# Patient Record
Sex: MALE | Employment: OTHER | URBAN - METROPOLITAN AREA
[De-identification: names, ages, dates, MRNs, and addresses within clinical notes are randomized per-mention and may not be internally consistent; named-entity substitution may affect disease eponyms.]

---

## 2017-05-18 ENCOUNTER — HOSPITAL ENCOUNTER (EMERGENCY)
Age: 67
Discharge: HOME | End: 2017-05-18
Attending: EMERGENCY MEDICINE
Payer: MEDICAID

## 2017-05-18 ENCOUNTER — APPOINTMENT (OUTPATIENT)
Dept: CT IMAGING | Age: 67
End: 2017-05-18
Attending: EMERGENCY MEDICINE
Payer: MEDICAID

## 2017-05-18 VITALS
HEART RATE: 72 BPM | WEIGHT: 273 LBS | SYSTOLIC BLOOD PRESSURE: 172 MMHG | DIASTOLIC BLOOD PRESSURE: 100 MMHG | TEMPERATURE: 99.3 F | RESPIRATION RATE: 16 BRPM | OXYGEN SATURATION: 96 % | BODY MASS INDEX: 36.02 KG/M2

## 2017-05-18 DIAGNOSIS — R22.1 LOCALIZED SWELLING, MASS AND LUMP, NECK: ICD-10-CM

## 2017-05-18 DIAGNOSIS — R03.0 ELEVATED BLOOD PRESSURE READING: Primary | ICD-10-CM

## 2017-05-18 PROCEDURE — 74011250636 HC RX REV CODE- 250/636: Performed by: EMERGENCY MEDICINE

## 2017-05-18 PROCEDURE — 87070 CULTURE OTHR SPECIMN AEROBIC: CPT | Performed by: EMERGENCY MEDICINE

## 2017-05-18 PROCEDURE — 96374 THER/PROPH/DIAG INJ IV PUSH: CPT

## 2017-05-18 PROCEDURE — 85025 COMPLETE CBC W/AUTO DIFF WBC: CPT | Performed by: EMERGENCY MEDICINE

## 2017-05-18 PROCEDURE — 86140 C-REACTIVE PROTEIN: CPT | Performed by: EMERGENCY MEDICINE

## 2017-05-18 PROCEDURE — 88305 TISSUE EXAM BY PATHOLOGIST: CPT | Performed by: OTOLARYNGOLOGY

## 2017-05-18 PROCEDURE — 74011250636 HC RX REV CODE- 250/636: Performed by: PHYSICIAN ASSISTANT

## 2017-05-18 PROCEDURE — 74011250636 HC RX REV CODE- 250/636: Performed by: OTOLARYNGOLOGY

## 2017-05-18 PROCEDURE — 84145 PROCALCITONIN (PCT): CPT | Performed by: PHYSICIAN ASSISTANT

## 2017-05-18 PROCEDURE — 80053 COMPREHEN METABOLIC PANEL: CPT | Performed by: EMERGENCY MEDICINE

## 2017-05-18 PROCEDURE — 85652 RBC SED RATE AUTOMATED: CPT | Performed by: EMERGENCY MEDICINE

## 2017-05-18 PROCEDURE — 99282 EMERGENCY DEPT VISIT SF MDM: CPT

## 2017-05-18 PROCEDURE — 88173 CYTOPATH EVAL FNA REPORT: CPT | Performed by: OTOLARYNGOLOGY

## 2017-05-18 PROCEDURE — 87040 BLOOD CULTURE FOR BACTERIA: CPT | Performed by: PHYSICIAN ASSISTANT

## 2017-05-18 PROCEDURE — 96375 TX/PRO/DX INJ NEW DRUG ADDON: CPT

## 2017-05-18 PROCEDURE — 96361 HYDRATE IV INFUSION ADD-ON: CPT

## 2017-05-18 PROCEDURE — 76380 CAT SCAN FOLLOW-UP STUDY: CPT

## 2017-05-18 RX ORDER — LISINOPRIL 20 MG/1
TABLET ORAL DAILY
COMMUNITY
End: 2019-02-23

## 2017-05-18 RX ORDER — OXYCODONE AND ACETAMINOPHEN 5; 325 MG/1; MG/1
TABLET ORAL
Qty: 12 TAB | Refills: 0 | Status: SHIPPED | OUTPATIENT
Start: 2017-05-18 | End: 2017-08-20

## 2017-05-18 RX ORDER — KETOROLAC TROMETHAMINE 30 MG/ML
15 INJECTION, SOLUTION INTRAMUSCULAR; INTRAVENOUS
Status: COMPLETED | OUTPATIENT
Start: 2017-05-18 | End: 2017-05-18

## 2017-05-18 RX ORDER — LIDOCAINE HYDROCHLORIDE 20 MG/ML
10 INJECTION, SOLUTION INFILTRATION; PERINEURAL ONCE
Status: DISCONTINUED | OUTPATIENT
Start: 2017-05-18 | End: 2017-05-18

## 2017-05-18 RX ORDER — CLINDAMYCIN HYDROCHLORIDE 300 MG/1
300 CAPSULE ORAL 4 TIMES DAILY
Qty: 40 CAP | Refills: 0 | Status: SHIPPED | OUTPATIENT
Start: 2017-05-18 | End: 2017-05-28

## 2017-05-18 RX ORDER — MORPHINE SULFATE 4 MG/ML
4 INJECTION, SOLUTION INTRAMUSCULAR; INTRAVENOUS
Status: COMPLETED | OUTPATIENT
Start: 2017-05-18 | End: 2017-05-18

## 2017-05-18 RX ADMIN — KETOROLAC TROMETHAMINE 15 MG: 30 INJECTION, SOLUTION INTRAMUSCULAR at 17:21

## 2017-05-18 RX ADMIN — Medication 4 MG: at 17:56

## 2017-05-18 RX ADMIN — SODIUM CHLORIDE 1000 ML: 900 INJECTION, SOLUTION INTRAVENOUS at 17:20

## 2017-05-18 NOTE — ED PROVIDER NOTES
HPI Comments: Pt presents with c/o \"swelling\" around the L neck just below the L ear x 3 month. He had seen his PCP who had ordered US followed by MRI but due to body habitus he was unable to get MRI scheduled. He is here today since it started hurting since last night. Denies of any n/v. SOB , chest pain, night sweats, chills, or difficulty hearing on the L ear, dysphagia or cough. Remote hx of tabacco use, quit in the 80 s. Patient is a 74 y.o. male presenting with abscess. Abscess           Past Medical History:   Diagnosis Date    COPD     Hypertension        Social History     Social History    Marital status: SINGLE     Spouse name: N/A    Number of children: N/A    Years of education: N/A     Occupational History    Not on file. Social History Main Topics    Smoking status: Former Smoker     Quit date: 6/1/1984    Smokeless tobacco: Not on file    Alcohol use Yes      Comment: sometimes    Drug use: No    Sexual activity: Not on file     Other Topics Concern    Not on file     Social History Narrative         ALLERGIES: Review of patient's allergies indicates no known allergies. Review of Systems   Constitutional: Negative for chills, fatigue and fever. HENT: Negative for dental problem, drooling, ear discharge, ear pain, facial swelling, hearing loss and mouth sores. Eyes: Negative for redness and itching. Respiratory: Positive for wheezing. Negative for apnea, cough, choking, chest tightness and stridor. Cardiovascular: Negative for chest pain and palpitations. Gastrointestinal: Negative for abdominal pain, anal bleeding and blood in stool. Musculoskeletal: Positive for neck pain. Negative for back pain, gait problem, joint swelling, myalgias and neck stiffness.        Vitals:    05/18/17 1435   BP: (!) 172/100   Pulse: 72   Resp: 16   Temp: 99.3 °F (37.4 °C)   SpO2: 96%   Weight: 123.8 kg (273 lb)            Physical Exam   Constitutional: He is oriented to person, place, and time. He appears well-developed and well-nourished. HENT:   Head: Normocephalic and atraumatic. Eyes: Conjunctivae and EOM are normal.   Neck: Normal range of motion. Neck mass on L side just below L ear approx 3-4 cm in diameter, warm to touch TTP, indurated with no fluctuance    Cardiovascular: Normal rate and regular rhythm. Pulmonary/Chest: Effort normal and breath sounds normal.   Abdominal: Soft. Bowel sounds are normal.   Musculoskeletal: Normal range of motion. Neurological: He is alert and oriented to person, place, and time. Skin: Skin is warm. Psychiatric: He has a normal mood and affect. His behavior is normal. Judgment and thought content normal.        MDM  Number of Diagnoses or Management Options  Elevated blood pressure reading:   Localized swelling, mass and lump, neck:   Diagnosis management comments: SpENT evaluated patient at [facility] and performed needle drainage with note explaining further detail. Recommended to send patient on Clindamycin including IR guided biopsy and follow up ih there  Clinic as OP. Spoke to ENT Resident Na/ Attend Dr Bandar Zamora @ [phone] pm based on CT neck finding. RECOMMENDATION PER ENT   \"IR core biopsy, preferably with Dr Sonna Kocher at [facility] (call [phone] during business hours to schedule), in next 2 weeks, with subsequent follow up with Dr Sher Archer, Dr Josie Jacobs, Dr Vicente Blum or Dr Jamison Romero at [facility] Otolaryngology (call [phone] during business hours to schedule). Return to ED if worsening redness, swelling, pain, fevers, difficulty breathing, or difficulty eating -- would recommend scan with IV contrast if that occurs. Discussed with senior resident Dr Sherri Page who agrees with management plan. \"    ED Course       Procedures    No results found for this or any previous visit (from the past 12 hour(s)). CT NECK W/O CONTRAST    1. No evidence of maxillofacial fracture.   2. Indeterminate, large mixed attenuation left parotid mass, involving both the  deep and superficial portions of the gland, and in close anatomic proximity to  the retromandibular vein. ENT consultation is recommended. 3. Incidental note of a medialized retropharyngeal course to the right common  carotid artery. DISCHARGE NOTE:  Now    Emmett Little Jr.'s  results have been reviewed with him. He has been counseled regarding his diagnosis, treatment, and plan. He verbally conveys understanding and agreement of the signs, symptoms, diagnosis, treatment and prognosis and additionally agrees to follow up as discussed. He also agrees with the care-plan and conveys that all of his questions have been answered. I have also provided discharge instructions for him that include: educational information regarding their diagnosis and treatment, and list of reasons why they would want to return to the ED prior to their follow-up appointment, should his condition change. CLINICAL IMPRESSION:    1. Elevated blood pressure reading    2. Localized swelling, mass and lump, neck        AFTER VISIT PLAN:    Current Discharge Medication List      START taking these medications    Details   clindamycin (CLEOCIN) 300 mg capsule Take 1 Cap by mouth four (4) times daily for 10 days. Qty: 40 Cap, Refills: 0      oxyCODONE-acetaminophen (PERCOCET) 5-325 mg per tablet Take 1 tablet every 4-6 hours as needed for pain control. If you were instructed to try over the counter ibuprofen or tylenol, only take the percocet for pain not controlled with the over the counter medication. Qty: 12 Tab, Refills: 0         STOP taking these medications       HYDROcodone-acetaminophen (NORCO) 5-325 mg per tablet Comments:   Reason for Stopping: Follow-up Information     Follow up With Details Comments Contact Info    Keturah Severe, MD Schedule an appointment as soon as possible for a visit TO GET SCHEDULED FOR IR GUIDED CORE BIOPSY FOR L sided neck mass.   [address] [place] [address] Belen Wiggins [phone] [facility] Department Of Otolaryngology Head Neck Surgery Schedule an appointment as soon as possible for a visit in 1 day call there office for follow up appointment.   [address] [place] [address] EMERGENCY DEPT  if pain worsens or difficulty swallowing, fever oral over 101, nausea, vomiting or shortness of breath = 100 [facility]           Written by Artem Crowe PA-C

## 2017-05-18 NOTE — CONSULTS
Consult    Patient: Lloyd Rice MRN: [id number]  SSN: [id number]    YOB: 1950  Age: 74 y.o. Sex: male      Subjective:      Lloyd Rice is a 74 y.o. male who is being seen for left parotid mass. Patient first noticed mass 9 days ago, and feels it has acutely enlarged and become more tender over past 72 hours. Denies fevers, unintentional weight loss, dyspnea, dental pain, difficulty eating, vision changes, or paresthesias. He denies tobacco use. Past Medical History:   Diagnosis Date    COPD     Hypertension      History reviewed. No pertinent surgical history. History reviewed. No pertinent family history. Social History   Substance Use Topics    Smoking status: Former Smoker     Quit date: 6/1/1984    Smokeless tobacco: Not on file    Alcohol use Yes      Comment: sometimes      Current Facility-Administered Medications   Medication Dose Route Frequency Provider Last Rate Last Dose    sodium chloride 0.9 % bolus infusion 1,000 mL  1,000 mL IntraVENous ONCE Patel Case, MD        ketorolac (TORADOL) injection 15 mg  15 mg IntraVENous NOW Lawrence AMANDA Joe         Current Outpatient Prescriptions   Medication Sig Dispense Refill    lisinopril (PRINIVIL, ZESTRIL) 20 mg tablet Take  by mouth daily.  simvastatin (ZOCOR) 10 mg tablet Take  by mouth nightly.  HYDROcodone-acetaminophen (NORCO) 5-325 mg per tablet Take 1 Tab by mouth every six (6) hours as needed for Pain. 12 Tab 0        No Known Allergies    Review of Systems:  A comprehensive review of systems was negative except for that written in the History of Present Illness. Objective:     Vitals:    05/18/17 1435   BP: (!) 172/100   Pulse: 72   Resp: 16   Temp: 99.3 °F (37.4 °C)   SpO2: 96%   Weight: 123.8 kg (273 lb)        Physical Exam:  NAD  EOMI, appropriate, neuro intact, CN's II-XII intact bilaterally.    TM's, EAC's clear  Nasal passages clear  OP clear, almost edentulous with no remaining dentition of left maxilla or mandible, left parotid duct papilla moderately edematous, able to express clear saliva on palpation of superior (non-swollen) aspect of gland. Left facial swelling overlying inferior aspect of parotid gland -- tender to palpation, tense. No overlying erythema. Neck flat with no obvious lymphadenopathy. RRR/CTAB       Labs:   Lab Results   Component Value Date/Time    WBC 6.0 05/16/2017 03:07 PM    HGB 14.4 05/16/2017 03:07 PM    HCT 42.3 05/16/2017 03:07 PM    PLATELET 599 10/35/6397 03:07 PM    MCV 83.6 05/16/2017 03:07 PM     Lab Results   Component Value Date/Time    Sodium 138 05/16/2017 03:07 PM    Potassium 4.0 05/16/2017 03:07 PM    Chloride 107 05/16/2017 03:07 PM    CO2 26 05/16/2017 03:07 PM    Anion gap 5 05/16/2017 03:07 PM    Glucose 92 05/16/2017 03:07 PM    BUN 13 05/16/2017 03:07 PM    Creatinine 1.04 05/16/2017 03:07 PM    BUN/Creatinine ratio 13 05/16/2017 03:07 PM    GFR est AA >60 05/16/2017 03:07 PM    GFR est non-AA >60 05/16/2017 03:07 PM    Calcium 8.5 05/16/2017 03:07 PM     Imaging:   CT maxillofacial w/o contrast:   Findings:        The frontal bone, nasal bones, bilateral zygomatic arches, medial orbital walls,  maxillary sinus walls, pterygoid plates, and anterior maxillary spine are  intact. Temporal mandibular joint alignment is normal bilaterally. Mild  bilateral temporomandibular joint osteoarthritis present. No evidence of  mandibular fracture. There are numerous missing teeth throughout the maxilla and  mandible. No evidence to suggest periapical abscess.     The CT appearance of the orbits demonstrates postoperative changes from  bilateral lens replacements. No evidence of intraconal or extraconal fat  abnormality. The included frontal, anterior and posterior ethmoid air cells,  bilateral maxillary, and sphenoid sinuses are normal in appearance.  Bilateral  mastoid air cells are normal in appearance.     Included submandibular glands are normal in appearance. There is a mixed  attenuation mass present within the left parotid gland, spanning the superficial  and deep components of the gland, which measures approximately 2.8 x 3.3 x 4.7  cm in size. This mass is in close approximation to the retromandibular vein. The  right parotid gland is normal in appearance.     Although not performed as an angiographic evaluation, incidental note is made of  a medial retropharyngeal course to the right common carotid artery.     IMPRESSION  IMPRESSION:  1. No evidence of maxillofacial fracture. 2. Indeterminate, large mixed attenuation left parotid mass, involving both the  deep and superficial portions of the gland, and in close anatomic proximity to  the retromandibular vein. ENT consultation is recommended. 3. Incidental note of a medialized retropharyngeal course to the right common  carotid artery. Assessment:     74 y.o. M with left parotid mass, with likely stasis of secretions. Difficult to fully evaluate without contrasted scan. Plan:     Needle aspiration performed with 22 G needle after informed consent obtained. Aspirated 6 cc of saliva mixed with blood; did not appear grossly purulent but sent for culture and cytology. OK for home from ENT standpoint with ABx (e.g. Clinda or Augmentin/Flagyl if more affordable), option of steroids, and warm compresses. Recommend IR core biopsy, preferably with Dr Izabela Swann at [facility] (call [phone] during business hours to schedule), in next 2 weeks, with subsequent follow up with Dr Luis E Gagnon, Dr Perfecto Osler, Dr Talia Medrano or Dr Latisha Casas at [facility] Otolaryngology (call [phone] during business hours to schedule). Return to ED if worsening redness, swelling, pain, fevers, difficulty breathing, or difficulty eating -- would recommend scan with IV contrast if that occurs. Discussed with senior resident Dr Job Goltz who agrees with management plan.        Signed By: Beryl Quiñonez MD     May 18, 2017

## 2017-05-18 NOTE — DISCHARGE INSTRUCTIONS
Elevated Blood Pressure: Care Instructions  Your Care Instructions    Blood pressure is a measure of how hard the blood pushes against the walls of your arteries. It's normal for blood pressure to go up and down throughout the day. But if it stays up over time, you have high blood pressure. Two numbers tell you your blood pressure. The first number is the systolic pressure. It shows how hard the blood pushes when your heart is pumping. The second number is the diastolic pressure. It shows how hard the blood pushes between heartbeats, when your heart is relaxed and filling with blood. An ideal blood pressure in adults is less than 120/80 (say \"120 over 80\"). High blood pressure is 140/90 or higher. You have high blood pressure if your top number is 140 or higher or your bottom number is 90 or higher, or both. The main test for high blood pressure is simple, fast, and painless. To diagnose high blood pressure, your doctor will test your blood pressure at different times. After testing your blood pressure, your doctor may ask you to test it again when you are home. If you are diagnosed with high blood pressure, you can work with your doctor to make a long-term plan to manage it. Follow-up care is a key part of your treatment and safety. Be sure to make and go to all appointments, and call your doctor if you are having problems. It's also a good idea to know your test results and keep a list of the medicines you take. How can you care for yourself at home? · Do not smoke. Smoking increases your risk for heart attack and stroke. If you need help quitting, talk to your doctor about stop-smoking programs and medicines. These can increase your chances of quitting for good. · Stay at a healthy weight. · Try to limit how much sodium you eat to less than 2,300 milligrams (mg) a day. Your doctor may ask you to try to eat less than 1,500 mg a day. · Be physically active.  Get at least 30 minutes of exercise on most days of the week. Walking is a good choice. You also may want to do other activities, such as running, swimming, cycling, or playing tennis or team sports. · Avoid or limit alcohol. Talk to your doctor about whether you can drink any alcohol. · Eat plenty of fruits, vegetables, and low-fat dairy products. Eat less saturated and total fats. · Learn how to check your blood pressure at home. When should you call for help? Call your doctor now or seek immediate medical care if:  · Your blood pressure is much higher than normal (such as 180/110 or higher). · You think high blood pressure is causing symptoms such as:  ¨ Severe headache. ¨ Blurry vision. Watch closely for changes in your health, and be sure to contact your doctor if:  · You do not get better as expected. Where can you learn more? Go to http://vinod-joshua.info/. Enter S976 in the search box to learn more about \"Elevated Blood Pressure: Care Instructions. \"  Current as of: October 19, 2016  Content Version: 11.2  © 9394-6598 [employer]. Care instructions adapted under license by [employer] (which disclaims liability or warranty for this information). If you have questions about a medical condition or this instruction, always ask your healthcare professional. [address] any warranty or liability for your use of this information.

## 2017-05-19 LAB
ALBUMIN SERPL BCP-MCNC: 3.1 G/DL (ref 3.4–5)
ALBUMIN/GLOB SERPL: 0.6 {RATIO} (ref 0.8–1.7)
ALP SERPL-CCNC: 85 U/L (ref 45–117)
ALT SERPL-CCNC: 33 U/L (ref 16–61)
ANION GAP BLD CALC-SCNC: 5 MMOL/L (ref 3–18)
AST SERPL W P-5'-P-CCNC: 41 U/L (ref 15–37)
BASOPHILS # BLD AUTO: 0 K/UL (ref 0–0.06)
BASOPHILS # BLD: 0 % (ref 0–2)
BILIRUB SERPL-MCNC: 1 MG/DL (ref 0.2–1)
BUN SERPL-MCNC: 13 MG/DL (ref 7–18)
BUN/CREAT SERPL: 13 (ref 12–20)
CALCIUM SERPL-MCNC: 8.5 MG/DL (ref 8.5–10.1)
CHLORIDE SERPL-SCNC: 107 MMOL/L (ref 100–108)
CO2 SERPL-SCNC: 26 MMOL/L (ref 21–32)
CREAT SERPL-MCNC: 1.04 MG/DL (ref 0.6–1.3)
CRP SERPL-MCNC: 1.2 MG/DL (ref 0–0.3)
DIFFERENTIAL METHOD BLD: ABNORMAL
EOSINOPHIL # BLD: 0 K/UL (ref 0–0.4)
EOSINOPHIL NFR BLD: 1 % (ref 0–5)
ERYTHROCYTE [DISTWIDTH] IN BLOOD BY AUTOMATED COUNT: 13.7 % (ref 11.6–14.5)
ERYTHROCYTE [SEDIMENTATION RATE] IN BLOOD: 19 MM/HR (ref 0–20)
GLOBULIN SER CALC-MCNC: 5.3 G/DL (ref 2–4)
GLUCOSE SERPL-MCNC: 92 MG/DL (ref 74–99)
HCT VFR BLD AUTO: 42.3 % (ref 36–48)
HGB BLD-MCNC: 14.4 G/DL (ref 13–16)
LYMPHOCYTES # BLD AUTO: 28 % (ref 21–52)
LYMPHOCYTES # BLD: 1.7 K/UL (ref 0.9–3.6)
MCH RBC QN AUTO: 28.5 PG (ref 24–34)
MCHC RBC AUTO-ENTMCNC: 34 G/DL (ref 31–37)
MCV RBC AUTO: 83.6 FL (ref 74–97)
MONOCYTES # BLD: 0.7 K/UL (ref 0.05–1.2)
MONOCYTES NFR BLD AUTO: 12 % (ref 3–10)
NEUTS SEG # BLD: 3.6 K/UL (ref 1.8–8)
NEUTS SEG NFR BLD AUTO: 59 % (ref 40–73)
PLATELET # BLD AUTO: 135 K/UL (ref 135–420)
PMV BLD AUTO: 10.3 FL (ref 9.2–11.8)
POTASSIUM SERPL-SCNC: 4 MMOL/L (ref 3.5–5.5)
PROCALCITONIN SERPL-MCNC: 0.05 NG/ML (ref 0–0.08)
PROT SERPL-MCNC: 8.4 G/DL (ref 6.4–8.2)
RBC # BLD AUTO: 5.06 M/UL (ref 4.7–5.5)
SODIUM SERPL-SCNC: 138 MMOL/L (ref 136–145)
WBC # BLD AUTO: 6 K/UL (ref 4.6–13.2)

## 2017-05-19 NOTE — ED NOTES
I have reviewed discharge instructions with the patient. The patient verbalized understanding. Patient made aware that he should not drive while taking prescription narcotics. Patient verbalized understanding. Patient armband removed and shredded.

## 2017-05-23 LAB
BACTERIA SPEC CULT: NORMAL
GRAM STN SPEC: NORMAL
GRAM STN SPEC: NORMAL
SERVICE CMNT-IMP: NORMAL

## 2017-05-24 LAB
BACTERIA SPEC CULT: NORMAL
BACTERIA SPEC CULT: NORMAL
SERVICE CMNT-IMP: NORMAL
SERVICE CMNT-IMP: NORMAL

## 2017-06-14 ENCOUNTER — HOSPITAL ENCOUNTER (EMERGENCY)
Age: 67
Discharge: HOME | End: 2017-06-14
Attending: EMERGENCY MEDICINE
Payer: MEDICAID

## 2017-06-14 ENCOUNTER — APPOINTMENT (OUTPATIENT)
Dept: GENERAL RADIOLOGY | Age: 67
End: 2017-06-14
Attending: EMERGENCY MEDICINE
Payer: MEDICAID

## 2017-06-14 VITALS
HEART RATE: 82 BPM | OXYGEN SATURATION: 98 % | DIASTOLIC BLOOD PRESSURE: 74 MMHG | RESPIRATION RATE: 20 BRPM | SYSTOLIC BLOOD PRESSURE: 127 MMHG | TEMPERATURE: 98.7 F

## 2017-06-14 DIAGNOSIS — T50.901A DRUG OVERDOSE, ACCIDENTAL OR UNINTENTIONAL, INITIAL ENCOUNTER: Primary | ICD-10-CM

## 2017-06-14 DIAGNOSIS — F14.10 COCAINE ABUSE (HCC): ICD-10-CM

## 2017-06-14 LAB
ALBUMIN SERPL BCP-MCNC: 3.2 G/DL (ref 3.4–5)
ALBUMIN/GLOB SERPL: 0.7 {RATIO} (ref 0.8–1.7)
ALP SERPL-CCNC: 90 U/L (ref 45–117)
ALT SERPL-CCNC: 39 U/L (ref 16–61)
AMPHET UR QL SCN: NEGATIVE
ANION GAP BLD CALC-SCNC: 12 MMOL/L (ref 3–18)
AST SERPL W P-5'-P-CCNC: 51 U/L (ref 15–37)
ATRIAL RATE: 102 BPM
BARBITURATES UR QL SCN: NEGATIVE
BASOPHILS # BLD AUTO: 0 K/UL (ref 0–0.06)
BASOPHILS # BLD: 0 % (ref 0–2)
BENZODIAZ UR QL: POSITIVE
BILIRUB SERPL-MCNC: 0.6 MG/DL (ref 0.2–1)
BUN SERPL-MCNC: 7 MG/DL (ref 7–18)
BUN/CREAT SERPL: 6 (ref 12–20)
CALCIUM SERPL-MCNC: 8.2 MG/DL (ref 8.5–10.1)
CALCULATED P AXIS, ECG09: 49 DEGREES
CALCULATED R AXIS, ECG10: -18 DEGREES
CALCULATED T AXIS, ECG11: 43 DEGREES
CANNABINOIDS UR QL SCN: NEGATIVE
CHLORIDE SERPL-SCNC: 108 MMOL/L (ref 100–108)
CK MB CFR SERPL CALC: 0.9 % (ref 0–4)
CK MB SERPL-MCNC: 3.6 NG/ML (ref 5–25)
CK SERPL-CCNC: 383 U/L (ref 39–308)
CO2 SERPL-SCNC: 22 MMOL/L (ref 21–32)
COCAINE UR QL SCN: POSITIVE
CREAT SERPL-MCNC: 1.26 MG/DL (ref 0.6–1.3)
DIAGNOSIS, 93000: NORMAL
DIFFERENTIAL METHOD BLD: ABNORMAL
EOSINOPHIL # BLD: 0 K/UL (ref 0–0.4)
EOSINOPHIL NFR BLD: 1 % (ref 0–5)
ERYTHROCYTE [DISTWIDTH] IN BLOOD BY AUTOMATED COUNT: 14.3 % (ref 11.6–14.5)
ETHANOL SERPL-MCNC: 32 MG/DL (ref 0–3)
GLOBULIN SER CALC-MCNC: 4.9 G/DL (ref 2–4)
GLUCOSE SERPL-MCNC: 143 MG/DL (ref 74–99)
HCT VFR BLD AUTO: 42.8 % (ref 36–48)
HDSCOM,HDSCOM: ABNORMAL
HGB BLD-MCNC: 14.2 G/DL (ref 13–16)
LYMPHOCYTES # BLD AUTO: 30 % (ref 21–52)
LYMPHOCYTES # BLD: 2.2 K/UL (ref 0.9–3.6)
MCH RBC QN AUTO: 28.6 PG (ref 24–34)
MCHC RBC AUTO-ENTMCNC: 33.2 G/DL (ref 31–37)
MCV RBC AUTO: 86.3 FL (ref 74–97)
METHADONE UR QL: NEGATIVE
MONOCYTES # BLD: 0.4 K/UL (ref 0.05–1.2)
MONOCYTES NFR BLD AUTO: 5 % (ref 3–10)
NEUTS SEG # BLD: 4.8 K/UL (ref 1.8–8)
NEUTS SEG NFR BLD AUTO: 64 % (ref 40–73)
OPIATES UR QL: NEGATIVE
P-R INTERVAL, ECG05: 126 MS
PCP UR QL: NEGATIVE
PLATELET # BLD AUTO: 130 K/UL (ref 135–420)
PMV BLD AUTO: 10.8 FL (ref 9.2–11.8)
POTASSIUM SERPL-SCNC: 4.6 MMOL/L (ref 3.5–5.5)
PROT SERPL-MCNC: 8.1 G/DL (ref 6.4–8.2)
Q-T INTERVAL, ECG07: 354 MS
QRS DURATION, ECG06: 82 MS
QTC CALCULATION (BEZET), ECG08: 461 MS
RBC # BLD AUTO: 4.96 M/UL (ref 4.7–5.5)
SODIUM SERPL-SCNC: 142 MMOL/L (ref 136–145)
TROPONIN I SERPL-MCNC: 0.02 NG/ML (ref 0–0.04)
VENTRICULAR RATE, ECG03: 102 BPM
WBC # BLD AUTO: 7.4 K/UL (ref 4.6–13.2)

## 2017-06-14 PROCEDURE — 80053 COMPREHEN METABOLIC PANEL: CPT | Performed by: EMERGENCY MEDICINE

## 2017-06-14 PROCEDURE — 74011250636 HC RX REV CODE- 250/636: Performed by: EMERGENCY MEDICINE

## 2017-06-14 PROCEDURE — 80307 DRUG TEST PRSMV CHEM ANLYZR: CPT | Performed by: EMERGENCY MEDICINE

## 2017-06-14 PROCEDURE — 99285 EMERGENCY DEPT VISIT HI MDM: CPT

## 2017-06-14 PROCEDURE — 85025 COMPLETE CBC W/AUTO DIFF WBC: CPT | Performed by: EMERGENCY MEDICINE

## 2017-06-14 PROCEDURE — 96374 THER/PROPH/DIAG INJ IV PUSH: CPT

## 2017-06-14 PROCEDURE — 82550 ASSAY OF CK (CPK): CPT | Performed by: EMERGENCY MEDICINE

## 2017-06-14 PROCEDURE — 71010 XR CHEST PORT: CPT

## 2017-06-14 PROCEDURE — 93005 ELECTROCARDIOGRAM TRACING: CPT

## 2017-06-14 RX ORDER — NALOXONE HYDROCHLORIDE 1 MG/ML
INJECTION INTRAMUSCULAR; INTRAVENOUS; SUBCUTANEOUS
Status: DISCONTINUED
Start: 2017-06-14 | End: 2017-06-14 | Stop reason: HOSPADM

## 2017-06-14 RX ORDER — NALOXONE HYDROCHLORIDE 1 MG/ML
2 INJECTION INTRAMUSCULAR; INTRAVENOUS; SUBCUTANEOUS
Status: COMPLETED | OUTPATIENT
Start: 2017-06-14 | End: 2017-06-14

## 2017-06-14 RX ADMIN — NALOXONE HYDROCHLORIDE 2 MG: 1 INJECTION PARENTERAL at 02:10

## 2017-06-14 NOTE — ED PROVIDER NOTES
HPI Comments: Ghanshyam Carson is a 74 y.o. Male who found unresponsive via friends, attempted to revive with throwing water on patient, called ems who arrived noted dec respirations, given narcan with return of normal loc. Pt denies any drug use but has h/o opiate, cocaine abuse in past. Denies any recent illness and does not recall what happened. The history is provided by the patient and the EMS personnel. Past Medical History:   Diagnosis Date    COPD     Hypertension        History reviewed. No pertinent surgical history. History reviewed. No pertinent family history. Social History     Social History    Marital status: SINGLE     Spouse name: N/A    Number of children: N/A    Years of education: N/A     Occupational History    Not on file. Social History Main Topics    Smoking status: Former Smoker     Quit date: 6/1/1984    Smokeless tobacco: Not on file    Alcohol use Yes      Comment: sometimes    Drug use: No    Sexual activity: Not on file     Other Topics Concern    Not on file     Social History Narrative         ALLERGIES: Review of patient's allergies indicates no known allergies. Review of Systems   Constitutional: Negative for fever. HENT: Negative for sore throat. Eyes: Negative for visual disturbance. Respiratory: Positive for apnea and shortness of breath. Cardiovascular: Negative for chest pain and leg swelling. Gastrointestinal: Negative for abdominal pain. Genitourinary: Negative for difficulty urinating. Musculoskeletal: Negative for gait problem (none recent). Skin: Negative for wound. Allergic/Immunologic: Negative for immunocompromised state. Neurological: Positive for syncope. Hematological: Does not bruise/bleed easily. Psychiatric/Behavioral: Negative for suicidal ideas.        Vitals:    06/14/17 0400 06/14/17 0445 06/14/17 0500 06/14/17 0515   BP: 115/64 132/71 133/71 119/69   Pulse: 83 77 76 75   Resp: 27 24 24 25 Temp:       SpO2: 100% 98% 96% 98%            Physical Exam   Constitutional: He is oriented to person, place, and time. Non-toxic appearance. He does not appear ill. No distress. HENT:   Head: Normocephalic and atraumatic. Right Ear: External ear normal.   Left Ear: External ear normal.   Nose: Nose normal.   Mouth/Throat: Oropharynx is clear and moist. No oropharyngeal exudate. Powder substance in nares     Eyes: Conjunctivae are normal.   Neck: Normal range of motion. Cardiovascular: Normal rate, regular rhythm, normal heart sounds and intact distal pulses. Pulmonary/Chest: Effort normal and breath sounds normal. No respiratory distress. He has no wheezes. He has no rales. Abdominal: Soft. There is no tenderness. Musculoskeletal: Normal range of motion. He exhibits no edema. Neurological: He is alert and oriented to person, place, and time. He displays no tremor. No cranial nerve deficit (3-12) or sensory deficit (gross touch). He exhibits normal muscle tone. GCS eye subscore is 4. GCS verbal subscore is 5. GCS motor subscore is 6. Skin: Skin is warm and dry. He is not diaphoretic. Psychiatric: His behavior is normal.   Nursing note and vitals reviewed.        [facility]  ED Course       Procedures           Vitals:  Patient Vitals for the past 12 hrs:   Temp Pulse Resp BP SpO2   06/14/17 0515 - 75 25 119/69 98 %   06/14/17 0500 - 76 24 133/71 96 %   06/14/17 0445 - 77 24 132/71 98 %   06/14/17 0400 - 83 27 115/64 100 %   06/14/17 0345 - 84 26 124/64 99 %   06/14/17 0330 - 87 28 128/64 99 %   06/14/17 0315 - 84 28 130/72 99 %   06/14/17 0300 - 83 28 138/73 99 %   06/14/17 0245 - 81 28 148/69 100 %   06/14/17 0230 - 91 (!) 33 162/76 97 %   06/14/17 0226 98.7 °F (37.1 °C) (!) 107 (!) 34 162/76 97 %         Medications ordered:   Medications   naloxone (NARCAN) 1 mg/mL injection (not administered)   naloxone (NARCAN) injection 2 mg (2 mg IntraVENous Given 6/14/17 0210)         Lab findings:  Recent Results (from the past 12 hour(s))   DRUG SCREEN, URINE    Collection Time: 06/14/17  2:16 AM   Result Value Ref Range    BENZODIAZEPINE POSITIVE (A) NEG      BARBITURATES NEGATIVE  NEG      THC (TH-CANNABINOL) NEGATIVE  NEG      OPIATES NEGATIVE  NEG      PCP(PHENCYCLIDINE) NEGATIVE  NEG      COCAINE POSITIVE (A) NEG      AMPHETAMINE NEGATIVE  NEG      METHADONE NEGATIVE       HDSCOM (NOTE)    CBC WITH AUTOMATED DIFF    Collection Time: 06/14/17  2:20 AM   Result Value Ref Range    WBC 7.4 4.6 - 13.2 K/uL    RBC 4.96 4.70 - 5.50 M/uL    HGB 14.2 13.0 - 16.0 g/dL    HCT 42.8 36.0 - 48.0 %    MCV 86.3 74.0 - 97.0 FL    MCH 28.6 24.0 - 34.0 PG    MCHC 33.2 31.0 - 37.0 g/dL    RDW 14.3 11.6 - 14.5 %    PLATELET 790 (L) 775 - 420 K/uL    MPV 10.8 9.2 - 11.8 FL    NEUTROPHILS 64 40 - 73 %    LYMPHOCYTES 30 21 - 52 %    MONOCYTES 5 3 - 10 %    EOSINOPHILS 1 0 - 5 %    BASOPHILS 0 0 - 2 %    ABS. NEUTROPHILS 4.8 1.8 - 8.0 K/UL    ABS. LYMPHOCYTES 2.2 0.9 - 3.6 K/UL    ABS. MONOCYTES 0.4 0.05 - 1.2 K/UL    ABS. EOSINOPHILS 0.0 0.0 - 0.4 K/UL    ABS. BASOPHILS 0.0 0.0 - 0.06 K/UL    DF AUTOMATED     METABOLIC PANEL, COMPREHENSIVE    Collection Time: 06/14/17  2:20 AM   Result Value Ref Range    Sodium 142 136 - 145 mmol/L    Potassium 4.6 3.5 - 5.5 mmol/L    Chloride 108 100 - 108 mmol/L    CO2 22 21 - 32 mmol/L    Anion gap 12 3.0 - 18 mmol/L    Glucose 143 (H) 74 - 99 mg/dL    BUN 7 7.0 - 18 MG/DL    Creatinine 1.26 0.6 - 1.3 MG/DL    BUN/Creatinine ratio 6 (L) 12 - 20      GFR est AA >60 >60 ml/min/1.73m2    GFR est non-AA 57 (L) >60 ml/min/1.73m2    Calcium 8.2 (L) 8.5 - 10.1 MG/DL    Bilirubin, total 0.6 0.2 - 1.0 MG/DL    ALT (SGPT) 39 16 - 61 U/L    AST (SGOT) 51 (H) 15 - 37 U/L    Alk.  phosphatase 90 45 - 117 U/L    Protein, total 8.1 6.4 - 8.2 g/dL    Albumin 3.2 (L) 3.4 - 5.0 g/dL    Globulin 4.9 (H) 2.0 - 4.0 g/dL    A-G Ratio 0.7 (L) 0.8 - 1.7     CARDIAC PANEL,(CK, CKMB & TROPONIN)    Collection Time: 06/14/17 2:20 AM   Result Value Ref Range     (H) 39 - 308 U/L    CK - MB 3.6 (H) <3.6 ng/ml    CK-MB Index 0.9 0.0 - 4.0 %    Troponin-I, Qt. 0.02 0.0 - 0.045 NG/ML   EKG, 12 LEAD, INITIAL    Collection Time: 06/14/17  2:21 AM   Result Value Ref Range    Ventricular Rate 102 BPM    Atrial Rate 102 BPM    P-R Interval 126 ms    QRS Duration 82 ms    Q-T Interval 354 ms    QTC Calculation (Bezet) 461 ms    Calculated P Axis 49 degrees    Calculated R Axis -18 degrees    Calculated T Axis 43 degrees    Diagnosis       Sinus tachycardia  Possible Left atrial enlargement  Borderline ECG  When compared with ECG of 05-DEC-2010 13:00,  No significant change was found         EKG interpretation by ED Physician:  Sinus tach with no acute st tw changes  Rate 102, qtc 461    X-Ray, CT or other radiology findings or impressions:  XR CHEST PORT    (Results Pending)   nap ep interp    Progress notes, Consult notes or additional Procedure notes:   D/w pt results, need to stop using drugs as it may result in death to which he expressed understanding  No further resp depression, sig hypoxia,. Vitals stable  I have discussed with patient and/or family/sig other the results, interpretation of any imaging if performed, suspected diagnosis and treatment plan to include instructions regarding the diagnoses listed to which understanding was expressed with all questions answered    ED Critical Care Note    System at risk for life threatening failure: cardiac, neuro, pulm  Associated problems: tachy, hypoxia, neuro    Critical Care services provided: bedside management, d/w pt, documentation  Excluded procedures (time not included in critical care): ecg interp    Total Critical Care Time (in minutes) 38          Reevaluation of patient:   stable    Disposition:  Diagnosis:   1. Drug overdose, accidental or unintentional, initial encounter    2.  Cocaine abuse        Disposition: home      Follow-up Information     Follow up With Details Comments Contact Info    Rafael Green MD Schedule an appointment as soon as possible for a visit  [address] [phone]      [facility] EMERGENCY DEPT  If symptoms worsen [address]  [phone]            Patient's Medications   Start Taking    No medications on file   Continue Taking    LISINOPRIL (PRINIVIL, ZESTRIL) 20 MG TABLET    Take  by mouth daily. OXYCODONE-ACETAMINOPHEN (PERCOCET) 5-325 MG PER TABLET    Take 1 tablet every 4-6 hours as needed for pain control. If you were instructed to try over the counter ibuprofen or tylenol, only take the percocet for pain not controlled with the over the counter medication. SIMVASTATIN (ZOCOR) 10 MG TABLET    Take  by mouth nightly.    These Medications have changed    No medications on file   Stop Taking    No medications on file

## 2017-06-14 NOTE — ED TRIAGE NOTES
EMS called for unresponsive patient. Upon arrival found patient unresponsive with agonal breathing and pinpoint pupils. Administered 2mg NARCAN IV. Patient then became more responsive and respirations improved. Upon arrival patient awake and answering questions. Patient denies drug use, but admits to ETOH consumption.

## 2017-06-14 NOTE — ED NOTES
Patient states he was drinking and took some of the \"fentanyl stuff\". Patient does not appear to be in any respiratory distress.

## 2017-08-11 ENCOUNTER — HOSPITAL ENCOUNTER (OUTPATIENT)
Dept: LAB | Age: 67
Discharge: HOME | End: 2017-08-11

## 2017-08-11 DIAGNOSIS — M79.10 MYALGIA: ICD-10-CM

## 2017-08-11 PROCEDURE — 99001 SPECIMEN HANDLING PT-LAB: CPT | Performed by: INTERNAL MEDICINE

## 2017-08-20 ENCOUNTER — APPOINTMENT (OUTPATIENT)
Dept: GENERAL RADIOLOGY | Age: 67
End: 2017-08-20
Attending: EMERGENCY MEDICINE
Payer: MEDICAID

## 2017-08-20 ENCOUNTER — HOSPITAL ENCOUNTER (EMERGENCY)
Age: 67
Discharge: HOME | End: 2017-08-20
Attending: EMERGENCY MEDICINE
Payer: MEDICAID

## 2017-08-20 VITALS
HEART RATE: 77 BPM | DIASTOLIC BLOOD PRESSURE: 113 MMHG | SYSTOLIC BLOOD PRESSURE: 176 MMHG | RESPIRATION RATE: 17 BRPM | HEIGHT: 72 IN | WEIGHT: 255 LBS | BODY MASS INDEX: 34.54 KG/M2 | TEMPERATURE: 98.2 F | OXYGEN SATURATION: 100 %

## 2017-08-20 DIAGNOSIS — S82.425A CLOSED NONDISPLACED TRANSVERSE FRACTURE OF SHAFT OF LEFT FIBULA, INITIAL ENCOUNTER: Primary | ICD-10-CM

## 2017-08-20 PROCEDURE — 99283 EMERGENCY DEPT VISIT LOW MDM: CPT

## 2017-08-20 PROCEDURE — 93971 EXTREMITY STUDY: CPT

## 2017-08-20 PROCEDURE — 74011250637 HC RX REV CODE- 250/637: Performed by: EMERGENCY MEDICINE

## 2017-08-20 PROCEDURE — 73590 X-RAY EXAM OF LOWER LEG: CPT

## 2017-08-20 RX ORDER — OXYCODONE AND ACETAMINOPHEN 5; 325 MG/1; MG/1
TABLET ORAL
Qty: 12 TAB | Refills: 0 | Status: SHIPPED | OUTPATIENT
Start: 2017-08-20 | End: 2018-08-24 | Stop reason: CLARIF

## 2017-08-20 RX ORDER — HYDROCODONE BITARTRATE AND ACETAMINOPHEN 5; 325 MG/1; MG/1
TABLET ORAL
Qty: 12 TAB | Refills: 0 | Status: SHIPPED | OUTPATIENT
Start: 2017-08-20 | End: 2018-08-24 | Stop reason: CLARIF

## 2017-08-20 RX ORDER — OXYCODONE AND ACETAMINOPHEN 5; 325 MG/1; MG/1
1 TABLET ORAL
Qty: 12 TAB | Refills: 0 | Status: SHIPPED | OUTPATIENT
Start: 2017-08-20 | End: 2017-08-20

## 2017-08-20 RX ORDER — OXYCODONE AND ACETAMINOPHEN 5; 325 MG/1; MG/1
1 TABLET ORAL
Status: COMPLETED | OUTPATIENT
Start: 2017-08-20 | End: 2017-08-20

## 2017-08-20 RX ADMIN — OXYCODONE HYDROCHLORIDE AND ACETAMINOPHEN 1 TABLET: 5; 325 TABLET ORAL at 12:10

## 2017-08-20 NOTE — ED PROVIDER NOTES
HPI Comments: 11:46 AM Aubrey Almaraz is a 74 y.o. male with h/o HTN who presents to ED complaining of left leg pain onset Monday. Patient says he was moving a rock with a stick when the stick bounced back and hit him in the leg. He admits to being nauseous from taking ibuprofen. Had no relief with the ibuprofen. Pain is 9/10. Denies CP, SOB, fever, and vomiting. No other concerns or symptoms at this time. PCP: Joyce Flores MD      The history is provided by the patient. Past Medical History:   Diagnosis Date    COPD     Hypertension        History reviewed. No pertinent surgical history. History reviewed. No pertinent family history. Social History     Social History    Marital status: SINGLE     Spouse name: N/A    Number of children: N/A    Years of education: N/A     Occupational History    Not on file. Social History Main Topics    Smoking status: Former Smoker     Quit date: 6/1/1984    Smokeless tobacco: Never Used    Alcohol use Yes      Comment: sometimes    Drug use: No    Sexual activity: Not on file     Other Topics Concern    Not on file     Social History Narrative         ALLERGIES: Review of patient's allergies indicates no known allergies. Review of Systems   Constitutional: Negative for diaphoresis and fever. HENT: Negative for congestion and sore throat. Eyes: Negative for pain and itching. Respiratory: Negative for cough and shortness of breath. Cardiovascular: Negative for chest pain and palpitations. Gastrointestinal: Negative for abdominal pain and diarrhea. Endocrine: Negative for polydipsia and polyuria. Genitourinary: Negative for dysuria and hematuria. Musculoskeletal: Positive for myalgias (left leg pain). Negative for arthralgias. Skin: Negative for rash and wound. Neurological: Negative for seizures and syncope. Hematological: Does not bruise/bleed easily.    Psychiatric/Behavioral: Negative for agitation and hallucinations. Vitals:    08/20/17 1135   BP: (!) 176/113   Pulse: 77   Resp: 17   Temp: 98.2 °F (36.8 °C)   SpO2: 100%   Weight: 115.7 kg (255 lb)   Height: 6' (1.829 m)            Physical Exam   Constitutional: He appears well-developed and well-nourished. HENT:   Head: Normocephalic and atraumatic. Eyes: Conjunctivae are normal. No scleral icterus. Neck: Normal range of motion. Neck supple. No JVD present. Cardiovascular: Normal rate, regular rhythm and normal heart sounds. 4 intact extremity pulses   Pulmonary/Chest: Effort normal and breath sounds normal.   Abdominal: Soft. He exhibits no mass. There is no tenderness. Musculoskeletal: Normal range of motion. Left lower leg: He exhibits tenderness. He exhibits no swelling and no deformity. No skin lesion. Neurovascular Intact. Lymphadenopathy:     He has no cervical adenopathy. Neurological: He is alert. Skin: Skin is warm and dry. Nursing note and vitals reviewed. MDM  Number of Diagnoses or Management Options  Diagnosis management comments: Unlikely fracture or DVT. Mot likely contusion. Will give percocet for pain. Will Xray and US. ED Course       Procedures      Vitals:  Patient Vitals for the past 12 hrs:   Temp Pulse Resp BP SpO2   08/20/17 1135 98.2 °F (36.8 °C) 77 17 (!) 176/113 100 %       Medications ordered:   Medications   oxyCODONE-acetaminophen (PERCOCET) 5-325 mg per tablet 1 Tab (1 Tab Oral Given 8/20/17 1210)         X-Ray, CT or other radiology findings or impressions:  DUPLEX LOWER EXT VENOUS LEFT         XR TIB/FIB LT    (Results Pending)    duplex LLE neg for DVT    XR shows left proximal fracture of the fibula        Progress notes, Consult notes, and Reevaluation of patient:   1:08 PM pain well controlled with a percocet. Discussed finding of no dvt, has fibular fracture left side, weight bear as tolerated, script for percocet #12  Reviewed in , appears he's in pain management.   In light of acute fracture, will give small script for percocet. Disposition:  Diagnosis:   1. Closed nondisplaced transverse fracture of shaft of left fibula, initial encounter        Disposition: home    Follow-up Information     Follow up With Details Comments [address] [address], [place] [id number]  [phone]             Patient's Medications   Start Taking    HYDROCODONE-ACETAMINOPHEN (NORCO) 5-325 MG PER TABLET    Take 1-2 tablets PO every 4-6 hours as needed for pain control. If over the counter ibuprofen or acetaminophen was suggested, then only take the vicodin for pain not well controlled with the over the counter medication. Continue Taking    LISINOPRIL (PRINIVIL, ZESTRIL) 20 MG TABLET    Take  by mouth daily. SIMVASTATIN (ZOCOR) 10 MG TABLET    Take  by mouth nightly. These Medications have changed    No medications on file   Stop Taking    OXYCODONE-ACETAMINOPHEN (PERCOCET) 5-325 MG PER TABLET    Take 1 tablet every 4-6 hours as needed for pain control. If you were instructed to try over the counter ibuprofen or tylenol, only take the percocet for pain not controlled with the over the counter medication. Mya Wadlen MD acting as a scribe for and in the presence of Rebecca Leon MD      August 20, 2017 at 1:07 PM       Provider Attestation:      I personally performed the services described in the documentation, reviewed the documentation, as recorded by the scribe in my presence, and it accurately and completely records my words and actions.  August 20, 2017 at 1:07 PM - Rebecca Leon MD

## 2017-08-20 NOTE — PROCEDURES
Jamee  *** FINAL REPORT ***    Name: Jean Carlos Alvarado  MRN: [id number]    Inpatient  : 13 Sep 1950  HIS Order #: [id number]  [id number] [facility] Visit #: [id number]  Date: 20 Aug 2017    TYPE OF TEST: Peripheral Venous Testing    REASON FOR TEST  Pain in limb    Left Leg:-  Deep venous thrombosis:           No  Superficial venous thrombosis:    No  Deep venous insufficiency:        Not examined  Superficial venous insufficiency: Not examined      INTERPRETATION/FINDINGS  Duplex images were obtained using 2-D gray scale, color flow, and  spectral Doppler analysis. Left leg :  1. Deep vein(s) visualized include the common femoral, deep femoral,  proximal femoral, mid femoral, distal femoral, popliteal(above knee),  popliteal(fossa), popliteal(below knee), posterior tibial and peroneal   veins. 2. No evidence of deep venous thrombosis detected in the veins  visualized. 3. No evidence of deep vein thrombosis in the contralateral common  femoral vein. 4. Superficial vein(s) visualized include the great saphenous and  small saphenous veins. 5. No evidence of superficial thrombosis detected. ADDITIONAL COMMENTS  Results given to Dr. Vazquez Officer    I have personally reviewed the data relevant to the interpretation of  this  study. TECHNOLOGIST: Mehul Fernandez RDMS, SHANTEL  Signed: 2017 12:07 PM    PHYSICIAN: Roseann Marshall MD  Signed: 2017 07:38 PM

## 2018-07-08 ENCOUNTER — HOSPITAL ENCOUNTER (EMERGENCY)
Age: 68
Discharge: HOME | End: 2018-07-08
Attending: EMERGENCY MEDICINE
Payer: MEDICAID

## 2018-07-08 VITALS
SYSTOLIC BLOOD PRESSURE: 153 MMHG | OXYGEN SATURATION: 93 % | RESPIRATION RATE: 22 BRPM | DIASTOLIC BLOOD PRESSURE: 69 MMHG | HEART RATE: 85 BPM | TEMPERATURE: 98.2 F

## 2018-07-08 DIAGNOSIS — T40.601A OPIATE OVERDOSE, ACCIDENTAL OR UNINTENTIONAL, INITIAL ENCOUNTER (HCC): Primary | ICD-10-CM

## 2018-07-08 LAB
ANION GAP SERPL CALC-SCNC: 9 MMOL/L (ref 3–18)
BASOPHILS # BLD: 0 K/UL (ref 0–0.06)
BASOPHILS NFR BLD: 0 % (ref 0–2)
BUN SERPL-MCNC: 20 MG/DL (ref 7–18)
BUN/CREAT SERPL: 14 (ref 12–20)
CALCIUM SERPL-MCNC: 7.9 MG/DL (ref 8.5–10.1)
CHLORIDE SERPL-SCNC: 107 MMOL/L (ref 100–108)
CO2 SERPL-SCNC: 23 MMOL/L (ref 21–32)
CREAT SERPL-MCNC: 1.46 MG/DL (ref 0.6–1.3)
DIFFERENTIAL METHOD BLD: ABNORMAL
EOSINOPHIL # BLD: 0.1 K/UL (ref 0–0.4)
EOSINOPHIL NFR BLD: 2 % (ref 0–5)
ERYTHROCYTE [DISTWIDTH] IN BLOOD BY AUTOMATED COUNT: 14.3 % (ref 11.6–14.5)
GLUCOSE SERPL-MCNC: 132 MG/DL (ref 74–99)
HCT VFR BLD AUTO: 44.7 % (ref 36–48)
HGB BLD-MCNC: 14.9 G/DL (ref 13–16)
LYMPHOCYTES # BLD: 3.6 K/UL (ref 0.9–3.6)
LYMPHOCYTES NFR BLD: 49 % (ref 21–52)
MCH RBC QN AUTO: 28.5 PG (ref 24–34)
MCHC RBC AUTO-ENTMCNC: 33.3 G/DL (ref 31–37)
MCV RBC AUTO: 85.5 FL (ref 74–97)
MONOCYTES # BLD: 0.3 K/UL (ref 0.05–1.2)
MONOCYTES NFR BLD: 5 % (ref 3–10)
NEUTS SEG # BLD: 3.2 K/UL (ref 1.8–8)
NEUTS SEG NFR BLD: 44 % (ref 40–73)
PLATELET # BLD AUTO: 129 K/UL (ref 135–420)
PMV BLD AUTO: 11.6 FL (ref 9.2–11.8)
POTASSIUM SERPL-SCNC: 3.9 MMOL/L (ref 3.5–5.5)
RBC # BLD AUTO: 5.23 M/UL (ref 4.7–5.5)
SODIUM SERPL-SCNC: 139 MMOL/L (ref 136–145)
WBC # BLD AUTO: 7.3 K/UL (ref 4.6–13.2)

## 2018-07-08 PROCEDURE — 99283 EMERGENCY DEPT VISIT LOW MDM: CPT

## 2018-07-08 PROCEDURE — 85025 COMPLETE CBC W/AUTO DIFF WBC: CPT | Performed by: EMERGENCY MEDICINE

## 2018-07-08 PROCEDURE — 80048 BASIC METABOLIC PNL TOTAL CA: CPT | Performed by: EMERGENCY MEDICINE

## 2018-07-08 NOTE — DISCHARGE INSTRUCTIONS
Alcohol, Drug, or Poison Ingestion: Care Instructions  Your Care Instructions    A person can become very sick, or die, from swallowing or using alcohol, drugs, or poisons. Alcohol poisoning occurs when a person drinks a large amount of alcohol. Alcohol can stop nerve signals that control breathing. It can also stop the gag reflex that prevents choking. Alcohol poisoning is serious. It can lead to brain damage or death if it's not treated right away. Drugs can be used by accident or on purpose. They can be swallowed, inhaled, injected, or absorbed through the skin. Drugs include over-the-counter medicine (such as aspirin or acetaminophen) and prescription medicine. They also include vitamins and supplements. And they include illegal drugs such as cocaine and heroin. And poisons are all around us. They include household , cosmetics, houseplants, and garden chemicals. The doctor has checked you carefully, but problems can develop later. If you notice any problems or new symptoms, get medical treatment right away. Follow-up care is a key part of your treatment and safety. Be sure to make and go to all appointments, and call your doctor if you are having problems. It's also a good idea to know your test results and keep a list of the medicines you take. How can you care for yourself at home? Alcohol problems  · Talk to your doctor or counselor about programs that can help you stop using alcohol. · Plan ways to avoid being tempted to drink. ¨ Get rid of all alcohol in your home. ¨ Avoid places where you tend to drink. ¨ Stay away from places or events that offer alcohol. ¨ Stay away from people who drink a lot. Drug problems  · Talk to your doctor about programs that can help you stop using drugs. · Get rid of any drugs you might be tempted to misuse. · Learn how to say no when other people use drugs. · Don't spend time with people who use drugs.   Poison prevention  · Keep products in the containers they came in. Keep them with the original labels. · Be careful when you use cleaning products, paints, solvents, and pesticides. Read labels before use. Use a fan to move strong odors and fumes out of your home. · Do not mix cleaning products. Try to use nontoxic . These include vinegar, lemon juice, and baking soda. When should you call for help? Poison control centers, hospitals, or your doctor can give immediate advice in the case of a poisoning. The [facility] [employer] number is [phone]. Have the poison container with you so you can give complete information to the poison control center, such as what the poison or substance is, how much was taken and when. Do not try to make the person vomit. ?Call 911 anytime you think you may need emergency care. For example, call if you or someone else:  ? · Has used or currently uses alcohol or drugs and is very confused or can't stay awake. ? · Has passed out (lost consciousness). ? · Has severe trouble breathing. ? · Is having a seizure. ?Call your doctor now or seek immediate medical care if you or someone else:  ? · Has new symptoms, or is not acting normally. ? Watch closely for changes in your health, and be sure to contact your doctor if:  ? · You do not get better as expected. ? · You need help with drug or alcohol problems. ? · You have problems with depression or other mental health issues. Where can you learn more? Go to http://vinod-joshua.info/. Enter T905 in the search box to learn more about \"Alcohol, Drug, or Poison Ingestion: Care Instructions. \"  Current as of: March 20, 2017  Content Version: 11.4  © 7735-3945 [employer]. Care instructions adapted under license by [employer] (which disclaims liability or warranty for this information).  If you have questions about a medical condition or this instruction, always ask your healthcare professional. [address] any warranty or liability for your use of this information.

## 2018-07-08 NOTE — ED NOTES
Report given to Edilma Duenas, [address] [facility]  No longer caring for pt

## 2018-07-08 NOTE — ED PROVIDER NOTES
EMERGENCY DEPARTMENT HISTORY AND PHYSICAL EXAM    2:39 PM      Date: 7/8/2018  Patient Name: Mae Zamorano. History of Presenting Illness     Chief Complaint   Patient presents with    Drug Overdose         History Provided By: PT    Chief Complaint: Drug overdose  Duration:  PTA  Timing:  acute  Location: generalized  Quality: heroin  Severity: severe  Modifying Factors: Pt was given narcan by EMS. Associated Symptoms: vomiting, Denies abd pain. Additional History (Context): Mae Jones is a 69 y.o. male presents with acute severe generalized heroin overdose that occurred PTA. Associated sx are vomiting, Denies abd pain. Pt was given narcan by EMS. Pt is a former smoker and drinks. PCP: Rosas Munson MD    Current Outpatient Prescriptions   Medication Sig Dispense Refill    HYDROcodone-acetaminophen (NORCO) 5-325 mg per tablet Take 1-2 tablets PO every 4-6 hours as needed for pain control. If over the counter ibuprofen or acetaminophen was suggested, then only take the vicodin for pain not well controlled with the over the counter medication. 12 Tab 0    oxyCODONE-acetaminophen (PERCOCET) 5-325 mg per tablet Take 1 tablet every 4-6 hours as needed for pain control. If you were instructed to try over the counter ibuprofen or tylenol, only take the percocet for pain not controlled with the over the counter medication. 12 Tab 0    lisinopril (PRINIVIL, ZESTRIL) 20 mg tablet Take  by mouth daily.  simvastatin (ZOCOR) 10 mg tablet Take  by mouth nightly. Past History     Past Medical History:  Past Medical History:   Diagnosis Date    COPD     Hypertension        Past Surgical History:  History reviewed. No pertinent surgical history. Family History:  History reviewed. No pertinent family history.     Social History:  Social History   Substance Use Topics    Smoking status: Former Smoker     Quit date: 6/1/1984    Smokeless tobacco: Never Used    Alcohol use Yes      Comment: sometimes       Allergies:  No Known Allergies      Review of Systems     Review of Systems   Constitutional: Negative for diaphoresis and fever. Positive for drug overdose   HENT: Negative for congestion and sore throat. Eyes: Negative for pain and itching. Respiratory: Negative for cough and shortness of breath. Cardiovascular: Negative for chest pain and palpitations. Gastrointestinal: Positive for vomiting. Negative for abdominal pain and diarrhea. Endocrine: Negative for polydipsia and polyuria. Genitourinary: Negative for dysuria and hematuria. Musculoskeletal: Negative for arthralgias and myalgias. Skin: Negative for rash and wound. Neurological: Negative for seizures and syncope. Hematological: Does not bruise/bleed easily. Psychiatric/Behavioral: Negative for agitation and hallucinations. All other systems reviewed and are negative. Physical Exam     Visit Vitals    /69    Pulse 85    Temp 98.2 °F (36.8 °C)    Resp 22    SpO2 93%       Physical Exam   Constitutional: He appears well-developed and well-nourished. HENT:   Head: Normocephalic and atraumatic. Eyes: Conjunctivae are normal. No scleral icterus. Neck: Normal range of motion. Neck supple. No JVD present. Cardiovascular: Normal rate, regular rhythm and normal heart sounds. 4 intact extremity pulses   Pulmonary/Chest: Effort normal and breath sounds normal.   Abdominal: Soft. He exhibits no mass. There is no tenderness. Musculoskeletal: Normal range of motion. Lymphadenopathy:     He has no cervical adenopathy. Neurological: He is alert. Pupils 2 mm reactive   Skin: Skin is warm and dry. Nursing note and vitals reviewed.         Diagnostic Study Results   Labs -  Recent Results (from the past 12 hour(s))   CBC WITH AUTOMATED DIFF    Collection Time: 07/08/18  2:45 PM   Result Value Ref Range    WBC 7.3 4.6 - 13.2 K/uL    RBC 5.23 4.70 - 5.50 M/uL    HGB 14.9 13.0 - 16.0 g/dL    HCT 44.7 36.0 - 48.0 %    MCV 85.5 74.0 - 97.0 FL    MCH 28.5 24.0 - 34.0 PG    MCHC 33.3 31.0 - 37.0 g/dL    RDW 14.3 11.6 - 14.5 %    PLATELET 370 (L) 801 - 420 K/uL    MPV 11.6 9.2 - 11.8 FL    NEUTROPHILS 44 40 - 73 %    LYMPHOCYTES 49 21 - 52 %    MONOCYTES 5 3 - 10 %    EOSINOPHILS 2 0 - 5 %    BASOPHILS 0 0 - 2 %    ABS. NEUTROPHILS 3.2 1.8 - 8.0 K/UL    ABS. LYMPHOCYTES 3.6 0.9 - 3.6 K/UL    ABS. MONOCYTES 0.3 0.05 - 1.2 K/UL    ABS. EOSINOPHILS 0.1 0.0 - 0.4 K/UL    ABS. BASOPHILS 0.0 0.0 - 0.06 K/UL    DF AUTOMATED     METABOLIC PANEL, BASIC    Collection Time: 07/08/18  2:45 PM   Result Value Ref Range    Sodium 139 136 - 145 mmol/L    Potassium 3.9 3.5 - 5.5 mmol/L    Chloride 107 100 - 108 mmol/L    CO2 23 21 - 32 mmol/L    Anion gap 9 3.0 - 18 mmol/L    Glucose 132 (H) 74 - 99 mg/dL    BUN 20 (H) 7.0 - 18 MG/DL    Creatinine 1.46 (H) 0.6 - 1.3 MG/DL    BUN/Creatinine ratio 14 12 - 20      GFR est AA 58 (L) >60 ml/min/1.73m2    GFR est non-AA 48 (L) >60 ml/min/1.73m2    Calcium 7.9 (L) 8.5 - 10.1 MG/DL       Radiologic Studies -   No orders to display     No results found. Medications ordered:   Medications - No data to display      Medical Decision Making   Initial Medical Decision Making and DDx:  Consistent with narcotics OD., Will monitor for a few hours. Don't suspect intracranial event or other toxic encephalopathy. ED Course: Progress Notes, Reevaluation, and Consults:  4:40 PM Pt has stable vital signs and is complaining of itching with no rash. He has not required a repeat dose of narcan so we will discharge. I am the first provider for this patient. I reviewed the vital signs, available nursing notes, past medical history, past surgical history, family history and social history. Vital Signs-Reviewed the patient's vital signs. Records Reviewed: Nursing Notes and Old Medical Records (Time of Review: 2:39 PM)    Diagnosis     Clinical Impression:   1.  Opiate overdose, accidental or unintentional, initial encounter        Disposition: Discharge    Follow-up Information     Follow up With Details Comments Contact Kyleigh Ferrer MD In 2 days  [facility] [phone]             Patient's Medications   Start Taking    No medications on file   Continue Taking    HYDROCODONE-ACETAMINOPHEN (NORCO) 5-325 MG PER TABLET    Take 1-2 tablets PO every 4-6 hours as needed for pain control. If over the counter ibuprofen or acetaminophen was suggested, then only take the vicodin for pain not well controlled with the over the counter medication. LISINOPRIL (PRINIVIL, ZESTRIL) 20 MG TABLET    Take  by mouth daily. OXYCODONE-ACETAMINOPHEN (PERCOCET) 5-325 MG PER TABLET    Take 1 tablet every 4-6 hours as needed for pain control. If you were instructed to try over the counter ibuprofen or tylenol, only take the percocet for pain not controlled with the over the counter medication. SIMVASTATIN (ZOCOR) 10 MG TABLET    Take  by mouth nightly. These Medications have changed    No medications on file   Stop Taking    No medications on file     _______________________________    Attestations:  [address] acting as a scribe for and in the presence of Guilherme Zepeda MD      July 08, 2018 at 2:39 PM       Provider Attestation:      I personally performed the services described in the documentation, reviewed the documentation, as recorded by the scribe in my presence, and it accurately and completely records my words and actions.  July 08, 2018 at 2:39 PM - Guilherme Zepeda MD    _______________________________

## 2018-07-08 NOTE — ED TRIAGE NOTES
Pt was found unresponsive by family in bathroom w/ needle at side. Pt had agonal breathing and was bagged by EMS. Given 2 mg IN Narcan and responded immediately. Pt states he uses heroin for arthritis pain. Unknown amount.  1 bout of vomiting upon arrival. [employer] at bedside

## 2018-08-24 ENCOUNTER — HOSPITAL ENCOUNTER (EMERGENCY)
Age: 68
Discharge: HOME | End: 2018-08-24
Attending: EMERGENCY MEDICINE | Admitting: EMERGENCY MEDICINE
Payer: MEDICAID

## 2018-08-24 VITALS
SYSTOLIC BLOOD PRESSURE: 148 MMHG | HEIGHT: 72 IN | HEART RATE: 90 BPM | BODY MASS INDEX: 36.3 KG/M2 | RESPIRATION RATE: 18 BRPM | OXYGEN SATURATION: 93 % | TEMPERATURE: 99 F | WEIGHT: 268 LBS | DIASTOLIC BLOOD PRESSURE: 75 MMHG

## 2018-08-24 DIAGNOSIS — S39.012A LOW BACK STRAIN, INITIAL ENCOUNTER: Primary | ICD-10-CM

## 2018-08-24 PROCEDURE — 99284 EMERGENCY DEPT VISIT MOD MDM: CPT

## 2018-08-24 PROCEDURE — 74011250637 HC RX REV CODE- 250/637: Performed by: EMERGENCY MEDICINE

## 2018-08-24 RX ORDER — CYCLOBENZAPRINE HCL 5 MG
10 TABLET ORAL 3 TIMES DAILY
Qty: 18 TAB | Refills: 0 | Status: SHIPPED | OUTPATIENT
Start: 2018-08-24 | End: 2018-08-27

## 2018-08-24 RX ORDER — HYDROCODONE BITARTRATE AND ACETAMINOPHEN 5; 325 MG/1; MG/1
1 TABLET ORAL
Status: COMPLETED | OUTPATIENT
Start: 2018-08-24 | End: 2018-08-24

## 2018-08-24 RX ORDER — GABAPENTIN 400 MG/1
400 CAPSULE ORAL 3 TIMES DAILY
COMMUNITY

## 2018-08-24 RX ORDER — IBUPROFEN 200 MG
600 TABLET ORAL 2 TIMES DAILY
COMMUNITY
End: 2018-08-24

## 2018-08-24 RX ORDER — IBUPROFEN 800 MG/1
800 TABLET ORAL EVERY 8 HOURS
Qty: 15 TAB | Refills: 0 | Status: SHIPPED | OUTPATIENT
Start: 2018-08-24 | End: 2018-08-29

## 2018-08-24 RX ORDER — LORATADINE 10 MG/1
10 TABLET ORAL
COMMUNITY
End: 2019-02-23

## 2018-08-24 RX ADMIN — HYDROCODONE BITARTRATE AND ACETAMINOPHEN 1 TABLET: 5; 325 TABLET ORAL at 03:32

## 2018-08-24 NOTE — ED PROVIDER NOTES
[facility] EMERGENCY DEPT      3:16 AM    Date: 8/24/2018  Patient Name: Justin Nava. History of Presenting Illness     Chief Complaint   Patient presents with    Back Pain       History Provided By: Patient    Chief Complaint: back pain  Duration:  1 day  Timing:  acute  Location: left lower back  Quality: aching  Severity: moderate  Modifying Factors: none  Associated Symptoms: left shoulder pain radiating down left side. no chest pain or shortness of breath. 69 y.o. male with noted past medical history who presents to the emergency department complaining of acute, aching, lower, left-sided back pain that started today. Patient notes that it started with left shoulder pain that radiated down his left side and then into his back 2 days ago. He also explains that he recently bought a new mattress approximately 3 months ago and has been sleeping on the sofa for the last 2 months since his new mattress is uncomfortable. No history of similar pain. No recent strenous activity. No recent fall. He denies chest pain, shortness of breath. No other complaints. Nursing notes regarding the HPI and triage nursing notes were reviewed. Prior medical records were reviewed. Current Outpatient Prescriptions   Medication Sig Dispense Refill    gabapentin (NEURONTIN) 400 mg capsule Take 400 mg by mouth two (2) times a day. Indications: NEUROPATHIC PAIN      loratadine (CLARITIN) 10 mg tablet Take 10 mg by mouth daily as needed for Allergies.  ipratropium-albuterol (COMBIVENT RESPIMAT)  mcg/actuation inhaler Take 1 Puff by inhalation every twelve (12) hours. Indications: Chronic Obstructive Pulmonary Disease with Bronchospasms      lisinopril (PRINIVIL, ZESTRIL) 20 mg tablet Take  by mouth daily.  simvastatin (ZOCOR) 10 mg tablet Take  by mouth nightly.          Past History     Past Medical History:  Past Medical History:   Diagnosis Date    Arthritis     COPD     Hypertension Past Surgical History:  History reviewed. No pertinent surgical history. Family History:  History reviewed. No pertinent family history. Social History:  Social History   Substance Use Topics    Smoking status: Former Smoker     Quit date: 6/1/1984    Smokeless tobacco: Never Used    Alcohol use Yes      Comment: sometimes       Allergies:  No Known Allergies    Patient's primary care provider (as noted in EPIC):  None    Review of Systems   Constitutional: Negative for diaphoresis. HENT: Negative for congestion. Eyes: Negative for discharge. Respiratory: Negative for shortness of breath and stridor. Cardiovascular: Negative for chest pain and palpitations. Gastrointestinal: Negative for diarrhea. Genitourinary: Negative for flank pain. Musculoskeletal: Positive for back pain. (+) left shoulder pain radiating down left side   Neurological: Negative for weakness. Psychiatric/Behavioral: Negative for hallucinations. All other systems reviewed and are negative. Visit Vitals    /75    Pulse 90    Temp 99 °F (37.2 °C)    Resp 18    Ht 6' (1.829 m)    Wt 121.6 kg (268 lb)    SpO2 93%    BMI 36.35 kg/m2       PHYSICAL EXAM:  CONSTITUTIONAL:  Alert, in no apparent distress;  well developed;  well nourished. HEAD:  Normocephalic, atraumatic. EYES:  EOMI. Non-icteric sclera. Normal conjunctiva. ENTM:  Nose:  no rhinorrhea. Throat:  no erythema or exudate, mucous membranes moist.  NECK:  No JVD. Supple  RESPIRATORY:  Chest clear, equal breath sounds, good air movement. CARDIOVASCULAR:  Regular rate and rhythm. No murmurs, rubs, or gallops. GI:  Normal bowel sounds, abdomen soft and non-tender. No rebound or guarding. BACK:  Lower right lateral back mild reproducible tenderness to palpation. No midline vertebral bony point tenderness or step-off. Normal polly-anal sensation. Normal bilateral straight leg raise.      UPPER EXT:  Normal inspection. LOWER EXT:  No edema, no calf tenderness. Distal pulses intact. NEURO:  Moves all four extremities, and grossly normal motor exam.  SKIN:  No rashes;  Normal for age. PSYCH:  Alert and normal affect. DIFFERENTIAL DIAGNOSES/ MEDICAL DECISION MAKING:  Low back pain from myofascial strain/ sprain, muscle spasm, vertebral disc problem, neuropathy to include sciatica, abscess/infection, referred retroperitoneal pain from pyelonephritis or ureterolithiasis, other etiologies versus a combination of the above (example: myofascial strain/ sprain as well as muscle spasm). Diagnostic Study Results     Abnormal lab results from this emergency department encounter:  Labs Reviewed - No data to display    Lab values for this patient within approximately the last 12 hours:  No results found for this or any previous visit (from the past 12 hour(s)). Radiologist and cardiologist interpretations if available at time of this note:  No results found. Medication(s) ordered for patient during this emergency visit encounter:  Medications   HYDROcodone-acetaminophen (NORCO) 5-325 mg per tablet 1 Tab (1 Tab Oral Given 8/24/18 3030)       Medical Decision Making     I am the first provider for this patient. I reviewed the vital signs, available nursing notes, past medical history, past surgical history, family history and social history. Vital Signs:  Reviewed the patient's vital signs. IMPRESSION AND MEDICAL DECISION MAKING:  There is no signs of sciatica. No perianal decreased sensation nor bowel/bladder incontinence to suggest a neurological emergency. The patient does not have fever, no other signs of infection to suggest an epidural or other back abscess that would require emergent intervention.     Based upon the patients presentation with noted HPI and PE, along with the work up done in the emergency department, I believe that the patient is having paralumbar myofascial strain (lower back strain). DIAGNOSIS:  1. Lower back (muscle) strain. SPECIFIC PATIENT INSTRUCTIONS FROM THE PHYSICIAN WHO TREATED YOU IN THE ER TODAY:  1. Ibuprofen and flexeril as prescribed until finished. 2. Follow up with:  Your primary doctor if still having symptoms after you finish the ibuprofen and flexeril. 3. Return if any concerns or worsening condition(s). Patient is improved, resting quietly and comfortably. The patient will be discharged home. The patient was reassured that these symptoms do not appear to represent a serious or life threatening condition at this time. Warning signs of worsening condition were discussed and understood by the patient. Based on patient's age, coexisting illness, exam, and the results of this ED evaluation, the decision to treat as an outpatient was made. Based on the information available at time of discharge, acute pathology requiring immediate intervention was deemed relative unlikely. While it is impossible to completely exclude the possibility of underlying serious disease or worsening of condition, I feel the relative likelihood is extremely low. I discussed this uncertainty with the patient, who understood ED evaluation and treatment and felt comfortable with the outpatient treatment plan. All questions regarding care, test results, and follow up were answered. The patient is stable and appropriate to discharge. They understand that they should return to the emergency department for any new or worsening symptoms. I stressed the importance of follow up for repeat assessment and possibly further evaluation/treatment. Coding Diagnoses     Clinical Impression:   1. Low back strain, initial encounter        Disposition     Disposition:  Home. Ann Chance M.D.   KENDAL Board Certified Emergency Physician    Provider Attestation:  If a scribe was utilized in generation of this patient record, I personally performed the services described in the documentation, reviewed the documentation, as recorded by the scribe in my presence, and it accurately records the patient's history of presenting illness, review of systems, patient physical examination, and procedures performed by me as the attending physician. Darel Bray L. Lafayette Hodgkins, M.D.   [facility] Board Certified Emergency Physician  8/24/2018.  3:17 AM    Scribe Attestation     Cristy Miranda acting as a scribe for and in the presence of Marshal Lundborg, MD      August 24, 2018 at 3:28 AM

## 2018-08-24 NOTE — ED NOTES
I have reviewed discharge instruction and prescriptions with patient. Patient verbalized understanding and has no further questions at this time. Education taught and patient verbalized understanding of education. Teach back method used. Armband removed and shredded per patients request.    Patients pain 4/10. Belongings given to patient. Patient discharged with waiting room to await [employer] Cab.

## 2018-08-24 NOTE — DISCHARGE INSTRUCTIONS
SPECIFIC PATIENT INSTRUCTIONS FROM THE PHYSICIAN WHO TREATED YOU IN THE ER TODAY:  1. Ibuprofen and flexeril as prescribed until finished. 2. Follow up with:  Your primary doctor if still having symptoms after you finish the ibuprofen and flexeril. 3. Return if any concerns or worsening condition(s). Back Strain: Care Instructions  Your Care Instructions    Back strain happens when you overstretch, or pull, a muscle in your back. You may hurt your back in an accident or when you exercise or lift something. Most back pain will get better with rest and time. You can take care of yourself at home to help your back heal.  Follow-up care is a key part of your treatment and safety. Be sure to make and go to all appointments, and call your doctor if you are having problems. It's also a good idea to know your test results and keep a list of the medicines you take. How can you care for yourself at home? · Try to stay as active as you can, but stop or reduce any activity that causes pain. · Put ice or a cold pack on the sore muscle for 10 to 20 minutes at a time to stop swelling. Try this every 1 to 2 hours for 3 days (when you are awake) or until the swelling goes down. Put a thin cloth between the ice pack and your skin. · After 2 or 3 days, apply a heating pad on low or a warm cloth to your back. Some doctors suggest that you go back and forth between hot and cold treatments. · Take pain medicines exactly as directed. ¨ If the doctor gave you a prescription medicine for pain, take it as prescribed. ¨ If you are not taking a prescription pain medicine, ask your doctor if you can take an over-the-counter medicine. · Try sleeping on your side with a pillow between your legs. Or put a pillow under your knees when you lie on your back. These measures can ease pain in your lower back. · Return to your usual level of activity slowly. When should you call for help?   Call 911 anytime you think you may need emergency care. For example, call if:    · You are unable to move a leg at all.   [facility] your doctor now or seek immediate medical care if:    · You have new or worse symptoms in your legs, belly, or buttocks. Symptoms may include:  ¨ Numbness or tingling. ¨ Weakness. ¨ Pain.     · You lose bladder or bowel control.    Watch closely for changes in your health, and be sure to contact your doctor if:    · You have a fever, lose weight, or don't feel well.     · You are not getting better as expected. Where can you learn more? Go to http://vinod-joshua.info/. Enter Y354 in the search box to learn more about \"Back Strain: Care Instructions. \"  Current as of: November 29, 2017  Content Version: 11.7  © 3002-6790 [employer]. Care instructions adapted under license by [employer] (which disclaims liability or warranty for this information). If you have questions about a medical condition or this instruction, always ask your healthcare professional. [place] [address] any warranty or liability for your use of this information. [employer] Activation    Thank you for requesting access to [employer]. Please follow the instructions below to securely access and download your online medical record. [employer] allows you to send messages to your doctor, view your test results, renew your prescriptions, schedule appointments, and more. How Do I Sign Up? 1. In your internet browser, go to [email or web address]. 2. Click on the First Time User? Click Here link in the Sign In box. You will see the New Member Sign Up page. 3. Enter your [employer] Access Code exactly as it appears below. You will not need to use this code after youve completed the sign-up process. If you do not sign up before the expiration date, you must request a new code.     [employer] Access Code: [email or web address]  Expires: 10/6/2018  2:34 PM (This is the date your [employer] access code will )    4. Enter the last four digits of your Social Security Number (xxxx) and Date of Birth (mm/dd/yyyy) as indicated and click Submit. You will be taken to the next sign-up page. 5. Create a [employer] ID. This will be your [employer] login ID and cannot be changed, so think of one that is secure and easy to remember. 6. Create a [employer] password. You can change your password at any time. 7. Enter your Password Reset Question and Answer. This can be used at a later time if you forget your password. 8. Enter your e-mail address. You will receive e-mail notification when new information is available in [address] 9. Click Sign Up. You can now view and download portions of your medical record. 10. Click the Download Summary menu link to download a portable copy of your medical information. Additional Information    If you have questions, please visit the Frequently Asked Questions section of the [employer] website at [email or web address] Remember, [employer] is NOT to be used for urgent needs. For medical emergencies, dial 911.

## 2018-08-24 NOTE — ED TRIAGE NOTES
Pt with history on chronic back pain that has gotten worse in the past 4 days. Feels worst in the lower and mid right side.

## 2018-09-09 ENCOUNTER — HOSPITAL ENCOUNTER (EMERGENCY)
Age: 68
Discharge: HOME | End: 2018-09-09
Attending: EMERGENCY MEDICINE | Admitting: EMERGENCY MEDICINE
Payer: MEDICAID

## 2018-09-09 VITALS
BODY MASS INDEX: 37.7 KG/M2 | HEART RATE: 94 BPM | TEMPERATURE: 99.1 F | WEIGHT: 278 LBS | OXYGEN SATURATION: 94 % | DIASTOLIC BLOOD PRESSURE: 84 MMHG | RESPIRATION RATE: 18 BRPM | SYSTOLIC BLOOD PRESSURE: 168 MMHG

## 2018-09-09 DIAGNOSIS — M54.9 CHRONIC LEFT-SIDED BACK PAIN, UNSPECIFIED BACK LOCATION: Primary | ICD-10-CM

## 2018-09-09 DIAGNOSIS — G89.29 CHRONIC LEFT-SIDED BACK PAIN, UNSPECIFIED BACK LOCATION: Primary | ICD-10-CM

## 2018-09-09 LAB
APPEARANCE UR: CLEAR
BACTERIA URNS QL MICRO: ABNORMAL /HPF
BILIRUB UR QL: ABNORMAL
COLOR UR: ABNORMAL
EPITH CASTS URNS QL MICRO: ABNORMAL /LPF (ref 0–5)
GLUCOSE UR STRIP.AUTO-MCNC: NEGATIVE MG/DL
HGB UR QL STRIP: NEGATIVE
KETONES UR QL STRIP.AUTO: NEGATIVE MG/DL
LEUKOCYTE ESTERASE UR QL STRIP.AUTO: ABNORMAL
NITRITE UR QL STRIP.AUTO: NEGATIVE
PH UR STRIP: 6 [PH] (ref 5–8)
PROT UR STRIP-MCNC: NEGATIVE MG/DL
RBC #/AREA URNS HPF: ABNORMAL /HPF (ref 0–5)
SP GR UR REFRACTOMETRY: 1.02 (ref 1–1.03)
UROBILINOGEN UR QL STRIP.AUTO: 4 EU/DL (ref 0.2–1)
WBC URNS QL MICRO: ABNORMAL /HPF (ref 0–4)

## 2018-09-09 PROCEDURE — 74011000250 HC RX REV CODE- 250: Performed by: EMERGENCY MEDICINE

## 2018-09-09 PROCEDURE — 74011250637 HC RX REV CODE- 250/637: Performed by: EMERGENCY MEDICINE

## 2018-09-09 PROCEDURE — 99284 EMERGENCY DEPT VISIT MOD MDM: CPT

## 2018-09-09 PROCEDURE — 81001 URINALYSIS AUTO W/SCOPE: CPT | Performed by: EMERGENCY MEDICINE

## 2018-09-09 RX ORDER — IBUPROFEN 400 MG/1
800 TABLET ORAL
Status: COMPLETED | OUTPATIENT
Start: 2018-09-09 | End: 2018-09-09

## 2018-09-09 RX ORDER — CYCLOBENZAPRINE HCL 10 MG
10 TABLET ORAL
Status: COMPLETED | OUTPATIENT
Start: 2018-09-09 | End: 2018-09-09

## 2018-09-09 RX ORDER — NAPROXEN 500 MG/1
500 TABLET ORAL 2 TIMES DAILY WITH MEALS
Qty: 14 TAB | Refills: 0 | Status: SHIPPED | OUTPATIENT
Start: 2018-09-09 | End: 2018-09-16

## 2018-09-09 RX ORDER — LIDOCAINE 4 G/100G
1 PATCH TOPICAL EVERY 24 HOURS
Status: DISCONTINUED | OUTPATIENT
Start: 2018-09-09 | End: 2018-09-09 | Stop reason: HOSPADM

## 2018-09-09 RX ADMIN — CYCLOBENZAPRINE HYDROCHLORIDE 10 MG: 10 TABLET, FILM COATED ORAL at 10:07

## 2018-09-09 RX ADMIN — IBUPROFEN 800 MG: 400 TABLET ORAL at 10:07

## 2018-09-09 NOTE — ED PROVIDER NOTES
EMERGENCY DEPARTMENT HISTORY AND PHYSICAL EXAM 
 
9:56 AM 
 
 
Date: 9/9/2018 Patient Name: Ezra Mchugh. History of Presenting Illness Chief Complaint Patient presents with  Back Pain History Provided By: Patient Chief Complaint: Back Pain Duration:  2 weeks ago Timing:  Progressive Location: Left-lumbar back pain radiating into his left shoulder, and left-side of abdomen Quality: \"Hammering sensation\" Severity: Severe Modifying Factors:  Notes that he was discharged with Flexeril and Ibuprofen. Reports that the medications alleviated the pain for 1 day, and afterwards, it provided on alleviation. Associated Symptoms: Reports that the back pain radiates into the left shoulder and left abdomen. Also reports tingling sensation in bilateral feet. Also reports urinary incontinence. Denies weakness, numbness, dysuria, or hematuria. Additional History (Context): Ezra Sanchez is a 69 y.o. male with PMHx of COPD, Arthritis, and HTN who presents with left lumbar back pain onset 3 weeks ago. Patient states that he was seen on 8/24/18 for right-sided back pain. Notes that he was discharged with Flexeril and Ibuprofen. Reports that the medications alleviated the pain for 1 day, and afterwards, it provided on alleviation. States that the pain is now located on the left-side with radiation to the left shoulder and left abdomen onset 5-6 days ago. Reports that he has been having to sleep sitting upwards secondary to the pain. Notes that the pain is exacerbated with movement. Denies any weakness or numbness, no groin numbness. Patient also reports urinary incontinence, but denies any retention. Denies any prior hx of this symptom. Notes hx of UTI approximately 10 years ago. Denies other associated symptoms, such as dysuria or hematuria. No other concerns were expressed at this time. PCP: None Current Facility-Administered Medications Medication Dose Route Frequency Provider Last Rate Last Dose  lidocaine 4 % patch 1 Patch  1 Patch TransDERmal Q24H Johnnie Clayton MD      
 
Current Outpatient Prescriptions Medication Sig Dispense Refill  naproxen (NAPROSYN) 500 mg tablet Take 1 Tab by mouth two (2) times daily (with meals) for 7 days. 14 Tab 0  
 gabapentin (NEURONTIN) 400 mg capsule Take 400 mg by mouth two (2) times a day. Indications: NEUROPATHIC PAIN    
 loratadine (CLARITIN) 10 mg tablet Take 10 mg by mouth daily as needed for Allergies.  ipratropium-albuterol (COMBIVENT RESPIMAT)  mcg/actuation inhaler Take 1 Puff by inhalation every twelve (12) hours. Indications: Chronic Obstructive Pulmonary Disease with Bronchospasms  lisinopril (PRINIVIL, ZESTRIL) 20 mg tablet Take  by mouth daily.  simvastatin (ZOCOR) 10 mg tablet Take  by mouth nightly. Past History Past Medical History: 
Past Medical History:  
Diagnosis Date  Arthritis  COPD  Hypertension Past Surgical History: 
History reviewed. No pertinent surgical history. Family History: 
History reviewed. No pertinent family history. Social History: 
Social History Substance Use Topics  Smoking status: Former Smoker Quit date: 6/1/1984  Smokeless tobacco: Never Used  Alcohol use Yes Comment: sometimes Allergies: Allergies Allergen Reactions  Shellfish Derived Hives Review of Systems Review of Systems Constitutional: Negative for activity change and appetite change. HENT: Negative for congestion. Eyes: Negative for visual disturbance. Respiratory: Negative for cough and shortness of breath. Cardiovascular: Negative for chest pain. Gastrointestinal: Positive for abdominal pain (left abdomen (from back)). Negative for diarrhea, nausea and vomiting. Genitourinary: Negative for dysuria and hematuria. (+) Urinary incontinence Musculoskeletal: Positive for arthralgias (left shoulder (from back pain)) and back pain. Negative for myalgias. Skin: Negative for rash. Neurological: Negative for weakness and numbness.  
     (+) Tingling sensation in bilateral feet Physical Exam  
 
Visit Vitals  /84 (BP 1 Location: Right arm)  Pulse 94  Temp 99.1 °F (37.3 °C)  Resp 18  Wt 126.1 kg (278 lb)  SpO2 94%  BMI 37.7 kg/m2 Physical Exam  
Constitutional: He is oriented to person, place, and time. He appears well-developed and well-nourished. HENT:  
Head: Normocephalic and atraumatic. Mouth/Throat: Oropharynx is clear and moist.  
Eyes: Conjunctivae are normal.  
Neck: Normal range of motion. Neck supple. No JVD present. Cardiovascular: Normal rate, regular rhythm, normal heart sounds and intact distal pulses. No murmur heard. Pulmonary/Chest: Effort normal and breath sounds normal.  
Abdominal: Soft. Bowel sounds are normal. He exhibits no distension. There is no tenderness. Musculoskeletal: Normal range of motion. He exhibits no deformity. No midline back tenderness. Some point tenderness in the musculature of the left low-mid back. Lymphadenopathy:  
  He has no cervical adenopathy. Neurological: He is alert and oriented to person, place, and time. He has normal strength and normal reflexes. No cranial nerve deficit or sensory deficit. Coordination normal.  
Skin: Skin is warm and dry. No rash noted. Psychiatric: He has a normal mood and affect. Nursing note and vitals reviewed. Diagnostic Study Results Labs - Recent Results (from the past 12 hour(s)) URINALYSIS W/ RFLX MICROSCOPIC Collection Time: 09/09/18  9:50 AM  
Result Value Ref Range Color DARK YELLOW Appearance CLEAR Specific gravity 1.023 1.005 - 1.030    
 pH (UA) 6.0 5.0 - 8.0 Protein NEGATIVE  NEG mg/dL Glucose NEGATIVE  NEG mg/dL Ketone NEGATIVE  NEG mg/dL Bilirubin SMALL (A) NEG Blood NEGATIVE  NEG Urobilinogen 4.0 (H) 0.2 - 1.0 EU/dL Nitrites NEGATIVE  NEG Leukocyte Esterase TRACE (A) NEG URINE MICROSCOPIC ONLY Collection Time: 09/09/18  9:50 AM  
Result Value Ref Range WBC 4 to 10 0 - 4 /hpf  
 RBC 0 to 1 0 - 5 /hpf Epithelial cells 1+ 0 - 5 /lpf Bacteria 1+ (A) NEG /hpf Radiologic Studies - No orders to display Medical Decision Making I am the first provider for this patient. I reviewed the vital signs, available nursing notes, past medical history, past surgical history, family history and social history. Vital Signs-Reviewed the patient's vital signs. Records Reviewed: Nursing Notes and Old Medical Records (Time of Review: 9:56 AM) 
 
ED Course: Progress Notes, Reevaluation, and Consults: 
 
 
Provider Notes (Medical Decision Making):  
Shana Dowd is a 69 y.o. male with PMHx of COPD, Arthritis, and HTN who presents with left lumbar back pain onset 3 weeks ago. He was seen previously a week ago for back pain, but has since had more pain in the left side of his back. He has no focal neurologic deficits, no darlene numbness, no urinary retention, no leg weakness or changes in reflexes. Abdomen is benign, vitals unremarkable. Pulses symmetric. Differential Diagnosis: likely muscular back pain. I do not suspect renal disease, ureterolithiasis, bowel obstruction or ischemia, AAA, cord injury given history and exam. 
 
Testing: ua 
Treatments: ibuprofen, flexeril, lidocaine patch. Do not feel narcotics are indicated. Case management has seen the patient to help connect with  Primary physician given the recent frequency of ED visits with no outpatient follow-up. Will switch to naproxen. Urged outpatient follow-up for this ongoing back pain. Diagnosis Clinical Impression: 1. Chronic left-sided back pain, unspecified back location Disposition: Discharge Follow-up Information Follow up With Details Comments Contact Info [facility] EMERGENCY DEPT  If symptoms worsen [address] 
[phone] Patient's Medications Start Taking NAPROXEN (NAPROSYN) 500 MG TABLET    Take 1 Tab by mouth two (2) times daily (with meals) for 7 days. Continue Taking GABAPENTIN (NEURONTIN) 400 MG CAPSULE    Take 400 mg by mouth two (2) times a day. Indications: NEUROPATHIC PAIN  
 IPRATROPIUM-ALBUTEROL (COMBIVENT RESPIMAT)  MCG/ACTUATION INHALER    Take 1 Puff by inhalation every twelve (12) hours. Indications: Chronic Obstructive Pulmonary Disease with Bronchospasms LISINOPRIL (PRINIVIL, ZESTRIL) 20 MG TABLET    Take  by mouth daily. LORATADINE (CLARITIN) 10 MG TABLET    Take 10 mg by mouth daily as needed for Allergies. SIMVASTATIN (ZOCOR) 10 MG TABLET    Take  by mouth nightly. These Medications have changed No medications on file Stop Taking No medications on file  
 
_______________________________ Attestations: 
Scribe Attestation [facility] acting as a scribe for and in the presence of Peg Lesches, MD     
September 09, 2018 at 9:56 AM 
    
Provider Attestation:     
I personally performed the services described in the documentation, reviewed the documentation, as recorded by the scribe in my presence, and it accurately and completely records my words and actions. September 09, 2018 at 9:56 AM - Peg Lesches, MD   
_______________________________

## 2018-09-09 NOTE — PROGRESS NOTES
Patient with three ED visits over 6 months and no PCP. The patient does have medicaid. He is willing to be referred to a PCP. Referred to  for PCP appt. Verified contact information on face sheet is correct - best number to reach pt is [phone].

## 2018-09-09 NOTE — DISCHARGE INSTRUCTIONS
Learning About Relief for Back Pain  What is back tension and strain? Back strain happens when you overstretch, or pull, a muscle in your back. You may hurt your back in an accident or when you exercise or lift something. Most back pain will get better with rest and time. You can take care of yourself at home to help your back heal.  What can you do first to relieve back pain? When you first feel back pain, try these steps:  · Walk. Take a short walk (10 to 20 minutes) on a level surface (no slopes, hills, or stairs) every 2 to 3 hours. Walk only distances you can manage without pain, especially leg pain. · Relax. Find a comfortable position for rest. Some people are comfortable on the floor or a medium-firm bed with a small pillow under their head and another under their knees. Some people prefer to lie on their side with a pillow between their knees. Don't stay in one position for too long. · Try heat or ice. Try using a heating pad on a low or medium setting, or take a warm shower, for 15 to 20 minutes every 2 to 3 hours. Or you can buy single-use heat wraps that last up to 8 hours. You can also try an ice pack for 10 to 15 minutes every 2 to 3 hours. You can use an ice pack or a bag of frozen vegetables wrapped in a thin towel. There is not strong evidence that either heat or ice will help, but you can try them to see if they help. You may also want to try switching between heat and cold. · Take pain medicine exactly as directed. ¨ If the doctor gave you a prescription medicine for pain, take it as prescribed. ¨ If you are not taking a prescription pain medicine, ask your doctor if you can take an over-the-counter medicine. What else can you do? · Stretch and exercise. Exercises that increase flexibility may relieve your pain and make it easier for your muscles to keep your spine in a good, neutral position. And don't forget to keep walking. · Do self-massage.  You can use self-massage to unwind after work or school or to energize yourself in the morning. You can easily massage your feet, hands, or neck. Self-massage works best if you are in comfortable clothes and are sitting or lying in a comfortable position. Use oil or lotion to massage bare skin. · Reduce stress. Back pain can lead to a vicious [place]: Distress about the pain tenses the muscles in your back, which in turn causes more pain. Learn how to relax your mind and your muscles to lower your stress. Where can you learn more? Go to http://vinod-joshua.info/. Enter R802 in the search box to learn more about \"Learning About Relief for Back Pain. \"  Current as of: March 21, 2017  Content Version: 11.5  © 8701-4751 [employer], [employer]. Care instructions adapted under license by [employer] (which disclaims liability or warranty for this information). If you have questions about a medical condition or this instruction, always ask your healthcare professional. [place] [address] any warranty or liability for your use of this information.

## 2018-11-14 ENCOUNTER — APPOINTMENT (OUTPATIENT)
Dept: CT IMAGING | Age: 68
DRG: 347 | End: 2018-11-14
Attending: EMERGENCY MEDICINE
Payer: MEDICAID

## 2018-11-14 ENCOUNTER — APPOINTMENT (OUTPATIENT)
Dept: MRI IMAGING | Age: 68
DRG: 347 | End: 2018-11-14
Attending: EMERGENCY MEDICINE
Payer: MEDICAID

## 2018-11-14 ENCOUNTER — APPOINTMENT (OUTPATIENT)
Dept: GENERAL RADIOLOGY | Age: 68
DRG: 347 | End: 2018-11-14
Attending: NURSE PRACTITIONER
Payer: MEDICAID

## 2018-11-14 ENCOUNTER — HOSPITAL ENCOUNTER (INPATIENT)
Age: 68
LOS: 20 days | Discharge: HOME HEALTH | DRG: 347 | End: 2018-12-04
Attending: EMERGENCY MEDICINE | Admitting: INTERNAL MEDICINE
Payer: MEDICAID

## 2018-11-14 DIAGNOSIS — M46.44 DISCITIS OF THORACIC REGION: Primary | ICD-10-CM

## 2018-11-14 PROBLEM — K59.00 CONSTIPATION: Status: ACTIVE | Noted: 2018-11-14

## 2018-11-14 PROBLEM — K74.60 CIRRHOSIS OF LIVER (HCC): Status: ACTIVE | Noted: 2018-11-14

## 2018-11-14 PROBLEM — B19.20 HEPATITIS C: Status: ACTIVE | Noted: 2018-11-14

## 2018-11-14 PROBLEM — M48.061 LUMBAR STENOSIS: Status: ACTIVE | Noted: 2018-11-14

## 2018-11-14 PROBLEM — M54.9 BACK PAIN: Status: ACTIVE | Noted: 2018-11-14

## 2018-11-14 PROBLEM — J90 BILATERAL PLEURAL EFFUSION: Status: ACTIVE | Noted: 2018-11-14

## 2018-11-14 PROBLEM — E66.9 OBESITY: Status: ACTIVE | Noted: 2018-11-14

## 2018-11-14 PROBLEM — J44.9 COPD (CHRONIC OBSTRUCTIVE PULMONARY DISEASE) (HCC): Chronic | Status: ACTIVE | Noted: 2018-11-14

## 2018-11-14 PROBLEM — D35.02 ADENOMA OF LEFT ADRENAL GLAND: Status: ACTIVE | Noted: 2018-11-14

## 2018-11-14 PROBLEM — N39.0 UTI (URINARY TRACT INFECTION): Status: ACTIVE | Noted: 2018-11-14

## 2018-11-14 PROBLEM — I10 UNCONTROLLED HYPERTENSION: Status: ACTIVE | Noted: 2018-11-14

## 2018-11-14 LAB
ALBUMIN SERPL-MCNC: 2.2 G/DL (ref 3.4–5)
ALBUMIN/GLOB SERPL: 0.3 {RATIO} (ref 0.8–1.7)
ALP SERPL-CCNC: 123 U/L (ref 45–117)
ALT SERPL-CCNC: 21 U/L (ref 16–61)
ANION GAP SERPL CALC-SCNC: 7 MMOL/L (ref 3–18)
APPEARANCE UR: ABNORMAL
APTT PPP: 36.8 SEC (ref 23–36.4)
AST SERPL-CCNC: 37 U/L (ref 15–37)
ATRIAL RATE: 96 BPM
BACTERIA URNS QL MICRO: ABNORMAL /HPF
BASOPHILS # BLD: 0 K/UL (ref 0–0.1)
BASOPHILS NFR BLD: 0 % (ref 0–2)
BILIRUB SERPL-MCNC: 1.2 MG/DL (ref 0.2–1)
BILIRUB UR QL: NEGATIVE
BNP SERPL-MCNC: 233 PG/ML (ref 0–900)
BUN SERPL-MCNC: 7 MG/DL (ref 7–18)
BUN/CREAT SERPL: 7 (ref 12–20)
CALCIUM SERPL-MCNC: 8.1 MG/DL (ref 8.5–10.1)
CALCULATED P AXIS, ECG09: 25 DEGREES
CALCULATED R AXIS, ECG10: -55 DEGREES
CALCULATED T AXIS, ECG11: -46 DEGREES
CHLORIDE SERPL-SCNC: 101 MMOL/L (ref 100–108)
CK MB CFR SERPL CALC: 2.1 % (ref 0–4)
CK MB SERPL-MCNC: 1.8 NG/ML (ref 5–25)
CK SERPL-CCNC: 86 U/L (ref 39–308)
CO2 SERPL-SCNC: 31 MMOL/L (ref 21–32)
COLOR UR: YELLOW
CREAT SERPL-MCNC: 0.99 MG/DL (ref 0.6–1.3)
CRP SERPL-MCNC: 2.8 MG/DL (ref 0–0.3)
DIAGNOSIS, 93000: NORMAL
DIFFERENTIAL METHOD BLD: ABNORMAL
EOSINOPHIL # BLD: 0.1 K/UL (ref 0–0.4)
EOSINOPHIL NFR BLD: 1 % (ref 0–5)
EPITH CASTS URNS QL MICRO: ABNORMAL /LPF (ref 0–5)
ERYTHROCYTE [DISTWIDTH] IN BLOOD BY AUTOMATED COUNT: 15.9 % (ref 11.6–14.5)
ERYTHROCYTE [SEDIMENTATION RATE] IN BLOOD: 20 MM/HR (ref 0–20)
GLOBULIN SER CALC-MCNC: 6.6 G/DL (ref 2–4)
GLUCOSE SERPL-MCNC: 89 MG/DL (ref 74–99)
GLUCOSE UR STRIP.AUTO-MCNC: NEGATIVE MG/DL
HCT VFR BLD AUTO: 43.1 % (ref 36–48)
HGB BLD-MCNC: 13.5 G/DL (ref 13–16)
HGB UR QL STRIP: NEGATIVE
INR PPP: 1.3 (ref 0.8–1.2)
KETONES UR QL STRIP.AUTO: NEGATIVE MG/DL
LACTATE BLD-SCNC: 1.07 MMOL/L (ref 0.4–2)
LEUKOCYTE ESTERASE UR QL STRIP.AUTO: ABNORMAL
LIPASE SERPL-CCNC: 112 U/L (ref 73–393)
LYMPHOCYTES # BLD: 1.9 K/UL (ref 0.9–3.6)
LYMPHOCYTES NFR BLD: 37 % (ref 21–52)
MCH RBC QN AUTO: 26.7 PG (ref 24–34)
MCHC RBC AUTO-ENTMCNC: 31.3 G/DL (ref 31–37)
MCV RBC AUTO: 85.3 FL (ref 74–97)
MONOCYTES # BLD: 0.4 K/UL (ref 0.05–1.2)
MONOCYTES NFR BLD: 8 % (ref 3–10)
NEUTS SEG # BLD: 2.8 K/UL (ref 1.8–8)
NEUTS SEG NFR BLD: 54 % (ref 40–73)
NITRITE UR QL STRIP.AUTO: POSITIVE
P-R INTERVAL, ECG05: 140 MS
PH UR STRIP: 8 [PH] (ref 5–8)
PLATELET # BLD AUTO: 157 K/UL (ref 135–420)
PMV BLD AUTO: 10.1 FL (ref 9.2–11.8)
POTASSIUM SERPL-SCNC: 4.2 MMOL/L (ref 3.5–5.5)
PROT SERPL-MCNC: 8.8 G/DL (ref 6.4–8.2)
PROT UR STRIP-MCNC: NEGATIVE MG/DL
PROTHROMBIN TIME: 15.7 SEC (ref 11.5–15.2)
Q-T INTERVAL, ECG07: 400 MS
QRS DURATION, ECG06: 78 MS
QTC CALCULATION (BEZET), ECG08: 505 MS
RBC # BLD AUTO: 5.05 M/UL (ref 4.7–5.5)
RBC #/AREA URNS HPF: 0 /HPF (ref 0–5)
SODIUM SERPL-SCNC: 139 MMOL/L (ref 136–145)
SP GR UR REFRACTOMETRY: 1.01 (ref 1–1.03)
TROPONIN I SERPL-MCNC: <0.02 NG/ML (ref 0–0.04)
UROBILINOGEN UR QL STRIP.AUTO: 2 EU/DL (ref 0.2–1)
VENTRICULAR RATE, ECG03: 96 BPM
WBC # BLD AUTO: 5.3 K/UL (ref 4.6–13.2)
WBC URNS QL MICRO: ABNORMAL /HPF (ref 0–4)

## 2018-11-14 PROCEDURE — 74011250636 HC RX REV CODE- 250/636: Performed by: NEUROLOGICAL SURGERY

## 2018-11-14 PROCEDURE — 87040 BLOOD CULTURE FOR BACTERIA: CPT

## 2018-11-14 PROCEDURE — 74011636320 HC RX REV CODE- 636/320: Performed by: EMERGENCY MEDICINE

## 2018-11-14 PROCEDURE — 74177 CT ABD & PELVIS W/CONTRAST: CPT

## 2018-11-14 PROCEDURE — 93005 ELECTROCARDIOGRAM TRACING: CPT

## 2018-11-14 PROCEDURE — 82553 CREATINE MB FRACTION: CPT

## 2018-11-14 PROCEDURE — 65270000029 HC RM PRIVATE

## 2018-11-14 PROCEDURE — 74011250636 HC RX REV CODE- 250/636: Performed by: EMERGENCY MEDICINE

## 2018-11-14 PROCEDURE — 85610 PROTHROMBIN TIME: CPT

## 2018-11-14 PROCEDURE — 77030032490 HC SLV COMPR SCD KNE COVD -B

## 2018-11-14 PROCEDURE — 83880 ASSAY OF NATRIURETIC PEPTIDE: CPT

## 2018-11-14 PROCEDURE — 96375 TX/PRO/DX INJ NEW DRUG ADDON: CPT

## 2018-11-14 PROCEDURE — 81001 URINALYSIS AUTO W/SCOPE: CPT

## 2018-11-14 PROCEDURE — 86140 C-REACTIVE PROTEIN: CPT

## 2018-11-14 PROCEDURE — 85730 THROMBOPLASTIN TIME PARTIAL: CPT

## 2018-11-14 PROCEDURE — 83690 ASSAY OF LIPASE: CPT

## 2018-11-14 PROCEDURE — 83605 ASSAY OF LACTIC ACID: CPT

## 2018-11-14 PROCEDURE — 80053 COMPREHEN METABOLIC PANEL: CPT

## 2018-11-14 PROCEDURE — 96374 THER/PROPH/DIAG INJ IV PUSH: CPT

## 2018-11-14 PROCEDURE — 77010033678 HC OXYGEN DAILY

## 2018-11-14 PROCEDURE — 85652 RBC SED RATE AUTOMATED: CPT

## 2018-11-14 PROCEDURE — 74011250637 HC RX REV CODE- 250/637: Performed by: INTERNAL MEDICINE

## 2018-11-14 PROCEDURE — 87186 SC STD MICRODIL/AGAR DIL: CPT

## 2018-11-14 PROCEDURE — 74011250636 HC RX REV CODE- 250/636: Performed by: INTERNAL MEDICINE

## 2018-11-14 PROCEDURE — 74011000258 HC RX REV CODE- 258: Performed by: EMERGENCY MEDICINE

## 2018-11-14 PROCEDURE — 85025 COMPLETE CBC W/AUTO DIFF WBC: CPT

## 2018-11-14 PROCEDURE — 99285 EMERGENCY DEPT VISIT HI MDM: CPT

## 2018-11-14 PROCEDURE — 87086 URINE CULTURE/COLONY COUNT: CPT

## 2018-11-14 PROCEDURE — 87106 FUNGI IDENTIFICATION YEAST: CPT

## 2018-11-14 PROCEDURE — 71045 X-RAY EXAM CHEST 1 VIEW: CPT

## 2018-11-14 RX ORDER — NALOXONE HYDROCHLORIDE 0.4 MG/ML
0.4 INJECTION, SOLUTION INTRAMUSCULAR; INTRAVENOUS; SUBCUTANEOUS AS NEEDED
Status: DISCONTINUED | OUTPATIENT
Start: 2018-11-14 | End: 2018-12-04 | Stop reason: HOSPADM

## 2018-11-14 RX ORDER — HYDROCODONE BITARTRATE AND ACETAMINOPHEN 7.5; 3 MG/1; MG/1
2 TABLET ORAL
COMMUNITY
End: 2018-12-21

## 2018-11-14 RX ORDER — ALBUMIN HUMAN 50 G/1000ML
25 SOLUTION INTRAVENOUS ONCE
Status: DISCONTINUED | OUTPATIENT
Start: 2018-11-14 | End: 2018-11-15 | Stop reason: RX

## 2018-11-14 RX ORDER — SIMVASTATIN 20 MG/1
20 TABLET, FILM COATED ORAL
Status: DISCONTINUED | OUTPATIENT
Start: 2018-11-14 | End: 2018-12-04 | Stop reason: HOSPADM

## 2018-11-14 RX ORDER — HYDROMORPHONE HYDROCHLORIDE 1 MG/ML
1 INJECTION, SOLUTION INTRAMUSCULAR; INTRAVENOUS; SUBCUTANEOUS ONCE
Status: DISPENSED | OUTPATIENT
Start: 2018-11-14 | End: 2018-11-15

## 2018-11-14 RX ORDER — IPRATROPIUM BROMIDE AND ALBUTEROL SULFATE 2.5; .5 MG/3ML; MG/3ML
3 SOLUTION RESPIRATORY (INHALATION)
Status: DISCONTINUED | OUTPATIENT
Start: 2018-11-14 | End: 2018-12-04 | Stop reason: HOSPADM

## 2018-11-14 RX ORDER — ONDANSETRON 2 MG/ML
4 INJECTION INTRAMUSCULAR; INTRAVENOUS
Status: DISCONTINUED | OUTPATIENT
Start: 2018-11-14 | End: 2018-12-04 | Stop reason: HOSPADM

## 2018-11-14 RX ORDER — VANCOMYCIN/0.9 % SOD CHLORIDE 1 G/100 ML
1000 PLASTIC BAG, INJECTION (ML) INTRAVENOUS ONCE
Status: DISCONTINUED | OUTPATIENT
Start: 2018-11-14 | End: 2018-11-14

## 2018-11-14 RX ORDER — SODIUM CHLORIDE 0.9 % (FLUSH) 0.9 %
5-10 SYRINGE (ML) INJECTION AS NEEDED
Status: DISCONTINUED | OUTPATIENT
Start: 2018-11-14 | End: 2018-12-04 | Stop reason: HOSPADM

## 2018-11-14 RX ORDER — VANCOMYCIN 2 GRAM/500 ML IN 0.9 % SODIUM CHLORIDE INTRAVENOUS
2000 EVERY 12 HOURS
Status: DISCONTINUED | OUTPATIENT
Start: 2018-11-15 | End: 2018-11-16

## 2018-11-14 RX ORDER — LISINOPRIL 20 MG/1
20 TABLET ORAL DAILY
Status: DISCONTINUED | OUTPATIENT
Start: 2018-11-15 | End: 2018-12-04 | Stop reason: HOSPADM

## 2018-11-14 RX ORDER — SODIUM CHLORIDE AND POTASSIUM CHLORIDE .9; .15 G/100ML; G/100ML
SOLUTION INTRAVENOUS CONTINUOUS
Status: DISCONTINUED | OUTPATIENT
Start: 2018-11-14 | End: 2018-11-15

## 2018-11-14 RX ORDER — HYDRALAZINE HYDROCHLORIDE 20 MG/ML
10 INJECTION INTRAMUSCULAR; INTRAVENOUS
Status: DISCONTINUED | OUTPATIENT
Start: 2018-11-14 | End: 2018-12-04 | Stop reason: HOSPADM

## 2018-11-14 RX ORDER — DIPHENHYDRAMINE HYDROCHLORIDE 50 MG/ML
12.5 INJECTION, SOLUTION INTRAMUSCULAR; INTRAVENOUS
Status: DISCONTINUED | OUTPATIENT
Start: 2018-11-14 | End: 2018-12-04 | Stop reason: HOSPADM

## 2018-11-14 RX ORDER — HEPARIN SODIUM 5000 [USP'U]/ML
5000 INJECTION, SOLUTION INTRAVENOUS; SUBCUTANEOUS EVERY 8 HOURS
Status: DISCONTINUED | OUTPATIENT
Start: 2018-11-14 | End: 2018-11-15

## 2018-11-14 RX ORDER — HYDROMORPHONE HYDROCHLORIDE 1 MG/ML
1 INJECTION, SOLUTION INTRAMUSCULAR; INTRAVENOUS; SUBCUTANEOUS
Status: DISCONTINUED | OUTPATIENT
Start: 2018-11-14 | End: 2018-11-16

## 2018-11-14 RX ORDER — FACIAL-BODY WIPES
10 EACH TOPICAL DAILY PRN
Status: DISCONTINUED | OUTPATIENT
Start: 2018-11-14 | End: 2018-12-04 | Stop reason: HOSPADM

## 2018-11-14 RX ORDER — VANCOMYCIN 2 GRAM/500 ML IN 0.9 % SODIUM CHLORIDE INTRAVENOUS
2000 ONCE
Status: COMPLETED | OUTPATIENT
Start: 2018-11-14 | End: 2018-11-14

## 2018-11-14 RX ORDER — GABAPENTIN 400 MG/1
400 CAPSULE ORAL 2 TIMES DAILY
Status: DISCONTINUED | OUTPATIENT
Start: 2018-11-14 | End: 2018-12-04 | Stop reason: HOSPADM

## 2018-11-14 RX ORDER — LORATADINE 10 MG/1
10 TABLET ORAL
Status: DISCONTINUED | OUTPATIENT
Start: 2018-11-14 | End: 2018-12-04 | Stop reason: HOSPADM

## 2018-11-14 RX ADMIN — SODIUM CHLORIDE AND POTASSIUM CHLORIDE: .9; .15 SOLUTION INTRAVENOUS at 20:24

## 2018-11-14 RX ADMIN — IOPAMIDOL 97 ML: 612 INJECTION, SOLUTION INTRAVENOUS at 15:17

## 2018-11-14 RX ADMIN — HYDROMORPHONE HYDROCHLORIDE 1 MG: 1 INJECTION, SOLUTION INTRAMUSCULAR; INTRAVENOUS; SUBCUTANEOUS at 20:43

## 2018-11-14 RX ADMIN — CEFEPIME HYDROCHLORIDE 2 G: 2 INJECTION, POWDER, FOR SOLUTION INTRAVENOUS at 17:45

## 2018-11-14 RX ADMIN — GABAPENTIN 400 MG: 400 CAPSULE ORAL at 22:38

## 2018-11-14 RX ADMIN — VANCOMYCIN HYDROCHLORIDE 2000 MG: 10 INJECTION, POWDER, LYOPHILIZED, FOR SOLUTION INTRAVENOUS at 17:46

## 2018-11-14 RX ADMIN — HEPARIN SODIUM 5000 UNITS: 5000 INJECTION INTRAVENOUS; SUBCUTANEOUS at 20:46

## 2018-11-14 RX ADMIN — SIMVASTATIN 20 MG: 20 TABLET, FILM COATED ORAL at 22:38

## 2018-11-14 RX ADMIN — LACTULOSE 10 G: 20 SOLUTION ORAL at 20:44

## 2018-11-14 NOTE — ED TRIAGE NOTES
Pt arrived through triage with c/o back pain with \"inability to walk,\" constipation x 3 weeks, abdominal pain, weight loss, and decreased appetite.

## 2018-11-14 NOTE — H&P
Hospitalist Admission Note NAME:  Gloria Riley :   1950 MRN:   [id number] Date/Time:  2018 5:49 PM 
 
Subjective: CHIEF COMPLAINT:   
Chief Complaint Patient presents with  Back Pain  Constipation HISTORY OF PRESENT ILLNESS:    
Shellie Brown is a 77 y.o.  male with h/o hypertension,copd,hepatitis C,presented to ED because of back pain. Patient indicating the lower portion of his T-spine where he has the pain,saying that it started almost three months ago and has been getting worse. He reports that he was seen in ED few weeks ago and was told that nothing was wrong with him. He is also reporting constipation the last 3 weeks. He denies legs weakness or loss of sensation. No fever or chills. Although patient did not report his history of heroin abuse,the ED report indicates that patient is an IV drugs abuser and last use of iv heroin was 3-4 weeks ago. Patient's friend who came with him in ED has reported that patient fell yesterday while trying to get to the bathroom and was incontinent. Patient is reporting that he has not been able to hold his urine for a while. In ED,CT abdm/pelvis showed severe  discitis,L3-4 central stenosis,bilateral pleural effusions with bilateral lower lobe atelectasis,splenic hilar and perisplenic varices. UA showed UTI. In ED,patient was started on vanc and cefepime for severe discitis. Neurosurgery was consulted,will see patient. The hospitalist team was called for admission. Patient now laying in bed,complaining more of back pain. Past Medical History:  
Diagnosis Date  Arthritis  COPD  Hypertension Social History Tobacco Use  Smoking status: Former Smoker Last attempt to quit: 1984 Years since quittin.4  Smokeless tobacco: Former User Substance Use Topics  Alcohol use: Yes Comment: sometimes History reviewed. No pertinent family history. Allergies Allergen Reactions  Shellfish Derived Hives Prior to Admission medications Medication Sig Start Date End Date Taking? Authorizing Provider  
gabapentin (NEURONTIN) 400 mg capsule Take 400 mg by mouth two (2) times a day. Indications: NEUROPATHIC PAIN    Kim Sanchez MD  
loratadine (CLARITIN) 10 mg tablet Take 10 mg by mouth daily as needed for Allergies. Kim Sanchez MD  
ipratropium-albuterol (COMBIVENT RESPIMAT)  mcg/actuation inhaler Take 1 Puff by inhalation every twelve (12) hours. Indications: Chronic Obstructive Pulmonary Disease with Bronchospasms    Kim Sanchez MD  
lisinopril (PRINIVIL, ZESTRIL) 20 mg tablet Take  by mouth daily. Kim Sanchez MD  
simvastatin (ZOCOR) 10 mg tablet Take  by mouth nightly. Kim Sanchez MD  
 
 
REVIEW OF SYSTEMS:   
Constitutional:  No fever or weight loss HEENT:  No headache or visual changes Cardiovascular:  No chest pain, no palpitations. Respiratory:  No coughing, wheezing, or shortness of breath. GI:  Constipations. No abdominal pain. No nausea or vomiting. No diarrhea :  Urinary incontinence. No hematuria or dysuria. No frequency, retention. Musculoskeletal:Back pain in thoracic and lumbar areas Skin:  No rashes or moles Neuro:  No seizures or syncope Hematological:  No bruising or bleeding Endocrine:  No diabetes or thyroid disease Objective: VITALS:  
 
 
Visit Vitals BP (!) 213/85 Pulse 96 Temp 98.4 °F (36.9 °C) Resp 27 Ht 6' (1.829 m) Wt 126.1 kg (278 lb) SpO2 92% BMI 37.70 kg/m² O2 Device: Room air PHYSICAL EXAM:  
General:    Lying in bed in no acute distress. HEENT:  Pupils equal.  Sclera anicteric. Conjunctiva pink. Mucous membranes 
                         moist 
Neck:  Supple. Trachea midline. No accessory muscle use. No thyromegaly. No jugular venous distention CV:                  Regular rate and rhythm. Lungs:   Clear to auscultation bilaterally. No Wheezing or Rhonchi.  No rales. 
                         Normal to percussion Abdomen:   Soft, non-tender. Not distended. Bowel sounds normal. No organomegaly Back:     Tender to palpation lower thoracic area of L-spine. Straight legs raise positive at 60 degrees. Extremities: Edema 2+. No cyanosis. No clubbing Neurologic: Alert and oriented X 3. Good sensation all extremities,good muscles tone,moving all extremities without difficulty. Skin:                Warm and dry. No rashes. LAB DATA REVIEWED:   
Recent Results (from the past 24 hour(s)) EKG, 12 LEAD, INITIAL Collection Time: 11/14/18 12:59 PM  
Result Value Ref Range Ventricular Rate 96 BPM  
 Atrial Rate 96 BPM  
 P-R Interval 140 ms QRS Duration 78 ms Q-T Interval 400 ms QTC Calculation (Bezet) 505 ms Calculated P Axis 25 degrees Calculated R Axis -55 degrees Calculated T Axis -46 degrees Diagnosis Normal sinus rhythm Possible Left atrial enlargement Left anterior fascicular block Left ventricular hypertrophy T wave abnormality, consider inferior ischemia T wave abnormality, consider anterolateral ischemia Prolonged QT Abnormal ECG When compared with ECG of 14-JUN-2017 02:21, Left anterior fascicular block is now present T wave inversion now evident in Inferior leads T wave inversion now evident in Anterolateral leads Confirmed by Ester Arcos ([id number]) on 11/14/2018 4:26:18 PM 
  
CBC WITH AUTOMATED DIFF Collection Time: 11/14/18  1:33 PM  
Result Value Ref Range WBC 5.3 4.6 - 13.2 K/uL  
 RBC 5.05 4.70 - 5.50 M/uL  
 HGB 13.5 13.0 - 16.0 g/dL HCT 43.1 36.0 - 48.0 % MCV 85.3 74.0 - 97.0 FL  
 MCH 26.7 24.0 - 34.0 PG  
 MCHC 31.3 31.0 - 37.0 g/dL  
 RDW 15.9 (H) 11.6 - 14.5 % PLATELET 053 383 - 389 K/uL MPV 10.1 9.2 - 11.8 FL  
 NEUTROPHILS 54 40 - 73 % LYMPHOCYTES 37 21 - 52 % MONOCYTES 8 3 - 10 % EOSINOPHILS 1 0 - 5 % BASOPHILS 0 0 - 2 %  
 ABS. NEUTROPHILS 2.8 1.8 - 8.0 K/UL ABS. LYMPHOCYTES 1.9 0.9 - 3.6 K/UL  
 ABS. MONOCYTES 0.4 0.05 - 1.2 K/UL  
 ABS. EOSINOPHILS 0.1 0.0 - 0.4 K/UL  
 ABS. BASOPHILS 0.0 0.0 - 0.1 K/UL  
 DF AUTOMATED METABOLIC PANEL, COMPREHENSIVE Collection Time: 11/14/18  1:33 PM  
Result Value Ref Range Sodium 139 136 - 145 mmol/L Potassium 4.2 3.5 - 5.5 mmol/L Chloride 101 100 - 108 mmol/L  
 CO2 31 21 - 32 mmol/L Anion gap 7 3.0 - 18 mmol/L Glucose 89 74 - 99 mg/dL BUN 7 7.0 - 18 MG/DL Creatinine 0.99 0.6 - 1.3 MG/DL  
 BUN/Creatinine ratio 7 (L) 12 - 20 GFR est AA >60 >60 ml/min/1.73m2 GFR est non-AA >60 >60 ml/min/1.73m2 Calcium 8.1 (L) 8.5 - 10.1 MG/DL Bilirubin, total 1.2 (H) 0.2 - 1.0 MG/DL  
 ALT (SGPT) 21 16 - 61 U/L  
 AST (SGOT) 37 15 - 37 U/L Alk. phosphatase 123 (H) 45 - 117 U/L Protein, total 8.8 (H) 6.4 - 8.2 g/dL Albumin 2.2 (L) 3.4 - 5.0 g/dL Globulin 6.6 (H) 2.0 - 4.0 g/dL A-G Ratio 0.3 (L) 0.8 - 1.7 LIPASE Collection Time: 11/14/18  1:33 PM  
Result Value Ref Range Lipase 112 73 - 393 U/L  
CARDIAC PANEL,(CK, CKMB & TROPONIN) Collection Time: 11/14/18  1:33 PM  
Result Value Ref Range CK 86 39 - 308 U/L  
 CK - MB 1.8 <3.6 ng/ml CK-MB Index 2.1 0.0 - 4.0 % Troponin-I, Qt. <0.02 0.0 - 0.045 NG/ML  
NT-PRO BNP Collection Time: 11/14/18  1:33 PM  
Result Value Ref Range NT pro- 0 - 900 PG/ML  
URINALYSIS W/ RFLX MICROSCOPIC Collection Time: 11/14/18  2:00 PM  
Result Value Ref Range Color YELLOW Appearance CLOUDY Specific gravity 1.015 1.005 - 1.030    
 pH (UA) 8.0 5.0 - 8.0 Protein NEGATIVE  NEG mg/dL Glucose NEGATIVE  NEG mg/dL Ketone NEGATIVE  NEG mg/dL Bilirubin NEGATIVE  NEG Blood NEGATIVE  NEG Urobilinogen 2.0 (H) 0.2 - 1.0 EU/dL Nitrites POSITIVE (A) NEG Leukocyte Esterase SMALL (A) NEG URINE MICROSCOPIC ONLY Collection Time: 11/14/18  2:00 PM  
Result Value Ref Range WBC 11 to 20 0 - 4 /hpf  
 RBC 0 0 - 5 /hpf Epithelial cells FEW 0 - 5 /lpf Bacteria 3+ (A) NEG /hpf POC LACTIC ACID Collection Time: 11/14/18  5:05 PM  
Result Value Ref Range Lactic Acid (POC) 1.07 0.40 - 2.00 mmol/L Assessment/Plan:  
  
Principal Problem: 
  Discitis of thoracic region (11/14/2018) Active Problems: 
  UTI (urinary tract infection) (11/14/2018) Bilateral pleural effusion (11/14/2018) Lumbar stenosis (11/14/2018) Adenoma of left adrenal gland (11/14/2018) Uncontrolled hypertension (11/14/2018) Cirrhosis of liver ([facility] (11/14/2018) Hepatitis C (11/14/2018) Obesity (11/14/2018) COPD (chronic obstructive pulmonary disease) ([facility] (11/14/2018) Constipation (11/14/2018) Back pain (11/14/2018) Legs edema 
___________________________________________________ PLAN: 1. Discitis of thoracic region 
 -CT showing severe discitis of T10-11. Patient with h/o iv heroin abuse. Infection suspected. -Vanc and cefepime initiated in ED,will continue. -Sepsis protocol was initiated in ED but don't think patient is septic - lactic acid,wbc,temp,HR normal. 
 -Blood cx ordered. -MRI ordered. 
 -Neurosurgery consulted,will see patient. Will follow recommendations. 
 -Pain management with dilaudid q 4 hrs prns. -Fall precaution. 2.Lumbar stenosis at L3-4  
 -Will follow neurosurgery recommendations 3.UTI (urinary tract infection) 
 -On cefepime. 
 -Ucx and blood cxordered 4. Cirrhosis of liver/Hepatitis C/H/o alcohol abuse 
 -Patient has h/o hepatitis C and past history of alcohol abuse. CT showing strong evidence of cirrhosis with possible portal vein hypertension. 
 -Will monitor. Will consult GI  
 -Will order albumin x 1 
 -Ammonia level. PT/INR 5. Bilateral pleural effusion/Legs edema 
 -Likely due to cirrhosis of liver. -Might need diuretics but will discuss with GI. 6.Adenoma of left adrenal gland -Seen in previous films. Will monitor 7. Uncontrolled hypertension  
 -Resume lisinopril. Hydralazine prn 8. Obesity 
 -Supportive care 9. COPD 
 -Duo-neb prn 
 
10. Constipation 
 -Order dulcolax 11. Back pain 
 -Due to problems above - on pain meds. 12.Legs edema 
 -Likely due to cirrhosis with portal hypertension 
 -Will discuss lasix with gi. 
 -Edema likely due to liver disease,will order pro-BNP and ECHO considering possibility of cardiac disease. DVT prophylaxis:sc heparin Full code:discussed with patient who stated that he does not have a living will and never decided his code status Disposition:tbd 
 
___________________________________________________ Admitting Physician: Kingston Tanner MD

## 2018-11-14 NOTE — PROGRESS NOTES
Pharmacy Dosing Services: Vancomycin Indication: Bone and Joint Infection Day of therapy: 0 Other Antimicrobials (Include dose, start day & day of therapy): 
Cefepime 2 grams every 8 hours, 2018 Loading dose (date given): 2,000 mg 
Current Maintenance dose: new start Goal Vancomycin Level: 15-20 mcg/mL (Trough 15-20 for most infections, 20 for meningitis/osteomyelitis, pre-HD level ~25) Vancomycin Level (if drawn): new start Significant Cultures:  
Blood cultures taken Renal function stable? (unstable defined as SCr increase of 0.5 mg/dL or > 50% increase from baseline, whichever is greater) (Y/N): Y  
 
CAPD, Hemodialysis or Renal Replacement Therapy (Y/N): N Recent Labs 18 
1333 CREA 0.99  
BUN 7  
WBC 5.3 Temp (24hrs), Av.4 °F (36.9 °C), Min:98.4 °F (36.9 °C), Max:98.4 °F (36.9 °C) Creatinine Clearance (Creatinine Clearance (ml/min)): Estimated Creatinine Clearance: 95.3 mL/min (based on SCr of 0.99 mg/dL). Regimen assessment: New start Maintenance dose: 2,000 mg every 12 hours Next scheduled level: 2018 at 0530 Pharmacy will follow daily and adjust medications as appropriate for renal function and/or serum levels. Thank you, Devonte Bray [place]

## 2018-11-14 NOTE — ED PROVIDER NOTES
EMERGENCY DEPARTMENT HISTORY AND PHYSICAL EXAM 
 
1:07 PM 
 
 
Date: 11/14/2018 Patient Name: Shruthi Nails History of Presenting Illness Chief Complaint Patient presents with  Back Pain  Constipation History Provided By: Patient Chief Complaint: Pain Duration: 3wks Timing:  Intermittent Location: Diffuse abd and back Quality: N/A Severity: Severe Modifying Factors: none reported Associated Symptoms: SOB, acticity and appetite change, weight loss, leg swelling, SOB, constipation Additional History (Context): Shruthi Nails is a 77 y.o. male with hypertension, osteoarthritis and COPD who presents with intermittent severe lower back and abd pain x3 wks. Associated sx include constipation for 3wks, appetite and activity change, weight loss, b/l leg swelling, SOB. Denies vomiting, CP. Friend states pt fell yesterday while trying to get to the bathroom, fell and was incontinent. 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 Pt is not home O2 and states he shouldn't be. PCP: None Current Facility-Administered Medications Medication Dose Route Frequency Provider Last Rate Last Dose  sodium chloride (NS) flush 5-10 mL  5-10 mL IntraVENous PRN Aimee Humphrey MD      
 cefepime (MAXIPIME) 2 g in 0.9% sodium chloride (MBP/ADV) 100 mL MBP  2 g IntraVENous Q8H Aimee Humphrey MD      
 vancomycin (VANCOCIN) 2000 mg in  ml infusion  2,000 mg IntraVENous ONCE Aimee Humphrey MD      
 Please enter patient's height and weight when available  1 Each Other ONCE Aimee Humphrey MD      
 
Current Outpatient Medications Medication Sig Dispense Refill  gabapentin (NEURONTIN) 400 mg capsule Take 400 mg by mouth two (2) times a day. Indications: NEUROPATHIC PAIN    
 loratadine (CLARITIN) 10 mg tablet Take 10 mg by mouth daily as needed for Allergies.  ipratropium-albuterol (COMBIVENT RESPIMAT)  mcg/actuation inhaler Take 1 Puff by inhalation every twelve (12) hours. Indications: Chronic Obstructive Pulmonary Disease with Bronchospasms  lisinopril (PRINIVIL, ZESTRIL) 20 mg tablet Take  by mouth daily.  simvastatin (ZOCOR) 10 mg tablet Take  by mouth nightly. Past History Past Medical History: 
Past Medical History:  
Diagnosis Date  Arthritis  COPD  Hypertension Past Surgical History: No past surgical history on file. Family History: No family history on file. Social History: 
Social History Tobacco Use  Smoking status: Former Smoker Last attempt to quit: 1984 Years since quittin.4  Smokeless tobacco: Never Used Substance Use Topics  Alcohol use: Yes Comment: sometimes  Drug use: Yes Types: Heroin, Marijuana, Opiates Comment: Uses opiates for pain Allergies: Allergies Allergen Reactions  Shellfish Derived Hives Review of Systems Review of Systems Constitutional: Positive for activity change, appetite change and unexpected weight change. Respiratory: Positive for shortness of breath. Cardiovascular: Positive for leg swelling. Negative for chest pain. Gastrointestinal: Positive for abdominal distention, abdominal pain and constipation. Negative for vomiting. Musculoskeletal: Positive for back pain. All other systems reviewed and are negative. Physical Exam  
 
Visit Vitals BP (!) 213/85 Pulse 96 Temp 98.4 °F (36.9 °C) Resp 27 SpO2 92% Physical Exam  
Constitutional: He is oriented to person, place, and time. He appears well-developed and well-nourished. No distress. Morbidly obese HENT:  
Head: Normocephalic and atraumatic. Mouth/Throat: Oropharynx is clear and moist.  
Eyes: Conjunctivae and EOM are normal. Pupils are equal, round, and reactive to light. No scleral icterus. Neck: Normal range of motion. Neck supple. Cardiovascular: Normal rate, regular rhythm and normal heart sounds. No murmur heard. Pulmonary/Chest: Effort normal. No respiratory distress. He has decreased breath sounds. Abdominal: Soft. Bowel sounds are normal. He exhibits no distension. There is tenderness (minimal). Musculoskeletal: He exhibits edema (2+ LE). Lymphadenopathy:  
  He has no cervical adenopathy. Neurological: He is alert and oriented to person, place, and time. Coordination normal.  
Skin: Skin is warm and dry. No rash noted. Psychiatric: He has a normal mood and affect. His behavior is normal.  
Nursing note and vitals reviewed. Diagnostic Study Results Labs - Recent Results (from the past 12 hour(s)) EKG, 12 LEAD, INITIAL Collection Time: 11/14/18 12:59 PM  
Result Value Ref Range Ventricular Rate 96 BPM  
 Atrial Rate 96 BPM  
 P-R Interval 140 ms QRS Duration 78 ms Q-T Interval 400 ms QTC Calculation (Bezet) 505 ms Calculated P Axis 25 degrees Calculated R Axis -55 degrees Calculated T Axis -46 degrees Diagnosis Normal sinus rhythm Possible Left atrial enlargement Left anterior fascicular block Left ventricular hypertrophy T wave abnormality, consider inferior ischemia T wave abnormality, consider anterolateral ischemia Prolonged QT Abnormal ECG When compared with ECG of 14-JUN-2017 02:21, Left anterior fascicular block is now present T wave inversion now evident in Inferior leads T wave inversion now evident in Anterolateral leads Confirmed by Corrie Ba ([id number]) on 11/14/2018 4:26:18 PM 
  
CBC WITH AUTOMATED DIFF Collection Time: 11/14/18  1:33 PM  
Result Value Ref Range WBC 5.3 4.6 - 13.2 K/uL  
 RBC 5.05 4.70 - 5.50 M/uL  
 HGB 13.5 13.0 - 16.0 g/dL HCT 43.1 36.0 - 48.0 % MCV 85.3 74.0 - 97.0 FL  
 MCH 26.7 24.0 - 34.0 PG  
 MCHC 31.3 31.0 - 37.0 g/dL  
 RDW 15.9 (H) 11.6 - 14.5 % PLATELET 673 800 - 953 K/uL MPV 10.1 9.2 - 11.8 FL  
 NEUTROPHILS 54 40 - 73 % LYMPHOCYTES 37 21 - 52 % MONOCYTES 8 3 - 10 % EOSINOPHILS 1 0 - 5 % BASOPHILS 0 0 - 2 %  
 ABS. NEUTROPHILS 2.8 1.8 - 8.0 K/UL  
 ABS. LYMPHOCYTES 1.9 0.9 - 3.6 K/UL  
 ABS. MONOCYTES 0.4 0.05 - 1.2 K/UL  
 ABS. EOSINOPHILS 0.1 0.0 - 0.4 K/UL  
 ABS. BASOPHILS 0.0 0.0 - 0.1 K/UL  
 DF AUTOMATED METABOLIC PANEL, COMPREHENSIVE Collection Time: 11/14/18  1:33 PM  
Result Value Ref Range Sodium 139 136 - 145 mmol/L Potassium 4.2 3.5 - 5.5 mmol/L Chloride 101 100 - 108 mmol/L  
 CO2 31 21 - 32 mmol/L Anion gap 7 3.0 - 18 mmol/L Glucose 89 74 - 99 mg/dL BUN 7 7.0 - 18 MG/DL Creatinine 0.99 0.6 - 1.3 MG/DL  
 BUN/Creatinine ratio 7 (L) 12 - 20 GFR est AA >60 >60 ml/min/1.73m2 GFR est non-AA >60 >60 ml/min/1.73m2 Calcium 8.1 (L) 8.5 - 10.1 MG/DL Bilirubin, total 1.2 (H) 0.2 - 1.0 MG/DL  
 ALT (SGPT) 21 16 - 61 U/L  
 AST (SGOT) 37 15 - 37 U/L Alk. phosphatase 123 (H) 45 - 117 U/L Protein, total 8.8 (H) 6.4 - 8.2 g/dL Albumin 2.2 (L) 3.4 - 5.0 g/dL Globulin 6.6 (H) 2.0 - 4.0 g/dL A-G Ratio 0.3 (L) 0.8 - 1.7 LIPASE Collection Time: 11/14/18  1:33 PM  
Result Value Ref Range Lipase 112 73 - 393 U/L  
CARDIAC PANEL,(CK, CKMB & TROPONIN) Collection Time: 11/14/18  1:33 PM  
Result Value Ref Range CK 86 39 - 308 U/L  
 CK - MB 1.8 <3.6 ng/ml CK-MB Index 2.1 0.0 - 4.0 % Troponin-I, Qt. <0.02 0.0 - 0.045 NG/ML  
NT-PRO BNP Collection Time: 11/14/18  1:33 PM  
Result Value Ref Range NT pro- 0 - 900 PG/ML  
URINALYSIS W/ RFLX MICROSCOPIC Collection Time: 11/14/18  2:00 PM  
Result Value Ref Range Color YELLOW Appearance CLOUDY Specific gravity 1.015 1.005 - 1.030    
 pH (UA) 8.0 5.0 - 8.0 Protein NEGATIVE  NEG mg/dL Glucose NEGATIVE  NEG mg/dL Ketone NEGATIVE  NEG mg/dL  Bilirubin NEGATIVE  NEG    
 Blood NEGATIVE  NEG Urobilinogen 2.0 (H) 0.2 - 1.0 EU/dL Nitrites POSITIVE (A) NEG Leukocyte Esterase SMALL (A) NEG URINE MICROSCOPIC ONLY Collection Time: 11/14/18  2:00 PM  
Result Value Ref Range WBC 11 to 20 0 - 4 /hpf  
 RBC 0 0 - 5 /hpf Epithelial cells FEW 0 - 5 /lpf Bacteria 3+ (A) NEG /hpf POC LACTIC ACID Collection Time: 11/14/18  5:05 PM  
Result Value Ref Range Lactic Acid (POC) 1.07 0.40 - 2.00 mmol/L Radiologic Studies -  
CT ABD PELV W CONT Final Result XR CHEST PORT Final Result MRI [facility] SPINE W CONT    (Results Pending) Ct Abd Pelv W Cont Result Date: 11/14/2018 EXAM: CT of the abdomen and pelvis INDICATION: Constipation, diffuse abdominal pain and severe low back pain for 3 months with recent progression. COMPARISON: None. TECHNIQUE: Axial CT imaging of the abdomen and pelvis was performed with intravenous contrast. Multiplanar reformats were generated. One or more dose reduction techniques were used on this CT: automated exposure control, adjustment of the mAs and/or kVp according to patient size, and iterative reconstruction techniques. The specific techniques used on this CT exam have been documented in the patient's electronic medical record. _______________ FINDINGS: LOWER CHEST: Moderate bilateral dependent pleural effusions with passive partial lower lobe atelectasis. Additional bandlike atelectasis posterior lingula and right middle lobe LIVER, BILIARY: Multiple small low-attenuation foci in the liver compatible with cysts. One lesion in anterior dome of liver measures 9 mm. Several additional smaller lesions as well. Disproportionate enlargement segment 2 and 3 of liver. No biliary dilation. PANCREAS: Normal. SPLEEN: Normal. ADRENALS: Low-attenuation nodule right adrenal gland measures about 1.7 cm without change compatible with adenoma. Normal left adrenal gland.  KIDNEYS/URETERS/URINARY BLADDER: Normal. LYMPH NODES: Gastrohepatic ligament adenopathy with several enlarged nodes 2 of which measure 1.2 cm short axis. There is also retrocrural adenopathy. GASTROINTESTINAL TRACT: No bowel dilation or wall thickening. Normal appendix. Large amount of retained stool in right colon. PELVIC ORGANS: Mildly enlarged prostate. VASCULATURE: Moderate atherosclerosis. Splenic hilar and perisplenic varices. BONES: There are severe destructive changes involving the endplates at G74-54. There is associated paravertebral soft tissue infiltration. Findings compatible with severe discitis. The inflammatory process extends into the anterior epidural space with compression of cord. L5-S1 spondylosis and degenerative disc disease. Probable hemangioma right L1 vertebral body. L3-4 severe central stenosis. OTHER: None. _______________ IMPRESSION: 1. Severe destructive changes involving endplates of U06-63 with circumferential paravertebral soft tissue infiltration and extension of process into the anterior epidural space with cord compression. Findings compatible with severe discitis. Recommend MRI evaluation. 2. Moderate dependent bilateral pleural effusions with partial bilateral lower lobe atelectasis and additional atelectatic streaks right middle lobe and lingula. 3. Multiple small hepatic cysts. Left adrenal adenoma. 4. Gastrohepatic ligament adenopathy and retrocrural adenopathy, probably reactive. 5. Splenic hilar and perisplenic varices. Disproportionate enlargement segment 2 and 3 of liver. Findings may indicate cirrhosis. 6. L3-4 central stenosis. Probable small hemangioma L1 vertebral body. Xr Chest [facility] Result Date: 11/14/2018 Chest, single view Indication: Shortness of breath Comparison: Several prior exams, most recently June 14, 2017 Findings:  Portable upright AP view of the chest was obtained. Lung volumes are low with mild bibasilar atelectasis. Small pleural effusions. Left retrocardiac density noted. Mild elevation of the right hemidiaphragm similar to multiple prior exams. Stable cardiac size and mediastinal contours. No acute osseous abnormality. Osteoarthritis of bilateral shoulder girdles noted. Impression: 1. Low lung volumes, left retrocardiac atelectasis or airspace disease and small bilateral pleural effusions. Medical Decision Making I am the first provider for this patient. I reviewed the vital signs, available nursing notes, past medical history, past surgical history, family history and social history. Vital Signs-Reviewed the patient's vital signs. Pulse Oximetry Analysis -  95% on 2L of O2 via NC (Interpretation) wnl 
 
Cardiac Monitor: 
Rate: 94 Rhythm:  Normal Sinus Rhythm EKG: Interpreted by the EP. Time Interpreted: 9466 Rate: 96 
 Rhythm: Normal Sinus Rhythm Interpretation: T wave inversions v1 through v6. New from June 2017. Records Reviewed: Nursing Notes and Old Medical Records (Time of Review: 1:07 PM) ED Course: Progress Notes, Reevaluation, and Consults: 
 
16:30 Repeat neuro exam, pt able to lift both LE able to flex and extend against gravity. 16:45 Consult:  Discussed care with Dr. Katia Frances (Hsopitalist). Standard discussion; including history of patients chief complaint, available diagnostic results, and treatment course. Accepts for admission. 17:11 Consult:  Discussed care with Dr. Mallika Mena (Neurosurgery). Standard discussion; including history of patients chief complaint, available diagnostic results, and treatment course. Agrees with Abx first, will see tonight. Provider Notes (Medical Decision Making): MDM Number of Diagnoses or Management Options Discitis of thoracic region:  
Diagnosis management comments: C/o back pain progressive for the past 3 weeks with increased abd pain denies lower ext numbness or weakness fever h/ o IVDA Ct reviewed sed rate crp blood cultures and poc lactic obtained prior to starting Abx. No evidence of severe sepsis or septic shock . Amount and/or Complexity of Data Reviewed Clinical lab tests: ordered and reviewed Tests in the radiology section of CPT®: ordered and reviewed Risk of Complications, Morbidity, and/or Mortality Presenting problems: high Diagnostic procedures: moderate Management options: moderate Critical Care Time: The services I provided to this patient were to treat and/or prevent clinically significant deterioration that could result in the failure of one or more body systems and/or organ systems due to discitis. Services included the following: 
-reviewing nursing notes and old charts 
-vital sign assessments 
-direct patient care 
-medication orders and management 
-interpreting and reviewing diagnostic studies/labs 
-re-evaluations 
-documentation time Aggregate critical care time was 30 minutes, which includes only time during which I was engaged in work directly related to the patient's care as described above, whether I was at bedside or elsewhere in the Emergency Department. It did not include time spent performing other reported procedures or the services of residents, students, nurses, or advance practice providers. Temitope Palomino MD - 16:45 For Hospitalized Patients: 
 
1. Hospitalization Decision Time: The decision to hospitalize the patient was made by Dr. Ibeth Mitchell at [phone] on 11/14/2018 2. Aspirin: Aspirin was not given because the patient did not present with a stroke at the time of their Emergency Department evaluation Diagnosis Clinical Impression: 1. Discitis of thoracic region Disposition: Admit Medication List  
  
ASK your doctor about these medications COMBIVENT RESPIMAT  mcg/actuation inhaler Generic drug:  ipratropium-albuterol 
  
gabapentin 400 mg capsule Commonly known as:  NEURONTIN 
  
lisinopril 20 mg tablet Commonly known as:  Judd Jones 
  
 loratadine 10 mg tablet Commonly known as:  CLARITIN 
  
simvastatin 10 mg tablet Commonly known as:  Doyce Pali 
  
  
 
_______________________________ Attestations: 
Scribe Attestation Theodosia Mcardle acting as a scribe for and in the presence of Reena Del Valle MD     
November 14, 2018 at 5:31 PM 
    
Provider Attestation:     
I personally performed the services described in the documentation, reviewed the documentation, as recorded by the scribe in my presence, and it accurately and completely records my words and actions. November 14, 2018 at 5:31 PM - Reena Del Valle MD   
_______________________________

## 2018-11-14 NOTE — ED TRIAGE NOTES
Florina Martinez. Darrol Rubinstein is a 77year old male arrived to ER c/o decreased appetite, SOB, constipation, diffuse abdominal pain, and severe lower back pain. Onset of symptoms started three months and has gradually worsen the past week. Patient denies fever, chills, CP,nausea, vomiting, urinary sx's, or any other concerns. I performed a brief evaluation, including history and physical, of the patient here in triage and I have determined that pt will need further treatment and evaluation from the main side ER physician. I have placed initial orders to help in expediting patients care.      November 14, 2018 at 12:34 PM - Darin Hilario NP

## 2018-11-15 ENCOUNTER — ANESTHESIA (OUTPATIENT)
Dept: SURGERY | Age: 68
DRG: 347 | End: 2018-11-15
Payer: MEDICAID

## 2018-11-15 ENCOUNTER — APPOINTMENT (OUTPATIENT)
Dept: CT IMAGING | Age: 68
DRG: 347 | End: 2018-11-15
Attending: INTERNAL MEDICINE
Payer: MEDICAID

## 2018-11-15 ENCOUNTER — APPOINTMENT (OUTPATIENT)
Dept: NON INVASIVE DIAGNOSTICS | Age: 68
DRG: 347 | End: 2018-11-15
Attending: INTERNAL MEDICINE
Payer: MEDICAID

## 2018-11-15 LAB
ALBUMIN SERPL-MCNC: 2.4 G/DL (ref 3.4–5)
ALBUMIN/GLOB SERPL: 0.3 {RATIO} (ref 0.8–1.7)
ALP SERPL-CCNC: 141 U/L (ref 45–117)
ALT SERPL-CCNC: 23 U/L (ref 16–61)
AMMONIA PLAS-SCNC: 36 UMOL/L (ref 11–32)
AMMONIA PLAS-SCNC: 57 UMOL/L (ref 11–32)
ANION GAP SERPL CALC-SCNC: 4 MMOL/L (ref 3–18)
ANION GAP SERPL CALC-SCNC: 4 MMOL/L (ref 3–18)
APTT PPP: 36.1 SEC (ref 23–36.4)
ARTERIAL PATENCY WRIST A: YES
AST SERPL-CCNC: 35 U/L (ref 15–37)
BASE EXCESS BLD CALC-SCNC: 9 MMOL/L
BASOPHILS # BLD: 0 K/UL (ref 0–0.1)
BASOPHILS NFR BLD: 0 % (ref 0–2)
BDY SITE: ABNORMAL
BILIRUB SERPL-MCNC: 1.3 MG/DL (ref 0.2–1)
BODY TEMPERATURE: 98.6
BUN SERPL-MCNC: 7 MG/DL (ref 7–18)
BUN SERPL-MCNC: 9 MG/DL (ref 7–18)
BUN/CREAT SERPL: 8 (ref 12–20)
BUN/CREAT SERPL: 8 (ref 12–20)
CALCIUM SERPL-MCNC: 8.2 MG/DL (ref 8.5–10.1)
CALCIUM SERPL-MCNC: 8.3 MG/DL (ref 8.5–10.1)
CHLORIDE SERPL-SCNC: 101 MMOL/L (ref 100–108)
CHLORIDE SERPL-SCNC: 102 MMOL/L (ref 100–108)
CO2 SERPL-SCNC: 31 MMOL/L (ref 21–32)
CO2 SERPL-SCNC: 32 MMOL/L (ref 21–32)
CREAT SERPL-MCNC: 0.91 MG/DL (ref 0.6–1.3)
CREAT SERPL-MCNC: 1.1 MG/DL (ref 0.6–1.3)
CRYPTOC AG SER QL LA: NEGATIVE
DIFFERENTIAL METHOD BLD: ABNORMAL
ECHO AO ASC DIAM: 3.69 CM
ECHO AO ROOT DIAM: 3.59 CM
ECHO AV AREA PEAK VELOCITY: -3.3 CM2
ECHO AV AREA VTI: 2.3 CM2
ECHO AV AREA/BSA PEAK VELOCITY: -1.4 CM2/M2
ECHO AV AREA/BSA VTI: 1 CM2/M2
ECHO AV MEAN GRADIENT: 9.2 MMHG
ECHO AV PEAK GRADIENT: 5.2 MMHG
ECHO AV PEAK VELOCITY: -113.89 CM/S
ECHO AV VTI: 26.86 CM
ECHO IVC SNIFF: 2.27 CM
ECHO LA VOL 2C: 41.26 ML (ref 18–58)
ECHO LA VOL 4C: 72.78 ML (ref 18–58)
ECHO LA VOLUME INDEX A2C: 16.84 ML/M2 (ref 16–28)
ECHO LA VOLUME INDEX A4C: 29.7 ML/M2 (ref 16–28)
ECHO LV EDV A2C: 103.5 ML
ECHO LV EDV A4C: 122.4 ML
ECHO LV EDV BP: 112.8 ML (ref 67–155)
ECHO LV EDV INDEX A4C: 50 ML/M2
ECHO LV EDV INDEX BP: 46 ML/M2
ECHO LV EDV NDEX A2C: 42.2 ML/M2
ECHO LV EJECTION FRACTION A2C: 68 %
ECHO LV EJECTION FRACTION A4C: 80 %
ECHO LV EJECTION FRACTION BIPLANE: 73.8 % (ref 55–100)
ECHO LV ESV A2C: 32.8 ML
ECHO LV ESV A4C: 25.1 ML
ECHO LV ESV BP: 29.6 ML (ref 22–58)
ECHO LV ESV INDEX A2C: 13.4 ML/M2
ECHO LV ESV INDEX A4C: 10.2 ML/M2
ECHO LV ESV INDEX BP: 12.1 ML/M2
ECHO LV INTERNAL DIMENSION DIASTOLIC: 3.48 CM (ref 4.2–5.9)
ECHO LV INTERNAL DIMENSION SYSTOLIC: 2.57 CM
ECHO LV IVSD: 1.49 CM (ref 0.6–1)
ECHO LV MASS 2D: 208 G (ref 88–224)
ECHO LV MASS INDEX 2D: 84.9 G/M2 (ref 49–115)
ECHO LV POSTERIOR WALL DIASTOLIC: 1.37 CM (ref 0.6–1)
ECHO LVOT CARDIAC OUTPUT: 6.7 L/MIN
ECHO LVOT DIAM: 1.99 CM
ECHO LVOT PEAK GRADIENT: 5.7 MMHG
ECHO LVOT PEAK VELOCITY: 119.14 CM/S
ECHO LVOT SV: 60.9 ML
ECHO LVOT VTI: 19.49 CM
ECHO MV A VELOCITY: 120.68 CM/S
ECHO MV AREA PHT: 5.1 CM2
ECHO MV AREA VTI: 3 CM2
ECHO MV E DECELERATION TIME (DT): 149.5 MS
ECHO MV E VELOCITY: 0.68 CM/S
ECHO MV E/A RATIO: 0.01
ECHO MV MAX VELOCITY: 130.53 CM/S
ECHO MV MEAN GRADIENT: 3.4 MMHG
ECHO MV PEAK GRADIENT: 6.8 MMHG
ECHO MV PRESSURE HALF TIME (PHT): 43.4 MS
ECHO MV VTI: 20.07 CM
ECHO TV REGURGITANT MAX VELOCITY: -38.41 CM/S
ECHO TV REGURGITANT PEAK GRADIENT: 0.6 MMHG
EOSINOPHIL # BLD: 0.1 K/UL (ref 0–0.4)
EOSINOPHIL NFR BLD: 1 % (ref 0–5)
ERYTHROCYTE [DISTWIDTH] IN BLOOD BY AUTOMATED COUNT: 15.9 % (ref 11.6–14.5)
GAS FLOW.O2 O2 DELIVERY SYS: ABNORMAL L/MIN
GAS FLOW.O2 SETTING OXYMISER: 5 L/M
GLOBULIN SER CALC-MCNC: 7.4 G/DL (ref 2–4)
GLUCOSE BLD STRIP.AUTO-MCNC: 96 MG/DL (ref 70–110)
GLUCOSE SERPL-MCNC: 106 MG/DL (ref 74–99)
GLUCOSE SERPL-MCNC: 79 MG/DL (ref 74–99)
HCO3 BLD-SCNC: 34.8 MMOL/L (ref 22–26)
HCT VFR BLD AUTO: 45.4 % (ref 36–48)
HGB BLD-MCNC: 14.7 G/DL (ref 13–16)
INR PPP: 1.2 (ref 0.8–1.2)
LYMPHOCYTES # BLD: 2 K/UL (ref 0.9–3.6)
LYMPHOCYTES NFR BLD: 30 % (ref 21–52)
MAGNESIUM SERPL-MCNC: 2.1 MG/DL (ref 1.6–2.6)
MCH RBC QN AUTO: 27.8 PG (ref 24–34)
MCHC RBC AUTO-ENTMCNC: 32.4 G/DL (ref 31–37)
MCV RBC AUTO: 85.8 FL (ref 74–97)
MONOCYTES # BLD: 0.6 K/UL (ref 0.05–1.2)
MONOCYTES NFR BLD: 9 % (ref 3–10)
NEUTS SEG # BLD: 4 K/UL (ref 1.8–8)
NEUTS SEG NFR BLD: 60 % (ref 40–73)
O2/TOTAL GAS SETTING VFR VENT: 40 %
PCO2 BLD: 64.1 MMHG (ref 35–45)
PH BLD: 7.34 [PH] (ref 7.35–7.45)
PHOSPHATE SERPL-MCNC: 3.5 MG/DL (ref 2.5–4.9)
PLATELET # BLD AUTO: 170 K/UL (ref 135–420)
PMV BLD AUTO: 10.4 FL (ref 9.2–11.8)
PO2 BLD: 91 MMHG (ref 80–100)
POTASSIUM SERPL-SCNC: 4.4 MMOL/L (ref 3.5–5.5)
POTASSIUM SERPL-SCNC: 4.5 MMOL/L (ref 3.5–5.5)
PROT SERPL-MCNC: 9.8 G/DL (ref 6.4–8.2)
PROTHROMBIN TIME: 15.1 SEC (ref 11.5–15.2)
PSA SERPL-MCNC: 0.9 NG/ML (ref 0–4)
RBC # BLD AUTO: 5.29 M/UL (ref 4.7–5.5)
SAO2 % BLD: 96 % (ref 92–97)
SERVICE CMNT-IMP: ABNORMAL
SODIUM SERPL-SCNC: 136 MMOL/L (ref 136–145)
SODIUM SERPL-SCNC: 138 MMOL/L (ref 136–145)
SPECIMEN TYPE: ABNORMAL
TOTAL RESP. RATE, ITRR: 20
WBC # BLD AUTO: 6.7 K/UL (ref 4.6–13.2)

## 2018-11-15 PROCEDURE — 85025 COMPLETE CBC W/AUTO DIFF WBC: CPT

## 2018-11-15 PROCEDURE — 77030011256 HC DRSG MEPILEX <16IN NO BORD MOLN -A

## 2018-11-15 PROCEDURE — 74011250636 HC RX REV CODE- 250/636: Performed by: HOSPITALIST

## 2018-11-15 PROCEDURE — 77030021352 HC CBL LD SYS DISP COVD -B

## 2018-11-15 PROCEDURE — 74011250637 HC RX REV CODE- 250/637: Performed by: INTERNAL MEDICINE

## 2018-11-15 PROCEDURE — 36415 COLL VENOUS BLD VENIPUNCTURE: CPT

## 2018-11-15 PROCEDURE — 80053 COMPREHEN METABOLIC PANEL: CPT

## 2018-11-15 PROCEDURE — 82803 BLOOD GASES ANY COMBINATION: CPT

## 2018-11-15 PROCEDURE — 87449 NOS EACH ORGANISM AG IA: CPT

## 2018-11-15 PROCEDURE — 77030032490 HC SLV COMPR SCD KNE COVD -B

## 2018-11-15 PROCEDURE — 84153 ASSAY OF PSA TOTAL: CPT

## 2018-11-15 PROCEDURE — 74011000258 HC RX REV CODE- 258: Performed by: EMERGENCY MEDICINE

## 2018-11-15 PROCEDURE — 84100 ASSAY OF PHOSPHORUS: CPT

## 2018-11-15 PROCEDURE — 85730 THROMBOPLASTIN TIME PARTIAL: CPT

## 2018-11-15 PROCEDURE — 70450 CT HEAD/BRAIN W/O DYE: CPT

## 2018-11-15 PROCEDURE — 83735 ASSAY OF MAGNESIUM: CPT

## 2018-11-15 PROCEDURE — 74011250636 HC RX REV CODE- 250/636: Performed by: INTERNAL MEDICINE

## 2018-11-15 PROCEDURE — 74011000250 HC RX REV CODE- 250: Performed by: INTERNAL MEDICINE

## 2018-11-15 PROCEDURE — 87327 CRYPTOCOCCUS NEOFORM AG IA: CPT

## 2018-11-15 PROCEDURE — 65270000029 HC RM PRIVATE

## 2018-11-15 PROCEDURE — 82140 ASSAY OF AMMONIA: CPT

## 2018-11-15 PROCEDURE — 36600 WITHDRAWAL OF ARTERIAL BLOOD: CPT

## 2018-11-15 PROCEDURE — P9045 ALBUMIN (HUMAN), 5%, 250 ML: HCPCS | Performed by: INTERNAL MEDICINE

## 2018-11-15 PROCEDURE — 82962 GLUCOSE BLOOD TEST: CPT

## 2018-11-15 PROCEDURE — C8929 TTE W OR WO FOL WCON,DOPPLER: HCPCS

## 2018-11-15 PROCEDURE — 85610 PROTHROMBIN TIME: CPT

## 2018-11-15 PROCEDURE — 74011250636 HC RX REV CODE- 250/636: Performed by: EMERGENCY MEDICINE

## 2018-11-15 PROCEDURE — 77010033678 HC OXYGEN DAILY

## 2018-11-15 PROCEDURE — 86060 ANTISTREPTOLYSIN O TITER: CPT

## 2018-11-15 RX ORDER — ALBUMIN HUMAN 50 G/1000ML
25 SOLUTION INTRAVENOUS ONCE
Status: COMPLETED | OUTPATIENT
Start: 2018-11-15 | End: 2018-11-15

## 2018-11-15 RX ORDER — SODIUM CHLORIDE 9 MG/ML
75 INJECTION, SOLUTION INTRAVENOUS CONTINUOUS
Status: DISCONTINUED | OUTPATIENT
Start: 2018-11-15 | End: 2018-11-18

## 2018-11-15 RX ADMIN — HYDROMORPHONE HYDROCHLORIDE 1 MG: 1 INJECTION, SOLUTION INTRAMUSCULAR; INTRAVENOUS; SUBCUTANEOUS at 20:30

## 2018-11-15 RX ADMIN — GABAPENTIN 400 MG: 400 CAPSULE ORAL at 09:27

## 2018-11-15 RX ADMIN — CEFEPIME HYDROCHLORIDE 2 G: 2 INJECTION, POWDER, FOR SOLUTION INTRAVENOUS at 09:27

## 2018-11-15 RX ADMIN — HYDRALAZINE HYDROCHLORIDE 10 MG: 20 INJECTION INTRAMUSCULAR; INTRAVENOUS at 20:17

## 2018-11-15 RX ADMIN — CEFEPIME HYDROCHLORIDE 2 G: 2 INJECTION, POWDER, FOR SOLUTION INTRAVENOUS at 01:35

## 2018-11-15 RX ADMIN — CEFEPIME HYDROCHLORIDE 2 G: 2 INJECTION, POWDER, FOR SOLUTION INTRAVENOUS at 23:59

## 2018-11-15 RX ADMIN — CEFEPIME HYDROCHLORIDE 2 G: 2 INJECTION, POWDER, FOR SOLUTION INTRAVENOUS at 19:34

## 2018-11-15 RX ADMIN — SODIUM CHLORIDE 75 ML/HR: 900 INJECTION, SOLUTION INTRAVENOUS at 23:59

## 2018-11-15 RX ADMIN — SIMVASTATIN 20 MG: 20 TABLET, FILM COATED ORAL at 23:59

## 2018-11-15 RX ADMIN — VANCOMYCIN HYDROCHLORIDE 2000 MG: 10 INJECTION, POWDER, LYOPHILIZED, FOR SOLUTION INTRAVENOUS at 20:56

## 2018-11-15 RX ADMIN — PERFLUTREN 1 ML: 6.52 INJECTION, SUSPENSION INTRAVENOUS at 09:00

## 2018-11-15 RX ADMIN — HEPARIN SODIUM 5000 UNITS: 5000 INJECTION INTRAVENOUS; SUBCUTANEOUS at 04:21

## 2018-11-15 RX ADMIN — LACTULOSE 10 G: 20 SOLUTION ORAL at 09:28

## 2018-11-15 RX ADMIN — ALBUMIN (HUMAN) 25 G: 12.5 INJECTION, SOLUTION INTRAVENOUS at 04:18

## 2018-11-15 RX ADMIN — BISACODYL 10 MG: 10 SUPPOSITORY RECTAL at 01:41

## 2018-11-15 RX ADMIN — LISINOPRIL 20 MG: 20 TABLET ORAL at 09:27

## 2018-11-15 RX ADMIN — VANCOMYCIN HYDROCHLORIDE 2000 MG: 10 INJECTION, POWDER, LYOPHILIZED, FOR SOLUTION INTRAVENOUS at 06:00

## 2018-11-15 NOTE — PROGRESS NOTES
conducted an initial consultation and Spiritual Assessment for Deana Canela, who is a 77 y.o.,male. Patients Primary Language is: [place]. According to the patients EMR Jew Affiliation is: No Confucianist. The reason the Patient came to the hospital is:  
Patient Active Problem List  
 Diagnosis Date Noted  Discitis of thoracic region 11/14/2018  UTI (urinary tract infection) 11/14/2018  Bilateral pleural effusion 11/14/2018  Lumbar stenosis 11/14/2018  Adenoma of left adrenal gland 11/14/2018  Uncontrolled hypertension 11/14/2018  Cirrhosis of liver ([facility] [address] 11/14/2018  Hepatitis C 11/14/2018  Obesity 11/14/2018  COPD (chronic obstructive pulmonary disease) ([facility] [address] 11/14/2018  Constipation 11/14/2018  Back pain 11/14/2018 The  provided the following Interventions: 
 attempted to initiate a relationship of care and support with patient in room 2220 today but found him resting peacefully after his lunch. Patient is not able to communicate at present. No family seen. Provided information about Spiritual Care Services. Offered prayer and assurance of continued prayers on patients behalf. . 
 
Assessment: 
Patient does not have any Yazidi/cultural needs that will affect patients preferences in health care. There are no further spiritual or Yazidi issues which require Spiritual Care Services interventions at this time. Plan: 
Chaplains will continue to follow and will provide pastoral care on an as needed/requested basis Gilda Emery 3 Board Certified [employer] Spiritual Care  
[phone]

## 2018-11-15 NOTE — CDMP QUERY
Please clarify if this patient is being treated/managed for: 
 
=>Psoas muscle abscess AND/OR 
=>Epidural abscess with spinal cord compression as evidenced by CT scan, treated w/ IV Vanco and maxipime 
=>Other Explanation of clinical findings =>Unable to Determine (no explanation of clinical findings) The medical record reflects the following: 
 
Risk: Low back pain x3 months, H/o IV heroin use Clinical Indicators: Patient presented w/ worsening back pain. CT chest : Severe destructive changes involving endplates of M76-16 with circumferential paravertebral soft tissue infiltration and extension of process into the anterior epidural space with cord compression. Neurosurgery consult: \"Diskitis with epidural extension, but no signs of a myelopathy\" Treatment:  IV Vanco, IV maxipime, Neurosurgery consult, MRI, bedrest 
 
Please clarify and document your clinical opinion in the progress notes and discharge summary including the definitive and/or presumptive diagnosis, (suspected or probable), related to the above clinical findings. Please include clinical findings supporting your diagnosis. If you DECLINE this query or would like to communicate with [facility], please utilize the \"[facility] message box\" at the TOP of the Progress Note on the right. Thank you, Sakina Pulido [facility], [address]

## 2018-11-15 NOTE — PROGRESS NOTES
Transported pt to ICU, pt placed on monitor, incontinence care provided. Vitals taken, SBAR given to Joy Samayoa and NIH stroke scale completed. Eleanor Levin updated by this nurse per Monitor Tech pt rhythm at 1622 appeared to be in V-tach/possible Torsades at the time RRT/ Code S was called.

## 2018-11-15 NOTE — CONSULTS
[facility]  MR[facility] [id number]  : 1950  ACCOUNT #: [de-identified]   DATE OF SERVICE: 2018    CHIEF COMPLAINT:  T10-T11 diskitis with anterior epidural extension. HISTORY:  The patient is a 66-year-old gentleman who presents with a several-year history of low back pain and a 3-month history of increasing lower back pain and lancinating electrical shocks around his abdomen. I have discussed this case with Dr. Viraj Charles of the ER, and the patient does have a history of IV drug abuse. The patient does have a primary care physician who started him on gabapentin for the numbness and tingling in his legs. When he coughs or sneezes, he will have a circular type of electrical pain to his umbilicus. Patient fell today, was incontinent of urine 1 time and presented to the emergency room. CAT scan of the abdomen shows extensive destruction of the T10-T11 disk space with some anterior epidural compression. MRI scan has been ordered. He is able to void. He has been having BMs. PHYSICAL EXAMINATION:  Shows a fit 77year-old. He is a bit overweight. He localizes his pain to the lower thoracic spine. He has some pain in the lower back as well. He has good strength in all lower extremity motor groups. Perhaps he has some very mild hip flexor weakness. Sensory exam is intact to pin and touch and position. His reflexes are diminished. ASSESSMENT AND PLAN:  Diskitis with epidural extension, but no signs of a myelopathy. At this point, I recommend bed rest.  I would recommend a TLSO. We would consider IR to do an aspirate of the T10-T11 disk space. MRI and DVT prophylaxis. At this juncture, antibiotics is the primary course of treatment. Certainly if he progresses neurologically, then decompression would be in order.       MD HAMLET iTlley / POOL  D: 2018 19:44     T: 11/15/2018 07:42  JOB #: [id number]

## 2018-11-15 NOTE — PROGRESS NOTES
6018 Received patient from ED. Patient is alert and oriented x 4. 
 
2011 Located 1 cm open area on right buttock placed wound consult in per protocol. Placed Mepilex over area per protocol. 2030 Patient asked to appear in MRI suite in order to receive MRI, Patient had informed MD that he would need something to be UNCONSCIOUS during the procedure. When patient arrived in MRI suite he agreed to participate upon this nurses urging to find out what was wrong with him, later MRI called and said patient had refused to participate. When this nurse went to get him, he stated that he must be Unconscious in order to receive an MRI. He has claustrophobia according to him, tolerated the elevator without a word. 0030 Administered Medications tolerating well, Patient reports it's been 3 weeks since BM will administer Dulcolax Supp. 
 
0215 Patient attempted to get out of bed by pulling all diagnostic/therapeutic tubes off him. Both IV were pulled out of patient simultaneously. 0300 Ensured placement of call bell for patient. 8425 Was informed by tech Patient is about to receive Echo. 
 
0745 Bedside and Verbal shift change report given to [address] [facility] (oncoming nurse) by Eliel Banks RN (offgoing nurse). Report included the following information SBAR, ED Summary, OR Summary, Intake/Output and MAR.

## 2018-11-15 NOTE — PROGRESS NOTES
Care Management Interventions PCP Verified by CM: Yes 
Palliative Care Criteria Met (RRAT>21 & CHF Dx)?: No 
Transition of Care Consult (CM Consult): Discharge Planning Physical Therapy Consult: No 
Occupational Therapy Consult: No 
Current Support Network: Other Plan discussed with Pt/Family/Caregiver: Yes Reason for Admission:   t10-T11 discistis RRAT Score:    RED Resources/supports as identified by patient/family:   Patient stated that he has personal care at home with [facility] 9-3 M-F and 9-1 on weekends hjis care giver is Taisha Strange [phone] He lives with his mother. Top Challenges facing patient (as identified by patient/family and CM): Patient lives with his mother Finances/Medication cost?    CCCP Medicaid Transportation? He states he uses the bus Support system or lack thereof? Lives with his mother. he has personal care at home with [facility] 9-3 M-F and 9-1 on weekends his care giver is Taisha Strange [phone] Living arrangements? Lives with his mother Self-care/ADLs/Cognition? Alert and oriented Current Advanced Directive/Advance Care Plan:  Not on file Plan for utilizing home health:    TBD Likelihood of readmission: RED Transition of Care Plan:               
Spoke with patient in room, He needs assistance with bathing ADL's and has a cane and walker. He has home care with [facility] home [facility] 9-3 M-F and 9-1 on weekends his care giver is Taisha Strange [phone]. Patient lives with his mother. He verified his address, PCP and insurance  and phone # as correct on the facesheet. D/c plan is home with home health personal care at this time. Patient will need PT to evaluate home care needs.

## 2018-11-15 NOTE — PROGRESS NOTES
Hospitalist Progress Note Marie Burgess MD 
Internal medicine/ Hospitalist 
 
Daily Progress Note: 11/15/2018 2:24 PM   
Interval history / Subjective:  
Lata French is a 77 y.o.  male with h/o hypertension,copd,hepatitis C,presented to ED because of back pain. Patient indicating the lower portion of his T-spine where he has the pain,saying that it started almost three months ago and has been getting worse. He reports that he was seen in ED few weeks ago and was told that nothing was wrong with him. He is also reporting constipation the last 3 weeks. He denies legs weakness or loss of sensation. No fever or chills. Although patient did not report his history of heroin abuse,the ED report indicates that patient is an IV drugs abuser and last use of iv heroin was 3-4 weeks ago. Patient's friend who came with him in ED has reported that patient fell yesterday while trying to get to the bathroom and was incontinent. Patient is reporting that he has not been able to hold his urine for a while. In ED,CT abdm/pelvis showed severe  discitis,L3-4 central stenosis,bilateral pleural effusions with bilateral lower lobe atelectasis,splenic hilar and perisplenic varices. UA showed UTI. In ED,patient was started on vanc and cefepime for severe discitis. Neurosurgery was consulted,will see patient. The hospitalist team was called for admission. Patient now laying in bed,complaining more of back pain. Current Facility-Administered Medications Medication Dose Route Frequency  sodium chloride (NS) flush 5-10 mL  5-10 mL IntraVENous PRN  
 cefepime (MAXIPIME) 2 g in 0.9% sodium chloride (MBP/ADV) 100 mL MBP  2 g IntraVENous Q8H  
 gabapentin (NEURONTIN) capsule 400 mg  400 mg Oral BID  lisinopril (PRINIVIL, ZESTRIL) tablet 20 mg  20 mg Oral DAILY  loratadine (CLARITIN) tablet 10 mg  10 mg Oral DAILY PRN  
 simvastatin (ZOCOR) tablet 20 mg  20 mg Oral QHS  0.9% sodium chloride with KCl 20 mEq/L infusion   IntraVENous CONTINUOUS  
 HYDROmorphone (PF) (DILAUDID) injection 1 mg  1 mg IntraVENous Q4H PRN  
 naloxone (NARCAN) injection 0.4 mg  0.4 mg IntraVENous PRN  
 diphenhydrAMINE (BENADRYL) injection 12.5 mg  12.5 mg IntraVENous Q4H PRN  
 ondansetron (ZOFRAN) injection 4 mg  4 mg IntraVENous Q4H PRN  
 heparin (porcine) injection 5,000 Units  5,000 Units SubCUTAneous Q8H  
 albuterol-ipratropium (DUO-NEB) 2.5 MG-0.5 MG/3 ML  3 mL Nebulization Q4H PRN  
 hydrALAZINE (APRESOLINE) 20 mg/mL injection 10 mg  10 mg IntraVENous Q6H PRN  
 vancomycin (VANCOCIN) 2000 mg in  ml infusion  2,000 mg IntraVENous Q12H  
 [START ON 2018] VANCOMYCIN INFORMATION NOTE   Other ONCE  
 VANCOMYCIN INFORMATION NOTE   Other Rx Dosing/Monitoring  bisacodyl (DULCOLAX) suppository 10 mg  10 mg Rectal DAILY PRN  
 lactulose (CHRONULAC) solution 10 g  10 g Oral BID Review of Systems Feeling better today,saying that the pain medications helping. Objective:  
 
Visit Vitals /80 (BP 1 Location: Left arm, BP Patient Position: At rest) Pulse (!) 113 Temp 98.6 °F (37 °C) Resp 17 Ht 6' (1.829 m) Wt 126.1 kg (278 lb) SpO2 98% BMI 37.70 kg/m² O2 Flow Rate (L/min): 2 l/min O2 Device: Nasal cannula Temp (24hrs), Av.2 °F (36.8 °C), Min:97.9 °F (36.6 °C), Max:98.6 °F (37 °C) 
 
 
11/15 0701 - 11/15 1900 In: -  
Out: 200 [Urine:200] 1901 - 11/15 0700 In: 1400 [P.O.:1400] Out: 1575 [PHKTX:6045] PHYSICAL EXAM:  
General:          Lying in bed in no acute distress. HEENT:           Pupils equal.  Sclera anicteric. Conjunctiva pink. Mucous membranes moist 
CV:                  Regular rate and rhythm. Lungs:             Clear to auscultation bilaterally. Abdomen:        Soft, non-tender. Not distended. Back:     Tender to palpation lower thoracic area of L-spine. Straight legs raise positive at 60 degrees. Extremities:     Edema 2+. No cyanosis. No clubbing Neurologic:      Alert and oriented X 3. Good sensation all extremities,good muscles tone,moving all extremities without difficulty. Data Review Recent Results (from the past 12 hour(s)) METABOLIC PANEL, COMPREHENSIVE Collection Time: 11/15/18  4:40 AM  
Result Value Ref Range Sodium 136 136 - 145 mmol/L Potassium 4.5 3.5 - 5.5 mmol/L Chloride 101 100 - 108 mmol/L  
 CO2 31 21 - 32 mmol/L Anion gap 4 3.0 - 18 mmol/L Glucose 79 74 - 99 mg/dL BUN 7 7.0 - 18 MG/DL Creatinine 0.91 0.6 - 1.3 MG/DL  
 BUN/Creatinine ratio 8 (L) 12 - 20 GFR est AA >60 >60 ml/min/1.73m2 GFR est non-AA >60 >60 ml/min/1.73m2 Calcium 8.2 (L) 8.5 - 10.1 MG/DL Bilirubin, total 1.3 (H) 0.2 - 1.0 MG/DL  
 ALT (SGPT) 23 16 - 61 U/L  
 AST (SGOT) 35 15 - 37 U/L Alk. phosphatase 141 (H) 45 - 117 U/L Protein, total 9.8 (H) 6.4 - 8.2 g/dL Albumin 2.4 (L) 3.4 - 5.0 g/dL Globulin 7.4 (H) 2.0 - 4.0 g/dL A-G Ratio 0.3 (L) 0.8 - 1.7    
CBC WITH AUTOMATED DIFF Collection Time: 11/15/18  4:40 AM  
Result Value Ref Range WBC 6.7 4.6 - 13.2 K/uL  
 RBC 5.29 4.70 - 5.50 M/uL  
 HGB 14.7 13.0 - 16.0 g/dL HCT 45.4 36.0 - 48.0 % MCV 85.8 74.0 - 97.0 FL  
 MCH 27.8 24.0 - 34.0 PG  
 MCHC 32.4 31.0 - 37.0 g/dL  
 RDW 15.9 (H) 11.6 - 14.5 % PLATELET 173 512 - 326 K/uL MPV 10.4 9.2 - 11.8 FL  
 NEUTROPHILS 60 40 - 73 % LYMPHOCYTES 30 21 - 52 % MONOCYTES 9 3 - 10 % EOSINOPHILS 1 0 - 5 % BASOPHILS 0 0 - 2 %  
 ABS. NEUTROPHILS 4.0 1.8 - 8.0 K/UL  
 ABS. LYMPHOCYTES 2.0 0.9 - 3.6 K/UL  
 ABS. MONOCYTES 0.6 0.05 - 1.2 K/UL  
 ABS. EOSINOPHILS 0.1 0.0 - 0.4 K/UL  
 ABS. BASOPHILS 0.0 0.0 - 0.1 K/UL  
 DF AUTOMATED    
PSA, DIAGNOSTIC (PROSTATE SPECIFIC AG) Collection Time: 11/15/18  4:40 AM  
Result Value Ref Range Prostate Specific Ag 0.9 0.0 - 4.0 ng/mL AMMONIA Collection Time: 11/15/18  4:40 AM  
Result Value Ref Range Ammonia 36 (H) 11 - 32 UMOL/L  
ECHO ADULT COMPLETE Collection Time: 11/15/18  8:46 AM  
Result Value Ref Range AO ASC D 3.69 cm Aortic Valve Systolic Peak Velocity -116.51 cm/s AoV VTI 26.86 cm Aortic Valve Area by Continuity of Peak Velocity -3.3 cm2 Aortic Valve Area by Continuity of VTI 2.3 cm2 AoV PG 5.2 mmHg LVIDd 3.48 (A) 4.2 - 5.9 cm  
 LVPWd 1.37 (A) 0.6 - 1.0 cm LVIDs 2.57 cm IVSd 1.49 (A) 0.6 - 1.0 cm  
 LV ED Vol A2C 103.5 mL  
 LV ES Vol A4C 25.1 mL  
 LV ES Vol BP 29.6 22 - 58 mL  
 LVOT d 1.99 cm  
 LVOT Peak Velocity 119.14 cm/s LVOT Peak Gradient 5.7 mmHg LVOT VTI 19.49 cm  
 MVA (PHT) 5.1 cm2  
 MV A Hardeep 120.68 cm/s  
 MV E Hardeep 0.68 cm/s  
 MV E/A 0.01   
 MV Mean Gradient 3.4 mmHg Mitral Valve Annulus Velocity Time Integral 20.07 cm Aortic Valve Systolic Mean Gradient 9.2 mmHg BP EF 73.8 55 - 100 % LV Ejection Fraction MOD 4C 80 % LV Ejection Fraction MOD 2C 68 % LVOT Cardiac Output 6.7 L/min Inferior Vena Cava Diameter Sniffing 2.27 cm  
 LA Vol 4C 72.78 (A) 18 - 58 mL  
 LA Vol 2C 41.26 18 - 58 mL  
 LV Mass .0 88 - 224 g LV Mass AL Index 84.9 49 - 115 g/m2 MV Peak Gradient 6.8 mmHg LV ES Vol A2C 32.8 mL  
 LVES Vol Index BP 12.1 mL/m2 LV ED Vol A4C 122.4 mL  
 LVED Vol Index BP 46.0 mL/m2 Mitral Valve E Wave Deceleration Time 149.5 ms  
 Mitral Valve Pressure Half-time 43.4 ms Triscuspid Valve Regurgitation Peak Gradient 0.6 mmHg Mitral Valve Max Velocity 130.53 cm/s MVA VTI 3.0 cm2 LVOT SV 60.9 ml  
 LV ED Vol .8 67 - 155 ml  
 TR Max Velocity -38.41 cm/s  
 LA Vol Index 16.84 16 - 28 ml/m2 LA Vol Index 29.70 16 - 28 ml/m2 LVED Vol Index A4C 50.0 mL/m2 LVED Vol Index A2C 42.2 mL/m2 LVES Vol Index A4C 10.2 mL/m2 LVES Vol Index A2C 13.4 mL/m2 Ao Root D 3.59 cm MARKIE/BSA Pk Hardeep -1.4 cm2/m2 MARKIE/BSA VTI 1.0 cm2/m2 Assessment/Plan:  
 
Principal Problem: Discitis of thoracic region (11/14/2018) Active Problems: 
  UTI (urinary tract infection) (11/14/2018) Bilateral pleural effusion (11/14/2018) Lumbar stenosis (11/14/2018) Adenoma of left adrenal gland (11/14/2018) Uncontrolled hypertension (11/14/2018) Cirrhosis of liver ([facility] [address] (11/14/2018) Hepatitis C (11/14/2018) Obesity (11/14/2018) COPD (chronic obstructive pulmonary disease) ([facility] [address] (11/14/2018) Constipation (11/14/2018) Back pain (11/14/2018) Care Plan 1. Discitis of thoracic region 
 -CT showing severe discitis of T10-11. Patient with h/o iv heroin abuse. Infection suspected. -Vanc and cefepime initiated in ED,will continue. -Sepsis protocol was initiated in ED but don't think patient is septic - lactic acid,wbc,temp,HR normal. 
 -Blood cx NGTD 
 -Pt did not tolerate MRI. Discussed with IR,will have MRI under sedation and aspiration of T10-11 will be performed for cultures,cytology 
 -Neurosurgery consulted ->pt high risk for intervention - medical management 
 -ID consulted 
 -Pain management with dilaudid q 4 hrs prns. -Fall precaution. 
  
2.Lumbar stenosis at L3-4  
 -Will follow neurosurgery recommendations -> no surgery,pt asymptomatic 
  
3. UTI (urinary tract infection) 
 -On cefepime. 
 -Ucx results pending and blood cx NGTD 
  
4.Cirrhosis of liver/Hepatitis C/H/o alcohol abuse 
 -Patient has h/o hepatitis C and past history of alcohol abuse. CT showing strong evidence of cirrhosis with possible portal vein hypertension. 
 -Will monitor. 
 -Will order albumin x 1  
 -Ammonia level 36. INR 1.3 
 -Outpatient follow up 5. Bilateral pleural effusion/Legs edema 
 -Likely due to cirrhosis of liver. -Might need diuretics but will discuss with GI. 
  
6. Adenoma of left adrenal gland  
 -Seen in previous films. Will monitor 
  
7. Uncontrolled hypertension  
 -Resume lisinopril. Hydralazine prn 
  
8. Obesity 
 -Supportive care 
  
9. COPD -Duo-neb prn 
  
10. Constipation 
 -Order dulcolax 
  
11. Back pain 
 -Due to problems above - on pain meds. 
  
12. Legs edema 
 -Likely due to cirrhosis with portal hypertension 
 -Will discuss lasix with gi. 
 -Edema likely due to liver disease,will order  
 -pro-BNP not elevated and ECHO nl with EF 66-70% 
  
DVT prophylaxis:sc heparin Full code:discussed with patient who stated that he does not have a living will and never decided his code status Disposition:tbd

## 2018-11-15 NOTE — CONSULTS
Neurosurgery consult:  Cc: t10-T11 discistis  Hx:  Mr. Bryan Rene is a 77year old male presenting with a several year history of lower back pain and a three month history of increased back pain with lancinating electrical shock around his abdomen. Today had one episode of incontinence and a fall. Ct scan of the abdomin shows extensive destruction of the T10-11 disc space. With anterior epidural compression. Has been on neurontin 400 mg tid with improvement of the numbness in the legs. Able to void and has had bms. Exam:   Back localizes the pain in the lower t spine  Motor: 5.5 perhaps slight weakness very mild in the right hip flexors. Sensory: intact to pp and touch  Reflexes: diminished.    A;  discistis with epidural extension  P; bedrest       tlso       Consider ir to do aspirate of the T10-11 disc space        Mri        dvt prophylasis

## 2018-11-15 NOTE — WOUND CARE
Wound care consult received and EMR reviewed, discussed all with CAMERON West. Per nursing, patient admitted with \"dime size\" approximately 1cm area of breakdown to right buttock. Area present is a stage 2 and Zinc oxide cream placed by nursing. Wound care recommends nursing to continue Zinc Oxide cream and every 2 hour turning to prevent further breakdown.

## 2018-11-15 NOTE — ROUTINE PROCESS
Plan for pt to have scheduled T-spine MRI with MAC sedation performed by anesthesia tomorrow 11/16/18 at 1330. Marcela, RN made aware. Pt should be made NPO after midnight tonight in preparation.

## 2018-11-15 NOTE — PROGRESS NOTES
1622 - This [address], [employer] arrived in pt room to find pt unresponsive to voice. Sternal rub performed. RRT called. VS taken /97 O2 93% 2L NC  
 
1624 - supervisor Jason Chambers ICU RN, Dr. Eder Carias in room 1625 - Code S called by nursing supervisor d/t right side facial droop & unintentional gaze. 1626 - blood sugar taken 96 
 
1627 - nonrebreather placed at 10L, CT order obtained 1628 - pt off floor to CT

## 2018-11-15 NOTE — PROGRESS NOTES
Received bedside report from Jacek Amador RN. Pt Aox4, Tele box #[id number], NC-2 liters, pt currently receiving ECHO, pt resting in bed/bed in low position, wheels locked, call bell within reach. 1159-Per Dr. Dee Cox pt to have MRI with sedation  Please make pt NPO. 
 
 
1232-Per Rocio FAULKNER Tech pt must have been NPO for 12 hours in order for MRI to be completed @ 2pm today w/ Anesthesia, if not MRI will be completed tomorrow. This RN informed Bruce Horne pt last had breakfast this AM and last had snack at 1030 am. Per Bruce FAULKNER Tech let pt eat will plan for MRI tomorrow. Will make pt NPO midnight.

## 2018-11-15 NOTE — CONSULTS
INFECTIOUS DISEASE CONSULT NOTE     Requested by:Dr Taryn Butler    Reason: disciitis    ABX:     Current abx Prior abx    Cefepime/vanco  11/14   1      ASSESSMENT: -- RECOMMENDATIONS      T10-11 disciitis   CT:   severe destructive changes involving the endplates at M71-82. There is associated paravertebral soft tissue infiltration. Findings compatible  with severe discitis. The inflammatory process extends into the anterior  epidural space with compression of cord. L5-S1 spondylosis and degenerative disc  disease. L3-4 central stenosis Neurosurgery consulted    IR assist needed for diagnostic aspiration or bx for path and micro to direct antibiotic selection   direct sampling needed to identify pathogen   blood cultures obtained. Molecular diagnostic ordered but low sensitivity  Trend inflammatory markers   Bilateral pleural effusions and LL atx with atx in rml    Abnormal CT abd:    Adenopathy- gastrohepatic ligament    Splenic hilar and perisplenic varices    Liver enlargement        Ho ivda Precludes cvl for out pt rx- likelly   Co morbidities: Arthritis/ copd/HTN    allergies Shellfish     MICROBIOLOGY:     11/14  blcx  X 2   ngtd  11/14  urcx   ngtd    LINES/DRAINS:     piv  HPI:  78 yo XY with ho arthritis and 3 months of back pain presented to [facility] ED on 11/14 with c/o decreased appetite, SOB, constipation, diffuse abdominal pain, and severe lower back pain. Onset of symptoms started three months and has gradually worsen the past week. Patient denies fever, chills, CP,nausea, vomiting, urinary sx's, or any other concerns. CT imaging reviewed:Severe destructive changes involving endplates of H81-05 with circumferential  paravertebral soft tissue infiltration and extension of process into the  anterior epidural space with cord compression. Findings compatible with severe discitis. Neurosurgery was consulted and empiric cefepime and iv vanco were started. Pt has been constipated and has had some urinary incontinence. Past Medical History:   Diagnosis Date    Arthritis     COPD     Hypertension        Past Surgical History:   Procedure Laterality Date    HX REFRACTIVE SURGERY         Allergies: Shellfish derived . Current Facility-Administered Medications   Medication Dose Route Frequency    sodium chloride (NS) flush 5-10 mL  5-10 mL IntraVENous PRN    cefepime (MAXIPIME) 2 g in 0.9% sodium chloride (MBP/ADV) 100 mL MBP  2 g IntraVENous Q8H    gabapentin (NEURONTIN) capsule 400 mg  400 mg Oral BID    lisinopril (PRINIVIL, ZESTRIL) tablet 20 mg  20 mg Oral DAILY    loratadine (CLARITIN) tablet 10 mg  10 mg Oral DAILY PRN    simvastatin (ZOCOR) tablet 20 mg  20 mg Oral QHS    0.9% sodium chloride with KCl 20 mEq/L infusion   IntraVENous CONTINUOUS    HYDROmorphone (PF) (DILAUDID) injection 1 mg  1 mg IntraVENous Q4H PRN    naloxone (NARCAN) injection 0.4 mg  0.4 mg IntraVENous PRN    diphenhydrAMINE (BENADRYL) injection 12.5 mg  12.5 mg IntraVENous Q4H PRN    ondansetron (ZOFRAN) injection 4 mg  4 mg IntraVENous Q4H PRN    heparin (porcine) injection 5,000 Units  5,000 Units SubCUTAneous Q8H    albuterol-ipratropium (DUO-NEB) 2.5 MG-0.5 MG/3 ML  3 mL Nebulization Q4H PRN    hydrALAZINE (APRESOLINE) 20 mg/mL injection 10 mg  10 mg IntraVENous Q6H PRN    vancomycin (VANCOCIN) 2000 mg in  ml infusion  2,000 mg IntraVENous Q12H    [START ON 11/16/2018] VANCOMYCIN INFORMATION NOTE   Other ONCE    VANCOMYCIN INFORMATION NOTE   Other Rx Dosing/Monitoring    bisacodyl (DULCOLAX) suppository 10 mg  10 mg Rectal DAILY PRN    lactulose (CHRONULAC) solution 10 g  10 g Oral BID       History reviewed. No pertinent family history.   Social History     Socioeconomic History    Marital status: SINGLE     Spouse name: Not on file    Number of children: Not on file    Years of education: Not on file    Highest education level: Not on file   Social Needs    Financial resource strain: Not on file    Food insecurity - worry: Not on file    Food insecurity - inability: Not on file    Transportation needs - medical: Not on file   [employer] needs - non-medical: Not on file   Occupational History    Not on file   Tobacco Use    Smoking status: Former Smoker     Last attempt to quit: 1984     Years since quittin.4    Smokeless tobacco: Former User   Substance and Sexual Activity    Alcohol use: Yes     Comment: sometimes    Drug use: Yes     Types: Heroin, Marijuana, Opiates     Comment: Uses opiates for pain    Sexual activity: Not on file   Other Topics Concern    Not on file   Social History Narrative    Not on file     Social History     Tobacco Use   Smoking Status Former Smoker    Last attempt to quit: 1984    Years since quittin.4   Smokeless Tobacco Former User        Temp (24hrs), Av.2 °F (36.8 °C), Min:97.9 °F (36.6 °C), Max:98.6 °F (37 °C)    Visit Vitals  /80 (BP 1 Location: Left arm, BP Patient Position: At rest)   Pulse (!) 113   Temp 98.6 °F (37 °C)   Resp 17   Ht 6' (1.829 m)   Wt 126.1 kg (278 lb)   SpO2 98%   BMI 37.70 kg/m²       ROS:A comprehensive review of systems was negative except for that written in the History of Present Illness. Physical Exam:    General: Well developed, well nourished 77 y.o.  male in no acute distress.   ENT: ENT exam normal, no neck nodes or sinus tenderness  Head: normocephalic, without obvious abnormality  Mouth:  mucous membranes moist, pharynx normal without lesions  Neck: supple, symmetrical, trachea midline   Cardio:  regular rate and rhythm, S1, S2 normal, no murmur, click, rub or gallop  Chest: inspection normal - no chest wall deformities or tenderness, respiratory effort normal  Lungs: clear to auscultation, no wheezes or rales and unlabored breathing  Abdomen: soft, non-tender. Bowel sounds normal. No masses, no organomegaly. Extremities:  no redness or tenderness in the calves or thighs, 2+ edema LEs bilateral  TTP L-S spine.  No point tenderness  Neuro: Grossly normal      Labs: Results:   Chemistry Recent Labs     11/15/18  0440 11/14/18  1333   GLU 79 89    139   K 4.5 4.2    101   CO2 31 31   BUN 7 7   CREA 0.91 0.99   CA 8.2* 8.1*   AGAP 4 7   BUCR 8* 7*   * 123*   TP 9.8* 8.8*   ALB 2.4* 2.2*   GLOB 7.4* 6.6*   AGRAT 0.3* 0.3*      CBC w/Diff Recent Labs     11/15/18  0440 11/14/18  1333   WBC 6.7 5.3   RBC 5.29 5.05   HGB 14.7 13.5   HCT 45.4 43.1    157   GRANS 60 54   LYMPH 30 37   EOS 1 1      Microbiology Recent Labs     11/14/18  1700 11/14/18  1645   CULT NO GROWTH AFTER 14 HOURS NO GROWTH AFTER 14 HOURS          Miguel Fang MD  11/15/2018  1:06 PM  [facility]   [phone]

## 2018-11-15 NOTE — PROGRESS NOTES
Neurosurgery Progress Note; 
Patient unable to tolerate mri last nite. Overall this am his back pain and radiculopathy is better. Motor strength is 5.5   Sed rate is normal! White count and hct normal 
Sensory is intact Reflexes are dimnished:   NO CHANGE Very short of breath On vancomycin and maxipene A: stable exam no signs of myelopathy. At this point an mri would be helpful if possible. Needle aspirate of psoas collection or disc space would help refine antibiotic treatment. Patient is a poor surgical candidate give copd, cirrhosis, hep c etc. Would not operate unless he developes progressive neurological deficit or kyphotic deformity. tlso should be later today. P; described above

## 2018-11-16 ENCOUNTER — APPOINTMENT (OUTPATIENT)
Dept: VASCULAR SURGERY | Age: 68
DRG: 347 | End: 2018-11-16
Attending: NURSE PRACTITIONER
Payer: MEDICAID

## 2018-11-16 ENCOUNTER — ANESTHESIA EVENT (OUTPATIENT)
Dept: SURGERY | Age: 68
DRG: 347 | End: 2018-11-16
Payer: MEDICAID

## 2018-11-16 ENCOUNTER — APPOINTMENT (OUTPATIENT)
Dept: MRI IMAGING | Age: 68
DRG: 347 | End: 2018-11-16
Attending: NEUROLOGICAL SURGERY
Payer: MEDICAID

## 2018-11-16 LAB
AMMONIA PLAS-SCNC: 52 UMOL/L (ref 11–32)
AMPHET UR QL SCN: NEGATIVE
ANION GAP SERPL CALC-SCNC: 1 MMOL/L (ref 3–18)
ARTERIAL PATENCY WRIST A: YES
ARTERIAL PATENCY WRIST A: YES
ASO AB SERPL-ACNC: 108 IU/ML (ref 0–200)
BARBITURATES UR QL SCN: NEGATIVE
BASE EXCESS BLD CALC-SCNC: 6 MMOL/L
BASE EXCESS BLD CALC-SCNC: 8 MMOL/L
BASOPHILS # BLD: 0 K/UL (ref 0–0.1)
BASOPHILS NFR BLD: 1 % (ref 0–2)
BDY SITE: ABNORMAL
BDY SITE: ABNORMAL
BENZODIAZ UR QL: NEGATIVE
BODY TEMPERATURE: 98.6
BUN SERPL-MCNC: 9 MG/DL (ref 7–18)
BUN/CREAT SERPL: 9 (ref 12–20)
CALCIUM SERPL-MCNC: 8.2 MG/DL (ref 8.5–10.1)
CANNABINOIDS UR QL SCN: NEGATIVE
CHLORIDE SERPL-SCNC: 103 MMOL/L (ref 100–108)
CO2 SERPL-SCNC: 35 MMOL/L (ref 21–32)
COCAINE UR QL SCN: POSITIVE
CREAT SERPL-MCNC: 0.97 MG/DL (ref 0.6–1.3)
DATE LAST DOSE: ABNORMAL
DIFFERENTIAL METHOD BLD: ABNORMAL
EOSINOPHIL # BLD: 0 K/UL (ref 0–0.4)
EOSINOPHIL NFR BLD: 1 % (ref 0–5)
ERYTHROCYTE [DISTWIDTH] IN BLOOD BY AUTOMATED COUNT: 16.2 % (ref 11.6–14.5)
GAS FLOW.O2 O2 DELIVERY SYS: ABNORMAL L/MIN
GAS FLOW.O2 O2 DELIVERY SYS: ABNORMAL L/MIN
GAS FLOW.O2 SETTING OXYMISER: 5 L/M
GLUCOSE SERPL-MCNC: 97 MG/DL (ref 74–99)
HCO3 BLD-SCNC: 32.8 MMOL/L (ref 22–26)
HCO3 BLD-SCNC: 34.2 MMOL/L (ref 22–26)
HCT VFR BLD AUTO: 41.3 % (ref 36–48)
HDSCOM,HDSCOM: ABNORMAL
HGB BLD-MCNC: 12.4 G/DL (ref 13–16)
LYMPHOCYTES # BLD: 1.1 K/UL (ref 0.9–3.6)
LYMPHOCYTES NFR BLD: 25 % (ref 21–52)
MAGNESIUM SERPL-MCNC: 2 MG/DL (ref 1.6–2.6)
MCH RBC QN AUTO: 26.5 PG (ref 24–34)
MCHC RBC AUTO-ENTMCNC: 30 G/DL (ref 31–37)
MCV RBC AUTO: 88.2 FL (ref 74–97)
METHADONE UR QL: NEGATIVE
MONOCYTES # BLD: 0.4 K/UL (ref 0.05–1.2)
MONOCYTES NFR BLD: 9 % (ref 3–10)
NEUTS SEG # BLD: 2.8 K/UL (ref 1.8–8)
NEUTS SEG NFR BLD: 64 % (ref 40–73)
O2/TOTAL GAS SETTING VFR VENT: 35 %
O2/TOTAL GAS SETTING VFR VENT: 40 %
OPIATES UR QL: POSITIVE
PCO2 BLD: 69.4 MMHG (ref 35–45)
PCO2 BLD: 71.5 MMHG (ref 35–45)
PCP UR QL: NEGATIVE
PEEP RESPIRATORY: 8 CMH2O
PH BLD: 7.28 [PH] (ref 7.35–7.45)
PH BLD: 7.29 [PH] (ref 7.35–7.45)
PHOSPHATE SERPL-MCNC: 3.1 MG/DL (ref 2.5–4.9)
PIP ISTAT,IPIP: 18
PLATELET # BLD AUTO: 144 K/UL (ref 135–420)
PMV BLD AUTO: 10.4 FL (ref 9.2–11.8)
PO2 BLD: 60 MMHG (ref 80–100)
PO2 BLD: 76 MMHG (ref 80–100)
POTASSIUM SERPL-SCNC: 4.4 MMOL/L (ref 3.5–5.5)
PRESSURE SUPPORT SETTING VENT: 10 CMH2O
RBC # BLD AUTO: 4.68 M/UL (ref 4.7–5.5)
REPORTED DOSE,DOSE: ABNORMAL UNITS
REPORTED DOSE/TIME,TMG: 1800
SAO2 % BLD: 86 % (ref 92–97)
SAO2 % BLD: 93 % (ref 92–97)
SERVICE CMNT-IMP: ABNORMAL
SERVICE CMNT-IMP: ABNORMAL
SODIUM SERPL-SCNC: 139 MMOL/L (ref 136–145)
SPECIMEN TYPE: ABNORMAL
SPECIMEN TYPE: ABNORMAL
TOTAL RESP. RATE, ITRR: 23
TOTAL RESP. RATE, ITRR: 26
VANCOMYCIN TROUGH SERPL-MCNC: 23.5 UG/ML (ref 10–20)
WBC # BLD AUTO: 4.3 K/UL (ref 4.6–13.2)

## 2018-11-16 PROCEDURE — 36600 WITHDRAWAL OF ARTERIAL BLOOD: CPT

## 2018-11-16 PROCEDURE — 74011250636 HC RX REV CODE- 250/636: Performed by: EMERGENCY MEDICINE

## 2018-11-16 PROCEDURE — 74011250636 HC RX REV CODE- 250/636: Performed by: INTERNAL MEDICINE

## 2018-11-16 PROCEDURE — 93970 EXTREMITY STUDY: CPT

## 2018-11-16 PROCEDURE — 85025 COMPLETE CBC W/AUTO DIFF WBC: CPT

## 2018-11-16 PROCEDURE — 80307 DRUG TEST PRSMV CHEM ANLYZR: CPT

## 2018-11-16 PROCEDURE — 94660 CPAP INITIATION&MGMT: CPT

## 2018-11-16 PROCEDURE — 82140 ASSAY OF AMMONIA: CPT

## 2018-11-16 PROCEDURE — 80048 BASIC METABOLIC PNL TOTAL CA: CPT

## 2018-11-16 PROCEDURE — 84100 ASSAY OF PHOSPHORUS: CPT

## 2018-11-16 PROCEDURE — 84145 PROCALCITONIN (PCT): CPT

## 2018-11-16 PROCEDURE — 5A09357 ASSISTANCE WITH RESPIRATORY VENTILATION, LESS THAN 24 CONSECUTIVE HOURS, CONTINUOUS POSITIVE AIRWAY PRESSURE: ICD-10-PCS | Performed by: INTERNAL MEDICINE

## 2018-11-16 PROCEDURE — 74011000258 HC RX REV CODE- 258: Performed by: INTERNAL MEDICINE

## 2018-11-16 PROCEDURE — 77030035694 HC MSK BIPAP FLL FAC PERFMAX PHIL -B

## 2018-11-16 PROCEDURE — 77010033678 HC OXYGEN DAILY

## 2018-11-16 PROCEDURE — 36415 COLL VENOUS BLD VENIPUNCTURE: CPT

## 2018-11-16 PROCEDURE — 83735 ASSAY OF MAGNESIUM: CPT

## 2018-11-16 PROCEDURE — 65270000029 HC RM PRIVATE

## 2018-11-16 PROCEDURE — 82803 BLOOD GASES ANY COMBINATION: CPT

## 2018-11-16 PROCEDURE — 74011250636 HC RX REV CODE- 250/636: Performed by: HOSPITALIST

## 2018-11-16 PROCEDURE — 74011000258 HC RX REV CODE- 258: Performed by: EMERGENCY MEDICINE

## 2018-11-16 PROCEDURE — 80202 ASSAY OF VANCOMYCIN: CPT

## 2018-11-16 PROCEDURE — 74011250637 HC RX REV CODE- 250/637: Performed by: INTERNAL MEDICINE

## 2018-11-16 RX ORDER — HYDROMORPHONE HYDROCHLORIDE 2 MG/ML
1 INJECTION, SOLUTION INTRAMUSCULAR; INTRAVENOUS; SUBCUTANEOUS ONCE
Status: DISCONTINUED | OUTPATIENT
Start: 2018-11-16 | End: 2018-11-16

## 2018-11-16 RX ORDER — MORPHINE SULFATE 2 MG/ML
2 INJECTION, SOLUTION INTRAMUSCULAR; INTRAVENOUS
Status: DISCONTINUED | OUTPATIENT
Start: 2018-11-16 | End: 2018-11-17

## 2018-11-16 RX ADMIN — GABAPENTIN 400 MG: 400 CAPSULE ORAL at 18:20

## 2018-11-16 RX ADMIN — CEFEPIME HYDROCHLORIDE 2 G: 2 INJECTION, POWDER, FOR SOLUTION INTRAVENOUS at 09:44

## 2018-11-16 RX ADMIN — HYDROMORPHONE HYDROCHLORIDE 1 MG: 1 INJECTION, SOLUTION INTRAMUSCULAR; INTRAVENOUS; SUBCUTANEOUS at 05:12

## 2018-11-16 RX ADMIN — LACTULOSE 10 G: 20 SOLUTION ORAL at 18:19

## 2018-11-16 RX ADMIN — SODIUM CHLORIDE 75 ML/HR: 900 INJECTION, SOLUTION INTRAVENOUS at 18:19

## 2018-11-16 RX ADMIN — VANCOMYCIN HYDROCHLORIDE 2000 MG: 10 INJECTION, POWDER, LYOPHILIZED, FOR SOLUTION INTRAVENOUS at 06:00

## 2018-11-16 RX ADMIN — HYDROMORPHONE HYDROCHLORIDE 1 MG: 1 INJECTION, SOLUTION INTRAMUSCULAR; INTRAVENOUS; SUBCUTANEOUS at 01:56

## 2018-11-16 RX ADMIN — SIMVASTATIN 20 MG: 20 TABLET, FILM COATED ORAL at 22:07

## 2018-11-16 RX ADMIN — CEFTRIAXONE SODIUM 2 G: 2 INJECTION, POWDER, FOR SOLUTION INTRAMUSCULAR; INTRAVENOUS at 18:20

## 2018-11-16 NOTE — PROGRESS NOTES
Problem: Pressure Injury - Risk of 
Goal: *Prevention of pressure injury Document Raul Scale and appropriate interventions in the flowsheet. Outcome: Progressing Towards Goal 
Pressure Injury Interventions: Activity Interventions: PT/OT evaluation Mobility Interventions: PT/OT evaluation Nutrition Interventions: Document food/fluid/supplement intake

## 2018-11-16 NOTE — PROGRESS NOTES
Problem: Falls - Risk of 
Goal: *Absence of Falls Document Clifm Furth Fall Risk and appropriate interventions in the flowsheet. Outcome: Progressing Towards Goal 
Fall Risk Interventions: 
Mobility Interventions: Patient to call before getting OOB

## 2018-11-16 NOTE — PROGRESS NOTES
Neurosurgery Progress Note; 
Transferred to icu for pulmonary reasons Awake but a bit more confused On vancomycin and maxipene Alert orients x 1-2 Voiding well Motor strength is intact no sensory level A; stable examination P: antibiotics for now, at some point will need aspirated of disc space or psoas abscess, mri of t spine would be usedful as well

## 2018-11-16 NOTE — PROGRESS NOTES
Hospitalist Progress Note Debo Carreon MD 
Internal medicine/ Hospitalist 
 
Daily Progress Note: 11/16/2018 2:24 PM   
Interval history / Subjective:  
Serenity Appiah is a 77 y.o.  male with h/o hypertension,copd,hepatitis C,presented to ED because of back pain. Patient indicating the lower portion of his T-spine where he has the pain,saying that it started almost three months ago and has been getting worse. He reports that he was seen in ED few weeks ago and was told that nothing was wrong with him. He is also reporting constipation the last 3 weeks. He denies legs weakness or loss of sensation. No fever or chills. Although patient did not report his history of heroin abuse,the ED report indicates that patient is an IV drugs abuser and last use of iv heroin was 3-4 weeks ago. Patient's friend who came with him in ED has reported that patient fell yesterday while trying to get to the bathroom and was incontinent. Patient is reporting that he has not been able to hold his urine for a while. In ED,CT abdm/pelvis showed severe  discitis,L3-4 central stenosis,bilateral pleural effusions with bilateral lower lobe atelectasis,splenic hilar and perisplenic varices. UA showed UTI. In ED,patient was started on vanc and cefepime for severe discitis. Neurosurgery was consulted,will see patient. The hospitalist team was called for admission. Patient now laying in bed,complaining more of back pain. Current Facility-Administered Medications Medication Dose Route Frequency  cefTRIAXone (ROCEPHIN) 2 g in 0.9% sodium chloride (MBP/ADV) 50 mL MBP  2 g IntraVENous Q24H  
 morphine injection 2 mg  2 mg IntraVENous Q4H PRN  
 0.9% sodium chloride infusion  75 mL/hr IntraVENous CONTINUOUS  
 sodium chloride (NS) flush 5-10 mL  5-10 mL IntraVENous PRN  
 gabapentin (NEURONTIN) capsule 400 mg  400 mg Oral BID  lisinopril (PRINIVIL, ZESTRIL) tablet 20 mg  20 mg Oral DAILY  loratadine (CLARITIN) tablet 10 mg  10 mg Oral DAILY PRN  
 simvastatin (ZOCOR) tablet 20 mg  20 mg Oral QHS  naloxone (NARCAN) injection 0.4 mg  0.4 mg IntraVENous PRN  
 diphenhydrAMINE (BENADRYL) injection 12.5 mg  12.5 mg IntraVENous Q4H PRN  
 ondansetron (ZOFRAN) injection 4 mg  4 mg IntraVENous Q4H PRN  
 albuterol-ipratropium (DUO-NEB) 2.5 MG-0.5 MG/3 ML  3 mL Nebulization Q4H PRN  
 hydrALAZINE (APRESOLINE) 20 mg/mL injection 10 mg  10 mg IntraVENous Q6H PRN  
 bisacodyl (DULCOLAX) suppository 10 mg  10 mg Rectal DAILY PRN  
 lactulose (CHRONULAC) solution 10 g  10 g Oral BID Review of Systems Feeling better today,saying that the pain medications helping. Objective:  
 
Visit Vitals /58 Pulse (!) 115 Temp 96.9 °F (36.1 °C) Resp 29 Ht 6' (1.829 m) Wt 112.9 kg (248 lb 14.4 oz) SpO2 93% BMI 33.76 kg/m² O2 Flow Rate (L/min): 5 l/min O2 Device: Nasal cannula Temp (24hrs), Av.7 °F (36.5 °C), Min:96.9 °F (36.1 °C), Max:98.6 °F (37 °C) No intake/output data recorded.  1901 -  0700 In: 1400 [P.O.:1400] Out: 1775 [TOONN:[id number]] PHYSICAL EXAM:  
General:          Lying in bed in no acute distress. HEENT:           Pupils equal.  Sclera anicteric. Conjunctiva pink. Mucous membranes moist 
CV:                  Regular rate and rhythm. Lungs:             Clear to auscultation bilaterally. Abdomen:        Soft, non-tender. Not distended. Back:     Tender to palpation lower thoracic area of L-spine. Straight legs raise positive at 60 degrees. Extremities:     Edema 2+. No cyanosis. No clubbing Neurologic:      Alert and oriented X 3. Good sensation all extremities,good muscles tone,moving all extremities without difficulty. Data Review Recent Results (from the past 12 hour(s)) Leandro Lambert Collection Time: 18  6:30 AM  
Result Value Ref Range Vancomycin,trough 23.5 (HH) 10.0 - 20.0 ug/mL Reported dose date: 20,181,115 Reported dose time: 1,800 Reported dose: 2,000 MG UNITS METABOLIC PANEL, BASIC Collection Time: 11/16/18  6:30 AM  
Result Value Ref Range Sodium 139 136 - 145 mmol/L Potassium 4.4 3.5 - 5.5 mmol/L Chloride 103 100 - 108 mmol/L  
 CO2 35 (H) 21 - 32 mmol/L Anion gap 1 (L) 3.0 - 18 mmol/L Glucose 97 74 - 99 mg/dL BUN 9 7.0 - 18 MG/DL Creatinine 0.97 0.6 - 1.3 MG/DL  
 BUN/Creatinine ratio 9 (L) 12 - 20 GFR est AA >60 >60 ml/min/1.73m2 GFR est non-AA >60 >60 ml/min/1.73m2 Calcium 8.2 (L) 8.5 - 10.1 MG/DL  
AMMONIA Collection Time: 11/16/18  9:23 AM  
Result Value Ref Range Ammonia 52 (H) 11 - 32 UMOL/L  
CBC WITH AUTOMATED DIFF Collection Time: 11/16/18  9:23 AM  
Result Value Ref Range WBC 4.3 (L) 4.6 - 13.2 K/uL  
 RBC 4.68 (L) 4.70 - 5.50 M/uL  
 HGB 12.4 (L) 13.0 - 16.0 g/dL HCT 41.3 36.0 - 48.0 % MCV 88.2 74.0 - 97.0 FL  
 MCH 26.5 24.0 - 34.0 PG  
 MCHC 30.0 (L) 31.0 - 37.0 g/dL  
 RDW 16.2 (H) 11.6 - 14.5 % PLATELET 210 328 - 343 K/uL MPV 10.4 9.2 - 11.8 FL  
 NEUTROPHILS 64 40 - 73 % LYMPHOCYTES 25 21 - 52 % MONOCYTES 9 3 - 10 % EOSINOPHILS 1 0 - 5 % BASOPHILS 1 0 - 2 %  
 ABS. NEUTROPHILS 2.8 1.8 - 8.0 K/UL  
 ABS. LYMPHOCYTES 1.1 0.9 - 3.6 K/UL  
 ABS. MONOCYTES 0.4 0.05 - 1.2 K/UL  
 ABS. EOSINOPHILS 0.0 0.0 - 0.4 K/UL  
 ABS. BASOPHILS 0.0 0.0 - 0.1 K/UL  
 DF AUTOMATED MAGNESIUM Collection Time: 11/16/18  9:23 AM  
Result Value Ref Range Magnesium 2.0 1.6 - 2.6 mg/dL PHOSPHORUS Collection Time: 11/16/18  9:23 AM  
Result Value Ref Range Phosphorus 3.1 2.5 - 4.9 MG/DL  
POC G3 Collection Time: 11/16/18 11:26 AM  
Result Value Ref Range Device: BIPAP    
 FIO2 (POC) 35 % pH (POC) 7.283 (L) 7.35 - 7.45    
 pCO2 (POC) 69.4 (H) 35.0 - 45.0 MMHG  
 pO2 (POC) 76 (L) 80 - 100 MMHG  
 HCO3 (POC) 32.8 (H) 22 - 26 MMOL/L  
 sO2 (POC) 93 92 - 97 % Base excess (POC) 6 mmol/L  
 PEEP/CPAP (POC) 8 cmH2O  
 PIP (POC) 18 Pressure support 10 cmH2O Allens test (POC) YES Total resp. rate 23 Site RIGHT RADIAL Specimen type (POC) ARTERIAL Performed by Pedro Dunham DRUG SCREEN, URINE Collection Time: 11/16/18 11:55 AM  
Result Value Ref Range BENZODIAZEPINES NEGATIVE  NEG    
 BARBITURATES NEGATIVE  NEG    
 THC (TH-CANNABINOL) NEGATIVE  NEG    
 OPIATES POSITIVE (A) NEG    
 PCP(PHENCYCLIDINE) NEGATIVE  NEG    
 COCAINE POSITIVE (A) NEG    
 AMPHETAMINES NEGATIVE  NEG METHADONE NEGATIVE  NEG HDSCOM (NOTE) Assessment/Plan:  
 
Principal Problem: 
  Discitis of thoracic region (11/14/2018) Active Problems: 
  UTI (urinary tract infection) (11/14/2018) Bilateral pleural effusion (11/14/2018) Lumbar stenosis (11/14/2018) Adenoma of left adrenal gland (11/14/2018) Uncontrolled hypertension (11/14/2018) Cirrhosis of liver ([facility] [address] (11/14/2018) Hepatitis C (11/14/2018) Obesity (11/14/2018) COPD (chronic obstructive pulmonary disease) ([facility] [address] (11/14/2018) Constipation (11/14/2018) Back pain (11/14/2018) Care Plan 1. Discitis of thoracic region 
 -CT showing severe discitis of T10-11. Patient with h/o iv heroin abuse. Infection suspected. -Vanc and cefepime initiated in ED,will continue. -Sepsis protocol was initiated in ED but don't think patient is septic - lactic acid,wbc,temp,HR normal. 
 -Blood cx NGTD 
 -Pt did not tolerate MRI. Discussed with IR,will have MRI under sedation and aspiration of T10-11 will be performed for cultures,cytology 
 -Neurosurgery consulted ->pt high risk for intervention - medical management 
 -ID consulted -Vanc/cefepime d/heather on 11/16. Pt currently on ceftriaxone. 
 -Neurosurgery requesting MRI 
 -Pain management with dilaudid q 4 hrs prns. -Fall precaution. 2.Respiratory failure with hypoxia/hypercapnia 
 -Started on BIPAP -Critical care consulted 3. Acute encephalopathy metabolic 
 -Likely due to acute resp failure and possibly elevated ammonia 
 -Will monitor mentation as patient being treated 
 -CT scan done on 11/15 after code stroke was called did not show any acute process. Tele-neurology did not think that patient had a stroke 
  4.Lumbar stenosis at L3-4  
 -Will follow neurosurgery recommendations -> no surgery,pt asymptomatic 
  
5. UTI (urinary tract infection) 
 -On cefepime. 
 -Ucx results pending and blood cx NGTD 
  
6.Cirrhosis of liver/Hepatitis C/H/o alcohol abuse 
 -Patient has h/o hepatitis C and past history of alcohol abuse. CT showing strong evidence of cirrhosis with possible portal vein hypertension. 
 -Will monitor. 
 -Will order albumin x 1  
 -Ammonia level 36. INR 1.3 
 -Outpatient follow up 7. Bilateral pleural effusion/Legs edema 
 -Likely due to cirrhosis of liver. -Might need diuretics but will discuss with GI. 
  
8. Adenoma of left adrenal gland  
 -Seen in previous films. Will monitor 
  
9. Uncontrolled hypertension  
 -Resume lisinopril. Hydralazine prn 
  
10. Obesity 
 -Supportive care 
  
11. COPD  
 -Duo-neb prn 
  
12. Constipation 
 -Order dulcolax 
  
13. Back pain 
 -Due to problems above - on pain meds. 
  
14. Legs edema 
 -Likely due to cirrhosis with portal hypertension 
 -Edema likely due to liver disease,will order  
 -pro-BNP not elevated and ECHO nl with EF 66-70% -legs edema resolved. 
  
DVT prophylaxis:sc heparin Full code:discussed with patient who stated that he does not have a living will and never decided his code status Disposition:tbd

## 2018-11-16 NOTE — CONSULTS
[facility]  Pulmonary, Critical Care, and Sleep Medicine      Name: Sherren Jupiter. MRN: [id number]   : 1950 Hospital: [facility]   Date: 2018          Critical Care Initial Patient Consult    Requesting MD: Taryn Butler                                                 Reason for CC Consult: Respiratory failure    IMPRESSION:   · Respiratory distress with hypercapnia requiring BIPAP  · Discitis of thoracic region- Neurosurgery and ID following  · Acute encephalopathy- elevated ammonia/hypercapnia? · Possible endocarditis with Hx of IV drug use? · UTI  · Right lower lobe consolidation-18 CT Abd- Pneumonia? · Bilateral pleural effusions  · Lumbar stenosis  · Adenoma of left adrenal gland  · Elevated ammonia- lactulose  · HTN  · Cirrhosis of liver  · Hep C  · COPD  Hx  · Arthritis  · Heroin/crack coccain use      RECOMMENDATIONS:   · Resp - Stable on 5L NC.  HOB > 30. O2 goal > 90. Aspiration precautions. Pulmonary hygiene. Repeat ABG this afternoon. BIPAP if hypercapnia continues to increase. May be a candidate for intubation. · ID - Afebrile and leukopenia. Trend temp and WBC curve. Continue cefepime and vancomycin for discitis per ID. IR for diagnostic aspiration? Monitor for signs of infection. · CVS - Monitor HD status. Continue lisinopril. BLE duplex studies for LE edema. · Heme/Onc- Daily CBC. Monitor H/H and platelets. Monitor for signs of infection. · Metabolic - Daily BMP. Trend and replace electrolytes per replacement protocol. Continue lactulose for elevated ammonia. · Renal - Trend renal indices. Monitor UOP. UDS ordered. · Endocrine - Glycemic control per protocol. Avoid hypoglycemia. · Neuro/ Pain/ Sedation - Monitor neuro status. Avoid sedating medications. Gabapentin for back pain. · GI - NPO. · Prophylaxis - DVT, GI- SCDs  · Code status- FULL     Subjective/History:      This patient has been seen and evaluated at the request of Dr. Jeramie Hyde for respiratory failure. Patient is a 77 y.o. male w/ PMHx of COPD, HTN, Hep C, heroin use,  who initially presented to the ED on 11/14/18 c/o lower back pain for the last 3 months and constipation for the past 3 weeks. CT abd/pelvis showed severe  discitis,L3-4 central stenosis,bilateral pleural effusions with bilateral lower lobe atelectasis,splenic hilar and perisplenic varices. UA showed UTI. Placed on cefepime and vanc for severe discitis. Neurosurgery was consulted ordered MRI, bedrest and possible plan to have IR to aspirate the T10-11 disc space and TLSO. Pt refused to have MRI unless he was medicated to be \"unconscious\" for the procedure. Plan for MRI w/ MAC sedation performed by anesthesia 11/16/18 at 1330. Yesterday afternoon pt found unresponsive w/ right sided facil droop and unintentional gaze. RRT and Code S called. CT negative for stroke. Transferred to ICU. Overnight pt confused- ABG showed respiratory acidosis- placed on BIPAP. Currently resting on BIPAP. Intensivist consulted for management and evaluation of BIPAP and concern of pt possibly needing intubation to obtain MRI. Mother at the bedside- stated that the pt has an adult daughter who has been difficult to reach. The patient is critically ill and can not provide additional history due to encephalopathy. Past Medical History:   Diagnosis Date    Arthritis     COPD     Hypertension       Past Surgical History:   Procedure Laterality Date    HX REFRACTIVE SURGERY        Prior to Admission medications    Medication Sig Start Date End Date Taking? Authorizing Provider   loratadine (CLARITIN) 10 mg tablet Take 10 mg by mouth daily as needed for Allergies. Yes Other, MD Kim   ipratropium-albuterol (COMBIVENT RESPIMAT)  mcg/actuation inhaler Take 1 Puff by inhalation every twelve (12) hours.  Indications: Chronic Obstructive Pulmonary Disease with Bronchospasms   Yes Other, MD Kim   simvastatin (ZOCOR) 10 mg tablet Take  by mouth nightly. Yes Laura, MD Kim   HYDROcodone-acetaminophen (XODOL) 7.5-300 mg tablet Take 2 Tabs by mouth four (4) times daily as needed. Provider, Historical   gabapentin (NEURONTIN) 400 mg capsule Take 400 mg by mouth two (2) times a day. Indications: NEUROPATHIC PAIN    Kim Sanchez MD   lisinopril (PRINIVIL, ZESTRIL) 20 mg tablet Take  by mouth daily. Kim Sanchez MD     Current Facility-Administered Medications   Medication Dose Route Frequency    HYDROmorphone (DILAUDID) injection 1 mg  1 mg IntraVENous ONCE    0.9% sodium chloride infusion  75 mL/hr IntraVENous CONTINUOUS    cefepime (MAXIPIME) 2 g in 0.9% sodium chloride (MBP/ADV) 100 mL MBP  2 g IntraVENous Q8H    gabapentin (NEURONTIN) capsule 400 mg  400 mg Oral BID    lisinopril (PRINIVIL, ZESTRIL) tablet 20 mg  20 mg Oral DAILY    simvastatin (ZOCOR) tablet 20 mg  20 mg Oral QHS    vancomycin (VANCOCIN) 2000 mg in  ml infusion  2,000 mg IntraVENous Q12H    VANCOMYCIN INFORMATION NOTE   Other Rx Dosing/Monitoring    lactulose (CHRONULAC) solution 10 g  10 g Oral BID     Allergies   Allergen Reactions    Shellfish Derived Hives      Social History     Tobacco Use    Smoking status: Former Smoker     Last attempt to quit: 1984     Years since quittin.4    Smokeless tobacco: Former User   Substance Use Topics    Alcohol use: Yes     Comment: sometimes      History reviewed. No pertinent family history. Review of Systems:  A comprehensive review of systems was negative except for that written in the HPI.     Objective:   Vital Signs:    Visit Vitals  /58   Pulse (!) 115   Temp 96.9 °F (36.1 °C)   Resp 29   Ht 6' (1.829 m)   Wt 126.1 kg (278 lb)   SpO2 97%   BMI 37.70 kg/m²       O2 Device: BIPAP   O2 Flow Rate (L/min): 5 l/min(5)   Temp (24hrs), Av.9 °F (36.6 °C), Min:96.9 °F (36.1 °C), Max:98.6 °F (37 °C)       Intake/Output:   Last shift:      No intake/output data recorded. Last 3 shifts: 11/14 1901 - 11/16 0700  In: 1400 [P.O.:1400]  Out: 1775 [Urine:1775]    Intake/Output Summary (Last 24 hours) at 11/16/2018 0848  Last data filed at 11/15/2018 0856  Gross per 24 hour   Intake    Output 200 ml   Net -200 ml       Ventilator Settings:  Mode Rate Tidal Volume Pressure FiO2 PEEP            35 %       Peak airway pressure:      Minute ventilation: 5.5 l/min            Physical Exam:    General:  Awake, not following commands, agitated   Head:  Normocephalic, without obvious abnormality, atraumatic. Eyes:  Conjunctivae/corneas clear. PERRL, EOMs intact. Nose: Nares normal. Septum midline. Mucosa normal. No drainage or sinus tenderness. Throat: Lips, mucosa, and tongue dry. Teeth and gums normal.   Neck: Supple, symmetrical, trachea midline, no adenopathy, thyroid: no enlargment/tenderness/nodules, no carotid bruit and no JVD. Back:   Symmetric, no curvature. ROM normal.   Lungs:   Clear to auscultation bilaterally. Chest wall:  No tenderness or deformity. Heart:  Sinus tachycardia, S1, S2 normal, no murmur, click, rub or gallop. Abdomen:   Soft, non-tender. Scar noted, Bowel sounds normal. No masses,  No organomegaly. Extremities: Extremities normal, atraumatic, no cyanosis +1 edema to BLE. Pulses: 2+ and symmetric all extremities. Skin: Skin color, texture, turgor normal. No rashes or lesions. Normal capillary refill.    Lymph nodes: Cervical, supraclavicular, and axillary nodes normal.   Neurologic: Awake, not cooperative, moves all extremities, agitated       Data:     Recent Results (from the past 24 hour(s))   CRYPTOCOCCUS AG W/REFLEX TITER    Collection Time: 11/15/18  2:35 PM   Result Value Ref Range    Cryptococcus Ag NEGATIVE  NEG     STREPTOLYSIN O (ASO) AB    Collection Time: 11/15/18  2:35 PM   Result Value Ref Range    Anti-streptolysin O Ab 108.0 0.0 - 200.0 IU/mL   GLUCOSE, POC    Collection Time: 11/15/18  4:26 PM   Result Value Ref Range    Glucose (POC) 96 70 - 110 mg/dL   AMMONIA    Collection Time: 11/15/18  7:49 PM   Result Value Ref Range    Ammonia 57 (H) 11 - 32 UMOL/L   MAGNESIUM    Collection Time: 11/15/18  7:49 PM   Result Value Ref Range    Magnesium 2.1 1.6 - 2.6 mg/dL   METABOLIC PANEL, BASIC    Collection Time: 11/15/18  7:49 PM   Result Value Ref Range    Sodium 138 136 - 145 mmol/L    Potassium 4.4 3.5 - 5.5 mmol/L    Chloride 102 100 - 108 mmol/L    CO2 32 21 - 32 mmol/L    Anion gap 4 3.0 - 18 mmol/L    Glucose 106 (H) 74 - 99 mg/dL    BUN 9 7.0 - 18 MG/DL    Creatinine 1.10 0.6 - 1.3 MG/DL    BUN/Creatinine ratio 8 (L) 12 - 20      GFR est AA >60 >60 ml/min/1.73m2    GFR est non-AA >60 >60 ml/min/1.73m2    Calcium 8.3 (L) 8.5 - 10.1 MG/DL   PHOSPHORUS    Collection Time: 11/15/18  7:49 PM   Result Value Ref Range    Phosphorus 3.5 2.5 - 4.9 MG/DL   PTT    Collection Time: 11/15/18  7:49 PM   Result Value Ref Range    aPTT 36.1 23.0 - 36.4 SEC   PROTHROMBIN TIME + INR    Collection Time: 11/15/18  7:49 PM   Result Value Ref Range    Prothrombin time 15.1 11.5 - 15.2 sec    INR 1.2 0.8 - 1.2     POC G3    Collection Time: 11/15/18  9:48 PM   Result Value Ref Range    Device: NASAL CANNULA      Flow rate (POC) 5 L/M    FIO2 (POC) 40 %    pH (POC) 7.342 (L) 7.35 - 7.45      pCO2 (POC) 64.1 (H) 35.0 - 45.0 MMHG    pO2 (POC) 91 80 - 100 MMHG    HCO3 (POC) 34.8 (H) 22 - 26 MMOL/L    sO2 (POC) 96 92 - 97 %    Base excess (POC) 9 mmol/L    Allens test (POC) YES      Total resp. rate 20      Site RIGHT RADIAL      Patient temp.  98.6      Specimen type (POC) ARTERIAL      Performed by Joshua Evans    POC G3    Collection Time: 11/16/18  2:11 AM   Result Value Ref Range    Device: NASAL CANNULA      Flow rate (POC) 5 L/M    FIO2 (POC) 40 %    pH (POC) 7.287 (L) 7.35 - 7.45      pCO2 (POC) 71.5 (H) 35.0 - 45.0 MMHG    pO2 (POC) 60 (L) 80 - 100 MMHG    HCO3 (POC) 34.2 (H) 22 - 26 MMOL/L    sO2 (POC) 86 (L) 92 - 97 %    Base excess (POC) 8 mmol/L    Allens test (POC) YES      Total resp. rate 26      Site RIGHT RADIAL      Patient temp. 98.6      Specimen type (POC) ARTERIAL      Performed by ALF Holly    Collection Time: 11/16/18  6:30 AM   Result Value Ref Range    Vancomycin,trough 23.5 (HH) 10.0 - 20.0 ug/mL    Reported dose date: 20,181,115      Reported dose time: 1,800      Reported dose: 2,000 MG UNITS   METABOLIC PANEL, BASIC    Collection Time: 11/16/18  6:30 AM   Result Value Ref Range    Sodium 139 136 - 145 mmol/L    Potassium 4.4 3.5 - 5.5 mmol/L    Chloride 103 100 - 108 mmol/L    CO2 35 (H) 21 - 32 mmol/L    Anion gap 1 (L) 3.0 - 18 mmol/L    Glucose 97 74 - 99 mg/dL    BUN 9 7.0 - 18 MG/DL    Creatinine 0.97 0.6 - 1.3 MG/DL    BUN/Creatinine ratio 9 (L) 12 - 20      GFR est AA >60 >60 ml/min/1.73m2    GFR est non-AA >60 >60 ml/min/1.73m2    Calcium 8.2 (L) 8.5 - 10.1 MG/DL             Telemetry:normal sinus rhythm    Imaging:  CXR Results  (Last 48 hours)               11/14/18 1420  XR CHEST PORT Final result    Impression:  Impression:       1. Low lung volumes, left retrocardiac atelectasis or airspace disease and small   bilateral pleural effusions. Narrative:  Chest, single view       Indication: Shortness of breath       Comparison: Several prior exams, most recently June 14, 2017       Findings:  Portable upright AP view of the chest was obtained. Lung volumes are   low with mild bibasilar atelectasis. Small pleural effusions. Left retrocardiac   density noted. Mild elevation of the right hemidiaphragm similar to multiple   prior exams. Stable cardiac size and mediastinal contours. No acute osseous   abnormality. Osteoarthritis of bilateral shoulder girdles noted. CT Results  (Last 48 hours)               11/15/18 1645  CT HEAD WO CONT Final result    Impression:  IMPRESSION:       1.   No CT evidence of an acute intracranial abnormality - in particular, no   acute cortical infarct, hemorrhage, or mass effect. 2.  Mild cerebral atrophy/volume loss and periventricular white matter changes,   most commonly seen with chronic microvascular disease. As this examination was ordered as a Code S, the findings were discussed with   the ordering physician, Dr. Janee Ricks, at 4:51 PM on 11/15/2018. Narrative:  EXAM: CT HEAD, WITHOUT IV CONTRAST       COMPARISON: Head CT 12/5/2010       INDICATION: Acute onset of altered mental status, confusion and disorientation   at 422 this afternoon       TECHNIQUE: Multiple axial CT images of the head were obtained extending from the   skull base through the vertex. Additional coronal and sagittal reformations were   also performed. One or more dose reduction techniques were used on this CT:   automated exposure control, adjustment of the mAs and/or kVp according to   patient size, and iterative reconstruction techniques. The specific techniques   used on this CT exam have been documented in the patient's electronic medical   record.       _______________       FINDINGS:       BRAIN AND POSTERIOR FOSSA: There is generalized prominence of the ventricular   system, associated with proportional widening of the cortical cerebral sulci,   compatible with generalized volume loss. Hazy hypoattenuation identified along   the periventricular white matter, a nonspecific finding which is most commonly   encountered in the setting of chronic microvascular disease. There is no   intracranial hemorrhage, mass effect, or midline shift. There are no   significant additional areas of abnormal parenchymal attenuation. EXTRA-AXIAL SPACES AND MENINGES: There are no abnormal extra-axial fluid   collections. CALVARIUM: No acute osseous abnormality.        OTHER: The visualized portions of the paranasal sinuses and mastoid air cells   are clear.       _______________           11/14/18 1520  CT ABD PELV W CONT Final result Impression:  IMPRESSION:           1. Severe destructive changes involving endplates of Y57-24 with circumferential   paravertebral soft tissue infiltration and extension of process into the   anterior epidural space with cord compression. Findings compatible with severe   discitis. Recommend MRI evaluation. 2. Moderate dependent bilateral pleural effusions with partial bilateral lower   lobe atelectasis and additional atelectatic streaks right middle lobe and   lingula. 3. Multiple small hepatic cysts. Left adrenal adenoma. 4. Gastrohepatic ligament adenopathy and retrocrural adenopathy, probably   reactive. 5. Splenic hilar and perisplenic varices. Disproportionate enlargement segment 2   and 3 of liver. Findings may indicate cirrhosis. 6. L3-4 central stenosis. Probable small hemangioma L1 vertebral body. Narrative:  EXAM: CT of the abdomen and pelvis       INDICATION: Constipation, diffuse abdominal pain and severe low back pain for 3   months with recent progression. COMPARISON: None. TECHNIQUE: Axial CT imaging of the abdomen and pelvis was performed with   intravenous contrast. Multiplanar reformats were generated. One or more dose   reduction techniques were used on this CT: automated exposure control,   adjustment of the mAs and/or kVp according to patient size, and iterative   reconstruction techniques. The specific techniques used on this CT exam have   been documented in the patient's electronic medical record.       _______________       FINDINGS:       LOWER CHEST: Moderate bilateral dependent pleural effusions with passive partial   lower lobe atelectasis. Additional bandlike atelectasis posterior lingula and   right middle lobe       LIVER, BILIARY: Multiple small low-attenuation foci in the liver compatible with   cysts. One lesion in anterior dome of liver measures 9 mm. Several additional   smaller lesions as well.  Disproportionate enlargement segment 2 and 3 of liver. No biliary dilation. PANCREAS: Normal.       SPLEEN: Normal.       ADRENALS: Low-attenuation nodule right adrenal gland measures about 1.7 cm   without change compatible with adenoma. Normal left adrenal gland. KIDNEYS/URETERS/URINARY BLADDER: Normal.       LYMPH NODES: Gastrohepatic ligament adenopathy with several enlarged nodes 2 of   which measure 1.2 cm short axis. There is also retrocrural adenopathy. GASTROINTESTINAL TRACT: No bowel dilation or wall thickening. Normal appendix. Large amount of retained stool in right colon. PELVIC ORGANS: Mildly enlarged prostate. VASCULATURE: Moderate atherosclerosis. Splenic hilar and perisplenic varices. BONES: There are severe destructive changes involving the endplates at D43-11. There is associated paravertebral soft tissue infiltration. Findings compatible   with severe discitis. The inflammatory process extends into the anterior   epidural space with compression of cord. L5-S1 spondylosis and degenerative disc   disease. Probable hemangioma right L1 vertebral body. L3-4 severe central   stenosis.        OTHER: None.       _______________                       Total critical care time exclusive of procedures: 30 minutes  Wilson Heath NP

## 2018-11-16 NOTE — ANESTHESIA PREPROCEDURE EVALUATION
Outpatient Physical Therapy Ortho Treatment Note  [facility]     Patient Name: Shellie Villeda  : 1949  MRN: [id number]  Today's Date: 3/7/2018      Visit Date: 2018    Visit Dx:    ICD-10-CM ICD-9-CM   1. Left shoulder pain, unspecified chronicity M25.512 719.41       Patient Active Problem List   Diagnosis   • Closed compression fracture of third lumbar vertebra   • Wedge compression fracture of third lumbar vertebra with delayed healing   • Chronic bilateral low back pain without sciatica   • Abdominal bloating   • Closed wedge compression fracture of third lumbar vertebra with routine healing   • Non-smoker   • Normal body mass index (BMI)        Past Medical History:   Diagnosis Date   • Compression fracture of lumbar vertebra    • Depression    • Disc degeneration, lumbar    • History of basal cell carcinoma    • History of hyperlipidemia    • History of insomnia    • History of multiple pulmonary nodules    • History of palpitations    • History of urinary tract infection    • Low back pain    • Osteoporosis    • Osteoporosis    • Skin cancer     basal cell   • Vitamin D insufficiency         Past Surgical History:   Procedure Laterality Date   • BASAL CELL CARCINOMA EXCISION      SEVERAL OFF FACE   • BREAST BIOPSY      benign   • COLONOSCOPY  2006   • COLONOSCOPY N/A 3/9/2017    Procedure: COLONOSCOPY WITH ANESTHESIA;  Surgeon: Arthur Harris MD;  Location: [facility] ENDOSCOPY;  Service:    • KYPHOPLASTY N/A 2016    Procedure: KYPHOPLASTY L3;  Surgeon: Ricky Ferguson MD;  Location: [facility] OR;  Service:                              PT Assessment/Plan       18 1100       PT Assessment    Assessment Comments The IR is improving with much less guarding at end range.  She is scheduled for two sessions next week and this will be likely all that we need.  The ROM limitations will continue to improve with a focused HEP as well.  -RS     PT Plan    PT Plan Comments progress HEP  Anesthetic History No history of anesthetic complications Review of Systems / Medical History Patient summary reviewed and pertinent labs reviewed Pulmonary COPD Comments: Hypercapnic resp. Failure on CPAP, acidotic B/l pleural effusion, atelectasis Neuro/Psych Comments: confused Cardiovascular Hypertension Comments: ECHO: EF= 66-70% GI/Hepatic/Renal 
  
 
 
 
Liver disease Comments: Cirrhosis Hep C Splenic varices Endo/Other Obesity Other Findings Comments: Change of mental status, hypercapnic resp failure on cpap, ammonium 57 Cocaine/ Heroine addict ETOH 
T10-11 discitis Physical Exam 
 
Airway Comments: Unable to assess, pt is confused. Cardiovascular Regular rate and rhythm,  S1 and S2 normal,  no murmur, click, rub, or gallop Rhythm: regular Rate: normal 
 
 
 
 Dental 
No notable dental hx 
 
Comments: Unable to assess, pt is confused Pulmonary Decreased breath sounds: bibasilar Abdominal 
GI exam deferred Other Findings Anesthetic Plan ASA: 4 Anesthesia type: general 
 
 
 
 
Induction: Intravenous Anesthetic plan and risks discussed with: Patient for final as we will discharge next week.  -RS       User Key  (r) = Recorded By, (t) = Taken By, (c) = Cosigned By    Initials Name Provider Type    RS Kingsley Su, PT DPT Physical Therapist                    Exercises       03/07/18 1100          Subjective Comments    Subjective Comments Pt is doing well.  She is working on her HEP  -RS      Subjective Pain    Able to rate subjective pain? yes  -RS      Pre-Treatment Pain Level 0  -RS      Post-Treatment Pain Level 0  -RS      Exercise 1    Exercise Name 1 supine (L) shoulder flexion full available range with 1# weight  -RS      Cueing 1 Verbal;Tactile  -RS      Sets 1 3  -RS      Reps 1 10  -RS      Exercise 2    Exercise Name 2 sidelying (L) ER AROM  -RS      Cueing 2 Verbal;Tactile  -RS      Sets 2 3  -RS      Reps 2 10  -RS      Exercise 3    Exercise Name 3 (R) sidelying (L) ER place and holds  -RS      Cueing 3 Verbal;Tactile  -RS      Sets 3 3  -RS      Time (Seconds) 3 15-20 seconds  -RS      Exercise 4    Exercise Name 4 standing CKC wall clocks 1-5 with red theraband  -RS      Cueing 4 Verbal;Tactile  -RS      Sets 4 1  -RS      Time (Minutes) 4 1 minute each  -RS      Exercise 5    Exercise Name 5 standing perturbations with green therabar and red theraband resistance  -RS      Cueing 5 Tactile  -RS      Sets 5 3  -RS      Time (Minutes) 5 20-30  -RS      Additional Comments towel under elbbow  -RS      Exercise 6    Exercise Name 6 supine therapist resisted PNF low to high chop (L) UE  -RS      Cueing 6 Tactile  -RS      Sets 6 3  -RS      Reps 6 12  -RS      Exercise 7    Exercise Name 7 UBE 3f/3b with level 2.0 resistance  -RS      Time (Minutes) 7 6  -RS        User Key  (r) = Recorded By, (t) = Taken By, (c) = Cosigned By    Initials Name Provider Type    RS Kingsley Su, PT DPT Physical Therapist                               PT OP Goals       03/07/18 1100       PT Short Term Goals    STG Date to Achieve 03/14/18  -RS     STG  1 Patient will improve left shoulder passive flexion to 120 or greater without guarding  -RS     STG 1 Progress Met  -RS     STG 2 Patient will improve left shoulder passive abduction to 100 degrees without guarding  -RS     STG 2 Progress Met  -RS     STG 3 Patient will improve left shoulder passive IR at 90 deg abduction to 25 degrees without guarding  -RS     STG 3 Progress Met  -RS     Long Term Goals    LTG Date to Achieve 03/28/18  -RS     LTG 1 Patient will be independent with a comprehensive HEP by discharge  -     LTG 1 Progress Progressing  -RS     LTG 2 Patient will improve Quick Dash score to reflect <25% impairment using the G code calculator to demonstrate a clinically significant improvement in function by discharge  -RS     LTG 2 Progress Met  -RS     LTG 3 Patient will improve (L) shoulder AROM to within 10 degrees of the (R) shoulder into flexion, abduction, ER, and IR at 90 degrees by discharge  -RS     LTG 3 Progress Ongoing  -RS     LTG 4 Patient will report ability to participate in ADL's such as dressing, bathing, putting on makeup with symptoms <3/10 in the left shoulder by discharge.  -RS     LTG 4 Progress Progressing  -RS     LTG 5 Patient will improve gross posterior rotator cuff strength to 4+/5 on MMT by discharge  -RS     LTG 5 Progress Progressing  -RS     Time Calculation    PT Goal Re-Cert Due Date 03/30/18  -       User Key  (r) = Recorded By, (t) = Taken By, (c) = Cosigned By    Initials Name Provider Type    RS Kingsley Su, PT DPT Physical Therapist          Therapy Education  Given: HEP  Program: Reinforced  How Provided: Verbal  Provided to: Patient  Level of Understanding: Verbalized              Time Calculation:   Start Time: 1110  Stop Time: 1155  Time Calculation (min): 45 min  Total Timed Code Minutes- PT: 45 minute(s)    Therapy Charges for Today     Code Description Service Date Service Provider Modifiers Qty    47662341497 HC PT THER PROC EA 15 MIN  3/7/2018 Kingsley Su, PT DPT GP 3                    R. Alli Su, PT DPT  3/7/2018

## 2018-11-16 NOTE — PROGRESS NOTES
Pharmacy Dosing Services: Vancomycin Indication: Bone and Joint Infection Day of therapy: 3 Other Antimicrobials (Include dose, start day & day of therapy): 
Cefepime 2 grams every 8 hours, 2018 Loading dose (date given): 2,000 mg 
Current Maintenance dose: 2 gm IV every 12 hours Goal Vancomycin Level: 15-20 mcg/mL (Trough 15-20 for most infections, 20 for meningitis/osteomyelitis, pre-HD level ~25) Vancomycin Level (if drawn): 
 - 23.5 (9 hours post dose) Significant Cultures:  
 - blood - pending  - urine - >100K GNR Renal function stable? (unstable defined as SCr increase of 0.5 mg/dL or > 50% increase from baseline, whichever is greater) (Y/N): Y  
 
CAPD, Hemodialysis or Renal Replacement Therapy (Y/N): N Recent Labs 18 
5847 18 
0630 11/15/18 
1949 11/15/18 
0440 18 
1333 CREA  --  0.97 1.10 0.91 0.99 BUN  --  9 9 7 7 WBC 4.3*  --   --  6.7 5.3 Temp (24hrs), Av.9 °F (36.6 °C), Min:96.9 °F (36.1 °C), Max:98.6 °F (37 °C) Creatinine Clearance (Creatinine Clearance (ml/min)): Estimated Creatinine Clearance: 91.9 mL/min (based on SCr of 0.97 mg/dL). Regimen assessment: Trough level taken only 9 hours post dose; extrapolated trough is 18.6 mcg/mL. Will resume current dose. Maintenance dose: 2,000 mg every 12 hours Next scheduled level: 2018 at 1730 Pharmacy will follow daily and adjust medications as appropriate for renal function and/or serum levels. Thank you, Stefani Olmos

## 2018-11-16 NOTE — ROUTINE PROCESS
1930 Bedside verbal shift report received from Zechariah Marie RN(offgoing nurse) given to [address], RN(oncoming nurse). Report included SBAR,MAR,KARDEX,TELE,I/O 
 
0482 Dr. Ade Garner at bedside assessing pt. Okay for patient to have something to eat but NPO after MN  
 
0140 Pt woke up out of sleep yelling out of room. Acting confused. New orders for ABG obtained. 0211 RRT at bedside obtaining ABG 
 
0247 New orders from Dr. Ade Garner to placed pt on BIPAP due to confusion and elevated CO2 
 
0300 RRT at bedside placing pt on BIPAP. 0345 PT lying in bed resting comfortably with bipap now. Denies pain and nad noted at this time. 0500 Lab at bedside/unable to draw labs on patient due to patient being a difficult lab draw will have someone else from lab come try. Pt pulled Bipap off and refused to put it back on. Bed soiled. Condom catheter placed. Bed changed. 0630 Phlebotomy at bedside drawing labs. 5602 Dr. Ade Garner notified that pt is in much pain after being turned/cleaned and repositioned. HR in the 120's. Pt not in distress but lots of pain. New orders for a one time dose of dilaudid 1mg Iv.

## 2018-11-16 NOTE — PROGRESS NOTES
0220,Pt placed on BIPAP of 18/8 40% for increased CO2,sxn set up at bedside pt resting will monitor though the shift.

## 2018-11-16 NOTE — PROGRESS NOTES
INFECTIOUS DISEASE FOLLOW UP NOTE :-  
Will follow back on Monday, Dr Augusto Otto is on call this weekend, please call us at [phone] for any questions or concerns. Admit Date: 11/14/2018 ABX;   
 
Current  Prior Ceftriaxone 11/16 - 0  Cefepime/vanco  11/14   2 ASSESSMENT: -> RECS  
 
T10-11 disciitis CT:   severe destructive changes involving the endplates at A34-90. There is associated paravertebral soft tissue infiltration. Findings compatible 
with severe discitis. The inflammatory process extends into the anterior 
epidural space with compression of cord. L5-S1 spondylosis and degenerative disc 
disease. 
  
  L3-4 central stenosis Neurosurgery consulted - ESR/CRP low for OM/discitis but dioes not exclude  
- also pt without any fever/chills or leukocytosis, no sepsis hence no urgency for Iv abx  
- needs diagnostics first, hence will stop Vanc/cefepime - d/w Dr Turner Em, pt will need sedation/intubation for MRI and likely IR guided biopsy of T10-11 area, which they will plan. GNR UTI  
- ua pos nitrite, small le, 11-20 wbc, 3+ bac - Ucx >100k GNR -> start iv ceftriaxone x 3 days 
-> await final Ucx Bilateral pleural effusions and LL atx with atx in rml    
Abnormal CT abd: 
  Adenopathy- gastrohepatic ligament Splenic hilar and perisplenic varices Liver enlargement    
Ho ivda Precludes cvl for out pt rx- likelly Co morbidities: Arthritis/ copd/HTN    
allergies Shellfish MICROBIOLOGY:  
11/14    Blcx x 2 - ntd 
             ucx - >100k gnr LINES AND CATHETERS:  
PIV SUBJECTIVE :  
 
Interval notes reviewed. Pt had MRT due to being over sedated from likely dilaudid he received overnight. He is awake and alert now, asking for pain medication for his back. MEDICATIONS :  
 
Current Facility-Administered Medications Medication Dose Route Frequency  HYDROmorphone (DILAUDID) injection 1 mg  1 mg IntraVENous ONCE  
 0.9% sodium chloride infusion  75 mL/hr IntraVENous CONTINUOUS  
 sodium chloride (NS) flush 5-10 mL  5-10 mL IntraVENous PRN  
 gabapentin (NEURONTIN) capsule 400 mg  400 mg Oral BID  lisinopril (PRINIVIL, ZESTRIL) tablet 20 mg  20 mg Oral DAILY  loratadine (CLARITIN) tablet 10 mg  10 mg Oral DAILY PRN  
 simvastatin (ZOCOR) tablet 20 mg  20 mg Oral QHS  
 HYDROmorphone (PF) (DILAUDID) injection 1 mg  1 mg IntraVENous Q4H PRN  
 naloxone (NARCAN) injection 0.4 mg  0.4 mg IntraVENous PRN  
 diphenhydrAMINE (BENADRYL) injection 12.5 mg  12.5 mg IntraVENous Q4H PRN  
 ondansetron (ZOFRAN) injection 4 mg  4 mg IntraVENous Q4H PRN  
 albuterol-ipratropium (DUO-NEB) 2.5 MG-0.5 MG/3 ML  3 mL Nebulization Q4H PRN  
 hydrALAZINE (APRESOLINE) 20 mg/mL injection 10 mg  10 mg IntraVENous Q6H PRN  
 bisacodyl (DULCOLAX) suppository 10 mg  10 mg Rectal DAILY PRN  
 lactulose (CHRONULAC) solution 10 g  10 g Oral BID OBJECTIVE :  
 
Visit Vitals /58 Pulse (!) 115 Temp 96.9 °F (36.1 °C) Resp 29 Ht 6' (1.829 m) Wt 112.9 kg (248 lb 14.4 oz) SpO2 93% BMI 33.76 kg/m² Temp (24hrs), Av.7 °F (36.5 °C), Min:96.9 °F (36.1 °C), Max:98.6 °F (37 °C) Jessica Clinton old AAM, confused but awake, alert not in distress RESP-  ctab CVS- s1s2 normal, no murmur ABD-soft, Nt, bs present EXT-no edema SKIN -no rash NEURO - awake, alert, O x2, b/l motor strength intact Labs: Results:  
Chemistry Recent Labs 18 
0630 11/15/18 
1949 11/15/18 
0440 18 
1333 GLU 97 106* 79 89  138 136 139  
K 4.4 4.4 4.5 4.2  102 101 101 CO2 35* 32 31 31 BUN 9 9 7 7 CREA 0.97 1.10 0.91 0.99 CA 8.2* 8.3* 8.2* 8.1* AGAP 1* 4 4 7 BUCR 9* 8* 8* 7* AP  --   --  141* 123* TP  --   --  9.8* 8.8* ALB  --   --  2.4* 2.2*  
GLOB  --   --  7.4* 6.6* AGRAT  --   --  0.3* 0.3*  
  
 CBC w/Diff Recent Labs 11/16/18 
6897 11/15/18 
0440 11/14/18 
1333 WBC 4.3* 6.7 5.3  
RBC 4.68* 5.29 5.05  
HGB 12.4* 14.7 13.5 HCT 41.3 45.4 43.1  170 157 GRANS 64 60 54 LYMPH 25 30 37 EOS 1 1 1 RADIOLOGY :   
CT abdo/pelvis 11/14 - IMPRESSION: 
1. Severe destructive changes involving endplates of A29-95 with circumferential 
paravertebral soft tissue infiltration and extension of process into the 
anterior epidural space with cord compression. Findings compatible with severe 
discitis. Recommend MRI evaluation. 2. Moderate dependent bilateral pleural effusions with partial bilateral lower 
lobe atelectasis and additional atelectatic streaks right middle lobe and 
lingula. 3. Multiple small hepatic cysts. Left adrenal adenoma. 4. Gastrohepatic ligament adenopathy and retrocrural adenopathy, probably 
reactive. 5. Splenic hilar and perisplenic varices. Disproportionate enlargement segment 2 
and 3 of liver. Findings may indicate cirrhosis. 6. L3-4 central stenosis. Probable small hemangioma L1 vertebral body. Lieutenant Tone MD 
November 16, 2018 [facility] [phone]

## 2018-11-17 ENCOUNTER — APPOINTMENT (OUTPATIENT)
Dept: MRI IMAGING | Age: 68
DRG: 347 | End: 2018-11-17
Attending: INTERNAL MEDICINE
Payer: MEDICAID

## 2018-11-17 LAB
1,3 BETA GLUCAN SER-MCNC: 375 PG/ML
ALBUMIN SERPL-MCNC: 2.4 G/DL (ref 3.4–5)
ALBUMIN/GLOB SERPL: 0.4 {RATIO} (ref 0.8–1.7)
ALP SERPL-CCNC: 104 U/L (ref 45–117)
ALT SERPL-CCNC: 21 U/L (ref 16–61)
AMMONIA PLAS-SCNC: 52 UMOL/L (ref 11–32)
ANION GAP SERPL CALC-SCNC: 1 MMOL/L (ref 3–18)
AST SERPL-CCNC: 32 U/L (ref 15–37)
BACTERIA SPEC CULT: ABNORMAL
BASOPHILS # BLD: 0 K/UL (ref 0–0.1)
BASOPHILS NFR BLD: 0 % (ref 0–2)
BILIRUB SERPL-MCNC: 0.9 MG/DL (ref 0.2–1)
BUN SERPL-MCNC: 7 MG/DL (ref 7–18)
BUN/CREAT SERPL: 10 (ref 12–20)
CALCIUM SERPL-MCNC: 8.1 MG/DL (ref 8.5–10.1)
CHLORIDE SERPL-SCNC: 101 MMOL/L (ref 100–108)
CO2 SERPL-SCNC: 35 MMOL/L (ref 21–32)
CREAT SERPL-MCNC: 0.67 MG/DL (ref 0.6–1.3)
DIFFERENTIAL METHOD BLD: ABNORMAL
EOSINOPHIL # BLD: 0.1 K/UL (ref 0–0.4)
EOSINOPHIL NFR BLD: 1 % (ref 0–5)
ERYTHROCYTE [DISTWIDTH] IN BLOOD BY AUTOMATED COUNT: 15.9 % (ref 11.6–14.5)
GLOBULIN SER CALC-MCNC: 6.8 G/DL (ref 2–4)
GLUCOSE SERPL-MCNC: 90 MG/DL (ref 74–99)
HCT VFR BLD AUTO: 42.5 % (ref 36–48)
HGB BLD-MCNC: 12.7 G/DL (ref 13–16)
LYMPHOCYTES # BLD: 1.4 K/UL (ref 0.9–3.6)
LYMPHOCYTES NFR BLD: 29 % (ref 21–52)
MCH RBC QN AUTO: 26.6 PG (ref 24–34)
MCHC RBC AUTO-ENTMCNC: 29.9 G/DL (ref 31–37)
MCV RBC AUTO: 89.1 FL (ref 74–97)
MONOCYTES # BLD: 0.5 K/UL (ref 0.05–1.2)
MONOCYTES NFR BLD: 10 % (ref 3–10)
NEUTS SEG # BLD: 2.9 K/UL (ref 1.8–8)
NEUTS SEG NFR BLD: 60 % (ref 40–73)
PLATELET # BLD AUTO: 141 K/UL (ref 135–420)
PMV BLD AUTO: 9.9 FL (ref 9.2–11.8)
POTASSIUM SERPL-SCNC: 4.7 MMOL/L (ref 3.5–5.5)
PROT SERPL-MCNC: 9.2 G/DL (ref 6.4–8.2)
RBC # BLD AUTO: 4.77 M/UL (ref 4.7–5.5)
SERVICE CMNT-IMP: ABNORMAL
SODIUM SERPL-SCNC: 137 MMOL/L (ref 136–145)
WBC # BLD AUTO: 4.9 K/UL (ref 4.6–13.2)

## 2018-11-17 PROCEDURE — 74011250636 HC RX REV CODE- 250/636: Performed by: INTERNAL MEDICINE

## 2018-11-17 PROCEDURE — 74011000258 HC RX REV CODE- 258: Performed by: INTERNAL MEDICINE

## 2018-11-17 PROCEDURE — 74011250637 HC RX REV CODE- 250/637: Performed by: INTERNAL MEDICINE

## 2018-11-17 PROCEDURE — 82140 ASSAY OF AMMONIA: CPT

## 2018-11-17 PROCEDURE — 65270000029 HC RM PRIVATE

## 2018-11-17 PROCEDURE — 94640 AIRWAY INHALATION TREATMENT: CPT

## 2018-11-17 PROCEDURE — 74011000250 HC RX REV CODE- 250: Performed by: INTERNAL MEDICINE

## 2018-11-17 PROCEDURE — 85025 COMPLETE CBC W/AUTO DIFF WBC: CPT

## 2018-11-17 PROCEDURE — 80053 COMPREHEN METABOLIC PANEL: CPT

## 2018-11-17 PROCEDURE — 77010033678 HC OXYGEN DAILY

## 2018-11-17 PROCEDURE — 36415 COLL VENOUS BLD VENIPUNCTURE: CPT

## 2018-11-17 PROCEDURE — 74011250636 HC RX REV CODE- 250/636: Performed by: HOSPITALIST

## 2018-11-17 RX ORDER — MORPHINE SULFATE 2 MG/ML
2 INJECTION, SOLUTION INTRAMUSCULAR; INTRAVENOUS ONCE
Status: COMPLETED | OUTPATIENT
Start: 2018-11-17 | End: 2018-11-17

## 2018-11-17 RX ORDER — MORPHINE SULFATE 2 MG/ML
1 INJECTION, SOLUTION INTRAMUSCULAR; INTRAVENOUS
Status: DISCONTINUED | OUTPATIENT
Start: 2018-11-17 | End: 2018-12-04 | Stop reason: HOSPADM

## 2018-11-17 RX ORDER — BUDESONIDE 0.5 MG/2ML
500 INHALANT ORAL
Status: DISCONTINUED | OUTPATIENT
Start: 2018-11-17 | End: 2018-12-04 | Stop reason: HOSPADM

## 2018-11-17 RX ORDER — HEPARIN SODIUM 5000 [USP'U]/ML
5000 INJECTION, SOLUTION INTRAVENOUS; SUBCUTANEOUS EVERY 8 HOURS
Status: DISPENSED | OUTPATIENT
Start: 2018-11-17 | End: 2018-11-26

## 2018-11-17 RX ADMIN — CEFTRIAXONE SODIUM 2 G: 2 INJECTION, POWDER, FOR SOLUTION INTRAMUSCULAR; INTRAVENOUS at 17:12

## 2018-11-17 RX ADMIN — MORPHINE SULFATE 1 MG: 2 INJECTION, SOLUTION INTRAMUSCULAR; INTRAVENOUS at 20:08

## 2018-11-17 RX ADMIN — SODIUM CHLORIDE 75 ML/HR: 900 INJECTION, SOLUTION INTRAVENOUS at 07:30

## 2018-11-17 RX ADMIN — MORPHINE SULFATE 2 MG: 2 INJECTION, SOLUTION INTRAMUSCULAR; INTRAVENOUS at 09:25

## 2018-11-17 RX ADMIN — LISINOPRIL 20 MG: 20 TABLET ORAL at 09:21

## 2018-11-17 RX ADMIN — SIMVASTATIN 20 MG: 20 TABLET, FILM COATED ORAL at 21:02

## 2018-11-17 RX ADMIN — HEPARIN SODIUM 5000 UNITS: 5000 INJECTION INTRAVENOUS; SUBCUTANEOUS at 21:02

## 2018-11-17 RX ADMIN — LACTULOSE 10 G: 20 SOLUTION ORAL at 09:21

## 2018-11-17 RX ADMIN — MORPHINE SULFATE 2 MG: 2 INJECTION, SOLUTION INTRAMUSCULAR; INTRAVENOUS at 13:35

## 2018-11-17 RX ADMIN — GABAPENTIN 400 MG: 400 CAPSULE ORAL at 09:21

## 2018-11-17 RX ADMIN — BUDESONIDE 500 MCG: 0.5 INHALANT RESPIRATORY (INHALATION) at 21:00

## 2018-11-17 NOTE — DIABETES MGMT
Nutrition/Glycemic Control: Pt screened for nutritional status. He is 6'4\" 248lbs. Ideal wt. 202 lbs. Pt is 123% ideal weight and has a  BMI=30.2 kg/k2. I observed him eating well at the dinner meal. No nutritional problems at this time.   
Dilip FREEMAN

## 2018-11-17 NOTE — PROGRESS NOTES
Problem: Falls - Risk of 
Goal: *Absence of Falls Document Juve Cornea Fall Risk and appropriate interventions in the flowsheet. Outcome: Progressing Towards Goal 
Fall Risk Interventions: 
Mobility Interventions: Bed/chair exit alarm Mentation Interventions: Bed/chair exit alarm, Evaluate medications/consider consulting pharmacy Medication Interventions: Bed/chair exit alarm Elimination Interventions: Bed/chair exit alarm, Call light in reach

## 2018-11-17 NOTE — PROGRESS NOTES
[facility] Pulmonary, Critical Care, and Sleep Medicine Critical Care Progress Note Name: Sulema Croft. : 1950 MRN: [id number] Date: 2018 [x]I have reviewed the flowsheet and previous days notes. Events, vitals, medications and notes from last 24 hours reviewed. Care plan discussed on multidisciplinary rounds. My assessment, plan of care, findings, medications, side effects etc were discussed with: 
[x] Nurse [] PT/OT   
[] Respiratory therapy [x] Dr. Salena Alvarenga  
[] Family [] Patient: answered all questions to satisfaction  
[] Pharmacist [] ASSESSMENT/PLAN:  
Principal Problem: 
  Discitis of thoracic region (2018) Active Problems: 
  UTI (urinary tract infection) (2018) Bilateral pleural effusion (2018) Lumbar stenosis (2018) Adenoma of left adrenal gland (2018) Uncontrolled hypertension (2018) Cirrhosis of liver ([facility] [address] (2018) Hepatitis C (2018) Obesity (2018) COPD (chronic obstructive pulmonary disease) ([facility] [address] (2018) Constipation (2018) Back pain (2018) · Decrease morphine to 1 mg IV q 4 hr PRN for pain · DuoNeb/Pulmicort · MRI of thoracolumbar spine today · Continue Rocephin 2 g IV daily per ID · Continue lisinopril · Continue Zocor · PT/OT evaluation and treatment plan NUTRITION: cardiac. Check prealbumin q week. Consult Clinical Dietician. ICU electrolyte replacement protocol PROPHYLAXIS: 
DVT prophylaxis: start heparin GI prophylaxis: start Protonix HOB 30-degree elevation Chlorhexidine mouth washes CODE STATUS: FULL CODE Further recommendations will be based on the patient's response to recommended treatment and results of the investigation ordered. DISPOSITION: 
 
The patient is: [] acutely ill Risk of deterioration: [x] moderate  
 [] critically ill  [] high [x]See my orders for details 
 
 
[] The patient is unable to give any meaningful history or review of systems because the patient is: 
[] Intubated [] Sedated  
[] Unresponsive [] []The patient is critically ill on     
[] Mechanical ventilation [] Vasoactive agents  
[] BiPAP [] Inotropes SUBJECTIVE 
- This 77year old  male is seen in consultation at the request of Dr. Karma Gottlieb for recommendations on further evaluation and management of acute hypercapnic respiratory failure. He presented to the to ER  on 11/14/2018 with complaints of decreased appetite, SOB, constipation, diffuse abdominal pain, and severe lower back pain. Onset of symptoms started three months and has gradually worsen the past week. CT abd/pelvis obtained in the ER showed severe T10-11 discitis with anterior epidural compression, L3-4 central stenosis, bilateral pleural effusions with bilateral lower lobe atelectasis, splenic and perisplenic varices. UA showed UTI. In ED, patient was started on vanc and cefepime for severe discitis. The patient is a poor historian and is unable to cooperate in providing clinical history to a satisfactory level of detail. He admits to a history of IV drug abuse with heroin and cocaine. He denies alcohol consumption, but he does have untreated hepatitis C with hyperammonemia. He is argumentative, combative and belligerent. He needs an MRI for further evaluation of the spine; however, he is unable to cooperate/tolerate MR imaging.  
 
- Interim events: Case was discussed with Dr. Bradley Mcmanus (ID) yesterday evening. The patient was started on empiric antibiotic therapy for suspected disciitis of infectious etiology but without a feasible plan for biopsy. The patient was combative and unable to tolerate MRI. On the other hand, the patient does not appear to be septic and the level of suspicion for infection is indeterminate.  The patient refused to undergo MRI imaging, citing claustrophobia and anxiety. His confused mental status yesterday did not allow him to provide informed consent regarding his care. The patient's mother provided information that the patient has an adult daughter, to whom decision making responsibilities should fall. [x] Nutrition: Cardiac 
[] BM today. ROS: Review of systems not obtained due to patient factors. Past Medical History:  
Diagnosis Date  Arthritis  COPD  Hypertension Allergies Allergen Reactions  Shellfish Derived Hives Current Facility-Administered Medications:  
  cefTRIAXone (ROCEPHIN) 2 g in 0.9% sodium chloride (MBP/ADV) 50 mL MBP, 2 g, IntraVENous, Q24H, Lizett Cortez MD, Last Rate: 100 mL/hr at 11/16/18 1820, 2 g at 11/16/18 1820   morphine injection 2 mg, 2 mg, IntraVENous, Q4H PRN, Qi Lomax MD, 2 mg at 11/17/18 0925 
  0.9% sodium chloride infusion, 75 mL/hr, IntraVENous, CONTINUOUS, Ursula Velez MD, Last Rate: 75 mL/hr at 11/17/18 0730, 75 mL/hr at 11/17/18 0730 
  sodium chloride (NS) flush 5-10 mL, 5-10 mL, IntraVENous, PRN, Gio Harrington MD 
  gabapentin (NEURONTIN) capsule 400 mg, 400 mg, Oral, BID, Senia Noyola MD, 400 mg at 11/17/18 0921 
  lisinopril (PRINIVIL, ZESTRIL) tablet 20 mg, 20 mg, Oral, DAILY, Senia Noyola MD, 20 mg at 11/17/18 7617   loratadine (CLARITIN) tablet 10 mg, 10 mg, Oral, DAILY PRN, Socrates Ackerman MD 
  simvastatin (ZOCOR) tablet 20 mg, 20 mg, Oral, QHS, Socrates Ackerman MD, 20 mg at 11/16/18 2207   naloxone (NARCAN) injection 0.4 mg, 0.4 mg, IntraVENous, PRN, Senia Longoria MD 
  diphenhydrAMINE (BENADRYL) injection 12.5 mg, 12.5 mg, IntraVENous, Q4H PRN, Senia Noyola MD 
  ondansetron (ZOFRAN) injection 4 mg, 4 mg, IntraVENous, Q4H PRN, Senia Noyola MD 
  albuterol-ipratropium (DUO-NEB) 2.5 MG-0.5 MG/3 ML, 3 mL, Nebulization, Q4H PRN, Socrates Ackerman MD 
   hydrALAZINE (APRESOLINE) 20 mg/mL injection 10 mg, 10 mg, IntraVENous, Q6H PRN, Arthur Lobo MD, 10 mg at 11/15/18 2017   bisacodyl (DULCOLAX) suppository 10 mg, 10 mg, Rectal, DAILY PRN, Senia Rapp MD, 10 mg at 11/15/18 0141 
  lactulose (CHRONULAC) solution 10 g, 10 g, Oral, BID, Senia Rapp MD, 10 g at 18 1319 OBJECTIVE Vital Signs:   
Patient Vitals for the past 24 hrs: 
 Temp Pulse Resp BP SpO2  
18 0849 98.6 °F (37 °C)      
18 0700  (!) 104 29 157/76 99 % 18 0600  96 27 150/77 100 % 18 0500  (!) 104 29 (!) 157/98 100 % 18 0400 97.7 °F (36.5 °C) (!) 101 (!) 33 (!) 177/99 100 % 18 0300  93 26 157/90 100 % 18 0200  99 28 (!) 158/94 100 % 18 0100  95 21 128/66 100 % 18 0000 98.4 °F (36.9 °C) 98 23 161/87 100 % 18 2300  98 19 148/89 100 % 18 2200  98 29 149/90 99 % 18 2100  100 (!) 33 161/90 99 % 18 2000 98.2 °F (36.8 °C) (!) 102 24 146/71 99 % 18 1900  (!) 107 23 (!) 124/106   
18 1800  96 26 148/72   
18 1700  (!) 105 23 163/76 93 % 18 1600  99 (!) 33 153/75 96 % 18 1500  98 23 140/67 99 % 18 1400  (!) 102 (!) 33 130/62 98 % 18 1300  (!) 105 26 152/73 97 % 18 1230  (!) 105 30 138/81 96 % O2 Device: Nasal cannula O2 Flow Rate (L/min): 6 l/min Temp (24hrs), Av.2 °F (36.8 °C), Min:97.7 °F (36.5 °C), Max:98.6 °F (37 °C) PHYSICAL EXAM:  
General: sleeping comfortably. arousable. Head: atraumatic, normocephalic Eye: PERRLA, no scleral icterus, no pallor, no cyanosis Nose: no sinus tenderness, no erythema, no induration, no discharge Throat: no tonsillar enlargement, no erythema, no exudates, no oral thrush Neck: supple, no thyromegaly, no JVD, no lymphadenopathy. Trachea midline Lung: symmetric in development and expansion, good air entry, no rales, no rhonchi, no wheezing Heart: Regular S1 & S2. No murmur. No rub. No gallop. Abdomen: soft, flat, bowel sounds present. Nontender, nondistended, no rebound, no guarding. Extremities: no edema, no cyanosis, no clubbing, 2+ peripheral pulses in DP Lymphatic: no cervical/supraclavicular/axillary lymphadenopathy Neurologic: cranial nerves II-XII are grossly symmetric and physiologic. No Babinski. No ankle clonus. No cerebellar signs. Gait was not assessed. DATA:  
Intake/Output:  
Last shift:      11/17 0701 - 11/17 1900 In: 120 [P.O.:120] Out: - Last 3 shifts: 11/15 1901 - 11/17 0700 In: 3733 [I.V.:875] Out: 625 [Urine:625] Intake/Output Summary (Last 24 hours) at 11/17/2018 1226 Last data filed at 11/17/2018 2580 Gross per 24 hour Intake 1195 ml Output 625 ml Net 570 ml Last 3 Recorded Weights in this Encounter 11/14/18 1747 11/16/18 8673 Weight: 126.1 kg (278 lb) 112.9 kg (248 lb 14.4 oz) Lines: All central lines examined by me. No signs of erythema, induration, discharge. Peripheral Intravenous Line: 
Peripheral IV 11/14/18 Right Forearm (Active) Site Assessment Clean, dry, & intact 11/17/2018  4:00 AM  
Phlebitis Assessment 0 11/17/2018  4:00 AM  
Infiltration Assessment 0 11/17/2018  4:00 AM  
Dressing Status Clean, dry, & intact 11/17/2018  4:00 AM  
Dressing Type Transparent 11/17/2018  4:00 AM  
Hub Color/Line Status Pink; Infusing 11/17/2018  4:00 AM  
Action Taken Open ports on tubing capped 11/17/2018  4:00 AM  
Alcohol Cap Used Yes 11/17/2018  4:00 AM  
  
Drain(s): 
Condom Catheter 11/16/18 (Active) Indications for Use Accurate measurement of urinary output 11/17/2018 12:00 AM  
Status Draining;Patent 11/17/2018 12:00 AM  
Site Condition No abnormalities 11/17/2018 12:00 AM  
Drainage Tube Clipped to Bed Yes 11/17/2018 12:00 AM  
Catheter Secured to Thigh Yes 11/17/2018 12:00 AM  
Tamper Seal Intact No 11/17/2018 12:00 AM  
 Bag Below Bladder/Not on Floor Yes 11/17/2018 12:00 AM  
Lack of Dependent Loop in Tubing Yes 11/17/2018 12:00 AM  
Drainage Bag Less Than Half Full Yes 11/17/2018 12:00 AM  
Sterile Solution Used for  Irrigation N/A 11/17/2018 12:00 AM  
Irrigation Volume Input (mL) 200 ml 11/17/2018 12:00 AM  
Urine Output (mL) 625 ml 11/16/2018  7:30 PM  
 
 
Telemetry: [x] Sinus [] A-flutter [] Paced [] A-fib [] Multiple PVCs Imaging: 
[x] I have personally reviewed the patients radiographs 
[] Radiographs reviewed with radiologist 
[x] No CXR study available for review today 
[] No change from prior, tubes and lines are in adequate position 
[] Improved   [] Worsening No results found. Labs: 
Recent Results (from the past 24 hour(s)) METABOLIC PANEL, COMPREHENSIVE Collection Time: 11/17/18  4:00 AM  
Result Value Ref Range Sodium 137 136 - 145 mmol/L Potassium 4.7 3.5 - 5.5 mmol/L Chloride 101 100 - 108 mmol/L  
 CO2 35 (H) 21 - 32 mmol/L Anion gap 1 (L) 3.0 - 18 mmol/L Glucose 90 74 - 99 mg/dL BUN 7 7.0 - 18 MG/DL Creatinine 0.67 0.6 - 1.3 MG/DL  
 BUN/Creatinine ratio 10 (L) 12 - 20 GFR est AA >60 >60 ml/min/1.73m2 GFR est non-AA >60 >60 ml/min/1.73m2 Calcium 8.1 (L) 8.5 - 10.1 MG/DL Bilirubin, total 0.9 0.2 - 1.0 MG/DL  
 ALT (SGPT) 21 16 - 61 U/L  
 AST (SGOT) 32 15 - 37 U/L Alk. phosphatase 104 45 - 117 U/L Protein, total 9.2 (H) 6.4 - 8.2 g/dL Albumin 2.4 (L) 3.4 - 5.0 g/dL Globulin 6.8 (H) 2.0 - 4.0 g/dL A-G Ratio 0.4 (L) 0.8 - 1.7    
CBC WITH AUTOMATED DIFF Collection Time: 11/17/18  4:00 AM  
Result Value Ref Range WBC 4.9 4.6 - 13.2 K/uL  
 RBC 4.77 4.70 - 5.50 M/uL  
 HGB 12.7 (L) 13.0 - 16.0 g/dL HCT 42.5 36.0 - 48.0 % MCV 89.1 74.0 - 97.0 FL  
 MCH 26.6 24.0 - 34.0 PG  
 MCHC 29.9 (L) 31.0 - 37.0 g/dL  
 RDW 15.9 (H) 11.6 - 14.5 % PLATELET 327 768 - 850 K/uL MPV 9.9 9.2 - 11.8 FL  
 NEUTROPHILS 60 40 - 73 % LYMPHOCYTES 29 21 - 52 % MONOCYTES 10 3 - 10 % EOSINOPHILS 1 0 - 5 % BASOPHILS 0 0 - 2 %  
 ABS. NEUTROPHILS 2.9 1.8 - 8.0 K/UL  
 ABS. LYMPHOCYTES 1.4 0.9 - 3.6 K/UL  
 ABS. MONOCYTES 0.5 0.05 - 1.2 K/UL  
 ABS. EOSINOPHILS 0.1 0.0 - 0.4 K/UL  
 ABS. BASOPHILS 0.0 0.0 - 0.1 K/UL  
 DF AUTOMATED AMMONIA Collection Time: 11/17/18  4:00 AM  
Result Value Ref Range Ammonia 52 (H) 11 - 32 UMOL/L Lab Results Component Value Date/Time Color YELLOW 11/14/2018 02:00 PM  
 Appearance CLOUDY 11/14/2018 02:00 PM  
 Specific gravity 1.015 11/14/2018 02:00 PM  
 pH (UA) 8.0 11/14/2018 02:00 PM  
 Protein NEGATIVE  11/14/2018 02:00 PM  
 Glucose NEGATIVE  11/14/2018 02:00 PM  
 Ketone NEGATIVE  11/14/2018 02:00 PM  
 Bilirubin NEGATIVE  11/14/2018 02:00 PM  
 Urobilinogen 2.0 (H) 11/14/2018 02:00 PM  
 Nitrites POSITIVE (A) 11/14/2018 02:00 PM  
 Leukocyte Esterase SMALL (A) 11/14/2018 02:00 PM  
 Epithelial cells FEW 11/14/2018 02:00 PM  
 Bacteria 3+ (A) 11/14/2018 02:00 PM  
 WBC 11 to 20 11/14/2018 02:00 PM  
 RBC 0 11/14/2018 02:00 PM  
 
Cultures: All Micro Results Procedure Component Value Units Date/Time CULTURE, BLOOD [[id number]] Collected:  11/14/18 1645 Order Status:  Completed Specimen:  Blood Updated:  11/17/18 0732 Special Requests: NO SPECIAL REQUESTS Culture result: NO GROWTH 3 DAYS     
 CULTURE, BLOOD [[id number]] Collected:  11/14/18 1700 Order Status:  Completed Specimen:  Blood Updated:  11/17/18 0732 Special Requests: NO SPECIAL REQUESTS Culture result: NO GROWTH 3 DAYS     
 CULTURE, URINE [[id number]]  (Abnormal)  (Susceptibility) Collected:  11/14/18 1400 Order Status:  Completed Specimen:  Urine from Clean catch Updated:  11/17/18 2782 Special Requests: NO SPECIAL REQUESTS Culture result:    
  >100,000 COLONIES/mL SERRATIA MARCESCENS  
     
 CULTURE, RESPIRATORY/SPUTUM/BRONCH Abbie Dec STAIN [[id number]] Order Status:  Sent Specimen:  Sputum CRYPTOCOCCUS AG W/REFLEX TITER [[id number]] Collected:  11/15/18 1435 Order Status:  Completed Specimen:  Serum Updated:  11/15/18 1538 Cryptococcus Ag NEGATIVE CULTURE, BLOOD [[id number]] Collected:  11/14/18 1630 Order Status:  Canceled Specimen:  Blood CULTURE, BLOOD [[id number]] Collected:  11/14/18 1630 Order Status:  Canceled Specimen:  Blood During this entire length of time I was immediately available to the patient. The reason for providing this level of medical care for this critically ill patient was due a critical illness that impaired one or more vital organ systems such that there was a high probability of imminent or life threatening deterioration in the patients condition. This care involved high complexity decision making to assess, manipulate, and support vital system functions, to treat this degreee vital organ system failure and to prevent further life threatening deterioration of the patients condition 
 
[] Total critical care time spent on reviewing the case/medical record/data/notes/EMR/patient examination/documentation/coordinating care with nurse/consultants, exclusive of procedures - minutes with complex decision making performed and > 50% time spent in face to face evaluation. Parviz Hannah MD  
Board Certified by the [employer] 11/17/2018

## 2018-11-17 NOTE — PROGRESS NOTES
Problem: Pressure Injury - Risk of 
Goal: *Prevention of pressure injury Document Raul Scale and appropriate interventions in the flowsheet. Outcome: Progressing Towards Goal 
Pressure Injury Interventions: 
Sensory Interventions: Assess changes in LOC, Check visual cues for pain Moisture Interventions: Absorbent underpads Activity Interventions: Pressure redistribution bed/mattress(bed type) Mobility Interventions: HOB 30 degrees or less, Pressure redistribution bed/mattress (bed type) Nutrition Interventions: Document food/fluid/supplement intake Friction and Shear Interventions: Foam dressings/transparent film/skin sealants, HOB 30 degrees or less

## 2018-11-17 NOTE — PROGRESS NOTES
Hospitalist Progress Note Irish Huang MD 
Internal medicine/ Hospitalist 
 
Daily Progress Note: 11/17/2018    
Interval history / Subjective:  
Abrahan Naranjo is a 77 y.o.  male with h/o hypertension,copd,hepatitis C,presented to ED because of back pain. Patient indicating the lower portion of his T-spine where he has the pain,saying that it started almost three months ago and has been getting worse. He reports that he was seen in ED few weeks ago and was told that nothing was wrong with him. He is also reporting constipation the last 3 weeks. He denies legs weakness or loss of sensation. No fever or chills. Although patient did not report his history of heroin abuse,the ED report indicates that patient is an IV drugs abuser and last use of iv heroin was 3-4 weeks ago. Patient's friend who came with him in ED has reported that patient fell yesterday while trying to get to the bathroom and was incontinent. Patient is reporting that he has not been able to hold his urine for a while. In ED,CT abdm/pelvis showed severe  discitis,L3-4 central stenosis,bilateral pleural effusions with bilateral lower lobe atelectasis,splenic hilar and perisplenic varices. UA showed UTI. In ED,patient was started on vanc and cefepime for severe discitis. Neurosurgery was consulted,will see patient. The hospitalist team was called for admission. ID consulted. Currently patient is only on ceftriaxone. MRI of the spine not done yet as patient has not tolerated. On 11/15/2018,patient noted confused. Code stroke was called. CT head w/o acute and tele-neurology ruled out the stroke. Patient was transferred to icu due to altered mental status,likely metabolic. On 11/16/18,patient was hypoxic,hypercapnic. He was put on BIPAP,critical care was consulted. Current Facility-Administered Medications Medication Dose Route Frequency  morphine injection 1 mg  1 mg IntraVENous Q4H PRN  
 [START ON 11/18/2018] pantoprazole (PROTONIX) 40 mg in sodium chloride 0.9% 10 mL injection  40 mg IntraVENous DAILY  heparin (porcine) injection 5,000 Units  5,000 Units SubCUTAneous Q8H  
 budesonide (PULMICORT) 500 mcg/2 ml nebulizer suspension  500 mcg Nebulization BID RT  
 cefTRIAXone (ROCEPHIN) 2 g in 0.9% sodium chloride (MBP/ADV) 50 mL MBP  2 g IntraVENous Q24H  
 0.9% sodium chloride infusion  75 mL/hr IntraVENous CONTINUOUS  
 sodium chloride (NS) flush 5-10 mL  5-10 mL IntraVENous PRN  
 gabapentin (NEURONTIN) capsule 400 mg  400 mg Oral BID  lisinopril (PRINIVIL, ZESTRIL) tablet 20 mg  20 mg Oral DAILY  loratadine (CLARITIN) tablet 10 mg  10 mg Oral DAILY PRN  
 simvastatin (ZOCOR) tablet 20 mg  20 mg Oral QHS  naloxone (NARCAN) injection 0.4 mg  0.4 mg IntraVENous PRN  
 diphenhydrAMINE (BENADRYL) injection 12.5 mg  12.5 mg IntraVENous Q4H PRN  
 ondansetron (ZOFRAN) injection 4 mg  4 mg IntraVENous Q4H PRN  
 albuterol-ipratropium (DUO-NEB) 2.5 MG-0.5 MG/3 ML  3 mL Nebulization Q4H PRN  
 hydrALAZINE (APRESOLINE) 20 mg/mL injection 10 mg  10 mg IntraVENous Q6H PRN  
 bisacodyl (DULCOLAX) suppository 10 mg  10 mg Rectal DAILY PRN  
 lactulose (CHRONULAC) solution 10 g  10 g Oral BID Review of Systems: 
Still complaining of back pain. Has been sometimes agitated according to nurse. Objective:  
 
Visit Vitals BP (!) 151/109 Pulse 100 Temp 98.6 °F (37 °C) Resp (!) 31 Ht 6' (1.829 m) Wt 112.9 kg (248 lb 14.4 oz) SpO2 99% BMI 33.76 kg/m² O2 Flow Rate (L/min): 6 l/min O2 Device: Nasal cannula Temp (24hrs), Av.2 °F (36.8 °C), Min:97.7 °F (36.5 °C), Max:98.6 °F (37 °C) 
 
 
701 - 1900 In: 120 [P.O.:120] Out: -  
11/15 1901 -  0700 In: 6810 [I.V.:875] Out: 625 [Urine:625] PHYSICAL EXAM:  
General:          Lying in bed in no acute distress. On nasal cannula oxygen now. HEENT:           Pupils equal.  Sclera anicteric. Conjunctiva pink.   Mucous membranes moist 
 CV:                  Regular rate and rhythm. Lungs:             Clear to auscultation bilaterally. Abdomen:        Soft, non-tender. Not distended. Back:     Tender to palpation lower thoracic area of L-spine. Straight legs raise positive at 60 degrees. Extremities:     Edema 2+. No cyanosis. No clubbing Neurologic:      Awake,answers questions. Good sensation all extremities,good muscles tone. Data Review Recent Results (from the past 12 hour(s)) METABOLIC PANEL, COMPREHENSIVE Collection Time: 11/17/18  4:00 AM  
Result Value Ref Range Sodium 137 136 - 145 mmol/L Potassium 4.7 3.5 - 5.5 mmol/L Chloride 101 100 - 108 mmol/L  
 CO2 35 (H) 21 - 32 mmol/L Anion gap 1 (L) 3.0 - 18 mmol/L Glucose 90 74 - 99 mg/dL BUN 7 7.0 - 18 MG/DL Creatinine 0.67 0.6 - 1.3 MG/DL  
 BUN/Creatinine ratio 10 (L) 12 - 20 GFR est AA >60 >60 ml/min/1.73m2 GFR est non-AA >60 >60 ml/min/1.73m2 Calcium 8.1 (L) 8.5 - 10.1 MG/DL Bilirubin, total 0.9 0.2 - 1.0 MG/DL  
 ALT (SGPT) 21 16 - 61 U/L  
 AST (SGOT) 32 15 - 37 U/L Alk. phosphatase 104 45 - 117 U/L Protein, total 9.2 (H) 6.4 - 8.2 g/dL Albumin 2.4 (L) 3.4 - 5.0 g/dL Globulin 6.8 (H) 2.0 - 4.0 g/dL A-G Ratio 0.4 (L) 0.8 - 1.7    
CBC WITH AUTOMATED DIFF Collection Time: 11/17/18  4:00 AM  
Result Value Ref Range WBC 4.9 4.6 - 13.2 K/uL  
 RBC 4.77 4.70 - 5.50 M/uL  
 HGB 12.7 (L) 13.0 - 16.0 g/dL HCT 42.5 36.0 - 48.0 % MCV 89.1 74.0 - 97.0 FL  
 MCH 26.6 24.0 - 34.0 PG  
 MCHC 29.9 (L) 31.0 - 37.0 g/dL  
 RDW 15.9 (H) 11.6 - 14.5 % PLATELET 922 284 - 933 K/uL MPV 9.9 9.2 - 11.8 FL  
 NEUTROPHILS 60 40 - 73 % LYMPHOCYTES 29 21 - 52 % MONOCYTES 10 3 - 10 % EOSINOPHILS 1 0 - 5 % BASOPHILS 0 0 - 2 %  
 ABS. NEUTROPHILS 2.9 1.8 - 8.0 K/UL  
 ABS. LYMPHOCYTES 1.4 0.9 - 3.6 K/UL  
 ABS. MONOCYTES 0.5 0.05 - 1.2 K/UL  
 ABS. EOSINOPHILS 0.1 0.0 - 0.4 K/UL  
 ABS. BASOPHILS 0.0 0.0 - 0.1 K/UL DF AUTOMATED AMMONIA Collection Time: 11/17/18  4:00 AM  
Result Value Ref Range Ammonia 52 (H) 11 - 32 UMOL/L Assessment/Plan:  
 
Principal Problem: 
  Discitis of thoracic region (11/14/2018) Active Problems: 
  UTI (urinary tract infection) (11/14/2018) Bilateral pleural effusion (11/14/2018) Lumbar stenosis (11/14/2018) Adenoma of left adrenal gland (11/14/2018) Uncontrolled hypertension (11/14/2018) Cirrhosis of liver ([facility] [address] (11/14/2018) Hepatitis C (11/14/2018) Obesity (11/14/2018) COPD (chronic obstructive pulmonary disease) ([facility] (11/14/2018) Constipation (11/14/2018) Back pain (11/14/2018) Care Plan 1. Discitis of thoracic region 
 -CT showing severe discitis of T10-11. Patient with h/o iv heroin abuse. Infection suspected. -Vanc and cefepime initiated in ED,continued 
 -Sepsis protocol was initiated in ED but don't think patient is septic - lactic acid,wbc,temp,HR normal. 
 -Blood cx NGTD 
 -Pt did not tolerate MRI. Discussed with IR,will have MRI under sedation and aspiration of T10-11 will be performed for cultures,cytology 
 -Neurosurgery consulted ->pt high risk for intervention - medical management 
 -ID consulted -Vanc/cefepime d/heather on 11/16. Pt currently on ceftriaxone. 
 -Neurosurgery,ID requesting MRI 
 -Pain management with morphine prn 
 -Fall precaution. 2.Respiratory failure with hypoxia/hypercapnia 
 -Started on BIPAP 
 -Critical care consulted 3. Acute encephalopathy metabolic 
 -Likely due to acute resp failure and possibly elevated ammonia 
 -Will monitor mentation as patient being treated 
 -CT scan done on 11/15 after code stroke was called did not show any acute process. Tele-neurology did not think that patient had a stroke 
 -Improving mentation 
  4.Lumbar stenosis at L3-4  
 -Will follow neurosurgery recommendations -> no surgery,pt asymptomatic 
  
5. UTI (urinary tract infection) 
 -On cefepime. -Ucx c/w serratia marscens and blood cx NGTD 
  
6.Cirrhosis of liver/Hepatitis C/H/o alcohol abuse 
 -Patient has h/o hepatitis C and past history of alcohol abuse. CT showing strong evidence of cirrhosis with possible portal vein hypertension. 
 -Will monitor. 
 -Received albumin x 1  
 -Ammonia level 36. INR 1.3 initially 
 -On lactulose. 7.Hyperammonia 52 on 11/17/18 
 -On lactulose 8. Bilateral pleural effusion/Legs edema 
 -Likely due to cirrhosis of liver. 
 -Legs edema improved 
  
9. Adenoma of left adrenal gland  
 -Seen in previous films. Will monitor 
  
10. Uncontrolled hypertension  
 -Resume lisinopril. Hydralazine prn 
  
11. Obesity 
 -Supportive care 
  
12. COPD  
 -Duo-neb prn 
  
13. Constipation 
 -Order dulcolax 
  
14. Back pain 
 -Due to problems above - on pain meds. 
  
15. Legs edema 
 -Likely due to cirrhosis with portal hypertension 
 -Edema likely due to liver disease,will order  
 -pro-BNP not elevated and ECHO nl with EF 66-70% -legs edema resolved. 
  
DVT prophylaxis:sc heparin Full code:discussed with patient who stated that he does not have a living will and never decided his code status Disposition:tbd

## 2018-11-17 NOTE — PROGRESS NOTES
2000 Assumed care of pt after bedside report with pt lying in bed with HOB elevated. Monitor denotes NSR. Pt alert but confused. Is oriented to self but disoriented to place, situation, and time. CORDOVA x 4. Abd distended, obese, soft. Condom catheter in place and draining lina urine. Denies c/o pain or discomfort. 2130 Daughter at bedside to see pt. Pt agreed to give his daughter information regarding hospitalization and also given pt's info code. 2300 Pt remains confused. Pt given call light and TV channel changer. Explained to pt that he is in hospital because he is sick. 11/17/2018  0100 Resting at present and moaning at times but is asleep. 0400 AM labs drawn and sent to lab. 0600 Resting without complaints.

## 2018-11-18 LAB
ALBUMIN SERPL-MCNC: 2.1 G/DL (ref 3.4–5)
ALBUMIN/GLOB SERPL: 0.3 {RATIO} (ref 0.8–1.7)
ALP SERPL-CCNC: 93 U/L (ref 45–117)
ALT SERPL-CCNC: 18 U/L (ref 16–61)
AMMONIA PLAS-SCNC: 46 UMOL/L (ref 11–32)
AMMONIA PLAS-SCNC: 60 UMOL/L (ref 11–32)
ANION GAP SERPL CALC-SCNC: 3 MMOL/L (ref 3–18)
ANION GAP SERPL CALC-SCNC: 5 MMOL/L (ref 3–18)
ARTERIAL PATENCY WRIST A: YES
ARTERIAL PATENCY WRIST A: YES
AST SERPL-CCNC: 28 U/L (ref 15–37)
BASE EXCESS BLD CALC-SCNC: 15 MMOL/L
BASE EXCESS BLD CALC-SCNC: 15 MMOL/L
BASOPHILS # BLD: 0 K/UL (ref 0–0.1)
BASOPHILS NFR BLD: 0 % (ref 0–2)
BDY SITE: ABNORMAL
BDY SITE: ABNORMAL
BILIRUB SERPL-MCNC: 0.9 MG/DL (ref 0.2–1)
BODY TEMPERATURE: 98.3
BUN SERPL-MCNC: 6 MG/DL (ref 7–18)
BUN SERPL-MCNC: 6 MG/DL (ref 7–18)
BUN/CREAT SERPL: 11 (ref 12–20)
BUN/CREAT SERPL: 12 (ref 12–20)
CALCIUM SERPL-MCNC: 8.1 MG/DL (ref 8.5–10.1)
CALCIUM SERPL-MCNC: 8.2 MG/DL (ref 8.5–10.1)
CHLORIDE SERPL-SCNC: 100 MMOL/L (ref 100–108)
CHLORIDE SERPL-SCNC: 98 MMOL/L (ref 100–108)
CO2 SERPL-SCNC: 29 MMOL/L (ref 21–32)
CO2 SERPL-SCNC: 33 MMOL/L (ref 21–32)
CREAT SERPL-MCNC: 0.51 MG/DL (ref 0.6–1.3)
CREAT SERPL-MCNC: 0.54 MG/DL (ref 0.6–1.3)
DIFFERENTIAL METHOD BLD: ABNORMAL
EOSINOPHIL # BLD: 0 K/UL (ref 0–0.4)
EOSINOPHIL NFR BLD: 1 % (ref 0–5)
ERYTHROCYTE [DISTWIDTH] IN BLOOD BY AUTOMATED COUNT: 15.3 % (ref 11.6–14.5)
GAS FLOW.O2 O2 DELIVERY SYS: ABNORMAL L/MIN
GAS FLOW.O2 O2 DELIVERY SYS: ABNORMAL L/MIN
GAS FLOW.O2 SETTING OXYMISER: 3 L/M
GAS FLOW.O2 SETTING OXYMISER: 3 L/M
GLOBULIN SER CALC-MCNC: 6.5 G/DL (ref 2–4)
GLUCOSE BLD STRIP.AUTO-MCNC: 110 MG/DL (ref 70–110)
GLUCOSE SERPL-MCNC: 81 MG/DL (ref 74–99)
GLUCOSE SERPL-MCNC: 82 MG/DL (ref 74–99)
HCO3 BLD-SCNC: 38.2 MMOL/L (ref 22–26)
HCO3 BLD-SCNC: 38.7 MMOL/L (ref 22–26)
HCT VFR BLD AUTO: 39.4 % (ref 36–48)
HGB BLD-MCNC: 12.1 G/DL (ref 13–16)
LYMPHOCYTES # BLD: 1.7 K/UL (ref 0.9–3.6)
LYMPHOCYTES NFR BLD: 37 % (ref 21–52)
MCH RBC QN AUTO: 27.1 PG (ref 24–34)
MCHC RBC AUTO-ENTMCNC: 30.7 G/DL (ref 31–37)
MCV RBC AUTO: 88.3 FL (ref 74–97)
MONOCYTES # BLD: 0.5 K/UL (ref 0.05–1.2)
MONOCYTES NFR BLD: 10 % (ref 3–10)
NEUTS SEG # BLD: 2.4 K/UL (ref 1.8–8)
NEUTS SEG NFR BLD: 52 % (ref 40–73)
O2/TOTAL GAS SETTING VFR VENT: 0.32 %
PCO2 BLD: 48.7 MMHG (ref 35–45)
PCO2 BLD: 53.8 MMHG (ref 35–45)
PH BLD: 7.46 [PH] (ref 7.35–7.45)
PH BLD: 7.5 [PH] (ref 7.35–7.45)
PLATELET # BLD AUTO: 139 K/UL (ref 135–420)
PMV BLD AUTO: 10.1 FL (ref 9.2–11.8)
PO2 BLD: 58 MMHG (ref 80–100)
PO2 BLD: 64 MMHG (ref 80–100)
POTASSIUM SERPL-SCNC: 4.5 MMOL/L (ref 3.5–5.5)
POTASSIUM SERPL-SCNC: 4.7 MMOL/L (ref 3.5–5.5)
PROT SERPL-MCNC: 8.6 G/DL (ref 6.4–8.2)
RBC # BLD AUTO: 4.46 M/UL (ref 4.7–5.5)
SAO2 % BLD: 91 % (ref 92–97)
SAO2 % BLD: 94 % (ref 92–97)
SERVICE CMNT-IMP: ABNORMAL
SERVICE CMNT-IMP: ABNORMAL
SODIUM SERPL-SCNC: 134 MMOL/L (ref 136–145)
SODIUM SERPL-SCNC: 134 MMOL/L (ref 136–145)
SPECIMEN TYPE: ABNORMAL
SPECIMEN TYPE: ABNORMAL
TOTAL RESP. RATE, ITRR: 30
TOTAL RESP. RATE, ITRR: 36
WBC # BLD AUTO: 4.6 K/UL (ref 4.6–13.2)

## 2018-11-18 PROCEDURE — C9113 INJ PANTOPRAZOLE SODIUM, VIA: HCPCS | Performed by: INTERNAL MEDICINE

## 2018-11-18 PROCEDURE — 74011000258 HC RX REV CODE- 258: Performed by: INTERNAL MEDICINE

## 2018-11-18 PROCEDURE — 77010033678 HC OXYGEN DAILY

## 2018-11-18 PROCEDURE — 74011250637 HC RX REV CODE- 250/637: Performed by: INTERNAL MEDICINE

## 2018-11-18 PROCEDURE — 77030021352 HC CBL LD SYS DISP COVD -B

## 2018-11-18 PROCEDURE — 36600 WITHDRAWAL OF ARTERIAL BLOOD: CPT

## 2018-11-18 PROCEDURE — 65660000000 HC RM CCU STEPDOWN

## 2018-11-18 PROCEDURE — 82140 ASSAY OF AMMONIA: CPT

## 2018-11-18 PROCEDURE — 36415 COLL VENOUS BLD VENIPUNCTURE: CPT

## 2018-11-18 PROCEDURE — 94640 AIRWAY INHALATION TREATMENT: CPT

## 2018-11-18 PROCEDURE — 74011250636 HC RX REV CODE- 250/636: Performed by: INTERNAL MEDICINE

## 2018-11-18 PROCEDURE — 85025 COMPLETE CBC W/AUTO DIFF WBC: CPT

## 2018-11-18 PROCEDURE — 80053 COMPREHEN METABOLIC PANEL: CPT

## 2018-11-18 PROCEDURE — 82803 BLOOD GASES ANY COMBINATION: CPT

## 2018-11-18 PROCEDURE — 74011000250 HC RX REV CODE- 250: Performed by: INTERNAL MEDICINE

## 2018-11-18 PROCEDURE — 82962 GLUCOSE BLOOD TEST: CPT

## 2018-11-18 RX ORDER — PANTOPRAZOLE SODIUM 40 MG/1
40 TABLET, DELAYED RELEASE ORAL
Status: DISCONTINUED | OUTPATIENT
Start: 2018-11-19 | End: 2018-12-04 | Stop reason: HOSPADM

## 2018-11-18 RX ADMIN — HEPARIN SODIUM 5000 UNITS: 5000 INJECTION INTRAVENOUS; SUBCUTANEOUS at 13:32

## 2018-11-18 RX ADMIN — PANTOPRAZOLE SODIUM 40 MG: 40 INJECTION, POWDER, FOR SOLUTION INTRAVENOUS at 08:29

## 2018-11-18 RX ADMIN — CEFTRIAXONE SODIUM 2 G: 2 INJECTION, POWDER, FOR SOLUTION INTRAMUSCULAR; INTRAVENOUS at 16:05

## 2018-11-18 RX ADMIN — LACTULOSE 10 G: 20 SOLUTION ORAL at 17:31

## 2018-11-18 RX ADMIN — MORPHINE SULFATE 1 MG: 2 INJECTION, SOLUTION INTRAMUSCULAR; INTRAVENOUS at 13:31

## 2018-11-18 RX ADMIN — HEPARIN SODIUM 5000 UNITS: 5000 INJECTION INTRAVENOUS; SUBCUTANEOUS at 04:47

## 2018-11-18 RX ADMIN — HYDRALAZINE HYDROCHLORIDE 10 MG: 20 INJECTION INTRAMUSCULAR; INTRAVENOUS at 13:32

## 2018-11-18 RX ADMIN — BUDESONIDE 500 MCG: 0.5 INHALANT RESPIRATORY (INHALATION) at 08:29

## 2018-11-18 RX ADMIN — HEPARIN SODIUM 5000 UNITS: 5000 INJECTION INTRAVENOUS; SUBCUTANEOUS at 21:26

## 2018-11-18 RX ADMIN — HYDRALAZINE HYDROCHLORIDE 10 MG: 20 INJECTION INTRAMUSCULAR; INTRAVENOUS at 19:40

## 2018-11-18 RX ADMIN — LISINOPRIL 20 MG: 20 TABLET ORAL at 08:30

## 2018-11-18 RX ADMIN — GABAPENTIN 400 MG: 400 CAPSULE ORAL at 08:30

## 2018-11-18 RX ADMIN — HYDRALAZINE HYDROCHLORIDE 10 MG: 20 INJECTION INTRAMUSCULAR; INTRAVENOUS at 04:47

## 2018-11-18 RX ADMIN — MORPHINE SULFATE 1 MG: 2 INJECTION, SOLUTION INTRAMUSCULAR; INTRAVENOUS at 17:27

## 2018-11-18 RX ADMIN — BUDESONIDE 500 MCG: 0.5 INHALANT RESPIRATORY (INHALATION) at 19:34

## 2018-11-18 RX ADMIN — SIMVASTATIN 20 MG: 20 TABLET, FILM COATED ORAL at 21:25

## 2018-11-18 RX ADMIN — MORPHINE SULFATE 1 MG: 2 INJECTION, SOLUTION INTRAMUSCULAR; INTRAVENOUS at 21:25

## 2018-11-18 RX ADMIN — LACTULOSE 10 G: 20 SOLUTION ORAL at 08:29

## 2018-11-18 RX ADMIN — GABAPENTIN 400 MG: 400 CAPSULE ORAL at 17:31

## 2018-11-18 NOTE — PROGRESS NOTES
Report given to [facility] on 3S. Pt being transferred to [address] [place] [address]. Report included the following information SBAR, Kardex, Procedure Summary, Intake/Output, MAR, Recent Results and Med Rec Status.

## 2018-11-18 NOTE — PROGRESS NOTES
S: resting comfortably, denies leg pain O: remains neuro intact in legs A: Likely discitis P: When medically stable, proceed with plan of Dr. Avinash Garcia note of 11/16

## 2018-11-18 NOTE — PROGRESS NOTES
ABG drawn on 3 lpm NC O2. Results: pH7.46, pCO2 53.8, pO2 58, BE 15, HCO3 38.7, sO2 91. SpO2 98, HR 99, RR 28.

## 2018-11-18 NOTE — PROGRESS NOTES
Hospitalist Progress Note Rachel Pierce MD 
Internal medicine/ Hospitalist 
 
Daily Progress Note: 11/18/2018    
Interval history / Subjective:  
Braulio Navarro is a 77 y.o.  male with h/o hypertension,copd,hepatitis C,presented to ED because of back pain. Patient indicating the lower portion of his T-spine where he has the pain,saying that it started almost three months ago and has been getting worse. He reports that he was seen in ED few weeks ago and was told that nothing was wrong with him. He is also reporting constipation the last 3 weeks. He denies legs weakness or loss of sensation. No fever or chills. Although patient did not report his history of heroin abuse,the ED report indicates that patient is an IV drugs abuser and last use of iv heroin was 3-4 weeks ago. Patient's friend who came with him in ED has reported that patient fell yesterday while trying to get to the bathroom and was incontinent. Patient is reporting that he has not been able to hold his urine for a while. In ED,CT abdm/pelvis showed severe  discitis,L3-4 central stenosis,bilateral pleural effusions with bilateral lower lobe atelectasis,splenic hilar and perisplenic varices. UA showed UTI. In ED,patient was started on vanc and cefepime for severe discitis. Neurosurgery was consulted,will see patient. The hospitalist team was called for admission. ID consulted. On 11/16,ID decided to discontinued vanc and cefepime,preferred the diagnosis first since patient has been afebrile,without leokocytosis and ESR/CRP showing low probability for OM/Discitis. Currently patient is only on ceftriaxone for UTI due to serratia. MRI of the spine not done yet as patient has not tolerated. On 11/15/2018,patient noted confused. Code stroke was called. CT head w/o acute and tele-neurology ruled out the stroke. Patient was transferred to icu due to altered mental status,likely metabolic. On 11/16/18,patient was hypoxic,hypercapnic. He was put on BIPAP,critical care was consulted. Today ,patient more awake,clincally stable. MRI machine not working since yesterday. Current Facility-Administered Medications Medication Dose Route Frequency  [START ON 2018] pantoprazole (PROTONIX) tablet 40 mg  40 mg Oral ACB  morphine injection 1 mg  1 mg IntraVENous Q4H PRN  
 heparin (porcine) injection 5,000 Units  5,000 Units SubCUTAneous Q8H  
 budesonide (PULMICORT) 500 mcg/2 ml nebulizer suspension  500 mcg Nebulization BID RT  
 cefTRIAXone (ROCEPHIN) 2 g in 0.9% sodium chloride (MBP/ADV) 50 mL MBP  2 g IntraVENous Q24H  
 sodium chloride (NS) flush 5-10 mL  5-10 mL IntraVENous PRN  
 gabapentin (NEURONTIN) capsule 400 mg  400 mg Oral BID  lisinopril (PRINIVIL, ZESTRIL) tablet 20 mg  20 mg Oral DAILY  loratadine (CLARITIN) tablet 10 mg  10 mg Oral DAILY PRN  
 simvastatin (ZOCOR) tablet 20 mg  20 mg Oral QHS  naloxone (NARCAN) injection 0.4 mg  0.4 mg IntraVENous PRN  
 diphenhydrAMINE (BENADRYL) injection 12.5 mg  12.5 mg IntraVENous Q4H PRN  
 ondansetron (ZOFRAN) injection 4 mg  4 mg IntraVENous Q4H PRN  
 albuterol-ipratropium (DUO-NEB) 2.5 MG-0.5 MG/3 ML  3 mL Nebulization Q4H PRN  
 hydrALAZINE (APRESOLINE) 20 mg/mL injection 10 mg  10 mg IntraVENous Q6H PRN  
 bisacodyl (DULCOLAX) suppository 10 mg  10 mg Rectal DAILY PRN  
 lactulose (CHRONULAC) solution 10 g  10 g Oral BID Review of Systems: 
Feeling better although still having back pain. Nurse reporting that patient is calm and more cooperative today Objective:  
 
Visit Vitals /82 Pulse (!) 116 Temp (P) 98 °F (36.7 °C) Resp 28 Ht 6' (1.829 m) Wt 112.9 kg (248 lb 14.4 oz) SpO2 93% BMI 33.76 kg/m² O2 Flow Rate (L/min): 3 l/min(titrated down from 5, SpO2 100) O2 Device: Nasal cannula Temp (24hrs), Av.2 °F (36.8 °C), Min:98 °F (36.7 °C), Max:98.9 °F (37.2 °C) No intake/output data recorded. 11/16 1901 - 11/18 0700 In: 9971 [P.O.:120; I.V.:825] Out: 3327 [Urine:2475] PHYSICAL EXAM:  
General:          Lying in bed in no acute distress. On nasal cannula oxygen now. HEENT:           Pupils equal.  Sclera anicteric. Conjunctiva pink. Mucous membranes moist 
CV:                  Regular rate and rhythm. Lungs:             Clear to auscultation bilaterally. Abdomen:        Soft, non-tender. Not distended. Back:     Tender to palpation lower thoracic area of L-spine. Straight legs raise positive at 60 degrees. Extremities:     Edema 2+. No cyanosis. No clubbing Neurologic:      Awake,answers questions. Good sensation all extremities,good muscles tone. Data Review Recent Results (from the past 12 hour(s)) METABOLIC PANEL, BASIC Collection Time: 11/18/18  5:00 AM  
Result Value Ref Range Sodium 134 (L) 136 - 145 mmol/L Potassium 4.7 3.5 - 5.5 mmol/L Chloride 100 100 - 108 mmol/L  
 CO2 29 21 - 32 mmol/L Anion gap 5 3.0 - 18 mmol/L Glucose 82 74 - 99 mg/dL BUN 6 (L) 7.0 - 18 MG/DL Creatinine 0.54 (L) 0.6 - 1.3 MG/DL  
 BUN/Creatinine ratio 11 (L) 12 - 20 GFR est AA >60 >60 ml/min/1.73m2 GFR est non-AA >60 >60 ml/min/1.73m2 Calcium 8.2 (L) 8.5 - 10.1 MG/DL  
CBC WITH AUTOMATED DIFF Collection Time: 11/18/18  5:00 AM  
Result Value Ref Range WBC 4.6 4.6 - 13.2 K/uL  
 RBC 4.46 (L) 4.70 - 5.50 M/uL  
 HGB 12.1 (L) 13.0 - 16.0 g/dL HCT 39.4 36.0 - 48.0 % MCV 88.3 74.0 - 97.0 FL  
 MCH 27.1 24.0 - 34.0 PG  
 MCHC 30.7 (L) 31.0 - 37.0 g/dL  
 RDW 15.3 (H) 11.6 - 14.5 % PLATELET 809 420 - 928 K/uL MPV 10.1 9.2 - 11.8 FL  
 NEUTROPHILS 52 40 - 73 % LYMPHOCYTES 37 21 - 52 % MONOCYTES 10 3 - 10 % EOSINOPHILS 1 0 - 5 % BASOPHILS 0 0 - 2 %  
 ABS. NEUTROPHILS 2.4 1.8 - 8.0 K/UL  
 ABS. LYMPHOCYTES 1.7 0.9 - 3.6 K/UL  
 ABS. MONOCYTES 0.5 0.05 - 1.2 K/UL  
 ABS. EOSINOPHILS 0.0 0.0 - 0.4 K/UL  
 ABS. BASOPHILS 0.0 0.0 - 0.1 K/UL DF AUTOMATED METABOLIC PANEL, COMPREHENSIVE Collection Time: 11/18/18  5:00 AM  
Result Value Ref Range Sodium 134 (L) 136 - 145 mmol/L Potassium 4.5 3.5 - 5.5 mmol/L Chloride 98 (L) 100 - 108 mmol/L  
 CO2 33 (H) 21 - 32 mmol/L Anion gap 3 3.0 - 18 mmol/L Glucose 81 74 - 99 mg/dL BUN 6 (L) 7.0 - 18 MG/DL Creatinine 0.51 (L) 0.6 - 1.3 MG/DL  
 BUN/Creatinine ratio 12 12 - 20 GFR est AA >60 >60 ml/min/1.73m2 GFR est non-AA >60 >60 ml/min/1.73m2 Calcium 8.1 (L) 8.5 - 10.1 MG/DL Bilirubin, total 0.9 0.2 - 1.0 MG/DL  
 ALT (SGPT) 18 16 - 61 U/L  
 AST (SGOT) 28 15 - 37 U/L Alk. phosphatase 93 45 - 117 U/L Protein, total 8.6 (H) 6.4 - 8.2 g/dL Albumin 2.1 (L) 3.4 - 5.0 g/dL Globulin 6.5 (H) 2.0 - 4.0 g/dL A-G Ratio 0.3 (L) 0.8 - 1.7 AMMONIA Collection Time: 11/18/18  5:00 AM  
Result Value Ref Range Ammonia 60 (H) 11 - 32 UMOL/L  
POC G3 Collection Time: 11/18/18  9:21 AM  
Result Value Ref Range Device: NASAL CANNULA Flow rate (POC) 3 L/M  
 FIO2 (POC) 0.32 % pH (POC) 7.464 (H) 7.35 - 7.45    
 pCO2 (POC) 53.8 (H) 35.0 - 45.0 MMHG  
 pO2 (POC) 58 (L) 80 - 100 MMHG  
 HCO3 (POC) 38.7 (H) 22 - 26 MMOL/L  
 sO2 (POC) 91 (L) 92 - 97 % Base excess (POC) 15 mmol/L Allens test (POC) YES Total resp. rate 30 Site RIGHT RADIAL Patient temp. 98.3 Specimen type (POC) ARTERIAL Performed by Pama Bollinger AMMONIA Collection Time: 11/18/18  9:40 AM  
Result Value Ref Range Ammonia 46 (H) 11 - 32 UMOL/L Assessment/Plan:  
 
Principal Problem: 
  Discitis of thoracic region (11/14/2018) Active Problems: 
  UTI (urinary tract infection) (11/14/2018) Bilateral pleural effusion (11/14/2018) Lumbar stenosis (11/14/2018) Adenoma of left adrenal gland (11/14/2018) Uncontrolled hypertension (11/14/2018) Cirrhosis of liver ([facility] [address] (11/14/2018) Hepatitis C (11/14/2018) Obesity (11/14/2018) COPD (chronic obstructive pulmonary disease) ([facility] (11/14/2018) Constipation (11/14/2018) Back pain (11/14/2018) Care Plan 1. Discitis of thoracic region 
 -CT showing severe discitis of T10-11. Patient with h/o iv heroin abuse. Infection suspected. -Vanc and cefepime initiated in ED,continued until 11/16 
 -Sepsis protocol was initiated in ED but I didn't think patient was septic - lactic acid,wbc,temp,HR normal. 
 -Blood cx negative 
 -Pt did not tolerate MRI. Discussed with IR,will have MRI under sedation and aspiration of T10-11 will be performed for cultures,cytology 
 -Neurosurgery consulted ->pt high risk for intervention - medical management 
 -ID consulted 
 -ID decided to d/c Vanc/cefepime on 11/16 as patient was not septic and ESR/CRP showing low probability for OM/Discitis. Pt currently on ceftriaxone for UTI 
 -Neurosurgery,ID requesting MRI 
 -Pain management with morphine prn 
 -Fall precaution. -MRI machine did not work since yesterday. Eventually patient will have MRI on Monday 2. Respiratory failure with hypoxia/hypercapnia 
 -Started on BIPAP 
 -Critical care consulted 
 -Improved. Tolerating 3 liters NC and O2sat 93-98% 3. Acute encephalopathy metabolic 
 -Likely due to acute resp failure and possibly elevated ammonia 
 -Will monitor mentation as patient being treated 
 -CT scan done on 11/15 after code stroke was called did not show any acute process. Tele-neurology did not think that patient had a stroke 
 -Improved mentation,interacting better today 
  4.Lumbar stenosis at L3-4  
 -Will follow neurosurgery recommendations -> no surgery,pt asymptomatic 
  
5. UTI (urinary tract infection) -Was on cefepime,then ceftriaxone since 11/16. 
 -Ucx c/w serratia marscens and blood cx NGTD 
  
6.Cirrhosis of liver/Hepatitis C/H/o alcohol abuse 
 -Patient has h/o hepatitis C and past history of alcohol abuse. CT showing strong evidence of cirrhosis with possible portal vein hypertension. 
 -Will monitor. 
 -Received albumin x 1  
 -Ammonia level 36. INR 1.3 initially. Then went up to 60 on 11/17 
 -On lactulose. 
 -Ammonia 46 today 7. Hyperammonia 52 on 11/17/18 
 -On lactulose 8. Bilateral pleural effusion/Legs edema 
 -Likely due to cirrhosis of liver. 
 -Legs edema improved 
  
9. Adenoma of left adrenal gland  
 -Seen in previous films. Will monitor 
  
10. Uncontrolled hypertension  
 -Resume lisinopril. Hydralazine prn 
  
11. Obesity 
 -Supportive care 
  
12. COPD  
 -Duo-neb prn 
  
13. Constipation 
 -Order dulcolax 
  
14. Back pain 
 -Due to problems above - on pain meds. 
  
15. Legs edema 
 -Likely due to cirrhosis with portal hypertension 
 -Edema likely due to liver disease,will order  
 -pro-BNP not elevated and ECHO nl with EF 66-70% -legs edema resolved. 
  
DVT prophylaxis:sc heparin Full code:discussed with patient who stated that he does not have a living will and never decided his code status Disposition:tbd

## 2018-11-18 NOTE — PROGRESS NOTES
[facility] Pulmonary, Critical Care, and Sleep Medicine Critical Care Progress Note Name: Kevin Dominique. : 1950 MRN: [id number] Date: 2018 [x]I have reviewed the flowsheet and previous days notes. Events, vitals, medications and notes from last 24 hours reviewed. Care plan discussed on multidisciplinary rounds. My assessment, plan of care, findings, medications, side effects etc were discussed with: 
[x] Nurse [] PT/OT   
[] Respiratory therapy [x] Dr. Jermaie Hyde  
[] Family [] Patient: answered all questions to satisfaction  
[] Pharmacist [] ASSESSMENT/PLAN:  
Principal Problem: 
  Discitis of thoracic region (2018) Active Problems: 
  UTI (urinary tract infection) (2018) Bilateral pleural effusion (2018) Lumbar stenosis (2018) Adenoma of left adrenal gland (2018) Uncontrolled hypertension (2018) Cirrhosis of liver ([facility] [address] (2018) Hepatitis C (2018) Obesity (2018) COPD (chronic obstructive pulmonary disease) ([facility] [address] (2018) Constipation (2018) Back pain (2018) · Decrease morphine to 1 mg IV q 4 hr PRN for pain · DuoNeb/Pulmicort · MRI of thoracic spine tomorrow, if MRI has been repaired · Will allow Rocephin to  (empiric treatment for UTI) · Continue lisinopril · Continue Zocor · PT/OT evaluation and treatment plan NUTRITION: cardiac. Check prealbumin q week. Consult Clinical Dietician. ICU electrolyte replacement protocol PROPHYLAXIS: 
DVT prophylaxis: start heparin GI prophylaxis: start Protonix HOB 30-degree elevation Chlorhexidine mouth washes CODE STATUS: FULL CODE Further recommendations will be based on the patient's response to recommended treatment and results of the investigation ordered.   
 
DISPOSITION: OK for the floor from pulmonary standpoint, if ABG shows normal pH The patient is: [] acutely ill Risk of deterioration: [x] moderate  
 [] critically ill  [] high  
 
[x]See my orders for details 
 
 
[] The patient is unable to give any meaningful history or review of systems because the patient is: 
[] Intubated [] Sedated  
[] Unresponsive [] []The patient is critically ill on     
[] Mechanical ventilation [] Vasoactive agents  
[] BiPAP [] Inotropes SUBJECTIVE 
- This 77year old  male is seen in consultation at the request of Dr. Rachel Pierce for recommendations on further evaluation and management of acute hypercapnic respiratory failure. He presented to the to ER  on 11/14/2018 with complaints of decreased appetite, SOB, constipation, diffuse abdominal pain, and severe lower back pain. Onset of symptoms started three months and has gradually worsen the past week. CT abd/pelvis obtained in the ER showed severe T10-11 discitis with anterior epidural compression, L3-4 central stenosis, bilateral pleural effusions with bilateral lower lobe atelectasis, splenic and perisplenic varices. UA showed UTI. In ED, patient was started on vanc and cefepime for severe discitis. The patient is a poor historian and is unable to cooperate in providing clinical history to a satisfactory level of detail. He admits to a history of IV drug abuse with heroin and cocaine. He denies alcohol consumption, but he does have untreated hepatitis C with hyperammonemia. He is argumentative, combative and belligerent. He needs an MRI for further evaluation of the spine; however, he is unable to cooperate/tolerate MR imaging.  
 
- Interim events: case discussed with Dr. Adonis Corey. The patient went down for MRI yesterday and was able to tolerate supine position with premedication (morphine 2 mg); however, there were technical difficulties involving MRI malfunction, so imaging was not done. Mentation has improved. [x] Nutrition: Cardiac 
[] BM today. ROS: Review of systems not obtained due to patient factors. Past Medical History:  
Diagnosis Date  Arthritis  COPD  Hypertension Allergies Allergen Reactions  Shellfish Derived Hives Current Facility-Administered Medications:  
  morphine injection 1 mg, 1 mg, IntraVENous, Q4H PRN, Yokasta Pedroza MD, 1 mg at 11/17/18 2008   pantoprazole (PROTONIX) 40 mg in sodium chloride 0.9% 10 mL injection, 40 mg, IntraVENous, DAILY, Yokasta Pedroza MD, 40 mg at 11/18/18 0829 
  heparin (porcine) injection 5,000 Units, 5,000 Units, SubCUTAneous, Q8H, Yokasta Pedroza MD, 5,000 Units at 11/18/18 3533   budesonide (PULMICORT) 500 mcg/2 ml nebulizer suspension, 500 mcg, Nebulization, BID RT, Yokasta Pedroza MD, 500 mcg at 11/18/18 3645   cefTRIAXone (ROCEPHIN) 2 g in 0.9% sodium chloride (MBP/ADV) 50 mL MBP, 2 g, IntraVENous, Q24H, Ludy Shen MD, Last Rate: 100 mL/hr at 11/17/18 1712, 2 g at 11/17/18 1712   sodium chloride (NS) flush 5-10 mL, 5-10 mL, IntraVENous, PRN, Vikki Estes MD 
  gabapentin (NEURONTIN) capsule 400 mg, 400 mg, Oral, BID, Senia Noyola MD, 400 mg at 11/18/18 0830 
  lisinopril (PRINIVIL, ZESTRIL) tablet 20 mg, 20 mg, Oral, DAILY, Senia Noyola MD, 20 mg at 11/18/18 0830   loratadine (CLARITIN) tablet 10 mg, 10 mg, Oral, DAILY PRN, Sadie Traylor MD 
  simvastatin (ZOCOR) tablet 20 mg, 20 mg, Oral, QHS, Senia Noyola MD, 20 mg at 11/17/18 2102 
  naloxone (NARCAN) injection 0.4 mg, 0.4 mg, IntraVENous, PRN, Sadie Traylor MD 
  diphenhydrAMINE (BENADRYL) injection 12.5 mg, 12.5 mg, IntraVENous, Q4H PRN, Senia Allison MD 
  ondansetron (ZOFRAN) injection 4 mg, 4 mg, IntraVENous, Q4H PRN, Senia Allison MD 
  albuterol-ipratropium (DUO-NEB) 2.5 MG-0.5 MG/3 ML, 3 mL, Nebulization, Q4H PRN, Senia Allison MD 
  hydrALAZINE (APRESOLINE) 20 mg/mL injection 10 mg, 10 mg, IntraVENous, Q6H PRN, Kendal Graham MD, 10 mg at 18 0544   bisacodyl (DULCOLAX) suppository 10 mg, 10 mg, Rectal, DAILY PRN, Senia Thompson MD, 10 mg at 11/15/18 0141 
  lactulose (CHRONULAC) solution 10 g, 10 g, Oral, BID, Senia Thompson MD, 10 g at 18 2516 OBJECTIVE Vital Signs:   
Patient Vitals for the past 24 hrs: 
 Temp Pulse Resp BP SpO2  
18 0829     99 % 18 0700  100 (!) 40 164/82 91 % 18 0600  97 29 161/79 98 % 18 0400 98.3 °F (36.8 °C) 90 (!) 31 161/84 98 % 18 0200  95 (!) 38 (!) 165/94 92 % 18 0001 98 °F (36.7 °C) 90 29 144/89 97 % 18 2200  90 25 138/75 100 % 18 2101     91 % 18 2000 98 °F (36.7 °C) 97 27 153/84 98 % 18 1630  95 27 145/74 100 % 18 1600 98.9 °F (37.2 °C) 96 30 146/79 99 % 18 1300  100 (!) 31 (!) 151/109 99 % 18 1200  97 21 107/56 100 % 18 1100  100 26 129/56 100 % 18 1000  (!) 105 18 146/70 99 % O2 Device: Nasal cannula O2 Flow Rate (L/min): 3 l/min(titrated down from 5, SpO2 100) Temp (24hrs), Av.3 °F (36.8 °C), Min:98 °F (36.7 °C), Max:98.9 °F (37.2 °C) PHYSICAL EXAM:  
General: sleeping comfortably. arousable. Head: atraumatic, normocephalic Eye: PERRLA, no scleral icterus, no pallor, no cyanosis Nose: no sinus tenderness, no erythema, no induration, no discharge Throat: no tonsillar enlargement, no erythema, no exudates, no oral thrush Neck: supple, no thyromegaly, no JVD, no lymphadenopathy. Trachea midline Lung: symmetric in development and expansion, good air entry, no rales, no rhonchi, no wheezing Heart: Regular S1 & S2. No murmur. No rub. No gallop. Abdomen: soft, flat, bowel sounds present. Nontender, nondistended, no rebound, no guarding. Extremities: no edema, no cyanosis, no clubbing, 2+ peripheral pulses in DP Lymphatic: no cervical/supraclavicular/axillary lymphadenopathy Neurologic: cranial nerves II-XII are grossly symmetric and physiologic. No Babinski. No ankle clonus. No cerebellar signs. Gait was not assessed. DATA:  
Intake/Output:  
Last shift:      No intake/output data recorded. Last 3 shifts: 11/16 1901 - 11/18 0700 In: 7320 [P.O.:120; I.V.:825] Out: 5114 [Urine:2475] Intake/Output Summary (Last 24 hours) at 11/18/2018 3940 Last data filed at 11/18/2018 6047 Gross per 24 hour Intake 120 ml Output 1850 ml Net -1730 ml Last 3 Recorded Weights in this Encounter 11/14/18 1747 11/16/18 5970 Weight: 126.1 kg (278 lb) 112.9 kg (248 lb 14.4 oz) Lines: All central lines examined by me. No signs of erythema, induration, discharge. Peripheral Intravenous Line: 
Peripheral IV 11/18/18 Anterior; Inferior;Left;Lower;Proximal Arm (Active) Drain(s): 
Condom Catheter 11/16/18 (Active) Indications for Use Accurate measurement of urinary output 11/17/2018  8:00 PM  
Status Draining 11/17/2018  8:00 PM  
Site Condition No abnormalities 11/17/2018  8:00 PM  
Drainage Tube Clipped to Bed Yes 11/17/2018  8:00 PM  
Catheter Secured to Thigh Yes 11/17/2018  8:00 PM  
Tamper Seal Intact Yes 11/17/2018  8:00 PM  
Bag Below Bladder/Not on Floor Yes 11/17/2018  8:00 PM  
Lack of Dependent Loop in Tubing Yes 11/17/2018  8:00 PM  
Drainage Bag Less Than Half Full Yes 11/17/2018  8:00 PM  
Sterile Solution Used for  Irrigation N/A 11/17/2018  8:00 PM  
Irrigation Volume Input (mL) 200 ml 11/17/2018 12:00 AM  
Urine Output (mL) 550 ml 11/18/2018  4:52 AM  
 
 
Telemetry: [x] Sinus [] A-flutter [] Paced [] A-fib [] Multiple PVCs Imaging: 
[x] I have personally reviewed the patients radiographs 
[] Radiographs reviewed with radiologist 
[x] No CXR study available for review today 
[] No change from prior, tubes and lines are in adequate position 
[] Improved   [] Worsening No results found. Labs: 
Recent Results (from the past 24 hour(s)) METABOLIC PANEL, BASIC Collection Time: 11/18/18  5:00 AM  
Result Value Ref Range Sodium 134 (L) 136 - 145 mmol/L Potassium 4.7 3.5 - 5.5 mmol/L Chloride 100 100 - 108 mmol/L  
 CO2 29 21 - 32 mmol/L Anion gap 5 3.0 - 18 mmol/L Glucose 82 74 - 99 mg/dL BUN 6 (L) 7.0 - 18 MG/DL Creatinine 0.54 (L) 0.6 - 1.3 MG/DL  
 BUN/Creatinine ratio 11 (L) 12 - 20 GFR est AA >60 >60 ml/min/1.73m2 GFR est non-AA >60 >60 ml/min/1.73m2 Calcium 8.2 (L) 8.5 - 10.1 MG/DL  
CBC WITH AUTOMATED DIFF Collection Time: 11/18/18  5:00 AM  
Result Value Ref Range WBC 4.6 4.6 - 13.2 K/uL  
 RBC 4.46 (L) 4.70 - 5.50 M/uL  
 HGB 12.1 (L) 13.0 - 16.0 g/dL HCT 39.4 36.0 - 48.0 % MCV 88.3 74.0 - 97.0 FL  
 MCH 27.1 24.0 - 34.0 PG  
 MCHC 30.7 (L) 31.0 - 37.0 g/dL  
 RDW 15.3 (H) 11.6 - 14.5 % PLATELET 834 554 - 865 K/uL MPV 10.1 9.2 - 11.8 FL  
 NEUTROPHILS 52 40 - 73 % LYMPHOCYTES 37 21 - 52 % MONOCYTES 10 3 - 10 % EOSINOPHILS 1 0 - 5 % BASOPHILS 0 0 - 2 %  
 ABS. NEUTROPHILS 2.4 1.8 - 8.0 K/UL  
 ABS. LYMPHOCYTES 1.7 0.9 - 3.6 K/UL  
 ABS. MONOCYTES 0.5 0.05 - 1.2 K/UL  
 ABS. EOSINOPHILS 0.0 0.0 - 0.4 K/UL  
 ABS. BASOPHILS 0.0 0.0 - 0.1 K/UL  
 DF AUTOMATED METABOLIC PANEL, COMPREHENSIVE Collection Time: 11/18/18  5:00 AM  
Result Value Ref Range Sodium 134 (L) 136 - 145 mmol/L Potassium 4.5 3.5 - 5.5 mmol/L Chloride 98 (L) 100 - 108 mmol/L  
 CO2 33 (H) 21 - 32 mmol/L Anion gap 3 3.0 - 18 mmol/L Glucose 81 74 - 99 mg/dL BUN 6 (L) 7.0 - 18 MG/DL Creatinine 0.51 (L) 0.6 - 1.3 MG/DL  
 BUN/Creatinine ratio 12 12 - 20 GFR est AA >60 >60 ml/min/1.73m2 GFR est non-AA >60 >60 ml/min/1.73m2 Calcium 8.1 (L) 8.5 - 10.1 MG/DL Bilirubin, total 0.9 0.2 - 1.0 MG/DL  
 ALT (SGPT) 18 16 - 61 U/L  
 AST (SGOT) 28 15 - 37 U/L Alk. phosphatase 93 45 - 117 U/L Protein, total 8.6 (H) 6.4 - 8.2 g/dL Albumin 2.1 (L) 3.4 - 5.0 g/dL Globulin 6.5 (H) 2.0 - 4.0 g/dL A-G Ratio 0.3 (L) 0.8 - 1.7 AMMONIA Collection Time: 11/18/18  5:00 AM  
Result Value Ref Range Ammonia 60 (H) 11 - 32 UMOL/L Lab Results Component Value Date/Time Color YELLOW 11/14/2018 02:00 PM  
 Appearance CLOUDY 11/14/2018 02:00 PM  
 Specific gravity 1.015 11/14/2018 02:00 PM  
 pH (UA) 8.0 11/14/2018 02:00 PM  
 Protein NEGATIVE  11/14/2018 02:00 PM  
 Glucose NEGATIVE  11/14/2018 02:00 PM  
 Ketone NEGATIVE  11/14/2018 02:00 PM  
 Bilirubin NEGATIVE  11/14/2018 02:00 PM  
 Urobilinogen 2.0 (H) 11/14/2018 02:00 PM  
 Nitrites POSITIVE (A) 11/14/2018 02:00 PM  
 Leukocyte Esterase SMALL (A) 11/14/2018 02:00 PM  
 Epithelial cells FEW 11/14/2018 02:00 PM  
 Bacteria 3+ (A) 11/14/2018 02:00 PM  
 WBC 11 to 20 11/14/2018 02:00 PM  
 RBC 0 11/14/2018 02:00 PM  
 
Cultures: All Micro Results Procedure Component Value Units Date/Time CULTURE, BLOOD [[id number]] Collected:  11/14/18 1645 Order Status:  Completed Specimen:  Blood Updated:  11/18/18 8113 Special Requests: NO SPECIAL REQUESTS Culture result: NO GROWTH 4 DAYS     
 CULTURE, BLOOD [[id number]] Collected:  11/14/18 1700 Order Status:  Completed Specimen:  Blood Updated:  11/18/18 0709 Special Requests: NO SPECIAL REQUESTS Culture result: NO GROWTH 4 DAYS     
 CULTURE, URINE [[id number]]  (Abnormal)  (Susceptibility) Collected:  11/14/18 1400 Order Status:  Completed Specimen:  Urine from Clean catch Updated:  11/17/18 3203 Special Requests: NO SPECIAL REQUESTS Culture result:    
  >100,000 COLONIES/mL SERRATIA MARCESCENS  
     
 CULTURE, RESPIRATORY/SPUTUM/BRONCH Page Rode STAIN [[id number]] Order Status:  Sent Specimen:  Sputum CRYPTOCOCCUS AG W/REFLEX TITER [[id number]] Collected:  11/15/18 1435 Order Status:  Completed Specimen:  Serum Updated:  11/15/18 1538   Cryptococcus Ag NEGATIVE      
 CULTURE, BLOOD [[id number]] Collected:  11/14/18 1630 Order Status:  Canceled Specimen:  Blood CULTURE, BLOOD [[id number]] Collected:  11/14/18 1630 Order Status:  Canceled Specimen:  Blood During this entire length of time I was immediately available to the patient. The reason for providing this level of medical care for this critically ill patient was due a critical illness that impaired one or more vital organ systems such that there was a high probability of imminent or life threatening deterioration in the patients condition. This care involved high complexity decision making to assess, manipulate, and support vital system functions, to treat this degreee vital organ system failure and to prevent further life threatening deterioration of the patients condition 
 
[] Total critical care time spent on reviewing the case/medical record/data/notes/EMR/patient examination/documentation/coordinating care with nurse/consultants, exclusive of procedures - minutes with complex decision making performed and > 50% time spent in face to face evaluation. Reina Dye MD  
Board Certified by the [employer] 11/18/2018

## 2018-11-18 NOTE — PROGRESS NOTES
1540 Received pt from ICU, A/O x 4, calm and cooperative O2 at 4L/min. IV site on left hand patent and intact. With pressure ulcer in the buttocks, dressing in place. With condom cath, pt on bedrest. Per report, pt gets confused when just waking up but becomes oriented after a while. Pt when sleeping is difficult to wake up as per report but is pt's baseline as well. HR, RR and BP elevated, MD aware per report, pt's baseline. 1600 Changed underpad and condom cath, condom cath not in place anymore upon arrival. Pt saying he still in pain, ICU RN explained he got a pain med already and should wait a while. 1727 Pain med given. 1800 Pt saying he feels better. 1825 VS taken SBP lower, HR and respirations still elevated. 12 Spoke with MD, per MD call RT to place pt in BIPAP and do ABG, if does not resolve, transfer pt back to ICU. Pt in bed, says feeling better after pain med given, respirations still fast. Report given to incoming RN. Pt alert and oriented x 4. On condom cath, draining to clear yellow uo. Pt on tele tachy with PVCs.

## 2018-11-19 LAB
ALBUMIN SERPL-MCNC: 2.4 G/DL (ref 3.4–5)
ALBUMIN/GLOB SERPL: 0.3 {RATIO} (ref 0.8–1.7)
ALP SERPL-CCNC: 112 U/L (ref 45–117)
ALT SERPL-CCNC: 19 U/L (ref 16–61)
AMMONIA PLAS-SCNC: 67 UMOL/L (ref 11–32)
ANION GAP SERPL CALC-SCNC: 4 MMOL/L (ref 3–18)
AST SERPL-CCNC: 26 U/L (ref 15–37)
BASOPHILS # BLD: 0 K/UL (ref 0–0.1)
BASOPHILS NFR BLD: 0 % (ref 0–2)
BILIRUB SERPL-MCNC: 1.2 MG/DL (ref 0.2–1)
BUN SERPL-MCNC: 6 MG/DL (ref 7–18)
BUN/CREAT SERPL: 9 (ref 12–20)
CALCIUM SERPL-MCNC: 8.3 MG/DL (ref 8.5–10.1)
CHLORIDE SERPL-SCNC: 99 MMOL/L (ref 100–108)
CO2 SERPL-SCNC: 33 MMOL/L (ref 21–32)
CREAT SERPL-MCNC: 0.64 MG/DL (ref 0.6–1.3)
DIFFERENTIAL METHOD BLD: ABNORMAL
EOSINOPHIL # BLD: 0 K/UL (ref 0–0.4)
EOSINOPHIL NFR BLD: 0 % (ref 0–5)
ERYTHROCYTE [DISTWIDTH] IN BLOOD BY AUTOMATED COUNT: 15.2 % (ref 11.6–14.5)
GLOBULIN SER CALC-MCNC: 6.9 G/DL (ref 2–4)
GLUCOSE BLD STRIP.AUTO-MCNC: 102 MG/DL (ref 70–110)
GLUCOSE BLD STRIP.AUTO-MCNC: 110 MG/DL (ref 70–110)
GLUCOSE BLD STRIP.AUTO-MCNC: 123 MG/DL (ref 70–110)
GLUCOSE BLD STRIP.AUTO-MCNC: 88 MG/DL (ref 70–110)
GLUCOSE SERPL-MCNC: 87 MG/DL (ref 74–99)
HCT VFR BLD AUTO: 43.4 % (ref 36–48)
HGB BLD-MCNC: 14 G/DL (ref 13–16)
LYMPHOCYTES # BLD: 1.9 K/UL (ref 0.9–3.6)
LYMPHOCYTES NFR BLD: 35 % (ref 21–52)
MCH RBC QN AUTO: 27.3 PG (ref 24–34)
MCHC RBC AUTO-ENTMCNC: 32.3 G/DL (ref 31–37)
MCV RBC AUTO: 84.6 FL (ref 74–97)
MONOCYTES # BLD: 0.6 K/UL (ref 0.05–1.2)
MONOCYTES NFR BLD: 11 % (ref 3–10)
NEUTS SEG # BLD: 3 K/UL (ref 1.8–8)
NEUTS SEG NFR BLD: 54 % (ref 40–73)
PLATELET # BLD AUTO: 173 K/UL (ref 135–420)
PMV BLD AUTO: 10 FL (ref 9.2–11.8)
POTASSIUM SERPL-SCNC: 3.7 MMOL/L (ref 3.5–5.5)
PROCALCITONIN SERPL-MCNC: 0.07 NG/ML (ref 0–0.08)
PROT SERPL-MCNC: 9.3 G/DL (ref 6.4–8.2)
RBC # BLD AUTO: 5.13 M/UL (ref 4.7–5.5)
SODIUM SERPL-SCNC: 136 MMOL/L (ref 136–145)
WBC # BLD AUTO: 5.6 K/UL (ref 4.6–13.2)

## 2018-11-19 PROCEDURE — 82962 GLUCOSE BLOOD TEST: CPT

## 2018-11-19 PROCEDURE — 94660 CPAP INITIATION&MGMT: CPT

## 2018-11-19 PROCEDURE — 80053 COMPREHEN METABOLIC PANEL: CPT

## 2018-11-19 PROCEDURE — 82140 ASSAY OF AMMONIA: CPT

## 2018-11-19 PROCEDURE — 74011250637 HC RX REV CODE- 250/637: Performed by: INTERNAL MEDICINE

## 2018-11-19 PROCEDURE — 94640 AIRWAY INHALATION TREATMENT: CPT

## 2018-11-19 PROCEDURE — 85025 COMPLETE CBC W/AUTO DIFF WBC: CPT

## 2018-11-19 PROCEDURE — 36415 COLL VENOUS BLD VENIPUNCTURE: CPT

## 2018-11-19 PROCEDURE — 77010033678 HC OXYGEN DAILY

## 2018-11-19 PROCEDURE — 94760 N-INVAS EAR/PLS OXIMETRY 1: CPT

## 2018-11-19 PROCEDURE — 74011000250 HC RX REV CODE- 250: Performed by: INTERNAL MEDICINE

## 2018-11-19 PROCEDURE — 74011250636 HC RX REV CODE- 250/636: Performed by: INTERNAL MEDICINE

## 2018-11-19 PROCEDURE — 65660000000 HC RM CCU STEPDOWN

## 2018-11-19 RX ADMIN — HYDRALAZINE HYDROCHLORIDE 10 MG: 20 INJECTION INTRAMUSCULAR; INTRAVENOUS at 03:54

## 2018-11-19 RX ADMIN — LACTULOSE 10 G: 20 SOLUTION ORAL at 17:30

## 2018-11-19 RX ADMIN — BUDESONIDE 500 MCG: 0.5 INHALANT RESPIRATORY (INHALATION) at 21:29

## 2018-11-19 RX ADMIN — LACTULOSE 10 G: 20 SOLUTION ORAL at 10:21

## 2018-11-19 RX ADMIN — MORPHINE SULFATE 1 MG: 2 INJECTION, SOLUTION INTRAMUSCULAR; INTRAVENOUS at 10:22

## 2018-11-19 RX ADMIN — HEPARIN SODIUM 5000 UNITS: 5000 INJECTION INTRAVENOUS; SUBCUTANEOUS at 04:55

## 2018-11-19 RX ADMIN — LISINOPRIL 20 MG: 20 TABLET ORAL at 10:22

## 2018-11-19 RX ADMIN — HEPARIN SODIUM 5000 UNITS: 5000 INJECTION INTRAVENOUS; SUBCUTANEOUS at 14:36

## 2018-11-19 RX ADMIN — HEPARIN SODIUM 5000 UNITS: 5000 INJECTION INTRAVENOUS; SUBCUTANEOUS at 21:12

## 2018-11-19 RX ADMIN — GABAPENTIN 400 MG: 400 CAPSULE ORAL at 17:30

## 2018-11-19 RX ADMIN — GABAPENTIN 400 MG: 400 CAPSULE ORAL at 10:22

## 2018-11-19 RX ADMIN — SIMVASTATIN 20 MG: 20 TABLET, FILM COATED ORAL at 21:12

## 2018-11-19 RX ADMIN — PANTOPRAZOLE SODIUM 40 MG: 40 TABLET, DELAYED RELEASE ORAL at 10:22

## 2018-11-19 RX ADMIN — BUDESONIDE 500 MCG: 0.5 INHALANT RESPIRATORY (INHALATION) at 08:33

## 2018-11-19 RX ADMIN — MORPHINE SULFATE 1 MG: 2 INJECTION, SOLUTION INTRAMUSCULAR; INTRAVENOUS at 03:54

## 2018-11-19 RX ADMIN — MORPHINE SULFATE 1 MG: 2 INJECTION, SOLUTION INTRAMUSCULAR; INTRAVENOUS at 22:00

## 2018-11-19 NOTE — PROGRESS NOTES
[facility] In[facility] Daily Progress Note Daily progress Note Patient: Mariluz Ellis. MRN: [id number]  [facility]: [id number] YOB: 1950  Age: 77 y.o. Sex: male DOA: 11/14/2018 LOS:  LOS: 5 days Chief Complaint:  Discitis thoracic region Interval History: 77 y.o.  male with h/o HTN, COPD, Hep C, presented to ED because of back pain on 11/14. Patient indicating the lower portion of his T-spine where he has the pain,saying that it started almost three months ago and has been getting worse. He reports that he was seen in ED few weeks ago and was told that nothing was wrong with him. He is also reporting constipation the last 3 weeks. He denies legs weakness or loss of sensation. No fever or chills. Although patient did not report his history of heroin abuse, the ED report indicates that patient is an IV drugs abuser and last use of IV heroin was 3-4 weeks ago. Patient's friend who came with him in ED has reported that patient fell the day prior while trying to get to the bathroom and was incontinent. Patient is reporting that he has not been able to hold his urine for a while. In the ED,CT abdm/pelvis showed severe  discitis, L3-4 central stenosis, bilateral pleural effusions with bilateral lower lobe atelectasis, splenic hilar and perisplenic varices. UA showed UTI. In ED, patient was started on vanc and cefepime for severe discitis. Neurosurgery was consulted. The hospitalist team was called for admission. ID consulted. On 11/16,  ID decided to discontinue vanc and cefepime, preferred the diagnosis first since patient has been afebrile,without leokocytosis and ESR/CRP showing low probability for OM/Discitis. Currently patient is only on ceftriaxone for UTI due to serratia.   MRI of the spine not done yet as patient has not tolerated. On 11/15/2018, patient noted confused. Code S called. CT head w/o acute and tele-neurology ruled out the stroke. Patient was transferred to ICU due to altered mental status, likely metabolic. On 11/16/18, patient was hypoxic,hypercapnic. He was put on BIPAP, critical care was consulted. On 11/18, patient more awake, clincally stable MRI machine not working since 11/17- tests still pending. Per ID, ESR/CRP low for OM/discitis but does not exclude, of all abx for better diagnostics as pt not septic. Assessment/Plan:  
 
Patient Active Problem List  
Diagnosis Code  Discitis of thoracic region M46.44  
 UTI (urinary tract infection) N39.0  Bilateral pleural effusion J90  
 Lumbar stenosis M48.061  
 Adenoma of left adrenal gland D35.02  
 Uncontrolled hypertension I10  
 Cirrhosis of liver ([facility] [address] K74.60  Hepatitis C B19.20  Obesity E66.9  
 COPD (chronic obstructive pulmonary disease) (HCC) J44.9  Constipation K59.00  Back pain M54.9 A/P: 
1. Discitis of thoracic region 
 -CT showing severe discitis of T10-11. Patient with h/o iv heroin abuse. Infection suspected. 
 -Vanc and cefepime initiated in ED,continued until 11/16 
 -Sepsis protocol was initiated in ED but I didn't think patient was septic - lactic acid,wbc,temp,HR normal. 
 -Blood cx negative 
 -Pt did not tolerate MRI. Discussed with IR,will have MRI under sedation and aspiration of T10-11 will be performed for cultures,cytology 
 -Neurosurgery consulted ->pt high risk for intervention - medical management 
 -ID consulted 
 -ID decided to d/c Vanc/cefepime on 11/16 as patient was not septic and ESR/CRP showing low probability for OM/Discitis. Pt currently on ceftriaxone for UTI 
 -Neurosurgery,ID requesting MRI 
 -Pain management with morphine prn 
 -Fall precaution. -MRI machine did not work since yesterday. Eventually patient will have MRI on Monday 
  2. Respiratory failure with hypoxia/hypercapnia -Started on BIPAP 
 -Critical care consulted 
 -Improved. Tolerating 3 liters NC and O2sat 93-98% 
  
3.Acute encephalopathy metabolic 
 -Likely due to acute resp failure and possibly elevated ammonia 
 -Will monitor mentation as patient being treated 
 -CT scan done on 11/15 after code stroke was called did not show any acute process. Tele-neurology did not think that patient had a stroke 
 -Improved mentation,interacting better today 
  4.Lumbar stenosis at L3-4  
 -Will follow neurosurgery recommendations -> no surgery,pt asymptomatic 
  
5. UTI (urinary tract infection)  -Was on cefepime,then ceftriaxone since 11/16. 
 -Ucx c/w serratia marscens and blood cx NGTD 
  
6.Cirrhosis of liver/Hepatitis C/H/o alcohol abuse 
 -Patient has h/o hepatitis C and past history of alcohol abuse. CT showing strong evidence of cirrhosis with possible portal vein hypertension. 
  -Received albumin x 1  
 -On lactulose. 7.Bilateral pleural effusion/Legs edema 
 -Likely due to cirrhosis of liver. 
 -Legs edema improved 
  
8. Adenoma of left adrenal gland  
 -Seen in previous films. Will monitor 
  
9. Uncontrolled hypertension  
 -Resume lisinopril. Hydralazine prn 
  
10. Obesity 
 -Supportive care 
  
11. COPD  
 -Duo-neb prn 
  
12. Constipation 
 -Order dulcolax 
  
13. Back pain 
 -Due to problems above - on pain meds. 
  
14. Legs edema 
 -Likely due to cirrhosis with portal hypertension 
 -Edema likely due to liver disease,will order  
 -pro-BNP not elevated and ECHO nl with EF 66-70% -legs edema resolved. 
  
DVT prophylaxis 
- Heparin subcutaneously TID ANDERSON Enriquez [address] [place], [place] [address]ty Group Hospitalist Division Pager:  [phone] Office:  [phone] Subjective: No acute events overnight. Pt c/o back pain. Awaiting MRI. Objective:  
  
Visit Vitals /82 (BP 1 Location: Left arm, BP Patient Position: Head of bed elevated (Comment degrees)) Pulse (!) 107 Temp 98 °F (36.7 °C) Resp 20 Ht 6' (1.829 m) Wt 108.4 kg (238 lb 15.7 oz) SpO2 96% BMI 32.41 kg/m² Physical Exam: 
General appearance: alert, cooperative, no distress, appears stated age Head: Normocephalic, without obvious abnormality, atraumatic Lungs: clear to auscultation bilaterally Heart: regular rate and rhythm, S1, S2 normal, no murmur, click, rub or gallop Abdomen: soft, non tender, non distended. Normoactive bowel sounds. No masses, no organomegaly Extremities: extremities normal, atraumatic, no cyanosis or edema Skin: Skin color, texture, turgor normal. No rashes or lesions Neurologic: Grossly normal 
PSY: Mood and affect normal, appropriately behaved Intake and Output: 
Current Shift:  11/19 0701 - 11/19 1900 In: 120 [P.O.:120] Out: 600 [Urine:600] Last three shifts:  11/17 1901 - 11/19 0700 In: 120 [P.O.:120] Out: 3101 [MGECL:4709] Recent Results (from the past 24 hour(s)) POC G3 Collection Time: 11/18/18  8:02 PM  
Result Value Ref Range Device: NASAL CANNULA Flow rate (POC) 3.0 L/M  
 pH (POC) 7.502 (H) 7.35 - 7.45    
 pCO2 (POC) 48.7 (H) 35.0 - 45.0 MMHG  
 pO2 (POC) 64 (L) 80 - 100 MMHG  
 HCO3 (POC) 38.2 (H) 22 - 26 MMOL/L  
 sO2 (POC) 94 92 - 97 % Base excess (POC) 15 mmol/L Allens test (POC) YES Total resp. rate 36 Site RIGHT RADIAL Specimen type (POC) ARTERIAL Performed by Cholo Anne GLUCOSE, POC Collection Time: 11/18/18  9:16 PM  
Result Value Ref Range Glucose (POC) 110 70 - 110 mg/dL CBC WITH AUTOMATED DIFF Collection Time: 11/19/18  7:24 AM  
Result Value Ref Range WBC 5.6 4.6 - 13.2 K/uL  
 RBC 5.13 4.70 - 5.50 M/uL  
 HGB 14.0 13.0 - 16.0 g/dL HCT 43.4 36.0 - 48.0 % MCV 84.6 74.0 - 97.0 FL  
 MCH 27.3 24.0 - 34.0 PG  
 MCHC 32.3 31.0 - 37.0 g/dL  
 RDW 15.2 (H) 11.6 - 14.5 % PLATELET 054 489 - 690 K/uL MPV 10.0 9.2 - 11.8 FL  
 NEUTROPHILS 54 40 - 73 % LYMPHOCYTES 35 21 - 52 % MONOCYTES 11 (H) 3 - 10 % EOSINOPHILS 0 0 - 5 % BASOPHILS 0 0 - 2 %  
 ABS. NEUTROPHILS 3.0 1.8 - 8.0 K/UL  
 ABS. LYMPHOCYTES 1.9 0.9 - 3.6 K/UL  
 ABS. MONOCYTES 0.6 0.05 - 1.2 K/UL  
 ABS. EOSINOPHILS 0.0 0.0 - 0.4 K/UL  
 ABS. BASOPHILS 0.0 0.0 - 0.1 K/UL  
 DF AUTOMATED METABOLIC PANEL, COMPREHENSIVE Collection Time: 11/19/18  7:24 AM  
Result Value Ref Range Sodium 136 136 - 145 mmol/L Potassium 3.7 3.5 - 5.5 mmol/L Chloride 99 (L) 100 - 108 mmol/L  
 CO2 33 (H) 21 - 32 mmol/L Anion gap 4 3.0 - 18 mmol/L Glucose 87 74 - 99 mg/dL BUN 6 (L) 7.0 - 18 MG/DL Creatinine 0.64 0.6 - 1.3 MG/DL  
 BUN/Creatinine ratio 9 (L) 12 - 20 GFR est AA >60 >60 ml/min/1.73m2 GFR est non-AA >60 >60 ml/min/1.73m2 Calcium 8.3 (L) 8.5 - 10.1 MG/DL Bilirubin, total 1.2 (H) 0.2 - 1.0 MG/DL  
 ALT (SGPT) 19 16 - 61 U/L  
 AST (SGOT) 26 15 - 37 U/L Alk. phosphatase 112 45 - 117 U/L Protein, total 9.3 (H) 6.4 - 8.2 g/dL Albumin 2.4 (L) 3.4 - 5.0 g/dL Globulin 6.9 (H) 2.0 - 4.0 g/dL A-G Ratio 0.3 (L) 0.8 - 1.7 AMMONIA Collection Time: 11/19/18  7:24 AM  
Result Value Ref Range Ammonia 67 (H) 11 - 32 UMOL/L  
GLUCOSE, POC Collection Time: 11/19/18  8:06 AM  
Result Value Ref Range Glucose (POC) 88 70 - 110 mg/dL GLUCOSE, POC Collection Time: 11/19/18 11:57 AM  
Result Value Ref Range Glucose (POC) 123 (H) 70 - 110 mg/dL Lab Results Component Value Date/Time Glucose 87 11/19/2018 07:24 AM  
 Glucose 82 11/18/2018 05:00 AM  
 Glucose 81 11/18/2018 05:00 AM  
 Glucose 90 11/17/2018 04:00 AM  
 Glucose 97 11/16/2018 06:30 AM  
  
 
Imaging: No results found. Medication List Reviewed: 
Current Facility-Administered Medications Medication Dose Route Frequency  pantoprazole (PROTONIX) tablet 40 mg  40 mg Oral ACB  morphine injection 1 mg  1 mg IntraVENous Q4H PRN  
  heparin (porcine) injection 5,000 Units  5,000 Units SubCUTAneous Q8H  
 budesonide (PULMICORT) 500 mcg/2 ml nebulizer suspension  500 mcg Nebulization BID RT  
 sodium chloride (NS) flush 5-10 mL  5-10 mL IntraVENous PRN  
 gabapentin (NEURONTIN) capsule 400 mg  400 mg Oral BID  lisinopril (PRINIVIL, ZESTRIL) tablet 20 mg  20 mg Oral DAILY  loratadine (CLARITIN) tablet 10 mg  10 mg Oral DAILY PRN  
 simvastatin (ZOCOR) tablet 20 mg  20 mg Oral QHS  naloxone (NARCAN) injection 0.4 mg  0.4 mg IntraVENous PRN  
 diphenhydrAMINE (BENADRYL) injection 12.5 mg  12.5 mg IntraVENous Q4H PRN  
 ondansetron (ZOFRAN) injection 4 mg  4 mg IntraVENous Q4H PRN  
 albuterol-ipratropium (DUO-NEB) 2.5 MG-0.5 MG/3 ML  3 mL Nebulization Q4H PRN  
 hydrALAZINE (APRESOLINE) 20 mg/mL injection 10 mg  10 mg IntraVENous Q6H PRN  
 bisacodyl (DULCOLAX) suppository 10 mg  10 mg Rectal DAILY PRN  
 lactulose (CHRONULAC) solution 10 g  10 g Oral BID

## 2018-11-19 NOTE — PROGRESS NOTES
conducted a Follow up consultation and Spiritual Assessment for Malvin Arnetha Krabbe., who is a 77 y.o.,male. The  provided the following Interventions: 
Continued the relationship of care and support. Listened empathically. Offered prayer and assurance of continued prayer on patients behalf. Chart reviewed. The following outcomes were achieved: 
Patient expressed gratitude for pastoral care visit. Assessment: 
There are no further spiritual or Amish issues which require Spiritual Care Services interventions at this time. Plan: 
Chaplains will continue to follow and will provide pastoral care on an as needed/requested basis.  recommends bedside caregivers page  on duty if patient shows signs of acute spiritual or emotional distress. [facility] Staff  Spiritual Care  
[phone]

## 2018-11-19 NOTE — PROGRESS NOTES
Spoke with Glenys Avila with MRI-per Glenys Avila there is currently no MRI ordered as anesthesia had deemed pt too ill to intubate for MRI and pt is claustrophobic and had adamantly refused MRI previously. Jose Vazquez NP notified.

## 2018-11-19 NOTE — PROGRESS NOTES
Neurosurgery Progress Note; 
Neurologically remains completely intact. No signs of a myelopathy. White count and sed rate are normal.  
Mri would be helpful as well as needle biopsy to determine active versus burned out infection discussed with patient 
 motor function is intact Brace is here patient if breathing better. Following no surgical indications at this point. Patient is a high surgical risk.

## 2018-11-19 NOTE — MANAGEMENT PLAN
Discharge/Transition Planning Reviewed chart and Care Management following. Pt plan is Home and HH. Need orders for PT/OT as soon as pt able to work with therapy to assist with mobilization and smooth transition home Jaimee Estrada RN BSN Outcomes Manager Pager # [phone]

## 2018-11-19 NOTE — PROGRESS NOTES
INFECTIOUS DISEASE FOLLOW UP NOTE :-  
 
Admit Date: 11/14/2018 ABX;   
 
Current  Prior None 11/19 - 0  Cefepime/vanco  11/14   2, Ceftriaxone 11/16 - 3  
 
 
ASSESSMENT: -> RECS  
 
T10-11 disciitis CT:   severe destructive changes involving the endplates at R94-90. There is associated paravertebral soft tissue infiltration. Findings compatible 
with severe discitis. The inflammatory process extends into the anterior 
epidural space with compression of cord. L5-S1 spondylosis and degenerative disc 
disease. 
  
  L3-4 central stenosis Neurosurgery consulted - ESR/CRP low for OM/discitis but does not exclude  
- off all abx for better diagnostics as pt not septic 
- plan for MRI spine likely with sedation and then IR guided aspiration for cultures [ bacteria, fungal, AFB ] and pathology - d/w NP Rollen Finders Serratia UTI  
- ua pos nitrite, small le, 11-20 wbc, 3+ cj - Ucx >100k Serratia  ->completed 5 days of abx Bilateral pleural effusions and LL atx with atx in rml    
Abnormal CT abd: 
  Adenopathy- gastrohepatic ligament Splenic hilar and perisplenic varices Liver enlargement    
Ho ivda Precludes cvl for out pt rx - likely Co morbidities: Arthritis/ copd/HTN    
allergies Shellfish MICROBIOLOGY:  
11/14    Blcx x 2 - ntd 
             ucx - >100k Serratia LINES AND CATHETERS:  
PIV SUBJECTIVE :  
 
Interval notes reviewed. Pt out of ICU over weekend, more awake and alert [ felt to be from dilaudid ]. No MRI done over weekend since MRI was down. Plan to likely do it today. MEDICATIONS :  
 
Current Facility-Administered Medications Medication Dose Route Frequency  pantoprazole (PROTONIX) tablet 40 mg  40 mg Oral ACB  morphine injection 1 mg  1 mg IntraVENous Q4H PRN  
 heparin (porcine) injection 5,000 Units  5,000 Units SubCUTAneous Q8H  
  budesonide (PULMICORT) 500 mcg/2 ml nebulizer suspension  500 mcg Nebulization BID RT  
 sodium chloride (NS) flush 5-10 mL  5-10 mL IntraVENous PRN  
 gabapentin (NEURONTIN) capsule 400 mg  400 mg Oral BID  lisinopril (PRINIVIL, ZESTRIL) tablet 20 mg  20 mg Oral DAILY  loratadine (CLARITIN) tablet 10 mg  10 mg Oral DAILY PRN  
 simvastatin (ZOCOR) tablet 20 mg  20 mg Oral QHS  naloxone (NARCAN) injection 0.4 mg  0.4 mg IntraVENous PRN  
 diphenhydrAMINE (BENADRYL) injection 12.5 mg  12.5 mg IntraVENous Q4H PRN  
 ondansetron (ZOFRAN) injection 4 mg  4 mg IntraVENous Q4H PRN  
 albuterol-ipratropium (DUO-NEB) 2.5 MG-0.5 MG/3 ML  3 mL Nebulization Q4H PRN  
 hydrALAZINE (APRESOLINE) 20 mg/mL injection 10 mg  10 mg IntraVENous Q6H PRN  
 bisacodyl (DULCOLAX) suppository 10 mg  10 mg Rectal DAILY PRN  
 lactulose (CHRONULAC) solution 10 g  10 g Oral BID OBJECTIVE :  
 
Visit Vitals /82 (BP 1 Location: Left arm, BP Patient Position: Head of bed elevated (Comment degrees)) Pulse (!) 107 Temp 98 °F (36.7 °C) Resp 20 Ht 6' (1.829 m) Wt 108.4 kg (238 lb 15.7 oz) SpO2 96% BMI 32.41 kg/m² Temp (24hrs), Av.2 °F (36.8 °C), Min:97.4 °F (36.3 °C), Max:99.4 °F (37.4 °C) Bull [place] old AAM, awake, alert not in distress RESP-  ctab CVS- s1s2 normal, no murmur ABD-soft, Nt, bs present EXT-no edema SKIN -no rash NEURO - awake, alert, O x3, b/l motor strength intact Labs: Results:  
Chemistry Recent Labs 18 
0724 18 
0500 18 
0400 GLU 87 82  81 90  134*  134* 137  
K 3.7 4.7  4.5 4.7 CL 99* 100  98* 101 CO2 33* 29  33* 35* BUN 6* 6*  6* 7  
CREA 0.64 0.54*  0.51* 0.67 CA 8.3* 8.2*  8.1* 8.1* AGAP 4 5  3 1*  
BUCR 9* 11*  12 10*  93 104  
TP 9.3* 8.6* 9.2* ALB 2.4* 2.1* 2.4*  
GLOB 6.9* 6.5* 6.8* AGRAT 0.3* 0.3* 0.4* CBC w/Diff Recent Labs 18 
0724 18 
0500 18 
0400 WBC 5.6 4.6 4.9  
RBC 5.13 4.46* 4.77 HGB 14.0 12.1* 12.7* HCT 43.4 39.4 42.5  139 141 GRANS 54 52 60 LYMPH 35 37 29 EOS 0 1 1 RADIOLOGY :   
CT abdo/pelvis 11/14 - IMPRESSION: 
1. Severe destructive changes involving endplates of Y42-47 with circumferential paravertebral soft tissue infiltration and extension of process into the anterior epidural space with cord compression. Findings compatible with severe discitis. Recommend MRI evaluation. 2. Moderate dependent bilateral pleural effusions with partial bilateral lower lobe atelectasis and additional atelectatic streaks right middle lobe and lingula. 3. Multiple small hepatic cysts. Left adrenal adenoma. 4. Gastrohepatic ligament adenopathy and retrocrural adenopathy, probably reactive. 5. Splenic hilar and perisplenic varices. Disproportionate enlargement segment 2 and 3 of liver. Findings may indicate cirrhosis. 6. L3-4 central stenosis. Probable small hemangioma L1 vertebral body. Janett Zuluaga MD 
November 19, 2018 [facility] [phone]

## 2018-11-19 NOTE — PROGRESS NOTES
Assumed care; Pt resting in bed; no distress noted; Pt complains of moderate pain, repositioned; Pt Tachycardic and Tachypnea. Pt denies chest pain. Lung clear. 95% on 4LNC bed in low position; ABG, and Bipap orders. RT at bedside. Prn BP meds administered. Nursing Supervisor notified of possible transfer to stepdown. Bed alarm on; Side rails up x 3; Call light and personal belonging within reach. Will continue to monitor. 2005: Dr Afia Cole notified of Mews, ABG results. Continue to monitor, Bipap at bedtime. Transferred to [phone] for continuous pulse ox monitoring. 2100: Transferred. 98% on 4LNC.

## 2018-11-19 NOTE — INTERDISCIPLINARY ROUNDS
IDR Summary Patient: Shruthi Nails MRN: [id number]    Age: 77 y.o.  : 1950 Admit Diagnosis: Discitis of thoracic region DIET status: 
 
Lines/Tubes:  
IV: YES   Needed: YES Cardoza: YES  Needed:YES Central Line: NO Needed: NO 
 
 
VTE Prophylaxis: Mechanical 
 
Mobility needs: No  
 
 
 
Disposition/Care Management: 
Discharge plan: TBD Barriers to discharge:  
Financial concerns: YES 
PCP: None : NO Interventions: LOS: 5 days Expected days until discharge:  TBD Signed:  
 
JUAN PABLO Jones [facility] Hospitalist Division Pager:  [phone] Office:  [phone]

## 2018-11-19 NOTE — PROGRESS NOTES
Problem: Pressure Injury - Risk of 
Goal: *Prevention of pressure injury Document Raul Scale and appropriate interventions in the flowsheet. Outcome: Progressing Towards Goal 
Pressure Injury Interventions: 
Sensory Interventions: Assess changes in LOC, Keep linens dry and wrinkle-free Moisture Interventions: Internal/External fecal devices Activity Interventions: Pressure redistribution bed/mattress(bed type) Mobility Interventions: Pressure redistribution bed/mattress (bed type) Nutrition Interventions: Document food/fluid/supplement intake, Offer support with meals,snacks and hydration Friction and Shear Interventions: HOB 30 degrees or less Problem: Falls - Risk of 
Goal: *Absence of Falls Document Brayan Mickey Fall Risk and appropriate interventions in the flowsheet. Outcome: Progressing Towards Goal 
Fall Risk Interventions: 
Mobility Interventions: Communicate number of staff needed for ambulation/transfer, Patient to call before getting OOB Mentation Interventions: Adequate sleep, hydration, pain control, Door open when patient unattended Medication Interventions: Patient to call before getting OOB Elimination Interventions: Call light in reach History of Falls Interventions: Consult care management for discharge planning, Door open when patient unattended

## 2018-11-19 NOTE — PROGRESS NOTES
Pt incontinent of large amount of stool. Mepilex to R buttock Stage II changed. Pt cleaned turned and repositioned.

## 2018-11-19 NOTE — PROGRESS NOTES
PT taken off BiPAP. pt continuously removes BiPAP. 0233:Belen Bennett RN discovered pt had realigned BiPAP placement choking himself. 4LNC applied.

## 2018-11-20 LAB
ANION GAP SERPL CALC-SCNC: 3 MMOL/L (ref 3–18)
BACTERIA SPEC CULT: NORMAL
BACTERIA SPEC CULT: NORMAL
BUN SERPL-MCNC: 10 MG/DL (ref 7–18)
BUN/CREAT SERPL: 13 (ref 12–20)
CALCIUM SERPL-MCNC: 8.9 MG/DL (ref 8.5–10.1)
CHLORIDE SERPL-SCNC: 99 MMOL/L (ref 100–108)
CO2 SERPL-SCNC: 36 MMOL/L (ref 21–32)
CREAT SERPL-MCNC: 0.77 MG/DL (ref 0.6–1.3)
GLUCOSE BLD STRIP.AUTO-MCNC: 105 MG/DL (ref 70–110)
GLUCOSE BLD STRIP.AUTO-MCNC: 80 MG/DL (ref 70–110)
GLUCOSE BLD STRIP.AUTO-MCNC: 97 MG/DL (ref 70–110)
GLUCOSE SERPL-MCNC: 80 MG/DL (ref 74–99)
POTASSIUM SERPL-SCNC: 3.8 MMOL/L (ref 3.5–5.5)
SERVICE CMNT-IMP: NORMAL
SERVICE CMNT-IMP: NORMAL
SODIUM SERPL-SCNC: 138 MMOL/L (ref 136–145)

## 2018-11-20 PROCEDURE — 77010033678 HC OXYGEN DAILY

## 2018-11-20 PROCEDURE — 74011000250 HC RX REV CODE- 250: Performed by: INTERNAL MEDICINE

## 2018-11-20 PROCEDURE — 94660 CPAP INITIATION&MGMT: CPT

## 2018-11-20 PROCEDURE — 36415 COLL VENOUS BLD VENIPUNCTURE: CPT

## 2018-11-20 PROCEDURE — 74011250637 HC RX REV CODE- 250/637: Performed by: INTERNAL MEDICINE

## 2018-11-20 PROCEDURE — 74011250636 HC RX REV CODE- 250/636: Performed by: INTERNAL MEDICINE

## 2018-11-20 PROCEDURE — 94760 N-INVAS EAR/PLS OXIMETRY 1: CPT

## 2018-11-20 PROCEDURE — 80048 BASIC METABOLIC PNL TOTAL CA: CPT

## 2018-11-20 PROCEDURE — 65660000000 HC RM CCU STEPDOWN

## 2018-11-20 PROCEDURE — 94640 AIRWAY INHALATION TREATMENT: CPT

## 2018-11-20 PROCEDURE — 82962 GLUCOSE BLOOD TEST: CPT

## 2018-11-20 RX ADMIN — MORPHINE SULFATE 1 MG: 2 INJECTION, SOLUTION INTRAMUSCULAR; INTRAVENOUS at 08:47

## 2018-11-20 RX ADMIN — GABAPENTIN 400 MG: 400 CAPSULE ORAL at 08:40

## 2018-11-20 RX ADMIN — LACTULOSE 10 G: 20 SOLUTION ORAL at 17:59

## 2018-11-20 RX ADMIN — BUDESONIDE 500 MCG: 0.5 INHALANT RESPIRATORY (INHALATION) at 08:45

## 2018-11-20 RX ADMIN — LISINOPRIL 20 MG: 20 TABLET ORAL at 08:41

## 2018-11-20 RX ADMIN — HEPARIN SODIUM 5000 UNITS: 5000 INJECTION INTRAVENOUS; SUBCUTANEOUS at 13:29

## 2018-11-20 RX ADMIN — SIMVASTATIN 20 MG: 20 TABLET, FILM COATED ORAL at 21:08

## 2018-11-20 RX ADMIN — PANTOPRAZOLE SODIUM 40 MG: 40 TABLET, DELAYED RELEASE ORAL at 08:46

## 2018-11-20 RX ADMIN — MORPHINE SULFATE 1 MG: 2 INJECTION, SOLUTION INTRAMUSCULAR; INTRAVENOUS at 13:21

## 2018-11-20 RX ADMIN — HEPARIN SODIUM 5000 UNITS: 5000 INJECTION INTRAVENOUS; SUBCUTANEOUS at 05:06

## 2018-11-20 RX ADMIN — HEPARIN SODIUM 5000 UNITS: 5000 INJECTION INTRAVENOUS; SUBCUTANEOUS at 21:07

## 2018-11-20 RX ADMIN — BUDESONIDE 500 MCG: 0.5 INHALANT RESPIRATORY (INHALATION) at 22:27

## 2018-11-20 RX ADMIN — GABAPENTIN 400 MG: 400 CAPSULE ORAL at 17:59

## 2018-11-20 RX ADMIN — MORPHINE SULFATE 1 MG: 2 INJECTION, SOLUTION INTRAMUSCULAR; INTRAVENOUS at 02:14

## 2018-11-20 RX ADMIN — LACTULOSE 10 G: 20 SOLUTION ORAL at 08:41

## 2018-11-20 RX ADMIN — MORPHINE SULFATE 1 MG: 2 INJECTION, SOLUTION INTRAMUSCULAR; INTRAVENOUS at 19:35

## 2018-11-20 NOTE — PROGRESS NOTES
Neurosurgery Progress Note; 
Patient is resting comfortably in room 3037 off antibiotics. Awaiting mri and biopsy. Discussed with patient importance of obtaining date, may not be infected. If not infected surgery may be an option  In terms of stabililizing the spine. Motor function is 5.5 A; stable P; await final workup

## 2018-11-20 NOTE — PROGRESS NOTES
INFECTIOUS DISEASE FOLLOW UP NOTE :-  
 
Admit Date: 11/14/2018 ABX;   
 
Current  Prior None 11/19 - 1 Cefepime/vanco  11/14   2, Ceftriaxone 11/16 - 3  
 
 
ASSESSMENT: -> RECS  
 
T10-11 disciitis CT:   severe destructive changes involving the endplates at P08-50. There is associated paravertebral soft tissue infiltration. Findings compatible 
with severe discitis. The inflammatory process extends into the anterior 
epidural space with compression of cord. L5-S1 spondylosis and degenerative disc 
disease. 
  
  L3-4 central stenosis - seen by Neurosurgery and conservative management recommended - ESR/CRP low for OM/discitis but does not exclude  
- off all abx for better diagnostics as pt not septic 
- needs MRI and then based on findings IT guided aspiration to make diagnosis. Send cultures [ bacteria, fungal, AFB ] and pathology - d/w Dr Elaine Grigsby and he is talking to [facility] to see if can intubate and get procedure done. Complex situation but will not subject him to long term Abx without making definite diagnosis Serratia UTI  
- ua pos nitrite, small le, 11-20 wbc, 3+ cj - Ucx >100k Serratia  ->completed 5 days of abx Bilateral pleural effusions and LL atx with atx in rml    
Abnormal CT abd: 
  Adenopathy- gastrohepatic ligament Splenic hilar and perisplenic varices Liver enlargement    
Ho ivda Precludes cvl for out pt rx - likely Co morbidities: Arthritis/ copd/HTN    
allergies Shellfish MICROBIOLOGY:  
11/14    Blcx x 2 - ntd 
             ucx - >100k Serratia LINES AND CATHETERS:  
PIV SUBJECTIVE :  
 
Interval notes reviewed. Pt afebrile. Continues to c/o pain. He says is claustrophobic but ready to try MRI again with ativan and pain meds.  D/w medicine team and pt had many attempts at MRI before and were unsuccessful despite meds. He is high risk for further sedation per anesthesia and unless intubated- won;t be able to get procedure done. D/w intensvist to see if can get it done. Will continue to hold Abx. MEDICATIONS :  
 
Current Facility-Administered Medications Medication Dose Route Frequency  pantoprazole (PROTONIX) tablet 40 mg  40 mg Oral ACB  morphine injection 1 mg  1 mg IntraVENous Q4H PRN  
 heparin (porcine) injection 5,000 Units  5,000 Units SubCUTAneous Q8H  
 budesonide (PULMICORT) 500 mcg/2 ml nebulizer suspension  500 mcg Nebulization BID RT  
 sodium chloride (NS) flush 5-10 mL  5-10 mL IntraVENous PRN  
 gabapentin (NEURONTIN) capsule 400 mg  400 mg Oral BID  lisinopril (PRINIVIL, ZESTRIL) tablet 20 mg  20 mg Oral DAILY  loratadine (CLARITIN) tablet 10 mg  10 mg Oral DAILY PRN  
 simvastatin (ZOCOR) tablet 20 mg  20 mg Oral QHS  naloxone (NARCAN) injection 0.4 mg  0.4 mg IntraVENous PRN  
 diphenhydrAMINE (BENADRYL) injection 12.5 mg  12.5 mg IntraVENous Q4H PRN  
 ondansetron (ZOFRAN) injection 4 mg  4 mg IntraVENous Q4H PRN  
 albuterol-ipratropium (DUO-NEB) 2.5 MG-0.5 MG/3 ML  3 mL Nebulization Q4H PRN  
 hydrALAZINE (APRESOLINE) 20 mg/mL injection 10 mg  10 mg IntraVENous Q6H PRN  
 bisacodyl (DULCOLAX) suppository 10 mg  10 mg Rectal DAILY PRN  
 lactulose (CHRONULAC) solution 10 g  10 g Oral BID OBJECTIVE :  
 
Visit Vitals /76 Pulse 92 Temp 97.8 °F (36.6 °C) Resp 26 Ht 6' (1.829 m) Wt 108.4 kg (238 lb 15.7 oz) SpO2 98% BMI 32.41 kg/m² Temp (24hrs), Av.9 °F (36.6 °C), Min:97.6 °F (36.4 °C), Max:98.2 °F (36.8 °C) Tom Flatter old AAM, awake, alert not in distress RESP-  ctab CVS- s1s2 normal, no murmur ABD-soft, Nt, bs present EXT-no edema SKIN -no rash NEURO - awake, alert, O x3, b/l motor strength intact Labs: Results:  
Chemistry Recent Labs  
  18 
0630 18 
0724 18 
0500 GLU 80 87 82  81  
  136 134*  134* K 3.8 3.7 4.7  4.5  
CL 99* 99* 100  98* CO2 36* 33* 29  33* BUN 10 6* 6*  6*  
CREA 0.77 0.64 0.54*  0.51* CA 8.9 8.3* 8.2*  8.1* AGAP 3 4 5  3 BUCR 13 9* 11*  12  
AP  --  112 93 TP  --  9.3* 8.6* ALB  --  2.4* 2.1*  
GLOB  --  6.9* 6.5* AGRAT  --  0.3* 0.3* CBC w/Diff Recent Labs 11/19/18 
0724 11/18/18 
0500 WBC 5.6 4.6  
RBC 5.13 4.46* HGB 14.0 12.1*  
HCT 43.4 39.4  139 GRANS 54 52 LYMPH 35 37 EOS 0 1 RADIOLOGY :   
CT abdo/pelvis 11/14 - IMPRESSION: 
1. Severe destructive changes involving endplates of R86-06 with circumferential paravertebral soft tissue infiltration and extension of process into the anterior epidural space with cord compression. Findings compatible with severe discitis. Recommend MRI evaluation. 2. Moderate dependent bilateral pleural effusions with partial bilateral lower lobe atelectasis and additional atelectatic streaks right middle lobe and lingula. 3. Multiple small hepatic cysts. Left adrenal adenoma. 4. Gastrohepatic ligament adenopathy and retrocrural adenopathy, probably reactive. 5. Splenic hilar and perisplenic varices. Disproportionate enlargement segment 2 and 3 of liver. Findings may indicate cirrhosis. 6. L3-4 central stenosis. Probable small hemangioma L1 vertebral body. Jaime Frederick MD 
November 20, 2018 [facility] [phone]

## 2018-11-20 NOTE — PROGRESS NOTES
SPO2 69. Pt anxious, attempting to remove Bipap. 4lLNC applied. SPO2 96. Offgoing nurse had reported during shift change that pt has severe claustrophobia.

## 2018-11-20 NOTE — PROGRESS NOTES
1256: PT orders received and cthart reviewed. Per chart review patient to be intubated for procedure and not appropriate for mobility at this time. Will d/c orders at this time. Please re-order when patient is appropriate for mobility. Thank you for this referral, Isidro Duarte PT, DPT Office extension: [phone] Pager #: [phone]

## 2018-11-20 NOTE — ROUTINE PROCESS
Bedside shift change report given to Belen BARNES (oncoming nurse) by Carolynn Brewer RN (offgoing nurse). Report included the following information SBAR, Kardex, Intake/Output, MAR, Recent Results and Cardiac Rhythm  occasional bigeminy.

## 2018-11-20 NOTE — ROUTINE PROCESS
0730-report received, patient resting queitly in bed, no distress noted, voiced no complaints at this time call bell within reach. 0840-am due medications given, and medicated for pain per patient request. 
1230-no change in condition, no distress noted. 1320-medicated for pain per patient request. 
1415-resting quietly in bed asleep. 1630-no change in condition, no distress noted. 1750-Dr. Moreira into see patient. 1800-pm due medications given. 1910-Bedside and Verbal shift change report given to Belen (oncoming nurse) by Sumit Ortega (offgoing nurse). Report included the following information SBAR, MAR and Recent Results.

## 2018-11-20 NOTE — ADT AUTH CERT NOTES
Back Pain - Care Day 5 (11/18/2018) by Tony Ji Review Status Review Entered Completed 11/19/2018 15:07 Criteria Review Care Day: 5 Care Date: 11/18/2018 Level of Care: Telemetry Guideline Day 2 Clinical Status  
(X) * Pain absent or managed 11/19/2018 3:04 PM EST by Azalia Miranda  
  Morphine 1 mg iv x 3,  
  
( ) * Acute etiologies requiring continued inpatient care absent  
( ) * Discharge plans and education understood Activity ( ) * Ambulation as tolerated[C] 11/19/2018 3:04 PM EST by Alma Kohler not noted to be up Routes  
(X) * Oral hydration, medications, and diet 11/19/2018 3:04 PM EST by Azalia Miranda  
  meds below, 120 ml po intake is noted. Card diet is order Medications ( ) * Oral analgesics 11/19/2018 3:04 PM EST by Azalia Miranda  
  Morphine 1 mg iv x 3,  
  
(X) * PCA absent 11/19/2018 3:07 PM EST by Azalia Miranda Subject: Additional Clinical Information  
  
per MEDICINE---  68 y.o.    male  with h/o hypertension,copd,hepatitis C,presented to ED because of back pain. Patient indicating the lower portion of his T-spine where he has the pain,saying that it started almost three months ago and has been getting worse. He reports that he was seen in ED few weeks ago and was told that nothing was wrong with him. He is also reporting constipation the last 3 weeks. He denies legs weakness or loss of sensation. No fever or chills. Although patient did not report his history of heroin abuse,the ED report indicates that patient is an IV drugs abuser and last use of iv heroin was 3-4 weeks ago. Patient's friend who came with him in ED has reported that patient fell yesterday while trying to get to the bathroom and was incontinent. Patient is reporting that he has not been able to hold his urine for a while.   
  
In ED,CT abdm/pelvis showed severe  discitis,L3-4 central stenosis,bilateral pleural effusions with bilateral lower lobe atelectasis,splenic hilar and perisplenic varices. UA showed UTI. In ED,patient was started on vanc and cefepime for severe discitis. Neurosurgery was consulted,will see patient. The hospitalist team was called for admission. ID consulted. On 11/16,ID decided to discontinued vanc and cefepime,preferred the diagnosis first since patient has been afebrile,without leokocytosis and ESR/CRP showing low probability for OM/Discitis. Currently patient is only on ceftriaxone for UTI due to serratia. MRI of the spine not done yet as patient has not tolerated. On 11/15/2018,   patient noted confused.   Code stroke was called.      
  
CT head w/o acute and tele-neurology ruled out the stroke. Patient was transferred to icu due to altered mental status,likely metabolic. On 11/16/18,patient was hypoxic,hypercapnic. He was put on BIPAP,critical care was consulted. Today 11/18,patient more awake,clincally stable. MRI machine not working since yesterday. 11/19/2018 3:04 PM EST by Octavia Jarquin Subject: Additional Clinical Information VS  
  
99.4-103-38, 172/102, 159/88. sat 97 on 3 liters NC.  LB  
  
lab HGB 12.1, , CA 8.2. AMMONIA 46.  
  
( ON 11/16/18 the UDS is positive for Cocaine and Opiates) Urine culture grew  
  
> 100,000 colonies/ml Serratia Marcescens. Resistant to 2 drugs. 11/18 ABG drawn on 3 lpm NC O2. Results: pH 7.46, pCO2 53.8, pO2 58, BE 15, HCO3 38.7, sO2 91. pO2 58, HR 99, RR 28. meds  
  
pulmicort neb bid, neurontin 400 mg po bid, hep 5000 u sc q 8h, apresoline 10 mg iv x 3, lactulose 10 g po bid, lisinopril 20 mg po daily, Morphine 1 mg iv x 3, Rocephin 2 g iv , NS IV @ 75 ran this day and DCd this day, protonix iv 40 mg daily.    
  
ORDERS;  
  
NEURO Cks per unit routine, BIPAP at bedtime. * Milestone Additional Notes -------------PER PULMONARY Principal Problem:  Discitis of thoracic region (2018)  Active Problems:  
  UTI (urinary tract infection) (2018)   
  Bilateral pleural effusion (2018)   
  Lumbar stenosis (2018)   
  Adenoma of left adrenal gland (2018)   
  Uncontrolled hypertension (2018)   
  Cirrhosis of liver ([facility] (2018)   
  Hepatitis C (2018)   
  Obesity (2018)   
  COPD (chronic obstructive pulmonary disease) ([facility] (2018)   
  Constipation (2018)  
   Back pain (2018)   
· Decrease morphine to 1 mg IV q 4 hr PRN for pain · DuoNeb/Pulmicort · MRI of thoracic spine tomorrow, if MRI has been repaired · Will allow Rocephin to  (empiric treatment for UTI) · Continue lisinopril · Continue Zocor · PT/OT evaluation and treatment plan   
NUTRITION: cardiac. Check prealbumin q week. Consult Clinical Dietician. ICU electrolyte replacement protocol   
PROPHYLAXIS:  DVT prophylaxis: start heparin GI prophylaxis: start Protonix HOB 30-degree elevation.   
  
---------------PER MEDICINE Care Plan 1. Discitis of thoracic region  
 -CT showing severe discitis of T10-11. Patient with h/o iv heroin abuse. Infection suspected.  
 -Vanc and cefepime initiated in ED,continued until   
 -Sepsis protocol was initiated in ED but I didn't think patient was septic - lactic acid,wbc,temp,HR normal.  
 -Blood cx negative  
 -Pt did not tolerate MRI. Discussed with IR,will have MRI under sedation and aspiration of T10-11 will be performed for cultures,cytology  
 -Neurosurgery consulted ->pt high risk for intervention - medical management  
 -ID consulted  
 -ID decided to d/c Vanc/cefepime on  as patient was not septic and ESR/CRP showing low probability for OM/Discitis. Pt currently on ceftriaxone for UTI  
 -Neurosurgery,ID requesting MRI  
 -Pain management with morphine prn  
 -Fall precaution.  -MRI machine did not work since yesterday. Eventually patient will have MRI on Monday  
  2. Respiratory failure with hypoxia/hypercapnia  
 -Started on BIPAP  
 -Critical care consulted  
 -Improved. Tolerating 3 liters NC and O2sat 93-98%  
   
3.Acute encephalopathy metabolic  
 -Likely due to acute resp failure and possibly elevated ammonia  
 -Will monitor mentation as patient being treated  
 -CT scan done on 11/15 after code stroke was called did not show any acute process. Tele-neurology did not think that patient had a stroke  
 -Improved mentation,interacting better today  
  4.Lumbar stenosis at L3-4   
 -Will follow neurosurgery recommendations -> no surgery,pt asymptomatic  
   
5. UTI (urinary tract infection) -Was on cefepime,then ceftriaxone since 11/16. -Ucx c/w serratia marscens and blood cx NGTD  
   
6.Cirrhosis of liver/Hepatitis C/H/o alcohol abuse  
 -Patient has h/o hepatitis C and past history of alcohol abuse. CT showing strong evidence of cirrhosis with possible portal vein hypertension. -Will monitor. -Received albumin x 1   
 -Ammonia level 36. INR 1.3 initially. Then went up to 60 on 11/17  
 -On lactulose. -Ammonia 46 today  
    
7. Hyperammonia 52 on 11/17/18 -On lactulose  
   
8.Bilateral pleural effusion/Legs edema -Likely due to cirrhosis of liver.  
 -Legs edema improved  9. Adenoma of left adrenal gland  -Seen in previous films. Will monitor  
   
10. Uncontrolled hypertension  -Resume lisinopril. Hydralazine prn   
11. Obesity -Supportive care   
12. COPD  -Duo-neb prn   
13. Constipation -Order dulcolax   
14. Back pain -Due to problems above - on pain meds.   
15. Legs edema -Likely due to cirrhosis with portal hypertension  
 -Edema likely due to liver disease,will order   
 -pro-BNP not elevated and ECHO nl with EF 66-70%  
 -legs edema resolved.  
   
DVT prophylaxis:sc heparin Full code:discussed with patient who stated that he does not have a living will and never decided his code status Disposition:tbd.   
  
-----------------PER NEUROSURGERY S: resting comfortably, denies leg pain O: remains neuro intact in legs A: Likely discitis P: When medically stable, proceed with plan of Dr. Veronica Daily note of 11/16 ( 11/16; P: antibiotics for now, at some point will need aspirated of disc space or psoas abscess, mri of t spine would be usedful as well)

## 2018-11-20 NOTE — PROGRESS NOTES
Problem: Pressure Injury - Risk of 
Goal: *Prevention of pressure injury Document Raul Scale and appropriate interventions in the flowsheet. Pressure Injury Interventions: 
Sensory Interventions: Assess changes in LOC Moisture Interventions: Absorbent underpads Activity Interventions: Pressure redistribution bed/mattress(bed type), PT/OT evaluation Mobility Interventions: Pressure redistribution bed/mattress (bed type) Nutrition Interventions: Document food/fluid/supplement intake Friction and Shear Interventions: Transferring/repositioning devices, Minimize layers, HOB 30 degrees or less Problem: Falls - Risk of 
Goal: *Absence of Falls Document Geovanni Agreste Fall Risk and appropriate interventions in the flowsheet. Outcome: Progressing Towards Goal 
Fall Risk Interventions: 
Mobility Interventions: Patient to call before getting OOB Mentation Interventions: Evaluate medications/consider consulting pharmacy Medication Interventions: Patient to call before getting OOB Elimination Interventions: Patient to call for help with toileting needs, Call light in reach History of Falls Interventions: Evaluate medications/consider consulting pharmacy Problem: Impaired Skin Integrity/Pressure Injury Treatment Goal: *Prevention of pressure injury Document Raul Scale and appropriate interventions in the flowsheet. Outcome: Progressing Towards Goal 
Pressure Injury Interventions: 
Sensory Interventions: Assess changes in LOC Moisture Interventions: Absorbent underpads Activity Interventions: Pressure redistribution bed/mattress(bed type), PT/OT evaluation Mobility Interventions: Pressure redistribution bed/mattress (bed type) Nutrition Interventions: Document food/fluid/supplement intake Friction and Shear Interventions: Transferring/repositioning devices, Minimize layers, HOB 30 degrees or less

## 2018-11-20 NOTE — PROGRESS NOTES
[address] [place], [place] [address]ist Division Inpatient Daily Progress Note Daily progress Note Patient: Terence Daugherty. MRN: [id number]  [facility]: [id number] YOB: 1950  Age: 77 y.o. Sex: male DOA: 11/14/2018 LOS:  LOS: 6 days Chief Complaint:  Discitis thoracic region Interval History: 77 y.o.  male with h/o HTN, COPD, Hep C, presented to ED because of back pain on 11/14. Patient indicating the lower portion of his T-spine where he has the pain,saying that it started almost three months ago and has been getting worse. He reports that he was seen in ED few weeks ago and was told that nothing was wrong with him. He is also reporting constipation the last 3 weeks. He denies legs weakness or loss of sensation. No fever or chills. Although patient did not report his history of heroin abuse, the ED report indicates that patient is an IV drugs abuser and last use of IV heroin was 3-4 weeks ago. Patient's friend who came with him in ED has reported that patient fell the day prior while trying to get to the bathroom and was incontinent. Patient is reporting that he has not been able to hold his urine for a while. In the ED,CT abdm/pelvis showed severe  discitis, L3-4 central stenosis, bilateral pleural effusions with bilateral lower lobe atelectasis, splenic hilar and perisplenic varices. UA showed UTI. In ED, patient was started on vanc and cefepime for severe discitis. Neurosurgery was consulted. The hospitalist team was called for admission. ID consulted. On 11/16,  ID decided to discontinue vanc and cefepime, preferred the diagnosis first since patient has been afebrile,without leokocytosis and ESR/CRP showing low probability for OM/Discitis. Currently patient is only on ceftriaxone for UTI due to serratia.   MRI of the spine not done yet as patient has not tolerated. On 11/15/2018, patient noted confused. Code S called. CT head w/o acute and tele-neurology ruled out the stroke. Patient was transferred to ICU due to altered mental status, likely metabolic. On 11/16/18, patient was hypoxic,hypercapnic. He was put on BIPAP, critical care was consulted. On 11/18, patient more awake, clincally stable MRI machine not working since 11/17- tests still pending. Per ID, ESR/CRP low for OM/discitis but does not exclude, of all abx for better diagnostics as pt not septic. Discussed with MRI dept on 11/19- stated anesthesia determined pt high risk for intubation and did not want to risk compensation given hx of COPD. MRI has been attempted 6-7 times but pt will not keep still and requesting to be put to sleep in order to complete test.  IR will not do culture/drain w/o prior MRI. Updated ID. Spoke with anesthesia on 11/20 to see if pt can be re-evaluated to be sedated for MRI- ID does not want to subject pt to long term IV abx w/o definite diagnosis, and IR will not obtain aspiration without MRI. Assessment/Plan:  
 
Patient Active Problem List  
Diagnosis Code  Discitis of thoracic region M46.44  
 UTI (urinary tract infection) N39.0  Bilateral pleural effusion J90  
 Lumbar stenosis M48.061  
 Adenoma of left adrenal gland D35.02  
 Uncontrolled hypertension I10  
 Cirrhosis of liver ([facility] [address] K74.60  Hepatitis C B19.20  Obesity E66.9  
 COPD (chronic obstructive pulmonary disease) (HCC) J44.9  Constipation K59.00  Back pain M54.9 A/P: 
1. Discitis of thoracic region 
 -CT showing severe discitis of T10-11. Patient with h/o iv heroin abuse. Infection suspected. 
 -Vanc and cefepime initiated in ED,continued until 11/16 
 -Sepsis protocol was initiated in ED but I didn't think patient was septic - lactic acid,wbc,temp,HR normal. 
 -Blood cx negative 
 -Pt did not tolerate MRI. Discussed with IR,will have MRI under sedation and aspiration of T10-11 will be performed for cultures,cytology 
 -Neurosurgery consulted ->pt high risk for intervention - medical management 
 -ID consulted 
 -ID decided to d/c Vanc/cefepime on 11/16 as patient was not septic and ESR/CRP showing low probability for OM/Discitis. Pt currently on ceftriaxone for UTI 
 -Neurosurgery,ID requesting MRI 
 -Pain management with morphine prn 
 -Fall precaution. -MRI machine did not work since 11/17 
- discussed with MRI dept 11/19 - stated anesthesia determined pt high risk for intubation and did not want to risk compensation given hx of COPD. MRI has been attempted 6-7 times but pt will not keep still and requesting to be put to sleep in order to complete test.  IR will not do culture/drain w/o prior MRI 
- discussed with anesthesia on 11/20 to see if pt can be re-evaluated for anesthesia for MRI 
  
2. Respiratory failure with hypoxia/hypercapnia 
 -Started on BIPAP 
 -Critical care consulted 
 -Improved. Tolerating 3 liters NC and O2sat 93-98% 
  
3.Acute encephalopathy metabolic 
 -Likely due to acute resp failure and possibly elevated ammonia 
 -Will monitor mentation as patient being treated 
 -CT scan done on 11/15 after code stroke was called did not show any acute process. Tele-neurology did not think that patient had a stroke 
 -Improved mentation,interacting better today 
  4.Lumbar stenosis at L3-4  
 -Will follow neurosurgery recommendations -> no surgery,pt asymptomatic 
  
5. UTI (urinary tract infection)  -Was on cefepime,then ceftriaxone since 11/16. 
 -Ucx c/w serratia marscens and blood cx NGTD 
  
6.Cirrhosis of liver/Hepatitis C/H/o alcohol abuse 
 -Patient has h/o hepatitis C and past history of alcohol abuse. CT showing strong evidence of cirrhosis with possible portal vein hypertension. 
  -Received albumin x 1  
 -On lactulose.  
  
7.Bilateral pleural effusion/Legs edema 
 -Likely due to cirrhosis of liver. 
 -Legs edema improved 
  
 8.Adenoma of left adrenal gland  
 -Seen in previous films. Will monitor 
  
9. Uncontrolled hypertension  
 -Resume lisinopril. Hydralazine prn 
  
10. Obesity 
 -Supportive care 
  
11. COPD  
 -Duo-neb prn 
  
12. Constipation 
 -Order dulcolax 
  
13. Back pain 
 -Due to problems above - on pain meds. 
  
14. Legs edema 
 -Likely due to cirrhosis with portal hypertension 
 -Edema likely due to liver disease,will order  
 -pro-BNP not elevated and ECHO nl with EF 66-70% -legs edema resolved. 
  
DVT prophylaxis 
- Heparin subcutaneously TID ANDERSON [address] [facility] Hospitalist Division Pager:  [phone] Office:  [phone] Subjective:  
  
Pt c/o back pain, meds help some. No c/o chest pain or SOB. Objective:  
  
Visit Vitals /76 Pulse 92 Temp 97.8 °F (36.6 °C) Resp 26 Ht 6' (1.829 m) Wt 108.4 kg (238 lb 15.7 oz) SpO2 98% BMI 32.41 kg/m² Physical Exam: 
General appearance: alert, cooperative, no distress, appears stated age Head: Normocephalic, without obvious abnormality, atraumatic Lungs: clear to auscultation bilaterally Heart: regular rate and rhythm, S1, S2 normal, no murmur, click, rub or gallop Abdomen: soft, non tender, non distended. Normoactive bowel sounds. No masses, no organomegaly Extremities: extremities normal, atraumatic, no cyanosis or edema Skin: Skin color, texture, turgor normal. No rashes or lesions Neurologic: Grossly normal 
PSY: Mood and affect normal, appropriately behaved Intake and Output: 
Current Shift:  No intake/output data recorded. Last three shifts:  11/18 1901 - 11/20 0700 In: 240 [P.O.:240] Out: 2200 [Urine:2200] Recent Results (from the past 24 hour(s)) GLUCOSE, POC Collection Time: 11/19/18 11:57 AM  
Result Value Ref Range Glucose (POC) 123 (H) 70 - 110 mg/dL GLUCOSE, POC Collection Time: 11/19/18  4:56 PM  
Result Value Ref Range Glucose (POC) 110 70 - 110 mg/dL GLUCOSE, POC Collection Time: 11/19/18  9:02 PM  
Result Value Ref Range Glucose (POC) 102 70 - 110 mg/dL METABOLIC PANEL, BASIC Collection Time: 11/20/18  6:30 AM  
Result Value Ref Range Sodium 138 136 - 145 mmol/L Potassium 3.8 3.5 - 5.5 mmol/L Chloride 99 (L) 100 - 108 mmol/L  
 CO2 36 (H) 21 - 32 mmol/L Anion gap 3 3.0 - 18 mmol/L Glucose 80 74 - 99 mg/dL BUN 10 7.0 - 18 MG/DL Creatinine 0.77 0.6 - 1.3 MG/DL  
 BUN/Creatinine ratio 13 12 - 20 GFR est AA >60 >60 ml/min/1.73m2 GFR est non-AA >60 >60 ml/min/1.73m2 Calcium 8.9 8.5 - 10.1 MG/DL  
GLUCOSE, POC Collection Time: 11/20/18  8:00 AM  
Result Value Ref Range Glucose (POC) 80 70 - 110 mg/dL Lab Results Component Value Date/Time Glucose 80 11/20/2018 06:30 AM  
 Glucose 87 11/19/2018 07:24 AM  
 Glucose 82 11/18/2018 05:00 AM  
 Glucose 81 11/18/2018 05:00 AM  
 Glucose 90 11/17/2018 04:00 AM  
  
 
Imaging: No results found. Medication List Reviewed: 
Current Facility-Administered Medications Medication Dose Route Frequency  pantoprazole (PROTONIX) tablet 40 mg  40 mg Oral ACB  morphine injection 1 mg  1 mg IntraVENous Q4H PRN  
 heparin (porcine) injection 5,000 Units  5,000 Units SubCUTAneous Q8H  
 budesonide (PULMICORT) 500 mcg/2 ml nebulizer suspension  500 mcg Nebulization BID RT  
 sodium chloride (NS) flush 5-10 mL  5-10 mL IntraVENous PRN  
 gabapentin (NEURONTIN) capsule 400 mg  400 mg Oral BID  lisinopril (PRINIVIL, ZESTRIL) tablet 20 mg  20 mg Oral DAILY  loratadine (CLARITIN) tablet 10 mg  10 mg Oral DAILY PRN  
 simvastatin (ZOCOR) tablet 20 mg  20 mg Oral QHS  naloxone (NARCAN) injection 0.4 mg  0.4 mg IntraVENous PRN  
 diphenhydrAMINE (BENADRYL) injection 12.5 mg  12.5 mg IntraVENous Q4H PRN  
 ondansetron (ZOFRAN) injection 4 mg  4 mg IntraVENous Q4H PRN  
  albuterol-ipratropium (DUO-NEB) 2.5 MG-0.5 MG/3 ML  3 mL Nebulization Q4H PRN  
 hydrALAZINE (APRESOLINE) 20 mg/mL injection 10 mg  10 mg IntraVENous Q6H PRN  
 bisacodyl (DULCOLAX) suppository 10 mg  10 mg Rectal DAILY PRN  
 lactulose (CHRONULAC) solution 10 g  10 g Oral BID

## 2018-11-21 PROCEDURE — 94640 AIRWAY INHALATION TREATMENT: CPT

## 2018-11-21 PROCEDURE — 74011250637 HC RX REV CODE- 250/637: Performed by: INTERNAL MEDICINE

## 2018-11-21 PROCEDURE — 77010033678 HC OXYGEN DAILY

## 2018-11-21 PROCEDURE — 74011000250 HC RX REV CODE- 250: Performed by: INTERNAL MEDICINE

## 2018-11-21 PROCEDURE — 94760 N-INVAS EAR/PLS OXIMETRY 1: CPT

## 2018-11-21 PROCEDURE — 74011250636 HC RX REV CODE- 250/636: Performed by: INTERNAL MEDICINE

## 2018-11-21 PROCEDURE — 74011250637 HC RX REV CODE- 250/637: Performed by: NURSE PRACTITIONER

## 2018-11-21 PROCEDURE — 65660000000 HC RM CCU STEPDOWN

## 2018-11-21 RX ORDER — HYDROCODONE BITARTRATE AND ACETAMINOPHEN 5; 325 MG/1; MG/1
1 TABLET ORAL
Status: DISCONTINUED | OUTPATIENT
Start: 2018-11-21 | End: 2018-12-04 | Stop reason: HOSPADM

## 2018-11-21 RX ADMIN — BUDESONIDE 500 MCG: 0.5 INHALANT RESPIRATORY (INHALATION) at 09:25

## 2018-11-21 RX ADMIN — GABAPENTIN 400 MG: 400 CAPSULE ORAL at 08:55

## 2018-11-21 RX ADMIN — LACTULOSE 10 G: 20 SOLUTION ORAL at 17:29

## 2018-11-21 RX ADMIN — HYDROCODONE BITARTRATE AND ACETAMINOPHEN 1 TABLET: 5; 325 TABLET ORAL at 10:54

## 2018-11-21 RX ADMIN — GABAPENTIN 400 MG: 400 CAPSULE ORAL at 17:29

## 2018-11-21 RX ADMIN — LISINOPRIL 20 MG: 20 TABLET ORAL at 08:55

## 2018-11-21 RX ADMIN — SIMVASTATIN 20 MG: 20 TABLET, FILM COATED ORAL at 22:17

## 2018-11-21 RX ADMIN — PANTOPRAZOLE SODIUM 40 MG: 40 TABLET, DELAYED RELEASE ORAL at 08:55

## 2018-11-21 RX ADMIN — HYDROCODONE BITARTRATE AND ACETAMINOPHEN 1 TABLET: 5; 325 TABLET ORAL at 16:00

## 2018-11-21 RX ADMIN — MORPHINE SULFATE 1 MG: 2 INJECTION, SOLUTION INTRAMUSCULAR; INTRAVENOUS at 08:55

## 2018-11-21 RX ADMIN — HEPARIN SODIUM 5000 UNITS: 5000 INJECTION INTRAVENOUS; SUBCUTANEOUS at 04:37

## 2018-11-21 RX ADMIN — BUDESONIDE 500 MCG: 0.5 INHALANT RESPIRATORY (INHALATION) at 20:40

## 2018-11-21 RX ADMIN — HEPARIN SODIUM 5000 UNITS: 5000 INJECTION INTRAVENOUS; SUBCUTANEOUS at 16:00

## 2018-11-21 RX ADMIN — HYDROCODONE BITARTRATE AND ACETAMINOPHEN 1 TABLET: 5; 325 TABLET ORAL at 20:36

## 2018-11-21 RX ADMIN — LACTULOSE 10 G: 20 SOLUTION ORAL at 08:55

## 2018-11-21 NOTE — PROGRESS NOTES
Neurosurgery Progress Note; Motor exam is stable and 5.5, thoracic radiculopathy has resolved. Patient is comfortable at bed rest and brace is here. Discussed with patient need for biopsy and aspirated. No signs of a myelopathy Patient is not too enthusiastic about operative procedure but is there is no infection it might hasten his recovery

## 2018-11-21 NOTE — PROGRESS NOTES
Problem: Pressure Injury - Risk of 
Goal: *Prevention of pressure injury Document Raul Scale and appropriate interventions in the flowsheet. Outcome: Progressing Towards Goal 
Pressure Injury Interventions: 
Sensory Interventions: Use 30-degree side-lying position, Turn and reposition approx. every two hours (pillows and wedges if needed), Pressure redistribution bed/mattress (bed type), Monitor skin under medical devices, Keep linens dry and wrinkle-free, Check visual cues for pain Moisture Interventions: Offer toileting Q_hr, Maintain skin hydration (lotion/cream), Check for incontinence Q2 hours and as needed, Internal/External urinary devices Activity Interventions: Pressure redistribution bed/mattress(bed type) Mobility Interventions: Pressure redistribution bed/mattress (bed type), Turn and reposition approx. every two hours(pillow and wedges), HOB 30 degrees or less Nutrition Interventions: Document food/fluid/supplement intake Friction and Shear Interventions: Apply protective barrier, creams and emollients Problem: Falls - Risk of 
Goal: *Absence of Falls Document Debria Check Fall Risk and appropriate interventions in the flowsheet. Outcome: Progressing Towards Goal 
Fall Risk Interventions: 
Mobility Interventions: Patient to call before getting OOB Mentation Interventions: Room close to nurse's station, More frequent rounding, Door open when patient unattended Medication Interventions: Teach patient to arise slowly, Patient to call before getting OOB Elimination Interventions: Call light in reach, Toileting schedule/hourly rounds, Patient to call for help with toileting needs History of Falls Interventions: Room close to nurse's station, Evaluate medications/consider consulting pharmacy, Door open when patient unattended Problem: Impaired Skin Integrity/Pressure Injury Treatment Goal: *Prevention of pressure injury Document Raul Scale and appropriate interventions in the flowsheet. Outcome: Progressing Towards Goal 
Pressure Injury Interventions: 
Sensory Interventions: Use 30-degree side-lying position, Turn and reposition approx. every two hours (pillows and wedges if needed), Pressure redistribution bed/mattress (bed type), Monitor skin under medical devices, Keep linens dry and wrinkle-free, Check visual cues for pain Moisture Interventions: Offer toileting Q_hr, Maintain skin hydration (lotion/cream), Check for incontinence Q2 hours and as needed, Internal/External urinary devices Activity Interventions: Pressure redistribution bed/mattress(bed type) Mobility Interventions: Pressure redistribution bed/mattress (bed type), Turn and reposition approx. every two hours(pillow and wedges), HOB 30 degrees or less Nutrition Interventions: Document food/fluid/supplement intake Friction and Shear Interventions: Apply protective barrier, creams and emollients

## 2018-11-21 NOTE — PROGRESS NOTES
Assumed care; Pt resting in bed; no distress noted; Pt c/o 
pain; bed in low position; Side rails up x 3; Call light and personal belonging within reach. Will continue to monitor. 1935: Converted from 4LNC-3LNC. Tolerating well @ 96%. 0100: Dr Kerry Prather wishes to contine BiPAP order till am.  
0130: Bipap on. Tolerating. Will continue to observe. 0215: SPO2 79. Pt awake in room requesting BiPAP off. 2LNC applied. 96% on 2lnc.  Will continue to monitor,

## 2018-11-21 NOTE — PROGRESS NOTES
0715 -- Bedside shift change report given to CAMERON Sparks (oncoming nurse) by Boris Yen RN (offgoing nurse). Report included the following information SBAR, Kardex, Intake/Output, MAR and Recent Results. 
   
3138 -- AM medications given, well tolerated, will continue to monitor. Pain level 8 out of 10, pain med given, well tolerated, will continue to monitor. 0930 -- Pain reassessed, patient pain level 6 out of 10, distraction, repositioned, and support offered. 1054 -- Pain level 8 out of 10, pain med given, well tolerated, will continue to monitor. Patient refused repositioning. 
   
1100 -- Reassessment completed, no change in patient condition, will continue to monitor. 1130 -- Pain reassessed, patient is sleeping easily arouse-able, will continue to monitor. Patient refused repositioning. 
   
1515 -- Reassessment completed, no change in patient condition, will continue to monitor. Patient refused repositioning. 1600 -- Pain level 8 out of 10, pain med given ,well tolerated, will continue to monitor. 1700 -- Pain reassessed, patient is visiting with a friend, will continue to monitor. Patient refused repositioning. 
   
Bedside shift change report given to CAMERON Malagon (oncoming nurse) by Clair Pemberton RN (offgoing nurse). Report included the following information SBAR, Kardex, Intake/Output, MAR and Recent Results.

## 2018-11-21 NOTE — PROGRESS NOTES
Nutrition initial assessment/ 
Plan of care RECOMMENDATIONS:  
 
1. Cardiac diet 2. Monitor weight, labs and PO intake 3. RD to follow GOALS:  
 
1. PO intake meets >75% of protein/calorie needs by 11/28 2. Weight Maintenance (+/- 1-2 lb by 11/28) ASSESSMENT:  
 
Weight status is classified as obese per BMI of 32.7. Patient with 13.1% weight loss over past 2.5 months per documented weights (Unsure if weights documented are accurate). PO intake is adequate. Labs noted. Nutrition recommendations listed. RD to follow. Nutrition Diagnoses:  
Obesity related to excessive energy intake as evidenced by BMI of 32.7. Nutrition Risk:  [] High  [] Moderate [x]  Low SUBJECTIVE/OBJECTIVE:  
 LOS. Transferred from ICU. Admitted with discitis of thoracic region. Has history of COPD and HTN. Has food allergy to shellfish. Noted poor dentition but patient denies problems with chewing/swallowing and declined change in diet texture. Patient reports having a good appetite and eating most of meals. Patient with 90% intake of lunch meal today and % intake of meals per vitals. Patient states weight was stable at 268 lb PTA (??). Encouraged adequate intake. Will monitor. Information Obtained from:  
 [x] Chart Review [x] Patient 
 [] Family/Caregiver 
 [] Nurse/Physician 
 [] Interdisciplinary Meeting/Rounds Diet: Cardiac diet Medications: [x] Reviewed Allergies: [x] Reviewed (Shellfish) Encounter Diagnoses ICD-10-CM ICD-9-CM 1. Discitis of thoracic region M46.44 722.92 Past Medical History:  
Diagnosis Date  Arthritis  COPD  Hypertension Labs:   
Lab Results Component Value Date/Time  Sodium 138 11/20/2018 06:30 AM  
 Potassium 3.8 11/20/2018 06:30 AM  
 Chloride 99 (L) 11/20/2018 06:30 AM  
 CO2 36 (H) 11/20/2018 06:30 AM  
 Anion gap 3 11/20/2018 06:30 AM  
 Glucose 80 11/20/2018 06:30 AM  
 BUN 10 11/20/2018 06:30 AM  
 Creatinine 0.77 11/20/2018 06:30 AM  
 Calcium 8.9 11/20/2018 06:30 AM  
 Magnesium 2.0 11/16/2018 09:23 AM  
 Phosphorus 3.1 11/16/2018 09:23 AM  
 Albumin 2.4 (L) 11/19/2018 07:24 AM  
 
Anthropometrics: BMI (calculated): 32.7 Last 3 Recorded Weights in this Encounter 11/18/18 2014 11/19/18 0540 11/20/18 1929 Weight: 110.4 kg (243 lb 4.8 oz) 108.4 kg (238 lb 15.7 oz) 109.5 kg (241 lb 8 oz) Ht Readings from Last 1 Encounters:  
11/14/18 6' (1.829 m) Weight Metrics 11/20/2018 9/9/2018 8/24/2018 8/20/2017 5/18/2017 6/24/2013 Weight 241 lb 8 oz 278 lb 268 lb 255 lb 273 lb 295 lb BMI 32.75 kg/m2 37.7 kg/m2 36.35 kg/m2 34.58 kg/m2 36.02 kg/m2 38.93 kg/m2 Patient Vitals for the past 100 hrs: 
 % Diet Eaten 11/21/18 0930 100 % 11/20/18 1758 75 % 11/20/18 1452 50 % 11/20/18 1320 100 % 11/20/18 1030 50 % 11/19/18 1842 50 % 11/19/18 1001 50 % 11/18/18 1800 50 % 11/17/18 0952 80 % IBW: 178 lb %IBW: 135% UBW: 268lb Estimated Nutrition Needs: [x] MSJ Calories: 2460 Kcal Based on:   [x] Actual BW   
Protein:    g Based on:   [x] Actual BW Fluid:       2500 ml Based on:   [x] Actual BW  
 
 [x] No Cultural, Moravian or ethnic dietary need identified. [] Cultural, Moravian and ethnic food preferences identified and addressed Wt Status:  [] Normal (18.6 - 24.9) [] Underweight (<18.5) [] Overweight (25 - 29.9) [x] Mild Obesity (30 - 34.9)  [] Moderate Obesity (35 - 39.9) [] Morbid Obesity (40+) Nutrition Problems Identified:  
[] Suboptimal PO intake [x] Food Allergies (Shellfish) [x] Difficulty chewing/swallowing/poor dentition 
[] Constipation/Diarrhea  
[] Nausea/Vomiting  
[] None 
[] Other:  
 
Plan:  
[x] Therapeutic Diet 
[]  Obtained/adjusted food preferences/tolerances and/or snacks options  
[]  Supplements added  
[] Occupational therapy following for feeding techniques []  HS snack added  
[]  Modify diet texture ( pt declined) [x]  Modify diet for food allergies []  Educate patient  
[]  Assist with menu selection  
[x]  Monitor PO intake on meal rounds  
[x]  Continue inpatient monitoring and intervention  
[]  Participated in discharge planning/Interdisciplinary rounds/Team meetings  
[]  Other:  
 
Education Needs: 
 [] Not appropriate for teaching at this time due to: 
 [x] Identified and addressed Nutrition Monitoring and Evaluation: 
[x] Continue ongoing monitoring and intervention 
[] Other [address]

## 2018-11-21 NOTE — PROGRESS NOTES
[address] [place], [place] [address] Hospitalist Division Inpatient Daily Progress Note Daily progress Note Patient: Terence Daugherty. MRN: [id number]  [facility]: [id number] YOB: 1950  Age: 77 y.o. Sex: male DOA: 11/14/2018 LOS:  LOS: 7 days Chief Complaint:  Discitis thoracic region Interval History: 77 y.o.  male with h/o HTN, COPD, Hep C, presented to ED because of back pain on 11/14. Patient indicating the lower portion of his T-spine where he has the pain,saying that it started almost three months ago and has been getting worse. He reports that he was seen in ED few weeks ago and was told that nothing was wrong with him. He is also reporting constipation the last 3 weeks. He denies legs weakness or loss of sensation. No fever or chills. Although patient did not report his history of heroin abuse, the ED report indicates that patient is an IV drugs abuser and last use of IV heroin was 3-4 weeks ago. Patient's friend who came with him in ED has reported that patient fell the day prior while trying to get to the bathroom and was incontinent. Patient is reporting that he has not been able to hold his urine for a while. In the ED,CT abdm/pelvis showed severe  discitis, L3-4 central stenosis, bilateral pleural effusions with bilateral lower lobe atelectasis, splenic hilar and perisplenic varices. UA showed UTI. In ED, patient was started on vanc and cefepime for severe discitis. Neurosurgery was consulted. The hospitalist team was called for admission. ID consulted. On 11/16,  ID decided to discontinue vanc and cefepime, preferred the diagnosis first since patient has been afebrile,without leokocytosis and ESR/CRP showing low probability for OM/Discitis. Currently patient is only on ceftriaxone for UTI due to serratia.   MRI of the spine not done yet as patient has not tolerated. On 11/15/2018, patient noted confused. Code S called. CT head w/o acute and tele-neurology ruled out the stroke. Patient was transferred to ICU due to altered mental status, likely metabolic. On 11/16/18, patient was hypoxic,hypercapnic. He was put on BIPAP, critical care was consulted. On 11/18, patient more awake, clincally stable MRI machine not working since 11/17- tests still pending. Per ID, ESR/CRP low for OM/discitis but does not exclude, of all abx for better diagnostics as pt not septic. Discussed with MRI dept on 11/19- stated anesthesia determined pt high risk for intubation and did not want to risk compensation given hx of COPD. MRI has been attempted 6-7 times but pt will not keep still and requesting to be put to sleep in order to complete test.  IR will not do culture/drain w/o prior MRI. Updated ID. Spoke with anesthesia on 11/20 to see if pt can be re-evaluated to be sedated for MRI- ID does not want to subject pt to long term IV abx w/o definite diagnosis, and IR will not obtain aspiration without MRI. Still awaiting MRI T spine- MRI stated they would be available on Friday for scheduling but unable to do today due to prior appts. Assessment/Plan:  
 
Patient Active Problem List  
Diagnosis Code  Discitis of thoracic region M46.44  
 UTI (urinary tract infection) N39.0  Bilateral pleural effusion J90  
 Lumbar stenosis M48.061  
 Adenoma of left adrenal gland D35.02  
 Uncontrolled hypertension I10  
 Cirrhosis of liver ([facility] [address] K74.60  Hepatitis C B19.20  Obesity E66.9  
 COPD (chronic obstructive pulmonary disease) (HCC) J44.9  Constipation K59.00  Back pain M54.9 A/P: 
1. Discitis of thoracic region 
 -CT showing severe discitis of T10-11. Patient with h/o iv heroin abuse. Infection suspected. 
 -Vanc and cefepime initiated in ED,continued until 11/16 
 -Sepsis protocol was initiated in ED but I didn't think patient was septic - lactic acid,wbc,temp,HR normal. 
 -Blood cx negative 
 -Pt did not tolerate MRI. Discussed with IR,will have MRI under sedation and aspiration of T10-11 will be performed for cultures,cytology 
 -Neurosurgery consulted ->pt high risk for intervention - medical management 
 -ID decided to d/c Vanc/cefepime on 11/16 as patient was not septic and ESR/CRP showing low probability for OM/Discitis. Pt currently on ceftriaxone for UTI 
 -Pain management with morphine and norco prn 
 -Fall precaution. - discussed with MRI dept 11/19 - stated anesthesia determined pt high risk for intubation and did not want to risk compensation given hx of COPD. MRI has been attempted 6-7 times but pt will not keep still and requesting to be put to sleep in order to complete test.  IR will not do culture/drain w/o prior MRI 
- discussed with anesthesia on 11/20 to see if pt can be re-evaluated for anesthesia for MRI 
  
2. Respiratory failure with hypoxia/hypercapnia 
 -Improved. Tolerating 3 liters NC and O2sat 93-98% 
  
3.Acute encephalopathy metabolic 
 -Likely due to acute resp failure and possibly elevated ammonia 
 -Will monitor mentation as patient being treated 
 -CT scan done on 11/15 after code stroke was called did not show any acute process. Tele-neurology did not think that patient had a stroke 
 -Improved mentation,interacting better today 
  4.Lumbar stenosis at L3-4  
 -Will follow neurosurgery recommendations -> no surgery,pt asymptomatic 
  
5. UTI (urinary tract infection) 
 - resolved- completed five days of IV abx 
 -Ucx c/w serratia marscens and blood cx NGTD 
  
6.Cirrhosis of liver/Hepatitis C/H/o alcohol abuse 
 -Patient has h/o hepatitis C and past history of alcohol abuse. CT showing strong evidence of cirrhosis with possible portal vein hypertension. 
  -Received albumin x 1  
 -On lactulose.  
  
7.Bilateral pleural effusion/Legs edema 
 -Likely due to cirrhosis of liver. 
 -Legs edema improved 
  
 8.Adenoma of left adrenal gland  
 -Seen in previous films. Will monitor 
  
9. Uncontrolled hypertension  
 -Resume lisinopril. Hydralazine prn 
  
10. Obesity 
 -Supportive care 
  
11. COPD  
 -Duo-neb prn 
  
12. Constipation 
 -Order dulcolax 
  
13. Back pain 
 -Due to problems above - on pain meds. 
  
14. Legs edema 
 -Likely due to cirrhosis with portal hypertension 
 -pro-BNP not elevated and ECHO nl with EF 66-70% -legs edema resolved. 
  
DVT prophylaxis 
- Heparin subcutaneously TID [facility] Pager:  [phone] Office:  [phone] Subjective: No acute events overnight. Morphine only helping for short amount of time. Still awaiting MRI. Objective:  
  
Visit Vitals /69 (BP 1 Location: Right arm, BP Patient Position: At rest) Pulse 91 Temp 98.3 °F (36.8 °C) Resp 22 Ht 6' (1.829 m) Wt 109.5 kg (241 lb 8 oz) SpO2 97% BMI 32.75 kg/m² Physical Exam: 
General appearance: alert, cooperative, no distress, appears stated age Head: Normocephalic, without obvious abnormality, atraumatic Lungs: clear to auscultation bilaterally Heart: regular rate and rhythm, S1, S2 normal, no murmur, click, rub or gallop Abdomen: soft, non tender, non distended. Normoactive bowel sounds. No masses, no organomegaly Extremities: extremities normal, atraumatic, no cyanosis or edema Skin: Skin color, texture, turgor normal. No rashes or lesions Neurologic: Grossly normal 
PSY: Mood and affect normal, appropriately behaved Intake and Output: 
Current Shift:  11/21 0701 - 11/21 1900 In: 240 [P.O.:240] Out: - Last three shifts:  11/19 1901 - 11/21 0700 In: 1300 [P.O.:600] Out: 400 [Urine:400] Recent Results (from the past 24 hour(s)) GLUCOSE, POC Collection Time: 11/20/18 12:26 PM  
Result Value Ref Range Glucose (POC) 105 70 - 110 mg/dL GLUCOSE, POC  
 Collection Time: 11/20/18  5:18 PM  
Result Value Ref Range Glucose (POC) 97 70 - 110 mg/dL Lab Results Component Value Date/Time Glucose 80 11/20/2018 06:30 AM  
 Glucose 87 11/19/2018 07:24 AM  
 Glucose 82 11/18/2018 05:00 AM  
 Glucose 81 11/18/2018 05:00 AM  
 Glucose 90 11/17/2018 04:00 AM  
  
 
Imaging: No results found. Medication List Reviewed: 
Current Facility-Administered Medications Medication Dose Route Frequency  HYDROcodone-acetaminophen (NORCO) 5-325 mg per tablet 1 Tab  1 Tab Oral Q4H PRN  pantoprazole (PROTONIX) tablet 40 mg  40 mg Oral ACB  morphine injection 1 mg  1 mg IntraVENous Q4H PRN  
 heparin (porcine) injection 5,000 Units  5,000 Units SubCUTAneous Q8H  
 budesonide (PULMICORT) 500 mcg/2 ml nebulizer suspension  500 mcg Nebulization BID RT  
 sodium chloride (NS) flush 5-10 mL  5-10 mL IntraVENous PRN  
 gabapentin (NEURONTIN) capsule 400 mg  400 mg Oral BID  lisinopril (PRINIVIL, ZESTRIL) tablet 20 mg  20 mg Oral DAILY  loratadine (CLARITIN) tablet 10 mg  10 mg Oral DAILY PRN  
 simvastatin (ZOCOR) tablet 20 mg  20 mg Oral QHS  naloxone (NARCAN) injection 0.4 mg  0.4 mg IntraVENous PRN  
 diphenhydrAMINE (BENADRYL) injection 12.5 mg  12.5 mg IntraVENous Q4H PRN  
 ondansetron (ZOFRAN) injection 4 mg  4 mg IntraVENous Q4H PRN  
 albuterol-ipratropium (DUO-NEB) 2.5 MG-0.5 MG/3 ML  3 mL Nebulization Q4H PRN  
 hydrALAZINE (APRESOLINE) 20 mg/mL injection 10 mg  10 mg IntraVENous Q6H PRN  
 bisacodyl (DULCOLAX) suppository 10 mg  10 mg Rectal DAILY PRN  
 lactulose (CHRONULAC) solution 10 g  10 g Oral BID

## 2018-11-21 NOTE — MANAGEMENT PLAN
Discharge/Transition Planning Pt will discharge Home with [facility] and personal care at discharge. Needs referral for PT to work with pt in hospital. Does not want nursing home placement currently Donna Ambrose RN BSN Outcomes Manager Pager # [phone]

## 2018-11-21 NOTE — PROGRESS NOTES
INFECTIOUS DISEASE FOLLOW UP NOTE :-  
 
Admit Date: 11/14/2018 ABX;   
 
Current  Prior None 11/19 - 2 Cefepime/vanco  11/14   2, Ceftriaxone 11/16 - 3  
 
 
ASSESSMENT: -> RECS  
 
T10-11 disciitis CT:   severe destructive changes involving the endplates at X42-65. There is associated paravertebral soft tissue infiltration. Findings compatible 
with severe discitis. The inflammatory process extends into the anterior 
epidural space with compression of cord. L5-S1 spondylosis and degenerative disc 
disease. 
  
  L3-4 central stenosis - seen by Neurosurgery and conservative management recommended - ESR/CRP low for OM/discitis but does not exclude  
- off all abx for better diagnostics as pt not septic 
- needs MRI and then based on findings IR guided aspiration to make diagnosis. Send cultures [ bacteria, fungal, AFB ] and pathology - d/w Dr Margaret PATEL and he is talking to [facility] to see if can intubate and get procedure done. Complex situation but will not subject him to long term Abx without making definite diagnosis Serratia UTI  
- ua pos nitrite, small le, 11-20 wbc, 3+ cj - Ucx >100k Serratia  ->completed 5 days of abx Bilateral pleural effusions and LL atx with atx in rml    
Abnormal CT abd: 
  Adenopathy- gastrohepatic ligament Splenic hilar and perisplenic varices Liver enlargement    
Ho ivda Precludes cvl for out pt rx - likely Co morbidities: Arthritis/ copd/HTN    
allergies Shellfish MICROBIOLOGY:  
11/14    Blcx x 2 - ntd 
             ucx - >100k Serratia LINES AND CATHETERS:  
PIV SUBJECTIVE :  
 
Interval notes reviewed. Pt afebrile. Continues to c/o pain. D/w medicine team and  He is high risk for further sedation per anesthesia and unless intubated- won;t be able to get procedure done.  Plans formulating to accomplish needed imaging. Will continue to hold Abx. MEDICATIONS :  
 
Current Facility-Administered Medications Medication Dose Route Frequency  HYDROcodone-acetaminophen (NORCO) 5-325 mg per tablet 1 Tab  1 Tab Oral Q4H PRN  pantoprazole (PROTONIX) tablet 40 mg  40 mg Oral ACB  morphine injection 1 mg  1 mg IntraVENous Q4H PRN  
 heparin (porcine) injection 5,000 Units  5,000 Units SubCUTAneous Q8H  
 budesonide (PULMICORT) 500 mcg/2 ml nebulizer suspension  500 mcg Nebulization BID RT  
 sodium chloride (NS) flush 5-10 mL  5-10 mL IntraVENous PRN  
 gabapentin (NEURONTIN) capsule 400 mg  400 mg Oral BID  lisinopril (PRINIVIL, ZESTRIL) tablet 20 mg  20 mg Oral DAILY  loratadine (CLARITIN) tablet 10 mg  10 mg Oral DAILY PRN  
 simvastatin (ZOCOR) tablet 20 mg  20 mg Oral QHS  naloxone (NARCAN) injection 0.4 mg  0.4 mg IntraVENous PRN  
 diphenhydrAMINE (BENADRYL) injection 12.5 mg  12.5 mg IntraVENous Q4H PRN  
 ondansetron (ZOFRAN) injection 4 mg  4 mg IntraVENous Q4H PRN  
 albuterol-ipratropium (DUO-NEB) 2.5 MG-0.5 MG/3 ML  3 mL Nebulization Q4H PRN  
 hydrALAZINE (APRESOLINE) 20 mg/mL injection 10 mg  10 mg IntraVENous Q6H PRN  
 bisacodyl (DULCOLAX) suppository 10 mg  10 mg Rectal DAILY PRN  
 lactulose (CHRONULAC) solution 10 g  10 g Oral BID OBJECTIVE :  
 
Visit Vitals /69 (BP 1 Location: Right arm, BP Patient Position: At rest) Pulse 91 Temp 98.3 °F (36.8 °C) Resp 22 Ht 6' (1.829 m) Wt 109.5 kg (241 lb 8 oz) SpO2 97% BMI 32.75 kg/m² Temp (24hrs), Av.1 °F (36.7 °C), Min:97.9 °F (36.6 °C), Max:98.3 °F (36.8 °C) Renae Stephen old AAM, awake, alert not in distress RESP-  ctab CVS- s1s2 normal, no murmur ABD-soft, Nt, bs present EXT-no edema SKIN -no rash NEURO - awake, alert, O x3, b/l motor strength intact Labs: Results:  
Chemistry Recent Labs  
  18 
0630 18 
8950 GLU 80 87  136  
K 3.8 3.7 CL 99* 99* CO2 36* 33* BUN 10 6* CREA 0.77 0.64 CA 8.9 8.3* AGAP 3 4 BUCR 13 9* AP  --  112 TP  --  9.3* ALB  --  2.4*  
GLOB  --  6.9* AGRAT  --  0.3* CBC w/Diff Recent Labs 11/19/18 
8069 WBC 5.6  
RBC 5.13  
HGB 14.0  
HCT 43.4  GRANS 54 LYMPH 35 EOS 0  
  
 
 
RADIOLOGY :   
CT abdo/pelvis 11/14 - IMPRESSION: 
1. Severe destructive changes involving endplates of S92-16 with circumferential paravertebral soft tissue infiltration and extension of process into the anterior epidural space with cord compression. Findings compatible with severe discitis. Recommend MRI evaluation. 2. Moderate dependent bilateral pleural effusions with partial bilateral lower lobe atelectasis and additional atelectatic streaks right middle lobe and lingula. 3. Multiple small hepatic cysts. Left adrenal adenoma. 4. Gastrohepatic ligament adenopathy and retrocrural adenopathy, probably reactive. 5. Splenic hilar and perisplenic varices. Disproportionate enlargement segment 2 and 3 of liver. Findings may indicate cirrhosis. 6. L3-4 central stenosis. Probable small hemangioma L1 vertebral body. Sara Lee MD, [id number] November 21, 2018 [facility] [phone]

## 2018-11-22 ENCOUNTER — APPOINTMENT (OUTPATIENT)
Dept: GENERAL RADIOLOGY | Age: 68
DRG: 347 | End: 2018-11-22
Attending: HOSPITALIST
Payer: MEDICAID

## 2018-11-22 LAB
ANION GAP SERPL CALC-SCNC: 0 MMOL/L (ref 3–18)
BUN SERPL-MCNC: 11 MG/DL (ref 7–18)
BUN/CREAT SERPL: 16 (ref 12–20)
CALCIUM SERPL-MCNC: 8.5 MG/DL (ref 8.5–10.1)
CHLORIDE SERPL-SCNC: 102 MMOL/L (ref 100–108)
CO2 SERPL-SCNC: 35 MMOL/L (ref 21–32)
CREAT SERPL-MCNC: 0.7 MG/DL (ref 0.6–1.3)
GLUCOSE BLD STRIP.AUTO-MCNC: 103 MG/DL (ref 70–110)
GLUCOSE BLD STRIP.AUTO-MCNC: 99 MG/DL (ref 70–110)
GLUCOSE SERPL-MCNC: 82 MG/DL (ref 74–99)
POTASSIUM SERPL-SCNC: 4.3 MMOL/L (ref 3.5–5.5)
SODIUM SERPL-SCNC: 137 MMOL/L (ref 136–145)

## 2018-11-22 PROCEDURE — 36415 COLL VENOUS BLD VENIPUNCTURE: CPT

## 2018-11-22 PROCEDURE — 65660000000 HC RM CCU STEPDOWN

## 2018-11-22 PROCEDURE — 77010033678 HC OXYGEN DAILY

## 2018-11-22 PROCEDURE — 80048 BASIC METABOLIC PNL TOTAL CA: CPT

## 2018-11-22 PROCEDURE — 77030011256 HC DRSG MEPILEX <16IN NO BORD MOLN -A

## 2018-11-22 PROCEDURE — 82803 BLOOD GASES ANY COMBINATION: CPT

## 2018-11-22 PROCEDURE — 36600 WITHDRAWAL OF ARTERIAL BLOOD: CPT

## 2018-11-22 PROCEDURE — 74011250637 HC RX REV CODE- 250/637: Performed by: INTERNAL MEDICINE

## 2018-11-22 PROCEDURE — 74011250636 HC RX REV CODE- 250/636: Performed by: INTERNAL MEDICINE

## 2018-11-22 PROCEDURE — 74011250637 HC RX REV CODE- 250/637: Performed by: NURSE PRACTITIONER

## 2018-11-22 PROCEDURE — 71045 X-RAY EXAM CHEST 1 VIEW: CPT

## 2018-11-22 PROCEDURE — 74011000250 HC RX REV CODE- 250: Performed by: INTERNAL MEDICINE

## 2018-11-22 PROCEDURE — 94640 AIRWAY INHALATION TREATMENT: CPT

## 2018-11-22 PROCEDURE — 82962 GLUCOSE BLOOD TEST: CPT

## 2018-11-22 RX ORDER — DOCUSATE SODIUM 100 MG/1
100 CAPSULE, LIQUID FILLED ORAL DAILY
Status: DISCONTINUED | OUTPATIENT
Start: 2018-11-23 | End: 2018-12-04 | Stop reason: HOSPADM

## 2018-11-22 RX ADMIN — LACTULOSE 10 G: 20 SOLUTION ORAL at 09:28

## 2018-11-22 RX ADMIN — BUDESONIDE 500 MCG: 0.5 INHALANT RESPIRATORY (INHALATION) at 19:39

## 2018-11-22 RX ADMIN — HYDROCODONE BITARTRATE AND ACETAMINOPHEN 1 TABLET: 5; 325 TABLET ORAL at 15:17

## 2018-11-22 RX ADMIN — HYDRALAZINE HYDROCHLORIDE 10 MG: 20 INJECTION INTRAMUSCULAR; INTRAVENOUS at 20:22

## 2018-11-22 RX ADMIN — GABAPENTIN 400 MG: 400 CAPSULE ORAL at 17:09

## 2018-11-22 RX ADMIN — LACTULOSE 10 G: 20 SOLUTION ORAL at 17:09

## 2018-11-22 RX ADMIN — PANTOPRAZOLE SODIUM 40 MG: 40 TABLET, DELAYED RELEASE ORAL at 09:28

## 2018-11-22 RX ADMIN — BUDESONIDE 500 MCG: 0.5 INHALANT RESPIRATORY (INHALATION) at 09:15

## 2018-11-22 RX ADMIN — LISINOPRIL 20 MG: 20 TABLET ORAL at 09:30

## 2018-11-22 RX ADMIN — HEPARIN SODIUM 5000 UNITS: 5000 INJECTION INTRAVENOUS; SUBCUTANEOUS at 17:10

## 2018-11-22 RX ADMIN — SIMVASTATIN 20 MG: 20 TABLET, FILM COATED ORAL at 21:54

## 2018-11-22 RX ADMIN — HYDROCODONE BITARTRATE AND ACETAMINOPHEN 1 TABLET: 5; 325 TABLET ORAL at 04:07

## 2018-11-22 RX ADMIN — HEPARIN SODIUM 5000 UNITS: 5000 INJECTION INTRAVENOUS; SUBCUTANEOUS at 23:41

## 2018-11-22 RX ADMIN — HEPARIN SODIUM 5000 UNITS: 5000 INJECTION INTRAVENOUS; SUBCUTANEOUS at 00:16

## 2018-11-22 RX ADMIN — HEPARIN SODIUM 5000 UNITS: 5000 INJECTION INTRAVENOUS; SUBCUTANEOUS at 09:30

## 2018-11-22 RX ADMIN — GABAPENTIN 400 MG: 400 CAPSULE ORAL at 09:28

## 2018-11-22 NOTE — PROGRESS NOTES
Bedside shift change report given to Vesna BURGOS RN (oncoming nurse) by Jermain Ho RN (offgoing nurse). Report included the following information SBAR, Kardex, Intake/Output, MAR and Recent Results. 
  
2030  Shift assessment completed. Medicated for pain to lower back. 2120  Pain reassessed - 3/10 reported, pt repositioned. 0406  Medicated for pain. 0450  Pain reassessed. Bedside shift change report given to Zak Thompson RN (oncoming nurse) by Efraín Gilliam RN (offgoing nurse). Report included the following information SBAR, Kardex, Intake/Output, MAR and Cardiac Rhythm NSR.

## 2018-11-22 NOTE — PROGRESS NOTES
Problem: Falls - Risk of 
Goal: *Absence of Falls Document Marinus Cornea Fall Risk and appropriate interventions in the flowsheet. Outcome: Progressing Towards Goal 
Fall Risk Interventions: 
Mobility Interventions: Patient to call before getting OOB Mentation Interventions: Door open when patient unattended, Bed/chair exit alarm Medication Interventions: Teach patient to arise slowly Elimination Interventions: Call light in reach History of Falls Interventions: Door open when patient unattended, Bed/chair exit alarm Problem: Impaired Skin Integrity/Pressure Injury Treatment Goal: *Prevention of pressure injury Document Raul Scale and appropriate interventions in the flowsheet. Outcome: Progressing Towards Goal 
Pressure Injury Interventions: 
Sensory Interventions: Assess need for specialty bed, Keep linens dry and wrinkle-free, Turn and reposition approx. every two hours (pillows and wedges if needed) Moisture Interventions: Offer toileting Q_hr, Moisture barrier, Absorbent underpads, Apply protective barrier, creams and emollients Activity Interventions: Pressure redistribution bed/mattress(bed type) Mobility Interventions: Pressure redistribution bed/mattress (bed type) Nutrition Interventions: Document food/fluid/supplement intake Friction and Shear Interventions: Apply protective barrier, creams and emollients

## 2018-11-22 NOTE — PROGRESS NOTES
Problem: Pressure Injury - Risk of 
Goal: *Prevention of pressure injury Document Raul Scale and appropriate interventions in the flowsheet. Outcome: Progressing Towards Goal 
Pressure Injury Interventions: 
Sensory Interventions: Assess changes in LOC, Check visual cues for pain, Discuss PT/OT consult with provider, Maintain/enhance activity level, Keep linens dry and wrinkle-free, Minimize linen layers Moisture Interventions: Internal/External urinary devices, Absorbent underpads, Limit adult briefs, Maintain skin hydration (lotion/cream), Minimize layers Activity Interventions: Pressure redistribution bed/mattress(bed type), PT/OT evaluation Mobility Interventions: HOB 30 degrees or less, Pressure redistribution bed/mattress (bed type), PT/OT evaluation Nutrition Interventions: Document food/fluid/supplement intake Friction and Shear Interventions: Foam dressings/transparent film/skin sealants, Lift sheet, HOB 30 degrees or less Problem: Falls - Risk of 
Goal: *Absence of Falls Document [facility] Fall Risk and appropriate interventions in the flowsheet. Outcome: Progressing Towards Goal 
Fall Risk Interventions: 
Mobility Interventions: Patient to call before getting OOB, PT Consult for mobility concerns Mentation Interventions: Adequate sleep, hydration, pain control, Door open when patient unattended, Room close to nurse's station Medication Interventions: Teach patient to arise slowly, Patient to call before getting OOB Elimination Interventions: Call light in reach, Patient to call for help with toileting needs, Toileting schedule/hourly rounds History of Falls Interventions: Evaluate medications/consider consulting pharmacy, Door open when patient unattended Problem: Impaired Skin Integrity/Pressure Injury Treatment Goal: *Prevention of pressure injury Document Raul Scale and appropriate interventions in the flowsheet.  
Outcome: Progressing Towards Goal 
 Pressure Injury Interventions: 
Sensory Interventions: Assess changes in LOC, Check visual cues for pain, Discuss PT/OT consult with provider, Maintain/enhance activity level, Keep linens dry and wrinkle-free, Minimize linen layers Moisture Interventions: Internal/External urinary devices, Absorbent underpads, Limit adult briefs, Maintain skin hydration (lotion/cream), Minimize layers Activity Interventions: Pressure redistribution bed/mattress(bed type), PT/OT evaluation Mobility Interventions: HOB 30 degrees or less, Pressure redistribution bed/mattress (bed type), PT/OT evaluation Nutrition Interventions: Document food/fluid/supplement intake Friction and Shear Interventions: Foam dressings/transparent film/skin sealants, Lift sheet, HOB 30 degrees or less

## 2018-11-22 NOTE — PROGRESS NOTES
[facility] Inpatient Daily Progress Note Patient: Sherren Jupiter. MRN: [id number]  [facility]: [id number] YOB: 1950  Age: 77 y.o. Sex: male DOA: 11/14/2018 LOS:  LOS: 8 days Interval History:   
 
77 y.o.  male with h/o HTN, COPD, Hep C, presented to ED because of back pain on 11/14. Patient indicating the lower portion of his T-spine where he has the pain,saying that it started almost three months ago and has been getting worse. He reports that he was seen in ED few weeks ago and was told that nothing was wrong with him. He is also reporting constipation the last 3 weeks. He denies legs weakness or loss of sensation. No fever or chills. Although patient did not report his history of heroin abuse, the ED report indicates that patient is an IV drugs abuser and last use of IV heroin was 3-4 weeks ago. Patient's friend who came with him in ED has reported that patient fell the day prior while trying to get to the bathroom and was incontinent. Patient is reporting that he has not been able to hold his urine for a while. In the ED,CT abdm/pelvis showed severe  discitis, L3-4 central stenosis, bilateral pleural effusions with bilateral lower lobe atelectasis, splenic hilar and perisplenic varices. UA showed UTI. In ED, patient was started on vanc and cefepime for severe discitis. Neurosurgery was consulted. The hospitalist team was called for admission. ID consulted. On 11/16,  ID decided to discontinue vanc and cefepime, preferred the diagnosis first since patient has been afebrile,without leokocytosis and ESR/CRP showing low probability for OM/Discitis. Currently patient is only on ceftriaxone for UTI due to serratia. MRI of the spine not done yet as patient has not tolerated. On 11/15/2018, patient noted confused.  Code S called. CT head w/o acute and tele-neurology ruled out the stroke. Patient was transferred to ICU due to altered mental status, likely metabolic. On 11/16/18, patient was hypoxic,hypercapnic. He was put on BIPAP, critical care was consulted. On 11/18, patient more awake, clincally stable MRI machine not working since 11/17- tests still pending. Per ID, ESR/CRP low for OM/discitis but does not exclude, of all abx for better diagnostics as pt not septic. Discussed with MRI dept on 11/19- stated anesthesia determined pt high risk for intubation and did not want to risk compensation given hx of COPD. MRI has been attempted 6-7 times but pt will not keep still and requesting to be put to sleep in order to complete test.  IR will not do culture/drain w/o prior MRI. Updated ID. Spoke with anesthesia on 11/20 to see if pt can be re-evaluated to be sedated for MRI- ID does not want to subject pt to long term IV abx w/o definite diagnosis, and IR will not obtain aspiration without MRI. Still awaiting MRI T spine- MRI stated they would be available on Friday for scheduling but unable to do today due to prior appts. 11/22/18: MRI tomorrow; aspirate pending MRI, consider surgery if no infx present-per neurosx. Colace added. BP slightly elevated-monitor BP closely. ICS, pulmonary toileting. Subjective:  
  
NAD. C/o some back discomfort Objective:  
  
Visit Vitals /68 Pulse 90 Temp 97.9 °F (36.6 °C) Resp 22 Ht 6' (1.829 m) Wt 111.5 kg (245 lb 13 oz) SpO2 96% BMI 33.34 kg/m² Physical Exam: 
General appearance: alert, cooperative Lungs: clear to auscultation bilaterally Heart: regular rate and rhythm, S1, S2 normal 
Abdomen: soft, non tender, non distended. Normoactive bowel sounds. Extremities: extremities normal, atraumatic, no cyanosis or edema Skin: Skin color, texture, turgor normal. 
Neurologic: Grossly normal 
 
 
Intake and Output: Current Shift:  No intake/output data recorded. Last three shifts:  11/20 1901 - 11/22 0700 In: 1970 [P.O.:1620] Out: 1225 [Urine:1225] No results found for this or any previous visit (from the past 24 hour(s)). Lab Results Component Value Date/Time Glucose 80 11/20/2018 06:30 AM  
 Glucose 87 11/19/2018 07:24 AM  
 Glucose 82 11/18/2018 05:00 AM  
 Glucose 81 11/18/2018 05:00 AM  
 Glucose 90 11/17/2018 04:00 AM  
  
 
Assessment/Plan:  
 
Patient Active Problem List  
Diagnosis Code  Discitis of thoracic region M46.44  
 UTI (urinary tract infection) N39.0  Bilateral pleural effusion J90  
 Lumbar stenosis M48.061  
 Adenoma of left adrenal gland D35.02  
 Uncontrolled hypertension I10  
 Cirrhosis of liver ([facility] [address] K74.60  Hepatitis C B19.20  Obesity E66.9  
 COPD (chronic obstructive pulmonary disease) (HCC) J44.9  Constipation K59.00  Back pain M54.9 A/P: 
 
1. Discitis of thoracic region 
 -CT showing severe discitis of T10-11. Patient with h/o iv heroin abuse. Infection suspected. 
 -Vanc and cefepime initiated in ED,continued until 11/16 
 -Sepsis protocol was initiated in ED but I didn't think patient was septic - lactic acid,wbc,temp,HR normal. 
 -Blood cx negative 
 -Pt did not tolerate MRI. Discussed with IR,will have MRI under sedation and aspiration of T10-11 will be performed for cultures,cytology 
 -Neurosurgery consulted ->pt high risk for intervention - medical management 
 -ID decided to d/c Vanc/cefepime on 11/16 as patient was not septic and ESR/CRP showing low probability for OM/Discitis. Pt currently on ceftriaxone for UTI 
 -Pain management with morphine and norco prn 
 -Fall precaution. - discussed with MRI dept 11/19 - stated anesthesia determined pt high risk for intubation and did not want to risk compensation given hx of COPD.   MRI has been attempted 6-7 times but pt will not keep still and requesting to be put to sleep in order to complete test.  IR will not do culture/drain w/o prior MRI 
- discussed with anesthesia on 11/20 to see if pt can be re-evaluated for anesthesia for MRI 
-MRI tomorrow hopefully per neurosx noted, aspirate pending MRI, if not infected, surgery considerable  
  2. Respiratory failure with hypoxia/hypercapnia 
 -Improved. Tolerating 3 liters NC and O2sat 93-98% 
-titrate down O2 as tolerated  
-hx of COPD (sat goal > 90%)  
  
3.Acute encephalopathy metabolic 
 -Likely due to acute resp failure and possibly elevated ammonia 
 -Will monitor mentation as patient being treated 
 -CT scan done on 11/15 after code stroke was called did not show any acute process. Tele-neurology did not think that patient had a stroke 
 -Improved mentation,interacting better today 
  4.Lumbar stenosis at L3-4  
 -Will follow neurosurgery recommendations 
-awaiting further w/u per neurosurgery  
  
5.UTI (urinary tract infection) 
 - resolved- completed five days of IV abx 
 -Ucx c/w serratia marscens and blood cx NGTD 
  
6.Cirrhosis of liver/Hepatitis C/H/o alcohol abuse 
 -Patient has h/o hepatitis C and past history of alcohol abuse. CT showing strong evidence of cirrhosis with possible portal vein hypertension. 
  -Received albumin x 1  
 -On lactulose 
  
7.Bilateral pleural effusion/Legs edema 
 -Likely due to cirrhosis of liver 
 -Legs edema improved 
  
8. Adenoma of left adrenal gland  
 -Seen in previous films. Will monitor 
  
9. Uncontrolled hypertension  
 -Resume lisinopril 
-Hydralazine prn 
  
10. Obesity 
 -Supportive care 
  
11. COPD  
 -Duo-neb prn 
-monitor sats (sat goal > 90%) 
-will need to monitor if will need home O2 (road test prior to discharge) -supplemental oxygenation as needed  
  
12. Constipation 
 -Order dulcolax 
-add daily stool softener  
  
13. Back pain 
 -Due to problems above - on pain meds. -pain control prn  
-frequent repositioning  
  
14. Legs edema  -Likely due to cirrhosis with portal hypertension 
 -pro-BNP not elevated and ECHO nl with EF 66-70% 
-resolved  
  
DVT prophylaxis 
- Heparin subcutaneously TID Quinn Trinidad, SARAH, NP-C [facility][phone] Office:  [phone]

## 2018-11-22 NOTE — ROUTINE PROCESS
Bedside, Verbal and Written shift change report given to Zeke Lewis RN (oncoming nurse) by Sandra Robles RN (offgoing nurse). Report included the following information SBAR, Kardex, Intake/Output, MAR, Recent Results, Med Rec Status, Procedure Summary and Cardiac Rhythm NSR.    
0720 - Shift assessment completed. Pt alert and oriented x4. No respiratory distress noted. No c/o pain reported. Call bell within reach, bed in low position. Will continue to monitor. 
   
1220 - Shift re-assessment completed, no change in pt condition. 
   
1517 - Pt c/o pain to back, PRN Norco administered. 1620 - Shift re-assessment completed, no change in pt condition. 
   
1939 - Pt had his O2 via n/c out of his nose and SOB. Placed pt's O2 back on, raised pt's HOB, and pt's scheduled breathing treatment administered. Pt's respiratory status improved and pt feeling better. Bedside, Verbal and Written shift change report given to Sandra Robles RN (oncoming nurse) by Zeke Lewis RN (offgoing nurse). Report included the following information SBAR, Kardex, Intake/Output, MAR, Recent Results, Med Rec Status, Procedure Summary and Cardiac Rhythm NSR.

## 2018-11-22 NOTE — PROGRESS NOTES
Neurosurgery:  
Mounika is comfortable, thoracic radiculopathy has resolved. Mri hopefully tomorrow Aspirate depending on mri If not infected could consider surgery Surgery would be at some increased risk but may shorten the overall treatment course. Discussed with patient

## 2018-11-22 NOTE — PROGRESS NOTES
Problem: Pressure Injury - Risk of 
Goal: *Prevention of pressure injury Document Raul Scale and appropriate interventions in the flowsheet. Outcome: Progressing Towards Goal 
Pressure Injury Interventions: 
Sensory Interventions: Assess changes in LOC, Avoid rigorous massage over bony prominences, Check visual cues for pain, Discuss PT/OT consult with provider, Keep linens dry and wrinkle-free, Maintain/enhance activity level, Minimize linen layers, Monitor skin under medical devices, Pad between skin to skin, Pressure redistribution bed/mattress (bed type), Turn and reposition approx. every two hours (pillows and wedges if needed) Moisture Interventions: Absorbent underpads, Check for incontinence Q2 hours and as needed, Internal/External urinary devices, Limit adult briefs, Maintain skin hydration (lotion/cream), Minimize layers, Moisture barrier Activity Interventions: Increase time out of bed, Pressure redistribution bed/mattress(bed type), PT/OT evaluation Mobility Interventions: HOB 30 degrees or less, Pressure redistribution bed/mattress (bed type), PT/OT evaluation, Turn and reposition approx. every two hours(pillow and wedges) Nutrition Interventions: Document food/fluid/supplement intake, Discuss nutritional consult with provider, Offer support with meals,snacks and hydration Friction and Shear Interventions: Foam dressings/transparent film/skin sealants, HOB 30 degrees or less, Lift sheet, Lift team/patient mobility team, Minimize layers, Transfer aides:transfer board/Gordon lift/ceiling lift, Transferring/repositioning devices Problem: Falls - Risk of 
Goal: *Absence of Falls Document Ermelinda Heart Fall Risk and appropriate interventions in the flowsheet.  
Outcome: Progressing Towards Goal 
Fall Risk Interventions: 
Mobility Interventions: Assess mobility with egress test, Communicate number of staff needed for ambulation/transfer, OT consult for ADLs, Patient to call before getting OOB, PT Consult for mobility concerns, PT Consult for assist device competence, Strengthening exercises (ROM-active/passive), Utilize walker, cane, or other assistive device, Utilize gait belt for transfers/ambulation, Bed/chair exit alarm Mentation Interventions: Adequate sleep, hydration, pain control, Door open when patient unattended, Eyeglasses and hearing aids, Gait belt with transfers/ambulation, Increase mobility, More frequent rounding, Room close to nurse's station, Toileting rounds, Update white board, Bed/chair exit alarm Medication Interventions: Patient to call before getting OOB, Teach patient to arise slowly, Utilize gait belt for transfers/ambulation, Bed/chair exit alarm Elimination Interventions: Call light in reach, Patient to call for help with toileting needs, Toilet paper/wipes in reach, Toileting schedule/hourly rounds, Bed/chair exit alarm History of Falls Interventions: Consult care management for discharge planning, Door open when patient unattended, Investigate reason for fall, Room close to nurse's station, Utilize gait belt for transfer/ambulation, Bed/chair exit alarm Problem: Impaired Skin Integrity/Pressure Injury Treatment Goal: *Prevention of pressure injury Document Raul Scale and appropriate interventions in the flowsheet. Outcome: Progressing Towards Goal 
Pressure Injury Interventions: 
Sensory Interventions: Assess changes in LOC, Avoid rigorous massage over bony prominences, Check visual cues for pain, Discuss PT/OT consult with provider, Keep linens dry and wrinkle-free, Maintain/enhance activity level, Minimize linen layers, Monitor skin under medical devices, Pad between skin to skin, Pressure redistribution bed/mattress (bed type), Turn and reposition approx. every two hours (pillows and wedges if needed) Moisture Interventions: Absorbent underpads, Check for incontinence Q2 hours and as needed, Internal/External urinary devices, Limit adult briefs, Maintain skin hydration (lotion/cream), Minimize layers, Moisture barrier Activity Interventions: Increase time out of bed, Pressure redistribution bed/mattress(bed type), PT/OT evaluation Mobility Interventions: HOB 30 degrees or less, Pressure redistribution bed/mattress (bed type), PT/OT evaluation, Turn and reposition approx. every two hours(pillow and wedges) Nutrition Interventions: Document food/fluid/supplement intake, Discuss nutritional consult with provider, Offer support with meals,snacks and hydration Friction and Shear Interventions: Foam dressings/transparent film/skin sealants, HOB 30 degrees or less, Lift sheet, Lift team/patient mobility team, Minimize layers, Transfer aides:transfer board/Gordon lift/ceiling lift, Transferring/repositioning devices

## 2018-11-23 LAB
ARTERIAL PATENCY WRIST A: YES
BASE EXCESS BLD CALC-SCNC: 17 MMOL/L
BDY SITE: ABNORMAL
GAS FLOW.O2 O2 DELIVERY SYS: ABNORMAL L/MIN
GAS FLOW.O2 SETTING OXYMISER: 3 L/M
HCO3 BLD-SCNC: 42.4 MMOL/L (ref 22–26)
O2/TOTAL GAS SETTING VFR VENT: 32 %
PCO2 BLD: 73.8 MMHG (ref 35–45)
PH BLD: 7.37 [PH] (ref 7.35–7.45)
PO2 BLD: 75 MMHG (ref 80–100)
SAO2 % BLD: 94 % (ref 92–97)
SERVICE CMNT-IMP: ABNORMAL
SPECIMEN TYPE: ABNORMAL
TOTAL RESP. RATE, ITRR: 28

## 2018-11-23 PROCEDURE — 74011250637 HC RX REV CODE- 250/637: Performed by: INTERNAL MEDICINE

## 2018-11-23 PROCEDURE — 74011250637 HC RX REV CODE- 250/637: Performed by: NURSE PRACTITIONER

## 2018-11-23 PROCEDURE — 77010033678 HC OXYGEN DAILY

## 2018-11-23 PROCEDURE — 74011250636 HC RX REV CODE- 250/636: Performed by: INTERNAL MEDICINE

## 2018-11-23 PROCEDURE — 94640 AIRWAY INHALATION TREATMENT: CPT

## 2018-11-23 PROCEDURE — 77030021352 HC CBL LD SYS DISP COVD -B

## 2018-11-23 PROCEDURE — 74011250636 HC RX REV CODE- 250/636: Performed by: NURSE PRACTITIONER

## 2018-11-23 PROCEDURE — 65660000000 HC RM CCU STEPDOWN

## 2018-11-23 PROCEDURE — 74011000250 HC RX REV CODE- 250: Performed by: INTERNAL MEDICINE

## 2018-11-23 RX ORDER — FUROSEMIDE 10 MG/ML
40 INJECTION INTRAMUSCULAR; INTRAVENOUS
Status: COMPLETED | OUTPATIENT
Start: 2018-11-23 | End: 2018-11-23

## 2018-11-23 RX ADMIN — IPRATROPIUM BROMIDE AND ALBUTEROL SULFATE 3 ML: .5; 3 SOLUTION RESPIRATORY (INHALATION) at 19:58

## 2018-11-23 RX ADMIN — LACTULOSE 10 G: 20 SOLUTION ORAL at 17:41

## 2018-11-23 RX ADMIN — GABAPENTIN 400 MG: 400 CAPSULE ORAL at 09:58

## 2018-11-23 RX ADMIN — HYDROCODONE BITARTRATE AND ACETAMINOPHEN 1 TABLET: 5; 325 TABLET ORAL at 22:36

## 2018-11-23 RX ADMIN — FUROSEMIDE 40 MG: 10 INJECTION, SOLUTION INTRAMUSCULAR; INTRAVENOUS at 16:46

## 2018-11-23 RX ADMIN — HEPARIN SODIUM 5000 UNITS: 5000 INJECTION INTRAVENOUS; SUBCUTANEOUS at 09:59

## 2018-11-23 RX ADMIN — BUDESONIDE 500 MCG: 0.5 INHALANT RESPIRATORY (INHALATION) at 10:06

## 2018-11-23 RX ADMIN — LACTULOSE 10 G: 20 SOLUTION ORAL at 09:58

## 2018-11-23 RX ADMIN — HEPARIN SODIUM 5000 UNITS: 5000 INJECTION INTRAVENOUS; SUBCUTANEOUS at 16:47

## 2018-11-23 RX ADMIN — Medication 10 ML: at 05:30

## 2018-11-23 RX ADMIN — DOCUSATE SODIUM 100 MG: 100 CAPSULE, LIQUID FILLED ORAL at 09:58

## 2018-11-23 RX ADMIN — HYDROCODONE BITARTRATE AND ACETAMINOPHEN 1 TABLET: 5; 325 TABLET ORAL at 01:39

## 2018-11-23 RX ADMIN — GABAPENTIN 400 MG: 400 CAPSULE ORAL at 17:40

## 2018-11-23 RX ADMIN — LISINOPRIL 20 MG: 20 TABLET ORAL at 09:59

## 2018-11-23 RX ADMIN — MORPHINE SULFATE 1 MG: 2 INJECTION, SOLUTION INTRAMUSCULAR; INTRAVENOUS at 16:47

## 2018-11-23 RX ADMIN — SIMVASTATIN 20 MG: 20 TABLET, FILM COATED ORAL at 22:31

## 2018-11-23 RX ADMIN — BUDESONIDE 500 MCG: 0.5 INHALANT RESPIRATORY (INHALATION) at 19:58

## 2018-11-23 RX ADMIN — PANTOPRAZOLE SODIUM 40 MG: 40 TABLET, DELAYED RELEASE ORAL at 09:59

## 2018-11-23 NOTE — PROGRESS NOTES
INFECTIOUS DISEASE FOLLOW UP NOTE :-  
 
Admit Date: 11/14/2018 ABX;   
 
Current  Prior None 11/19 - 4 Cefepime/vanco  11/14   2, Ceftriaxone 11/16 - 3  
 
 
ASSESSMENT: -> RECS  
 
T10-11 disciitis CT:   severe destructive changes involving the endplates at T96-09. There is associated paravertebral soft tissue infiltration. Findings compatible 
with severe discitis. The inflammatory process extends into the anterior 
epidural space with compression of cord. L5-S1 spondylosis and degenerative disc 
disease. 
  
  L3-4 central stenosis - seen by Neurosurgery and conservative management recommended - ESR/CRP low for OM/discitis but does not exclude  
- off all abx for better diagnostics as pt not septic 
- needs MRI and then based on findings IR guided aspiration to make diagnosis. Send cultures [ bacteria, fungal, AFB ] and pathology - d/w nursing-- mri still being orchestrated- arranging anesthesia assist 
 
 Complex situation but will not subject him to long term Abx without making definite diagnosis After diagnostic aspiration and/or bx accomplished and sent for Path and micro, begin: 
IV Vanco- dosing per pharm for trough goal 15-20 Plus ceftriaxone 2 g iv q 12 Serratia UTI  
- ua pos nitrite, small le, 11-20 wbc, 3+ cj - Ucx >100k Serratia  ->completed 5 days of abx Bilateral pleural effusions and LL atx with atx in rml    
Abnormal CT abd: 
  Adenopathy- gastrohepatic ligament Splenic hilar and perisplenic varices Liver enlargement    
Ho ivda Precludes cvl for out pt rx - likely Co morbidities: Arthritis/ copd/HTN    
allergies Shellfish MICROBIOLOGY:  
11/14    Blcx x 2 - ntd 
             ucx - >100k Serratia LINES AND CATHETERS:  
PIV SUBJECTIVE :  
 
Interval notes reviewed. Pt afebrile. Continues to c/o pain.   MRI Plans formulating to accomplish needed imaging and diagnostic proceedure. Will continue to hold Abx. MEDICATIONS :  
 
Current Facility-Administered Medications Medication Dose Route Frequency  docusate sodium (COLACE) capsule 100 mg  100 mg Oral DAILY  HYDROcodone-acetaminophen (NORCO) 5-325 mg per tablet 1 Tab  1 Tab Oral Q4H PRN  pantoprazole (PROTONIX) tablet 40 mg  40 mg Oral ACB  morphine injection 1 mg  1 mg IntraVENous Q4H PRN  
 heparin (porcine) injection 5,000 Units  5,000 Units SubCUTAneous Q8H  
 budesonide (PULMICORT) 500 mcg/2 ml nebulizer suspension  500 mcg Nebulization BID RT  
 sodium chloride (NS) flush 5-10 mL  5-10 mL IntraVENous PRN  
 gabapentin (NEURONTIN) capsule 400 mg  400 mg Oral BID  lisinopril (PRINIVIL, ZESTRIL) tablet 20 mg  20 mg Oral DAILY  loratadine (CLARITIN) tablet 10 mg  10 mg Oral DAILY PRN  
 simvastatin (ZOCOR) tablet 20 mg  20 mg Oral QHS  naloxone (NARCAN) injection 0.4 mg  0.4 mg IntraVENous PRN  
 diphenhydrAMINE (BENADRYL) injection 12.5 mg  12.5 mg IntraVENous Q4H PRN  
 ondansetron (ZOFRAN) injection 4 mg  4 mg IntraVENous Q4H PRN  
 albuterol-ipratropium (DUO-NEB) 2.5 MG-0.5 MG/3 ML  3 mL Nebulization Q4H PRN  
 hydrALAZINE (APRESOLINE) 20 mg/mL injection 10 mg  10 mg IntraVENous Q6H PRN  
 bisacodyl (DULCOLAX) suppository 10 mg  10 mg Rectal DAILY PRN  
 lactulose (CHRONULAC) solution 10 g  10 g Oral BID OBJECTIVE :  
 
Visit Vitals /74 (BP 1 Location: Right arm, BP Patient Position: Head of bed elevated (Comment degrees)) Pulse 96 Temp 98 °F (36.7 °C) Resp 18 Ht 6' (1.829 m) Wt 110.6 kg (243 lb 12.8 oz) SpO2 93% BMI 33.07 kg/m² Temp (24hrs), Av.2 °F (36.8 °C), Min:97.7 °F (36.5 °C), Max:98.4 °F (36.9 °C) Jessica Clinton old AAM, awake, alert not in distress RESP-  ctab CVS- s1s2 normal, no murmur ABD-soft, Nt, bs present EXT-no edema Labs: Results:  
Chemistry Recent Labs  
  18 0322 GLU 82   
K 4.3  CO2 35* BUN 11  
CREA 0.70 CA 8.5 AGAP 0* BUCR 16 CBC w/Diff No results for input(s): WBC, RBC, HGB, HCT, PLT, GRANS, LYMPH, EOS, HGBEXT, HCTEXT, PLTEXT, HGBEXT, HCTEXT, PLTEXT in the last 72 hours. RADIOLOGY :   
CT abdo/pelvis 11/14 - IMPRESSION: 
1. Severe destructive changes involving endplates of C12-44 with circumferential paravertebral soft tissue infiltration and extension of process into the anterior epidural space with cord compression. Findings compatible with severe discitis. Recommend MRI evaluation. 2. Moderate dependent bilateral pleural effusions with partial bilateral lower lobe atelectasis and additional atelectatic streaks right middle lobe and lingula. 3. Multiple small hepatic cysts. Left adrenal adenoma. 4. Gastrohepatic ligament adenopathy and retrocrural adenopathy, probably reactive. 5. Splenic hilar and perisplenic varices. Disproportionate enlargement segment 2 and 3 of liver. Findings may indicate cirrhosis. 6. L3-4 central stenosis. Probable small hemangioma L1 vertebral body. Stanley Mendez MD, [address] November 23, 2018 [facility] [phone]

## 2018-11-23 NOTE — PROGRESS NOTES
Neurosurgery:  
Patient is comfortable. Back pain is tolerable, no radiculary type symptoms. Motor function is 5.5 Off antibiotics currently Hopefully mri today and aspiration soon If not infected patient may have surgical option if he decides to proceed along these lines. He is not the best surgical patient given co-morbidities

## 2018-11-23 NOTE — PROGRESS NOTES
[address] [place], [place] [address] Hospitalist Division Inpatient Daily Progress Note Daily progress Note Patient: Mady Poe. MRN: [id number]  CSN: [id number] YOB: 1950  Age: 77 y.o. Sex: male DOA: 11/14/2018 LOS:  LOS: 9 days Chief Complaint:  Discitis thoracic region Interval History: 77 y.o.  male with h/o HTN, COPD, Hep C, presented to ED because of back pain on 11/14. Patient indicating the lower portion of his T-spine where he has the pain,saying that it started almost three months ago and has been getting worse. He reports that he was seen in ED few weeks ago and was told that nothing was wrong with him. He is also reporting constipation the last 3 weeks. He denies legs weakness or loss of sensation. No fever or chills. Although patient did not report his history of heroin abuse, the ED report indicates that patient is an IV drugs abuser and last use of IV heroin was 3-4 weeks ago. Patient's friend who came with him in ED has reported that patient fell the day prior while trying to get to the bathroom and was incontinent. Patient is reporting that he has not been able to hold his urine for a while. In the ED,CT abdm/pelvis showed severe  discitis, L3-4 central stenosis, bilateral pleural effusions with bilateral lower lobe atelectasis, splenic hilar and perisplenic varices. UA showed UTI. In ED, patient was started on vanc and cefepime for severe discitis. Neurosurgery was consulted. The hospitalist team was called for admission. ID consulted. On 11/16,  ID decided to discontinue vanc and cefepime, preferred the diagnosis first since patient has been afebrile,without leokocytosis and ESR/CRP showing low probability for OM/Discitis. Currently patient is only on ceftriaxone for UTI due to serratia.   MRI of the spine not done yet as patient has not tolerated. On 11/15/2018, patient noted confused. Code S called. CT head w/o acute and tele-neurology ruled out the stroke. Patient was transferred to ICU due to altered mental status, likely metabolic. On 11/16/18, patient was hypoxic,hypercapnic. He was put on BIPAP, critical care was consulted. On 11/18, patient more awake, clincally stable MRI machine not working since 11/17- tests still pending. Per ID, ESR/CRP low for OM/discitis but does not exclude, of all abx for better diagnostics as pt not septic. Discussed with MRI dept on 11/19- stated anesthesia determined pt high risk for intubation and did not want to risk compensation given hx of COPD. MRI has been attempted 6-7 times but pt will not keep still and requesting to be put to sleep in order to complete test.  IR will not do culture/drain w/o prior MRI. Updated ID. Spoke with anesthesia on 11/20 to see if pt can be re-evaluated to be sedated for MRI- ID does not want to subject pt to long term IV abx w/o definite diagnosis, and IR will not obtain aspiration without MRI. Still awaiting MRI T spine- MRI stated they would be available on Friday for scheduling but unable to do today due to prior appts. Spoke w/ anesthesia again on 11/23 - stated they were available for emergencies but would forward to Dr. Gerard Grider (sp) and would return call (I have given my cell number for return call). For now, continue w/ current plan of care - nothing can be done until pt obtains MRI. Discussed with pt again trying to get MRI done w/o anesthesia, states he knows he can't tolerate it. Assessment/Plan:  
 
Patient Active Problem List  
Diagnosis Code  Discitis of thoracic region M46.44  
 UTI (urinary tract infection) N39.0  Bilateral pleural effusion J90  
 Lumbar stenosis M48.061  
 Adenoma of left adrenal gland D35.02  
 Uncontrolled hypertension I10  
 Cirrhosis of liver ([facility] [address] K74.60  Hepatitis C B19.20  Obesity E66.9  COPD (chronic obstructive pulmonary disease) ([facility]) J44.9  Constipation K59.00  Back pain M54.9 A/P: 
1. Discitis of thoracic region 
 -CT showing severe discitis of T10-11. Patient with h/o iv heroin abuse. Infection suspected. 
 -Vanc and cefepime initiated in ED,continued until 11/16 
 -Sepsis protocol was initiated in ED but I didn't think patient was septic - lactic acid,wbc,temp,HR normal. 
 -Blood cx negative 
 -Pt did not tolerate MRI. Discussed with IR,will have MRI under sedation and aspiration of T10-11 will be performed for cultures,cytology 
 -Neurosurgery consulted ->pt high risk for intervention - medical management 
 -ID decided to d/c Vanc/cefepime on 11/16 as patient was not septic and ESR/CRP showing low probability for OM/Discitis. Pt currently on ceftriaxone for UTI 
 -Pain management with morphine and norco prn 
 -Fall precaution. - discussed with MRI dept 11/19 - stated anesthesia determined pt high risk for intubation and did not want to risk compensation given hx of COPD. MRI has been attempted 6-7 times but pt will not keep still and requesting to be put to sleep in order to complete test.  IR will not do culture/drain w/o prior MRI 
- discussed with anesthesia on 11/20 to see if pt can be re-evaluated for anesthesia for MRI 
- discussed w/ anesthesia again on 11/23 - they are here for emergencies only- awaiting return call  
  2. Respiratory failure with hypoxia/hypercapnia 
 -Improved. Tolerating 3 liters NC and O2sat 93-98% 
  
3.Acute encephalopathy metabolic 
 -Likely due to acute resp failure and possibly elevated ammonia 
 -Will monitor mentation as patient being treated 
 -CT scan done on 11/15 after code stroke was called did not show any acute process. Tele-neurology did not think that patient had a stroke 
 -Improved mentation,interacting better today 
  4.Lumbar stenosis at L3-4  
 -Will follow neurosurgery recommendations -> no surgery,pt asymptomatic 
  
 5.UTI (urinary tract infection) 
 - resolved- completed five days of IV abx 
 -Ucx c/w serratia marscens and blood cx NGTD 
  
6.Cirrhosis of liver/Hepatitis C/H/o alcohol abuse 
 -Patient has h/o hepatitis C and past history of alcohol abuse. CT showing strong evidence of cirrhosis with possible portal vein hypertension. 
  -Received albumin x 1  
 -On lactulose. 7.Bilateral pleural effusion/Legs edema 
 -Likely due to cirrhosis of liver. 
 -Legs edema improved 
  
8. Adenoma of left adrenal gland  
 -Seen in previous films. Will monitor 
  
9. Uncontrolled hypertension  
 -Resume lisinopril. Hydralazine prn 
  
10. Obesity 
 -Supportive care 
  
11. COPD  
 -Duo-neb prn 
  
12. Constipation 
 -Order dulcolax 
  
13. Back pain 
 -Due to problems above - on pain meds. 
  
14. Legs edema 
 -Likely due to cirrhosis with portal hypertension 
 -pro-BNP not elevated and ECHO nl with EF 66-70% -legs edema resolved. 
  
DVT prophylaxis 
- Heparin subcutaneously TID DAVID Albright-NAWAF [address] [place], [place] [address]ty Group Hospitalist Division Pager:  [phone] Office:  [phone] Subjective: No acute events overnight. Pain controlled. Objective:  
  
Visit Vitals /80 (BP 1 Location: Right arm, BP Patient Position: Head of bed elevated (Comment degrees)) Pulse 98 Temp 98.4 °F (36.9 °C) Resp 26 Ht 6' (1.829 m) Wt 110.6 kg (243 lb 12.8 oz) SpO2 95% BMI 33.07 kg/m² Physical Exam: 
General appearance: alert, cooperative, no distress, appears stated age Head: Normocephalic, without obvious abnormality, atraumatic Lungs: clear to auscultation bilaterally Heart: regular rate and rhythm, S1, S2 normal, no murmur, click, rub or gallop Abdomen: soft, non tender, non distended. Normoactive bowel sounds. No masses, no organomegaly Extremities: extremities normal, atraumatic, no cyanosis or edema Skin: Skin color, texture, turgor normal. No rashes or lesions Neurologic: Grossly normal 
PSY: Mood and affect normal, appropriately behaved Intake and Output: 
Current Shift:  No intake/output data recorded. Last three shifts:  11/21 1901 - 11/23 0700 In: 1140 [P.O.:1140] Out: 6143 [XRBTN:0173] Recent Results (from the past 24 hour(s)) METABOLIC PANEL, BASIC Collection Time: 11/22/18  7:50 AM  
Result Value Ref Range Sodium 137 136 - 145 mmol/L Potassium 4.3 3.5 - 5.5 mmol/L Chloride 102 100 - 108 mmol/L  
 CO2 35 (H) 21 - 32 mmol/L Anion gap 0 (L) 3.0 - 18 mmol/L Glucose 82 74 - 99 mg/dL BUN 11 7.0 - 18 MG/DL Creatinine 0.70 0.6 - 1.3 MG/DL  
 BUN/Creatinine ratio 16 12 - 20 GFR est AA >60 >60 ml/min/1.73m2 GFR est non-AA >60 >60 ml/min/1.73m2 Calcium 8.5 8.5 - 10.1 MG/DL  
GLUCOSE, POC Collection Time: 11/22/18  7:50 PM  
Result Value Ref Range Glucose (POC) 99 70 - 110 mg/dL POC G3 Collection Time: 11/22/18 11:44 PM  
Result Value Ref Range Device: NASAL CANNULA Flow rate (POC) 3 L/M  
 FIO2 (POC) 32 % pH (POC) 7.367 7.35 - 7.45    
 pCO2 (POC) 73.8 (H) 35.0 - 45.0 MMHG  
 pO2 (POC) 75 (L) 80 - 100 MMHG  
 HCO3 (POC) 42.4 (H) 22 - 26 MMOL/L  
 sO2 (POC) 94 92 - 97 % Base excess (POC) 17 mmol/L Allens test (POC) YES Total resp. rate 28 Site RIGHT RADIAL Specimen type (POC) ARTERIAL Performed by Raf Gaytan GLUCOSE, POC Collection Time: 11/22/18 11:48 PM  
Result Value Ref Range Glucose (POC) 103 70 - 110 mg/dL Lab Results Component Value Date/Time Glucose 82 11/22/2018 07:50 AM  
 Glucose 80 11/20/2018 06:30 AM  
 Glucose 87 11/19/2018 07:24 AM  
 Glucose 82 11/18/2018 05:00 AM  
 Glucose 81 11/18/2018 05:00 AM  
  
 
Imaging: No results found. Medication List Reviewed: 
Current Facility-Administered Medications Medication Dose Route Frequency  docusate sodium (COLACE) capsule 100 mg  100 mg Oral DAILY  HYDROcodone-acetaminophen (NORCO) 5-325 mg per tablet 1 Tab  1 Tab Oral Q4H PRN  pantoprazole (PROTONIX) tablet 40 mg  40 mg Oral ACB  morphine injection 1 mg  1 mg IntraVENous Q4H PRN  
 heparin (porcine) injection 5,000 Units  5,000 Units SubCUTAneous Q8H  
 budesonide (PULMICORT) 500 mcg/2 ml nebulizer suspension  500 mcg Nebulization BID RT  
 sodium chloride (NS) flush 5-10 mL  5-10 mL IntraVENous PRN  
 gabapentin (NEURONTIN) capsule 400 mg  400 mg Oral BID  lisinopril (PRINIVIL, ZESTRIL) tablet 20 mg  20 mg Oral DAILY  loratadine (CLARITIN) tablet 10 mg  10 mg Oral DAILY PRN  
 simvastatin (ZOCOR) tablet 20 mg  20 mg Oral QHS  naloxone (NARCAN) injection 0.4 mg  0.4 mg IntraVENous PRN  
 diphenhydrAMINE (BENADRYL) injection 12.5 mg  12.5 mg IntraVENous Q4H PRN  
 ondansetron (ZOFRAN) injection 4 mg  4 mg IntraVENous Q4H PRN  
 albuterol-ipratropium (DUO-NEB) 2.5 MG-0.5 MG/3 ML  3 mL Nebulization Q4H PRN  
 hydrALAZINE (APRESOLINE) 20 mg/mL injection 10 mg  10 mg IntraVENous Q6H PRN  
 bisacodyl (DULCOLAX) suppository 10 mg  10 mg Rectal DAILY PRN  
 lactulose (CHRONULAC) solution 10 g  10 g Oral BID

## 2018-11-23 NOTE — MANAGEMENT PLAN
[facility] personal care when medically stable. Pt needs PT/OT and oob when medically able to start working toward this plan. Have asked for orders to be placed. Antonella Proctor RN BSN Outcomes Manager Pager # [phone]

## 2018-11-23 NOTE — PROGRESS NOTES
0555Bedside shift change report given to Severa Milks, RN (oncoming nurse) by Vesna Alex RN (offgoing nurse). Report included the following information SBAR, Kardex, Intake/Output, MAR, and Cardiac Rhythm NSR. Pt drowsy but oriented x4 during shift change report. BG checked - 99. Breathing tx given by Bianca Minor (offgoing nurse). 1950  Pt in bed drowsy, A&O x4 with delayed responses. Reports pain 4/10. 
 
2035  Called to bedside for SOB, pt oxygen sat at 94%. Called Alina, RT to bedside 2225  Respiration in the high 30s. Paged and informed Dr Lance Angela - no new orders received. 0050  In bed denies pain. 0555  Pt resting in bed. Bedside shift change report given to [facility] B., RN (oncoming nurse) by Gosia Silva RN (offgoing nurse). Report included the following information SBAR, Kardex, Intake/Output, MAR and Cardiac Rhythm NSR/ST.

## 2018-11-23 NOTE — WOUND CARE
Wound/Ostomy Nurse Progress Note Patient: Joyce Elliott [id number] MRN: [id number] Situation: Wound consult for right buttocks pressure injury. Background: Patient was admitted to [address] [place] [address] on 18 for severe discitis and UTI. A pressure injury was noted upon admission. Assessment: The patient was lying quietly in bed when wound care arrived for consult. He was awake, alert, and agreeable to a wound assessment. He was able to turn himself onto his left side. A dressing of Mepilex foam was removed from his right buttocks. He has a small open area measuring 0.9 x 0.7 cm. The wound bed is pink and dry. The patient states \"I got that from laying on the couch; it was the string on my sweatpants that did it. \" He denies pain in the area. The wound was dressed with a bordered foam dressing. Recommendation: Continue to monitor the area q shift, during head-to-toe skin assessment. As the wound is dry and stable, the Mepilex foam may be lifted to inspect the wound and  replaced. Daily dressing change should not be necessary. Change the foam every 3-4 days. Off-load the area with pillows or foam wedges. Follow pressure injury prevention protocols.

## 2018-11-23 NOTE — PROGRESS NOTES
Problem: Pressure Injury - Risk of 
Goal: *Prevention of pressure injury Document Raul Scale and appropriate interventions in the flowsheet. Outcome: Progressing Towards Goal 
Pressure Injury Interventions: 
Sensory Interventions: Assess need for specialty bed, Check visual cues for pain Moisture Interventions: Absorbent underpads, Maintain skin hydration (lotion/cream), Minimize layers Activity Interventions: Increase time out of bed, Pressure redistribution bed/mattress(bed type) Mobility Interventions: HOB 30 degrees or less, Pressure redistribution bed/mattress (bed type) Nutrition Interventions: Document food/fluid/supplement intake Friction and Shear Interventions: Foam dressings/transparent film/skin sealants, HOB 30 degrees or less, Lift sheet, Minimize layers Problem: Falls - Risk of 
Goal: *Absence of Falls Document Marinus Cornea Fall Risk and appropriate interventions in the flowsheet. Outcome: Progressing Towards Goal 
Fall Risk Interventions: 
Mobility Interventions: Patient to call before getting OOB Mentation Interventions: Adequate sleep, hydration, pain control, Family/sitter at bedside Medication Interventions: Evaluate medications/consider consulting pharmacy, Patient to call before getting OOB, Teach patient to arise slowly Elimination Interventions: Call light in reach, Patient to call for help with toileting needs History of Falls Interventions: Consult care management for discharge planning, Door open when patient unattended Problem: Impaired Skin Integrity/Pressure Injury Treatment Goal: *Prevention of pressure injury Document Raul Scale and appropriate interventions in the flowsheet. Outcome: Progressing Towards Goal 
Pressure Injury Interventions: 
Sensory Interventions: Assess need for specialty bed, Check visual cues for pain Moisture Interventions: Absorbent underpads, Maintain skin hydration (lotion/cream), Minimize layers Activity Interventions: Increase time out of bed, Pressure redistribution bed/mattress(bed type) Mobility Interventions: HOB 30 degrees or less, Pressure redistribution bed/mattress (bed type) Nutrition Interventions: Document food/fluid/supplement intake Friction and Shear Interventions: Foam dressings/transparent film/skin sealants, HOB 30 degrees or less, Lift sheet, Minimize layers

## 2018-11-24 LAB
ANION GAP SERPL CALC-SCNC: 2 MMOL/L (ref 3–18)
BUN SERPL-MCNC: 10 MG/DL (ref 7–18)
BUN/CREAT SERPL: 17 (ref 12–20)
CALCIUM SERPL-MCNC: 8.8 MG/DL (ref 8.5–10.1)
CHLORIDE SERPL-SCNC: 98 MMOL/L (ref 100–108)
CO2 SERPL-SCNC: 39 MMOL/L (ref 21–32)
CREAT SERPL-MCNC: 0.6 MG/DL (ref 0.6–1.3)
GLUCOSE SERPL-MCNC: 93 MG/DL (ref 74–99)
POTASSIUM SERPL-SCNC: 4.4 MMOL/L (ref 3.5–5.5)
SODIUM SERPL-SCNC: 139 MMOL/L (ref 136–145)

## 2018-11-24 PROCEDURE — 36415 COLL VENOUS BLD VENIPUNCTURE: CPT

## 2018-11-24 PROCEDURE — 80048 BASIC METABOLIC PNL TOTAL CA: CPT

## 2018-11-24 PROCEDURE — 77010033678 HC OXYGEN DAILY

## 2018-11-24 PROCEDURE — 74011250637 HC RX REV CODE- 250/637: Performed by: INTERNAL MEDICINE

## 2018-11-24 PROCEDURE — 74011250637 HC RX REV CODE- 250/637: Performed by: NURSE PRACTITIONER

## 2018-11-24 PROCEDURE — 94640 AIRWAY INHALATION TREATMENT: CPT

## 2018-11-24 PROCEDURE — 65660000000 HC RM CCU STEPDOWN

## 2018-11-24 PROCEDURE — 74011000250 HC RX REV CODE- 250: Performed by: INTERNAL MEDICINE

## 2018-11-24 PROCEDURE — 94760 N-INVAS EAR/PLS OXIMETRY 1: CPT

## 2018-11-24 PROCEDURE — 74011250636 HC RX REV CODE- 250/636: Performed by: INTERNAL MEDICINE

## 2018-11-24 RX ADMIN — HYDROCODONE BITARTRATE AND ACETAMINOPHEN 1 TABLET: 5; 325 TABLET ORAL at 22:50

## 2018-11-24 RX ADMIN — SIMVASTATIN 20 MG: 20 TABLET, FILM COATED ORAL at 22:48

## 2018-11-24 RX ADMIN — LISINOPRIL 20 MG: 20 TABLET ORAL at 08:10

## 2018-11-24 RX ADMIN — HEPARIN SODIUM 5000 UNITS: 5000 INJECTION INTRAVENOUS; SUBCUTANEOUS at 15:29

## 2018-11-24 RX ADMIN — DOCUSATE SODIUM 100 MG: 100 CAPSULE, LIQUID FILLED ORAL at 08:10

## 2018-11-24 RX ADMIN — PANTOPRAZOLE SODIUM 40 MG: 40 TABLET, DELAYED RELEASE ORAL at 08:10

## 2018-11-24 RX ADMIN — LACTULOSE 10 G: 20 SOLUTION ORAL at 17:42

## 2018-11-24 RX ADMIN — HEPARIN SODIUM 5000 UNITS: 5000 INJECTION INTRAVENOUS; SUBCUTANEOUS at 00:44

## 2018-11-24 RX ADMIN — HEPARIN SODIUM 5000 UNITS: 5000 INJECTION INTRAVENOUS; SUBCUTANEOUS at 08:10

## 2018-11-24 RX ADMIN — GABAPENTIN 400 MG: 400 CAPSULE ORAL at 17:43

## 2018-11-24 RX ADMIN — GABAPENTIN 400 MG: 400 CAPSULE ORAL at 08:10

## 2018-11-24 RX ADMIN — BUDESONIDE 500 MCG: 0.5 INHALANT RESPIRATORY (INHALATION) at 19:51

## 2018-11-24 RX ADMIN — MORPHINE SULFATE 1 MG: 2 INJECTION, SOLUTION INTRAMUSCULAR; INTRAVENOUS at 08:08

## 2018-11-24 RX ADMIN — BUDESONIDE 500 MCG: 0.5 INHALANT RESPIRATORY (INHALATION) at 09:19

## 2018-11-24 RX ADMIN — MORPHINE SULFATE 1 MG: 2 INJECTION, SOLUTION INTRAMUSCULAR; INTRAVENOUS at 15:29

## 2018-11-24 RX ADMIN — LACTULOSE 10 G: 20 SOLUTION ORAL at 08:11

## 2018-11-24 NOTE — ROUTINE PROCESS
0810-Assessment completed, call bell within reach, no distress noted, voiced no complaints at this time. 1000-am due medications given. 1230-no change in condition, no distress noted, voiced no complaints at this time. 1400-resting quietly in bed. 1630-no change in condition, no distress noted. Medicated for pain per patient request. 
1740-pm due medications given. 1930-Bedside and Verbal shift change report given to Domenic SALGEURO (oncoming nurse) by Sumit Ortega (offgoing nurse). Report included the following information SBAR, MAR and Recent Results.

## 2018-11-24 NOTE — PROGRESS NOTES
1930 Assumed care of patient from [facility] 57. No ss of distress, denies pain bed low and locked, call bell within reach. All needs presently met. 2236 medicated for pain. 0000 Patient resting in bed, no pain or distress at this time. 0400Patient resting in bed, noo ss of pain or distress. 0730 Bedside shift change report given to [facility] 57 (oncoming nurse) by Isra Cameron RN (offgoing nurse). Report included the following information SBAR, Intake/Output, MAR, Recent Results and Cardiac Rhythm ST.

## 2018-11-24 NOTE — PROGRESS NOTES
Neurosurgery Progress Note; No changes,no mri Motor exam stable Back pain is stable Still need mri and aspirate to determine best treatment option. following

## 2018-11-24 NOTE — PROGRESS NOTES
Problem: Pressure Injury - Risk of 
Goal: *Prevention of pressure injury Document Raul Scale and appropriate interventions in the flowsheet. Outcome: Progressing Towards Goal 
Pressure Injury Interventions: 
Sensory Interventions: Assess changes in LOC, Keep linens dry and wrinkle-free, Pressure redistribution bed/mattress (bed type) Moisture Interventions: Internal/External urinary devices, Absorbent underpads Activity Interventions: Increase time out of bed, Pressure redistribution bed/mattress(bed type), PT/OT evaluation Mobility Interventions: HOB 30 degrees or less, Pressure redistribution bed/mattress (bed type), PT/OT evaluation Nutrition Interventions: Document food/fluid/supplement intake Friction and Shear Interventions: Foam dressings/transparent film/skin sealants Problem: Falls - Risk of 
Goal: *Absence of Falls Document Sarthak Granados Fall Risk and appropriate interventions in the flowsheet. Outcome: Progressing Towards Goal 
Fall Risk Interventions: 
Mobility Interventions: Patient to call before getting OOB Mentation Interventions: Reorient patient, Room close to nurse's station Medication Interventions: Patient to call before getting OOB, Teach patient to arise slowly Elimination Interventions: Call light in reach, Patient to call for help with toileting needs History of Falls Interventions: Evaluate medications/consider consulting pharmacy, Investigate reason for fall, Door open when patient unattended Problem: Impaired Skin Integrity/Pressure Injury Treatment Goal: *Prevention of pressure injury Document Raul Scale and appropriate interventions in the flowsheet. Outcome: Progressing Towards Goal 
Pressure Injury Interventions: 
Sensory Interventions: Assess changes in LOC, Keep linens dry and wrinkle-free, Pressure redistribution bed/mattress (bed type) Moisture Interventions: Internal/External urinary devices, Absorbent underpads Activity Interventions: Increase time out of bed, Pressure redistribution bed/mattress(bed type), PT/OT evaluation Mobility Interventions: HOB 30 degrees or less, Pressure redistribution bed/mattress (bed type), PT/OT evaluation Nutrition Interventions: Document food/fluid/supplement intake Friction and Shear Interventions: Foam dressings/transparent film/skin sealants

## 2018-11-25 ENCOUNTER — APPOINTMENT (OUTPATIENT)
Dept: MRI IMAGING | Age: 68
DRG: 347 | End: 2018-11-25
Attending: HOSPITALIST
Payer: MEDICAID

## 2018-11-25 LAB — CRP SERPL-MCNC: 0.7 MG/DL (ref 0–0.3)

## 2018-11-25 PROCEDURE — 74011250637 HC RX REV CODE- 250/637: Performed by: INTERNAL MEDICINE

## 2018-11-25 PROCEDURE — 36415 COLL VENOUS BLD VENIPUNCTURE: CPT

## 2018-11-25 PROCEDURE — 77010033678 HC OXYGEN DAILY

## 2018-11-25 PROCEDURE — 97162 PT EVAL MOD COMPLEX 30 MIN: CPT

## 2018-11-25 PROCEDURE — 74011250636 HC RX REV CODE- 250/636: Performed by: INTERNAL MEDICINE

## 2018-11-25 PROCEDURE — 74011250637 HC RX REV CODE- 250/637: Performed by: NURSE PRACTITIONER

## 2018-11-25 PROCEDURE — 97530 THERAPEUTIC ACTIVITIES: CPT

## 2018-11-25 PROCEDURE — 94640 AIRWAY INHALATION TREATMENT: CPT

## 2018-11-25 PROCEDURE — 86140 C-REACTIVE PROTEIN: CPT

## 2018-11-25 PROCEDURE — 74011250636 HC RX REV CODE- 250/636: Performed by: HOSPITALIST

## 2018-11-25 PROCEDURE — 94760 N-INVAS EAR/PLS OXIMETRY 1: CPT

## 2018-11-25 PROCEDURE — 65660000000 HC RM CCU STEPDOWN

## 2018-11-25 PROCEDURE — 74011000250 HC RX REV CODE- 250: Performed by: INTERNAL MEDICINE

## 2018-11-25 RX ORDER — LORAZEPAM 2 MG/ML
2 INJECTION INTRAMUSCULAR ONCE
Status: COMPLETED | OUTPATIENT
Start: 2018-11-25 | End: 2018-11-25

## 2018-11-25 RX ADMIN — DOCUSATE SODIUM 100 MG: 100 CAPSULE, LIQUID FILLED ORAL at 10:21

## 2018-11-25 RX ADMIN — LORAZEPAM 2 MG: 2 INJECTION, SOLUTION INTRAMUSCULAR; INTRAVENOUS at 10:23

## 2018-11-25 RX ADMIN — GABAPENTIN 400 MG: 400 CAPSULE ORAL at 10:22

## 2018-11-25 RX ADMIN — PANTOPRAZOLE SODIUM 40 MG: 40 TABLET, DELAYED RELEASE ORAL at 10:22

## 2018-11-25 RX ADMIN — MORPHINE SULFATE 1 MG: 2 INJECTION, SOLUTION INTRAMUSCULAR; INTRAVENOUS at 16:58

## 2018-11-25 RX ADMIN — HYDROCODONE BITARTRATE AND ACETAMINOPHEN 1 TABLET: 5; 325 TABLET ORAL at 15:28

## 2018-11-25 RX ADMIN — GABAPENTIN 400 MG: 400 CAPSULE ORAL at 17:00

## 2018-11-25 RX ADMIN — BUDESONIDE 500 MCG: 0.5 INHALANT RESPIRATORY (INHALATION) at 20:08

## 2018-11-25 RX ADMIN — LACTULOSE 10 G: 20 SOLUTION ORAL at 10:21

## 2018-11-25 RX ADMIN — LACTULOSE 10 G: 20 SOLUTION ORAL at 17:00

## 2018-11-25 RX ADMIN — SIMVASTATIN 20 MG: 20 TABLET, FILM COATED ORAL at 23:53

## 2018-11-25 RX ADMIN — BUDESONIDE 500 MCG: 0.5 INHALANT RESPIRATORY (INHALATION) at 10:18

## 2018-11-25 RX ADMIN — LISINOPRIL 20 MG: 20 TABLET ORAL at 10:22

## 2018-11-25 NOTE — PROGRESS NOTES
Progress Note Patient: Donna Parks. Sex: male          DOA: 11/14/2018 YOB: 1950      Age:  77 y.o.        LOS:  LOS: 11 days CHIEF COMPLAINT:  Likely discitis Subjective:  
 
Patient awake and talking No distress Objective:  
  
Visit Vitals /75 Pulse 92 Temp 98.2 °F (36.8 °C) Resp 18 Ht 6' (1.829 m) Wt 110.2 kg (243 lb) SpO2 94% BMI 32.96 kg/m² Physical Exam: 
Gen:  No distress, no complaint Lungs:  Clear bilaterally, no wheeze or rhonchi Heart:  Regular rate and rhythm, no murmurs or gallops Abdomen:  Soft, non-tender, normal bowel sounds Lab/Data Reviewed: 
BMP:  
Lab Results Component Value Date/Time  11/26/2018 04:20 AM  
 K 5.5 11/26/2018 04:20 AM  
  11/26/2018 04:20 AM  
 CO2 38 (H) 11/26/2018 04:20 AM  
 AGAP 0 (L) 11/26/2018 04:20 AM  
 GLU 93 11/26/2018 04:20 AM  
 BUN 21 (H) 11/26/2018 04:20 AM  
 CREA 1.09 11/26/2018 04:20 AM  
 GFRAA >60 11/26/2018 04:20 AM  
 GFRNA >60 11/26/2018 04:20 AM  
 
 
 
Assessment/Plan Principal Problem: 
  Discitis of thoracic region (11/14/2018) Active Problems: 
  UTI (urinary tract infection) (11/14/2018) Bilateral pleural effusion (11/14/2018) Lumbar stenosis (11/14/2018) Adenoma of left adrenal gland (11/14/2018) Uncontrolled hypertension (11/14/2018) Cirrhosis of liver ([address] (11/14/2018) Hepatitis C (11/14/2018) Obesity (11/14/2018) COPD (chronic obstructive pulmonary disease) ([address] (11/14/2018) Constipation (11/14/2018) Back pain (11/14/2018) Plan: We had long discussing regarding MRI He agrees how important this is for management He assures me he will undergo MRI testing today Further management based on results.

## 2018-11-25 NOTE — PROGRESS NOTES
Neurosurgery Progress nOte; 
Still in a holding pattern awaiting mri and aspiration Clinically doing well Discussed surgical option if he is not infected. Motor exam is stable. Will order pt and ot  
following

## 2018-11-25 NOTE — PROGRESS NOTES
Problem: Pressure Injury - Risk of 
Goal: *Prevention of pressure injury Document Raul Scale and appropriate interventions in the flowsheet. Outcome: Progressing Towards Goal 
Pressure Injury Interventions: 
Sensory Interventions: Assess changes in LOC, Pressure redistribution bed/mattress (bed type) Moisture Interventions: Internal/External urinary devices Activity Interventions: Pressure redistribution bed/mattress(bed type) Mobility Interventions: Pressure redistribution bed/mattress (bed type) Nutrition Interventions: Document food/fluid/supplement intake Friction and Shear Interventions: Foam dressings/transparent film/skin sealants Problem: Falls - Risk of 
Goal: *Absence of Falls Document Dorrene Evens Fall Risk and appropriate interventions in the flowsheet. Outcome: Progressing Towards Goal 
Fall Risk Interventions: 
Mobility Interventions: Patient to call before getting OOB Mentation Interventions: Reorient patient Medication Interventions: Patient to call before getting OOB Elimination Interventions: Call light in reach History of Falls Interventions: Evaluate medications/consider consulting pharmacy Problem: Impaired Skin Integrity/Pressure Injury Treatment Goal: *Prevention of pressure injury Document Raul Scale and appropriate interventions in the flowsheet. Outcome: Progressing Towards Goal 
Pressure Injury Interventions: 
Sensory Interventions: Assess changes in LOC, Pressure redistribution bed/mattress (bed type) Moisture Interventions: Internal/External urinary devices Activity Interventions: Pressure redistribution bed/mattress(bed type) Mobility Interventions: Pressure redistribution bed/mattress (bed type) Nutrition Interventions: Document food/fluid/supplement intake Friction and Shear Interventions: Foam dressings/transparent film/skin sealants

## 2018-11-25 NOTE — ROUTINE PROCESS
0730-report received, patient resting quietly in bed, no distress noted, call bell within reach, 
0810-am due medications given, and medicated for pain per patient request. 
1230-no change in condition, no distress noted, voiced no complaints. 1520-medicated for pain per patient request. 
1630-no change in condition, no distress noted. 1740-pm due medication given. 1915-Bedside and Verbal shift change report given to Domenic SALGUERO (oncoming nurse) by Sumit Ortega (offgoing nurse). Report included the following information SBAR, MAR and Recent Results.

## 2018-11-25 NOTE — ROUTINE PROCESS
0730- Assumed care. Pt in bed resting. NAD.  
 
1021- Due meds givem Tolerated well. Pt is alert and oriented with delayed responses and mumbled speech. No c/o pain. No respiratory distress noted. Call light in reach. MRI screening form completed. 1050- MRI on call bon Mena made aware of orders for MRI today and ativan given. He states \"the pt has been refusing unless he can be asleep and intubated\". Kaylah MAIN/Charge RN made aware. Dr. Jodie Harrington made aware. 1400- Pt back from MRI. Per MRI tech Antoni Mena, pt refused MRI and refused prn Ativan. Dr. Jodie Harrington paged. 1528- Prn pain meds given. 1658- Prn pain meds given post PT. Bedside and Verbal shift change report given to [employer] (oncoming nurse) by Figueroa Dumas RN 
 (offgoing nurse). Report included the following information SBAR, Kardex, Procedure Summary, Intake/Output, MAR, Recent Results and Med Rec Status.

## 2018-11-26 LAB
ANION GAP SERPL CALC-SCNC: 0 MMOL/L (ref 3–18)
BUN SERPL-MCNC: 21 MG/DL (ref 7–18)
BUN/CREAT SERPL: 19 (ref 12–20)
CALCIUM SERPL-MCNC: 8.8 MG/DL (ref 8.5–10.1)
CHLORIDE SERPL-SCNC: 102 MMOL/L (ref 100–108)
CO2 SERPL-SCNC: 38 MMOL/L (ref 21–32)
CREAT SERPL-MCNC: 1.09 MG/DL (ref 0.6–1.3)
GLUCOSE SERPL-MCNC: 93 MG/DL (ref 74–99)
POTASSIUM SERPL-SCNC: 5.5 MMOL/L (ref 3.5–5.5)
SODIUM SERPL-SCNC: 140 MMOL/L (ref 136–145)

## 2018-11-26 PROCEDURE — 77030010545

## 2018-11-26 PROCEDURE — 74011250637 HC RX REV CODE- 250/637: Performed by: NURSE PRACTITIONER

## 2018-11-26 PROCEDURE — 97530 THERAPEUTIC ACTIVITIES: CPT

## 2018-11-26 PROCEDURE — 74011250637 HC RX REV CODE- 250/637: Performed by: INTERNAL MEDICINE

## 2018-11-26 PROCEDURE — 77030011256 HC DRSG MEPILEX <16IN NO BORD MOLN -A

## 2018-11-26 PROCEDURE — 65660000000 HC RM CCU STEPDOWN

## 2018-11-26 PROCEDURE — 77010033678 HC OXYGEN DAILY

## 2018-11-26 PROCEDURE — 74011250636 HC RX REV CODE- 250/636: Performed by: INTERNAL MEDICINE

## 2018-11-26 PROCEDURE — 94760 N-INVAS EAR/PLS OXIMETRY 1: CPT

## 2018-11-26 PROCEDURE — 74011000250 HC RX REV CODE- 250: Performed by: INTERNAL MEDICINE

## 2018-11-26 PROCEDURE — 97166 OT EVAL MOD COMPLEX 45 MIN: CPT

## 2018-11-26 PROCEDURE — 36415 COLL VENOUS BLD VENIPUNCTURE: CPT

## 2018-11-26 PROCEDURE — 74011250636 HC RX REV CODE- 250/636: Performed by: NURSE PRACTITIONER

## 2018-11-26 PROCEDURE — 80048 BASIC METABOLIC PNL TOTAL CA: CPT

## 2018-11-26 PROCEDURE — 94640 AIRWAY INHALATION TREATMENT: CPT

## 2018-11-26 RX ADMIN — GABAPENTIN 400 MG: 400 CAPSULE ORAL at 17:52

## 2018-11-26 RX ADMIN — LACTULOSE 10 G: 20 SOLUTION ORAL at 10:02

## 2018-11-26 RX ADMIN — HYDROCODONE BITARTRATE AND ACETAMINOPHEN 1 TABLET: 5; 325 TABLET ORAL at 06:40

## 2018-11-26 RX ADMIN — DOCUSATE SODIUM 100 MG: 100 CAPSULE, LIQUID FILLED ORAL at 10:03

## 2018-11-26 RX ADMIN — GABAPENTIN 400 MG: 400 CAPSULE ORAL at 10:03

## 2018-11-26 RX ADMIN — LISINOPRIL 20 MG: 20 TABLET ORAL at 10:02

## 2018-11-26 RX ADMIN — PANTOPRAZOLE SODIUM 40 MG: 40 TABLET, DELAYED RELEASE ORAL at 06:33

## 2018-11-26 RX ADMIN — SIMVASTATIN 20 MG: 20 TABLET, FILM COATED ORAL at 21:00

## 2018-11-26 RX ADMIN — HYDROCODONE BITARTRATE AND ACETAMINOPHEN 1 TABLET: 5; 325 TABLET ORAL at 14:23

## 2018-11-26 RX ADMIN — BUDESONIDE 500 MCG: 0.5 INHALANT RESPIRATORY (INHALATION) at 20:06

## 2018-11-26 RX ADMIN — BUDESONIDE 500 MCG: 0.5 INHALANT RESPIRATORY (INHALATION) at 09:25

## 2018-11-26 RX ADMIN — HEPARIN SODIUM 5000 UNITS: 5000 INJECTION INTRAVENOUS; SUBCUTANEOUS at 10:03

## 2018-11-26 RX ADMIN — LACTULOSE 10 G: 20 SOLUTION ORAL at 17:52

## 2018-11-26 RX ADMIN — HEPARIN SODIUM 5000 UNITS: 5000 INJECTION INTRAVENOUS; SUBCUTANEOUS at 16:55

## 2018-11-26 NOTE — MANAGEMENT PLAN
Discharge/Transition Planning Care Management following and chart reviewed. Pt would benefit from SNF rehab. Barriers to this plan: Pt has [facility] and no skilled benefit for SNF; pt active crack cocaine and heroine user with positive urine on admissions and multiple previous admission which would make any acceptance difficult. Plan remains Home and [facility]. Pt working with therapy Kirstin Rodriguez RN BSN Outcomes Manager Pager # [phone]

## 2018-11-26 NOTE — PROGRESS NOTES
Neurosurgery Progress Note; Motor examination is stable Sleepy this am 
Still awaiting mri and aspirate. Following He is not interested in surgery at this point Will speak to Dr. Jodie Harrington today

## 2018-11-26 NOTE — PROGRESS NOTES
Problem: Self Care Deficits Care Plan (Adult) Goal: *Acute Goals and Plan of Care (Insert Text) Occupational Therapy Goals Initiated 11/26/2018 within 7 day(s). 1.  Patient will perform grooming tasks while standing with stand-by assistance for balance and < 3 rest breaks. 2.  Patient will perform lower body dressing with moderate assistance utilizing AE. 3.  Patient will perform functional task in standing for 8 minutes with supervision/set-up for balance to increase activity tolerance for ADLs. 4.  Patient will perform toilet transfers with stand-by assistance. 5.  Patient will perform all aspects of toileting with supervision/set-up. 6.  Patient will participate in upper extremity therapeutic exercise/activities with supervision/set-up for 8 minutes to increase BUE strength for functional transfers & ADLs. 7.  Patient will utilize energy conservation techniques during functional activities with minimal verbal cues. Outcome: [address] Occupational THERAPY: Initial Assessment INPATIENT: Medicaid: Hospital Day: 13 Patient: Shruthi Nails (67 y.o. male)    Date: 11/26/2018 Primary Diagnosis: Discitis of thoracic region Procedure(s) (LRB): Anes MRI (N/A), 11 Days Post-Op, Precautions: Back brace and Falls PLOF: Pt reports requiring assistance for most ADLs from caregiver PTA. Has RW for functional mobility. ASSESSMENT:  
Based on the objective data described below, the patient presents with impairments with regard to bed mobility, activity tolerance, BUE function and independence in ADLs. Pt supine on arrival, c/o 7/10 back pain, agreeable to therapy. Min/mod A x2 for supine-->sit with skilled instruction on proper positioning. Fair sitting balance; decreased BUE AROM/strength.  Functional transfers x3 trials with min A x2, RW and vc's for proper positioning to ensure lumbar integrity & safety in prep for [facility] transfer. Pt with decreased fair activity tolerance and frequent rest breaks with vc's on breathing techniques. Mod/max A x2 for sit-->supine secondary to pain. C/o 10/10 pain at end of session. Pt reports caregivers assist with ADLs PTA; pt will benefit from SNF stay to maximize activity tolerance and participation in ADLs. Needs left within reach. Recommendations for the next treatment session:  
Mr. Martin Hdz will benefit from skilled intervention to address the above impairments. His rehabilitation potential is considered to be Fair. EDUCATION Education:  Patient was educated on the following topics:  
Barriers to Learning/Limitations: None Compensate with: visual, verbal, tactile, kinesthetic cues/model PLAN OF CARE:  
Problems:  Decreased ROM, Decreased strength affecting function, Decreased ADL/functioning of activities, Decreased transfer abilities and Decreased activity tolerance Recommendations and Planned Interventions: 
[x]                  Self Care Training                   [x]         Therapeutic Activities [x]                  Functional Mobility Training    []          Cognitive Retraining 
[x]                  Therapeutic Exercises            [x]          Endurance Activities [x]                  Balance Training                     []          Neuromuscular Re-ed []                  Visual/Perceptual Training      [x]       Home Safety Training 
[x]                  Patient Education                    [x]          Family Training/Education []                  Other (comment): Frequency/Duration: Patient will be followed by occupational therapy 3-5 times a week to address goals. Discharge Recommendations: Franki Bains Factors which may impact discharge planning: Further Equipment Recommendations for Discharge: bedside commode SUBJECTIVE:  
Patient stated: \"I haven't gotten up in 2 weeks. \" OBJECTIVE/TREATMENT:  
 
Past Medical History: Diagnosis Date  Arthritis  COPD  Hypertension Past Surgical History:  
Procedure Laterality Date  HX REFRACTIVE SURGERY Eval Complexity: History: MEDIUM Complexity : Expanded review of history including physical, cognitive and psychosocial  history ; Examination: MEDIUM Complexity : 3-5 performance deficits relating to physical, cognitive , or psychosocial skils that result in activity limitations and / or participation restrictions; Decision Making:MEDIUM Complexity : Patient may present with comorbidities that affect occupational performnce. Miniml to moderate modification of tasks or assistance (eg, physical or verbal ) with assesment(s) is necessary to enable patient to complete evaluation G CODES: Self Care  Current  CL= 60-79%  Goal  CK= 40-59%. The severity rating is based on the Other Functional Assessment, mMT, ROM Prior Level of Function/Home Situation: Capable of only limited self-care; confined to bed or chair more than 50% of waking hours. Lives with Caregivers Shower chair Cognitive/Behavioral Status:  
Neurologic State: alert Orientation: oriented to time, place, person and situation Cognition:   appropriate decision making, appropriate for age attention/concentration, appropriate safety awareness and following commands  follows multi-step simple commands/direction Safety/Judgement: Awareness of environment and Fall prevention ROM: Moderately limited (BUEs secondary to pain) MMT: 4/5 (BUEs) Coordination: BUEs [facility] Hand dominance:Right Skin: Intact (BUEs) Edema: None noted (BUEs) Sensation: Intact (BUEs) Vision/Perceptual: normal 
 
 
Functional Status Indep Mod I  
Sup. / 
Set- Up SBA CGA Min Assist  
Mod Assist  
Max assist  
Total Assist  
Assist x2 Additional Time NT Comments Rolling []  []  []  []  []    [x]    []    []  []  []  []  [] Supine to sit []  []  []  []  []  [x]  []  []  []  [x]  [x]  [] Sit to supine []  []  []  []  []  []  [x]  [x]  []  [x]  []  [] Sit to stand []  []  []  []  []  [x]  []  []  []  [x]  [x]  [] Toilet Transfer []  []  []  []  []  []  []  []  []  []  []  [x] Feeding []  []  [x]  []  []  []  []  []  []  []  []  []    
Grooming []  []  [x]  []  []  []  []  []  []  []  []  [] Bathing  []  []  []  []  []  []  []  [x]  []  []  []  []    
UB Dressing  []  []  []  []  []  [x]  []  []  []  []  []  []    
LB Dressing  []  []  []  []  []  []  []  [x]  []  []  []  [] Toileting []  []  []  []  []  []  []  [x]  []  []  []  [] Balance Good Sheilda Iggy Poor Unable Comments Sitting static []  [x]  []  [] Sitting dynamic []  [x]  []  []    
Standing static []  [x]  []  []    
Standing dynamic []  [x]  []  []  Fair- Therapeutic Activity:  
Functional transfers x3 trials with min A x2, RW and vc's for proper positioning to ensure lumbar integrity & safety in prep for [facility] transfer. Pt with decreased fair activity tolerance and frequent rest breaks with vc's on breathing techniques. Pain:  
Pre treatment: 7/10 Post treatment: 10/10 (back pain) Scale: numeric Activity tolerance:  fair COMMUNICATION/EDUCATION: Pt educated on role of OT and POC; he verbalized understanding. [x]         Fall prevention education was provided and the patient/caregiver indicated understanding. [x]         Patient/family have participated as able in goal setting and plan of care. [x]         Patient/family agree to work toward stated goals and plan of care. []         Patient understands intent and goals of therapy, but is neutral about his/her participation The patients plan of care was also discussed with: Physical Therapist, Certified Occupational Therapy Assistant and Registered Nurse. · After treatment position/precautions:  
o Supine in bed 
o Call light within reach 
o RN notified Recommendations for nursing: up with assist x1-2 with RW 
 Written on communication board: yes Thank you for this referral. 
Yesenia Palacios MS OTR/L Time Calculation: 26 mins

## 2018-11-26 NOTE — PROGRESS NOTES
Problem: Pressure Injury - Risk of 
Goal: *Prevention of pressure injury Document Raul Scale and appropriate interventions in the flowsheet. Outcome: Progressing Towards Goal 
Pressure Injury Interventions: 
Sensory Interventions: Assess changes in LOC, Check visual cues for pain Moisture Interventions: Apply protective barrier, creams and emollients, Check for incontinence Q2 hours and as needed Activity Interventions: Pressure redistribution bed/mattress(bed type) Mobility Interventions: HOB 30 degrees or less Nutrition Interventions: Document food/fluid/supplement intake, Offer support with meals,snacks and hydration Friction and Shear Interventions: HOB 30 degrees or less Problem: Falls - Risk of 
Goal: *Absence of Falls Document [facility] Fall Risk and appropriate interventions in the flowsheet. Outcome: Progressing Towards Goal 
Fall Risk Interventions: 
Mobility Interventions: Patient to call before getting OOB Mentation Interventions: Adequate sleep, hydration, pain control, Door open when patient unattended Medication Interventions: Patient to call before getting OOB, Teach patient to arise slowly Elimination Interventions: Call light in reach, Patient to call for help with toileting needs, Toilet paper/wipes in reach History of Falls Interventions: Bed/chair exit alarm, Door open when patient unattended

## 2018-11-26 NOTE — ROUTINE PROCESS
Bedside shift change report given to Rayna Bustillo RN (oncoming nurse) by Wild Chaney RN (offgoing nurse). Report included the following information SBAR, Procedure Summary, Intake/Output, MAR and Recent Results.

## 2018-11-26 NOTE — PROGRESS NOTES
Problem: Pressure Injury - Risk of 
Goal: *Prevention of pressure injury Document Raul Scale and appropriate interventions in the flowsheet. Outcome: Progressing Towards Goal 
Pressure Injury Interventions: 
Sensory Interventions: Assess changes in LOC, Assess need for specialty bed Moisture Interventions: Check for incontinence Q2 hours and as needed Activity Interventions: Pressure redistribution bed/mattress(bed type) Mobility Interventions: HOB 30 degrees or less, Pressure redistribution bed/mattress (bed type) Nutrition Interventions: Document food/fluid/supplement intake, Offer support with meals,snacks and hydration Friction and Shear Interventions: HOB 30 degrees or less Problem: Falls - Risk of 
Goal: *Absence of Falls Document [facility] Fall Risk and appropriate interventions in the flowsheet. Outcome: Progressing Towards Goal 
Fall Risk Interventions: 
Mobility Interventions: Patient to call before getting OOB Mentation Interventions: Adequate sleep, hydration, pain control, Door open when patient unattended Medication Interventions: Patient to call before getting OOB Elimination Interventions: Call light in reach, Patient to call for help with toileting needs History of Falls Interventions: Consult care management for discharge planning, Evaluate medications/consider consulting pharmacy Problem: Impaired Skin Integrity/Pressure Injury Treatment Goal: *Prevention of pressure injury Document Raul Scale and appropriate interventions in the flowsheet. Outcome: Progressing Towards Goal 
Pressure Injury Interventions: 
Sensory Interventions: Assess changes in LOC, Assess need for specialty bed Moisture Interventions: Check for incontinence Q2 hours and as needed Activity Interventions: Pressure redistribution bed/mattress(bed type) Mobility Interventions: HOB 30 degrees or less, Pressure redistribution bed/mattress (bed type) Nutrition Interventions: Document food/fluid/supplement intake, Offer support with meals,snacks and hydration Friction and Shear Interventions: HOB 30 degrees or less

## 2018-11-26 NOTE — PROGRESS NOTES
[address] [place], [place] [address] Hospitalist Division Inpatient Daily Progress Note Daily progress Note Patient: Breonna Carcamo. MRN: [id number]  [facility]: [id number] YOB: 1950  Age: 77 y.o. Sex: male DOA: 11/14/2018 LOS:  LOS: 12 days Chief Complaint:  Discitis thoracic region Interval History: 77 y.o.  male with h/o HTN, COPD, Hep C, presented to ED because of back pain on 11/14. Patient indicating the lower portion of his T-spine where he has the pain,saying that it started almost three months ago and has been getting worse. He reports that he was seen in ED few weeks ago and was told that nothing was wrong with him. He is also reporting constipation the last 3 weeks. He denies legs weakness or loss of sensation. No fever or chills. Although patient did not report his history of heroin abuse, the ED report indicates that patient is an IV drugs abuser and last use of IV heroin was 3-4 weeks ago. Patient's friend who came with him in ED has reported that patient fell the day prior while trying to get to the bathroom and was incontinent. Patient is reporting that he has not been able to hold his urine for a while. In the ED,CT abdm/pelvis showed severe  discitis, L3-4 central stenosis, bilateral pleural effusions with bilateral lower lobe atelectasis, splenic hilar and perisplenic varices. UA showed UTI. In ED, patient was started on vanc and cefepime for severe discitis. Neurosurgery was consulted. The hospitalist team was called for admission. ID consulted. On 11/16,  ID decided to discontinue vanc and cefepime, preferred the diagnosis first since patient has been afebrile,without leokocytosis and ESR/CRP showing low probability for OM/Discitis. Currently patient is only on ceftriaxone for UTI due to serratia.   MRI of the spine not done yet as patient has not tolerated. On 11/15/2018, patient noted confused. Code S called. CT head w/o acute and tele-neurology ruled out the stroke. Patient was transferred to ICU due to altered mental status, likely metabolic. On 11/16/18, patient was hypoxic,hypercapnic. He was put on BIPAP, critical care was consulted. On 11/18, patient more awake, clincally stable MRI machine not working since 11/17- tests still pending. Per ID, ESR/CRP low for OM/discitis but does not exclude, of all abx for better diagnostics as pt not septic. Discussed with MRI dept on 11/19- stated anesthesia determined pt high risk for intubation and did not want to risk compensation given hx of COPD. MRI has been attempted 6-7 times but pt will not keep still and requesting to be put to sleep in order to complete test.  IR will not do culture/drain w/o prior MRI. Updated ID. Spoke with anesthesia on 11/20 to see if pt can be re-evaluated to be sedated for MRI- ID does not want to subject pt to long term IV abx w/o definite diagnosis, and IR will not obtain aspiration without MRI. Still awaiting MRI T spine- MRI stated they would be available on Friday for scheduling but unable to do today due to prior appts. Spoke w/ anesthesia again on 11/23 - stated they were available for emergencies but would forward to Dr. Sumner Boeck (sp) and would return call (I have given my cell number for return call). For now, continue w/ current plan of care - nothing can be done until pt obtains MRI. Discussed with pt again trying to get MRI done w/o anesthesia, states he knows he can't tolerate it. Attempted MRI T spine and L spine again on 11/25 however pt unable to tolerate. 11/26 ID recommends doing CT guided aspiration. Discussed with pulmonary- does not feel comfortable intubating pt for one day to obtain MRI. Recommend moving forward w/ ID's plan for CT guided aspiration.   I discussed with IR- unable to obtain today- did however state in most cases, they will have obtained sterile cultures so this may not affect plan of care as we'd hope, however if cultures are positive then we will have a true diagnosis. Unable to do today due to urgent case, but orders have been placed for tomorrow 11/27- recommends NPO after midnight and holding am dose of Heparin (dose due at midnight- please hold). Assessment/Plan:  
 
Patient Active Problem List  
Diagnosis Code  Discitis of thoracic region M46.44  
 UTI (urinary tract infection) N39.0  Bilateral pleural effusion J90  
 Lumbar stenosis M48.061  
 Adenoma of left adrenal gland D35.02  
 Uncontrolled hypertension I10  
 Cirrhosis of liver ([facility] [address] K74.60  Hepatitis C B19.20  Obesity E66.9  
 COPD (chronic obstructive pulmonary disease) (HCC) J44.9  Constipation K59.00  Back pain M54.9 A/P: 
1. Discitis of thoracic region 
 -CT showing severe discitis of T10-11. Patient with h/o iv heroin abuse. Infection suspected. 
 -Vanc and cefepime initiated in ED,continued until 11/16 
 -Sepsis protocol was initiated in ED but I didn't think patient was septic - lactic acid,wbc,temp,HR normal. 
 -Blood cx negative 
 -Pt did not tolerate MRI. Discussed with IR,will have MRI under sedation and aspiration of T10-11 will be performed for cultures,cytology 
 -Neurosurgery consulted ->pt high risk for intervention - medical management 
 -ID decided to d/c Vanc/cefepime on 11/16 as patient was not septic and ESR/CRP showing low probability for OM/Discitis. Pt currently on ceftriaxone for UTI 
 -Pain management with morphine and norco prn 
 -Fall precaution. - discussed with MRI dept 11/19 - stated anesthesia determined pt high risk for intubation and did not want to risk compensation given hx of COPD. MRI has been attempted 6-7 times but pt will not keep still and requesting to be put to sleep in order to complete test.  IR will not do culture/drain w/o prior MRI - discussed with anesthesia on 11/20 to see if pt can be re-evaluated for anesthesia for MRI 
- discussed w/ anesthesia again on 11/23 - they are here for emergencies only- awaiting return call  
- tried again to do MRI on 11/25 but pt unable to tolerate 
- 11/26 ID recommends doing CT guided aspiration and manage abx based on cultures 
- discussed with pulmonary- they do not feel comfortable intubating pt for one day just to obtain imaging 
- discussed with IR on 11/26- ok for CT guided aspiration however informed that in most cases, will obtain sterile culture so this will not affect plan of care/treatment as we'd hope- however if cultures are positive then will have definitive treatment plan- unable to do aspiration today due to urgent case but orders have been placed for tomorrow 11/27 
  
2. Respiratory failure with hypoxia/hypercapnia 
 -Improved. Tolerating 3 liters NC and O2sat 93-98% 
  
3.Acute encephalopathy metabolic 
 -Likely due to acute resp failure and possibly elevated ammonia 
 -Will monitor mentation as patient being treated 
 -CT scan done on 11/15 after code stroke was called did not show any acute process. Tele-neurology did not think that patient had a stroke 
 -Improved mentation,interacting better today 
  4.Lumbar stenosis at L3-4  
 -Will follow neurosurgery recommendations -> no surgery,pt asymptomatic 
  
5. UTI (urinary tract infection) 
 - resolved- completed five days of IV abx 
 -Ucx c/w serratia marscens and blood cx NGTD 
  
6.Cirrhosis of liver/Hepatitis C/H/o alcohol abuse 
 -Patient has h/o hepatitis C and past history of alcohol abuse. CT showing strong evidence of cirrhosis with possible portal vein hypertension. 
  -Received albumin x 1  
 -On lactulose. 7.Bilateral pleural effusion/Legs edema 
 -Likely due to cirrhosis of liver. 
 -Legs edema improved 
  
8. Adenoma of left adrenal gland  
 -Seen in previous films. Will monitor 
  
9. Uncontrolled hypertension  
  -Resume lisinopril. Hydralazine prn 
  
10. Obesity 
 -Supportive care 
  
11. COPD  
 -Duo-neb prn 
  
12. Constipation 
 -Order dulcolax 
  
13. Back pain 
 -Due to problems above - on pain meds. 
  
14. Legs edema 
 -Likely due to cirrhosis with portal hypertension 
 -pro-BNP not elevated and ECHO nl with EF 66-70% -legs edema resolved. 
  
DVT prophylaxis 
- Heparin subcutaneously TID - hold for now given CT guided aspiration ANDERSON Obrien [facility] Pager:  [phone] Office:  [phone] Subjective: Interval notes reviewed. Trying to establish plan. Objective:  
  
Visit Vitals /73 (BP 1 Location: Right arm, BP Patient Position: Head of bed elevated (Comment degrees)) Pulse 92 Temp 98.1 °F (36.7 °C) Resp 18 Ht 6' (1.829 m) Wt 110.2 kg (242 lb 15.2 oz) SpO2 92% BMI 32.95 kg/m² Physical Exam: 
General appearance: alert, cooperative, no distress, appears stated age Head: Normocephalic, without obvious abnormality, atraumatic Lungs: clear to auscultation bilaterally Heart: regular rate and rhythm, S1, S2 normal, no murmur, click, rub or gallop Abdomen: soft, non tender, non distended. Normoactive bowel sounds. No masses, no organomegaly Extremities: extremities normal, atraumatic, no cyanosis or edema Skin: Skin color, texture, turgor normal. No rashes or lesions Neurologic: Grossly normal 
PSY: Mood and affect normal, appropriately behaved Intake and Output: 
Current Shift:  11/26 0701 - 11/26 1900 In: 120 [P.O.:120] Out: - Last three shifts:  11/24 1901 - 11/26 0700 In: 120 [P.O.:120] Out: 970 [Urine:970] Recent Results (from the past 24 hour(s)) METABOLIC PANEL, BASIC Collection Time: 11/26/18  4:20 AM  
Result Value Ref Range Sodium 140 136 - 145 mmol/L Potassium 5.5 3.5 - 5.5 mmol/L  Chloride 102 100 - 108 mmol/L  
 CO2 38 (H) 21 - 32 mmol/L  
 Anion gap 0 (L) 3.0 - 18 mmol/L Glucose 93 74 - 99 mg/dL BUN 21 (H) 7.0 - 18 MG/DL Creatinine 1.09 0.6 - 1.3 MG/DL  
 BUN/Creatinine ratio 19 12 - 20 GFR est AA >60 >60 ml/min/1.73m2 GFR est non-AA >60 >60 ml/min/1.73m2 Calcium 8.8 8.5 - 10.1 MG/DL Lab Results Component Value Date/Time Glucose 93 11/26/2018 04:20 AM  
 Glucose 93 11/24/2018 06:43 AM  
 Glucose 82 11/22/2018 07:50 AM  
 Glucose 80 11/20/2018 06:30 AM  
 Glucose 87 11/19/2018 07:24 AM  
  
 
Imaging: No results found. Medication List Reviewed: 
Current Facility-Administered Medications Medication Dose Route Frequency  docusate sodium (COLACE) capsule 100 mg  100 mg Oral DAILY  HYDROcodone-acetaminophen (NORCO) 5-325 mg per tablet 1 Tab  1 Tab Oral Q4H PRN  pantoprazole (PROTONIX) tablet 40 mg  40 mg Oral ACB  morphine injection 1 mg  1 mg IntraVENous Q4H PRN  
 heparin (porcine) injection 5,000 Units  5,000 Units SubCUTAneous Q8H  
 budesonide (PULMICORT) 500 mcg/2 ml nebulizer suspension  500 mcg Nebulization BID RT  
 sodium chloride (NS) flush 5-10 mL  5-10 mL IntraVENous PRN  
 gabapentin (NEURONTIN) capsule 400 mg  400 mg Oral BID  lisinopril (PRINIVIL, ZESTRIL) tablet 20 mg  20 mg Oral DAILY  loratadine (CLARITIN) tablet 10 mg  10 mg Oral DAILY PRN  
 simvastatin (ZOCOR) tablet 20 mg  20 mg Oral QHS  naloxone (NARCAN) injection 0.4 mg  0.4 mg IntraVENous PRN  
 diphenhydrAMINE (BENADRYL) injection 12.5 mg  12.5 mg IntraVENous Q4H PRN  
 ondansetron (ZOFRAN) injection 4 mg  4 mg IntraVENous Q4H PRN  
 albuterol-ipratropium (DUO-NEB) 2.5 MG-0.5 MG/3 ML  3 mL Nebulization Q4H PRN  
 hydrALAZINE (APRESOLINE) 20 mg/mL injection 10 mg  10 mg IntraVENous Q6H PRN  
 bisacodyl (DULCOLAX) suppository 10 mg  10 mg Rectal DAILY PRN  
 lactulose (CHRONULAC) solution 10 g  10 g Oral BID

## 2018-11-26 NOTE — PROGRESS NOTES
Problem: Mobility Impaired (Adult and Pediatric) Goal: *Acute Goals and Plan of Care (Insert Text) Physical Therapy Goals Initiated 11/25/2018 and to be accomplished within 7 day(s) 1. Patient will move from supine to sit and sit to supine , scoot up and down and roll side to side in bed with minimal assistance/contact guard assist.    
2.  Patient will transfer from bed to chair and chair to bed with minimal assistance/contact guard assist using the least restrictive device. 3.  Patient will perform sit to stand with supervision/set-up. 4.  Patient will ambulate with minimal assistance/contact guard assist for >/= 100 feet with the least restrictive device. 5.  Patient will ascend/descend 5 stairs with bilateral handrail(s) with minimal assistance/contact guard assist.  
 
physical Therapy EVALUATION Patient: Ricky Montague (67 y.o. male) Date: 11/25/2018 Primary Diagnosis: Discitis of thoracic region Procedure(s) (LRB): Anes MRI (N/A) 10 Days Post-Op Precautions:   
 PLOF: Ambulatory with cane ASSESSMENT : 
 Patient requires between maximal assistance for bed mobility. Unable to assess transfers and ambulation at this time secondary to c/o pain. Will benefit from skilled PT intervention to increase overall functional mobility independence and safety. May benefit from Pre-Med prior to treatment for better participation with therapy session. Patient presents with deficits in:  
Bed Mobility, Transfers, Gait, Strength and Balance Patient will benefit from skilled intervention to address the above impairments. Patients rehabilitation potential is considered to be Good Factors which may influence rehabilitation potential include:  
[]         None noted 
[]         Mental ability/status [x]         Medical condition 
[]         Home/family situation and support systems 
[x]         Safety awareness [x]         Pain tolerance/management 
[]         Other: Recommendations for nursing:  
Written on communication board: sit edge of bed with assist of 1-2 for safety Verbally communicated to: Nurse Linda Poon PLAN : 
Recommendations and Planned Interventions: 
[x]           Bed Mobility Training             []    Neuromuscular Re-Education 
[x]           Transfer Training                   []    Orthotic/Prosthetic Training 
[x]           Gait Training                          []    Modalities [x]           Therapeutic Exercises          []    Edema Management/Control 
[]           Therapeutic Activities            [x]    Patient and Family Training/Education* [x]           Other (comment):  Plan of care Frequency/Duration: Patient will be followed by physical therapy 1 time per day/4-7 days per week to address goals. Discharge Recommendations: Franki Bains Further Equipment Recommendations for Discharge: rolling walker SUBJECTIVE:  
Patient stated I want to get back to walking.  OBJECTIVE DATA SUMMARY:  
 
Past Medical History:  
Diagnosis Date  Arthritis  COPD  Hypertension Past Surgical History:  
Procedure Laterality Date  HX REFRACTIVE SURGERY Barriers to Learning/Limitations: yes;  none Compensate with: visual, verbal, tactile, kinesthetic cues/model G CODE:Mobility [id number] Current  CL= 60-79%   Goal  CJ= 20-39%. The severity rating is based on the Other [facility] Sitting Balance Scale Eval Complexity: History: MEDIUM  Complexity : 1-2 comorbidities / personal factors will impact the outcome/ POC Exam:MEDIUM Complexity : 3 Standardized tests and measures addressing body structure, function, activity limitation and / or participation in recreation  Presentation: MEDIUM Complexity : Evolving with changing characteristics  Clinical Decision Making:Medium Complexity [facility] Sitting Balance Scale Overall Complexity:MEDIUM [facility] Sitting Balance Scale 0: Pt performs 25% or less of sitting activity (Max assist) CN, 100% impaired. 1: Pt supports self with upper extremities but requires therapist assistance. Pt performs 25-50% of effort (Mod assist) CM, 80% to <100% impaired. 1+: Pt supports self with upper extremities but requires therapist assistance. Pt performs >50% effort. (Min assist). CL, 60% to <80% impaired. 2: Pt supports self independently with both upper extremities. CL, 60% to <80% impaired. 2+: Pt support self independently with 1 upper extremity. CK, 40% to <60% impaired. 3: Pt sits without upper extremity support for up to 30 seconds. CK, 40% to <60% impaired. 3+: Pt sits without upper extremity support for 30 seconds or greater. CJ, 20% to <40% impaired. 4: Pt moves and returns trunkal midpoint 1-2 inches in one plane. CJ, 20% to <40% impaired. 4+: Pt moves and returns trunkal midpoint 1-2 inches in multiple planes. CI, 1% to <20% impaired. 5: Pt moves and returns trunkal midpoint in all planes greater than 2 inches. CH, 0% impaired. Prior Level of Function/Home Situation: Ambulatory with cane Home Situation Home Environment: Private residence One/Two Story Residence: One story Living Alone: No 
Support Systems: Family member(s) Patient Expects to be Discharged to[de-identified] Private residence Current DME Used/Available at Home: Shower chair, Wheelchair, Devon Dodrill, straight Strength:   
Strength: Generally decreased, functional(both LE) Tone & Sensation:  
Tone: Normal(both LE) Range Of Motion: 
AROM: Generally decreased, functional(both LE) Functional Mobility: 
Bed Mobility: 
Rolling: Maximum assistance; Additional time Supine to Sit: Maximum assistance Sit to Supine: Maximum assistance;Assist x2 Transfers: 
Sit to Stand: (unable to assess at this time) Balance:  
Sitting: Impaired Sitting - Static: Fair (occasional) Sitting - Dynamic: Poor (constant support) Standing: (unable to assess)Ambulation/Gait Training Unable to assess at this time Pain: 
Pre treatment pain level:  8, low backPost treatment pain level: 8, low back, nursing notified patient requesting pain medication Pain Scale 1: Numeric (0 - 10) Pain Intensity 1: 0 Pain Location 1: Back Pain Orientation 1: Posterior Pain Description 1: Aching Pain Intervention(s) 1: Medication (see MAR) Activity Tolerance: 
Poor Please refer to the flowsheet for vital signs taken during this treatment. After treatment:  
[]         Patient left in no apparent distress sitting up in chair 
[x]         Patient left in no apparent distress in bed 
[x]         Call bell left within reach [x]         Nursing notified 
[]         Caregiver present 
[]         Bed alarm activated COMMUNICATION/EDUCATION:*  
[x]         Fall prevention education was provided and the patient/caregiver indicated understanding. [x]         Patient/family have participated as able in goal setting and plan of care. [x]         Patient/family agree to work toward stated goals and plan of care. []         Patient understands intent and goals of therapy, but is neutral about his/her participation. []         Patient is unable to participate in goal setting and plan of care. Thank you for this referral. 
Smith Patterson, PT Time Calculation: 28 mins

## 2018-11-26 NOTE — PROGRESS NOTES
Infectious Disease Follow-up Admit Date: 11/14/2018 Current abx Prior abx None 11/18 - 8 Cefepime/vanco  11/14   2, Ceftriaxone 11/16 - 3 Assessment -> Rec:  
 
T10-11 discitis 
- mid back pain x 3 months, fell, incontinent of urine - CTAP: severe destructive changes T 10-11, paravertebral sot tissue infiltration, extension to anterior epidural space w/ cord compression  
- h/o IVDU  
- given Vanc/Cefepime 2 days then Ceftriaxone 2 days ending 11/18. Off abx since but MRI, aspiration not yet done (not cooperative w/ getting MRI done) -> would recommend going straight to CT guided aspiration for culture ASAP then resume IV abx. (foregoing MRI)  Will need 6-8 weeks IV abx - final choice based on culture if positive. 
-> d/w Dr. Doron Hodges who will try to arrange w/ IR  
UTI, Serratia - UA 11-20 WBC Bilateral pleural effusions Hepatomegaly, splenic hilar & perisplenic varices 
- seen on CTAP 11/14 ? cirrhosis COPD   
HTN   
[facility] MICROBIOLOGY:  
11/14 urcx >100,000 Serratia marcescens 
 blcx NG x 2 
 
LINES AND CATHETERS:  
piv Active Hospital Problems Diagnosis Date Noted  Discitis of thoracic region 11/14/2018  UTI (urinary tract infection) 11/14/2018  Bilateral pleural effusion 11/14/2018  Lumbar stenosis 11/14/2018  Adenoma of left adrenal gland 11/14/2018  Uncontrolled hypertension 11/14/2018  Cirrhosis of liver ([facility] [address] 11/14/2018  Hepatitis C 11/14/2018  Obesity 11/14/2018  COPD (chronic obstructive pulmonary disease) ([facility] [address] 11/14/2018  Constipation 11/14/2018  Back pain 11/14/2018 Subjective: Interval notes reviewed. Continues with back pain - no better, but not worse. Aggravated by movement and standing. Afebrile Current Facility-Administered Medications Medication Dose Route Frequency  docusate sodium (COLACE) capsule 100 mg  100 mg Oral DAILY  HYDROcodone-acetaminophen (NORCO) 5-325 mg per tablet 1 Tab  1 Tab Oral Q4H PRN  pantoprazole (PROTONIX) tablet 40 mg  40 mg Oral ACB  morphine injection 1 mg  1 mg IntraVENous Q4H PRN  
 heparin (porcine) injection 5,000 Units  5,000 Units SubCUTAneous Q8H  
 budesonide (PULMICORT) 500 mcg/2 ml nebulizer suspension  500 mcg Nebulization BID RT  
 sodium chloride (NS) flush 5-10 mL  5-10 mL IntraVENous PRN  
 gabapentin (NEURONTIN) capsule 400 mg  400 mg Oral BID  lisinopril (PRINIVIL, ZESTRIL) tablet 20 mg  20 mg Oral DAILY  loratadine (CLARITIN) tablet 10 mg  10 mg Oral DAILY PRN  
 simvastatin (ZOCOR) tablet 20 mg  20 mg Oral QHS  naloxone (NARCAN) injection 0.4 mg  0.4 mg IntraVENous PRN  
 diphenhydrAMINE (BENADRYL) injection 12.5 mg  12.5 mg IntraVENous Q4H PRN  
 ondansetron (ZOFRAN) injection 4 mg  4 mg IntraVENous Q4H PRN  
 albuterol-ipratropium (DUO-NEB) 2.5 MG-0.5 MG/3 ML  3 mL Nebulization Q4H PRN  
 hydrALAZINE (APRESOLINE) 20 mg/mL injection 10 mg  10 mg IntraVENous Q6H PRN  
 bisacodyl (DULCOLAX) suppository 10 mg  10 mg Rectal DAILY PRN  
 lactulose (CHRONULAC) solution 10 g  10 g Oral BID Objective:  
 
Visit Vitals /60 (BP 1 Location: Right arm, BP Patient Position: Head of bed elevated (Comment degrees)) Pulse 97 Temp 97.4 °F (36.3 °C) Resp 18 Ht 6' (1.829 m) Wt 110.2 kg (242 lb 15.2 oz) SpO2 91% BMI 32.95 kg/m² Temp (24hrs), Av.8 °F (36.6 °C), Min:97.3 °F (36.3 °C), Max:98.2 °F (36.8 °C) GEN: pleasant 76 y/o AA male, sl lethargic but not in distress HEENT: no scleral icterus, no oral lesions CHEST: no crackles or wheezes CVS:regular, no murmurs, rubs or gallops ABD: soft, nontender EXT: no edema or cyanosis Neuro: awake, sl drowsy, oriented x 3, can move extremities including LE's Labs: Results:  
Chemistry Recent Labs  
  18 
5437 18 
8022 GLU 93 93  139  
K 5.5 4.4  
 98* CO2 38* 39* BUN 21* 10  
CREA 1.09 0.60  
CA 8.8 8.8 AGAP 0* 2*  
BUCR 19 17 CBC w/Diff No results for input(s): WBC, RBC, HGB, HCT, PLT, GRANS, LYMPH, EOS, HGBEXT, HCTEXT, PLTEXT in the last 72 hours. Microbiology Results No results for input(s): SDES, CULT in the last 72 hours. Joseph Ellis MD 
November 26, 2018 [facility] [phone]

## 2018-11-27 ENCOUNTER — APPOINTMENT (OUTPATIENT)
Dept: NUCLEAR MEDICINE | Age: 68
DRG: 347 | End: 2018-11-27
Attending: INTERNAL MEDICINE
Payer: MEDICAID

## 2018-11-27 ENCOUNTER — APPOINTMENT (OUTPATIENT)
Dept: NUCLEAR MEDICINE | Age: 68
DRG: 347 | End: 2018-11-27
Attending: HOSPITALIST
Payer: MEDICAID

## 2018-11-27 LAB
BASOPHILS # BLD: 0 K/UL (ref 0–0.1)
BASOPHILS NFR BLD: 0 % (ref 0–2)
DIFFERENTIAL METHOD BLD: ABNORMAL
EOSINOPHIL # BLD: 0.1 K/UL (ref 0–0.4)
EOSINOPHIL NFR BLD: 2 % (ref 0–5)
ERYTHROCYTE [DISTWIDTH] IN BLOOD BY AUTOMATED COUNT: 16.3 % (ref 11.6–14.5)
HCT VFR BLD AUTO: 45.2 % (ref 36–48)
HGB BLD-MCNC: 13.4 G/DL (ref 13–16)
LYMPHOCYTES # BLD: 2.4 K/UL (ref 0.9–3.6)
LYMPHOCYTES NFR BLD: 42 % (ref 21–52)
MCH RBC QN AUTO: 27.1 PG (ref 24–34)
MCHC RBC AUTO-ENTMCNC: 29.6 G/DL (ref 31–37)
MCV RBC AUTO: 91.5 FL (ref 74–97)
MONOCYTES # BLD: 0.5 K/UL (ref 0.05–1.2)
MONOCYTES NFR BLD: 8 % (ref 3–10)
NEUTS SEG # BLD: 2.7 K/UL (ref 1.8–8)
NEUTS SEG NFR BLD: 48 % (ref 40–73)
PLATELET # BLD AUTO: 151 K/UL (ref 135–420)
PMV BLD AUTO: 10.6 FL (ref 9.2–11.8)
RBC # BLD AUTO: 4.94 M/UL (ref 4.7–5.5)
WBC # BLD AUTO: 5.7 K/UL (ref 4.6–13.2)

## 2018-11-27 PROCEDURE — 74011250637 HC RX REV CODE- 250/637: Performed by: NURSE PRACTITIONER

## 2018-11-27 PROCEDURE — A9503 TC99M MEDRONATE: HCPCS

## 2018-11-27 PROCEDURE — 74011250637 HC RX REV CODE- 250/637: Performed by: INTERNAL MEDICINE

## 2018-11-27 PROCEDURE — 74011000250 HC RX REV CODE- 250: Performed by: INTERNAL MEDICINE

## 2018-11-27 PROCEDURE — 97535 SELF CARE MNGMENT TRAINING: CPT

## 2018-11-27 PROCEDURE — 77010033678 HC OXYGEN DAILY

## 2018-11-27 PROCEDURE — 74011250636 HC RX REV CODE- 250/636: Performed by: NURSE PRACTITIONER

## 2018-11-27 PROCEDURE — 97530 THERAPEUTIC ACTIVITIES: CPT

## 2018-11-27 PROCEDURE — 85025 COMPLETE CBC W/AUTO DIFF WBC: CPT

## 2018-11-27 PROCEDURE — 94640 AIRWAY INHALATION TREATMENT: CPT

## 2018-11-27 PROCEDURE — 77030034849

## 2018-11-27 PROCEDURE — A9556 GA67 GALLIUM: HCPCS

## 2018-11-27 PROCEDURE — 36415 COLL VENOUS BLD VENIPUNCTURE: CPT

## 2018-11-27 PROCEDURE — 65660000000 HC RM CCU STEPDOWN

## 2018-11-27 RX ORDER — HEPARIN SODIUM 5000 [USP'U]/ML
5000 INJECTION, SOLUTION INTRAVENOUS; SUBCUTANEOUS EVERY 8 HOURS
Status: DISCONTINUED | OUTPATIENT
Start: 2018-11-27 | End: 2018-12-04 | Stop reason: HOSPADM

## 2018-11-27 RX ADMIN — GABAPENTIN 400 MG: 400 CAPSULE ORAL at 09:18

## 2018-11-27 RX ADMIN — LACTULOSE 10 G: 20 SOLUTION ORAL at 09:19

## 2018-11-27 RX ADMIN — PANTOPRAZOLE SODIUM 40 MG: 40 TABLET, DELAYED RELEASE ORAL at 09:18

## 2018-11-27 RX ADMIN — HYDROCODONE BITARTRATE AND ACETAMINOPHEN 1 TABLET: 5; 325 TABLET ORAL at 19:57

## 2018-11-27 RX ADMIN — LACTULOSE 10 G: 20 SOLUTION ORAL at 17:16

## 2018-11-27 RX ADMIN — LISINOPRIL 20 MG: 20 TABLET ORAL at 09:18

## 2018-11-27 RX ADMIN — IPRATROPIUM BROMIDE AND ALBUTEROL SULFATE 3 ML: .5; 3 SOLUTION RESPIRATORY (INHALATION) at 03:57

## 2018-11-27 RX ADMIN — HYDROCODONE BITARTRATE AND ACETAMINOPHEN 1 TABLET: 5; 325 TABLET ORAL at 14:40

## 2018-11-27 RX ADMIN — SIMVASTATIN 20 MG: 20 TABLET, FILM COATED ORAL at 21:08

## 2018-11-27 RX ADMIN — DOCUSATE SODIUM 100 MG: 100 CAPSULE, LIQUID FILLED ORAL at 09:18

## 2018-11-27 RX ADMIN — HEPARIN SODIUM 5000 UNITS: 5000 INJECTION INTRAVENOUS; SUBCUTANEOUS at 21:08

## 2018-11-27 RX ADMIN — BUDESONIDE 500 MCG: 0.5 INHALANT RESPIRATORY (INHALATION) at 08:58

## 2018-11-27 RX ADMIN — HEPARIN SODIUM 5000 UNITS: 5000 INJECTION INTRAVENOUS; SUBCUTANEOUS at 14:43

## 2018-11-27 RX ADMIN — GABAPENTIN 400 MG: 400 CAPSULE ORAL at 17:15

## 2018-11-27 RX ADMIN — HYDROCODONE BITARTRATE AND ACETAMINOPHEN 1 TABLET: 5; 325 TABLET ORAL at 07:05

## 2018-11-27 NOTE — PROGRESS NOTES
[facility]U/IPR REFERRAL CONTACT NOTE [address] [place] [address] for Physical Rehabilitation RE:  Iglesia Kirkland Received referral from case management Alma Hines & team) to review this patient's case for admission to [address] [place] [address] for Physical Rehabilitation Based on our pre-admission screening:  
 
[X] Our Team/Medical Director is following this case. Comments:  Per Dr. João Pedroza for ct with aspiration, Off antibiotics, Exam is 5.5 motor NOT interested is surgical treatment; In the past these issues were treated with prolonged bedrest and mobilization in brace\" Per Nuclear Med Three phase bone scan will be completed today. Gallium injection will be completed today and images will be acquired tomorrow (11/28), Thursday (11/29), and possibly Friday (11/30). Will follow-up for outcome of bone scans/medical status as well as additional therapy updates and advise. Again, Thank you for this referral. Should you have any questions please do not hesitate to call. Sincerely, Bearl  Ricardo Mac Clinical Liaison [facility] for Physical Rehabilitation 
[phone]

## 2018-11-27 NOTE — PROGRESS NOTES
[address] [place], [place] [address] Hospitalist Division Inpatient Daily Progress Note Daily progress Note Patient: Glendy Sam. MRN: [id number]  [facility]: [id number] YOB: 1950  Age: 77 y.o. Sex: male DOA: 11/14/2018 LOS:  LOS: 13 days Chief Complaint:  Discitis thoracic region Interval History: 77 y.o.  male with h/o HTN, COPD, Hep C, presented to ED because of back pain on 11/14. Patient indicating the lower portion of his T-spine where he has the pain,saying that it started almost three months ago and has been getting worse. He reports that he was seen in ED few weeks ago and was told that nothing was wrong with him. He is also reporting constipation the last 3 weeks. He denies legs weakness or loss of sensation. No fever or chills. Although patient did not report his history of heroin abuse, the ED report indicates that patient is an IV drugs abuser and last use of IV heroin was 3-4 weeks ago. Patient's friend who came with him in ED has reported that patient fell the day prior while trying to get to the bathroom and was incontinent. Patient is reporting that he has not been able to hold his urine for a while. In the ED,CT abdm/pelvis showed severe  discitis, L3-4 central stenosis, bilateral pleural effusions with bilateral lower lobe atelectasis, splenic hilar and perisplenic varices. UA showed UTI. In ED, patient was started on vanc and cefepime for severe discitis. Neurosurgery was consulted. The hospitalist team was called for admission. ID consulted. On 11/16,  ID decided to discontinue vanc and cefepime, preferred the diagnosis first since patient has been afebrile,without leokocytosis and ESR/CRP showing low probability for OM/Discitis. Currently patient is only on ceftriaxone for UTI due to serratia.   MRI of the spine not done yet as patient has not tolerated. On 11/15/2018, patient noted confused. Code S called. CT head w/o acute and tele-neurology ruled out the stroke. Patient was transferred to ICU due to altered mental status, likely metabolic. On 11/16/18, patient was hypoxic,hypercapnic. He was put on BIPAP, critical care was consulted. On 11/18, patient more awake, clincally stable MRI machine not working since 11/17- tests still pending. Per ID, ESR/CRP low for OM/discitis but does not exclude, of all abx for better diagnostics as pt not septic. Discussed with MRI dept on 11/19- stated anesthesia determined pt high risk for intubation and did not want to risk compensation given hx of COPD. MRI has been attempted 6-7 times but pt will not keep still and requesting to be put to sleep in order to complete test.  IR will not do culture/drain w/o prior MRI. Updated ID. Spoke with anesthesia on 11/20 to see if pt can be re-evaluated to be sedated for MRI- ID does not want to subject pt to long term IV abx w/o definite diagnosis, and IR will not obtain aspiration without MRI. Still awaiting MRI T spine- MRI stated they would be available on Friday for scheduling but unable to do today due to prior appts. Spoke w/ anesthesia again on 11/23 - stated they were available for emergencies but would forward to Dr. Doug Fletcher (sp) and would return call (I have given my cell number for return call). For now, continue w/ current plan of care - nothing can be done until pt obtains MRI. Discussed with pt again trying to get MRI done w/o anesthesia, states he knows he can't tolerate it. Attempted MRI T spine and L spine again on 11/25 however pt unable to tolerate. 11/26 ID recommends doing CT guided aspiration. Discussed with pulmonary- does not feel comfortable intubating pt for one day to obtain MRI. Recommend moving forward w/ ID's plan for CT guided aspiration.   I discussed with IR- unable to obtain today- did however state in most cases, they will have obtained sterile cultures so this may not affect plan of care as we'd hope, however if cultures are positive then we will have a true diagnosis. Unable to do today due to urgent case, but orders have been placed for tomorrow 11/27- recommends NPO after midnight and holding am dose of Heparin (dose due at midnight- please hold). Attending discussed with radiology on 11/27- concern for pt tolerability as he will have to lay prone for CT guided aspiration- recommends bone scan for now. ID updated. Assessment/Plan:  
 
Patient Active Problem List  
Diagnosis Code  Discitis of thoracic region M46.44  
 UTI (urinary tract infection) N39.0  Bilateral pleural effusion J90  
 Lumbar stenosis M48.061  
 Adenoma of left adrenal gland D35.02  
 Uncontrolled hypertension I10  
 Cirrhosis of liver ([address] K74.60  Hepatitis C B19.20  Obesity E66.9  
 COPD (chronic obstructive pulmonary disease) (HCC) J44.9  Constipation K59.00  Back pain M54.9 A/P: 
1. Discitis of thoracic region 
 -CT showing severe discitis of T10-11. Patient with h/o iv heroin abuse. Infection suspected. 
 -Vanc and cefepime initiated in ED,continued until 11/16 
 -Sepsis protocol was initiated in ED but I didn't think patient was septic - lactic acid,wbc,temp,HR normal. 
 -Blood cx negative 
 -Pt did not tolerate MRI. Discussed with IR,will have MRI under sedation and aspiration of T10-11 will be performed for cultures,cytology 
 -Neurosurgery consulted ->pt high risk for intervention - medical management 
 -ID decided to d/c Vanc/cefepime on 11/16 as patient was not septic and ESR/CRP showing low probability for OM/Discitis. Pt currently on ceftriaxone for UTI 
 -Pain management with morphine and norco prn 
 -Fall precaution.  
- discussed with MRI dept 11/19 - stated anesthesia determined pt high risk for intubation and did not want to risk compensation given hx of COPD.  MRI has been attempted 6-7 times but pt will not keep still and requesting to be put to sleep in order to complete test.  IR will not do culture/drain w/o prior MRI 
- discussed with anesthesia on 11/20 to see if pt can be re-evaluated for anesthesia for MRI 
- discussed w/ anesthesia again on 11/23 - they are here for emergencies only- awaiting return call  
- tried again to do MRI on 11/25 but pt unable to tolerate 
- 11/26 ID recommends doing CT guided aspiration and manage abx based on cultures 
- discussed with pulmonary- they do not feel comfortable intubating pt for one day just to obtain imaging 
- discussed with IR on 11/26- ok for CT guided aspiration however informed that in most cases, will obtain sterile culture so this will not affect plan of care/treatment as we'd hope- however if cultures are positive then will have definitive treatment plan- unable to do aspiration today due to urgent case but orders have been placed for tomorrow 11/27 
- CT guided aspiration on hold for now, proceed with bone scan 
  2. Respiratory failure with hypoxia/hypercapnia 
 -Improved. Tolerating 3 liters NC and O2sat 93-98% 
  
3.Acute encephalopathy metabolic 
 -Likely due to acute resp failure and possibly elevated ammonia 
 -Will monitor mentation as patient being treated 
 -CT scan done on 11/15 after code stroke was called did not show any acute process. Tele-neurology did not think that patient had a stroke 
 -Improved mentation,interacting better today 
  4.Lumbar stenosis at L3-4  
 -Will follow neurosurgery recommendations -> no surgery,pt asymptomatic 
  
5. UTI (urinary tract infection) 
 - resolved- completed five days of IV abx 
 -Ucx c/w serratia marscens and blood cx NGTD 
  
6.Cirrhosis of liver/Hepatitis C/H/o alcohol abuse 
 -Patient has h/o hepatitis C and past history of alcohol abuse. CT showing strong evidence of cirrhosis with possible portal vein hypertension. 
  -Received albumin x 1  
 -On lactulose. 7.Bilateral pleural effusion/Legs edema 
 -Likely due to cirrhosis of liver. 
 -Legs edema improved 
  
8. Adenoma of left adrenal gland  
 -Seen in previous films. Will monitor 
  
9. Uncontrolled hypertension  
 -Resume lisinopril. Hydralazine prn 
  
10. Obesity 
 -Supportive care 
  
11. COPD  
 -Duo-neb prn 
  
12. Constipation 
 -Order dulcolax 
  
13. Back pain 
 -Due to problems above - on pain meds. 
  
14. Legs edema 
 -Likely due to cirrhosis with portal hypertension 
 -pro-BNP not elevated and ECHO nl with EF 66-70% -legs edema resolved. 
  
DVT prophylaxis 
- Heparin subcutaneously TID [facility] Pager:  [phone] Office:  [phone] Subjective: No acute events overnight. CT guided aspiration on hold for now, plan for bone scan at this time. ID updated. Discussed with primary nurse. Objective:  
  
Visit Vitals /78 Pulse 94 Temp 98.8 °F (37.1 °C) Resp 18 Ht 6' (1.829 m) Wt 110.3 kg (243 lb 2.7 oz) SpO2 93% BMI 32.98 kg/m² Physical Exam: 
General appearance: alert, cooperative, no distress, appears stated age Head: Normocephalic, without obvious abnormality, atraumatic Lungs: clear to auscultation bilaterally Heart: regular rate and rhythm, S1, S2 normal, no murmur, click, rub or gallop Abdomen: soft, non tender, non distended. Normoactive bowel sounds. No masses, no organomegaly Extremities: extremities normal, atraumatic, no cyanosis or edema Skin: Skin color, texture, turgor normal. No rashes or lesions Neurologic: Grossly normal 
PSY: Mood and affect normal, appropriately behaved Intake and Output: 
Current Shift:  No intake/output data recorded. Last three shifts:  11/25 1901 - 11/27 0700 In: 26 [P.O.:460] Out: 1111 [[id number]] Recent Results (from the past 24 hour(s)) CBC WITH AUTOMATED DIFF  Collection Time: 11/27/18  7:25 AM  
 Result Value Ref Range WBC 5.7 4.6 - 13.2 K/uL  
 RBC 4.94 4.70 - 5.50 M/uL  
 HGB 13.4 13.0 - 16.0 g/dL HCT 45.2 36.0 - 48.0 % MCV 91.5 74.0 - 97.0 FL  
 MCH 27.1 24.0 - 34.0 PG  
 MCHC 29.6 (L) 31.0 - 37.0 g/dL  
 RDW 16.3 (H) 11.6 - 14.5 % PLATELET 387 881 - 890 K/uL MPV 10.6 9.2 - 11.8 FL  
 NEUTROPHILS 48 40 - 73 % LYMPHOCYTES 42 21 - 52 % MONOCYTES 8 3 - 10 % EOSINOPHILS 2 0 - 5 % BASOPHILS 0 0 - 2 %  
 ABS. NEUTROPHILS 2.7 1.8 - 8.0 K/UL  
 ABS. LYMPHOCYTES 2.4 0.9 - 3.6 K/UL  
 ABS. MONOCYTES 0.5 0.05 - 1.2 K/UL  
 ABS. EOSINOPHILS 0.1 0.0 - 0.4 K/UL  
 ABS. BASOPHILS 0.0 0.0 - 0.1 K/UL  
 DF AUTOMATED Lab Results Component Value Date/Time Glucose 93 11/26/2018 04:20 AM  
 Glucose 93 11/24/2018 06:43 AM  
 Glucose 82 11/22/2018 07:50 AM  
 Glucose 80 11/20/2018 06:30 AM  
 Glucose 87 11/19/2018 07:24 AM  
  
 
Imaging: No results found. Medication List Reviewed: 
Current Facility-Administered Medications Medication Dose Route Frequency  docusate sodium (COLACE) capsule 100 mg  100 mg Oral DAILY  HYDROcodone-acetaminophen (NORCO) 5-325 mg per tablet 1 Tab  1 Tab Oral Q4H PRN  pantoprazole (PROTONIX) tablet 40 mg  40 mg Oral ACB  morphine injection 1 mg  1 mg IntraVENous Q4H PRN  
 budesonide (PULMICORT) 500 mcg/2 ml nebulizer suspension  500 mcg Nebulization BID RT  
 sodium chloride (NS) flush 5-10 mL  5-10 mL IntraVENous PRN  
 gabapentin (NEURONTIN) capsule 400 mg  400 mg Oral BID  lisinopril (PRINIVIL, ZESTRIL) tablet 20 mg  20 mg Oral DAILY  loratadine (CLARITIN) tablet 10 mg  10 mg Oral DAILY PRN  
 simvastatin (ZOCOR) tablet 20 mg  20 mg Oral QHS  naloxone (NARCAN) injection 0.4 mg  0.4 mg IntraVENous PRN  
 diphenhydrAMINE (BENADRYL) injection 12.5 mg  12.5 mg IntraVENous Q4H PRN  
 ondansetron (ZOFRAN) injection 4 mg  4 mg IntraVENous Q4H PRN  
 albuterol-ipratropium (DUO-NEB) 2.5 MG-0.5 MG/3 ML  3 mL Nebulization Q4H PRN  
  hydrALAZINE (APRESOLINE) 20 mg/mL injection 10 mg  10 mg IntraVENous Q6H PRN  
 bisacodyl (DULCOLAX) suppository 10 mg  10 mg Rectal DAILY PRN  
 lactulose (CHRONULAC) solution 10 g  10 g Oral BID

## 2018-11-27 NOTE — PROGRESS NOTES
Infectious Disease Follow-up Admit Date: 11/14/2018 Current abx Prior abx None 11/18 - 8 Cefepime/vanco  11/14   2, Ceftriaxone 11/16 - 3 Assessment -> Rec:  
 
T10-11 discitis 
- mid back pain x 3 months, fell, incontinent of urine - CTAP: severe destructive changes T 10-11, paravertebral sot tissue infiltration, extension to anterior epidural space w/ cord compression  
- h/o IVDU  
- given Vanc/Cefepime 2 days then Ceftriaxone 2 days ending 11/18. Off abx since but MRI, aspiration not yet done (not cooperative w/ getting MRI done - claustrophobic) - d/w Dr. Matias House of IR. CT findings suggestive, not definitive for infection. MRI would be ideal for clarifying this but anesthesia apparently not available for conscious sedation for this. Bone/ gallium can help exclude non-infectious possibilities.   -> bone / gallium scan today. D/w Dr. Matias House of IR 
-> if not c/w infection - no aspiration / biopsy needed 
-> if c/w infection, need anesthesia to provide conscious sedation for CT guided aspiration biopsy. UTI, Serratia - UA 11-20 WBC Bilateral pleural effusions Hepatomegaly, splenic hilar & perisplenic varices 
- seen on CTAP 11/14 ? cirrhosis COPD   
HTN   
[facility] MICROBIOLOGY:  
11/14 urcx >100,000 Serratia marcescens 
 blcx NG x 2 
 
LINES AND CATHETERS:  
piv Active Hospital Problems Diagnosis Date Noted  Discitis of thoracic region 11/14/2018  UTI (urinary tract infection) 11/14/2018  Bilateral pleural effusion 11/14/2018  Lumbar stenosis 11/14/2018  Adenoma of left adrenal gland 11/14/2018  Uncontrolled hypertension 11/14/2018  Cirrhosis of liver ([address] 11/14/2018  Hepatitis C 11/14/2018  Obesity 11/14/2018  COPD (chronic obstructive pulmonary disease) ([address] 11/14/2018  Constipation 11/14/2018  Back pain 11/14/2018 Subjective: Interval notes reviewed. Sleeping soundly. Eventually roused with some difficulty. Denies back pain at the moment. Agrees to have nuclear scans. Current Facility-Administered Medications Medication Dose Route Frequency  docusate sodium (COLACE) capsule 100 mg  100 mg Oral DAILY  HYDROcodone-acetaminophen (NORCO) 5-325 mg per tablet 1 Tab  1 Tab Oral Q4H PRN  pantoprazole (PROTONIX) tablet 40 mg  40 mg Oral ACB  morphine injection 1 mg  1 mg IntraVENous Q4H PRN  
 budesonide (PULMICORT) 500 mcg/2 ml nebulizer suspension  500 mcg Nebulization BID RT  
 sodium chloride (NS) flush 5-10 mL  5-10 mL IntraVENous PRN  
 gabapentin (NEURONTIN) capsule 400 mg  400 mg Oral BID  lisinopril (PRINIVIL, ZESTRIL) tablet 20 mg  20 mg Oral DAILY  loratadine (CLARITIN) tablet 10 mg  10 mg Oral DAILY PRN  
 simvastatin (ZOCOR) tablet 20 mg  20 mg Oral QHS  naloxone (NARCAN) injection 0.4 mg  0.4 mg IntraVENous PRN  
 diphenhydrAMINE (BENADRYL) injection 12.5 mg  12.5 mg IntraVENous Q4H PRN  
 ondansetron (ZOFRAN) injection 4 mg  4 mg IntraVENous Q4H PRN  
 albuterol-ipratropium (DUO-NEB) 2.5 MG-0.5 MG/3 ML  3 mL Nebulization Q4H PRN  
 hydrALAZINE (APRESOLINE) 20 mg/mL injection 10 mg  10 mg IntraVENous Q6H PRN  
 bisacodyl (DULCOLAX) suppository 10 mg  10 mg Rectal DAILY PRN  
 lactulose (CHRONULAC) solution 10 g  10 g Oral BID Objective:  
 
Visit Vitals /78 Pulse 94 Temp 98.8 °F (37.1 °C) Resp 18 Ht 6' (1.829 m) Wt 110.3 kg (243 lb 2.7 oz) SpO2 93% BMI 32.98 kg/m² Temp (24hrs), Av.4 °F (36.9 °C), Min:97.4 °F (36.3 °C), Max:98.8 °F (37.1 °C) GEN: pleasant 76 y/o AA male, lethargic but not in distress HEENT: no scleral icterus, no oral lesions CHEST: no crackles or wheezes CVS:regular, no murmurs, rubs or gallops ABD: soft, nontender EXT: no edema or cyanosis Neuro: sl drowsy, can move extremities including LE's Labs: Results: Chemistry Recent Labs  
  11/26/18 
5686 GLU 93   
K 5.5  CO2 38* BUN 21* CREA 1.09  
CA 8.8 AGAP 0* BUCR 19 CBC w/Diff Recent Labs  
  11/27/18 
0725 WBC 5.7  
RBC 4.94  
HGB 13.4 HCT 45.2  GRANS 48 LYMPH 42 EOS 2 Microbiology Results No results for input(s): SDES, CULT in the last 72 hours. Gaviota Bailey MD 
November 27, 2018 [facility] [phone]

## 2018-11-27 NOTE — ROUTINE PROCESS
Bedside shift change report given to Sathya Sheth (oncoming nurse) by Hilario Ghotra RN (offgoing nurse). Report included the following information SBAR, Kardex, Intake/Output, MAR, Recent Results and Cardiac Rhythm SR 93.

## 2018-11-27 NOTE — PROGRESS NOTES
Neurosurgery Progress Note; 
Mri with sedation will not work out. Plans for ct with aspiration Off antibiotics Exam is 5.5 motor NOT interested is surgical treatment In the past these issues were treated with prolonged bedrest and mobilization in brace. following

## 2018-11-27 NOTE — PROGRESS NOTES
Problem: Pressure Injury - Risk of 
Goal: *Prevention of pressure injury Document Raul Scale and appropriate interventions in the flowsheet. Outcome: Progressing Towards Goal 
Pressure Injury Interventions: 
Sensory Interventions: Assess changes in LOC, Check visual cues for pain Moisture Interventions: Absorbent underpads, Internal/External urinary devices Activity Interventions: Pressure redistribution bed/mattress(bed type) Mobility Interventions: HOB 30 degrees or less, Pressure redistribution bed/mattress (bed type) Nutrition Interventions: Document food/fluid/supplement intake, Offer support with meals,snacks and hydration Friction and Shear Interventions: HOB 30 degrees or less Problem: Falls - Risk of 
Goal: *Absence of Falls Document Roc Hunger Fall Risk and appropriate interventions in the flowsheet. Outcome: Progressing Towards Goal 
Fall Risk Interventions: 
Mobility Interventions: Communicate number of staff needed for ambulation/transfer Mentation Interventions: Adequate sleep, hydration, pain control, Door open when patient unattended Medication Interventions: Bed/chair exit alarm Elimination Interventions: Call light in reach History of Falls Interventions: Bed/chair exit alarm, Door open when patient unattended Problem: Impaired Skin Integrity/Pressure Injury Treatment Goal: *Prevention of pressure injury Document Raul Scale and appropriate interventions in the flowsheet. Outcome: Progressing Towards Goal 
Pressure Injury Interventions: 
Sensory Interventions: Assess changes in LOC, Check visual cues for pain Moisture Interventions: Absorbent underpads, Internal/External urinary devices Activity Interventions: Pressure redistribution bed/mattress(bed type) Mobility Interventions: HOB 30 degrees or less, Pressure redistribution bed/mattress (bed type) Nutrition Interventions: Document food/fluid/supplement intake, Offer support with meals,snacks and hydration Friction and Shear Interventions: HOB 30 degrees or less

## 2018-11-27 NOTE — PROGRESS NOTES
Problem: Mobility Impaired (Adult and Pediatric) Goal: *Acute Goals and Plan of Care (Insert Text) Physical Therapy Goals Initiated 11/25/2018 and to be accomplished within 7 day(s) 1. Patient will move from supine to sit and sit to supine , scoot up and down and roll side to side in bed with minimal assistance/contact guard assist.    
2.  Patient will transfer from bed to chair and chair to bed with minimal assistance/contact guard assist using the least restrictive device. 3.  Patient will perform sit to stand with supervision/set-up. 4.  Patient will ambulate with minimal assistance/contact guard assist for >/= 100 feet with the least restrictive device. 5.  Patient will ascend/descend 5 stairs with bilateral handrail(s) with minimal assistance/contact guard assist.  
Outcome: Progressing Towards Goal 
 
PHYSICAL THERAPY: Daily TREATMENT Note INPATIENT: Medicaid: Hospital Day: 14 Patient: Alma Castro (67 y.o. male)    Date: 11/27/2018 Primary Diagnosis: Discitis of thoracic region Procedure(s) (LRB): Anes MRI (N/A), 12 Days Post-Op, Precautions:   
 
 
Chart, physical therapy assessment, plan of care and goals were reviewed. ASSESSMENT: 
Pt initially agreeable to PT, instructed in log rolling mod/max a for bed mobility and sup>sit transfer. Pt declined further activity, returned to comfortable position in bed. Progression toward goals: 
 
      Improving slowly and progressing toward goals PLAN: 
Patient continues to benefit from skilled intervention to address the above impairments. Continue treatment per established plan of care. EDUCATION:  
Education:  Patient was educated on the following topics: log rolling Barriers to Learning/Limitations: yes;  altered mental status (i.e.Sedation, Confusion) Compensate with: visual, verbal, tactile, kinesthetic cues/model Discharge Recommendations:  Franki Bains Further Equipment Recommendations for Discharge:  TBD at next level of care Factors which may impact discharge planning: medical condition SUBJECTIVE:  
Patient stated someone made me a suit .  (possibly referring to brace?) OBJECTIVE DATA SUMMARY:  
Critical Behavior: 
Neurologic State: Alert Orientation Level: Oriented X4 Cognition: Follows commands G CODE:Mobility [id number] Current  CM= 80-99%   Goal  CK= 40-59%. The severity rating is based on the Other [address] Standing Balance Scale 
0: Pt performs 25% or less of standing activity (Max assist) CN, 100% impaired. 1: Pt supports self with upper extremities but requires therapist assistance. Pt performs 25-50% of effort (Mod assist) CM, 80% to <100% impaired. 1+: Pt supports self with upper extremities but requires therapist assistance. Pt performs >50% effort. (Min assist). CL, 60% to <80% impaired. 2: Pt supports self independently with both upper extremities (walker, crutches, parallel bars). CL, 60% to <80% impaired. 2+: Pt support self independently with 1 upper extremity (cane, crutch, 1 parallel bar). CK, 40% to <60% impaired. 3: Pt stands without upper extremity support for up to 30 seconds. CK, 40% to <60% impaired. 3+: Pt stands without upper extremity support for 30 seconds or greater. CJ, 20% to <40% impaired. 4: Pt independently moves and returns center of gravity 1-2 inches in one plane. CJ, 20% to <40% impaired. 4+: Pt independently moves and returns center of gravity 1-2 inches in multiple planes. CI, 1% to <20% impaired. 5: Pt independently moves and returns center of gravity in all planes greater than 2 inches. CH, 0% impaired. Functional Mobility: 
 
 
Functional Status Indep (I) Mod I Super-vision Min A Mod A Max A Total A Assist x2 Verbal cues Additional time Not tested Comments Rolling []  []  [] []    []    [x]  []  [] [x] [x] [] Supine to sit []  []  [] []  []  [x]  []  [] [x] [x] [] Sit to supine []  []  [] []  []  [x]  []  [] [x] [x] [] Sit to stand []  []  [] []  []  []  []  [] [] [] [x] Stand to sit []  []  [] []  []  []  []  [] [] [] [x] Bed to chair transfers []  []  [] []  []  []  []  [] [] [] [x] Balance Good Felicia Bridgette Poor Unable Not tested Comments Sitting static []  [x]  []  []  [] Sitting dynamic []  [x]  []  []  []   
Standing static []  []  []  []  []   
Standing dynamic []  []  []  []  []   
 
 
Vital Signs Temp: 98.1 °F (36.7 °C) Pulse (Heart Rate): 91    
BP: 118/70 Resp Rate: 18    
O2 Sat (%): 98 %Pain:Pre treatment pain level:6 Post treatment pain level:6Pain Scale 1: Numeric (0 - 10) Pain Intensity 1: 6 Pain Location 1: Flank;Back Pain Description 1: Aching Pain Intervention(s) 1: Medication (see MAR) Activity Tolerance:  
Poor After treatment:  
 
Patient left in no apparent distress in bed Call bell left within reach David Mata PTA Time Calculation: 10 mins

## 2018-11-27 NOTE — PROGRESS NOTES
Received bedside report from CAMERON Long to [place], [facility]. Pt is AxO 4. IV flushed and patent. Pt denies pain at this time. Report included the follow information SBAR, Kardex, Procedure Summary, Intake/Output, MAR, Recent Lab Results, and Cardiac Rhythm @ sinus rhythm. Pt educated on call bell when in need of help/assistance. Pt verbalizes understanding. Will resume care and monitor pt.  
 
2100- due PO med given. Pt resting in bed. No needs at this time. 0000- Reassessment complete. no changes noted. 0350- pt awake and requesting prn breathing tx.  
 
0357- prn breathing tx administered. Reassessment complete. No changes noted. 0724- pt complaining of pain in back rated 8/10. Prn pain med administered. Incontinence care performed will continue to monitor. Bedside shift change report given to Cuauhtemoc Schultz RN (oncoming nurse) by CAMERON Atkinson (offgoing nurse). Report included the following information SBAR, Kardex, Intake/Output, MAR, Accordion, Recent Results and Cardiac Rhythm Sinus Rhythm.

## 2018-11-27 NOTE — PROGRESS NOTES
Problem: Self Care Deficits Care Plan (Adult) Goal: *Acute Goals and Plan of Care (Insert Text) Occupational Therapy Goals Initiated 11/26/2018 within 7 day(s). 1.  Patient will perform grooming tasks while standing with stand-by assistance for balance and < 3 rest breaks. 2.  Patient will perform lower body dressing with moderate assistance utilizing AE. 3.  Patient will perform functional task in standing for 8 minutes with supervision/set-up for balance to increase activity tolerance for ADLs. 4.  Patient will perform toilet transfers with stand-by assistance. 5.  Patient will perform all aspects of toileting with supervision/set-up. 6.  Patient will participate in upper extremity therapeutic exercise/activities with supervision/set-up for 8 minutes to increase BUE strength for functional transfers & ADLs. 7.  Patient will utilize energy conservation techniques during functional activities with minimal verbal cues. Outcome: Progressing Towards Goal 
Occupational Therapy TREATMENT Patient: Shruthi Nails (67 y.o. male) Date: 11/27/2018 Diagnosis: Discitis of thoracic region Discitis of thoracic region Procedure(s) (LRB): Anes MRI (N/A) 12 Days Post-Op Precautions:   
Chart, occupational therapy assessment, plan of care, and goals were reviewed. PLOF: Independent ASSESSMENT: 
Pt is pleasant and cooperative, agreeable to EOB activity. Generalized weakness and c/o back pain requires assist w/bed mobility to maneuver to EOB. Pt tolerates sitting ~ 10 minutes performing ADL grooming tasks. Pt presents w/BUE dysmetria w/ADL item retrieval from tray table and poor bilateral coordination as evidenced w/toothpaste application on toothbrush. Pt fatigues easily and returned to supine. Pt requires max vc's w/bed mobility, rolling L/R, and hand over hand assist w/BUE reaching for side rails. Reset pads and positioned for comfort. EDUCATION Pt educated on energy conservation techniques w/ADLs Progression toward goals: 
[]          Improving appropriately and progressing toward goals [x]          Improving slowly and progressing toward goals 
[]          Not making progress toward goals and plan of care will be adjusted PLAN: 
Patient continues to benefit from skilled intervention to address the above impairments. Continue treatment per established plan of care. Discharge Recommendations:  Franki Bains Further Equipment Recommendations for Discharge:  shower chair and rolling walker SUBJECTIVE:  
Patient stated Someone's bringing me some lotion.  OBJECTIVE DATA SUMMARY: 
  
Cognitive/Behavioral Status: 
Neurologic State: Alert Orientation Level: Oriented X4 Cognition: Follows commands Functional Mobility and Transfers for ADLs: 
 Bed Mobility: 
Rolling: Moderate assistance Supine to Sit: Maximum assistance(w/HOB raised and SRs) Sit to Supine: Moderate assistance(assist w/BLE) Balance: 
Sitting: Impaired Sitting - Static: Fair (occasional) Sitting - Dynamic: Poor (constant support) ADL Intervention: 
Grooming Washing Face: Stand-by assistance Washing Hands: Minimum assistance(assist for thoroughness) Brushing Teeth: Minimum assistance(requires assist w/basin mgt) Pain: 
Pre Treatment:8 Post Treatment:8 Pain Scale 1: Numeric (0 - 10) Pain Intensity 1: 8 Pain Location 1: Back Pain Description 1: Aching Pain Intervention(s) 1: Medication (see MAR) Activity Tolerance:   
Poor Please refer to the flowsheet for vital signs taken during this treatment. After treatment:  
[]  Patient left in no apparent distress sitting up in chair 
[x]  Patient left in no apparent distress in bed 
[x]  Call bell left within reach 
[]  Nursing notified 
[]  Caregiver present 
[]  Bed alarm activated Martha Foote Time Calculation: 25 mins

## 2018-11-27 NOTE — PROGRESS NOTES
Three phase bone scan will be completed today. Gallium injection will be completed today and images will be acquired tomorrow (11/28), thursday (11/29), and possibly friday (11/30).

## 2018-11-27 NOTE — ADT AUTH CERT NOTES
Utilization Reviews Back Pain - Care Day 11 (11/24/2018) by Aspen Son Review Status Review Entered Completed 11/26/2018 16:58 Criteria Review Care Day: 11 Care Date: 11/24/2018 Level of Care: Telemetry Guideline Day 2 Clinical Status  
(X) * Pain absent or managed ( ) * Acute etiologies requiring continued inpatient care absent  
( ) * Discharge plans and education understood Activity ( ) * Ambulation as tolerated[C] 11/26/2018 4:58 PM EST by Ventura Baker  
  not getting up . Routes  
(X) * Oral hydration, medications, and diet 11/26/2018 4:58 PM EST by Ventura Baker  
  po; colace daily, neurontin 400 mg bid, norco x 1, lactulose 10 g bid, lisinopril daily, protonix daily, zocor q hs,  
  
  
Medications  
(X) * Oral analgesics 11/26/2018 4:58 PM EST by Ventura Baker  
  norco  
  
(X) * PCA absent 11/26/2018 4:58 PM EST by Ventura Baker  
  other meds; pulmicort neb bid, Hep sq 5000 u q 8h, morphine 1 mg iv x 2,  
  
  
11/26/2018 4:58 PM EST by Ventura Baker Subject: Additional Clinical Information  
  
vs; 98.5/89/18, bp 132/76, sat 91 on 2 liters NC .  LB. LAB; CHLORIDE 98, c02 is 39, Neurosurgery Progress Note; No changes,no mri Motor exam stable Back pain is stable Still need mri and aspirate to determine best treatment option. following. * Milestone Additional Notes  
---11/23; --Dc plan  
-Plan has been Home and [facility] with continued personal care when medically stable. Pt needs PT/OT and oob when medically able to start working toward this plan. Have asked for orders to be placed. ---PER ID 11/23;   
T10-11 disciitis  
 CT:   severe destructive changes involving the endplates at K39-47. There is associated paravertebral soft tissue infiltration. Findings compatible  
with severe discitis. The inflammatory process extends into the anterior epidural space with compression of cord. L5-S1 spondylosis and degenerative disc  
disease.  
   
  L3-4 central stenosis - seen by Neurosurgery and conservative management recommended - ESR/CRP low for OM/discitis but does not exclude   
- off all abx for better diagnostics as pt not septic  
- needs MRI and then based on findings IR guided aspiration to make diagnosis. Send cultures [ bacteria, fungal, AFB ] and pathology - d/w nursing-- mri still being orchestrated- arranging anesthesia assist  
   
 Complex situation but will not subject him to long term Abx without making definite diagnosis  
   
After diagnostic aspiration and/or bx accomplished and sent for Path and micro, begin:  
IV Vanco- dosing per pharm for trough goal 15-20 Plus ceftriaxone 2 g iv q 12 Serratia UTI   
- ua pos nitrite, small le, 11-20 wbc, 3+ cj - Ucx >100k Serratia ->completed 5 days of abx Back Pain - Care Day 8 (11/21/2018) by Almaz Kwan Review Status Review Entered Completed 11/26/2018 16:15 Criteria Review Care Day: 8 Care Date: 11/21/2018 Level of Care: Telemetry Guideline Day 2 Clinical Status  
(X) * Pain absent or managed ( ) * Acute etiologies requiring continued inpatient care absent  
( ) * Discharge plans and education understood Activity ( ) * Ambulation as tolerated[C] 11/26/2018 4:14 PM EST by  Factor  
  NOT Noted to be up Routes  
(X) * Oral hydration, medications, and diet 11/26/2018 4:14 PM EST by Sproutel  below Medications  
(X) * Oral analgesics 11/26/2018 4:14 PM EST by  Factor  
  norco po x 3  
  
(X) * PCA absent 11/26/2018 4:15 PM EST by  Factor Subject: Additional Clinical Information HISTORY----------  In the ED,CT abdm/pelvis showed severe  discitis, L3-4 central stenosis, bilateral pleural effusions with bilateral lower lobe atelectasis, splenic hilar and perisplenic varices.   UA showed UTI.   In ED, patient was started on vanc and cefepime for severe discitis.   Neurosurgery was consulted.   The hospitalist team was called for admission.   ID consulted.   On 11/16,   ID decided to discontinue vanc and cefepime, preferred the diagnosis first since patient has been afebrile,without leokocytosis and ESR/CRP showing low probability for OM/Discitis.   Currently patient is only on ceftriaxone  for UTI due to serratia.   MRI of the spine not done yet as patient has not tolerated.   On 11/15/2018, patient noted confused. Code S called.   CT head w/o acute and tele-neurology ruled out the stroke.   Patient was transferred to ICU due to altered mental status, likely metabolic.   On 95/55/63, patient was hypoxic,hypercapnic.   He was put on BIPAP, critical care was consulted. On 11/18, patient more awake, clincally stable MRI machine not working since 11/17- tests still pending.   Per ID, ESR/CRP low for OM/discitis but does not exclude, of all abx for better diagnostics as pt not septic.   Discussed with MRI dept on 11/19- stated anesthesia determined pt high risk for intubation and did not want to risk compensation given hx of COPD.   MRI has been attempted 6-7 times but pt will not keep still and requesting to be put to sleep in order to complete test.   IR will not do culture/drain w/o prior MRI.   Updated ID. Spoke with anesthesia on 11/20 to see if pt can be re-evaluated to be sedated for MRI- ID does not want to subject pt to long term IV abx w/o definite diagnosis, and IR will not obtain aspiration without MRI.   Still awaiting MRI T spine- MRI stated they would be available on Friday for scheduling but unable to do today due to prior appts. 11/26/2018 4:14 PM EST by Brittanie Yuen Subject: Additional Clinical Information 98.3-91-22,140/69, sat 97 2 liters NC  
  
PAIN BACK 8/10. pulmicort neb bid, neurontin 400 mg po bid, hep sc 5000 q 8h, norco po 1 tab x 3, lactulose 10 g po bid, lisinopril 20 mg po daily, Morphine 1 mg iv , protonix and zocor po daily Neurosurgery Progress Note; Motor exam is stable and 5.5, thoracic radiculopathy has resolved. Patient is comfortable at bed rest and brace is here. Discussed with patient need for biopsy and aspirated. No signs of a myelopathy. Patient is not too enthusiastic about operative procedure but is there is no infection it might hasten his recovery. DC PLAN;  
  
Pt will discharge Home with [facility] and personal care at discharge. Needs referral for PT to work with pt in hospital. Does not want nursing home placement currently * Milestone Additional Notes MEDICINE  
A/P:  
1. Discitis of thoracic region  
 -CT showing severe discitis of T10-11. Patient with h/o iv heroin abuse. Infection suspected.  
 -Vanc and cefepime initiated in ED,continued until 11/16  
 -Sepsis protocol was initiated in ED but I didn't think patient was septic - lactic acid,wbc,temp,HR normal.  
 -Blood cx negative  
 -Pt did not tolerate MRI. Discussed with IR,will have MRI under sedation and aspiration of T10-11 will be performed for cultures,cytology  
 -Neurosurgery consulted ->pt high risk for intervention - medical management  
 -ID decided to d/c Vanc/cefepime on 11/16 as patient was not septic and ESR/CRP showing low probability for OM/Discitis. Pt currently on ceftriaxone for UTI  
 -Pain management with morphine and norco prn  
 -Fall precaution.   
- discussed with MRI dept 11/19 - stated anesthesia determined pt high risk for intubation and did not want to risk compensation given hx of COPD.  MRI has been attempted 6-7 times but pt will not keep still and requesting to be put to sleep in order to complete test. Crista Litten will not do culture/drain w/o prior MRI  
- discussed with anesthesia on 11/20 to see if pt can be re-evaluated for anesthesia for MRI  
   
2. Respiratory failure with hypoxia/hypercapnia  
 -Improved. Tolerating 3 liters NC and O2sat 93-98%  
   
3.Acute encephalopathy metabolic  
 -Likely due to acute resp failure and possibly elevated ammonia  
 -Will monitor mentation as patient being treated  
 -CT scan done on 11/15 after code stroke was called did not show any acute process. Tele-neurology did not think that patient had a stroke  
 -Improved mentation,interacting better today  
  4.Lumbar stenosis at L3-4   
 -Will follow neurosurgery recommendations -> no surgery,pt asymptomatic  
   
5. UTI (urinary tract infection)  
 - resolved- completed five days of IV abx  
 -Ucx c/w serratia marscens and blood cx NGTD  
   
6.Cirrhosis of liver/Hepatitis C/H/o alcohol abuse  
 -Patient has h/o hepatitis C and past history of alcohol abuse. CT showing strong evidence of cirrhosis with possible portal vein hypertension.  
  -Received albumin x 1   
 -On lactulose.  
   
7.Bilateral pleural effusion/Legs edema  
 -Likely due to cirrhosis of liver.  
 -Legs edema improved  
   
8. Adenoma of left adrenal gland   
 -Seen in previous films. Will monitor  
   
9. Uncontrolled hypertension   
 -Resume lisinopril. Hydralazine prn  
   
10. Obesity  
 -Supportive care  
   
11. COPD   
 -Duo-neb prn  
   
12. Constipation  
 -Order dulcolax  
   
13. Back pain  
 -Due to problems above - on pain meds.  
   
14. Legs edema  
 -Likely due to cirrhosis with portal hypertension  
 -pro-BNP not elevated and ECHO nl with EF 66-70%  
 -legs edema resolved.  
   
DVT prophylaxis  
- Heparin subcutaneously TID  
  
----------PER ID  
T10-11 disciitis  
 CT:   severe destructive changes involving the endplates at T50-16. There is associated paravertebral soft tissue infiltration. Findings compatible  
with severe discitis. The inflammatory process extends into the anterior  
epidural space with compression of cord. L5-S1 spondylosis and degenerative disc disease.  
   
  L3-4 central stenosis - seen by Neurosurgery and conservative management recommended - ESR/CRP low for OM/discitis but does not exclude   
- off all abx for better diagnostics as pt not septic  
- needs MRI and then based on findings IR guided aspiration to make diagnosis. Send cultures [ bacteria, fungal, AFB ] and pathology - d/w Dr Duke PATEL and he is talking to [facility] to see if can intubate and get procedure done. Complex situation but will not subject him to long term Abx without making definite diagnosis Serratia UTI   
- ua pos nitrite, small le, 11-20 wbc, 3+ cj - Ucx >100k Serratia ->completed 5 days of abx Bilateral pleural effusions and LL atx with atx in rml    
Abnormal CT abd:  
  Adenopathy- gastrohepatic ligament  
  Splenic hilar and perisplenic varices  
  Liver enlargement    
Ho ivda Precludes cvl for out pt rx - likely Co morbidities: Arthritis/ copd/HTN    
allergies Shellfish  
   
MICROBIOLOGY:  
11/14    Blcx x 2 - ntd  
             ucx - >100k Serratia    
   
LINES AND CATHETERS:  
PIV   
   
SUBJECTIVE :  
   
Interval notes reviewed. Pt afebrile. Continues to c/o pain. D/w medicine team and Yovana Bone is high risk for further sedation per anesthesia and unless intubated- won;t be able to get procedure done. Plans formulating to accomplish needed imaging.  Will continue to hold Abx.

## 2018-11-28 ENCOUNTER — APPOINTMENT (OUTPATIENT)
Dept: NUCLEAR MEDICINE | Age: 68
DRG: 347 | End: 2018-11-28
Attending: HOSPITALIST
Payer: MEDICAID

## 2018-11-28 LAB
ANION GAP SERPL CALC-SCNC: 3 MMOL/L (ref 3–18)
BUN SERPL-MCNC: 19 MG/DL (ref 7–18)
BUN/CREAT SERPL: 24 (ref 12–20)
CALCIUM SERPL-MCNC: 9 MG/DL (ref 8.5–10.1)
CHLORIDE SERPL-SCNC: 99 MMOL/L (ref 100–108)
CO2 SERPL-SCNC: 38 MMOL/L (ref 21–32)
CREAT SERPL-MCNC: 0.78 MG/DL (ref 0.6–1.3)
GLUCOSE SERPL-MCNC: 87 MG/DL (ref 74–99)
POTASSIUM SERPL-SCNC: 5.2 MMOL/L (ref 3.5–5.5)
SODIUM SERPL-SCNC: 140 MMOL/L (ref 136–145)

## 2018-11-28 PROCEDURE — 74011250637 HC RX REV CODE- 250/637: Performed by: INTERNAL MEDICINE

## 2018-11-28 PROCEDURE — 94760 N-INVAS EAR/PLS OXIMETRY 1: CPT

## 2018-11-28 PROCEDURE — 65660000000 HC RM CCU STEPDOWN

## 2018-11-28 PROCEDURE — 97530 THERAPEUTIC ACTIVITIES: CPT

## 2018-11-28 PROCEDURE — 36415 COLL VENOUS BLD VENIPUNCTURE: CPT

## 2018-11-28 PROCEDURE — 74011250637 HC RX REV CODE- 250/637: Performed by: NURSE PRACTITIONER

## 2018-11-28 PROCEDURE — 74011000250 HC RX REV CODE- 250: Performed by: INTERNAL MEDICINE

## 2018-11-28 PROCEDURE — 74011250636 HC RX REV CODE- 250/636: Performed by: NURSE PRACTITIONER

## 2018-11-28 PROCEDURE — 77010033678 HC OXYGEN DAILY

## 2018-11-28 PROCEDURE — 94640 AIRWAY INHALATION TREATMENT: CPT

## 2018-11-28 PROCEDURE — 80048 BASIC METABOLIC PNL TOTAL CA: CPT

## 2018-11-28 RX ADMIN — HYDROCODONE BITARTRATE AND ACETAMINOPHEN 1 TABLET: 5; 325 TABLET ORAL at 03:40

## 2018-11-28 RX ADMIN — LACTULOSE 10 G: 20 SOLUTION ORAL at 09:35

## 2018-11-28 RX ADMIN — BUDESONIDE 500 MCG: 0.5 INHALANT RESPIRATORY (INHALATION) at 00:38

## 2018-11-28 RX ADMIN — SIMVASTATIN 20 MG: 20 TABLET, FILM COATED ORAL at 21:31

## 2018-11-28 RX ADMIN — BUDESONIDE 500 MCG: 0.5 INHALANT RESPIRATORY (INHALATION) at 08:31

## 2018-11-28 RX ADMIN — PANTOPRAZOLE SODIUM 40 MG: 40 TABLET, DELAYED RELEASE ORAL at 09:34

## 2018-11-28 RX ADMIN — HYDROCODONE BITARTRATE AND ACETAMINOPHEN 1 TABLET: 5; 325 TABLET ORAL at 13:57

## 2018-11-28 RX ADMIN — HEPARIN SODIUM 5000 UNITS: 5000 INJECTION INTRAVENOUS; SUBCUTANEOUS at 21:31

## 2018-11-28 RX ADMIN — GABAPENTIN 400 MG: 400 CAPSULE ORAL at 09:34

## 2018-11-28 RX ADMIN — GABAPENTIN 400 MG: 400 CAPSULE ORAL at 17:31

## 2018-11-28 RX ADMIN — BUDESONIDE 500 MCG: 0.5 INHALANT RESPIRATORY (INHALATION) at 21:08

## 2018-11-28 RX ADMIN — LISINOPRIL 20 MG: 20 TABLET ORAL at 09:34

## 2018-11-28 RX ADMIN — DOCUSATE SODIUM 100 MG: 100 CAPSULE, LIQUID FILLED ORAL at 09:34

## 2018-11-28 RX ADMIN — LACTULOSE 10 G: 20 SOLUTION ORAL at 17:31

## 2018-11-28 NOTE — ROUTINE PROCESS
Bedside, Verbal and Written shift change report given to Dominique Ewing RN (oncoming nurse) by Aidan Cee RN (offgoing nurse). Report included the following information SBAR, Kardex, Intake/Output, MAR, Recent Results, Med Rec Status, Procedure Summary and Cardiac Rhythm NSR.    
0720 - Shift assessment completed. Pt alert and oriented x4. No respiratory distress noted, pt on 2 liters O2 via n/c. No c/o pain reported. Call bell within reach, bed in low position. Will continue to monitor. 
   
1100 - Pt left unit via stretcher for NM Bone Scan. 1220 - Shift re-assessment unable to be completed, pt off unit at this time. 
   
1315 - Pt returned to unit via stretcher from NM Bone Scan. 
 
1357 - Pt c/o pain to back, PRN Norco administered. 1400 - Shift re-assessment completed, no change in pt condition. 1800 - Shift re-assessment completed, no change in pt condition. 
   
Bedside, Verbal and Written shift change report given to Lino Waters RN (oncoming nurse) by Dominique Ewing RN (offgoing nurse). Report included the following information SBAR, Kardex, Intake/Output, MAR, Recent Results, Med Rec Status, Procedure Summary and Cardiac Rhythm NSR.

## 2018-11-28 NOTE — PROGRESS NOTES
Problem: Pressure Injury - Risk of 
Goal: *Prevention of pressure injury Document Raul Scale and appropriate interventions in the flowsheet. Outcome: Progressing Towards Goal 
Pressure Injury Interventions: 
Sensory Interventions: Assess changes in LOC, Pressure redistribution bed/mattress (bed type), Use 30-degree side-lying position, Minimize linen layers, Check visual cues for pain Moisture Interventions: Offer toileting Q_hr, Maintain skin hydration (lotion/cream), Check for incontinence Q2 hours and as needed Activity Interventions: Pressure redistribution bed/mattress(bed type) Mobility Interventions: Pressure redistribution bed/mattress (bed type), Turn and reposition approx. every two hours(pillow and wedges) Nutrition Interventions: Document food/fluid/supplement intake Friction and Shear Interventions: Minimize layers, HOB 30 degrees or less, Apply protective barrier, creams and emollients Problem: Falls - Risk of 
Goal: *Absence of Falls Document Serina Riley Fall Risk and appropriate interventions in the flowsheet. Outcome: Progressing Towards Goal 
Fall Risk Interventions: 
Mobility Interventions: Patient to call before getting OOB, Bed/chair exit alarm, Strengthening exercises (ROM-active/passive) Mentation Interventions: Adequate sleep, hydration, pain control, Toileting rounds, Room close to nurse's station, More frequent rounding, Familiar objects from home Medication Interventions: Teach patient to arise slowly, Patient to call before getting OOB Elimination Interventions: Call light in reach, Toileting schedule/hourly rounds, Patient to call for help with toileting needs History of Falls Interventions: Room close to nurse's station, Evaluate medications/consider consulting pharmacy, Door open when patient unattended Problem: Impaired Skin Integrity/Pressure Injury Treatment Goal: *Prevention of pressure injury Document Raul Scale and appropriate interventions in the flowsheet. Outcome: Progressing Towards Goal 
Pressure Injury Interventions: 
Sensory Interventions: Assess changes in LOC, Pressure redistribution bed/mattress (bed type), Use 30-degree side-lying position, Minimize linen layers, Check visual cues for pain Moisture Interventions: Offer toileting Q_hr, Maintain skin hydration (lotion/cream), Check for incontinence Q2 hours and as needed Activity Interventions: Pressure redistribution bed/mattress(bed type) Mobility Interventions: Pressure redistribution bed/mattress (bed type), Turn and reposition approx. every two hours(pillow and wedges) Nutrition Interventions: Document food/fluid/supplement intake Friction and Shear Interventions: Minimize layers, HOB 30 degrees or less, Apply protective barrier, creams and emollients

## 2018-11-28 NOTE — PROGRESS NOTES
0800-Received pt resting in bed with eyes closed, easily awakened, no sign of distress, calm and cooperative, will continue to monitor 1915-Bedside and Verbal shift change report given to [facility] RN (oncoming nurse) by .me (offgoing nurse).  Report included the following information SBAR, Kardex, Intake/Output, MAR, Recent Results and Cardiac Rhythm SR.

## 2018-11-28 NOTE — PROGRESS NOTES
Problem: Pressure Injury - Risk of 
Goal: *Prevention of pressure injury Document Raul Scale and appropriate interventions in the flowsheet. Outcome: Progressing Towards Goal 
Pressure Injury Interventions: 
Sensory Interventions: Assess changes in LOC, Assess need for specialty bed, Avoid rigorous massage over bony prominences, Check visual cues for pain, Discuss PT/OT consult with provider, Keep linens dry and wrinkle-free, Maintain/enhance activity level, Minimize linen layers, Monitor skin under medical devices, Pad between skin to skin, Pressure redistribution bed/mattress (bed type), Turn and reposition approx. every two hours (pillows and wedges if needed) Moisture Interventions: Absorbent underpads, Assess need for specialty bed, Check for incontinence Q2 hours and as needed, Internal/External urinary devices, Limit adult briefs, Maintain skin hydration (lotion/cream), Minimize layers, Moisture barrier Activity Interventions: Assess need for specialty bed, Increase time out of bed, Pressure redistribution bed/mattress(bed type), PT/OT evaluation Mobility Interventions: Assess need for specialty bed, HOB 30 degrees or less, Pressure redistribution bed/mattress (bed type), PT/OT evaluation, Turn and reposition approx. every two hours(pillow and wedges) Nutrition Interventions: Document food/fluid/supplement intake, Discuss nutritional consult with provider, Offer support with meals,snacks and hydration Friction and Shear Interventions: Foam dressings/transparent film/skin sealants, HOB 30 degrees or less, Lift sheet, Lift team/patient mobility team, Minimize layers, Transfer aides:transfer board/Gordon lift/ceiling lift, Transferring/repositioning devices Problem: Falls - Risk of 
Goal: *Absence of Falls Document Yuan Blanc Fall Risk and appropriate interventions in the flowsheet. Outcome: Progressing Towards Goal 
Fall Risk Interventions: Mobility Interventions: Assess mobility with egress test, Communicate number of staff needed for ambulation/transfer, OT consult for ADLs, Patient to call before getting OOB, PT Consult for mobility concerns, PT Consult for assist device competence, Strengthening exercises (ROM-active/passive), Utilize walker, cane, or other assistive device, Utilize gait belt for transfers/ambulation Mentation Interventions: Adequate sleep, hydration, pain control, Door open when patient unattended, Gait belt with transfers/ambulation, Increase mobility, More frequent rounding, Toileting rounds, Update white board Medication Interventions: Patient to call before getting OOB, Teach patient to arise slowly, Utilize gait belt for transfers/ambulation Elimination Interventions: Call light in reach, Patient to call for help with toileting needs, Toilet paper/wipes in reach, Toileting schedule/hourly rounds, Urinal in reach History of Falls Interventions: Consult care management for discharge planning, Door open when patient unattended, Room close to nurse's station, Utilize gait belt for transfer/ambulation Problem: Impaired Skin Integrity/Pressure Injury Treatment Goal: *Prevention of pressure injury Document Raul Scale and appropriate interventions in the flowsheet. Outcome: Progressing Towards Goal 
Pressure Injury Interventions: 
Sensory Interventions: Assess changes in LOC, Assess need for specialty bed, Avoid rigorous massage over bony prominences, Check visual cues for pain, Discuss PT/OT consult with provider, Keep linens dry and wrinkle-free, Maintain/enhance activity level, Minimize linen layers, Monitor skin under medical devices, Pad between skin to skin, Pressure redistribution bed/mattress (bed type), Turn and reposition approx. every two hours (pillows and wedges if needed) Moisture Interventions: Absorbent underpads, Assess need for specialty bed, Check for incontinence Q2 hours and as needed, Internal/External urinary devices, Limit adult briefs, Maintain skin hydration (lotion/cream), Minimize layers, Moisture barrier Activity Interventions: Assess need for specialty bed, Increase time out of bed, Pressure redistribution bed/mattress(bed type), PT/OT evaluation Mobility Interventions: Assess need for specialty bed, HOB 30 degrees or less, Pressure redistribution bed/mattress (bed type), PT/OT evaluation, Turn and reposition approx. every two hours(pillow and wedges) Nutrition Interventions: Document food/fluid/supplement intake, Discuss nutritional consult with provider, Offer support with meals,snacks and hydration Friction and Shear Interventions: Foam dressings/transparent film/skin sealants, HOB 30 degrees or less, Lift sheet, Lift team/patient mobility team, Minimize layers, Transfer aides:transfer board/Gordon lift/ceiling lift, Transferring/repositioning devices

## 2018-11-28 NOTE — PROGRESS NOTES
Problem: Mobility Impaired (Adult and Pediatric) Goal: *Acute Goals and Plan of Care (Insert Text) Physical Therapy Goals Initiated 11/25/2018 and to be accomplished within 7 day(s) 1. Patient will move from supine to sit and sit to supine , scoot up and down and roll side to side in bed with minimal assistance/contact guard assist.    
2.  Patient will transfer from bed to chair and chair to bed with minimal assistance/contact guard assist using the least restrictive device. 3.  Patient will perform sit to stand with supervision/set-up. 4.  Patient will ambulate with minimal assistance/contact guard assist for >/= 100 feet with the least restrictive device. 5.  Patient will ascend/descend 5 stairs with bilateral handrail(s) with minimal assistance/contact guard assist.  
Outcome: Progressing Towards Goal 
 
PHYSICAL THERAPY: Daily TREATMENT Note INPATIENT: Medicaid: Hospital Day: 15 Patient: Amina Solares (67 y.o. male)    Date: 11/28/2018 Primary Diagnosis: Discitis of thoracic region Procedure(s) (LRB): Anes MRI (N/A), 13 Days Post-Op, Precautions:   
 
 
Chart, physical therapy assessment, plan of care and goals were reviewed. PLOF:mod I 
 
ASSESSMENT: 
Pt agreeable to PT, instructed in log rolling for bed mobility, with min a and verbal cues for spinal alignment, min a for sup>sit; min a X2 for sit<>stand, pt leaning posteriorly in standing and with knees flexed, pt unable to posture correct or correct stance secondary to back pain. Pt took 5 side steps EOB with min a X2 and assist with walker management, fair standing balance. Pt then returned to sup using log rolling technique with min a X2 for follow through and positioning in bed. Progression toward goals: 
      Improving appropriately and progressing toward goals PLAN: 
Patient continues to benefit from skilled intervention to address the above impairments. Continue treatment per established plan of care. EDUCATION:  
Education:  Patient was educated on the following topics: safety with RW during transfers Barriers to Learning/Limitations: None Compensate with: visual, verbal, tactile, kinesthetic cues/model Discharge Recommendations:  Franki Bains Further Equipment Recommendations for Discharge:  rolling walker Factors which may impact discharge planning: outcomes of testing SUBJECTIVE:  
Patient stated my back hurts.  OBJECTIVE DATA SUMMARY:  
Critical Behavior: 
Neurologic State: Alert Orientation Level: Oriented X4 Cognition: Follows commands Functional Mobility: 
 
 
Functional Status Indep (I) Mod I Super-vision Min A Mod A Max A Total A Assist x2 Verbal cues Additional time Not tested Comments Rolling []  []  [] [x]    []    []  []  [x] [x] [x] [] Supine to sit []  []  [] [x]  []  []  []  [x] [x] [x] [] Sit to supine []  []  [] [x]  []  []  []  [x] [x] [x] [] Sit to stand []  []  [] [x]  []  []  []  [x] [x] [x] [] Stand to sit []  []  [] [x]  []  []  []  [x] [x] [x] [] Bed to chair transfers []  []  [] []  []  []  []  [] [] [] [] Balance Good [address] Poor Unable Not tested Comments Sitting static []  [x]  []  []  [] Sitting dynamic []  [x]  []  []  []   
Standing static []  [x] (-) []  []  []   
Standing dynamic []  [x] (-) []  []  [] Mobility/Gait:  
Level of Assistance: Minimum assistance and Assist x2 Assistive Device: rolling walker Distance Ambulated: 3 feet Base of Support: [facility] Speed/Shannan: shuffled and slow Step Length: left shortened and right shortened Vital Signs Temp: 97.7 °F (36.5 °C) Pulse (Heart Rate): 85    
BP: 137/84 Resp Rate: 18    
O2 Sat (%): 95 %Pain:Pre treatment pain level:0 Post treatment pain level:0- pt reports no pain at rest in supPain Scale 1: Visual 
Pain Intensity 1: 0 Pain Location 1: Back Pain Description 1: Aching Pain Intervention(s) 1: Medication (see MAR) Activity Tolerance:  
Fair, limited by back pain After treatment:  
Patient left in no apparent distress in bed Call bell left within reach Noel Wilson PTA Time Calculation: 16 mins

## 2018-11-28 NOTE — PROGRESS NOTES
Neurosurgery Progress NOte; 
Tentatively for aspirate today Stable exam 
Following Patient not interested in surgery Patient is a significant surgical risk

## 2018-11-28 NOTE — PROGRESS NOTES
Internal Medicine Progress Note Patient's Name: Gold Thomas. Admit Date: 11/14/2018 Length of Stay: 14 
 
 
Assessment/Plan Active Hospital Problems Diagnosis Date Noted  Discitis of thoracic region 11/14/2018  UTI (urinary tract infection) 11/14/2018  Bilateral pleural effusion 11/14/2018  Lumbar stenosis 11/14/2018  Adenoma of left adrenal gland 11/14/2018  Uncontrolled hypertension 11/14/2018  Cirrhosis of liver ([facility] [address] 11/14/2018  Hepatitis C 11/14/2018  Obesity 11/14/2018  COPD (chronic obstructive pulmonary disease) ([facility] [address] 11/14/2018  Constipation 11/14/2018  Back pain 11/14/2018 1. Discitis of thoracic region 
 -CT showing severe discitis of T10-11. Patient with h/o iv heroin abuse. Infection suspected. 
 -Vanc and cefepime initiated in ED,continued until 11/16 
 -Sepsis protocol was initiated in ED but I didn't think patient was septic - lactic acid,wbc,temp,HR normal. 
 -Blood cx negative 
 -Pt did not tolerate MRI. Discussed with IR,will have MRI under sedation and aspiration of T10-11 will be performed for cultures,cytology 
 -Neurosurgery consulted ->pt high risk for intervention - medical management 
 -ID decided to d/c Vanc/cefepime on 11/16 as patient was not septic and ESR/CRP showing low probability for OM/Discitis. Pt currently on ceftriaxone for UTI 
 -Pain management with morphine and norco prn 
 -Fall precaution. - discussed with MRI dept 11/19 - stated anesthesia determined pt high risk for intubation and did not want to risk compensation given hx of COPD. MRI has been attempted 6-7 times but pt will not keep still and requesting to be put to sleep in order to complete test.  IR will not do culture/drain w/o prior MRI 
- discussed with anesthesia on 11/20 to see if pt can be re-evaluated for anesthesia for MRI 
- discussed w/ anesthesia again on 11/23 - they are here for emergencies only- awaiting return call - tried again to do MRI on 11/25 but pt unable to tolerate 
- 11/26 ID recommends doing CT guided aspiration and manage abx based on cultures 
- discussed with pulmonary- they do not feel comfortable intubating pt for one day just to obtain imaging 
- discussed with IR on 11/26- ok for CT guided aspiration however informed that in most cases, will obtain sterile culture so this will not affect plan of care/treatment as we'd hope- however if cultures are positive then will have definitive treatment plan- unable to do aspiration today due to urgent case but orders have been placed for tomorrow 11/27 
- CT guided aspiration on hold for now, proceed with bone scan - Bone/gallium scan 11/27 pending  
  2. Respiratory failure with hypoxia/hypercapnia 
 -Improved. Tolerating 3 liters NC and O2sat 93-98% 
  
3.Acute encephalopathy metabolic 
 -Likely due to acute resp failure and possibly elevated ammonia 
 -Will monitor mentation as patient being treated 
 -CT scan done on 11/15 after code stroke was called did not show any acute process. Tele-neurology did not think that patient had a stroke 
 -Improved mentation,interacting better today 
  4.Lumbar stenosis at L3-4  
 -Will follow neurosurgery recommendations -> no surgery,pt asymptomatic 
  
5. UTI (urinary tract infection) 
 - resolved- completed five days of IV abx 
 -Ucx c/w serratia marscens and blood cx NGTD 
  
6.Cirrhosis of liver/Hepatitis C/H/o alcohol abuse 
 -Patient has h/o hepatitis C and past history of alcohol abuse. CT showing strong evidence of cirrhosis with possible portal vein hypertension. 
  -Received albumin x 1  
 -On lactulose. 
  
7.Bilateral pleural effusion/Legs edema 
 -Likely due to cirrhosis of liver. 
 -LE edema improved 
  
8. Adenoma of left adrenal gland  
 -Seen in previous films. Will monitor 
  
9. Uncontrolled hypertension  
 -Resume lisinopril. Hydralazine prn 
  
10. Obesity 
 -Supportive care 
  
11. COPD  
 -Duo-neb prn 
  
 12.Constipation 
 -Order dulcolax 
  
13. Back pain 
 -Due to problems above - on pain meds. 
  
14. Legs edema 
 -Likely due to cirrhosis with portal hypertension 
 -pro-BNP not elevated and ECHO nl with EF 66-70% 
 -legs edema resolved. 
  
DVT prophylaxis 
- Heparin subcutaneously TID  
 
 
- Cont acceptable home medications for chronic conditions  
- DVT protocol I have personally reviewed all pertinent labs and films that have officially resulted over the last 24 hours. I have personally checked for all pending labs that are awaiting final results. Interval History 77 y.o.  male with h/o HTN, COPD, Hep C, presented to ED because of back pain on 11/14. Patient indicating the lower portion of his T-spine where he has the pain,saying that it started almost three months ago and has been getting worse. He reports that he was seen in ED few weeks ago and was told that nothing was wrong with him. He is also reporting constipation the last 3 weeks. He denies legs weakness or loss of sensation. No fever or chills. Although patient did not report his history of heroin abuse, the ED report indicates that patient is an IV drugs abuser and last use of IV heroin was 3-4 weeks ago. Patient's friend who came with him in ED has reported that patient fell the day prior while trying to get to the bathroom and was incontinent. Patient is reporting that he has not been able to hold his urine for a while. In the ED,CT abdm/pelvis showed severe  discitis, L3-4 central stenosis, bilateral pleural effusions with bilateral lower lobe atelectasis, splenic hilar and perisplenic varices. UA showed UTI. In ED, patient was started on vanc and cefepime for severe discitis. Neurosurgery was consulted. The hospitalist team was called for admission. ID consulted.   On 11/16,  ID decided to discontinue vanc and cefepime, preferred the diagnosis first since patient has been afebrile,without leokocytosis and ESR/CRP showing low probability for OM/Discitis. Currently patient is only on ceftriaxone for UTI due to serratia. MRI of the spine not done yet as patient has not tolerated. On 11/15/2018, patient noted confused. Code S called. CT head w/o acute and tele-neurology ruled out the stroke. Patient was transferred to ICU due to altered mental status, likely metabolic. On 11/16/18, patient was hypoxic,hypercapnic. He was put on BIPAP, critical care was consulted. On 11/18, patient more awake, clincally stable MRI machine not working since 11/17- tests still pending. Per ID, ESR/CRP low for OM/discitis but does not exclude, of all abx for better diagnostics as pt not septic. Discussed with MRI dept on 11/19- stated anesthesia determined pt high risk for intubation and did not want to risk compensation given hx of COPD. MRI has been attempted 6-7 times but pt will not keep still and requesting to be put to sleep in order to complete test.  IR will not do culture/drain w/o prior MRI. Updated ID. Spoke with anesthesia on 11/20 to see if pt can be re-evaluated to be sedated for MRI- ID does not want to subject pt to long term IV abx w/o definite diagnosis, and IR will not obtain aspiration without MRI. Still awaiting MRI T spine- MRI stated they would be available on Friday for scheduling but unable to do today due to prior appts. Spoke w/ anesthesia again on 11/23 - stated they were available for emergencies but would forward to Dr. Harriet Reynolds (sp) and would return call (I have given my cell number for return call). For now, continue w/ current plan of care - nothing can be done until pt obtains MRI. Discussed with pt again trying to get MRI done w/o anesthesia, states he knows he can't tolerate it. Attempted MRI T spine and L spine again on 11/25 however pt unable to tolerate. 11/26 ID recommends doing CT guided aspiration.   Discussed with pulmonary- does not feel comfortable intubating pt for one day to obtain MRI. Recommend moving forward w/ ID's plan for CT guided aspiration. I discussed with IR- unable to obtain today- did however state in most cases, they will have obtained sterile cultures so this may not affect plan of care as we'd hope, however if cultures are positive then we will have a true diagnosis. Unable to do today due to urgent case, but orders have been placed for tomorrow 11/27- recommends NPO after midnight and holding am dose of Heparin (dose due at midnight- please hold). Attending discussed with radiology on 11/27- concern for pt tolerability as he will have to lay prone for CT guided aspiration- recommends bone scan for now. ID updated. Bone/gallium scan done 11/27 pending. Subjective Pt s/e @ bedside. No major events overnight. Pt offers no complaints this AM. Denies CP or SOB. Denies abd pain, N/V. Objective Visit Vitals /81 (BP 1 Location: Right arm, BP Patient Position: Head of bed elevated (Comment degrees)) Pulse 88 Temp 98.5 °F (36.9 °C) Resp 18 Ht 6' (1.829 m) Wt 109.1 kg (240 lb 8.4 oz) SpO2 94% BMI 32.62 kg/m² Physical Exam: 
General Appearance: NAD, conversant HENT: normocephalic/atraumatic, moist mucus membranes Neck: No JVD, supple Lungs: CTA with normal respiratory effort CV: RRR, no m/r/g Abdomen: soft, non-tender, normal bowel sounds Extremities: no cyanosis, no peripheral edema Neuro: No focal deficits, motor/sensory intact Skin: Normal color, intact Intake and Output: 
Current Shift:  11/28 0701 - 11/28 1900 In: 0 Out: 350 [Urine:350] Last three shifts:  11/26 1901 - 11/28 0700 In: 26 [P.O.:220] Out: 600 [Urine:600] Lab/Data Reviewed: 
BMP:  
Lab Results Component Value Date/Time   11/28/2018 05:35 AM  
 K 5.2 11/28/2018 05:35 AM  
 CL 99 (L) 11/28/2018 05:35 AM  
 CO2 38 (H) 11/28/2018 05:35 AM  
 AGAP 3 11/28/2018 05:35 AM  
 GLU 87 11/28/2018 05:35 AM  
 BUN 19 (H) 11/28/2018 05:35 AM  
 CREA 0.78 11/28/2018 05:35 AM  
 GFRAA >60 11/28/2018 05:35 AM  
 GFRNA >60 11/28/2018 05:35 AM  
 
CBC: No results found for: WBC, HGB, HGBEXT, HCT, HCTEXT, PLT, PLTEXT, HGBEXT, HCTEXT, PLTEXT Imaging Reviewed: 
No results found. Medications Reviewed: 
Current Facility-Administered Medications Medication Dose Route Frequency  heparin (porcine) injection 5,000 Units  5,000 Units SubCUTAneous Q8H  
 docusate sodium (COLACE) capsule 100 mg  100 mg Oral DAILY  HYDROcodone-acetaminophen (NORCO) 5-325 mg per tablet 1 Tab  1 Tab Oral Q4H PRN  pantoprazole (PROTONIX) tablet 40 mg  40 mg Oral ACB  morphine injection 1 mg  1 mg IntraVENous Q4H PRN  
 budesonide (PULMICORT) 500 mcg/2 ml nebulizer suspension  500 mcg Nebulization BID RT  
 sodium chloride (NS) flush 5-10 mL  5-10 mL IntraVENous PRN  
 gabapentin (NEURONTIN) capsule 400 mg  400 mg Oral BID  lisinopril (PRINIVIL, ZESTRIL) tablet 20 mg  20 mg Oral DAILY  loratadine (CLARITIN) tablet 10 mg  10 mg Oral DAILY PRN  
 simvastatin (ZOCOR) tablet 20 mg  20 mg Oral QHS  naloxone (NARCAN) injection 0.4 mg  0.4 mg IntraVENous PRN  
 diphenhydrAMINE (BENADRYL) injection 12.5 mg  12.5 mg IntraVENous Q4H PRN  
 ondansetron (ZOFRAN) injection 4 mg  4 mg IntraVENous Q4H PRN  
 albuterol-ipratropium (DUO-NEB) 2.5 MG-0.5 MG/3 ML  3 mL Nebulization Q4H PRN  
 hydrALAZINE (APRESOLINE) 20 mg/mL injection 10 mg  10 mg IntraVENous Q6H PRN  
 bisacodyl (DULCOLAX) suppository 10 mg  10 mg Rectal DAILY PRN  
 lactulose (CHRONULAC) solution 10 g  10 g Oral BID Valorie Gutierrez PA-C [facility] Office:  [phone] Pager: [phone]

## 2018-11-28 NOTE — PROGRESS NOTES
Assumed care; Pt resting in bed; no distress noted; Pt complains of pain, see mar; bed in low position; Side rails up x 3; Call light and personal belonging within reach. Will continue to monitor. 0030: RR:28. Scheduled Breathing tx administered.

## 2018-11-28 NOTE — PROGRESS NOTES
Nutrition follow up/ 
Plan of care RECOMMENDATIONS:  
 
1. Cardiac diet when diet resumes 2. Monitor weight, labs and PO intake 3. RD to follow GOALS:  
 
1. Met/Ongoing: PO intake meets >75% of protein/calorie needs by 12/5 2. Met/Ongoing: Weight Maintenance (+/- 1-2 lb by 12/5) ASSESSMENT: 
 
Weight status is classified as obese per BMI of 32.6. PO intake was adequate prior to NPO status. Labs noted. Nutrition recommendations listed. RD to follow. Nutrition Risk:  [] High  [] Moderate [x]  Low SUBJECTIVE/OBJECTIVE:  
 (11/21): LOS. Transferred from ICU. Admitted with discitis of thoracic region. Has history of COPD and HTN. Has food allergy to shellfish. Noted poor dentition but patient denies problems with chewing/swallowing and declined change in diet texture. Patient reports having a good appetite and eating most of meals. Patient with 90% intake of lunch meal today and % intake of meals per vitals. Patient states weight was stable at 268 lb PTA (??). Encouraged adequate intake. Will monitor. (11/28): Patient NPO for CT guided aspiration procedure. Patient reports being hungry anxious to eat next meal. Reports eating most of meals prior to NPO status. % intake of meals per vitals. Will monitor. Information Obtained from:  
 [x] Chart Review [x] Patient 
 [] Family/Caregiver 
 [] Nurse/Physician 
 [] Interdisciplinary Meeting/Rounds Diet: Cardiac diet Medications: [x] Reviewed Allergies: [x] Reviewed (Shellfish) Encounter Diagnoses ICD-10-CM ICD-9-CM 1. Discitis of thoracic region M46.44 722.92 Past Medical History:  
Diagnosis Date  Arthritis  COPD  Hypertension Labs:   
Lab Results Component Value Date/Time  Sodium 140 11/28/2018 05:35 AM  
 Potassium 5.2 11/28/2018 05:35 AM  
 Chloride 99 (L) 11/28/2018 05:35 AM  
 CO2 38 (H) 11/28/2018 05:35 AM  
 Anion gap 3 11/28/2018 05:35 AM  
 Glucose 87 11/28/2018 05:35 AM  
 BUN 19 (H) 11/28/2018 05:35 AM  
 Creatinine 0.78 11/28/2018 05:35 AM  
 Calcium 9.0 11/28/2018 05:35 AM  
 Magnesium 2.0 11/16/2018 09:23 AM  
 Phosphorus 3.1 11/16/2018 09:23 AM  
 Albumin 2.4 (L) 11/19/2018 07:24 AM  
 
Anthropometrics: BMI (calculated): 32.6 Last 3 Recorded Weights in this Encounter 11/26/18 5618 11/27/18 0520 11/28/18 0510 Weight: 110.2 kg (242 lb 15.2 oz) 110.3 kg (243 lb 2.7 oz) 109.1 kg (240 lb 8.4 oz) Ht Readings from Last 1 Encounters:  
11/20/18 6' (1.829 m) Weight Metrics 11/28/2018 9/9/2018 8/24/2018 8/20/2017 5/18/2017 6/24/2013 Weight 240 lb 8.4 oz 278 lb 268 lb 255 lb 273 lb 295 lb BMI 32.62 kg/m2 37.7 kg/m2 36.35 kg/m2 34.58 kg/m2 36.02 kg/m2 38.93 kg/m2 Patient Vitals for the past 100 hrs: 
 % Diet Eaten 11/28/18 1023 0 % 11/26/18 1905 50 % 11/26/18 1408 80 % 11/26/18 0955 80 % 11/25/18 1755 50 % 11/24/18 1800 100 % 11/24/18 1320 100 % 11/24/18 0905 100 % Estimated Nutrition Needs: [x] MSJ Calories: 2460 Kcal Based on:   [x] Actual BW   
Protein:    g Based on:   [x] Actual BW Fluid:       2500 ml Based on:   [x] Actual BW  
 
 [x] No Cultural, Sabianism or ethnic dietary need identified. [] Cultural, Sabianism and ethnic food preferences identified and addressed Wt Status:  [] Normal (18.6 - 24.9) [] Underweight (<18.5) [] Overweight (25 - 29.9) [x] Mild Obesity (30 - 34.9)  [] Moderate Obesity (35 - 39.9) [] Morbid Obesity (40+) Nutrition Problems Identified:  
[] Suboptimal PO intake [x] Food Allergies (Shellfish) [x] Difficulty chewing/swallowing/poor dentition 
[] Constipation/Diarrhea  
[] Nausea/Vomiting  
[] None 
[] Other:  
 
Plan:  
[x] Therapeutic Diet 
[]  Obtained/adjusted food preferences/tolerances and/or snacks options  
[]  Supplements added  
[] Occupational therapy following for feeding techniques []  HS snack added  
[]  Modify diet texture ( pt declined) [x]  Modify diet for food allergies []  Educate patient  
[]  Assist with menu selection  
[x]  Monitor PO intake on meal rounds  
[x]  Continue inpatient monitoring and intervention  
[]  Participated in discharge planning/Interdisciplinary rounds/Team meetings  
[]  Other:  
 
Education Needs: 
 [] Not appropriate for teaching at this time due to: 
 [x] Identified and addressed Nutrition Monitoring and Evaluation: 
[x] Continue ongoing monitoring and intervention 
[] Other [address]

## 2018-11-28 NOTE — MANAGEMENT PLAN
Discharge/Transition Planning Have asked [facility] to evaluate pt for possibility of admission to Acute Rehab 
 
1100: Had long discussion with pt about transition plan. Pt asked if he and mother could live at a nursing home together. Notified pt that Care Manager could only work on his plan as he is pt of [facility]. Gave options of Home Health, Nursing Home, ARU. And, facility being if would have acceptance. Explained that Nursing Home placement takes his monthly check except a small amount. Pt declined nursing home after being made aware of this. Plan remains Home with [facility] and personal care. Or, ARU if will accept. Encouraged pt to work with therapy and progress with mobility if able Miguel Angel Crain RN BSN Outcomes Manager Pager # [phone]

## 2018-11-28 NOTE — PROGRESS NOTES
Infectious Disease Follow-up Admit Date: 11/14/2018 Current abx Prior abx None 11/18 - 10 Cefepime/vanco  11/14   2, Ceftriaxone 11/16 - 3 Assessment -> Rec:  
 
T10-11 discitis 
- mid back pain x 3 months, fell, incontinent of urine - CTAP: severe destructive changes T 10-11, paravertebral sot tissue infiltration, extension to anterior epidural space w/ cord compression  
- 11/14: ESR 20, 0.7 
- h/o IVDU  
- given Vanc/Cefepime 2 days then Ceftriaxone 2 days ending 11/18. Off abx since but MRI, aspiration not yet done (not cooperative w/ getting MRI done - claustrophobic) - d/w Dr. Robby Wang of IR. CT findings suggestive, not definitive for infection. MRI would be ideal for clarifying this but anesthesia apparently not available for conscious sedation for this. Bone/ gallium scans (done 11/27) can help exclude non-infectious possibilities.   -> await bone / gallium scan report. 
-> if not c/w infection - no aspiration / biopsy needed 
-> if c/w infection, need anesthesia to provide conscious sedation for CT guided aspiration biopsy. D/w Dr. Buddy Ramirez. UTI, Serratia - UA 11-20 WBC Bilateral pleural effusions Hepatomegaly, splenic hilar & perisplenic varices 
- seen on CTAP 11/14 ? cirrhosis COPD   
HTN   
[facility] MICROBIOLOGY:  
11/14 urcx >100,000 Serratia marcescens 
 blcx NG x 2 
 
LINES AND CATHETERS:  
piv Active Hospital Problems Diagnosis Date Noted  Discitis of thoracic region 11/14/2018  UTI (urinary tract infection) 11/14/2018  Bilateral pleural effusion 11/14/2018  Lumbar stenosis 11/14/2018  Adenoma of left adrenal gland 11/14/2018  Uncontrolled hypertension 11/14/2018  Cirrhosis of liver ([address] 11/14/2018  Hepatitis C 11/14/2018  Obesity 11/14/2018  COPD (chronic obstructive pulmonary disease) ([address] 11/14/2018  Constipation 11/14/2018  Back pain 11/14/2018 Subjective: Interval notes reviewed. Had bone and gallium scans done yesterday without problem. No new complaints today. No report on scans yet. Current Facility-Administered Medications Medication Dose Route Frequency  heparin (porcine) injection 5,000 Units  5,000 Units SubCUTAneous Q8H  
 docusate sodium (COLACE) capsule 100 mg  100 mg Oral DAILY  HYDROcodone-acetaminophen (NORCO) 5-325 mg per tablet 1 Tab  1 Tab Oral Q4H PRN  pantoprazole (PROTONIX) tablet 40 mg  40 mg Oral ACB  morphine injection 1 mg  1 mg IntraVENous Q4H PRN  
 budesonide (PULMICORT) 500 mcg/2 ml nebulizer suspension  500 mcg Nebulization BID RT  
 sodium chloride (NS) flush 5-10 mL  5-10 mL IntraVENous PRN  
 gabapentin (NEURONTIN) capsule 400 mg  400 mg Oral BID  lisinopril (PRINIVIL, ZESTRIL) tablet 20 mg  20 mg Oral DAILY  loratadine (CLARITIN) tablet 10 mg  10 mg Oral DAILY PRN  
 simvastatin (ZOCOR) tablet 20 mg  20 mg Oral QHS  naloxone (NARCAN) injection 0.4 mg  0.4 mg IntraVENous PRN  
 diphenhydrAMINE (BENADRYL) injection 12.5 mg  12.5 mg IntraVENous Q4H PRN  
 ondansetron (ZOFRAN) injection 4 mg  4 mg IntraVENous Q4H PRN  
 albuterol-ipratropium (DUO-NEB) 2.5 MG-0.5 MG/3 ML  3 mL Nebulization Q4H PRN  
 hydrALAZINE (APRESOLINE) 20 mg/mL injection 10 mg  10 mg IntraVENous Q6H PRN  
 bisacodyl (DULCOLAX) suppository 10 mg  10 mg Rectal DAILY PRN  
 lactulose (CHRONULAC) solution 10 g  10 g Oral BID Objective:  
 
Visit Vitals /84 (BP 1 Location: Right arm, BP Patient Position: Head of bed elevated (Comment degrees)) Pulse 85 Temp 97.7 °F (36.5 °C) Resp 18 Ht 6' (1.829 m) Wt 109.1 kg (240 lb 8.4 oz) SpO2 95% BMI 32.62 kg/m² Temp (24hrs), Av.9 °F (36.6 °C), Min:97.6 °F (36.4 °C), Max:98.2 °F (36.8 °C) GEN: pleasant 76 y/o AA male, lethargic but not in distress HEENT: no scleral icterus, no oral lesions CHEST: no crackles or wheezes CVS:regular, no murmurs, rubs or gallops ABD: soft, nontender EXT: no edema or cyanosis Neuro: alert, oriented, can move extremities including LE's Labs: Results:  
Chemistry Recent Labs  
  11/28/18 
0535 11/26/18 
2902 GLU 87 93  140  
K 5.2 5.5 CL 99* 102 CO2 38* 38* BUN 19* 21* CREA 0.78 1.09  
CA 9.0 8.8 AGAP 3 0* BUCR 24* 19  
  
CBC w/Diff Recent Labs  
  11/27/18 
0725 WBC 5.7  
RBC 4.94  
HGB 13.4 HCT 45.2  GRANS 48 LYMPH 42 EOS 2 Microbiology Results No results for input(s): SDES, CULT in the last 72 hours. Annabelle Ng MD 
November 28, 2018 [facility] [phone]

## 2018-11-29 PROCEDURE — 74011250637 HC RX REV CODE- 250/637: Performed by: NURSE PRACTITIONER

## 2018-11-29 PROCEDURE — 65660000000 HC RM CCU STEPDOWN

## 2018-11-29 PROCEDURE — 87103 BLOOD FUNGUS CULTURE: CPT

## 2018-11-29 PROCEDURE — 94760 N-INVAS EAR/PLS OXIMETRY 1: CPT

## 2018-11-29 PROCEDURE — 94640 AIRWAY INHALATION TREATMENT: CPT

## 2018-11-29 PROCEDURE — 97535 SELF CARE MNGMENT TRAINING: CPT

## 2018-11-29 PROCEDURE — 74011250637 HC RX REV CODE- 250/637: Performed by: INTERNAL MEDICINE

## 2018-11-29 PROCEDURE — 36415 COLL VENOUS BLD VENIPUNCTURE: CPT

## 2018-11-29 PROCEDURE — 97530 THERAPEUTIC ACTIVITIES: CPT

## 2018-11-29 PROCEDURE — 74011000250 HC RX REV CODE- 250: Performed by: INTERNAL MEDICINE

## 2018-11-29 PROCEDURE — 87449 NOS EACH ORGANISM AG IA: CPT

## 2018-11-29 PROCEDURE — 87389 HIV-1 AG W/HIV-1&-2 AB AG IA: CPT

## 2018-11-29 PROCEDURE — 77010033678 HC OXYGEN DAILY

## 2018-11-29 PROCEDURE — 74011250636 HC RX REV CODE- 250/636: Performed by: NURSE PRACTITIONER

## 2018-11-29 RX ADMIN — LISINOPRIL 20 MG: 20 TABLET ORAL at 08:51

## 2018-11-29 RX ADMIN — HEPARIN SODIUM 5000 UNITS: 5000 INJECTION INTRAVENOUS; SUBCUTANEOUS at 05:24

## 2018-11-29 RX ADMIN — GABAPENTIN 400 MG: 400 CAPSULE ORAL at 08:48

## 2018-11-29 RX ADMIN — BUDESONIDE 500 MCG: 0.5 INHALANT RESPIRATORY (INHALATION) at 08:20

## 2018-11-29 RX ADMIN — LACTULOSE 10 G: 20 SOLUTION ORAL at 17:36

## 2018-11-29 RX ADMIN — HYDROCODONE BITARTRATE AND ACETAMINOPHEN 1 TABLET: 5; 325 TABLET ORAL at 13:23

## 2018-11-29 RX ADMIN — DOCUSATE SODIUM 100 MG: 100 CAPSULE, LIQUID FILLED ORAL at 08:48

## 2018-11-29 RX ADMIN — LACTULOSE 10 G: 20 SOLUTION ORAL at 08:48

## 2018-11-29 RX ADMIN — HEPARIN SODIUM 5000 UNITS: 5000 INJECTION INTRAVENOUS; SUBCUTANEOUS at 21:00

## 2018-11-29 RX ADMIN — HYDROCODONE BITARTRATE AND ACETAMINOPHEN 1 TABLET: 5; 325 TABLET ORAL at 08:48

## 2018-11-29 RX ADMIN — GABAPENTIN 400 MG: 400 CAPSULE ORAL at 17:36

## 2018-11-29 RX ADMIN — BUDESONIDE 500 MCG: 0.5 INHALANT RESPIRATORY (INHALATION) at 20:31

## 2018-11-29 RX ADMIN — SIMVASTATIN 20 MG: 20 TABLET, FILM COATED ORAL at 21:00

## 2018-11-29 RX ADMIN — PANTOPRAZOLE SODIUM 40 MG: 40 TABLET, DELAYED RELEASE ORAL at 08:48

## 2018-11-29 RX ADMIN — HEPARIN SODIUM 5000 UNITS: 5000 INJECTION INTRAVENOUS; SUBCUTANEOUS at 13:25

## 2018-11-29 NOTE — PROGRESS NOTES
Problem: Pressure Injury - Risk of 
Goal: *Prevention of pressure injury Document Raul Scale and appropriate interventions in the flowsheet. Outcome: Progressing Towards Goal 
Pressure Injury Interventions: 
Sensory Interventions: Assess changes in LOC, Avoid rigorous massage over bony prominences, Check visual cues for pain, Discuss PT/OT consult with provider, Keep linens dry and wrinkle-free, Maintain/enhance activity level, Minimize linen layers, Monitor skin under medical devices, Pad between skin to skin, Pressure redistribution bed/mattress (bed type), Turn and reposition approx. every two hours (pillows and wedges if needed) Moisture Interventions: Absorbent underpads, Check for incontinence Q2 hours and as needed, Limit adult briefs, Maintain skin hydration (lotion/cream), Minimize layers, Moisture barrier Activity Interventions: Increase time out of bed, Pressure redistribution bed/mattress(bed type), PT/OT evaluation Mobility Interventions: HOB 30 degrees or less, Pressure redistribution bed/mattress (bed type), PT/OT evaluation, Turn and reposition approx. every two hours(pillow and wedges) Nutrition Interventions: Document food/fluid/supplement intake, Discuss nutritional consult with provider, Offer support with meals,snacks and hydration Friction and Shear Interventions: Foam dressings/transparent film/skin sealants, HOB 30 degrees or less, Lift sheet, Lift team/patient mobility team, Minimize layers, Transfer aides:transfer board/Gordon lift/ceiling lift, Transferring/repositioning devices Problem: Falls - Risk of 
Goal: *Absence of Falls Document Adel[facility] Fall Risk and appropriate interventions in the flowsheet.  
Outcome: Progressing Towards Goal 
Fall Risk Interventions: 
Mobility Interventions: Assess mobility with egress test, Communicate number of staff needed for ambulation/transfer, OT consult for ADLs, Patient to call before getting OOB, PT Consult for mobility concerns, PT Consult for assist device competence, Strengthening exercises (ROM-active/passive), Utilize walker, cane, or other assistive device, Utilize gait belt for transfers/ambulation Mentation Interventions: Adequate sleep, hydration, pain control, Door open when patient unattended, Eyeglasses and hearing aids, Gait belt with transfers/ambulation, Increase mobility, More frequent rounding, Reorient patient, Toileting rounds, Update white board Medication Interventions: Patient to call before getting OOB, Teach patient to arise slowly, Utilize gait belt for transfers/ambulation Elimination Interventions: Call light in reach, Patient to call for help with toileting needs, Toilet paper/wipes in reach, Toileting schedule/hourly rounds, Urinal in reach History of Falls Interventions: Consult care management for discharge planning, Door open when patient unattended, Investigate reason for fall, Room close to nurse's station, Utilize gait belt for transfer/ambulation Problem: Impaired Skin Integrity/Pressure Injury Treatment Goal: *Prevention of pressure injury Document Raul Scale and appropriate interventions in the flowsheet. Outcome: Progressing Towards Goal 
Pressure Injury Interventions: 
Sensory Interventions: Assess changes in LOC, Avoid rigorous massage over bony prominences, Check visual cues for pain, Discuss PT/OT consult with provider, Keep linens dry and wrinkle-free, Maintain/enhance activity level, Minimize linen layers, Monitor skin under medical devices, Pad between skin to skin, Pressure redistribution bed/mattress (bed type), Turn and reposition approx. every two hours (pillows and wedges if needed) Moisture Interventions: Absorbent underpads, Check for incontinence Q2 hours and as needed, Limit adult briefs, Maintain skin hydration (lotion/cream), Minimize layers, Moisture barrier Activity Interventions: Increase time out of bed, Pressure redistribution bed/mattress(bed type), PT/OT evaluation Mobility Interventions: HOB 30 degrees or less, Pressure redistribution bed/mattress (bed type), PT/OT evaluation, Turn and reposition approx. every two hours(pillow and wedges) Nutrition Interventions: Document food/fluid/supplement intake, Discuss nutritional consult with provider, Offer support with meals,snacks and hydration Friction and Shear Interventions: Foam dressings/transparent film/skin sealants, HOB 30 degrees or less, Lift sheet, Lift team/patient mobility team, Minimize layers, Transfer aides:transfer board/Gordon lift/ceiling lift, Transferring/repositioning devices

## 2018-11-29 NOTE — PROGRESS NOTES
Problem: Pressure Injury - Risk of 
Goal: *Prevention of pressure injury Document Raul Scale and appropriate interventions in the flowsheet. Outcome: Progressing Towards Goal 
Pressure Injury Interventions: 
Sensory Interventions: Assess changes in LOC Moisture Interventions: Absorbent underpads Activity Interventions: Increase time out of bed Mobility Interventions: HOB 30 degrees or less Nutrition Interventions: Document food/fluid/supplement intake Friction and Shear Interventions: Foam dressings/transparent film/skin sealants Problem: Falls - Risk of 
Goal: *Absence of Falls Document [facility] Fall Risk and appropriate interventions in the flowsheet. Outcome: Progressing Towards Goal 
Fall Risk Interventions: 
Mobility Interventions: Patient to call before getting OOB Mentation Interventions: Door open when patient unattended Medication Interventions: Patient to call before getting OOB Elimination Interventions: Call light in reach History of Falls Interventions: Door open when patient unattended Problem: Impaired Skin Integrity/Pressure Injury Treatment Goal: *Prevention of pressure injury Document Raul Scale and appropriate interventions in the flowsheet. Outcome: Progressing Towards Goal 
Pressure Injury Interventions: 
Sensory Interventions: Assess changes in LOC Moisture Interventions: Absorbent underpads Activity Interventions: Increase time out of bed Mobility Interventions: HOB 30 degrees or less Nutrition Interventions: Document food/fluid/supplement intake Friction and Shear Interventions: Foam dressings/transparent film/skin sealants

## 2018-11-29 NOTE — PROGRESS NOTES
2000-no signs of distress. Ordered meds given throughout shift. Unremarkable night. Bedside shift change report given to RN Huy Pringle (oncoming nurse) by Elaine Vivas (offgoing nurse). Report included the following information SBAR.

## 2018-11-29 NOTE — PROGRESS NOTES
Request X2 for 02 walk test (since 1400 today) No testing documented. . When recievied, if the pt meets criteria, home 02 will be ordered  Will continue to monitor for walk test results. Padmini Patel

## 2018-11-29 NOTE — PROGRESS NOTES
Infectious Disease Follow-up Admit Date: 11/14/2018 Current abx Prior abx None 11/18 - 11 Cefepime/vanco  11/14   2, Ceftriaxone 11/16 - 3 Assessment -> Rec:  
 
Possible T10-11 discitis 
- mid back pain x 3 months, fell, incontinent of urine - CTAP: severe destructive changes T 10-11, paravertebral sot tissue infiltration, extension to anterior epidural space w/ cord compression  
- 11/14: ESR 20, CRP 0.7 11/25 
- h/o IVDU  
- given Vanc/Cefepime 2 days then Ceftriaxone 2 days ending 11/18. Off abx since but MRI, aspiration not yet done (not cooperative w/ getting MRI done - claustrophobic) - case has been d/w Dr. Ana Gutierrez of IR. CT findings suggestive, not definitive for infection. MRI would be ideal for clarifying this but anesthesia apparently not available for conscious sedation for this. Bone/ gallium scans (done 11/27) can help exclude non-infectious possibilities.   ->d/w Dr. Ksenia Arias today bone / gallium scan report-- not s/o infection per radiol so agree with close follow-up and hold on aspiration/bx with current information-- complicated and understand bx felt to be high risk for anesthesia /positioning but if progressive changes would likely need to pursue for dx  
->repeat fungitell ordered stat- if still high could also be indication for bx though not specific here 
->brace etc per NSGY - pt indicated to them not interested in stabilization procedure now. 
->? Rehab - defer others UTI, Serratia - UA 11-20 WBC Bilateral pleural effusions Hepatomegaly, splenic hilar & perisplenic varices 
- seen on CTAP 11/14 ? cirrhosis Elevated fungitell - significance unclear, not specific here,  
- drawn shortly after albumin infusion 11/15 which can cause false positive BG result ->repeat fungitell stat today and also fungal bctx ordered for completeness 
->await repeat fungitell result to decide significance/other eval   
 ->also check HIV, Hep BsAg (pt says last ivdu 4 mo ago, has hep C and thinks hep b immune) COPD   
HTN   
[facility] MICROBIOLOGY:  
11/14 urcx >100,000 Serratia marcescens 
 blcx NG x 2 
11/15  CrAG neg, fungitell 375 (reported 11/17) LINES AND CATHETERS:  
piv Active Hospital Problems Diagnosis Date Noted  Discitis of thoracic region 11/14/2018  UTI (urinary tract infection) 11/14/2018  Bilateral pleural effusion 11/14/2018  Lumbar stenosis 11/14/2018  Adenoma of left adrenal gland 11/14/2018  Uncontrolled hypertension 11/14/2018  [address]rhosis of [address] ([facility] [address] 11/14/2018  Hepatitis C 11/14/2018  Obesity 11/14/2018  COPD (chronic obstructive pulmonary disease) ([facility] [address] 11/14/2018  Constipation 11/14/2018  Back pain 11/14/2018 Subjective: Interval notes reviewed. D/w Dr. Daisha Dash earlier Dr. Adalid Machuca rec for anesthesia to provide conscious sedation for CT guided aspiration if nuc med study c/w infection but report indicates less likely to be infectious etiology here. Still may need bx for dx but also understand felt higher risk d/t positioning/sedation required so agree with plan to monitor on current therapy, potentially as outpt soon. In review of results do see elevated fungitell reported 11/17, drawn 11/15 after albumin infusion and with negative other studies may represent false positive from this but ordered repeat stat along with getting fungal bctx for completeness and d/w pt Dr. Daisha Dash need to f/u. Pt reports pain little changed, no fever no rash, says last ivdu 4 mo ago, hep c and h/o hep b infection thinks immune, agreeable to hiv testing today also ordered . Current Facility-Administered Medications Medication Dose Route Frequency  heparin (porcine) injection 5,000 Units  5,000 Units SubCUTAneous Q8H  
 docusate sodium (COLACE) capsule 100 mg  100 mg Oral DAILY  HYDROcodone-acetaminophen (NORCO) 5-325 mg per tablet 1 Tab  1 Tab Oral Q4H PRN  pantoprazole (PROTONIX) tablet 40 mg  40 mg Oral ACB  morphine injection 1 mg  1 mg IntraVENous Q4H PRN  
 budesonide (PULMICORT) 500 mcg/2 ml nebulizer suspension  500 mcg Nebulization BID RT  
 sodium chloride (NS) flush 5-10 mL  5-10 mL IntraVENous PRN  
 gabapentin (NEURONTIN) capsule 400 mg  400 mg Oral BID  lisinopril (PRINIVIL, ZESTRIL) tablet 20 mg  20 mg Oral DAILY  loratadine (CLARITIN) tablet 10 mg  10 mg Oral DAILY PRN  
 simvastatin (ZOCOR) tablet 20 mg  20 mg Oral QHS  naloxone (NARCAN) injection 0.4 mg  0.4 mg IntraVENous PRN  
 diphenhydrAMINE (BENADRYL) injection 12.5 mg  12.5 mg IntraVENous Q4H PRN  
 ondansetron (ZOFRAN) injection 4 mg  4 mg IntraVENous Q4H PRN  
 albuterol-ipratropium (DUO-NEB) 2.5 MG-0.5 MG/3 ML  3 mL Nebulization Q4H PRN  
 hydrALAZINE (APRESOLINE) 20 mg/mL injection 10 mg  10 mg IntraVENous Q6H PRN  
 bisacodyl (DULCOLAX) suppository 10 mg  10 mg Rectal DAILY PRN  
 lactulose (CHRONULAC) solution 10 g  10 g Oral BID Objective:  
 
Visit Vitals /67 Pulse 87 Temp 97.8 °F (36.6 °C) Resp 18 Ht 6' (1.829 m) Wt 109.1 kg (240 lb 8.4 oz) SpO2 97% BMI 32.62 kg/m² Temp (24hrs), Av °F (36.7 °C), Min:97.6 °F (36.4 °C), Max:98.5 °F (36.9 °C) GEN: pleasant 76 y/o AA male, awake NAD HEENT: no scleral icterus, no oral lesions CHEST: no crackles or wheezes CVS:regular, no murmurs, rubs or gallops ABD: soft, nontender EXT: no edema or cyanosis Neuro: alert, oriented, can move extremities including LE's Labs: Results:  
Chemistry Recent Labs  
  18 
4965 GLU 87   
K 5.2 CL 99* CO2 38* BUN 19* CREA 0.78 CA 9.0 AGAP 3  
BUCR 24* CBC w/Diff Recent Labs  
  18 
0725 WBC 5.7  
RBC 4.94  
HGB 13.4 HCT 45.2  GRANS 48 LYMPH 42 EOS 2  
  
 
 nuc med IMPRESSION: 
 Degree of tracer uptake relative to the recent bone scan suggests this is less 
likely to be infectious discitis osteomyelitis. Clinical correlation and close 
follow-up needed. Microbiology Results No results for input(s): SDES, CULT in the last 72 hours. Felicity Amaya MD 
November 29, 2018 [facility] [phone]

## 2018-11-29 NOTE — PROGRESS NOTES
Problem: Self Care Deficits Care Plan (Adult) Goal: *Acute Goals and Plan of Care (Insert Text) Occupational Therapy Goals Initiated 11/26/2018 within 7 day(s). 1.  Patient will perform grooming tasks while standing with stand-by assistance for balance and < 3 rest breaks. 2.  Patient will perform lower body dressing with moderate assistance utilizing AE. 3.  Patient will perform functional task in standing for 8 minutes with supervision/set-up for balance to increase activity tolerance for ADLs. 4.  Patient will perform toilet transfers with stand-by assistance. 5.  Patient will perform all aspects of toileting with supervision/set-up. 6.  Patient will participate in upper extremity therapeutic exercise/activities with supervision/set-up for 8 minutes to increase BUE strength for functional transfers & ADLs. 7.  Patient will utilize energy conservation techniques during functional activities with minimal verbal cues. Outcome: Progressing Towards Goal 
[facility] 
OCCUPATIONAL THERAPY: Daily Note INPATIENT: Medicaid: Hospital Day: 16 Patient: Gisella Frazier. (67 y.o. male)    Date: 11/29/2018 Primary Diagnosis: Discitis of thoracic region Procedure(s) (LRB): Anes MRI (N/A), 14 Days Post-Op, Precautions: Back brace and Falls PLOF : Pt required assistance with ADLs PTA. ASSESSMENT : Pt supine on arrival, c/o 7/10 back pain, agreeable to therapy. Co-treated with PT to maximize pt safety & participation. Supervision for log rolling with min vc's; fair sitting balance. Max A for LB dressing due to back pain; educated on use of reacher; pt verbalized that he has reacher at home. Total A to don back brace; CGA/min A for functional transfer to [facility]. Max A for bowel hygiene due to back pain & limited ROM. Pt left in partial chair position with needs within reach. Pt making good progress towards functional goals.  
  
 
PLAN : 
 Patient continues to benefit from skilled intervention to address the above impairments. Continue treatment per established plan of care. EDUCATION:  
Education:  Patient was educated on the following topics:   
Progression toward goals: 
[x]          Improving appropriately and progressing toward goals 
[]          Improving slowly and progressing toward goals 
[]          Not making progress toward goals and plan of care will be adjusted Barriers to Learning/Limitations: None Compensate with: visual, verbal, tactile, kinesthetic cues/model Discharge Recommendations: Home Health Further Equipment Recommendations for Discharge:   
 
G CODES:  
 
Mobility [id number] Current  CK= 40-59%   Goal  CJ= 20-39%. The severity rating is based on the Other Functional Assessment, MMT, ROM SUBJECTIVE:  
Patient stated: \"I'm trying. \" OBJECTIVE/TREATMENT:  
 
Past Medical History:  
Diagnosis Date  Arthritis  COPD  Hypertension Past Surgical History:  
Procedure Laterality Date  HX REFRACTIVE SURGERY Functional Status Indep (I) Mod I Stand-by Assist  
Contact Guard Min Assist  
Mod Assist  
Max assist  
Total Assist  
Comments Rolling []  []  [x]  []    []    []    []  [] Supine to sit []  []  []  [x]  []  []  []  [] Sit to supine []  []  []  []  []  [x]  []  []  BLE management Sit to stand []  []  []  [x]  [x]  []  []  []    
Stand to sit []  []  []  [x]  [x]  []  []  [] Bed to chair transfers []  []  []  []  [x]  []  []  [] Balance Good Polo Pucker Poor Unable Comments Sitting static []  [x]  []  [] Sitting dynamic []  [x]  []  []    
Standing static []  [x]  []  []    
Standing dynamic []  [x]  []  [] Therapeutic Activity:  
Sit to stand transfer x3 trials with CGA/min A & vc's for proper body & BUE positioning to ensures safety in prep for [facility] transfer. ADL Intervention: Pt had bowel movement; max A for bowel hygiene & clothing management due to limited back pain & ROM. Pain:  
Pre treatment:  7/10 Post treatment:  7/10 Scale: numeric Location: Lower back Activity tolerance:  
fair COMMUNICATION/EDUCATION:  
Education:  Patient was educated on the following topics: transfer techniques, LB Dressing techniques Treatment/Session Assessment: · After treatment position/precautions:  
- partial chair position 
- needs within reach Recommendations/Intent for next treatment session: \"Next visit will focus on advancements to more challenging activities\". Thank you for this referral. 
Burke Roldan MS OTR/L Time Calculation: 40 mins

## 2018-11-29 NOTE — PROGRESS NOTES
Neurosurgery Progress Note; 
Bone gallium study: no likely to be infection This goes along with normal white count low sed rate etc. 
Patient seems relieved reference to result He is NOT interested in a stablization procedure. Motor examination: 5.5 Condom catheter. A; Neurosurgery will sign off today P; patient assured me if he changes his mind he will let us know. Please call for concerns. The brace that he has is perfect.

## 2018-11-29 NOTE — MANAGEMENT PLAN
Discharge/Transition Planning 1015: Verbal order, read back for wheelchair and home health from Dr Vishal Alamo. ARU is still following. 1315: Spoke with Dr Vishal Alamo and pt medically ready to discharge. Pat Driver, Admission Coordinator at [facility] and facility has declined. 1330: Spoke with pt about his discharge planning. He is apprehensive about discharge. Asking about oxygen. Will review if needed at home and get order from Dr Vishal Alamo. Pt uses [facility] for personal care, CM will notify 1345: Spoke with CAMERON Cullen and pt is having desaturations with oxygen off. CM needs documentation of o2 sats off oxygen and on. Will get order for Home o2 from Dr Vishal Alamo. Pt has COPD diagnosis Donna Ambrose RN BSN Outcomes Manager Pager # [phone]

## 2018-11-29 NOTE — PROGRESS NOTES
FOC on the chatt for [facility] #[phone].; copy given to the pt. Their intake rep, TANYA confirmed Dr. bJ Ruano will follow pt's from their agency. PCP is  Dr. Yvan Charles # [phone]. Pt verified the contact informtion on the face sheet is correct,  DC pending. Standard wheelchair ordered from [facility] # [id number] via Fax. Delivery will be in 1-3 business days , if approved by the pt's insurance. Pt notified and verbalized understanding. He reported he may have a wheelchair that was not paid for by his insurance. He will have his family attempt to locate the wheelchair. Care Management Interventions PCP Verified by CM: Yes 
Palliative Care Criteria Met (RRAT>21 & CHF Dx)?: No 
Transition of Care Consult (CM Consult): Home Health [address] [place] [address] Road: No 
Reason Outside [place] [address] Chosen: ([facility]. Pt's PCP , Dr. Tri Yip choice of agency.) Physical Therapy Consult: No 
Occupational Therapy Consult: No 
Current Support Network: Other Plan discussed with Pt/Family/Caregiver: Yes Freedom of Choice Offered: Yes Discharge Location Discharge Placement: Home with home health

## 2018-11-29 NOTE — PROGRESS NOTES
Pt may benefit from Outpatient Pulmonary Rehab after discharge. Please evaluate, and if appropriate fax referral to [phone]

## 2018-11-29 NOTE — PROGRESS NOTES
Internal Medicine Progress Note Patient's Name: Jessica Griffin. Admit Date: 11/14/2018 Length of Stay: 15 
 
 
Assessment/Plan Active Hospital Problems Diagnosis Date Noted  Discitis of thoracic region 11/14/2018  UTI (urinary tract infection) 11/14/2018  Bilateral pleural effusion 11/14/2018  Lumbar stenosis 11/14/2018  Adenoma of left adrenal gland 11/14/2018  Uncontrolled hypertension 11/14/2018  Cirrhosis of liver ([facility] [address] 11/14/2018  Hepatitis C 11/14/2018  Obesity 11/14/2018  COPD (chronic obstructive pulmonary disease) ([facility] [address] 11/14/2018  Constipation 11/14/2018  Back pain 11/14/2018 1. Discitis of thoracic region 
 -CT showing severe discitis of T10-11. Patient with h/o iv heroin abuse. Infection suspected. 
 -Vanc and cefepime initiated in ED,continued until 11/16 
 -Sepsis protocol was initiated in ED but I didn't think patient was septic - lactic acid,wbc,temp,HR normal. 
 -Blood cx negative 
 -Pt did not tolerate MRI. Discussed with IR,will have MRI under sedation and aspiration of T10-11 will be performed for cultures,cytology 
 -Neurosurgery consulted ->pt high risk for intervention - medical management 
 -ID decided to d/c Vanc/cefepime on 11/16 as patient was not septic and ESR/CRP showing low probability for OM/Discitis. Pt currently on ceftriaxone for UTI 
 -Pain management with morphine and norco prn 
 -Fall precaution. - discussed with MRI dept 11/19 - stated anesthesia determined pt high risk for intubation and did not want to risk compensation given hx of COPD. MRI has been attempted 6-7 times but pt will not keep still and requesting to be put to sleep in order to complete test.  IR will not do culture/drain w/o prior MRI 
- discussed with anesthesia on 11/20 to see if pt can be re-evaluated for anesthesia for MRI 
- discussed w/ anesthesia again on 11/23 - they are here for emergencies only- awaiting return call - tried again to do MRI on 11/25 but pt unable to tolerate 
- 11/26 ID recommends doing CT guided aspiration and manage abx based on cultures 
- discussed with pulmonary- they do not feel comfortable intubating pt for one day just to obtain imaging 
- discussed with IR on 11/26- ok for CT guided aspiration however informed that in most cases, will obtain sterile culture so this will not affect plan of care/treatment as we'd hope- however if cultures are positive then will have definitive treatment plan- unable to do aspiration today due to urgent case but orders have been placed for tomorrow 11/27 
- CT guided aspiration on hold for now, proceed with bone scan - Bone/gallium scan 11/27: Degree of tracer uptake relative to the recent bone scan suggests this is less likely to be infectious discitis osteomyelitis. Clinical correlation and close follow-up needed. 
  
2. Respiratory failure with hypoxia/hypercapnia 
 -Improved. Tolerating 3 liters NC and O2sat 93-98% 
  
3.Acute encephalopathy metabolic 
 -Likely due to acute resp failure and possibly elevated ammonia 
 -Will monitor mentation as patient being treated 
 -CT scan done on 11/15 after code stroke was called did not show any acute process. Tele-neurology did not think that patient had a stroke 
 -Improved mentation,interacting better today 
  4.Lumbar stenosis at L3-4  
 -Will follow neurosurgery recommendations -> no surgery,pt asymptomatic 
  
5. UTI (urinary tract infection) 
 - resolved- completed five days of IV abx 
 -Ucx c/w serratia marscens and blood cx NGTD 
  
6.Cirrhosis of liver/Hepatitis C/H/o alcohol abuse 
 -Patient has h/o hepatitis C and past history of alcohol abuse. CT showing strong evidence of cirrhosis with possible portal vein hypertension. 
  -Received albumin x 1  
 -On lactulose. 
  
7.Bilateral pleural effusion/Legs edema 
 -Likely due to cirrhosis of liver. 
 -LE edema improved 
  
8. Adenoma of left adrenal gland  
  -Seen in previous films. Will monitor 
  
9. Uncontrolled hypertension  
 -Resume lisinopril. Hydralazine prn 
  
10. Obesity 
 -Supportive care 
  
11. COPD  
 -Duo-neb prn 
  
12. Constipation 
 -Order dulcolax 
  
13. Back pain 
 -Due to problems above - on pain meds. 
  
14. Legs edema 
 -Likely due to cirrhosis with portal hypertension 
 -pro-BNP not elevated and ECHO nl with EF 66-70% 
 -legs edema resolved. 
  
DVT prophylaxis 
- Heparin subcutaneously TID  
 
 
- Cont acceptable home medications for chronic conditions  
- DVT protocol I have personally reviewed all pertinent labs and films that have officially resulted over the last 24 hours. I have personally checked for all pending labs that are awaiting final results. Interval History 77 y.o.  male with h/o HTN, COPD, Hep C, presented to ED because of back pain on 11/14. Patient indicating the lower portion of his T-spine where he has the pain,saying that it started almost three months ago and has been getting worse. He reports that he was seen in ED few weeks ago and was told that nothing was wrong with him. He is also reporting constipation the last 3 weeks. He denies legs weakness or loss of sensation. No fever or chills. Although patient did not report his history of heroin abuse, the ED report indicates that patient is an IV drugs abuser and last use of IV heroin was 3-4 weeks ago. Patient's friend who came with him in ED has reported that patient fell the day prior while trying to get to the bathroom and was incontinent. Patient is reporting that he has not been able to hold his urine for a while. In the ED,CT abdm/pelvis showed severe  discitis, L3-4 central stenosis, bilateral pleural effusions with bilateral lower lobe atelectasis, splenic hilar and perisplenic varices. UA showed UTI. In ED, patient was started on vanc and cefepime for severe discitis. Neurosurgery was consulted. The hospitalist team was called for admission. ID consulted. On 11/16,  ID decided to discontinue vanc and cefepime, preferred the diagnosis first since patient has been afebrile,without leokocytosis and ESR/CRP showing low probability for OM/Discitis. Currently patient is only on ceftriaxone for UTI due to serratia. MRI of the spine not done yet as patient has not tolerated. On 11/15/2018, patient noted confused. Code S called. CT head w/o acute and tele-neurology ruled out the stroke. Patient was transferred to ICU due to altered mental status, likely metabolic. On 11/16/18, patient was hypoxic,hypercapnic. He was put on BIPAP, critical care was consulted. On 11/18, patient more awake, clincally stable MRI machine not working since 11/17- tests still pending. Per ID, ESR/CRP low for OM/discitis but does not exclude, of all abx for better diagnostics as pt not septic. Discussed with MRI dept on 11/19- stated anesthesia determined pt high risk for intubation and did not want to risk compensation given hx of COPD. MRI has been attempted 6-7 times but pt will not keep still and requesting to be put to sleep in order to complete test.  IR will not do culture/drain w/o prior MRI. Updated ID. Spoke with anesthesia on 11/20 to see if pt can be re-evaluated to be sedated for MRI- ID does not want to subject pt to long term IV abx w/o definite diagnosis, and IR will not obtain aspiration without MRI. Still awaiting MRI T spine- MRI stated they would be available on Friday for scheduling but unable to do today due to prior appts. Spoke w/ anesthesia again on 11/23 - stated they were available for emergencies but would forward to Dr. Carrington Zayas (sp) and would return call (I have given my cell number for return call). For now, continue w/ current plan of care - nothing can be done until pt obtains MRI. Discussed with pt again trying to get MRI done w/o anesthesia, states he knows he can't tolerate it. Attempted MRI T spine and L spine again on 11/25 however pt unable to tolerate. 11/26 ID recommends doing CT guided aspiration. Discussed with pulmonary- does not feel comfortable intubating pt for one day to obtain MRI. Recommend moving forward w/ ID's plan for CT guided aspiration. I discussed with IR- unable to obtain today- did however state in most cases, they will have obtained sterile cultures so this may not affect plan of care as we'd hope, however if cultures are positive then we will have a true diagnosis. Unable to do today due to urgent case, but orders have been placed for tomorrow 11/27- recommends NPO after midnight and holding am dose of Heparin (dose due at midnight- please hold). Attending discussed with radiology on 11/27- concern for pt tolerability as he will have to lay prone for CT guided aspiration- recommends bone scan for now. ID updated. Bone/gallium scan done 11/27 \"Degree of tracer uptake relative to the recent bone scan suggests this is less likely to be infectious discitis osteomyelitis. Clinical correlation and close follow-up needed. \" Per ID, repeat fungitell stat 11/29 and also fungal bctx. Subjective Pt s/e @ bedside. No major events overnight. Pt reports mild back pain after PT/OT. Denies CP or SOB. Denies abd pain, N/V. Objective Visit Vitals /70 (BP 1 Location: Right arm, BP Patient Position: At rest) Pulse 74 Temp 97.6 °F (36.4 °C) Resp 18 Ht 6' (1.829 m) Wt 109.1 kg (240 lb 8.4 oz) SpO2 97% BMI 32.62 kg/m² Physical Exam: 
General Appearance: NAD, conversant HENT: normocephalic/atraumatic, moist mucus membranes Neck: No JVD, supple Lungs: CTA with normal respiratory effort CV: RRR, no m/r/g Abdomen: soft, non-tender, normal bowel sounds Extremities: no cyanosis, no peripheral edema Neuro: No focal deficits, motor/sensory intact Skin: Normal color, intact Intake and Output: Current Shift:  11/29 0701 - 11/29 1900 In: 3600 [P.O.:3600] Out: 100 [Urine:100] Last three shifts:  11/27 1901 - 11/29 0700 In: 120 [P.O.:120] Out: 550 [Urine:550] Lab/Data Reviewed: 
BMP:  
No results found for: NA, K, CL, CO2, AGAP, GLU, BUN, CREA, GFRAA, GFRNA 
CBC: No results found for: WBC, HGB, HGBEXT, HCT, HCTEXT, PLT, PLTEXT, HGBEXT, HCTEXT, PLTEXT Imaging Reviewed: 
Chris Cavanaugh [address] Result Date: 11/28/2018 HISTORY: History of thoracic spine abnormality on CT, suspicion of discitis osteomyelitis Technique: After administration of 10 mCi of gallium 67 citrate intravenously, 24 hours, delayed imaging of the thoracolumbar spine was obtained. FINDINGS: There is moderately intense tracer uptake in the region of T10/T11 vertebral body however this is significantly less intense than the uptake seen on previous 3 phase bone scan. Findings would speak against active infectious discitis osteomyelitis. IMPRESSION: Degree of tracer uptake relative to the recent bone scan suggests this is less likely to be infectious discitis osteomyelitis. Clinical correlation and close follow-up needed. Medications Reviewed: 
Current Facility-Administered Medications Medication Dose Route Frequency  heparin (porcine) injection 5,000 Units  5,000 Units SubCUTAneous Q8H  
 docusate sodium (COLACE) capsule 100 mg  100 mg Oral DAILY  HYDROcodone-acetaminophen (NORCO) 5-325 mg per tablet 1 Tab  1 Tab Oral Q4H PRN  pantoprazole (PROTONIX) tablet 40 mg  40 mg Oral ACB  morphine injection 1 mg  1 mg IntraVENous Q4H PRN  
 budesonide (PULMICORT) 500 mcg/2 ml nebulizer suspension  500 mcg Nebulization BID RT  
 sodium chloride (NS) flush 5-10 mL  5-10 mL IntraVENous PRN  
 gabapentin (NEURONTIN) capsule 400 mg  400 mg Oral BID  lisinopril (PRINIVIL, ZESTRIL) tablet 20 mg  20 mg Oral DAILY  loratadine (CLARITIN) tablet 10 mg  10 mg Oral DAILY PRN  
  simvastatin (ZOCOR) tablet 20 mg  20 mg Oral QHS  naloxone (NARCAN) injection 0.4 mg  0.4 mg IntraVENous PRN  
 diphenhydrAMINE (BENADRYL) injection 12.5 mg  12.5 mg IntraVENous Q4H PRN  
 ondansetron (ZOFRAN) injection 4 mg  4 mg IntraVENous Q4H PRN  
 albuterol-ipratropium (DUO-NEB) 2.5 MG-0.5 MG/3 ML  3 mL Nebulization Q4H PRN  
 hydrALAZINE (APRESOLINE) 20 mg/mL injection 10 mg  10 mg IntraVENous Q6H PRN  
 bisacodyl (DULCOLAX) suppository 10 mg  10 mg Rectal DAILY PRN  
 lactulose (CHRONULAC) solution 10 g  10 g Oral BID Tere Liu PA-C 7 [facility] Hospitalist Division Office:  [phone] Pager: [phone]

## 2018-11-30 LAB
ANION GAP SERPL CALC-SCNC: 5 MMOL/L (ref 3–18)
BUN SERPL-MCNC: 23 MG/DL (ref 7–18)
BUN/CREAT SERPL: 23 (ref 12–20)
CALCIUM SERPL-MCNC: 8.9 MG/DL (ref 8.5–10.1)
CHLORIDE SERPL-SCNC: 98 MMOL/L (ref 100–108)
CO2 SERPL-SCNC: 37 MMOL/L (ref 21–32)
CREAT SERPL-MCNC: 1 MG/DL (ref 0.6–1.3)
GLUCOSE SERPL-MCNC: 91 MG/DL (ref 74–99)
HBV SURFACE AB SER QL IA: NEGATIVE
HBV SURFACE AB SERPL IA-ACNC: 8.69 MIU/ML
HBV SURFACE AG SER QL: 0.17 INDEX
HBV SURFACE AG SER QL: NEGATIVE
HEP BS AB COMMENT,HBSAC: ABNORMAL
HIV 1+2 AB+HIV1 P24 AG SERPL QL IA: NONREACTIVE
HIV12 RESULT COMMENT, HHIVC: NORMAL
POTASSIUM SERPL-SCNC: 4.5 MMOL/L (ref 3.5–5.5)
SODIUM SERPL-SCNC: 140 MMOL/L (ref 136–145)

## 2018-11-30 PROCEDURE — 65660000000 HC RM CCU STEPDOWN

## 2018-11-30 PROCEDURE — 94640 AIRWAY INHALATION TREATMENT: CPT

## 2018-11-30 PROCEDURE — 74011000250 HC RX REV CODE- 250: Performed by: INTERNAL MEDICINE

## 2018-11-30 PROCEDURE — 86706 HEP B SURFACE ANTIBODY: CPT

## 2018-11-30 PROCEDURE — 80048 BASIC METABOLIC PNL TOTAL CA: CPT

## 2018-11-30 PROCEDURE — 97530 THERAPEUTIC ACTIVITIES: CPT

## 2018-11-30 PROCEDURE — 87340 HEPATITIS B SURFACE AG IA: CPT

## 2018-11-30 PROCEDURE — 74011250637 HC RX REV CODE- 250/637: Performed by: NURSE PRACTITIONER

## 2018-11-30 PROCEDURE — 74011250637 HC RX REV CODE- 250/637: Performed by: INTERNAL MEDICINE

## 2018-11-30 PROCEDURE — 74011250636 HC RX REV CODE- 250/636: Performed by: NURSE PRACTITIONER

## 2018-11-30 PROCEDURE — 36415 COLL VENOUS BLD VENIPUNCTURE: CPT

## 2018-11-30 PROCEDURE — 97535 SELF CARE MNGMENT TRAINING: CPT

## 2018-11-30 RX ADMIN — HEPARIN SODIUM 5000 UNITS: 5000 INJECTION INTRAVENOUS; SUBCUTANEOUS at 05:15

## 2018-11-30 RX ADMIN — DOCUSATE SODIUM 100 MG: 100 CAPSULE, LIQUID FILLED ORAL at 09:42

## 2018-11-30 RX ADMIN — LISINOPRIL 20 MG: 20 TABLET ORAL at 09:42

## 2018-11-30 RX ADMIN — LACTULOSE 10 G: 20 SOLUTION ORAL at 18:24

## 2018-11-30 RX ADMIN — SIMVASTATIN 20 MG: 20 TABLET, FILM COATED ORAL at 21:00

## 2018-11-30 RX ADMIN — PANTOPRAZOLE SODIUM 40 MG: 40 TABLET, DELAYED RELEASE ORAL at 09:42

## 2018-11-30 RX ADMIN — BUDESONIDE 500 MCG: 0.5 INHALANT RESPIRATORY (INHALATION) at 08:48

## 2018-11-30 RX ADMIN — HYDROCODONE BITARTRATE AND ACETAMINOPHEN 1 TABLET: 5; 325 TABLET ORAL at 13:50

## 2018-11-30 RX ADMIN — HYDROCODONE BITARTRATE AND ACETAMINOPHEN 1 TABLET: 5; 325 TABLET ORAL at 23:47

## 2018-11-30 RX ADMIN — HEPARIN SODIUM 5000 UNITS: 5000 INJECTION INTRAVENOUS; SUBCUTANEOUS at 21:01

## 2018-11-30 RX ADMIN — LACTULOSE 10 G: 20 SOLUTION ORAL at 09:41

## 2018-11-30 RX ADMIN — GABAPENTIN 400 MG: 400 CAPSULE ORAL at 18:24

## 2018-11-30 RX ADMIN — GABAPENTIN 400 MG: 400 CAPSULE ORAL at 09:42

## 2018-11-30 RX ADMIN — BUDESONIDE 500 MCG: 0.5 INHALANT RESPIRATORY (INHALATION) at 20:36

## 2018-11-30 RX ADMIN — HEPARIN SODIUM 5000 UNITS: 5000 INJECTION INTRAVENOUS; SUBCUTANEOUS at 13:50

## 2018-11-30 NOTE — ROUTINE PROCESS
Bedside, Verbal and Written shift change report given to Dominique Ewing RN (oncoming nurse) by Lino Waters RN (offgoing nurse). Report included the following information SBAR, Kardex, Intake/Output, MAR, Recent Results, Med Rec Status, Procedure Summary and Cardiac Rhythm NSR.    
0720 - Shift assessment completed. Pt alert and oriented x4. No respiratory distress noted, pt on 2 liters O2 via n/c. No c/o pain reported. Call bell within reach, bed in low position. Will continue to monitor. 
   
0848 - Pt c/o pain to back, PRN Norco administered. 1220 - Shift re-assessment completed, no change in pt condition. 
  
1323 - Pt c/o pain to back, PRN Norco administered. 1600 - Tested pt's O2 Saturation tested without O2 via n/c - please see note. 1620 - Shift re-assessment completed, no change in pt condition. 
   
Bedside, Verbal and Written shift change report given to Lino Waters RN (oncoming nurse) by Dominique Ewing RN (offgoing nurse). Report included the following information SBAR, Kardex, Intake/Output, MAR, Recent Results, Med Rec Status, Procedure Summary and Cardiac Rhythm NSR.

## 2018-11-30 NOTE — PROGRESS NOTES
2000-no signs of distress. Ordered meds given throughout night. Unremarkable night. Bedside shift change report given to RN Deejay Clay (oncoming nurse) by [place] [employer] (offgoing nurse). Report included the following information SBAR.

## 2018-11-30 NOTE — PROGRESS NOTES
Home 02 ordered from [facility] #[phone]. Requested a portable to be delivered to the hospital room. Timur Whitehead Ararcenio, reported the portable will be delivered to the hospital room 12-1-18. Home care referral sent to [facility] #[phone] via [employer] and called to their intake rep, Domenic, as a pending dc. Care Management Interventions PCP Verified by CM: Yes 
Palliative Care Criteria Met (RRAT>21 & CHF Dx)?: No 
Transition of Care Consult (CM Consult): Home Health [address] [place] [address] Road: No 
Reason Outside [place] [id number] Chosen: ([facility]. Pt's PCP , Dr. Chris Jaramillo choice of agency.) Physical Therapy Consult: No 
Occupational Therapy Consult: No 
Current Support Network: Other Plan discussed with Pt/Family/Caregiver: Yes Freedom of Choice Offered: Yes Discharge Location Discharge Placement: Home with home health

## 2018-11-30 NOTE — PROGRESS NOTES
Internal Medicine Progress Note Patient's Name: Gloria Riley Admit Date: 11/14/2018 Length of Stay: 16 
 
 
Assessment/Plan Active Hospital Problems Diagnosis Date Noted  Discitis of thoracic region 11/14/2018  UTI (urinary tract infection) 11/14/2018  Bilateral pleural effusion 11/14/2018  Lumbar stenosis 11/14/2018  Adenoma of left adrenal gland 11/14/2018  Uncontrolled hypertension 11/14/2018  Cirrhosis of liver ([facility] [address] 11/14/2018  Hepatitis C 11/14/2018  Obesity 11/14/2018  COPD (chronic obstructive pulmonary disease) ([facility] [address] 11/14/2018  Constipation 11/14/2018  Back pain 11/14/2018 1. Discitis of thoracic region 
 -CT showing severe discitis of T10-11. Patient with h/o iv heroin abuse. Infection suspected. 
 -Vanc and cefepime initiated in ED,continued until 11/16 
 -Sepsis protocol was initiated in ED but I didn't think patient was septic - lactic acid,wbc,temp,HR normal. 
 -Blood cx negative 
 -Pt did not tolerate MRI. Discussed with IR,will have MRI under sedation and aspiration of T10-11 will be performed for cultures,cytology 
 -Neurosurgery consulted ->pt high risk for intervention - medical management 
 -ID decided to d/c Vanc/cefepime on 11/16 as patient was not septic and ESR/CRP showing low probability for OM/Discitis. Pt currently on ceftriaxone for UTI 
 -Pain management with morphine and norco prn 
 -Fall precaution. - discussed with MRI dept 11/19 - stated anesthesia determined pt high risk for intubation and did not want to risk compensation given hx of COPD. MRI has been attempted 6-7 times but pt will not keep still and requesting to be put to sleep in order to complete test.  IR will not do culture/drain w/o prior MRI 
- discussed with anesthesia on 11/20 to see if pt can be re-evaluated for anesthesia for MRI 
- discussed w/ anesthesia again on 11/23 - they are here for emergencies only- awaiting return call - tried again to do MRI on 11/25 but pt unable to tolerate 
- 11/26 ID recommends doing CT guided aspiration and manage abx based on cultures 
- discussed with pulmonary- they do not feel comfortable intubating pt for one day just to obtain imaging 
- discussed with IR on 11/26- ok for CT guided aspiration however informed that in most cases, will obtain sterile culture so this will not affect plan of care/treatment as we'd hope- however if cultures are positive then will have definitive treatment plan- unable to do aspiration today due to urgent case but orders have been placed for tomorrow 11/27 
- CT guided aspiration on hold for now, proceed with bone scan - Bone/gallium scan 11/27: Degree of tracer uptake relative to the recent bone scan suggests this is less likely to be infectious discitis osteomyelitis. Clinical correlation and close follow-up needed. 
  
2. Respiratory failure with hypoxia/hypercapnia 
 -Improved. Tolerating 3 liters NC and O2sat 93-98% 
  
3.Acute encephalopathy metabolic 
 -Likely due to acute resp failure and possibly elevated ammonia 
 -Will monitor mentation as patient being treated 
 -CT scan done on 11/15 after code stroke was called did not show any acute process. Tele-neurology did not think that patient had a stroke 
 -Improved mentation,interacting better today 
  4.Lumbar stenosis at L3-4  
 -Will follow neurosurgery recommendations -> no surgery,pt asymptomatic 
  
5. UTI (urinary tract infection) 
 - resolved- completed five days of IV abx 
 -Ucx c/w serratia marscens and blood cx NGTD 
  
6.Cirrhosis of liver/Hepatitis C/H/o alcohol abuse 
 -Patient has h/o hepatitis C and past history of alcohol abuse. CT showing strong evidence of cirrhosis with possible portal vein hypertension. 
  -Received albumin x 1  
 -On lactulose. 
  
7.Bilateral pleural effusion/Legs edema 
 -Likely due to cirrhosis of liver. 
 -LE edema improved 
  
8. Adenoma of left adrenal gland  
  -Seen in previous films. Will monitor 
  
9. Uncontrolled hypertension  
 -Resume lisinopril. Hydralazine prn 
  
10. Obesity 
 -Supportive care 
  
11. COPD  
 -Duo-neb prn 
  
12. Constipation 
 -Order dulcolax 
  
13. Back pain 
 -Due to problems above - on pain meds. 
  
14. Legs edema 
 -Likely due to cirrhosis with portal hypertension 
 -pro-BNP not elevated and ECHO nl with EF 66-70% 
 -legs edema resolved. 
  
DVT prophylaxis 
- Heparin subcutaneously TID ANDERSON Nieto [address] [facility] Pager:  [phone] Office:  [phone] Interval History 77 y.o.  male with h/o HTN, COPD, Hep C, presented to ED because of back pain on 11/14. Patient indicating the lower portion of his T-spine where he has the pain,saying that it started almost three months ago and has been getting worse. He reports that he was seen in ED few weeks ago and was told that nothing was wrong with him. He is also reporting constipation the last 3 weeks. He denies legs weakness or loss of sensation. No fever or chills. Although patient did not report his history of heroin abuse, the ED report indicates that patient is an IV drugs abuser and last use of IV heroin was 3-4 weeks ago. Patient's friend who came with him in ED has reported that patient fell the day prior while trying to get to the bathroom and was incontinent. Patient is reporting that he has not been able to hold his urine for a while. In the ED,CT abdm/pelvis showed severe  discitis, L3-4 central stenosis, bilateral pleural effusions with bilateral lower lobe atelectasis, splenic hilar and perisplenic varices. UA showed UTI. In ED, patient was started on vanc and cefepime for severe discitis. Neurosurgery was consulted. The hospitalist team was called for admission. ID consulted.   On 11/16,  ID decided to discontinue vanc and cefepime, preferred the diagnosis first since patient has been afebrile,without leokocytosis and ESR/CRP showing low probability for OM/Discitis. Currently patient is only on ceftriaxone for UTI due to serratia. MRI of the spine not done yet as patient has not tolerated. On 11/15/2018, patient noted confused. Code S called. CT head w/o acute and tele-neurology ruled out the stroke. Patient was transferred to ICU due to altered mental status, likely metabolic. On 11/16/18, patient was hypoxic,hypercapnic. He was put on BIPAP, critical care was consulted. On 11/18, patient more awake, clincally stable MRI machine not working since 11/17- tests still pending. Per ID, ESR/CRP low for OM/discitis but does not exclude, of all abx for better diagnostics as pt not septic. Discussed with MRI dept on 11/19- stated anesthesia determined pt high risk for intubation and did not want to risk compensation given hx of COPD. MRI has been attempted 6-7 times but pt will not keep still and requesting to be put to sleep in order to complete test.  IR will not do culture/drain w/o prior MRI. Updated ID. Spoke with anesthesia on 11/20 to see if pt can be re-evaluated to be sedated for MRI- ID does not want to subject pt to long term IV abx w/o definite diagnosis, and IR will not obtain aspiration without MRI. Still awaiting MRI T spine- MRI stated they would be available on Friday for scheduling but unable to do today due to prior appts. Spoke w/ anesthesia again on 11/23 - stated they were available for emergencies but would forward to Dr. Beba Boss ([place]) and would return call (I have given my cell number for return call). For now, continue w/ current plan of care - nothing can be done until pt obtains MRI. Discussed with pt again trying to get MRI done w/o anesthesia, states he knows he can't tolerate it. Attempted MRI T spine and L spine again on 11/25 however pt unable to tolerate. 11/26 ID recommends doing CT guided aspiration.   Discussed with pulmonary- does not feel comfortable intubating pt for one day to obtain MRI. Recommend moving forward w/ ID's plan for CT guided aspiration. I discussed with IR- unable to obtain today- did however state in most cases, they will have obtained sterile cultures so this may not affect plan of care as we'd hope, however if cultures are positive then we will have a true diagnosis. Unable to do today due to urgent case, but orders have been placed for tomorrow 11/27- recommends NPO after midnight and holding am dose of Heparin (dose due at midnight- please hold). Attending discussed with radiology on 11/27- concern for pt tolerability as he will have to lay prone for CT guided aspiration- recommends bone scan for now. ID updated. Bone/gallium scan done 11/27 \"Degree of tracer uptake relative to the recent bone scan suggests this is less likely to be infectious discitis osteomyelitis. Clinical correlation and close follow-up needed. \" Per ID, repeat fungitell stat 11/29 and also fungal bctx. Subjective No acute events overnight. Pain controlled. Objective Visit Vitals /80 (BP 1 Location: Right arm, BP Patient Position: At rest) Pulse 89 Temp 97.5 °F (36.4 °C) Resp 18 Ht 6' (1.829 m) Wt 108.9 kg (240 lb) SpO2 95% BMI 32.55 kg/m² Physical Exam: 
General Appearance: NAD, conversant HENT: normocephalic/atraumatic, moist mucus membranes Neck: No JVD, supple Lungs: CTA with normal respiratory effort CV: RRR, no m/r/g Abdomen: soft, non-tender, normal bowel sounds Extremities: no cyanosis, no peripheral edema Neuro: No focal deficits, motor/sensory intact Skin: Normal color, intact Intake and Output: 
Current Shift:  No intake/output data recorded. Last three shifts:  11/28 1901 - 11/30 0700 In: 6022 [P.O.:3840] Out: 900 [Urine:900] Lab/Data Reviewed: 
BMP:  
No results found for: NA, K, CL, CO2, AGAP, GLU, BUN, CREA, GFRAA, GFRNA CBC: No results found for: WBC, HGB, HGBEXT, HCT, HCTEXT, PLT, PLTEXT, HGBEXT, HCTEXT, PLTEXT Imaging Reviewed: 
No results found. Medications Reviewed: 
Current Facility-Administered Medications Medication Dose Route Frequency  heparin (porcine) injection 5,000 Units  5,000 Units SubCUTAneous Q8H  
 docusate sodium (COLACE) capsule 100 mg  100 mg Oral DAILY  HYDROcodone-acetaminophen (NORCO) 5-325 mg per tablet 1 Tab  1 Tab Oral Q4H PRN  pantoprazole (PROTONIX) tablet 40 mg  40 mg Oral ACB  morphine injection 1 mg  1 mg IntraVENous Q4H PRN  
 budesonide (PULMICORT) 500 mcg/2 ml nebulizer suspension  500 mcg Nebulization BID RT  
 sodium chloride (NS) flush 5-10 mL  5-10 mL IntraVENous PRN  
 gabapentin (NEURONTIN) capsule 400 mg  400 mg Oral BID  lisinopril (PRINIVIL, ZESTRIL) tablet 20 mg  20 mg Oral DAILY  loratadine (CLARITIN) tablet 10 mg  10 mg Oral DAILY PRN  
 simvastatin (ZOCOR) tablet 20 mg  20 mg Oral QHS  naloxone (NARCAN) injection 0.4 mg  0.4 mg IntraVENous PRN  
 diphenhydrAMINE (BENADRYL) injection 12.5 mg  12.5 mg IntraVENous Q4H PRN  
 ondansetron (ZOFRAN) injection 4 mg  4 mg IntraVENous Q4H PRN  
 albuterol-ipratropium (DUO-NEB) 2.5 MG-0.5 MG/3 ML  3 mL Nebulization Q4H PRN  
 hydrALAZINE (APRESOLINE) 20 mg/mL injection 10 mg  10 mg IntraVENous Q6H PRN  
 bisacodyl (DULCOLAX) suppository 10 mg  10 mg Rectal DAILY PRN  
 lactulose (CHRONULAC) solution 10 g  10 g Oral BID

## 2018-11-30 NOTE — PROGRESS NOTES
Problem: Mobility Impaired (Adult and Pediatric) Goal: *Acute Goals and Plan of Care (Insert Text) Physical Therapy Goals Initiated 11/25/2018 and to be accomplished within 7 day(s) 1. Patient will move from supine to sit and sit to supine , scoot up and down and roll side to side in bed with minimal assistance/contact guard assist.    
2.  Patient will transfer from bed to chair and chair to bed with minimal assistance/contact guard assist using the least restrictive device. 3.  Patient will perform sit to stand with supervision/set-up. 4.  Patient will ambulate with minimal assistance/contact guard assist for >/= 100 feet with the least restrictive device. 5.  Patient will ascend/descend 5 stairs with bilateral handrail(s) with minimal assistance/contact guard assist.  
Outcome: Progressing Towards Goal 
 
PHYSICAL THERAPY: Daily TREATMENT Note INPATIENT: Medicaid: Hospital Day: 16 Patient: Nelsy Thomas (67 y.o. male)    Date: 11/30/2018 Primary Diagnosis: Discitis of thoracic region Procedure(s) (LRB): Anes MRI (N/A), 15 Days Post-Op, Precautions:   
 
 
Chart, physical therapy assessment, plan of care and goals were reviewed. PLOF:Independent ASSESSMENT: 
Pt co-treat with OT for pt safety and to maximize pt benefit; pt with decreased activity tolerance secondary to back pain in seated and standing. Pt required total a X2 for donning of brace, which appears to be too large for pt; in seated chest piece pushes into throat area, in standing even maximally cinched brace slides down to pt hips. In standing pt with swayback posture, knee flexion, with frequent verbal and tactile cues for posture correction, in seated pt tends to be forward flexed, although limited by anterior brace piece; however due to gapping pt does have significant latitude for posterior/ anterior movement in brace.   Gait training X 12 ft today with frequent verbal and tactile cues for walker management, posture correction, and knee extension. Progression toward goals: 
      Improving appropriately and progressing toward goals PLAN: 
Patient continues to benefit from skilled intervention to address the above impairments. Continue treatment per established plan of care. EDUCATION:  
Education:  Patient was educated on the following topics: brace Barriers to Learning/Limitations: None Compensate with: visual, verbal, tactile, kinesthetic cues/model Discharge Recommendations: To Be Determined Further Equipment Recommendations for Discharge:  rolling walker Factors which may impact discharge planning: none SUBJECTIVE:  
Patient stated I can try.  OBJECTIVE DATA SUMMARY:  
Critical Behavior: 
Neurologic State: Alert Orientation Level: Oriented X4 Cognition: Decreased attention/concentration, Impaired decision making G CODE:Mobility [id number] Current  CK= 40-59%   Goal  CI= 1-19%. The severity rating is based on the Other [address] Standing Balance Scale 
0: Pt performs 25% or less of standing activity (Max assist) CN, 100% impaired. 1: Pt supports self with upper extremities but requires therapist assistance. Pt performs 25-50% of effort (Mod assist) CM, 80% to <100% impaired. 1+: Pt supports self with upper extremities but requires therapist assistance. Pt performs >50% effort. (Min assist). CL, 60% to <80% impaired. 2: Pt supports self independently with both upper extremities (walker, crutches, parallel bars). CL, 60% to <80% impaired. 2+: Pt support self independently with 1 upper extremity (cane, crutch, 1 parallel bar). CK, 40% to <60% impaired. 3: Pt stands without upper extremity support for up to 30 seconds. CK, 40% to <60% impaired. 3+: Pt stands without upper extremity support for 30 seconds or greater. CJ, 20% to <40% impaired. 4: Pt independently moves and returns center of gravity 1-2 inches in one plane. CJ, 20% to <40% impaired. 4+: Pt independently moves and returns center of gravity 1-2 inches in multiple planes. CI, 1% to <20% impaired. 5: Pt independently moves and returns center of gravity in all planes greater than 2 inches. CH, 0% impaired. Functional Mobility: 
 
 
Functional Status Indep (I) Mod I Super-vision Min A Mod A Max A Total A Assist x2 Verbal cues Additional time Not tested Comments Rolling []  []  [] [x]    []    []  []  [] [x] [x] [] Supine to sit []  []  [x] []  []  []  []  [] [x] [x] [] Sit to supine []  []  [] [x]  []  []  []  [] [x] [x] [] Sit to stand []  []  [x] []  []  []  []  [] [x] [x] [] Stand to sit []  []  [x] []  []  []  []  [] [x] [x] [] Bed to chair transfers []  []  [] []  []  []  []  [] [] [] [x] Balance Good Brady Gallus Poor Unable Not tested Comments Sitting static [x]  []  []  []  [] Sitting dynamic [x]  []  []  []  []   
Standing static [x]  []  []  []  []   
Standing dynamic []  [x]  []  []  [] With support and brace Mobility/Gait:  
Level of Assistance: Contact guard assistance Assistive Device: brace/splint and rolling walker Distance Ambulated: 12 feet Base of Support: center of gravity altered Speed/Shannan: shuffled and slow Step Length: left shortened and right shortened Swing Pattern: left asymmetrical 
Stance: time Gait Abnormalities: shuffling gait and step to gait Stairs:  
Level of Assistance: Unable at this time Vital Signs Temp: 98.4 °F (36.9 °C) Pulse (Heart Rate): 89    
BP: 150/67 Resp Rate: 18    
O2 Sat (%): 93 %Pain:Pre treatment pain level:0 Post treatment pain level:0Pain Scale 1: Numeric (0 - 10) Pain Intensity 1: 0 Activity Tolerance:  
Fair After treatment:  
 
Patient left in no apparent distress in bed Call bell left within reach Dalia Molina PTA Time Calculation: 28 mins

## 2018-11-30 NOTE — PROGRESS NOTES
Infectious Disease Follow-up Admit Date: 11/14/2018 Will plan to check back on Monday 12/3/2018 if still here. Dr. Satish London is on call for ID this weekend and can be reached at [phone] if needed. Current abx Prior abx None 11/18 - 12 Cefepime/vanco  11/14   2, Ceftriaxone 11/16 - 3 Assessment -> Rec:  
 
Destructive T10-11 changes on CTAP 11/14 
- mid back pain x 3 months, fell, incontinent of urine - CTAP: severe destructive changes T 10-11, paravertebral sot tissue infiltration, extension to anterior epidural space w/ cord compression  
- 11/14: ESR 20, CRP 0.7 11/25 
- h/o IVDU  
- given Vanc/Cefepime 2 days then Ceftriaxone 2 days ending 11/18. Off abx since but MRI, aspiration not done (not cooperative w/ getting MRI done - claustrophobic) - case has been d/w Dr. Katie Gutierrez of IR. CT findings suggestive, not definitive for infection. MRI would be ideal for clarifying this but anesthesia apparently not available for conscious sedation for this. - Bone/ gallium scans 11/27, 28 not suggestive of discitis/OM  
- not interested in stabilization procedure per Dr. Efren Prasad - rpt CRP 11/26 - 0.7 -> await repeat fungitell but not highly suspicious for invasive fungal infection. ?false positive due to preceding albumin infusion as suggested by Dr. Buchanan Pel 
-> if still highly elevated may still need to aspirate T10-11 disc for cx including fungal cx 
-> d/w Dr. Ruben Ochoa  
UTI, Serratia - UA 11-20 WBC - received 5 days abx rx -> no further rx Bilateral pleural effusions Hepatomegaly, splenic hilar & perisplenic varices 
- seen on CTAP 11/14 ? cirrhosis Elevated fungitell  
- significance unclear, not specific here,  
- drawn shortly after albumin infusion 11/15 which can cause false positive BG result -> await repeat fungitell and fungal bctx ordered for completeness H/o IVDU 
- HIV ab NR 11/19 -> await Hep BsAg (pt says last ivdu 4 mo ago, has hep C and thinks hep b immune) COPD   
 HTN   
[facility] MICROBIOLOGY:  
11/14 urcx >100,000 Serratia marcescens 
 blcx NG x 2 
11/15  CrAG neg, fungitell 375 (reported 11/17) LINES AND CATHETERS:  
piv Active Hospital Problems Diagnosis Date Noted  Discitis of thoracic region 11/14/2018  UTI (urinary tract infection) 11/14/2018  Bilateral pleural effusion 11/14/2018  Lumbar stenosis 11/14/2018  Adenoma of left adrenal gland 11/14/2018  Uncontrolled hypertension 11/14/2018  Cirrhosis of liver ([facility] [address] 11/14/2018  Hepatitis C 11/14/2018  Obesity 11/14/2018  COPD (chronic obstructive pulmonary disease) ([facility] [address] 11/14/2018  Constipation 11/14/2018  Back pain 11/14/2018 Subjective: Interval notes reviewed. Back pain persists unchanged. Says he understands importance of back brace since he is refusing stabilization surgery. Current Facility-Administered Medications Medication Dose Route Frequency  heparin (porcine) injection 5,000 Units  5,000 Units SubCUTAneous Q8H  
 docusate sodium (COLACE) capsule 100 mg  100 mg Oral DAILY  HYDROcodone-acetaminophen (NORCO) 5-325 mg per tablet 1 Tab  1 Tab Oral Q4H PRN  pantoprazole (PROTONIX) tablet 40 mg  40 mg Oral ACB  morphine injection 1 mg  1 mg IntraVENous Q4H PRN  
 budesonide (PULMICORT) 500 mcg/2 ml nebulizer suspension  500 mcg Nebulization BID RT  
 sodium chloride (NS) flush 5-10 mL  5-10 mL IntraVENous PRN  
 gabapentin (NEURONTIN) capsule 400 mg  400 mg Oral BID  lisinopril (PRINIVIL, ZESTRIL) tablet 20 mg  20 mg Oral DAILY  loratadine (CLARITIN) tablet 10 mg  10 mg Oral DAILY PRN  
 simvastatin (ZOCOR) tablet 20 mg  20 mg Oral QHS  naloxone (NARCAN) injection 0.4 mg  0.4 mg IntraVENous PRN  
 diphenhydrAMINE (BENADRYL) injection 12.5 mg  12.5 mg IntraVENous Q4H PRN  
 ondansetron (ZOFRAN) injection 4 mg  4 mg IntraVENous Q4H PRN  
  albuterol-ipratropium (DUO-NEB) 2.5 MG-0.5 MG/3 ML  3 mL Nebulization Q4H PRN  
 hydrALAZINE (APRESOLINE) 20 mg/mL injection 10 mg  10 mg IntraVENous Q6H PRN  
 bisacodyl (DULCOLAX) suppository 10 mg  10 mg Rectal DAILY PRN  
 lactulose (CHRONULAC) solution 10 g  10 g Oral BID Objective:  
 
Visit Vitals /73 (BP 1 Location: Left arm, BP Patient Position: Head of bed elevated (Comment degrees)) Pulse 82 Temp 97.4 °F (36.3 °C) Resp 18 Ht 6' (1.829 m) Wt 108.9 kg (240 lb) SpO2 91% BMI 32.55 kg/m² Temp (24hrs), Av.9 °F (36.6 °C), Min:97.4 °F (36.3 °C), Max:98.3 °F (36.8 °C) GEN: pleasant 76 y/o AA male, awake NAD HEENT: no scleral icterus, no oral lesions CHEST: no crackles or wheezes CVS:regular, no murmurs, rubs or gallops ABD: soft, nontender EXT: no edema or cyanosis Neuro: alert, oriented, can move extremities including LE's Labs: Results:  
Chemistry Recent Labs 18 
0606 18 
0535 GLU 91 87  140  
K 4.5 5.2 CL 98* 99* CO2 37* 38* BUN 23* 19* CREA 1.00 0.78  
CA 8.9 9.0 AGAP 5 3 BUCR 23* 24* CBC w/Diff No results for input(s): WBC, RBC, HGB, HCT, PLT, GRANS, LYMPH, EOS, HGBEXT, HCTEXT, PLTEXT, HGBEXT, HCTEXT, PLTEXT in the last 72 hours.  nuc med IMPRESSION: 
Degree of tracer uptake relative to the recent bone scan suggests this is less 
likely to be infectious discitis osteomyelitis. Clinical correlation and close 
follow-up needed. Microbiology Results No results for input(s): SDES, CULT in the last 72 hours. Jada Vazquez MD 
2018 [facility] [phone]

## 2018-11-30 NOTE — PROGRESS NOTES
Problem: Self Care Deficits Care Plan (Adult) Goal: *Acute Goals and Plan of Care (Insert Text) Occupational Therapy Goals Initiated 11/26/2018 within 7 day(s). 1.  Patient will perform grooming tasks while standing with stand-by assistance for balance and < 3 rest breaks. 2.  Patient will perform lower body dressing with moderate assistance utilizing AE. 3.  Patient will perform functional task in standing for 8 minutes with supervision/set-up for balance to increase activity tolerance for ADLs. 4.  Patient will perform toilet transfers with stand-by assistance. 5.  Patient will perform all aspects of toileting with supervision/set-up. 6.  Patient will participate in upper extremity therapeutic exercise/activities with supervision/set-up for 8 minutes to increase BUE strength for functional transfers & ADLs. 7.  Patient will utilize energy conservation techniques during functional activities with minimal verbal cues. Outcome: Progressing Towards Goal 
Occupational Therapy TREATMENT Patient: Sadi Campos. (67 y.o. male) Date: 11/30/2018 Diagnosis: Discitis of thoracic region Discitis of thoracic region Procedure(s) (LRB): Anes MRI (N/A) 15 Days Post-Op Precautions:   
Chart, occupational therapy assessment, plan of care, and goals were reviewed. PLOF: Independent ASSESSMENT: 
Pt co-treated w/Pt to maximize safety w/EOB and OOB activity. Pt requires increase time to maneuver to EOB. Tolerates ~ 15 minutes sitting EOB performing ADL grooming tasks w/set-up. Pt requires Total Assist donning back brace that is ill fitting. Pt reports weight loss > 50# since brace was sized. Pt requires max verbal/tactile cues for cervical extension and BUE shoulder protraction w/functional mobility in room using RW. Pt requires 2 person assist returning to supine using \"log roll\" technique to minimize back pain. EDUCATION Pt educated on importance OOB/EOB activity to increase activity tolerance w/ADLs. Progression toward goals: 
[]          Improving appropriately and progressing toward goals [x]          Improving slowly and progressing toward goals 
[]          Not making progress toward goals and plan of care will be adjusted PLAN: 
Patient continues to benefit from skilled intervention to address the above impairments. Continue treatment per established plan of care. Discharge Recommendations:  Franki Bains Further Equipment Recommendations for Discharge:  shower chair, rolling walker and wheelchair for community re-entry SUBJECTIVE:  
Patient stated This thing is hitting me in my face.  OBJECTIVE DATA SUMMARY: 
  
Cognitive/Behavioral Status: 
Neurologic State: Alert Orientation Level: Oriented X4 Cognition: Follows commands Functional Mobility and Transfers for ADLs: 
 Bed Mobility: 
Rolling: Minimum assistance Supine to Sit: Minimum assistance;Assist x2 Sit to Supine: Moderate assistance;Assist x2 Transfers: 
Sit to Stand: Contact guard assistance;Assist x2(w.RW from elevated surface) Balance: 
Sitting: Impaired Sitting - Static: Fair (occasional) Sitting - Dynamic: Fair (occasional) Standing: With support; Impaired Standing - Static: Fair Standing - Dynamic : Fair ADL Intervention: 
Grooming Washing Face: Stand-by assistance Washing Hands: Stand-by assistance Brushing Teeth: Stand-by assistance Applying Makeup: Stand-by assistance(applying lotion to BUE) Upper Body Dressing Assistance Orthotics(Brace): Total assistance (dependent) Pain Pre Treatment:0 Post Treatment:0 Activity Tolerance:   
Fair, pt fatigues easily Please refer to the flowsheet for vital signs taken during this treatment. After treatment:  
[]  Patient left in no apparent distress sitting up in chair 
[x]  Patient left in no apparent distress in bed 
[x]  Call bell left within reach [x]  Nursing notified 
[]  Caregiver present 
[]  Bed alarm activated Rigo Gupta Time Calculation: 28 mins

## 2018-11-30 NOTE — PROGRESS NOTES
Problem: Pressure Injury - Risk of 
Goal: *Prevention of pressure injury Document Raul Scale and appropriate interventions in the flowsheet. Outcome: Progressing Towards Goal 
Pressure Injury Interventions: 
Sensory Interventions: Assess changes in LOC Moisture Interventions: Absorbent underpads Activity Interventions: Increase time out of bed Mobility Interventions: HOB 30 degrees or less Nutrition Interventions: Document food/fluid/supplement intake Friction and Shear Interventions: Foam dressings/transparent film/skin sealants Problem: Falls - Risk of 
Goal: *Absence of Falls Document Ai Will Fall Risk and appropriate interventions in the flowsheet. Outcome: Progressing Towards Goal 
Fall Risk Interventions: 
Mobility Interventions: Patient to call before getting OOB Mentation Interventions: Door open when patient unattended Medication Interventions: Patient to call before getting OOB Elimination Interventions: Call light in reach History of Falls Interventions: Door open when patient unattended Problem: Impaired Skin Integrity/Pressure Injury Treatment Goal: *Prevention of pressure injury Document Raul Scale and appropriate interventions in the flowsheet. Outcome: Progressing Towards Goal 
Pressure Injury Interventions: 
Sensory Interventions: Assess changes in LOC Moisture Interventions: Absorbent underpads Activity Interventions: Increase time out of bed Mobility Interventions: HOB 30 degrees or less Nutrition Interventions: Document food/fluid/supplement intake Friction and Shear Interventions: Foam dressings/transparent film/skin sealants

## 2018-11-30 NOTE — MANAGEMENT PLAN
Discharge/Transition Planning Plan remains home with Home Health Chelsie Menchaca RN BSN Outcomes Manager Pager # [phone]

## 2018-11-30 NOTE — INTERDISCIPLINARY ROUNDS
IDR Summary Patient: Sherren Jupiter. MRN: [id number]    Age: 77 y.o.  : 1950 Admit Diagnosis: Discitis of thoracic region Problems pertinent to hospital stay: discitis, UTI, pleural effusions Consults: P. T and O.T. Testing due for patient today? YES Nutrition plan:Yes Mobility needs: No 
 
 
Lines/Tubes:  
IV: YES   Needed: YES Cardoza: YES   Needed:YES Central Line: NO Needed: NO   
 
VTE Prophylaxis: Chemical 
 
Influenza Vaccine received? NO Care Management: 
Discharge plan: home PCP: None : YES Financial concerns:No  
Interventions: LOS: 16 days Expected days until discharge:   
 
 
Signed:  
 
AMANDA Huerta

## 2018-11-30 NOTE — PROGRESS NOTES
conducted a Follow up consultation and Spiritual Assessment for Malvin Arnetha Krabbe., who is a 77 y.o.,male. The  provided the following Interventions: 
Continued the relationship of care and support. Listened empathically. Offered prayer and assurance of continued prayer on patients behalf. Chart reviewed. The following outcomes were achieved: 
Patient expressed gratitude for pastoral care visit. Assessment: 
There are no further spiritual or Mu-ism issues which require Spiritual Care Services interventions at this time. Plan: 
Chaplains will continue to follow and will provide pastoral care on an as needed/requested basis.  recommends bedside caregivers page  on duty if patient shows signs of acute spiritual or emotional distress. [facility] Staff  Spiritual Care  
[phone]

## 2018-12-01 LAB — 1,3 BETA GLUCAN SER-MCNC: 113 PG/ML

## 2018-12-01 PROCEDURE — 65660000000 HC RM CCU STEPDOWN

## 2018-12-01 PROCEDURE — 74011250637 HC RX REV CODE- 250/637: Performed by: NURSE PRACTITIONER

## 2018-12-01 PROCEDURE — 94760 N-INVAS EAR/PLS OXIMETRY 1: CPT

## 2018-12-01 PROCEDURE — 74011250636 HC RX REV CODE- 250/636: Performed by: NURSE PRACTITIONER

## 2018-12-01 PROCEDURE — 74011250637 HC RX REV CODE- 250/637: Performed by: INTERNAL MEDICINE

## 2018-12-01 PROCEDURE — 94640 AIRWAY INHALATION TREATMENT: CPT

## 2018-12-01 PROCEDURE — 74011000250 HC RX REV CODE- 250: Performed by: INTERNAL MEDICINE

## 2018-12-01 PROCEDURE — 77010033678 HC OXYGEN DAILY

## 2018-12-01 RX ADMIN — SIMVASTATIN 20 MG: 20 TABLET, FILM COATED ORAL at 22:41

## 2018-12-01 RX ADMIN — PANTOPRAZOLE SODIUM 40 MG: 40 TABLET, DELAYED RELEASE ORAL at 09:41

## 2018-12-01 RX ADMIN — HYDROCODONE BITARTRATE AND ACETAMINOPHEN 1 TABLET: 5; 325 TABLET ORAL at 12:07

## 2018-12-01 RX ADMIN — DOCUSATE SODIUM 100 MG: 100 CAPSULE, LIQUID FILLED ORAL at 09:41

## 2018-12-01 RX ADMIN — BUDESONIDE 500 MCG: 0.5 INHALANT RESPIRATORY (INHALATION) at 21:04

## 2018-12-01 RX ADMIN — GABAPENTIN 400 MG: 400 CAPSULE ORAL at 18:02

## 2018-12-01 RX ADMIN — HEPARIN SODIUM 5000 UNITS: 5000 INJECTION INTRAVENOUS; SUBCUTANEOUS at 05:05

## 2018-12-01 RX ADMIN — HEPARIN SODIUM 5000 UNITS: 5000 INJECTION INTRAVENOUS; SUBCUTANEOUS at 22:41

## 2018-12-01 RX ADMIN — HEPARIN SODIUM 5000 UNITS: 5000 INJECTION INTRAVENOUS; SUBCUTANEOUS at 16:48

## 2018-12-01 RX ADMIN — LACTULOSE 10 G: 20 SOLUTION ORAL at 18:02

## 2018-12-01 RX ADMIN — GABAPENTIN 400 MG: 400 CAPSULE ORAL at 09:41

## 2018-12-01 RX ADMIN — LACTULOSE 10 G: 20 SOLUTION ORAL at 09:40

## 2018-12-01 RX ADMIN — LISINOPRIL 20 MG: 20 TABLET ORAL at 09:41

## 2018-12-01 RX ADMIN — BUDESONIDE 500 MCG: 0.5 INHALANT RESPIRATORY (INHALATION) at 09:42

## 2018-12-01 RX ADMIN — HYDROCODONE BITARTRATE AND ACETAMINOPHEN 1 TABLET: 5; 325 TABLET ORAL at 22:41

## 2018-12-01 NOTE — PROGRESS NOTES
Problem: Pressure Injury - Risk of 
Goal: *Prevention of pressure injury Document Raul Scale and appropriate interventions in the flowsheet. Outcome: Progressing Towards Goal 
Pressure Injury Interventions: 
Sensory Interventions: Assess changes in LOC Moisture Interventions: Absorbent underpads Activity Interventions: Increase time out of bed Mobility Interventions: HOB 30 degrees or less Nutrition Interventions: Document food/fluid/supplement intake Friction and Shear Interventions: HOB 30 degrees or less Problem: Falls - Risk of 
Goal: *Absence of Falls Document Jagjit Baeza Fall Risk and appropriate interventions in the flowsheet. Outcome: Progressing Towards Goal 
Fall Risk Interventions: 
Mobility Interventions: Patient to call before getting OOB Mentation Interventions: Door open when patient unattended Medication Interventions: Patient to call before getting OOB Elimination Interventions: Call light in reach History of Falls Interventions: Door open when patient unattended Problem: Impaired Skin Integrity/Pressure Injury Treatment Goal: *Prevention of pressure injury Document Raul Scale and appropriate interventions in the flowsheet. Outcome: Progressing Towards Goal 
Pressure Injury Interventions: 
Sensory Interventions: Assess changes in LOC Moisture Interventions: Absorbent underpads Activity Interventions: Increase time out of bed Mobility Interventions: HOB 30 degrees or less Nutrition Interventions: Document food/fluid/supplement intake Friction and Shear Interventions: HOB 30 degrees or less

## 2018-12-01 NOTE — PROGRESS NOTES
PHYSICAL THERAPY NOTE 
 
11:32AM  Chart reviewed. Attempted to see patient. Patient bathing at this time. 12:35PM  Attempted to see patient for the second time. Nurse MAULIK trying to get IV access. 13:31PM  Attempted to check on patient for the 3rd time. Patient declined PT. Has visitors in room and requested for PT to just check back tomorrow. Nurse Olvin Roland made aware. Will re-attempt PT as schedule permits. Marco Gardiner.  Ronda Marino

## 2018-12-01 NOTE — PROGRESS NOTES
Internal Medicine Progress Note Patient's Name: Sulema Croft. Admit Date: 11/14/2018 Length of Stay: 17 
 
 
Assessment/Plan Active Hospital Problems Diagnosis Date Noted  Discitis of thoracic region 11/14/2018  UTI (urinary tract infection) 11/14/2018  Bilateral pleural effusion 11/14/2018  Lumbar stenosis 11/14/2018  Adenoma of left adrenal gland 11/14/2018  Uncontrolled hypertension 11/14/2018  Cirrhosis of liver ([facility] [address] 11/14/2018  Hepatitis C 11/14/2018  Obesity 11/14/2018  COPD (chronic obstructive pulmonary disease) ([facility] [address] 11/14/2018  Constipation 11/14/2018  Back pain 11/14/2018  
 
- Cont pain control PRN 
- Cont to observe off antibx - Fungitell culture pending, suspect false positive at this time - If negative, can dc w/o antibx/antifungal 
- PT/OT 
- Dispo remains home w/ home health  
- Cont acceptable home medications for chronic conditions  
- DVT protocol I have personally reviewed all pertinent labs and films that have officially resulted over the last 24 hours. I have personally checked for all pending labs that are awaiting final results. Subjective Pt s/e @ bedside No major events overnight Remains afebrile No complaints today and doing well Denies CP or SOB Objective Visit Vitals /81 Pulse 85 Temp 97.8 °F (36.6 °C) Resp 18 Ht 6' (1.829 m) Wt 109.8 kg (242 lb) SpO2 95% BMI 32.82 kg/m² Physical Exam: 
General Appearance: NAD, conversant, chronically debilitated Lungs: CTA with normal respiratory effort CV: RRR, no m/r/g Abdomen: soft, non-tender, normal bowel sounds Extremities: no cyanosis, + peripheral edema Neuro: No focal deficits, motor/sensory intact Lab/Data Reviewed: 
BMP: No results found for: NA, K, CL, CO2, AGAP, GLU, BUN, CREA, GFRAA, GFRNA 
CBC: No results found for: WBC, HGB, HGBEXT, HCT, HCTEXT, PLT, PLTEXT, HGBEXT, HCTEXT, PLTEXT Imaging Reviewed: 
No results found. Medications Reviewed: 
Current Facility-Administered Medications Medication Dose Route Frequency  heparin (porcine) injection 5,000 Units  5,000 Units SubCUTAneous Q8H  
 docusate sodium (COLACE) capsule 100 mg  100 mg Oral DAILY  HYDROcodone-acetaminophen (NORCO) 5-325 mg per tablet 1 Tab  1 Tab Oral Q4H PRN  pantoprazole (PROTONIX) tablet 40 mg  40 mg Oral ACB  morphine injection 1 mg  1 mg IntraVENous Q4H PRN  
 budesonide (PULMICORT) 500 mcg/2 ml nebulizer suspension  500 mcg Nebulization BID RT  
 sodium chloride (NS) flush 5-10 mL  5-10 mL IntraVENous PRN  
 gabapentin (NEURONTIN) capsule 400 mg  400 mg Oral BID  lisinopril (PRINIVIL, ZESTRIL) tablet 20 mg  20 mg Oral DAILY  loratadine (CLARITIN) tablet 10 mg  10 mg Oral DAILY PRN  
 simvastatin (ZOCOR) tablet 20 mg  20 mg Oral QHS  naloxone (NARCAN) injection 0.4 mg  0.4 mg IntraVENous PRN  
 diphenhydrAMINE (BENADRYL) injection 12.5 mg  12.5 mg IntraVENous Q4H PRN  
 ondansetron (ZOFRAN) injection 4 mg  4 mg IntraVENous Q4H PRN  
 albuterol-ipratropium (DUO-NEB) 2.5 MG-0.5 MG/3 ML  3 mL Nebulization Q4H PRN  
 hydrALAZINE (APRESOLINE) 20 mg/mL injection 10 mg  10 mg IntraVENous Q6H PRN  
 bisacodyl (DULCOLAX) suppository 10 mg  10 mg Rectal DAILY PRN  
 lactulose (CHRONULAC) solution 10 g  10 g Oral BID Jovana King DO Internal Medicine, Hospitalist 
Pager: [phone]

## 2018-12-01 NOTE — ROUTINE PROCESS
Bedside, Verbal and Written shift change report given to Dominique Ewing RN (oncoming nurse) Manus CAMERON Wheeler (offgoing nurse). Report included the following information SBAR, Kardex, Intake/Output, MAR, Recent Results, Med Rec Status, Procedure Summary and Cardiac Rhythm NSR.    
4568 - Shift assessment completed. Pt alert and oriented x4. No respiratory distress noted, pt on 2 liters O2 via n/c. No c/o pain reported. Call bell within reach, bed in low position. Will continue to monitor. 
    
1225 - Shift re-assessment completed, no change in pt condition.   
1350 - Pt c/o pain to back, PRN Norco administered. 
   
1625 - Shift re-assessment completed, no change in pt condition. 
   
Bedside, Verbal and Written shift change report given Luevenia Moritz, RN (oncoming nurse) by Dominique Ewing RN (offgoing nurse). Report included the following information SBAR, Kardex, Intake/Output, MAR, Recent Results, Med Rec Status, Procedure Summary and Cardiac Rhythm NSR.

## 2018-12-01 NOTE — PROGRESS NOTES
Bedside and Verbal shift change report given to MAULIK (oncoming nurse) by Brock Chirinos (offgoing nurse). Report included the following information SBAR, Kardex and MAR. Pt is sleeping. He is on 2L/min NC with no S/S of SOB. He is on telemetry and is SR. He uses the urinal at the bedside but occasionally misses. Pt has a small stage 2 pressure ulcer on buttocks POA. Pt has no needs at this time.

## 2018-12-01 NOTE — PROGRESS NOTES
Problem: Pressure Injury - Risk of 
Goal: *Prevention of pressure injury Document Raul Scale and appropriate interventions in the flowsheet. Outcome: Progressing Towards Goal 
Pressure Injury Interventions: 
Sensory Interventions: Assess changes in LOC Moisture Interventions: Absorbent underpads Activity Interventions: Increase time out of bed Mobility Interventions: HOB 30 degrees or less Nutrition Interventions: Document food/fluid/supplement intake Friction and Shear Interventions: HOB 30 degrees or less Problem: Falls - Risk of 
Goal: *Absence of Falls Document Paula Salazar Fall Risk and appropriate interventions in the flowsheet. Outcome: Progressing Towards Goal 
Fall Risk Interventions: 
Mobility Interventions: Patient to call before getting OOB Mentation Interventions: Door open when patient unattended Medication Interventions: Patient to call before getting OOB Elimination Interventions: Call light in reach History of Falls Interventions: Door open when patient unattended Problem: Impaired Skin Integrity/Pressure Injury Treatment Goal: *Prevention of pressure injury Document Raul Scale and appropriate interventions in the flowsheet. Outcome: Progressing Towards Goal 
Pressure Injury Interventions: 
Sensory Interventions: Assess changes in LOC Moisture Interventions: Absorbent underpads Activity Interventions: Increase time out of bed Mobility Interventions: HOB 30 degrees or less Nutrition Interventions: Document food/fluid/supplement intake Friction and Shear Interventions: HOB 30 degrees or less

## 2018-12-01 NOTE — PROGRESS NOTES
2000-no signs of distress. Ordered meds given throughout shift. Unremarkable night. Bedside shift change report given to CAMERON WILLINGHAM (oncoming nurse) by Gaurang Stevens (offgoing nurse). Report included the following information SBAR.

## 2018-12-01 NOTE — PROGRESS NOTES
Problem: Pressure Injury - Risk of 
Goal: *Prevention of pressure injury Document Raul Scale and appropriate interventions in the flowsheet. Outcome: Progressing Towards Goal 
Pressure Injury Interventions: 
Sensory Interventions: Assess changes in LOC Moisture Interventions: Absorbent underpads Activity Interventions: Increase time out of bed Mobility Interventions: HOB 30 degrees or less Nutrition Interventions: Document food/fluid/supplement intake Friction and Shear Interventions: HOB 30 degrees or less Problem: Falls - Risk of 
Goal: *Absence of Falls Document Sarthak Granados Fall Risk and appropriate interventions in the flowsheet. Outcome: Progressing Towards Goal 
Fall Risk Interventions: 
Mobility Interventions: Patient to call before getting OOB Mentation Interventions: Door open when patient unattended Medication Interventions: Patient to call before getting OOB Elimination Interventions: Call light in reach History of Falls Interventions: Door open when patient unattended Problem: Impaired Skin Integrity/Pressure Injury Treatment Goal: *Prevention of pressure injury Document Raul Scale and appropriate interventions in the flowsheet. Outcome: Progressing Towards Goal 
Pressure Injury Interventions: 
Sensory Interventions: Assess changes in LOC Moisture Interventions: Absorbent underpads Activity Interventions: Increase time out of bed Mobility Interventions: HOB 30 degrees or less Nutrition Interventions: Document food/fluid/supplement intake Friction and Shear Interventions: HOB 30 degrees or less

## 2018-12-02 LAB
ANION GAP SERPL CALC-SCNC: 6 MMOL/L (ref 3–18)
BUN SERPL-MCNC: 17 MG/DL (ref 7–18)
BUN/CREAT SERPL: 24 (ref 12–20)
CALCIUM SERPL-MCNC: 8.7 MG/DL (ref 8.5–10.1)
CHLORIDE SERPL-SCNC: 103 MMOL/L (ref 100–108)
CO2 SERPL-SCNC: 30 MMOL/L (ref 21–32)
CREAT SERPL-MCNC: 0.7 MG/DL (ref 0.6–1.3)
GLUCOSE BLD STRIP.AUTO-MCNC: 100 MG/DL (ref 70–110)
GLUCOSE SERPL-MCNC: 80 MG/DL (ref 74–99)
PLATELET # BLD AUTO: 126 K/UL (ref 135–420)
POTASSIUM SERPL-SCNC: 5.2 MMOL/L (ref 3.5–5.5)
SODIUM SERPL-SCNC: 139 MMOL/L (ref 136–145)

## 2018-12-02 PROCEDURE — 80048 BASIC METABOLIC PNL TOTAL CA: CPT

## 2018-12-02 PROCEDURE — 74011250637 HC RX REV CODE- 250/637: Performed by: NURSE PRACTITIONER

## 2018-12-02 PROCEDURE — 94640 AIRWAY INHALATION TREATMENT: CPT

## 2018-12-02 PROCEDURE — 74011250637 HC RX REV CODE- 250/637: Performed by: INTERNAL MEDICINE

## 2018-12-02 PROCEDURE — 65660000000 HC RM CCU STEPDOWN

## 2018-12-02 PROCEDURE — 74011250636 HC RX REV CODE- 250/636: Performed by: NURSE PRACTITIONER

## 2018-12-02 PROCEDURE — 36415 COLL VENOUS BLD VENIPUNCTURE: CPT

## 2018-12-02 PROCEDURE — 82962 GLUCOSE BLOOD TEST: CPT

## 2018-12-02 PROCEDURE — 74011000250 HC RX REV CODE- 250: Performed by: INTERNAL MEDICINE

## 2018-12-02 PROCEDURE — 85049 AUTOMATED PLATELET COUNT: CPT

## 2018-12-02 RX ADMIN — GABAPENTIN 400 MG: 400 CAPSULE ORAL at 09:27

## 2018-12-02 RX ADMIN — DOCUSATE SODIUM 100 MG: 100 CAPSULE, LIQUID FILLED ORAL at 09:27

## 2018-12-02 RX ADMIN — BUDESONIDE 500 MCG: 0.5 INHALANT RESPIRATORY (INHALATION) at 10:18

## 2018-12-02 RX ADMIN — LACTULOSE 10 G: 20 SOLUTION ORAL at 17:54

## 2018-12-02 RX ADMIN — PANTOPRAZOLE SODIUM 40 MG: 40 TABLET, DELAYED RELEASE ORAL at 09:27

## 2018-12-02 RX ADMIN — HEPARIN SODIUM 5000 UNITS: 5000 INJECTION INTRAVENOUS; SUBCUTANEOUS at 22:17

## 2018-12-02 RX ADMIN — BUDESONIDE 500 MCG: 0.5 INHALANT RESPIRATORY (INHALATION) at 20:35

## 2018-12-02 RX ADMIN — SIMVASTATIN 20 MG: 20 TABLET, FILM COATED ORAL at 22:17

## 2018-12-02 RX ADMIN — HEPARIN SODIUM 5000 UNITS: 5000 INJECTION INTRAVENOUS; SUBCUTANEOUS at 05:13

## 2018-12-02 RX ADMIN — LISINOPRIL 20 MG: 20 TABLET ORAL at 09:27

## 2018-12-02 RX ADMIN — GABAPENTIN 400 MG: 400 CAPSULE ORAL at 17:54

## 2018-12-02 RX ADMIN — HYDROCODONE BITARTRATE AND ACETAMINOPHEN 1 TABLET: 5; 325 TABLET ORAL at 05:59

## 2018-12-02 RX ADMIN — HEPARIN SODIUM 5000 UNITS: 5000 INJECTION INTRAVENOUS; SUBCUTANEOUS at 16:02

## 2018-12-02 RX ADMIN — LACTULOSE 10 G: 20 SOLUTION ORAL at 09:27

## 2018-12-02 NOTE — PROGRESS NOTES
Bedside and Verbal shift change report given to MAULIK (oncoming nurse) by Yvan Spencer (offgoing nurse). Report included the following information SBAR, Kardex and MAR. Pt is AOx4 on 2L/min NC prn. He has no complaints of SOB. Pt does complain of pain in his lower back relieved well from pain medication regiment currently in place. Pt has no needs at this time.

## 2018-12-02 NOTE — PROGRESS NOTES
Problem: Pressure Injury - Risk of 
Goal: *Prevention of pressure injury Document Raul Scale and appropriate interventions in the flowsheet. Outcome: Progressing Towards Goal 
Pressure Injury Interventions: 
Sensory Interventions: Assess changes in LOC Moisture Interventions: Absorbent underpads Activity Interventions: Increase time out of bed Mobility Interventions: HOB 30 degrees or less Nutrition Interventions: Document food/fluid/supplement intake Friction and Shear Interventions: HOB 30 degrees or less Problem: Falls - Risk of 
Goal: *Absence of Falls Document Goessel Leonor Fall Risk and appropriate interventions in the flowsheet. Outcome: Progressing Towards Goal 
Fall Risk Interventions: 
Mobility Interventions: Patient to call before getting OOB Mentation Interventions: Door open when patient unattended Medication Interventions: Patient to call before getting OOB Elimination Interventions: Call light in reach History of Falls Interventions: Door open when patient unattended Problem: Impaired Skin Integrity/Pressure Injury Treatment Goal: *Prevention of pressure injury Document Raul Scale and appropriate interventions in the flowsheet. Pressure Injury Interventions: 
Sensory Interventions: Assess changes in LOC Moisture Interventions: Absorbent underpads Activity Interventions: Increase time out of bed Mobility Interventions: HOB 30 degrees or less Nutrition Interventions: Document food/fluid/supplement intake Friction and Shear Interventions: HOB 30 degrees or less

## 2018-12-02 NOTE — PROGRESS NOTES
Internal Medicine Progress Note Patient's Name: Kiya Gallardo. Admit Date: 11/14/2018 Length of Stay: 18 
 
 
Assessment/Plan Active Hospital Problems Diagnosis Date Noted  Discitis of thoracic region 11/14/2018  UTI (urinary tract infection) 11/14/2018  Bilateral pleural effusion 11/14/2018  Lumbar stenosis 11/14/2018  Adenoma of left adrenal gland 11/14/2018  Uncontrolled hypertension 11/14/2018  Cirrhosis of liver ([facility] [address] 11/14/2018  Hepatitis C 11/14/2018  Obesity 11/14/2018  COPD (chronic obstructive pulmonary disease) ([facility] 11/14/2018  Constipation 11/14/2018  Back pain 11/14/2018  
 
- Cont pain control PRN 
- Cont to observe off antibx - Fungitell culture pending, suspect false positive at this time - Prelim results state NGTD x 3 
- If final result negative and OK w/ ID meg can dc w/o antibx/antifungal 
- PT/OT 
- Dispo remains home w/ home health, hopefully meg w/ ID approval 
- Cont acceptable home medications for chronic conditions  
- DVT protocol I have personally reviewed all pertinent labs and films that have officially resulted over the last 24 hours. I have personally checked for all pending labs that are awaiting final results. Subjective Pt s/e @ bedside No major events overnight Admits to low back tolerable w/ meds Denies CP or SOB Objective Visit Vitals /73 (BP 1 Location: Right arm, BP Patient Position: Head of bed elevated (Comment degrees)) Pulse 81 Temp 98.2 °F (36.8 °C) Resp 20 Ht 6' (1.829 m) Wt 108.4 kg (239 lb) SpO2 90% BMI 32.41 kg/m² Physical Exam: 
General Appearance: NAD, conversant, chronically debilitated Lungs: CTA with normal respiratory effort CV: RRR, no m/r/g Abdomen: soft, non-tender, normal bowel sounds Extremities: no cyanosis, + peripheral edema Neuro: No focal deficits, motor/sensory intact Lab/Data Reviewed: 
BMP:  
Lab Results Component Value Date/Time  12/02/2018 05:50 AM  
 K 5.2 12/02/2018 05:50 AM  
  12/02/2018 05:50 AM  
 CO2 30 12/02/2018 05:50 AM  
 AGAP 6 12/02/2018 05:50 AM  
 GLU 80 12/02/2018 05:50 AM  
 BUN 17 12/02/2018 05:50 AM  
 CREA 0.70 12/02/2018 05:50 AM  
 GFRAA >60 12/02/2018 05:50 AM  
 GFRNA >60 12/02/2018 05:50 AM  
 
CBC: No results found for: WBC, HGB, HGBEXT, HCT, HCTEXT, PLT, PLTEXT, HGBEXT, HCTEXT, PLTEXT Imaging Reviewed: 
No results found. Medications Reviewed: 
Current Facility-Administered Medications Medication Dose Route Frequency  heparin (porcine) injection 5,000 Units  5,000 Units SubCUTAneous Q8H  
 docusate sodium (COLACE) capsule 100 mg  100 mg Oral DAILY  HYDROcodone-acetaminophen (NORCO) 5-325 mg per tablet 1 Tab  1 Tab Oral Q4H PRN  pantoprazole (PROTONIX) tablet 40 mg  40 mg Oral ACB  morphine injection 1 mg  1 mg IntraVENous Q4H PRN  
 budesonide (PULMICORT) 500 mcg/2 ml nebulizer suspension  500 mcg Nebulization BID RT  
 sodium chloride (NS) flush 5-10 mL  5-10 mL IntraVENous PRN  
 gabapentin (NEURONTIN) capsule 400 mg  400 mg Oral BID  lisinopril (PRINIVIL, ZESTRIL) tablet 20 mg  20 mg Oral DAILY  loratadine (CLARITIN) tablet 10 mg  10 mg Oral DAILY PRN  
 simvastatin (ZOCOR) tablet 20 mg  20 mg Oral QHS  naloxone (NARCAN) injection 0.4 mg  0.4 mg IntraVENous PRN  
 diphenhydrAMINE (BENADRYL) injection 12.5 mg  12.5 mg IntraVENous Q4H PRN  
 ondansetron (ZOFRAN) injection 4 mg  4 mg IntraVENous Q4H PRN  
 albuterol-ipratropium (DUO-NEB) 2.5 MG-0.5 MG/3 ML  3 mL Nebulization Q4H PRN  
 hydrALAZINE (APRESOLINE) 20 mg/mL injection 10 mg  10 mg IntraVENous Q6H PRN  
 bisacodyl (DULCOLAX) suppository 10 mg  10 mg Rectal DAILY PRN  
 lactulose (CHRONULAC) solution 10 g  10 g Oral BID Steve Sevilla,  Internal Medicine, Hospitalist 
Pager: [phone] [facility]

## 2018-12-02 NOTE — PROGRESS NOTES
Problem: Pressure Injury - Risk of 
Goal: *Prevention of pressure injury Document Raul Scale and appropriate interventions in the flowsheet. Outcome: Progressing Towards Goal 
Pressure Injury Interventions: 
Sensory Interventions: Assess changes in LOC Moisture Interventions: Absorbent underpads Activity Interventions: Increase time out of bed Mobility Interventions: HOB 30 degrees or less Nutrition Interventions: Document food/fluid/supplement intake Friction and Shear Interventions: HOB 30 degrees or less Problem: Falls - Risk of 
Goal: *Absence of Falls Document [facility] Fall Risk and appropriate interventions in the flowsheet. Outcome: Progressing Towards Goal 
Fall Risk Interventions: 
Mobility Interventions: Patient to call before getting OOB Mentation Interventions: Door open when patient unattended Medication Interventions: Patient to call before getting OOB Elimination Interventions: Call light in reach History of Falls Interventions: Door open when patient unattended Problem: Impaired Skin Integrity/Pressure Injury Treatment Goal: *Prevention of pressure injury Document Raul Scale and appropriate interventions in the flowsheet. Outcome: Progressing Towards Goal 
Pressure Injury Interventions: 
Sensory Interventions: Assess changes in LOC Moisture Interventions: Absorbent underpads Activity Interventions: Increase time out of bed Mobility Interventions: HOB 30 degrees or less Nutrition Interventions: Document food/fluid/supplement intake Friction and Shear Interventions: HOB 30 degrees or less

## 2018-12-03 PROCEDURE — 74011250636 HC RX REV CODE- 250/636: Performed by: INTERNAL MEDICINE

## 2018-12-03 PROCEDURE — 77010033678 HC OXYGEN DAILY

## 2018-12-03 PROCEDURE — 94760 N-INVAS EAR/PLS OXIMETRY 1: CPT

## 2018-12-03 PROCEDURE — 97116 GAIT TRAINING THERAPY: CPT

## 2018-12-03 PROCEDURE — 74011250637 HC RX REV CODE- 250/637: Performed by: INTERNAL MEDICINE

## 2018-12-03 PROCEDURE — 65660000000 HC RM CCU STEPDOWN

## 2018-12-03 PROCEDURE — 97164 PT RE-EVAL EST PLAN CARE: CPT

## 2018-12-03 PROCEDURE — 94640 AIRWAY INHALATION TREATMENT: CPT

## 2018-12-03 PROCEDURE — 74011250637 HC RX REV CODE- 250/637: Performed by: NURSE PRACTITIONER

## 2018-12-03 PROCEDURE — 74011000250 HC RX REV CODE- 250: Performed by: INTERNAL MEDICINE

## 2018-12-03 PROCEDURE — 74011250636 HC RX REV CODE- 250/636: Performed by: NURSE PRACTITIONER

## 2018-12-03 RX ADMIN — HEPARIN SODIUM 5000 UNITS: 5000 INJECTION INTRAVENOUS; SUBCUTANEOUS at 21:16

## 2018-12-03 RX ADMIN — LACTULOSE 10 G: 20 SOLUTION ORAL at 17:36

## 2018-12-03 RX ADMIN — SIMVASTATIN 20 MG: 20 TABLET, FILM COATED ORAL at 21:16

## 2018-12-03 RX ADMIN — PANTOPRAZOLE SODIUM 40 MG: 40 TABLET, DELAYED RELEASE ORAL at 07:04

## 2018-12-03 RX ADMIN — MORPHINE SULFATE 1 MG: 2 INJECTION, SOLUTION INTRAMUSCULAR; INTRAVENOUS at 08:14

## 2018-12-03 RX ADMIN — GABAPENTIN 400 MG: 400 CAPSULE ORAL at 17:35

## 2018-12-03 RX ADMIN — Medication 10 ML: at 21:21

## 2018-12-03 RX ADMIN — BUDESONIDE 500 MCG: 0.5 INHALANT RESPIRATORY (INHALATION) at 21:32

## 2018-12-03 RX ADMIN — LACTULOSE 10 G: 20 SOLUTION ORAL at 08:15

## 2018-12-03 RX ADMIN — DOCUSATE SODIUM 100 MG: 100 CAPSULE, LIQUID FILLED ORAL at 08:15

## 2018-12-03 RX ADMIN — LISINOPRIL 20 MG: 20 TABLET ORAL at 08:14

## 2018-12-03 RX ADMIN — HEPARIN SODIUM 5000 UNITS: 5000 INJECTION INTRAVENOUS; SUBCUTANEOUS at 17:40

## 2018-12-03 RX ADMIN — GABAPENTIN 400 MG: 400 CAPSULE ORAL at 08:14

## 2018-12-03 RX ADMIN — BUDESONIDE 500 MCG: 0.5 INHALANT RESPIRATORY (INHALATION) at 12:26

## 2018-12-03 RX ADMIN — MORPHINE SULFATE 1 MG: 2 INJECTION, SOLUTION INTRAMUSCULAR; INTRAVENOUS at 17:35

## 2018-12-03 RX ADMIN — HEPARIN SODIUM 5000 UNITS: 5000 INJECTION INTRAVENOUS; SUBCUTANEOUS at 07:04

## 2018-12-03 NOTE — PROGRESS NOTES
Infectious Disease Follow-up Admit Date: 11/14/2018 Current abx Prior abx None 11/18 - 15 Cefepime/vanco  11/14   2, Ceftriaxone 11/16 - 3 Assessment -> Rec:  
 
Destructive T10-11 changes on CTAP 11/14 
- mid back pain x 3 months, fell, incontinent of urine - CTAP: severe destructive changes T 10-11, paravertebral sot tissue infiltration, extension to anterior epidural space w/ cord compression  
- 11/14: ESR 20, CRP 0.7 11/25 
- h/o IVDU  
- given Vanc/Cefepime 2 days then Ceftriaxone 2 days ending 11/18. Off abx since but MRI, aspiration not done (not cooperative w/ getting MRI done - claustrophobic) - case has been d/w Dr. Kiarra Waters of IR. CT findings suggestive, not definitive for infection. MRI would be ideal for clarifying this but anesthesia apparently not available for conscious sedation for this. - Bone/ gallium scans 11/27, 28 not suggestive of discitis/OM  
- not interested in stabilization procedure per Dr. Xochilt Velásquez - rpt CRP 11/26 - 0.7 -> repeat fungitell down at 137 from 300s without any treatment hence not suggestive of invasive fungal infection 
-> okay to discharge patient from ID stand point.  
-> d/w NP/ Pharmacy team rounding today. UTI, Serratia - UA 11-20 WBC - received 5 days abx rx -> no further rx Bilateral pleural effusions Hepatomegaly, splenic hilar & perisplenic varices 
- seen on CTAP 11/14 ? cirrhosis Elevated fungitell  
- significance unclear, not specific here,  
- drawn shortly after albumin infusion 11/15 which can cause false positive BG result -> repeat fungitell down, fungal blood culture also neg H/o IVDU 
- HIV ab NR 11/19 -> Hep BsAg neg (pt says last ivdu 4 mo ago, has hep C and thinks hep b immune) COPD   
HTN   
[facility] MICROBIOLOGY:  
11/14 urcx >100,000 Serratia marcescens 
 blcx NG x 2 
11/15  CrAG neg, fungitell 375 (reported 11/17) 
11/29   blcx for fungus - neg  
            fungitell 113 LINES AND CATHETERS:  
piv Active Hospital Problems Diagnosis Date Noted  Discitis of thoracic region 11/14/2018  UTI (urinary tract infection) 11/14/2018  Bilateral pleural effusion 11/14/2018  Lumbar stenosis 11/14/2018  Adenoma of left adrenal gland 11/14/2018  Uncontrolled hypertension 11/14/2018  Cirrhosis of liver ([facility] [address] 11/14/2018  Hepatitis C 11/14/2018  Obesity 11/14/2018  COPD (chronic obstructive pulmonary disease) ([facility] [address] 11/14/2018  Constipation 11/14/2018  Back pain 11/14/2018 Subjective: Interval notes reviewed. Back pain persists, eating lunch, says feels fine, no fever/chills. Wants to go home. Current Facility-Administered Medications Medication Dose Route Frequency  heparin (porcine) injection 5,000 Units  5,000 Units SubCUTAneous Q8H  
 docusate sodium (COLACE) capsule 100 mg  100 mg Oral DAILY  HYDROcodone-acetaminophen (NORCO) 5-325 mg per tablet 1 Tab  1 Tab Oral Q4H PRN  pantoprazole (PROTONIX) tablet 40 mg  40 mg Oral ACB  morphine injection 1 mg  1 mg IntraVENous Q4H PRN  
 budesonide (PULMICORT) 500 mcg/2 ml nebulizer suspension  500 mcg Nebulization BID RT  
 sodium chloride (NS) flush 5-10 mL  5-10 mL IntraVENous PRN  
 gabapentin (NEURONTIN) capsule 400 mg  400 mg Oral BID  lisinopril (PRINIVIL, ZESTRIL) tablet 20 mg  20 mg Oral DAILY  loratadine (CLARITIN) tablet 10 mg  10 mg Oral DAILY PRN  
 simvastatin (ZOCOR) tablet 20 mg  20 mg Oral QHS  naloxone (NARCAN) injection 0.4 mg  0.4 mg IntraVENous PRN  
 diphenhydrAMINE (BENADRYL) injection 12.5 mg  12.5 mg IntraVENous Q4H PRN  
 ondansetron (ZOFRAN) injection 4 mg  4 mg IntraVENous Q4H PRN  
 albuterol-ipratropium (DUO-NEB) 2.5 MG-0.5 MG/3 ML  3 mL Nebulization Q4H PRN  
 hydrALAZINE (APRESOLINE) 20 mg/mL injection 10 mg  10 mg IntraVENous Q6H PRN  
 bisacodyl (DULCOLAX) suppository 10 mg  10 mg Rectal DAILY PRN  
  lactulose (CHRONULAC) solution 10 g  10 g Oral BID Objective:  
 
Visit Vitals /79 (BP 1 Location: Left arm, BP Patient Position: Head of bed elevated (Comment degrees)) Pulse 90 Temp 98.2 °F (36.8 °C) Resp 20 Ht 6' (1.829 m) Wt 108 kg (238 lb) SpO2 94% BMI 32.28 kg/m² Temp (24hrs), Av.1 °F (36.7 °C), Min:97.8 °F (36.6 °C), Max:98.5 °F (36.9 °C) GEN: pleasant 78 y/o AA male, awake NAD HEENT: no scleral icterus, no oral lesions CHEST: no crackles or wheezes CVS:regular, no murmurs, rubs or gallops ABD: soft, nontender EXT: no edema or cyanosis Neuro: alert, oriented, can move extremities including LE's Labs: Results:  
Chemistry Recent Labs 18 
0550 GLU 80   
K 5.2  CO2 30 BUN 17 CREA 0.70 CA 8.7 AGAP 6  
BUCR 24* CBC w/Diff Recent Labs 18 2110 *  
  
 
 nuc med IMPRESSION: 
Degree of tracer uptake relative to the recent bone scan suggests this is less 
likely to be infectious discitis osteomyelitis. Clinical correlation and close 
follow-up needed. Minnie Addison MD 
December 3, 2018 [facility] [phone]

## 2018-12-03 NOTE — MANAGEMENT PLAN
Discharge/Transition Planning Care Management following. [facility], Home o2, wheelchair ordered last week and processed. Pt working well with therapy today and did 10 ft x 2 with walker. Infectious Disease has cleared pt. Plan remains [address], Home therapy and continue his personal care. Family/Friend will need to transport home. Pt has brace in room Iza Thompson RN BSN Outcomes Manager Pager # [phone]

## 2018-12-03 NOTE — PROGRESS NOTES
Progress Note Patient: Jolie Altman. Sex: male          DOA: 11/14/2018 YOB: 1950      Age:  77 y.o.        LOS:  LOS: 19 days CHIEF COMPLAINT:  Ongoing back pain Subjective:  
 
No distress Awake and talking Objective:  
  
Visit Vitals /85 Pulse 88 Temp 97.9 °F (36.6 °C) Resp 20 Ht 6' (1.829 m) Wt 108.4 kg (239 lb) SpO2 94% BMI 32.41 kg/m² Physical Exam: 
Gen:  No distress, no complaint Lungs:  Clear bilaterally, no wheeze or rhonchi Heart:  Regular rate and rhythm, no murmurs or gallops Abdomen:  Soft, non-tender, normal bowel sounds Lab/Data Reviewed: All lab results for the last 24 hours reviewed. Assessment/Plan Principal Problem: 
  Discitis of thoracic region (11/14/2018) Active Problems: 
  UTI (urinary tract infection) (11/14/2018) Bilateral pleural effusion (11/14/2018) Lumbar stenosis (11/14/2018) Adenoma of left adrenal gland (11/14/2018) Uncontrolled hypertension (11/14/2018) Cirrhosis of liver ([facility] [address] (11/14/2018) Hepatitis C (11/14/2018) Obesity (11/14/2018) COPD (chronic obstructive pulmonary disease) ([facility] [address] (11/14/2018) Constipation (11/14/2018) Back pain (11/14/2018) Plan: 
Continue with pain control Reviewing antibiotics with ID Mobilization DVT prophylaxis.

## 2018-12-03 NOTE — PROGRESS NOTES
Problem: Falls - Risk of 
Goal: *Absence of Falls Document [facility] and appropriate interventions in the flowsheet. Outcome: Progressing Towards Goal 
Fall Risk Interventions: 
Mobility Interventions: Communicate number of staff needed for ambulation/transfer, Patient to call before getting OOB Mentation Interventions: Adequate sleep, hydration, pain control, Door open when patient unattended, Room close to nurse's station Medication Interventions: Patient to call before getting OOB Elimination Interventions: Call light in reach, Urinal in reach History of Falls Interventions: Door open when patient unattended, Room close to nurse's station

## 2018-12-03 NOTE — PROGRESS NOTES
Bedside shift change report given to CAMERON Sparks (oncoming nurse) by Surjit Woodruff RN (offgoing nurse). Report included the following information SBAR, Kardex, Intake/Output, MAR and Recent Results. 
   
0820 -- AM medications given, well tolerated, will continue to monitor. Pain level, 7 out of 10, pain med given, well tolerated, will continue to monitor. 1130 -- Spoke with Eleonora Ocasio (girlfriend) ([phone]), need information on discharge, is asking for patient's mother, was told the patient was being discharged today, wants to know if patient is going to [facility] or home. 
   
1228 -- Reassessment completed, no change in patient condition, will continue to monitor. 
   
1504 -- Reassessment completed, no change in patient condition, will continue to monitor. Patient wanted pain med, when nurse returned to give the pain med, patient was hard to arouse, nurse shaking and sternal rub the patient to awaken, nurse told patient the pain med could not be given, patient was not pleased. 1735 -- Pain level 7 out of 10, pain med given, well tolerated, will continue to monitor. 1800 -- Pain reassessed, patient is eating dinner, no signs of distress. 
   
Bedside shift change report given to CAMERON Carmichael (oncoming nurse) by Romain Villar RN (offgoing nurse). Report included the following information SBAR, Kardex, Intake/Output, MAR and Recent Results.

## 2018-12-03 NOTE — ROUTINE PROCESS
1940: Assumed care. Awake. HOB elevated. No sob on oxygen at 3 LPM. Denies any pain or discomfort a t this time. Call light within reach. 2110: Ordered routine lab test for platelet count per protocol. Patient on Heparin SC. Last platelet count was on 11/27/18. 
 
2217: Due meds given. Snack given per patient request. 
 
0000: No change from previous assessment. 4802: Sleeping. 
 
0428: No change from previous assessment. 0600: Incontinence care provided. 9813: Slept on & off thru night. Needs attended. Due med given. 4496: Bedside and Verbal shift change report given to Clare RN (oncoming nurse) by me (offgoing nurse). Report included the following information SBAR, Kardex, Intake/Output, MAR and Recent Results.

## 2018-12-03 NOTE — PROGRESS NOTES
Problem: Pressure Injury - Risk of 
Goal: *Prevention of pressure injury Document Raul Scale and appropriate interventions in the flowsheet. Outcome: Progressing Towards Goal 
Pressure Injury Interventions: 
Sensory Interventions: Assess changes in LOC, Keep linens dry and wrinkle-free, Pressure redistribution bed/mattress (bed type), Use 30-degree side-lying position Moisture Interventions: Absorbent underpads, Minimize layers Activity Interventions: Pressure redistribution bed/mattress(bed type), PT/OT evaluation Mobility Interventions: HOB 30 degrees or less, Pressure redistribution bed/mattress (bed type), PT/OT evaluation Nutrition Interventions: Document food/fluid/supplement intake Friction and Shear Interventions: HOB 30 degrees or less, Minimize layers

## 2018-12-03 NOTE — MED STUDENT NOTES
*ATTENTION:  This note has been created by a medical student for educational purposes only. Please do not refer to the content of this note for clinical decision-making, billing, or other purposes. Please see attending physicians note to obtain clinical information on this patient. * Student Progress Note Please refer to attendings daily rounding note for full details Patient: Ricky Montague MRN: [id number]  CSN: [id number] YOB: 1950  Age: 77 y.o. Sex: male DOA: 11/14/2018 LOS:  LOS: 19 days Chief Complaint: Back pain Subjective: HPI: Mr. Pako Jiang is a 77year old male with a history of HTN, COPD, Hepatitis C, and IVDU that presented to the ED on 11/14 with complaints of over one month of severe mid to lower back pain. He reported increased shortness of breath and leg swelling for several weeks as well. Work up in ED demonstrated T10-T11 discitis, L3-L4 central canal stenosis, bilateral pleural effusions, splenic varices, and a UTI. Patient was admitted with a neurosurgery consult. PMH: Arthritis, COPD, HTN, Hepatitis C, IV drug use Allergies: Allergies Allergen Reactions  Shellfish Derived Hives Home Meds:  
Prior to Admission Medications Prescriptions Last Dose Informant Patient Reported? Taking? HYDROcodone-acetaminophen (XODOL) 7.5-300 mg tablet Not Taking at Unknown time  Yes No  
Sig: Take 2 Tabs by mouth four (4) times daily as needed. gabapentin (NEURONTIN) 400 mg capsule 11/11/2018  Yes No  
Sig: Take 400 mg by mouth two (2) times a day. Indications: NEUROPATHIC PAIN  
ipratropium-albuterol (COMBIVENT RESPIMAT)  mcg/actuation inhaler 11/13/2018 at Unknown time  Yes Yes Sig: Take 1 Puff by inhalation every twelve (12) hours. Indications: Chronic Obstructive Pulmonary Disease with Bronchospasms  
lisinopril (PRINIVIL, ZESTRIL) 20 mg tablet 11/11/2018  Yes No  
Sig: Take  by mouth daily. loratadine (CLARITIN) 10 mg tablet 11/7/2018 at Unknown time  Yes Yes Sig: Take 10 mg by mouth daily as needed for Allergies. simvastatin (ZOCOR) 10 mg tablet 11/7/2018 at Unknown time  Yes Yes Sig: Take  by mouth nightly. Facility-Administered Medications: None FamHx: Mother: Diabetes. Father: EtOH abuse. Brother: Myocardial Infarction. Social Hx: 
Tobacco: Former smoker. Quit in 1980's. Alcohol: Occasional alcohol use. Illicit Drugs: Current IV heroin user, last use 3-4 weeks prior to admission. ROS: 
Constitutional: Weight loss of 30 lbs over 3 months. Denies fevers, chills. Skin: Denies rashes. HEENT: Denies headache, vision changes. Respiratory: Progressive shortness of breath. Cardiovascular: Progressive bilateral leg swelling. Denies chest pain. Gastrointestinal: Constipation, now resolved. Denies abdomen pain, N/V/D, dark or bloody stool, rectal bleeding. Genitourinary: Urinary changes, now resolved. Musculoskeletal: Severe mid to lower back pain Neurologic: Denies urinary incontinence, difficulty ambulating, weakness, or numbness. Objective:  
  
Visit Vitals /80 (BP 1 Location: Left arm, BP Patient Position: Head of bed elevated (Comment degrees)) Pulse 98 Temp 98.1 °F (36.7 °C) Resp 20 Ht 6' (1.829 m) Wt 108 kg (238 lb) SpO2 94% BMI 32.28 kg/m² Physical Exam: 
General: Ill appearing  male. No active distress. Skin: Pallor of nailbeds and eyelids. HEENT: Head normocephalic, atraumatic. No scleral icterus. Cardiovascular: RRR. S3 heart sound at left sternal border. Weak peripheral pulses in bilateral upper and lower extremities. Respiratory: Clear to auscultation. On 3L nasal canula. Abdominal: No caput medusa. Obese, non distended, non-tender abdomen. No guarding or rebound tenderness. Musculoskeletal: Lower thoracic paraspinal tenderness bilaterally. No bony deformity or bony tenderness. Neurologic: Strength and sensation intact and symmetric throughout. Psychiatric: Appropriate affect. Mood: \"I feel like I am six feet tall! \" I have reviewed the patient's Labs and Radiology studies. Recent Results (from the past 24 hour(s)) GLUCOSE, POC Collection Time: 12/02/18  5:20 PM  
Result Value Ref Range Glucose (POC) 100 70 - 110 mg/dL PLATELET COUNT Collection Time: 12/02/18  9:10 PM  
Result Value Ref Range PLATELET 502 (L) 934 - 420 K/uL Assessment/Plan:  
77year old male with a history of IV drug abuse and Hepatitis C presents with discitis, lumbar stenosis, cirrhosis of liver, and UTI. 1) Discitis of thoracic region 
-History of IV heroin use 
-CT showing severe discitis of T10-T11 
-Consulted neurosurgery. Patient not a surgical candidate.  
-Patient unable to tolerate MRI x6. MRI under sedation and aspiration discussed with anesthesia and interventional radiology. Patient not candidate for intubation per anaesthesia. -Bone/gallium scan on 11/27- less likely to be infectious discitis or osteomyelitis  
-Consulted infectious disease. Recommend discontinue IV abx.  
 
2) Urinary Tract Infection 
-Resolved 
-Completed 5 days of abc- Ceftriaxone 3) Cirrhosis -History of IV drug use, Hepatitis C 
-CT showing varices, cirrhosis 
-Decreased Albumin: 2.4 
-Elevated Ammonia: 67 
-On lactulose 4) Lumbar stenosis 
-CT showing L3-L4 stenosis 
-Consulted neurosurgery. Patient asymptomatic. Not surgical candidate. 5) Respiratory Failure with hypoxemia and hypercapnia 
-ABG pH 7.367, pCO2 73.8, pO2 75 
-Improving 
-On 3L nasal canula Po Lumonserrat 12/3/2018 
9:08 AM

## 2018-12-03 NOTE — PROGRESS NOTES
Problem: Mobility Impaired (Adult and Pediatric) Goal: *Acute Goals and Plan of Care (Insert Text) Physical Therapy Goals Initiated 11/25/2018 and to be accomplished within 7 day(s) 1. Patient will move from supine to sit and sit to supine , scoot up and down and roll side to side in bed with minimal assistance/contact guard assist.    
2.  Patient will transfer from bed to chair and chair to bed with minimal assistance/contact guard assist using the least restrictive device. 3.  Patient will perform sit to stand with supervision/set-up. 4.  Patient will ambulate with minimal assistance/contact guard assist for >/= 100 feet with the least restrictive device. 5.  Patient will ascend/descend 5 stairs with bilateral handrail(s) with minimal assistance/contact guard assist.  
Outcome: Progressing Towards Goal 
PHYSICAL THERAPY: Re-evaluationINPATIENT: Medicaid: Hospital Day: 20 Patient: Yanique Anderson (67 y.o. male)    Date: 12/3/2018 Primary Diagnosis: Discitis of thoracic region Procedure(s) (LRB): Anes MRI (N/A), 18 Days Post-Op, Precautions:     
 
PLOF: Independent with SPC ASSESSMENT: 
Patient supine in bed, agreeable to participation with PT. Visitor present. SBA for supine <> sit. Demo's good seated balance at EOB. Patient on 3L of supplemental 02. ON RA SPO2 84%. Instructed in deep breathing, patient only able to return to 86% in 1 minute. Supplemental O2 returned. Patient returns to 92% < 1 minute with continued instruction in deep breathing. MIN A x 2 for sit <> stand with RW for support; CGA x 2 for sit <> stand on second attempt. Verbal cuing for increased knee extension and posture in stance. Ambulation 10 ft x 2 with rolling walker and CGA x 2. Patient ambulates with forward trunk lean, elongated steps, decreased safety with RW, and decreased gait speed. Verbal cuing and manual assist for safe use and negotiation of RW.  SPO2 between bouts of gait 88%, returns to 93%. SPO2% 94% at end of session. MAX A to return to bed. Left supine in bed with all needs within reach. No c/o pain during mobility. EDUCATION:  
Education:  Patient was educated on the following topics: safety with RW, deep breathing for recovery, transfers, bed mobility. Verbalizes understanding. Barriers to Learning/Limitations: None Compensate with: visual, verbal, tactile, kinesthetic cues/model Patient's progression toward goals since last assessment: Since initial evaluation patient demo's improved activity tolerance d/t decreased pain. He currently requires only MIN A x 2 -CGA x 2 for transfer. Able to ambulate 10 ft x 2 with RW for support but requires CGA x 2 and assist with RW for safety. Patient progressing towards goals. Will continue to benefit from skilled PT to reach all goals. PLAN: 
Goals have been updated based on progression since last assessment. Patient continues to benefit from skilled intervention to address the above impairments. Continue to follow the patient 3 - 5 times a week to address goals. Planned Interventions: 
[x]     Bed Mobility Training          [x]     Neuromuscular Re-Education 
[x]     Transfer Training                []    Orthotic/Prosthetic Training 
[x]     Gait Training                       []     Modalities [x]     Therapeutic Exercises       []     Edema Management/Control 
[x]     Therapeutic Activities         [x]     Patient and Family Training/Education 
[]     Other (comment): 
Discharge Recommendations: Franki Bains Further Equipment Recommendations for Discharge: rolling walker SUBJECTIVE:  
Patient stated \"How long until I get better? Vernestine Cooks OBJECTIVE DATA SUMMARY:  
 
G CODES:  Mobility [id number] Current  CK= 40-59%  [id number] Goal  CJ= 20-39%. The severity rating is based on the Other SBA - MAX A for mobility, improved activity tolerance, gait abnormality, decreased safety awareness. Critical Behavior: Neurologic State: Alert Orientation Level: Oriented X4 Cognition: Follows commands Tone : NormalSensation: NT 
Range Of Motion: [facility] Functional Mobility: 
 
 
Functional Status Indep (I) Mod I Super-vision Min A Mod A Max A Total A Assist x2 Verbal cues Additional time Not tested Comments Rolling []  []  [] []    []    []  []  [] [] [] [x] Supine to sit []  []  [x] []  []  []  []  [] [] [] [] SBA Sit to supine []  []  [] []  []  [x]  []  [] [] [] [] Sit to stand []  []  [x] []  []  []  []  [x] [] [] [] MIN A for first trial, CGA for second trial  
Stand to sit []  []  [x] []  []  []  []  [x] [] [] [] Bed to chair transfers []  []  [] []  []  []  []  [] [] [] [x] Balance Good Jose Graff Unable Not tested Comments Sitting static [x]  []  []  []  [] Sitting dynamic [x]  []  []  []  []   
Standing static [x]  []  []  []  []   
Standing dynamic []  [x]  []  []  [] Mobility/Gait:  
Level of Assistance: Contact guard assistance x 2 Assistive Device: rolling walker Distance Ambulated: 10 feet x 2 Left Lower Extremity: FWB Right Lower Extremity: FWB Base of Support: center of gravity altered Speed/Shannan: pace decreased (<100 feet/min) Step Length: left lengthened and right lengthened Swing Pattern: left asymmetrical and right asymmetrical 
Stance: [facility] Gait Abnormalities: altered arm swing and path deviations Pain: 
None Vital Signs Temp: 98.1 °F (36.7 °C) Pulse (Heart Rate): 98    
BP: 157/80 Resp Rate: 20    
O2 Sat (%): 94 % Activity Tolerance:  
Fair Please refer to the flowsheet for vital signs taken during this treatment. After treatment:  
[]  Patient left in no apparent distress sitting up in chair 
[x]  Patient left in no apparent distress in bed 
[x]  Call bell left within reach 
[]  Nursing notified 
[]  Caregiver present 
[]  Bed alarm activated Tanner Ti Time Calculation: 25 mins

## 2018-12-04 VITALS
HEIGHT: 72 IN | HEART RATE: 83 BPM | RESPIRATION RATE: 18 BRPM | BODY MASS INDEX: 32.25 KG/M2 | TEMPERATURE: 97.4 F | WEIGHT: 238.1 LBS | SYSTOLIC BLOOD PRESSURE: 134 MMHG | DIASTOLIC BLOOD PRESSURE: 78 MMHG | OXYGEN SATURATION: 97 %

## 2018-12-04 LAB
ANION GAP SERPL CALC-SCNC: 1 MMOL/L (ref 3–18)
BUN SERPL-MCNC: 15 MG/DL (ref 7–18)
BUN/CREAT SERPL: 19 (ref 12–20)
CALCIUM SERPL-MCNC: 8.5 MG/DL (ref 8.5–10.1)
CHLORIDE SERPL-SCNC: 100 MMOL/L (ref 100–108)
CO2 SERPL-SCNC: 37 MMOL/L (ref 21–32)
CREAT SERPL-MCNC: 0.77 MG/DL (ref 0.6–1.3)
GLUCOSE SERPL-MCNC: 98 MG/DL (ref 74–99)
POTASSIUM SERPL-SCNC: 4.7 MMOL/L (ref 3.5–5.5)
SODIUM SERPL-SCNC: 138 MMOL/L (ref 136–145)

## 2018-12-04 PROCEDURE — 74011250636 HC RX REV CODE- 250/636: Performed by: NURSE PRACTITIONER

## 2018-12-04 PROCEDURE — 74011250637 HC RX REV CODE- 250/637: Performed by: NURSE PRACTITIONER

## 2018-12-04 PROCEDURE — 74011250637 HC RX REV CODE- 250/637: Performed by: INTERNAL MEDICINE

## 2018-12-04 PROCEDURE — 80048 BASIC METABOLIC PNL TOTAL CA: CPT

## 2018-12-04 PROCEDURE — 94640 AIRWAY INHALATION TREATMENT: CPT

## 2018-12-04 PROCEDURE — 36415 COLL VENOUS BLD VENIPUNCTURE: CPT

## 2018-12-04 PROCEDURE — 74011000250 HC RX REV CODE- 250: Performed by: INTERNAL MEDICINE

## 2018-12-04 RX ADMIN — HYDROCODONE BITARTRATE AND ACETAMINOPHEN 1 TABLET: 5; 325 TABLET ORAL at 06:03

## 2018-12-04 RX ADMIN — HEPARIN SODIUM 5000 UNITS: 5000 INJECTION INTRAVENOUS; SUBCUTANEOUS at 05:00

## 2018-12-04 RX ADMIN — GABAPENTIN 400 MG: 400 CAPSULE ORAL at 08:45

## 2018-12-04 RX ADMIN — PANTOPRAZOLE SODIUM 40 MG: 40 TABLET, DELAYED RELEASE ORAL at 08:45

## 2018-12-04 RX ADMIN — LACTULOSE 10 G: 20 SOLUTION ORAL at 08:45

## 2018-12-04 RX ADMIN — LISINOPRIL 20 MG: 20 TABLET ORAL at 08:45

## 2018-12-04 RX ADMIN — DOCUSATE SODIUM 100 MG: 100 CAPSULE, LIQUID FILLED ORAL at 08:45

## 2018-12-04 RX ADMIN — BUDESONIDE 500 MCG: 0.5 INHALANT RESPIRATORY (INHALATION) at 08:11

## 2018-12-04 NOTE — PROGRESS NOTES
0715 -- Bedside shift change report given to CAMERON Sparks (oncoming nurse) by Sam Garcia RN (offgoing nurse). Report included the following information SBAR, Kardex, Intake/Output, MAR and Recent Results. Patient drowsy, easy to arouse, 3L NC of O2, call bell in reach, will continue to monitor. 
   
0845 -- AM medications given, well tolerated, will continue to monitor. 8834 -- Patient eat 100% of breakfast, no distress noted. 1030 --Nurse and PT in room, patient is aroused with the sternal rub and shaking, when awaken, patient . 
   
1115 -- Reassessment completed, no change in patient condition, will continue to monitor.  
  
1248 -- Nurse called to the room to hold the phone for so patient can talk to his mother, patient was lethargic . 65 -- Patient's nephew in room visiting. 80 -- Called patient's mother Melva Caller), for the patient. 1512 -- Reassessment completed, no change in patient condition, will continue to monitor. 5 -- Spoke with patient's mother Melva Caller) about discharge for patient, patient was unable to sign. 
   
1648 -- Patient discharged.

## 2018-12-04 NOTE — PROGRESS NOTES
Notified by [facility] the pt's portable will be delivered t the hospital room this afternoon; home set-up will be today. Notified [address], [place], the pt is dc'd home this afternoon. Notified [facility],  the pt will dc with [facility]. They will have the 02 delivered to the home this afternoon. Care Management Interventions PCP Verified by CM: Yes 
Palliative Care Criteria Met (RRAT>21 & CHF Dx)?: No 
Transition of Care Consult (CM Consult): Home Health [facility] [place] - INPATIENT: No 
Reason Outside [place] [id number] Chosen: (ALL 4 [facility] Home Care. Pt's PCP , Dr. Soco Jimenez choice of agency.) Physical Therapy Consult: No 
Occupational Therapy Consult: No 
Current Support Network: Other Plan discussed with Pt/Family/Caregiver: Yes Freedom of Choice Offered: Yes Discharge Location Discharge Placement: Home with home health

## 2018-12-04 NOTE — MED STUDENT NOTES
*ATTENTION:  This note has been created by a medical student for educational purposes only. Please do not refer to the content of this note for clinical decision-making, billing, or other purposes. Please see attending physicians note to obtain clinical information on this patient. * Student Progress Note Please refer to attendings daily rounding note for full details Patient: Pipo Mcwilliams. MRN: [id number]  CSN: [id number] YOB: 1950  Age: 77 y.o. Sex: male DOA: 11/14/2018 LOS:  LOS: 20 days Chief Complaint: Back Pain Subjective: HPI: Mr. Srinath Zambrano is a 77year old male with a history of HTN, COPD, Hepatitis C, and IVDU that presented to the ED on 11/14 with complaints of over one month of severe mid to lower back pain. He reported increased shortness of breath and leg swelling for several weeks as well. Work up in ED demonstrated T10-T11 discitis, L3-L4 central canal stenosis, bilateral pleural effusions, splenic varices, and a UTI. Patient was admitted with a neurosurgery consult. 12/4 At time of evaluation, patient is asleep and unresponsive to questions. Nurse states that the pt has been increasingly drowsy following his Hydrocodone-acetaminophen 5-325 mg PO dose at 0600. She reports a similar episode yesterday evening, following the visit of his girlfriend.  
  
PMH: Arthritis, COPD, HTN, Hepatitis C, IV drug use 
  
Allergies: Allergies Allergen Reactions  Shellfish Derived Hives  
  
  
Home Meds:  
Prior to Admission Medications Prescriptions Last Dose Informant Patient Reported? Taking? HYDROcodone-acetaminophen (XODOL) 7.5-300 mg tablet Not Taking at Unknown time   Yes No  
Sig: Take 2 Tabs by mouth four (4) times daily as needed. gabapentin (NEURONTIN) 400 mg capsule 11/11/2018   Yes No  
Sig: Take 400 mg by mouth two (2) times a day.  Indications: NEUROPATHIC PAIN  
 ipratropium-albuterol (COMBIVENT RESPIMAT)  mcg/actuation inhaler 11/13/2018 at Unknown time   Yes Yes Sig: Take 1 Puff by inhalation every twelve (12) hours. Indications: Chronic Obstructive Pulmonary Disease with Bronchospasms  
lisinopril (PRINIVIL, ZESTRIL) 20 mg tablet 11/11/2018   Yes No  
Sig: Take  by mouth daily. loratadine (CLARITIN) 10 mg tablet 11/7/2018 at Unknown time   Yes Yes Sig: Take 10 mg by mouth daily as needed for Allergies. simvastatin (ZOCOR) 10 mg tablet 11/7/2018 at Unknown time   Yes Yes Sig: Take  by mouth nightly. Facility-Administered Medications: None  
  
FamHx: Mother: Diabetes. Father: EtOH abuse. Brother: Myocardial Infarction.  
  
Social Hx: 
Tobacco: Former smoker. Quit in 1980's. Alcohol: Occasional alcohol use. Illicit Drugs: Current IV heroin user, last use 3-4 weeks prior to admission. ROS:  
Pt unresponsive to questions. Unable to evaluate. Objective:  
  
Vitals:  
Patient Vitals for the past 12 hrs: 
 Temp Pulse Resp BP SpO2  
12/04/18 0858 97.3 °F (36.3 °C) 91 18 154/84 90 % 12/04/18 0811     93 % 12/04/18 0453 97.2 °F (36.2 °C) 94 20 131/67 97 % Vitals at time of exam: Pulse 78, Respiratory rate 23 with 100% O2sat on 3L nasal canula. Physical Exam: 
General: Asleep. Patient not responsive to questions or verbal commands. Does not wake up to sternal rub. Skin: Pallor of nailbeds and eyelids. IV in place in left [facility]. HEENT: 2 mm pupils b/l. Arcus senilis bilaterally. Head normocephalic, atraumatic. No scleral icterus. Cardiovascular: RRR. No murmurs, rubs, gallops. 2+ peripheral pulses in upper and lower extremities. Respiratory: On 3L nasal canula. Clear to auscultation in all lung fields bilaterally. No wheezing or crackles. No respiratory distress. Abdominal: No caput medusa. Obese, non distended. No palpable masses. Musculoskeletal:  No obvious bony deformity over lumbar or thoracic spine. No leg edema. Neurologic: Unresponsive to sternal rub. Psychiatric: Not assessed. 
  
I have reviewed the patient's Labs and Radiology studies. Recent Results (from the past 24 hour(s)) METABOLIC PANEL, BASIC Collection Time: 12/04/18  5:58 AM  
Result Value Ref Range Sodium 138 136 - 145 mmol/L Potassium 4.7 3.5 - 5.5 mmol/L Chloride 100 100 - 108 mmol/L  
 CO2 37 (H) 21 - 32 mmol/L Anion gap 1 (L) 3.0 - 18 mmol/L Glucose 98 74 - 99 mg/dL BUN 15 7.0 - 18 MG/DL Creatinine 0.77 0.6 - 1.3 MG/DL  
 BUN/Creatinine ratio 19 12 - 20 GFR est AA >60 >60 ml/min/1.73m2 GFR est non-AA >60 >60 ml/min/1.73m2 Calcium 8.5 8.5 - 10.1 MG/DL Assessment/Plan:  
77year old male with a history of IV drug abuse and Hepatitis C with thoracic discitis, lumbar stenosis, cirrhosis of liver, and UTI.  
  
1) Discitis of thoracic region 
-History of IV heroin use 
-CT showing severe discitis of T10-T11 
-Consulted neurosurgery. Patient not a surgical candidate.  
-Patient unable to tolerate MRI x6. MRI under sedation and aspiration discussed with anesthesia and interventional radiology. Patient not candidate for intubation per anaesthesia. -Bone/gallium scan on 11/27- less likely to be infectious discitis or osteomyelitis  
-Consulted infectious disease. Recommend discontinue IV abx, cleared for discharge. 
  
2) Urinary Tract Infection 
-Resolved 
-Completed 5 days of abx- Ceftriaxone   
3) Cirrhosis -History of IV drug use, Hepatitis C 
-CT showing varices, cirrhosis 
-Decreased Albumin: 2.4 
-Elevated Ammonia: 67 
-On lactulose   
4) Lumbar stenosis 
-CT showing L3-L4 stenosis 
-Consulted neurosurgery, patient asymptomatic. Not surgical candidate. 
  
5) Respiratory Failure with hypoxemia and hypercapnia 
-ABG pH 7.367, pCO2 73.8, pO2 75 
-Improving 
-On 3L nasal canula Po Luts 12/4/2018 
9:54 AM

## 2018-12-04 NOTE — DISCHARGE INSTRUCTIONS
Patient armband removed and shredded    DISCHARGE SUMMARY from Nurse    PATIENT INSTRUCTIONS:    After general anesthesia or intravenous sedation, for 24 hours or while taking prescription Narcotics:  · Limit your activities  · Do not drive and operate hazardous machinery  · Do not make important personal or business decisions  · Do  not drink alcoholic beverages  · If you have not urinated within 8 hours after discharge, please contact your surgeon on call. Report the following to your surgeon:  · Excessive pain, swelling, redness or odor of or around the surgical area  · Temperature over 100.5  · Nausea and vomiting lasting longer than 4 hours or if unable to take medications  · Any signs of decreased circulation or nerve impairment to extremity: change in color, persistent  numbness, tingling, coldness or increase pain  · Any questions    What to do at Home:  Recommended activity: Activity as tolerated. If you experience any of the following symptoms severe lower back pain, and neck pain, please follow up with primary care provider/ED. *  Please give a list of your current medications to your Primary Care Provider. *  Please update this list whenever your medications are discontinued, doses are      changed, or new medications (including over-the-counter products) are added. *  Please carry medication information at all times in case of emergency situations. These are general instructions for a healthy lifestyle:    No smoking/ No tobacco products/ Avoid exposure to second hand smoke  Surgeon General's Warning:  Quitting smoking now greatly reduces serious risk to your health.     Obesity, smoking, and sedentary lifestyle greatly increases your risk for illness    A healthy diet, regular physical exercise & weight monitoring are important for maintaining a healthy lifestyle    You may be retaining fluid if you have a history of heart failure or if you experience any of the following symptoms: Weight gain of 3 pounds or more overnight or 5 pounds in a week, increased swelling in our hands or feet or shortness of breath while lying flat in bed. Please call your doctor as soon as you notice any of these symptoms; do not wait until your next office visit. Recognize signs and symptoms of STROKE:    F-face looks uneven    A-arms unable to move or move unevenly    S-speech slurred or non-existent    T-time-call 911 as soon as signs and symptoms begin-DO NOT go       Back to bed or wait to see if you get better-TIME IS BRAIN. Warning Signs of HEART ATTACK     Call 911 if you have these symptoms:   Chest discomfort. Most heart attacks involve discomfort in the center of the chest that lasts more than a few minutes, or that goes away and comes back. It can feel like uncomfortable pressure, squeezing, fullness, or pain.  Discomfort in other areas of the upper body. Symptoms can include pain or discomfort in one or both arms, the back, neck, jaw, or stomach.  Shortness of breath with or without chest discomfort.  Other signs may include breaking out in a cold sweat, nausea, or lightheadedness. Don't wait more than five minutes to call 911 - MINUTES MATTER! Fast action can save your life. Calling 911 is almost always the fastest way to get lifesaving treatment. Emergency Medical Services staff can begin treatment when they arrive -- up to an hour sooner than if someone gets to the hospital by car. The discharge information has been reviewed with the patient. The patient verbalized understanding. Discharge medications reviewed with the patient and appropriate educational materials and side effects teaching were provided. ___________________________________________________________________________________________________________________________________  MyChart Activation    Thank you for enrolling in [facility] [address] Please follow the instructions below to securely access your online medical record. Group[employer] allows you to send messages to your doctor, view your test results, renew your prescriptions, schedule appointments, and more. How Do I Sign Up? 1. In your internet browser, go to [email or web address]. 2. Click on the First Time User? Click Here link in the Sign In box. You will see the New Member Sign Up page. 3. Enter your GroupMe Access Code exactly as it appears below. You will not need to use this code after youve completed the sign-up process. If you do not sign up before the expiration date, you must request a new code. GroupMe Access Code: [email or web address]  Expires: 2/12/2019 12:33 PM     4. Enter the last four digits of your Social Security Number (xxxx) and Date of Birth (mm/dd/yyyy) as indicated and click Submit. You will be taken to the next sign-up page. 5. Create a GroupMe ID. This will be your GroupMe login ID and cannot be changed, so think of one that is secure and easy to remember. 6. Create a GroupMe password. You can change your password at any time. 7. Enter your Password Reset Question and Answer. This can be used at a later time if you forget your password. 8. Enter your e-mail address. You will receive e-mail notification when new information is available in [address]. 9. Click Sign Up. You can now view your medical record. Additional Information    Remember, GroupMe is NOT to be used for urgent needs. For medical emergencies, dial 911. Now available from your iPhone and Android! Learning About Relief for Back Pain  What is back tension and strain? Back strain happens when you overstretch, or pull, a muscle in your back. You may hurt your back in an accident or when you exercise or lift something. Most back pain will get better with rest and time. You can take care of yourself at home to help your back heal.  What can you do first to relieve back pain? When you first feel back pain, try these steps:  · Walk.  Take a short walk (10 to 20 minutes) on a level surface (no slopes, hills, or stairs) every 2 to 3 hours. Walk only distances you can manage without pain, especially leg pain. · Relax. Find a comfortable position for rest. Some people are comfortable on the floor or a medium-firm bed with a small pillow under their head and another under their knees. Some people prefer to lie on their side with a pillow between their knees. Don't stay in one position for too long. · Try heat or ice. Try using a heating pad on a low or medium setting, or take a warm shower, for 15 to 20 minutes every 2 to 3 hours. Or you can buy single-use heat wraps that last up to 8 hours. You can also try an ice pack for 10 to 15 minutes every 2 to 3 hours. You can use an ice pack or a bag of frozen vegetables wrapped in a thin towel. There is not strong evidence that either heat or ice will help, but you can try them to see if they help. You may also want to try switching between heat and cold. · Take pain medicine exactly as directed. ¨ If the doctor gave you a prescription medicine for pain, take it as prescribed. ¨ If you are not taking a prescription pain medicine, ask your doctor if you can take an over-the-counter medicine. What else can you do? · Stretch and exercise. Exercises that increase flexibility may relieve your pain and make it easier for your muscles to keep your spine in a good, neutral position. And don't forget to keep walking. · Do self-massage. You can use self-massage to unwind after work or school or to energize yourself in the morning. You can easily massage your feet, hands, or neck. Self-massage works best if you are in comfortable clothes and are sitting or lying in a comfortable position. Use oil or lotion to massage bare skin. · Reduce stress. Back pain can lead to a vicious [place]: Distress about the pain tenses the muscles in your back, which in turn causes more pain.  Learn how to relax your mind and your muscles to lower your stress. Where can you learn more? Go to http://vinod-joshua.info/. Enter Y856 in the search box to learn more about \"Learning About Relief for Back Pain. \"  Current as of: March 21, 2017  Content Version: 11.5  © 6833-1220 [employer]. Care instructions adapted under license by [employer] (which disclaims liability or warranty for this information). If you have questions about a medical condition or this instruction, always ask your healthcare professional. [place] [address] any warranty or liability for your use of this information. Back Pain: Care Instructions  Your Care Instructions    Back pain has many possible causes. It is often related to problems with muscles and ligaments of the back. It may also be related to problems with the nerves, discs, or bones of the back. Moving, lifting, standing, sitting, or sleeping in an awkward way can strain the back. Sometimes you don't notice the injury until later. Arthritis is another common cause of back pain. Although it may hurt a lot, back pain usually improves on its own within several weeks. Most people recover in 12 weeks or less. Using good home treatment and being careful not to stress your back can help you feel better sooner. Follow-up care is a key part of your treatment and safety. Be sure to make and go to all appointments, and call your doctor if you are having problems. It's also a good idea to know your test results and keep a list of the medicines you take. How can you care for yourself at home? · Sit or lie in positions that are most comfortable and reduce your pain. Try one of these positions when you lie down:  ? Lie on your back with your knees bent and supported by large pillows. ? Lie on the floor with your legs on the seat of a sofa or chair. ? Lie on your side with your knees and hips bent and a pillow between your legs.   ? Lie on your stomach if it does not make pain worse.  · Do not sit up in bed, and avoid soft couches and twisted positions. Bed rest can help relieve pain at first, but it delays healing. Avoid bed rest after the first day of back pain. · Change positions every 30 minutes. If you must sit for long periods of time, take breaks from sitting. Get up and walk around, or lie in a comfortable position. · Try using a heating pad on a low or medium setting for 15 to 20 minutes every 2 or 3 hours. Try a warm shower in place of one session with the heating pad. · You can also try an ice pack for 10 to 15 minutes every 2 to 3 hours. Put a thin cloth between the ice pack and your skin. · Take pain medicines exactly as directed. ? If the doctor gave you a prescription medicine for pain, take it as prescribed. ? If you are not taking a prescription pain medicine, ask your doctor if you can take an over-the-counter medicine. · Take short walks several times a day. You can start with 5 to 10 minutes, 3 or 4 times a day, and work up to longer walks. Walk on level surfaces and avoid hills and stairs until your back is better. · Return to work and other activities as soon as you can. Continued rest without activity is usually not good for your back. · To prevent future back pain, do exercises to stretch and strengthen your back and stomach. Learn how to use good posture, safe lifting techniques, and proper body mechanics. When should you call for help? Call your doctor now or seek immediate medical care if:    · You have new or worsening numbness in your legs.     · You have new or worsening weakness in your legs. (This could make it hard to stand up.)     · You lose control of your bladder or bowels.    Watch closely for changes in your health, and be sure to contact your doctor if:    · You have a fever, lose weight, or don't feel well.     · You do not get better as expected. Where can you learn more? Go to http://vinod-joshua.info/.   Enter I594 in the search box to learn more about \"Back Pain: Care Instructions. \"  Current as of: November 29, 2017  Content Version: 11.8  © 8510-0901 [employer]. Care instructions adapted under license by [employer] (which disclaims liability or warranty for this information). If you have questions about a medical condition or this instruction, always ask your healthcare professional. [address] any warranty or liability for your use of this information. Chronic Obstructive Pulmonary Disease (COPD): Care Instructions  Your Care Instructions    Chronic obstructive pulmonary disease (COPD) is a general term for a group of lung diseases, including emphysema and chronic bronchitis. People with COPD have decreased airflow in and out of the lungs, which makes it hard to breathe. The airways also can get clogged with thick mucus. Cigarette smoking is a major cause of COPD. Although there is no cure for COPD, you can slow its progress. Following your treatment plan and taking care of yourself can help you feel better and live longer. Follow-up care is a key part of your treatment and safety. Be sure to make and go to all appointments, and call your doctor if you are having problems. It's also a good idea to know your test results and keep a list of the medicines you take. How can you care for yourself at home?   Staying healthy    · Do not smoke. This is the most important step you can take to prevent more damage to your lungs. If you need help quitting, talk to your doctor about stop-smoking programs and medicines. These can increase your chances of quitting for good.     · Avoid colds and flu. Get a pneumococcal vaccine shot. If you have had one before, ask your doctor whether you need a second dose. Get the flu vaccine every fall.  If you must be around people with colds or the flu, wash your hands often.     · Avoid secondhand smoke, air pollution, and high altitudes. Also avoid cold, dry air and hot, humid air. Stay at home with your windows closed when air pollution is bad.    Medicines and oxygen therapy    · Take your medicines exactly as prescribed. Call your doctor if you think you are having a problem with your medicine.     · You may be taking medicines such as:  ? Bronchodilators. These help open your airways and make breathing easier. Bronchodilators are either short-acting (work for 6 to 9 hours) or long-acting (work for 24 hours). You inhale most bronchodilators, so they start to act quickly. Always carry your quick-relief inhaler with you in case you need it while you are away from home. ? Corticosteroids (prednisone, budesonide). These reduce airway inflammation. They come in pill or inhaled form. You must take these medicines every day for them to work well.     · A spacer may help you get more inhaled medicine to your lungs. Ask your doctor or pharmacist if a spacer is right for you. If it is, ask how to use it properly.     · Do not take any vitamins, over-the-counter medicine, or herbal products without talking to your doctor first.     · If your doctor prescribed antibiotics, take them as directed. Do not stop taking them just because you feel better. You need to take the full course of antibiotics.     · Oxygen therapy boosts the amount of oxygen in your blood and helps you breathe easier. Use the flow rate your doctor has recommended, and do not change it without talking to your doctor first.   Activity    · Get regular exercise. Walking is an easy way to get exercise. Start out slowly, and walk a little more each day.     · Pay attention to your breathing.  You are exercising too hard if you cannot talk while you are exercising.     · Take short rest breaks when doing household chores and other activities.     · Learn breathing methods--such as breathing through pursed lips--to help you become less short of breath.     · If your doctor has not set you up with a pulmonary rehabilitation program, talk to him or her about whether rehab is right for you. Rehab includes exercise programs, education about your disease and how to manage it, help with diet and other changes, and emotional support. Diet    · Eat regular, healthy meals. Use bronchodilators about 1 hour before you eat to make it easier to eat. Eat several small meals instead of three large ones. Drink beverages at the end of the meal. Avoid foods that are hard to chew.     · Eat foods that contain protein so that you do not lose muscle mass.     · Talk with your doctor if you gain too much weight or if you lose weight without trying.    Mental health    · Talk to your family, friends, or a therapist about your feelings. It is normal to feel frightened, angry, hopeless, helpless, and even guilty. Talking openly about bad feelings can help you cope. If these feelings last, talk to your doctor. When should you call for help? Call 911 anytime you think you may need emergency care. For example, call if:    · You have severe trouble breathing.    Call your doctor now or seek immediate medical care if:    · You have new or worse trouble breathing.     · You cough up blood.     · You have a fever.    Watch closely for changes in your health, and be sure to contact your doctor if:    · You cough more deeply or more often, especially if you notice more mucus or a change in the color of your mucus.     · You have new or worse swelling in your legs or belly.     · You are not getting better as expected. Where can you learn more? Go to http://vinod-joshua.info/. Heladio Lynne in the search box to learn more about \"Chronic Obstructive Pulmonary Disease (COPD): Care Instructions. \"  Current as of: December 6, 2017  Content Version: 11.8  © 0615-6769 [employer], [employer].  Care instructions adapted under license by [employer] (which disclaims liability or warranty for this information). If you have questions about a medical condition or this instruction, always ask your healthcare professional. [address] any warranty or liability for your use of this information. Cirrhosis: Care Instructions  Your Care Instructions    Cirrhosis occurs when healthy tissue in your liver gets scarred. This keeps the liver from working well. It usually happens after a liver has been inflamed for years. Cirrhosis is most often caused by alcohol abuse or hepatitis infection. But there are other causes too. These include medicines and too much fat in the liver. Conditions passed down in families and other disorders can also cause it. In some cases, no cause can be found. Treatment can't completely fix liver damage. But you may be able to slow or prevent more damage if you don't drink alcohol or use drugs that harm your liver. Follow-up care is a key part of your treatment and safety. Be sure to make and go to all appointments, and call your doctor if you are having problems. It's also a good idea to know your test results and keep a list of the medicines you take. How can you care for yourself at home? · Do not drink any alcohol. It can harm your liver. Talk to your doctor if you need help to stop drinking. · Be safe with medicines. Take your medicines exactly as prescribed. Call your doctor if you think you are having a problem with your medicine. · Talk to your doctor before you take any other medicines. These include over-the-counter medicines and herbal products. · Be careful taking acetaminophen (Tylenol), ibuprofen (Advil, Motrin), or naproxen (Aleve). These can sometimes cause more liver damage. Talk with your doctor if you're not sure which medicines are safe. · If your cirrhosis causes extra fluid to build up in your body, try not to eat a lot of salt. Use less salt when you cook and at the table. Don't eat fast foods or snack foods with a lot of salt. Extra fluid in your belly, legs, and chest can cause serious problems. · Work with your doctor or a dietitian to be sure you eat the right amount of carbohydrate, protein, fat, and sodium (salt). It's very important to choose the best foods for the health of your liver. · If your doctor recommends it, limit how much fluid you drink. · If your doctor recommends it, get more exercise. Walking is a good choice. Bit by bit, increase the amount you walk every day. Try for at least 30 minutes on most days of the week. You also may want to swim, bike, or do other activities. When should you call for help? Call 911 anytime you think you may need emergency care. For example, call if:    · You have trouble breathing.     · You vomit blood or what looks like coffee grounds.    Call your doctor now or seek immediate medical care if:    · You feel very sleepy or confused.     · You have new belly pain, or your pain gets worse.     · You have a fever.     · There is a new or increasing yellow tint to your skin or the whites of your eyes.     · You have any abnormal bleeding, such as:  ? Nosebleeds. ? Vaginal bleeding that is different (heavier, more frequent, at a different time of the month) than what you are used to.  ? Bloody or black stools, or rectal bleeding. ? Bloody or pink urine.    Watch closely for changes in your health, and be sure to contact your doctor if:    · You have any problems.     · Your belly is getting bigger.     · You are gaining weight. Where can you learn more? Go to http://vinod-joshua.info/. Enter M412 in the search box to learn more about \"Cirrhosis: Care Instructions. \"  Current as of: March 28, 2018  Content Version: 11.8  © 1739-3622 [employer]. Care instructions adapted under license by [employer] (which disclaims liability or warranty for this information).  If you have questions about a medical condition or this instruction, always ask your healthcare professional. [address] any warranty or liability for your use of this information. DASH Diet: Care Instructions  Your Care Instructions    The DASH diet is an eating plan that can help lower your blood pressure. DASH stands for Dietary Approaches to Stop Hypertension. Hypertension is high blood pressure. The DASH diet focuses on eating foods that are high in calcium, potassium, and magnesium. These nutrients can lower blood pressure. The foods that are highest in these nutrients are fruits, vegetables, low-fat dairy products, nuts, seeds, and legumes. But taking calcium, potassium, and magnesium supplements instead of eating foods that are high in those nutrients does not have the same effect. The DASH diet also includes whole grains, fish, and poultry. The DASH diet is one of several lifestyle changes your doctor may recommend to lower your high blood pressure. Your doctor may also want you to decrease the amount of sodium in your diet. Lowering sodium while following the DASH diet can lower blood pressure even further than just the DASH diet alone. Follow-up care is a key part of your treatment and safety. Be sure to make and go to all appointments, and call your doctor if you are having problems. It's also a good idea to know your test results and keep a list of the medicines you take. How can you care for yourself at home? Following the DASH diet  · Eat 4 to 5 servings of fruit each day. A serving is 1 medium-sized piece of fruit, ½ cup chopped or canned fruit, 1/4 cup dried fruit, or 4 ounces (½ cup) of fruit juice. Choose fruit more often than fruit juice. · Eat 4 to 5 servings of vegetables each day. A serving is 1 cup of lettuce or raw leafy vegetables, ½ cup of chopped or cooked vegetables, or 4 ounces (½ cup) of vegetable juice. Choose vegetables more often than vegetable juice. · Get 2 to 3 servings of low-fat and fat-free dairy each day.  A serving is 8 ounces of milk, 1 cup of yogurt, or 1 ½ ounces of cheese. · Eat 6 to 8 servings of grains each day. A serving is 1 slice of bread, 1 ounce of dry cereal, or ½ cup of cooked rice, pasta, or cooked cereal. Try to choose whole-grain products as much as possible. · Limit lean meat, poultry, and fish to 2 servings each day. A serving is 3 ounces, about the size of a deck of cards. · Eat 4 to 5 servings of nuts, seeds, and legumes (cooked dried beans, lentils, and split peas) each week. A serving is 1/3 cup of nuts, 2 tablespoons of seeds, or ½ cup of cooked beans or peas. · Limit fats and oils to 2 to 3 servings each day. A serving is 1 teaspoon of vegetable oil or 2 tablespoons of salad dressing. · Limit sweets and added sugars to 5 servings or less a week. A serving is 1 tablespoon jelly or jam, ½ cup sorbet, or 1 cup of lemonade. · Eat less than 2,300 milligrams (mg) of sodium a day. If you limit your sodium to 1,500 mg a day, you can lower your blood pressure even more. Tips for success  · Start small. Do not try to make dramatic changes to your diet all at once. You might feel that you are missing out on your favorite foods and then be more likely to not follow the plan. Make small changes, and stick with them. Once those changes become habit, add a few more changes. · Try some of the following:  ? Make it a goal to eat a fruit or vegetable at every meal and at snacks. This will make it easy to get the recommended amount of fruits and vegetables each day. ? Try yogurt topped with fruit and nuts for a snack or healthy dessert. ? Add lettuce, tomato, cucumber, and onion to sandwiches. ? Combine a ready-made pizza crust with low-fat mozzarella cheese and lots of vegetable toppings. Try using tomatoes, squash, spinach, broccoli, carrots, cauliflower, and onions. ? Have a variety of cut-up vegetables with a low-fat dip as an appetizer instead of chips and dip. ?  Sprinkle sunflower seeds or chopped almonds over salads. Or try adding chopped walnuts or almonds to cooked vegetables. ? Try some vegetarian meals using beans and peas. Add garbanzo or kidney beans to salads. Make burritos and tacos with mashed thrasher beans or black beans. Where can you learn more? Go to http://vinod-joshua.info/. Enter X339 in the search box to learn more about \"DASH Diet: Care Instructions. \"  Current as of: December 6, 2017  Content Version: 11.8  © 2973-0724 [employer]. Care instructions adapted under license by [employer] (which disclaims liability or warranty for this information). If you have questions about a medical condition or this instruction, always ask your healthcare professional. [place] [address] any warranty or liability for your use of this information. Hepatitis C: Care Instructions  Your Care Instructions    Hepatitis C is an infection of the liver caused by a virus. This virus spreads when blood or body fluids from an infected person enter another person's body. This occurs most often when people share needles that have the virus on them. In the past, people got the virus through blood transfusions and organ transplants. But since 1992, all donated blood and organs have been screened for hepatitis C. So getting the virus this way is now very rare. Less often, hepatitis C can spread through sex and sharing items such as razor blades or toothbrushes. Needles used for tattoos and body piercings can also spread the virus. The virus doesn't always cause symptoms. But you may feel tired. And you may have a headache, sore muscles, nausea, and pain in the upper right belly. Other symptoms include yellowish skin and dark urine. Home treatment can help ease symptoms. And your doctor may prescribe antiviral medicine. Long-term infection can lead to severe liver damage. So make sure to go to your follow-up appointments.   Follow-up care is a key part of your treatment and safety. Be sure to make and go to all appointments, and call your doctor if you are having problems. It's also a good idea to know your test results and keep a list of the medicines you take. How can you care for yourself at home? · Be safe with medicines. If your doctor prescribes antiviral medicine, take it exactly as prescribed. Call your doctor if you think you are having a problem with your medicine. · Do not drink alcohol. Alcohol can damage the liver. Tell your doctor if you need help to quit. Counseling, support groups, and sometimes medicines can help you stay sober. · Do not take drugs or herbal medicines. They can make liver problems worse. · Make sure your doctor knows all of the medicines you take. Some medicines, such as acetaminophen (Tylenol), can make liver problems worse. Do not take any new medicines unless your doctor tells you to. This includes over-the-counter medicines. · Maintain a healthy lifestyle. Get plenty of exercise if you feel up to it. Eat a healthy diet. · Drink plenty of fluids, enough so that your urine is light yellow or clear like water. If you have kidney, heart, or liver disease and have to limit fluids, talk with your doctor before you increase the amount of fluids you drink. · Get the vaccines (if you have not already) to protect yourself from hepatitis A and hepatitis B, influenza, and pneumococcus. · The infection can make you itch. Keep cool and stay out of the sun. Try to wear cotton clothing. Talk to your doctor about using over-the-counter medicines for itching. These include diphenhydramine (Benadryl) and chlorpheniramine (Chlor-Trimeton). Follow the instructions on the label. · If you feel depressed, talk to your doctor about treatment. Many people who have long-term illnesses get depressed. Keep in mind that antiviral medicine can make depression worse.   To avoid spreading hepatitis C to others  · Tell the people that you live with or have sex with about your illness as soon as you can. · Don't share needles to inject drugs. Don't share other equipment (such as cotton, spoons, and water) with others. Find out if a needle exchange program is available in your area, and use it. Get into a drug treatment program.  · Practice safer sex. Reduce your number of sex partners if you have more than one. Unless you are in a long-term relationship in which neither partner has sex with anyone else, always use latex condoms when you have sex. · Don't donate blood or blood products, organs, semen, or eggs (ova). · Make sure that all equipment is sterilized if you get a tattoo, have your body pierced, or have acupuncture. · Do not share your personal items. These include razors, toothbrushes, towels, and nail files. · Tell your doctor, dentist, and anyone else who may come in contact with your blood about your illness. · Prevent others from coming in contact with your blood and other body fluids. Keep any cuts, scrapes, or blisters covered. · Wash your hands--and any object that has come in contact with your blood--thoroughly with water and soap. When should you call for help? Call 911 anytime you think you may need emergency care. For example, call if:    · You have trouble breathing.     · You vomit blood or what looks like coffee grounds.    Call your doctor now or seek immediate medical care if:    · You feel very sleepy or confused.     · You have a fever.     · There is a new or increasing yellow tint to your skin or the whites of your eyes.     · You have new or worse belly pain.     · You have any abnormal bleeding, such as:  ? Nosebleeds. ? Vaginal bleeding that is different (heavier, more frequent, at a different time of the month) than what you are used to.  ? Bloody or black stools, or rectal bleeding. ?  Bloody or pink urine.    Watch closely for changes in your health, and be sure to contact your doctor if:    · You have any problems.     · Your belly is getting bigger.     · You are gaining weight. Where can you learn more? Go to http://vinod-joshua.info/. Enter F266 in the search box to learn more about \"Hepatitis C: Care Instructions. \"  Current as of: November 18, 2017  Content Version: 11.8  © 7788-9575 [employer]. Care instructions adapted under license by [employer] (which disclaims liability or warranty for this information). If you have questions about a medical condition or this instruction, always ask your healthcare professional. [address] any warranty or liability for your use of this information.

## 2018-12-04 NOTE — PROGRESS NOTES
Infectious Disease Follow-up Admit Date: 11/14/2018 Current abx Prior abx None 11/18 - 17 Cefepime/vanco  11/14   2, Ceftriaxone 11/16 - 3 Assessment -> Rec:  
 
Destructive T10-11 changes on CTAP 11/14 
- mid back pain x 3 months, fell, incontinent of urine - CTAP: severe destructive changes T 10-11, paravertebral sot tissue infiltration, extension to anterior epidural space w/ cord compression  
- 11/14: ESR 20, CRP 0.7 11/25 
- h/o IVDU  
- given Vanc/Cefepime 2 days then Ceftriaxone 2 days ending 11/18. Off abx since but MRI, aspiration not done (not cooperative w/ getting MRI done - claustrophobic) - case has been d/w Dr. Nay Faustin of IR. CT findings suggestive, not definitive for infection. MRI would be ideal for clarifying this but anesthesia apparently not available for conscious sedation for this. - Bone/ gallium scans 11/27, 28 not suggestive of discitis/OM  
- not interested in stabilization procedure per Dr. Gustabo Hairh - rpt CRP 11/26 - 0.7 -> okay to discharge patient from ID stand point when okay with IM, others . - d/w student on team rounding today. UTI, Serratia - UA 11-20 WBC - received 5 days abx rx -> no further rx Bilateral pleural effusions Hepatomegaly, splenic hilar & perisplenic varices 
- seen on CTAP 11/14 ? cirrhosis Elevated fungitell  
- significance unclear, not specific here,  
- drawn shortly after albumin infusion 11/15 which can cause false positive BG result -> repeat fungitell down, fungal blood culture also neg so far  
  
H/o IVDU 
- HIV ab NR 11/19 ,Hep BsAg neg (last reported ivdu 4 mo ago, has hep C and thinks hep b immune) 
 ->seems very sedated today - d/w student and nurse aware and defer IM 
-rechecked 1300- more awake, eating lunch now. COPD   
HTN   
[facility] MICROBIOLOGY:  
11/14 urcx >100,000 Serratia marcescens 
 blcx NG x 2 
11/15  CrAG neg, fungitell 375 (reported 11/17) 
11/29   blcx for fungus - ngtd fungitell 113 LINES AND CATHETERS:  
piv Active Hospital Problems Diagnosis Date Noted  Discitis of thoracic region 11/14/2018  UTI (urinary tract infection) 11/14/2018  Bilateral pleural effusion 11/14/2018  Lumbar stenosis 11/14/2018  Adenoma of left adrenal gland 11/14/2018  Uncontrolled hypertension 11/14/2018  Cirrhosis of liver ([facility] 11/14/2018  Hepatitis C 11/14/2018  Obesity 11/14/2018  COPD (chronic obstructive pulmonary disease) ([facility] 11/14/2018  Constipation 11/14/2018  Back pain 11/14/2018 Subjective: Interval notes reviewed. Pt very sedated appearing earlier today, d/w student on team understand GF visited yesterday, also asked/received pain med earlier today. Rechecked this afternoon, more awake eating lunch. Little to add ID perspective now, can f/u with Dr. Martin Knutson after discharge if needed. Current Facility-Administered Medications Medication Dose Route Frequency  heparin (porcine) injection 5,000 Units  5,000 Units SubCUTAneous Q8H  
 docusate sodium (COLACE) capsule 100 mg  100 mg Oral DAILY  HYDROcodone-acetaminophen (NORCO) 5-325 mg per tablet 1 Tab  1 Tab Oral Q4H PRN  pantoprazole (PROTONIX) tablet 40 mg  40 mg Oral ACB  morphine injection 1 mg  1 mg IntraVENous Q4H PRN  
 budesonide (PULMICORT) 500 mcg/2 ml nebulizer suspension  500 mcg Nebulization BID RT  
 sodium chloride (NS) flush 5-10 mL  5-10 mL IntraVENous PRN  
 gabapentin (NEURONTIN) capsule 400 mg  400 mg Oral BID  lisinopril (PRINIVIL, ZESTRIL) tablet 20 mg  20 mg Oral DAILY  loratadine (CLARITIN) tablet 10 mg  10 mg Oral DAILY PRN  
 simvastatin (ZOCOR) tablet 20 mg  20 mg Oral QHS  naloxone (NARCAN) injection 0.4 mg  0.4 mg IntraVENous PRN  
 diphenhydrAMINE (BENADRYL) injection 12.5 mg  12.5 mg IntraVENous Q4H PRN  
 ondansetron (ZOFRAN) injection 4 mg  4 mg IntraVENous Q4H PRN  
  albuterol-ipratropium (DUO-NEB) 2.5 MG-0.5 MG/3 ML  3 mL Nebulization Q4H PRN  
 hydrALAZINE (APRESOLINE) 20 mg/mL injection 10 mg  10 mg IntraVENous Q6H PRN  
 bisacodyl (DULCOLAX) suppository 10 mg  10 mg Rectal DAILY PRN  
 lactulose (CHRONULAC) solution 10 g  10 g Oral BID Objective:  
 
Visit Vitals /84 (BP 1 Location: Left arm, BP Patient Position: Head of bed elevated (Comment degrees)) Pulse 91 Temp 97.3 °F (36.3 °C) Resp 18 Ht 6' (1.829 m) Wt 108 kg (238 lb 1.6 oz) SpO2 90% BMI 32.29 kg/m² Temp (24hrs), Av.6 °F (36.4 °C), Min:97.2 °F (36.2 °C), Max:98.2 °F (36.8 °C) GEN: pleasant 78 y/o AA male, earlier not rouse to voice, exam now awake HEENT: no scleral icterus, no oral lesions CHEST: no crackles or wheezes CVS:regular, no murmurs, rubs or gallops ABD: soft, nontender EXT: no edema or cyanosis Labs: Results:  
Chemistry Recent Labs 18 
8129 18 
0550 GLU 98 80  139  
K 4.7 5.2  103 CO2 37* 30 BUN 15 17 CREA 0.77 0.70 CA 8.5 8.7 AGAP 1* 6  
BUCR 19 24* CBC w/Diff Recent Labs 18 2110 *  
  
 
11/28 nuc med IMPRESSION: 
Degree of tracer uptake relative to the recent bone scan suggests this is less 
likely to be infectious discitis osteomyelitis. Clinical correlation and close 
follow-up needed. Tony Hutson MD 
2018 [facility] [phone]

## 2018-12-04 NOTE — MANAGEMENT PLAN
Discharge/Transition Planning 1430: Text page Dr Jodie Harrington and discussed pt discharge. [address] [facility], [address] waiting for definitive discharge before delivery. Plan is home with HH. Pt declined from ARU, does not have SNF benefit, and does not want nursing home placement. Home Health set up and pt has personal care already. 1500: Pt and mother stating cannot take pt in car. Notified pt CM can set medical transport, but risk of being declined and he would be responsible for bill. Pt wants medical transport. Oxygen getting delivered to home. Mother at home waiting. Called [facility] and got auth for stretcher transport  Trip ID [id number]. Rolando Marandafin and set transport for first available of 1630. Notified RN Clare. Pt asleep and did not respond to notify of time. Mother did not answer home phone when CM called back to give time. No voicemail. Care Management Interventions PCP Verified by CM: Yes 
Palliative Care Criteria Met (RRAT>21 & CHF Dx)?: No 
Transition of Care Consult (CM Consult): Home Health [address] [place] [address] Road: No 
Reason Outside [place] [address] Chosen: (ALL 4 -1 Home Care. Pt's PCP , Dr. Queenie Metcalf choice of agency.) Physical Therapy Consult: No 
Occupational Therapy Consult: No 
Current Support Network: Other Plan discussed with Pt/Family/Caregiver: Yes Freedom of Choice Offered: Yes Discharge Location Discharge Placement: Home with home health Saleem Jack RN BSN Outcomes Manager Pager # [phone]

## 2018-12-04 NOTE — PROGRESS NOTES
conducted a Follow up consultation and Spiritual Assessment for Rommel Felipe, who is a 77 y.o.,male. The  provided the following Interventions: 
Continued the relationship of care and support. Listened empathically. Offered prayer and assurance of continued prayer on patients behalf. Chart reviewed. The following outcomes were achieved: 
Patient expressed gratitude for pastoral care visit. Assessment: 
There are no further spiritual or Restorationism issues which require Spiritual Care Services interventions at this time. Plan: 
Chaplains will continue to follow and will provide pastoral care on an as needed/requested basis.  recommends bedside caregivers page  on duty if patient shows signs of acute spiritual or emotional distress. [address] [facility] Staff  Spiritual Care  
[phone]

## 2018-12-04 NOTE — PROGRESS NOTES
Nutrition follow up/ 
Plan of care RECOMMENDATIONS:  
 
1. Cardiac diet 2. Monitor weight, labs and PO intake 3. RD to follow GOALS:  
 
1. Met/Ongoing: PO intake meets >75% of protein/calorie needs by 12/11 2. Met/Ongoing: Weight Maintenance (+/- 1-2 lb by 12/11) ASSESSMENT: 
 
Weight status is classified as obese per BMI of 32.3. PO intake is adequate. Labs noted. Nutrition recommendations listed. RD to follow. Nutrition Risk:  [] High  [] Moderate [x]  Low SUBJECTIVE/OBJECTIVE: 
 (11/21): LOS. Transferred from ICU. Admitted with discitis of thoracic region. Has history of COPD and HTN. Has food allergy to shellfish. Noted poor dentition but patient denies problems with chewing/swallowing and declined change in diet texture. Patient reports having a good appetite and eating most of meals. Patient with 90% intake of lunch meal today and % intake of meals per vitals. Patient states weight was stable at 268 lb PTA (??). Encouraged adequate intake. Will monitor. (11/28): Patient NPO for CT guided aspiration procedure. Patient reports being hungry anxious to eat next meal. Reports eating most of meals prior to NPO status. % intake of meals per vitals. Will monitor. (12/4): Patient reports having a good appetite and eating all of meals. Observed 75% intake of lunch meal during visit. % intake of meals per vitals. Plan for discharge home this afternoon. No complaints or questions pertaining to diet. Information Obtained from:  
 [x] Chart Review [x] Patient 
 [] Family/Caregiver [x] Nurse/Physician 
 [] Interdisciplinary Meeting/Rounds Diet: Cardiac diet Medications: [x] Reviewed Allergies: [x] Reviewed (Shellfish) Encounter Diagnoses ICD-10-CM ICD-9-CM 1. Discitis of thoracic region M46.44 722.92 Past Medical History:  
Diagnosis Date  Arthritis  COPD  Hypertension Labs:   
Lab Results Component Value Date/Time Sodium 138 12/04/2018 05:58 AM  
 Potassium 4.7 12/04/2018 05:58 AM  
 Chloride 100 12/04/2018 05:58 AM  
 CO2 37 (H) 12/04/2018 05:58 AM  
 Anion gap 1 (L) 12/04/2018 05:58 AM  
 Glucose 98 12/04/2018 05:58 AM  
 BUN 15 12/04/2018 05:58 AM  
 Creatinine 0.77 12/04/2018 05:58 AM  
 Calcium 8.5 12/04/2018 05:58 AM  
 Magnesium 2.0 11/16/2018 09:23 AM  
 Phosphorus 3.1 11/16/2018 09:23 AM  
 Albumin 2.4 (L) 11/19/2018 07:24 AM  
 
Anthropometrics: BMI (calculated): 32.3 Last 3 Recorded Weights in this Encounter 12/02/18 8673 12/03/18 0750 12/04/18 8717 Weight: 108.4 kg (239 lb) 108 kg (238 lb) 108 kg (238 lb 1.6 oz) Ht Readings from Last 1 Encounters:  
11/20/18 6' (1.829 m) Weight Metrics 12/4/2018 9/9/2018 8/24/2018 8/20/2017 5/18/2017 6/24/2013 Weight 238 lb 1.6 oz 278 lb 268 lb 255 lb 273 lb 295 lb BMI 32.29 kg/m2 37.7 kg/m2 36.35 kg/m2 34.58 kg/m2 36.02 kg/m2 38.93 kg/m2 Patient Vitals for the past 100 hrs: 
 % Diet Eaten 12/04/18 1353 75 % 12/04/18 0935 100 % 12/03/18 1821 75 % 12/03/18 1421 50 % 12/03/18 0959 50 % 12/02/18 1435 75 % 12/02/18 1010 75 % Estimated Nutrition Needs: [x] MSJ Calories: 2460 Kcal Based on:   [x] Actual BW   
Protein:    g Based on:   [x] Actual BW Fluid:       2500 ml Based on:   [x] Actual BW  
 
 [x] No Cultural, Episcopal or ethnic dietary need identified. [] Cultural, Episcopal and ethnic food preferences identified and addressed Wt Status:  [] Normal (18.6 - 24.9) [] Underweight (<18.5) [] Overweight (25 - 29.9) [x] Mild Obesity (30 - 34.9)  [] Moderate Obesity (35 - 39.9) [] Morbid Obesity (40+) Nutrition Problems Identified:  
[] Suboptimal PO intake [x] Food Allergies (Shellfish) [x] Difficulty chewing/swallowing/poor dentition 
[] Constipation/Diarrhea  
[] Nausea/Vomiting  
[] None 
[] Other:  
 
Plan:  
[x] Therapeutic Diet 
[]  Obtained/adjusted food preferences/tolerances and/or snacks options []  Supplements added  
[] Occupational therapy following for feeding techniques []  HS snack added  
[]  Modify diet texture ( pt declined) [x]  Modify diet for food allergies []  Assist with menu selection  
[x]  Monitor PO intake on meal rounds  
[x]  Continue inpatient monitoring and intervention  
[x]  Discharge Plan: Continue Cardiac diet 
[]  Other:  
 
Education Needs: 
 [] Not appropriate for teaching at this time due to: 
 [x] Identified and addressed Nutrition Monitoring and Evaluation: 
[x] Continue ongoing monitoring and intervention 
[] Other [address]

## 2018-12-04 NOTE — PROGRESS NOTES
Problem: Pressure Injury - Risk of 
Goal: *Prevention of pressure injury Document Raul Scale and appropriate interventions in the flowsheet. Outcome: Progressing Towards Goal 
Pressure Injury Interventions: 
Sensory Interventions: Assess changes in LOC Moisture Interventions: Absorbent underpads Activity Interventions: Pressure redistribution bed/mattress(bed type) Mobility Interventions: HOB 30 degrees or less Nutrition Interventions: Document food/fluid/supplement intake Friction and Shear Interventions: HOB 30 degrees or less, Minimize layers Problem: Falls - Risk of 
Goal: *Absence of Falls Document Milnor Leonor Fall Risk and appropriate interventions in the flowsheet. Outcome: Progressing Towards Goal 
Fall Risk Interventions: 
Mobility Interventions: Communicate number of staff needed for ambulation/transfer Mentation Interventions: Adequate sleep, hydration, pain control Medication Interventions: Patient to call before getting OOB Elimination Interventions: Call light in reach History of Falls Interventions: Door open when patient unattended Problem: Impaired Skin Integrity/Pressure Injury Treatment Goal: *Prevention of pressure injury Document Raul Scale and appropriate interventions in the flowsheet. Outcome: Progressing Towards Goal 
Pressure Injury Interventions: 
Sensory Interventions: Assess changes in LOC Moisture Interventions: Absorbent underpads Activity Interventions: Pressure redistribution bed/mattress(bed type) Mobility Interventions: HOB 30 degrees or less Nutrition Interventions: Document food/fluid/supplement intake Friction and Shear Interventions: HOB 30 degrees or less, Minimize layers

## 2018-12-04 NOTE — PROGRESS NOTES
Attempted PT X2; first attempt pt extremely lethargic, second attempt pt eating lunch. Will continue to follow.  Gigi Meade, PTA

## 2018-12-04 NOTE — PROGRESS NOTES
2 attempts for OT re-evaluation 1310: pt eating lunch 
1400: pt not wearing O2 on arrival. Checked O2 via pulse ox, O2 registering 72-78%. Pt in no distress, but RR is elevated. Clare RN arrived and verbalized understanding. Pt not appropriate for mobilization at this time. Will follow up. Padmini Laughlin MS OTR/L Office Ext: [phone] Pager: [phone]

## 2018-12-05 NOTE — MANAGEMENT PLAN
Discharge/Transition Planning [facility] requesting [facility] for personal care. CM completed in EPAS and submitted. Will fax. Pt called requesting discharge prescriptions. Pt states had no prescription at discharge. Text page Dr Hector Galdamez RN BSN Outcomes Manager Pager # [phone]

## 2018-12-13 NOTE — DISCHARGE SUMMARY
[facility]    DISCHARGE SUMMARY    Indy Petersen  MR#: [id number]  : 1950  ACCOUNT #: [de-identified]   ADMIT DATE: 2018  DISCHARGE DATE: 2018    ADMITTING DIAGNOSES:  Back pain. HISTORY OF PRESENT ILLNESS:  The patient is a 75-year-old [facility] American male with a known history of hypertension, COPD, hepatitis C. He presented to the emergency department with back pain. His pain was described as in the middle of his back. It has been going on for about 3 months. There was some questionable history as to whether or not he had been using IV drugs. In the emergency room CT scanning was done which demonstrated potential diskitis. He was admitted for ongoing management. HOSPITAL COURSE:  A neurosurgery consultation was obtained on admission and infectious disease shortly thereafter. There was a prolonged attempt to try to obtain MRI testing for the patient. He  had multiple attempts where he acknowledged that he would get the MRI performed, but then in the MRI scanner itself he would have a severe reaction and refused to proceed. Multiple days were spent trying to assess whether the patient could be admitted to the intensive care unit and placed on a Precedex drip to accomplish this versus potentially utilizing complete sedation and placing him on the mechanical ventilator. Ultimately interventional radiology strategies were utilized and in the end Infectious Disease was able to say based on biopsy attempts that no further need for IV antibiotics was needed. The patient did have a bone scan which showed no evidence of diskitis or osteomyelitis. The patient occasionally had need of oxygen during hospitalization. This was resolved by the time of discharge. By 2018, he was considered to be ready for discharge. The patient was mobilizing relatively well.   He is also refusing any consideration for skilled nursing facility placement even for temporary rehabilitation purposes. He was discharged home. DISCHARGE DIAGNOSES:  1.  Back pain, improved. 2.  COPD. 3.  Hepatitis C.  4.  Hypertension. His condition is improved, but in need of ongoing management. DISCHARGE MEDICATIONS:  1. Combivent 1 puff by inhalation 2 times daily. 2.  Neurontin 400 mg by mouth 2 times daily. 3.  Hydrocodone/acetaminophen 77.5/300 two tablets by mouth 4 times daily as needed. 4.  Lisinopril 20 mg by mouth daily. 5.  Claritin 10 mg by mouth daily. 6.  Zocor 10 mg by mouth daily. DISPOSITION:  Home    DIET:  Regular. MD CECILIA Fernando/BILL  D: 12/12/2018 10:39     T: 12/12/2018 19:17  JOB #: [id number]  CC: Jose Angel Longo MD

## 2018-12-16 ENCOUNTER — APPOINTMENT (OUTPATIENT)
Dept: GENERAL RADIOLOGY | Age: 68
DRG: 133 | End: 2018-12-16
Attending: EMERGENCY MEDICINE
Payer: MEDICAID

## 2018-12-16 ENCOUNTER — APPOINTMENT (OUTPATIENT)
Dept: CT IMAGING | Age: 68
DRG: 133 | End: 2018-12-16
Attending: EMERGENCY MEDICINE
Payer: MEDICAID

## 2018-12-16 ENCOUNTER — HOSPITAL ENCOUNTER (INPATIENT)
Age: 68
LOS: 5 days | Discharge: HOME HEALTH | DRG: 133 | End: 2018-12-21
Attending: EMERGENCY MEDICINE | Admitting: HOSPITALIST
Payer: MEDICAID

## 2018-12-16 DIAGNOSIS — R79.89 ELEVATED LACTIC ACID LEVEL: ICD-10-CM

## 2018-12-16 DIAGNOSIS — R41.89 UNRESPONSIVE STATE: ICD-10-CM

## 2018-12-16 DIAGNOSIS — M48.061 SPINAL STENOSIS OF LUMBAR REGION WITHOUT NEUROGENIC CLAUDICATION: ICD-10-CM

## 2018-12-16 DIAGNOSIS — N39.0 ACUTE UTI: Primary | ICD-10-CM

## 2018-12-16 PROBLEM — J96.01 ACUTE RESPIRATORY FAILURE WITH HYPOXIA (HCC): Status: ACTIVE | Noted: 2018-12-16

## 2018-12-16 PROBLEM — R41.82 ALTERED MENTAL STATUS: Status: ACTIVE | Noted: 2018-12-16

## 2018-12-16 LAB
ALBUMIN SERPL-MCNC: 2.2 G/DL (ref 3.4–5)
ALBUMIN/GLOB SERPL: 0.3 {RATIO} (ref 0.8–1.7)
ALP SERPL-CCNC: 95 U/L (ref 45–117)
ALT SERPL-CCNC: 20 U/L (ref 16–61)
ANION GAP SERPL CALC-SCNC: 6 MMOL/L (ref 3–18)
APAP SERPL-MCNC: <2 UG/ML (ref 10–30)
APPEARANCE UR: ABNORMAL
ARTERIAL PATENCY WRIST A: ABNORMAL
AST SERPL-CCNC: 15 U/L (ref 15–37)
BACTERIA URNS QL MICRO: ABNORMAL /HPF
BASE EXCESS BLD CALC-SCNC: 15 MMOL/L
BASOPHILS # BLD: 0 K/UL (ref 0–0.1)
BASOPHILS NFR BLD: 0 % (ref 0–2)
BDY SITE: ABNORMAL
BILIRUB SERPL-MCNC: 0.7 MG/DL (ref 0.2–1)
BILIRUB UR QL: ABNORMAL
BNP SERPL-MCNC: 1018 PG/ML (ref 0–900)
BODY TEMPERATURE: 98
BUN SERPL-MCNC: 18 MG/DL (ref 7–18)
BUN/CREAT SERPL: 15 (ref 12–20)
CALCIUM SERPL-MCNC: 8.3 MG/DL (ref 8.5–10.1)
CHLORIDE SERPL-SCNC: 100 MMOL/L (ref 100–108)
CO2 SERPL-SCNC: 36 MMOL/L (ref 21–32)
COLOR UR: ABNORMAL
CREAT SERPL-MCNC: 1.23 MG/DL (ref 0.6–1.3)
DIFFERENTIAL METHOD BLD: ABNORMAL
EOSINOPHIL # BLD: 0 K/UL (ref 0–0.4)
EOSINOPHIL NFR BLD: 0 % (ref 0–5)
EPITH CASTS URNS QL MICRO: ABNORMAL /LPF (ref 0–5)
ERYTHROCYTE [DISTWIDTH] IN BLOOD BY AUTOMATED COUNT: 16.6 % (ref 11.6–14.5)
ETHANOL SERPL-MCNC: <3 MG/DL (ref 0–3)
GAS FLOW.O2 O2 DELIVERY SYS: ABNORMAL L/MIN
GAS FLOW.O2 SETTING OXYMISER: 15 BPM
GLOBULIN SER CALC-MCNC: 6.4 G/DL (ref 2–4)
GLUCOSE SERPL-MCNC: 134 MG/DL (ref 74–99)
GLUCOSE UR STRIP.AUTO-MCNC: NEGATIVE MG/DL
GRAN CASTS URNS QL MICRO: ABNORMAL /LPF
HCO3 BLD-SCNC: 37.3 MMOL/L (ref 22–26)
HCT VFR BLD AUTO: 40.8 % (ref 36–48)
HGB BLD-MCNC: 12.6 G/DL (ref 13–16)
HGB UR QL STRIP: NEGATIVE
INR PPP: 1.1 (ref 0.8–1.2)
INSPIRATION.DURATION SETTING TIME VENT: 0.9 SEC
KETONES UR QL STRIP.AUTO: ABNORMAL MG/DL
LACTATE BLD-SCNC: 1.1 MMOL/L (ref 0.4–2)
LACTATE BLD-SCNC: 2.34 MMOL/L (ref 0.4–2)
LEUKOCYTE ESTERASE UR QL STRIP.AUTO: NEGATIVE
LIPASE SERPL-CCNC: 785 U/L (ref 73–393)
LYMPHOCYTES # BLD: 0.6 K/UL (ref 0.9–3.6)
LYMPHOCYTES NFR BLD: 8 % (ref 21–52)
MAGNESIUM SERPL-MCNC: 2.1 MG/DL (ref 1.6–2.6)
MCH RBC QN AUTO: 28.7 PG (ref 24–34)
MCHC RBC AUTO-ENTMCNC: 30.9 G/DL (ref 31–37)
MCV RBC AUTO: 92.9 FL (ref 74–97)
MONOCYTES # BLD: 0.6 K/UL (ref 0.05–1.2)
MONOCYTES NFR BLD: 9 % (ref 3–10)
MUCOUS THREADS URNS QL MICRO: ABNORMAL /LPF
NEUTS SEG # BLD: 6.2 K/UL (ref 1.8–8)
NEUTS SEG NFR BLD: 83 % (ref 40–73)
NITRITE UR QL STRIP.AUTO: NEGATIVE
O2/TOTAL GAS SETTING VFR VENT: 100 %
PCO2 BLD: 42.8 MMHG (ref 35–45)
PEEP RESPIRATORY: 8 CMH2O
PH BLD: 7.55 [PH] (ref 7.35–7.45)
PH UR STRIP: 5.5 [PH] (ref 5–8)
PIP ISTAT,IPIP: 23
PLATELET # BLD AUTO: 162 K/UL (ref 135–420)
PMV BLD AUTO: 10.1 FL (ref 9.2–11.8)
PO2 BLD: 325 MMHG (ref 80–100)
POTASSIUM SERPL-SCNC: 4.3 MMOL/L (ref 3.5–5.5)
PROT SERPL-MCNC: 8.6 G/DL (ref 6.4–8.2)
PROT UR STRIP-MCNC: 100 MG/DL
PROTHROMBIN TIME: 14.4 SEC (ref 11.5–15.2)
RBC # BLD AUTO: 4.39 M/UL (ref 4.7–5.5)
RBC #/AREA URNS HPF: 0 /HPF (ref 0–5)
SALICYLATES SERPL-MCNC: <2.8 MG/DL (ref 2.8–20)
SAO2 % BLD: 100 % (ref 92–97)
SERVICE CMNT-IMP: ABNORMAL
SODIUM SERPL-SCNC: 142 MMOL/L (ref 136–145)
SP GR UR REFRACTOMETRY: 1.03 (ref 1–1.03)
SPECIMEN TYPE: ABNORMAL
TOTAL RESP. RATE, ITRR: 15
UROBILINOGEN UR QL STRIP.AUTO: 1 EU/DL (ref 0.2–1)
VENTILATION MODE VENT: ABNORMAL
VOLUME CONTROL PLUS IVLCP: YES
VT SETTING VENT: 550 ML
WBC # BLD AUTO: 7.4 K/UL (ref 4.6–13.2)
WBC URNS QL MICRO: ABNORMAL /HPF (ref 0–4)

## 2018-12-16 PROCEDURE — 75810000137 HC PLCMT CENT VENOUS CATH

## 2018-12-16 PROCEDURE — 93005 ELECTROCARDIOGRAM TRACING: CPT

## 2018-12-16 PROCEDURE — 74011250636 HC RX REV CODE- 250/636: Performed by: EMERGENCY MEDICINE

## 2018-12-16 PROCEDURE — 96374 THER/PROPH/DIAG INJ IV PUSH: CPT

## 2018-12-16 PROCEDURE — 0BH17EZ INSERTION OF ENDOTRACHEAL AIRWAY INTO TRACHEA, VIA NATURAL OR ARTIFICIAL OPENING: ICD-10-PCS | Performed by: EMERGENCY MEDICINE

## 2018-12-16 PROCEDURE — 99285 EMERGENCY DEPT VISIT HI MDM: CPT

## 2018-12-16 PROCEDURE — 94762 N-INVAS EAR/PLS OXIMTRY CONT: CPT

## 2018-12-16 PROCEDURE — 87040 BLOOD CULTURE FOR BACTERIA: CPT

## 2018-12-16 PROCEDURE — 81001 URINALYSIS AUTO W/SCOPE: CPT

## 2018-12-16 PROCEDURE — 77030005514 HC CATH URETH FOL14 BARD -A

## 2018-12-16 PROCEDURE — 85610 PROTHROMBIN TIME: CPT

## 2018-12-16 PROCEDURE — 96376 TX/PRO/DX INJ SAME DRUG ADON: CPT

## 2018-12-16 PROCEDURE — 65610000006 HC RM INTENSIVE CARE

## 2018-12-16 PROCEDURE — 96375 TX/PRO/DX INJ NEW DRUG ADDON: CPT

## 2018-12-16 PROCEDURE — 77030031197 HC NDL INFUS INTOSSE TELE -C

## 2018-12-16 PROCEDURE — 83605 ASSAY OF LACTIC ACID: CPT

## 2018-12-16 PROCEDURE — 74011000258 HC RX REV CODE- 258: Performed by: EMERGENCY MEDICINE

## 2018-12-16 PROCEDURE — 87086 URINE CULTURE/COLONY COUNT: CPT

## 2018-12-16 PROCEDURE — 74011250636 HC RX REV CODE- 250/636: Performed by: HOSPITALIST

## 2018-12-16 PROCEDURE — 75810000304 HC PLACE NEED INTRAOSSEOUS INFUS

## 2018-12-16 PROCEDURE — 77030008683 HC TU ET CUF COVD -A

## 2018-12-16 PROCEDURE — 83735 ASSAY OF MAGNESIUM: CPT

## 2018-12-16 PROCEDURE — 96365 THER/PROPH/DIAG IV INF INIT: CPT

## 2018-12-16 PROCEDURE — 36600 WITHDRAWAL OF ARTERIAL BLOOD: CPT

## 2018-12-16 PROCEDURE — 31500 INSERT EMERGENCY AIRWAY: CPT

## 2018-12-16 PROCEDURE — 83880 ASSAY OF NATRIURETIC PEPTIDE: CPT

## 2018-12-16 PROCEDURE — 74011250636 HC RX REV CODE- 250/636

## 2018-12-16 PROCEDURE — 85025 COMPLETE CBC W/AUTO DIFF WBC: CPT

## 2018-12-16 PROCEDURE — 80307 DRUG TEST PRSMV CHEM ANLYZR: CPT

## 2018-12-16 PROCEDURE — 82803 BLOOD GASES ANY COMBINATION: CPT

## 2018-12-16 PROCEDURE — C1751 CATH, INF, PER/CENT/MIDLINE: HCPCS

## 2018-12-16 PROCEDURE — 70450 CT HEAD/BRAIN W/O DYE: CPT

## 2018-12-16 PROCEDURE — 80053 COMPREHEN METABOLIC PANEL: CPT

## 2018-12-16 PROCEDURE — 83690 ASSAY OF LIPASE: CPT

## 2018-12-16 PROCEDURE — 74011000250 HC RX REV CODE- 250: Performed by: EMERGENCY MEDICINE

## 2018-12-16 PROCEDURE — 02HV33Z INSERTION OF INFUSION DEVICE INTO SUPERIOR VENA CAVA, PERCUTANEOUS APPROACH: ICD-10-PCS | Performed by: EMERGENCY MEDICINE

## 2018-12-16 PROCEDURE — 96368 THER/DIAG CONCURRENT INF: CPT

## 2018-12-16 PROCEDURE — 94003 VENT MGMT INPAT SUBQ DAY: CPT

## 2018-12-16 PROCEDURE — 94002 VENT MGMT INPAT INIT DAY: CPT

## 2018-12-16 PROCEDURE — 51702 INSERT TEMP BLADDER CATH: CPT

## 2018-12-16 PROCEDURE — 71045 X-RAY EXAM CHEST 1 VIEW: CPT

## 2018-12-16 PROCEDURE — 96366 THER/PROPH/DIAG IV INF ADDON: CPT

## 2018-12-16 PROCEDURE — 5A1945Z RESPIRATORY VENTILATION, 24-96 CONSECUTIVE HOURS: ICD-10-PCS | Performed by: EMERGENCY MEDICINE

## 2018-12-16 RX ORDER — MIDAZOLAM HYDROCHLORIDE 1 MG/ML
INJECTION, SOLUTION INTRAMUSCULAR; INTRAVENOUS
Status: COMPLETED
Start: 2018-12-16 | End: 2018-12-16

## 2018-12-16 RX ORDER — SODIUM CHLORIDE 9 MG/ML
100 INJECTION, SOLUTION INTRAVENOUS CONTINUOUS
Status: DISCONTINUED | OUTPATIENT
Start: 2018-12-16 | End: 2018-12-18

## 2018-12-16 RX ORDER — ACETAMINOPHEN 325 MG/1
650 TABLET ORAL
Status: DISCONTINUED | OUTPATIENT
Start: 2018-12-16 | End: 2018-12-21 | Stop reason: HOSPADM

## 2018-12-16 RX ORDER — PROPOFOL 10 MG/ML
0-50 INJECTION, EMULSION INTRAVENOUS
Status: COMPLETED | OUTPATIENT
Start: 2018-12-16 | End: 2018-12-16

## 2018-12-16 RX ORDER — NOREPINEPHRINE BIT/0.9 % NACL 8 MG/250ML
2-16 INFUSION BOTTLE (ML) INTRAVENOUS
Status: DISCONTINUED | OUTPATIENT
Start: 2018-12-16 | End: 2018-12-20

## 2018-12-16 RX ORDER — FENTANYL CITRATE 50 UG/ML
100 INJECTION, SOLUTION INTRAMUSCULAR; INTRAVENOUS ONCE
Status: COMPLETED | OUTPATIENT
Start: 2018-12-16 | End: 2018-12-16

## 2018-12-16 RX ORDER — VANCOMYCIN 2 GRAM/500 ML IN 0.9 % SODIUM CHLORIDE INTRAVENOUS
2000 ONCE
Status: COMPLETED | OUTPATIENT
Start: 2018-12-16 | End: 2018-12-16

## 2018-12-16 RX ORDER — FENTANYL CITRATE 50 UG/ML
100 INJECTION, SOLUTION INTRAMUSCULAR; INTRAVENOUS
Status: DISCONTINUED | OUTPATIENT
Start: 2018-12-16 | End: 2018-12-18

## 2018-12-16 RX ORDER — ONDANSETRON 2 MG/ML
4 INJECTION INTRAMUSCULAR; INTRAVENOUS
Status: DISCONTINUED | OUTPATIENT
Start: 2018-12-16 | End: 2018-12-21 | Stop reason: HOSPADM

## 2018-12-16 RX ORDER — FENTANYL CITRATE 50 UG/ML
INJECTION, SOLUTION INTRAMUSCULAR; INTRAVENOUS
Status: COMPLETED
Start: 2018-12-16 | End: 2018-12-16

## 2018-12-16 RX ORDER — HEPARIN SODIUM 5000 [USP'U]/ML
5000 INJECTION, SOLUTION INTRAVENOUS; SUBCUTANEOUS EVERY 8 HOURS
Status: DISCONTINUED | OUTPATIENT
Start: 2018-12-16 | End: 2018-12-21 | Stop reason: HOSPADM

## 2018-12-16 RX ORDER — PROPOFOL 10 MG/ML
INJECTION, EMULSION INTRAVENOUS
Status: COMPLETED
Start: 2018-12-16 | End: 2018-12-16

## 2018-12-16 RX ORDER — MIDAZOLAM HYDROCHLORIDE 1 MG/ML
2 INJECTION, SOLUTION INTRAMUSCULAR; INTRAVENOUS ONCE
Status: COMPLETED | OUTPATIENT
Start: 2018-12-16 | End: 2018-12-16

## 2018-12-16 RX ORDER — VANCOMYCIN/0.9 % SOD CHLORIDE 1 G/100 ML
1000 PLASTIC BAG, INJECTION (ML) INTRAVENOUS ONCE
Status: DISCONTINUED | OUTPATIENT
Start: 2018-12-16 | End: 2018-12-16 | Stop reason: DRUGHIGH

## 2018-12-16 RX ORDER — LEVOFLOXACIN 5 MG/ML
750 INJECTION, SOLUTION INTRAVENOUS EVERY 24 HOURS
Status: COMPLETED | OUTPATIENT
Start: 2018-12-16 | End: 2018-12-18

## 2018-12-16 RX ADMIN — MIDAZOLAM 2 MG: 1 INJECTION INTRAMUSCULAR; INTRAVENOUS at 16:00

## 2018-12-16 RX ADMIN — LEVOFLOXACIN 750 MG: 5 INJECTION, SOLUTION INTRAVENOUS at 18:25

## 2018-12-16 RX ADMIN — NOREPINEPHRINE BITARTRATE 4 MCG/MIN: 1 INJECTION INTRAVENOUS at 17:20

## 2018-12-16 RX ADMIN — SODIUM CHLORIDE 1000 ML: 900 INJECTION, SOLUTION INTRAVENOUS at 16:53

## 2018-12-16 RX ADMIN — PIPERACILLIN SODIUM,TAZOBACTAM SODIUM 4.5 G: 4; .5 INJECTION, POWDER, FOR SOLUTION INTRAVENOUS at 17:55

## 2018-12-16 RX ADMIN — MIDAZOLAM HYDROCHLORIDE 2 MG: 1 INJECTION, SOLUTION INTRAMUSCULAR; INTRAVENOUS at 16:00

## 2018-12-16 RX ADMIN — FENTANYL CITRATE 100 MCG: 50 INJECTION, SOLUTION INTRAMUSCULAR; INTRAVENOUS at 16:00

## 2018-12-16 RX ADMIN — FENTANYL CITRATE 100 MCG: 50 INJECTION, SOLUTION INTRAMUSCULAR; INTRAVENOUS at 22:43

## 2018-12-16 RX ADMIN — SODIUM CHLORIDE 100 ML/HR: 900 INJECTION, SOLUTION INTRAVENOUS at 23:46

## 2018-12-16 RX ADMIN — VANCOMYCIN HYDROCHLORIDE 2000 MG: 10 INJECTION, POWDER, LYOPHILIZED, FOR SOLUTION INTRAVENOUS at 20:40

## 2018-12-16 RX ADMIN — FENTANYL CITRATE 100 MCG: 50 INJECTION, SOLUTION INTRAMUSCULAR; INTRAVENOUS at 20:30

## 2018-12-16 RX ADMIN — PROPOFOL 20 MCG/KG/MIN: 10 INJECTION, EMULSION INTRAVENOUS at 16:29

## 2018-12-16 RX ADMIN — FENTANYL CITRATE 100 MCG: 50 INJECTION, SOLUTION INTRAMUSCULAR; INTRAVENOUS at 17:29

## 2018-12-16 NOTE — PROGRESS NOTES
Pharmacy Dosing Services: Vancomycin    Indication: Sepsis    Day of therapy: 1    Other Antimicrobials (Include dose, start day & day of therapy):  Levofloxacin 750 mg IV every 24 hours (started )  Pip/tazo 4.5 gm IV every 6 hours (started )      Loading dose (date given): 2000 mg  Current Maintenance dose: None at this time    Goal Vancomycin Level: 15-20  (Trough 15-20 for most infections, 20 for meningitis/osteomyelitis, pre-HD level ~25)    Vancomycin Level (if drawn): None at this time     Significant Cultures: pending    Renal function stable? (unstable defined as SCr increase of 0.5 mg/dL or > 50% increase from baseline, whichever is greater) (Y/N): N (Scr > 0.5 mg/dl increase from baseline)     CAPD, Hemodialysis or Renal Replacement Therapy (Y/N): N     Recent Labs     18  1550   CREA 1.23   BUN 18   WBC 7.4     Temp (24hrs), Av.4 °F (36.9 °C), Min:98.4 °F (36.9 °C), Max:98.4 °F (36.9 °C)    Creatinine Clearance (Creatinine Clearance (ml/min)): 70 ml/min    Regimen assessment: Will dose per level for now - consider dosing tomorrow   Maintenance dose: None at this time  Next scheduled level: Random on  at [address] [place] [address] will follow daily and adjust medications as appropriate for renal function and/or serum levels.     Thank you,  Torsten Reilly, PHARMD

## 2018-12-16 NOTE — ED PROVIDER NOTES
EMERGENCY DEPARTMENT HISTORY AND PHYSICAL EXAM    2:38 PM      Date: 12/16/2018  Patient Name: Bijan Larkin    History of Presenting Illness     No chief complaint on file. History Provided By: EMS    Chief Complaint: Unresponsive  Duration:  Last known normal at 10AM today  Timing:  Acute  Location: Patient did not dictate   Quality: Patient did not dictate   Severity: Patient did not dictate   Modifying Factors: Per EMS, the patient was 88-90% on Room Air. Patient was placed on a non-rebreather, and his saturation increased to 100%. Associated Symptoms: Patient did not dictate. Additional History (Context): Bijan Larkin is a 77 y.o. male with PMHx of HTN, COPD, and Arthritis who presents as an unresponsive patient with a last known normal at 10AM today. Per EMS, the patient is normally alert and oriented x4. EMS states the patient's mother found the patient with slurred speech, and called the patient's caregiver to the bedside. States the patient's presentation progressively worsened and the patient became unresponsive. Upon EMS arrival, the patient was not able to follow commands. Patient attempted to track with his eyes, but was not responsive to commands or stimuli. There was no  strength or strength in extremities. Patient's eyes were PERRL. Patient was 88-90% on Room Air. Patient was placed on a non-rebreather, and his saturation increased to 100%. EMS were not able to obtain medical history from the caregiver, and was able to obtain very little information from the patient's mother. No other concerns were expressed at this time. HPI is limited secondary to the patient's acuity of condition. PCP: Faboila Kelly MD    Current Outpatient Medications   Medication Sig Dispense Refill    HYDROcodone-acetaminophen (XODOL) 7.5-300 mg tablet Take 2 Tabs by mouth four (4) times daily as needed.  gabapentin (NEURONTIN) 400 mg capsule Take 400 mg by mouth two (2) times a day. Indications: NEUROPATHIC PAIN      loratadine (CLARITIN) 10 mg tablet Take 10 mg by mouth daily as needed for Allergies.  ipratropium-albuterol (COMBIVENT RESPIMAT)  mcg/actuation inhaler Take 1 Puff by inhalation every twelve (12) hours. Indications: Chronic Obstructive Pulmonary Disease with Bronchospasms      lisinopril (PRINIVIL, ZESTRIL) 20 mg tablet Take  by mouth daily.  simvastatin (ZOCOR) 10 mg tablet Take  by mouth nightly. Past History     Past Medical History:  Past Medical History:   Diagnosis Date    Arthritis     COPD     Hypertension        Past Surgical History:  Past Surgical History:   Procedure Laterality Date    HX REFRACTIVE SURGERY         Family History:  No family history on file. Social History:  Social History     Tobacco Use    Smoking status: Former Smoker     Last attempt to quit: 1984     Years since quittin.5    Smokeless tobacco: Former User   Substance Use Topics    Alcohol use: Yes     Comment: sometimes    Drug use: Yes     Types: Heroin, Marijuana, Opiates     Comment: Uses opiates for pain       Allergies: Allergies   Allergen Reactions    Shellfish Derived Hives         Review of Systems     Review of Systems   Unable to perform ROS: Acuity of condition         Physical Exam   There were no vitals taken for this visit. Physical Exam  General Exam: Patient is a well developed and well nourished. Elderly. Patient is unresponsive   Eye Exam: Lids and conjunctiva are normal. PERRL. Not tracking with eyes. ENT Exam: The general head and facial exam is normal.  The neck is supple without meningeal signs. No significant adenopathy. Pulmonary Exam: No respiratory distress. The respiratory rate is normal.  No stridor. The breath sounds are equal bilaterally. There are no wheezes, rales, or rhonchi noted. Cardiac Exam: The cardiac rate and rhythm are normal.  No significant murmurs, rubs, or gallops.   The peripheral pulses are normal.  Abdominal Exam: Abdomen is soft and non-distended. No pulsatile masses. There is no local tenderness. There is no rebound or guarding noted. Skin and Soft Tissue: The skin is warm and dry, without significant abnormality. Good color. Musculoskeletal Exam: No peripheral edema. The musculoskeletal exam of the lower extremities is normal without significant local tenderness. Psychiatric:Unresponsive. Neuro: Unresponsive    Diagnostic Study Results     Labs -  No results found for this or any previous visit (from the past 12 hour(s)). Radiologic Studies -   No orders to display         Medical Decision Making   I am the first provider for this patient. I reviewed the vital signs, available nursing notes, past medical history, past surgical history, family history and social history. Vital Signs-Reviewed the patient's vital signs. Pulse Oximetry Analysis -  96% on Non-rebreather (Interpretation)    Cardiac Monitor:  Rate: 97bpm      Records Reviewed: Nursing Notes, Old Medical Records and Triage notes  (Time of Review: 2:38 PM)    ED Course: Progress Notes, Reevaluation, and Consults:  2:23PM: Patient arrived in the ED.   : Respiratory at the bedside. 2:28PM: Bag valve mask in place. Patient's oxygen saturation is at 88%. 2:30PM: Patient was switched to a non-rebreather. Patient's oxygen saturation increased to 93%. 2:32PM: IO established to the left humeral.   2:33PM: 20mg Etomidate administered. 2:34PM: 100mg Succinylcholine administered. 2:35PM: Intubation Procedure. See Procedure note. 2:36PM: Patient is status-post intubation. Positive bilateral breath sounds and great color change with CO2.  2:37PM:  at the bedside. 2:43PM: EKG obtained at the bedside. 2:47PM: I, Dr. Samra Ford, am attempting to establish an Ultrasound-guided IV.   2:50PM: Patient is fighting the Tube. 50mg Propafol was administered. 2:54PM: 1mg Versed was administered.   2:55PM: 50mg Fentanyl was administered. 3:13PM: Central Line placement. See Procedure note. 4:00 PM : Pt care transferred to Dr. Negrita Daley  ,ED provider. History of patient complaint(s), available diagnostic reports and current treatment plan has been discussed thoroughly. Bedside rounding on patient occured : yes . Intended disposition of patient : Admit  Pending diagnostics reports and/or labs (please list): Labs and imaging      Provider Notes (Medical Decision Making):     Procedures: Intubation  Date/Time: 12/16/2018 2:35 PM  Performed by: Marybel Ratliff MD  Authorized by: Marybel Ratliff MD     Consent:     Consent obtained:  Emergent situation  Pre-procedure details:     Patient status:  Unresponsive  Procedure details:     Preoxygenation:  Bag valve mask    CPR in progress: no      Intubation method:  Oral    Oral intubation technique:  Video-assisted    Tube size (mm):  8.0    Tube type:  Cuffed    Number of attempts:  1    Cricoid pressure: no      Tube visualized through cords: yes    Placement assessment:     ETT to lip:  25    Tube secured with:  ETT forbes    Breath sounds:  Equal and absent over the epigastrium    Placement verification: chest rise, CXR verification, direct visualization, equal breath sounds, ETCO2 detector and tube exhalation      CXR findings:  ETT in proper place  Post-procedure details:     Patient tolerance of procedure: Tolerated well, no immediate complications  Central Line  Date/Time: 12/16/2018 3:13 PM  Performed by: Marybel Ratliff MD  Authorized by: Marybel Ratliff MD     Consent:     Consent obtained:  Emergent situation  Pre-procedure details:     Hand hygiene: Hand hygiene performed prior to insertion      Sterile barrier technique:  All elements of maximal sterile technique followed      Skin preparation:  2% chlorhexidine    Skin preparation agent: Skin preparation agent completely dried prior to procedure    Sedation:     Sedation type:  Deep  Procedure details:     Location:  R internal jugular Patient position:  Flat    Procedural supplies:  Triple lumen    Landmarks identified: yes      Ultrasound guidance: yes      Sterile ultrasound techniques: Sterile gel and sterile probe covers were used      Number of attempts:  1 (2 prior attempts in R groin unsuccessful - catheter would not advance - compression held at site after each attempt with no residual bleeding or swelling noted)    Successful placement: yes    Post-procedure details:     Post-procedure:  Dressing applied and line sutured    Assessment:  Blood return through all ports, no pneumothorax on x-ray and placement verified by x-ray    Patient tolerance of procedure: Tolerated well, no immediate complications      Critical Care Time:  The services I provided to this patient were to treat and/or prevent clinically significant deterioration that could result in the failure of one or more body systems and/or organ systems due to     Services included the following:  -reviewing nursing notes and old charts  -vital sign assessments  -direct patient care  -medication orders and management  -interpreting and reviewing diagnostic studies/labs  -re-evaluations  -documentation time    Aggregate critical care time was  minutes, which includes only time during which I was engaged in work directly related to the patient's care as described above, whether I was at bedside or elsewhere in the Emergency Department. It did not include time spent performing other reported procedures or the services of residents, students, nurses, or advance practice providers. Sami Strong MD    3:46 PM    MDM: Pt presented unresponsive and hypoxic per EMS. Intubated for airway protection. Difficult access. Initially had IO placed in L upper arm by ED tech. Attempts at peripheral access failed. Central line attempts in R groin unsuccessful due to inability to advance catheter. Pressure held to site after each attempt. Successful placement of CVL in R IJ.  Labs and imaging ordered. Pt care transferred to Dr. Shonda Parmar at this time. Pt noted to have somewhat purposeful movement of R arm in reaching towards tube. Still does not follow commands. Diagnosis     Disposition: Signed out to Dr. Shonda Parmar, ED Provider     Follow-up Information    None             Medication List      ASK your doctor about these medications    COMBIVENT RESPIMAT  mcg/actuation inhaler  Generic drug:  ipratropium-albuterol     gabapentin 400 mg capsule  Commonly known as:  NEURONTIN     HYDROcodone-acetaminophen 7.5-300 mg tablet  Commonly known as:  XODOL     lisinopril 20 mg tablet  Commonly known as:  PRINIVIL, ZESTRIL     loratadine 10 mg tablet  Commonly known as:  CLARITIN     simvastatin 10 mg tablet  Commonly known as:  ZOCOR          _______________________________       [facility] acting as a scribe for and in the presence of Freda De La Rosa MD      December 16, 2018 at 2:38 PM       Provider Attestation:      I personally performed the services described in the documentation, reviewed the documentation, as recorded by the scribe in my presence, and it accurately and completely records my words and actions.  December 16, 2018 at 2:38 PM - Freda De La Rosa MD        _______________________________

## 2018-12-17 ENCOUNTER — APPOINTMENT (OUTPATIENT)
Dept: GENERAL RADIOLOGY | Age: 68
DRG: 133 | End: 2018-12-17
Attending: INTERNAL MEDICINE
Payer: MEDICAID

## 2018-12-17 ENCOUNTER — APPOINTMENT (OUTPATIENT)
Dept: NON INVASIVE DIAGNOSTICS | Age: 68
DRG: 133 | End: 2018-12-17
Attending: HOSPITALIST
Payer: MEDICAID

## 2018-12-17 ENCOUNTER — APPOINTMENT (OUTPATIENT)
Dept: GENERAL RADIOLOGY | Age: 68
DRG: 133 | End: 2018-12-17
Attending: FAMILY MEDICINE
Payer: MEDICAID

## 2018-12-17 PROBLEM — J32.9 SINUSITIS: Status: ACTIVE | Noted: 2018-12-17

## 2018-12-17 LAB
ALBUMIN SERPL-MCNC: 1.9 G/DL (ref 3.4–5)
ALBUMIN/GLOB SERPL: 0.3 {RATIO} (ref 0.8–1.7)
ALP SERPL-CCNC: 81 U/L (ref 45–117)
ALT SERPL-CCNC: 21 U/L (ref 16–61)
AMPHET UR QL SCN: NEGATIVE
ANION GAP SERPL CALC-SCNC: 6 MMOL/L (ref 3–18)
APTT PPP: 31.5 SEC (ref 23–36.4)
AST SERPL-CCNC: 60 U/L (ref 15–37)
ATRIAL RATE: 101 BPM
ATRIAL RATE: 93 BPM
BARBITURATES UR QL SCN: NEGATIVE
BENZODIAZ UR QL: POSITIVE
BILIRUB SERPL-MCNC: 0.7 MG/DL (ref 0.2–1)
BUN SERPL-MCNC: 20 MG/DL (ref 7–18)
BUN/CREAT SERPL: 18 (ref 12–20)
CALCIUM SERPL-MCNC: 7.5 MG/DL (ref 8.5–10.1)
CALCULATED P AXIS, ECG09: 46 DEGREES
CALCULATED P AXIS, ECG09: 58 DEGREES
CALCULATED R AXIS, ECG10: -23 DEGREES
CALCULATED R AXIS, ECG10: -30 DEGREES
CALCULATED T AXIS, ECG11: 36 DEGREES
CALCULATED T AXIS, ECG11: 40 DEGREES
CANNABINOIDS UR QL SCN: NEGATIVE
CHLORIDE SERPL-SCNC: 105 MMOL/L (ref 100–108)
CHOLEST SERPL-MCNC: 114 MG/DL
CO2 SERPL-SCNC: 31 MMOL/L (ref 21–32)
COCAINE UR QL SCN: NEGATIVE
CREAT SERPL-MCNC: 1.13 MG/DL (ref 0.6–1.3)
DIAGNOSIS, 93000: NORMAL
DIAGNOSIS, 93000: NORMAL
ECHO AO ROOT DIAM: 3.2 CM
ECHO AV PEAK GRADIENT: 0 MMHG
ECHO AV PEAK VELOCITY: 0 CM/S
ECHO LA VOL 2C: 78.43 ML (ref 18–58)
ECHO LA VOL 4C: 89.46 ML (ref 18–58)
ECHO LA VOLUME INDEX A2C: 34.63 ML/M2 (ref 16–28)
ECHO LA VOLUME INDEX A4C: 39.5 ML/M2 (ref 16–28)
ECHO LV EDV A2C: 143.5 ML
ECHO LV EDV A4C: 157.9 ML
ECHO LV EDV BP: 151.1 ML (ref 67–155)
ECHO LV EDV INDEX A4C: 69.7 ML/M2
ECHO LV EDV INDEX BP: 66.7 ML/M2
ECHO LV EDV NDEX A2C: 63.4 ML/M2
ECHO LV EJECTION FRACTION A2C: 45 %
ECHO LV EJECTION FRACTION A4C: 44 %
ECHO LV EJECTION FRACTION BIPLANE: 44.5 % (ref 55–100)
ECHO LV ESV A2C: 78.8 ML
ECHO LV ESV A4C: 88.1 ML
ECHO LV ESV BP: 83.9 ML (ref 22–58)
ECHO LV ESV INDEX A2C: 34.8 ML/M2
ECHO LV ESV INDEX A4C: 38.9 ML/M2
ECHO LV ESV INDEX BP: 37 ML/M2
ECHO LV INTERNAL DIMENSION DIASTOLIC: 4.09 CM (ref 4.2–5.9)
ECHO LV INTERNAL DIMENSION SYSTOLIC: 2.75 CM
ECHO LV IVSD: 1.32 CM (ref 0.6–1)
ECHO LV MASS 2D: 224.3 G (ref 88–224)
ECHO LV MASS INDEX 2D: 99 G/M2 (ref 49–115)
ECHO LV POSTERIOR WALL DIASTOLIC: 1.26 CM (ref 0.6–1)
ECHO LVOT DIAM: 2.42 CM
ECHO LVOT PEAK GRADIENT: 5.2 MMHG
ECHO LVOT PEAK VELOCITY: 113.66 CM/S
ECHO MV A VELOCITY: 79.56 CM/S
ECHO MV AREA PHT: 6.1 CM2
ECHO MV E DECELERATION TIME (DT): 123.8 MS
ECHO MV E VELOCITY: 0.47 CM/S
ECHO MV E/A RATIO: 0.01
ECHO MV PRESSURE HALF TIME (PHT): 35.9 MS
ERYTHROCYTE [DISTWIDTH] IN BLOOD BY AUTOMATED COUNT: 16.6 % (ref 11.6–14.5)
GLOBULIN SER CALC-MCNC: 5.7 G/DL (ref 2–4)
GLUCOSE SERPL-MCNC: 94 MG/DL (ref 74–99)
HCT VFR BLD AUTO: 38.9 % (ref 36–48)
HDLC SERPL-MCNC: 36 MG/DL (ref 40–60)
HDLC SERPL: 3.2 {RATIO} (ref 0–5)
HDSCOM,HDSCOM: ABNORMAL
HGB BLD-MCNC: 12 G/DL (ref 13–16)
LDLC SERPL CALC-MCNC: 61.8 MG/DL (ref 0–100)
LIPID PROFILE,FLP: ABNORMAL
MCH RBC QN AUTO: 27.8 PG (ref 24–34)
MCHC RBC AUTO-ENTMCNC: 30.8 G/DL (ref 31–37)
MCV RBC AUTO: 90.3 FL (ref 74–97)
METHADONE UR QL: NEGATIVE
OPIATES UR QL: POSITIVE
P-R INTERVAL, ECG05: 132 MS
P-R INTERVAL, ECG05: 194 MS
PCP UR QL: NEGATIVE
PLATELET # BLD AUTO: 145 K/UL (ref 135–420)
PMV BLD AUTO: 10 FL (ref 9.2–11.8)
POTASSIUM SERPL-SCNC: 3.6 MMOL/L (ref 3.5–5.5)
PROT SERPL-MCNC: 7.6 G/DL (ref 6.4–8.2)
Q-T INTERVAL, ECG07: 368 MS
Q-T INTERVAL, ECG07: 374 MS
QRS DURATION, ECG06: 82 MS
QRS DURATION, ECG06: 84 MS
QTC CALCULATION (BEZET), ECG08: 465 MS
QTC CALCULATION (BEZET), ECG08: 477 MS
RBC # BLD AUTO: 4.31 M/UL (ref 4.7–5.5)
SODIUM SERPL-SCNC: 142 MMOL/L (ref 136–145)
TRIGL SERPL-MCNC: 81 MG/DL (ref ?–150)
TROPONIN I SERPL-MCNC: 0.03 NG/ML (ref 0–0.04)
TROPONIN I SERPL-MCNC: 0.04 NG/ML (ref 0–0.04)
TROPONIN I SERPL-MCNC: 0.04 NG/ML (ref 0–0.04)
VANCOMYCIN SERPL-MCNC: 15 UG/ML (ref 5–40)
VENTRICULAR RATE, ECG03: 101 BPM
VENTRICULAR RATE, ECG03: 93 BPM
VLDLC SERPL CALC-MCNC: 16.2 MG/DL
WBC # BLD AUTO: 9.3 K/UL (ref 4.6–13.2)

## 2018-12-17 PROCEDURE — 85730 THROMBOPLASTIN TIME PARTIAL: CPT

## 2018-12-17 PROCEDURE — 94003 VENT MGMT INPAT SUBQ DAY: CPT

## 2018-12-17 PROCEDURE — 80053 COMPREHEN METABOLIC PANEL: CPT

## 2018-12-17 PROCEDURE — 85027 COMPLETE CBC AUTOMATED: CPT

## 2018-12-17 PROCEDURE — 74011250637 HC RX REV CODE- 250/637: Performed by: HOSPITALIST

## 2018-12-17 PROCEDURE — 36591 DRAW BLOOD OFF VENOUS DEVICE: CPT

## 2018-12-17 PROCEDURE — 80307 DRUG TEST PRSMV CHEM ANLYZR: CPT

## 2018-12-17 PROCEDURE — 93306 TTE W/DOPPLER COMPLETE: CPT

## 2018-12-17 PROCEDURE — 74011250636 HC RX REV CODE- 250/636: Performed by: EMERGENCY MEDICINE

## 2018-12-17 PROCEDURE — 80202 ASSAY OF VANCOMYCIN: CPT

## 2018-12-17 PROCEDURE — 71045 X-RAY EXAM CHEST 1 VIEW: CPT

## 2018-12-17 PROCEDURE — 65610000006 HC RM INTENSIVE CARE

## 2018-12-17 PROCEDURE — 74011000258 HC RX REV CODE- 258: Performed by: INTERNAL MEDICINE

## 2018-12-17 PROCEDURE — 74011000258 HC RX REV CODE- 258: Performed by: EMERGENCY MEDICINE

## 2018-12-17 PROCEDURE — 36415 COLL VENOUS BLD VENIPUNCTURE: CPT

## 2018-12-17 PROCEDURE — 74011250636 HC RX REV CODE- 250/636: Performed by: HOSPITALIST

## 2018-12-17 PROCEDURE — 80061 LIPID PANEL: CPT

## 2018-12-17 PROCEDURE — 77030032490 HC SLV COMPR SCD KNE COVD -B

## 2018-12-17 PROCEDURE — 84484 ASSAY OF TROPONIN QUANT: CPT

## 2018-12-17 PROCEDURE — 74018 RADEX ABDOMEN 1 VIEW: CPT

## 2018-12-17 PROCEDURE — 74011250636 HC RX REV CODE- 250/636: Performed by: INTERNAL MEDICINE

## 2018-12-17 PROCEDURE — 74011250637 HC RX REV CODE- 250/637: Performed by: FAMILY MEDICINE

## 2018-12-17 RX ORDER — DOCUSATE SODIUM 50 MG/5ML
100 LIQUID ORAL DAILY
Status: DISCONTINUED | OUTPATIENT
Start: 2018-12-17 | End: 2018-12-21 | Stop reason: HOSPADM

## 2018-12-17 RX ORDER — DOCUSATE SODIUM 100 MG/1
100 CAPSULE, LIQUID FILLED ORAL DAILY
Status: DISCONTINUED | OUTPATIENT
Start: 2018-12-17 | End: 2018-12-17

## 2018-12-17 RX ORDER — ACETAMINOPHEN 650 MG/1
650 SUPPOSITORY RECTAL
Status: DISCONTINUED | OUTPATIENT
Start: 2018-12-17 | End: 2018-12-17

## 2018-12-17 RX ORDER — FUROSEMIDE 10 MG/ML
40 INJECTION INTRAMUSCULAR; INTRAVENOUS
Status: COMPLETED | OUTPATIENT
Start: 2018-12-17 | End: 2018-12-17

## 2018-12-17 RX ADMIN — SODIUM CHLORIDE 1250 MG: 900 INJECTION, SOLUTION INTRAVENOUS at 13:33

## 2018-12-17 RX ADMIN — PIPERACILLIN SODIUM,TAZOBACTAM SODIUM 3.38 G: 3; .375 INJECTION, POWDER, FOR SOLUTION INTRAVENOUS at 19:37

## 2018-12-17 RX ADMIN — PIPERACILLIN SODIUM,TAZOBACTAM SODIUM 4.5 G: 4; .5 INJECTION, POWDER, FOR SOLUTION INTRAVENOUS at 05:15

## 2018-12-17 RX ADMIN — FENTANYL CITRATE 100 MCG: 50 INJECTION, SOLUTION INTRAMUSCULAR; INTRAVENOUS at 19:40

## 2018-12-17 RX ADMIN — LEVOFLOXACIN 750 MG: 5 INJECTION, SOLUTION INTRAVENOUS at 18:26

## 2018-12-17 RX ADMIN — ACETAMINOPHEN 650 MG: 325 TABLET, FILM COATED ORAL at 13:11

## 2018-12-17 RX ADMIN — HEPARIN SODIUM 5000 UNITS: 5000 INJECTION INTRAVENOUS; SUBCUTANEOUS at 16:44

## 2018-12-17 RX ADMIN — SODIUM CHLORIDE 100 ML/HR: 900 INJECTION, SOLUTION INTRAVENOUS at 02:43

## 2018-12-17 RX ADMIN — DOCUSATE SODIUM 100 MG: 50 LIQUID ORAL at 16:44

## 2018-12-17 RX ADMIN — PIPERACILLIN SODIUM,TAZOBACTAM SODIUM 3.38 G: 3; .375 INJECTION, POWDER, FOR SOLUTION INTRAVENOUS at 13:02

## 2018-12-17 RX ADMIN — FENTANYL CITRATE 100 MCG: 50 INJECTION, SOLUTION INTRAMUSCULAR; INTRAVENOUS at 16:44

## 2018-12-17 RX ADMIN — HEPARIN SODIUM 5000 UNITS: 5000 INJECTION INTRAVENOUS; SUBCUTANEOUS at 00:10

## 2018-12-17 RX ADMIN — PIPERACILLIN SODIUM,TAZOBACTAM SODIUM 4.5 G: 4; .5 INJECTION, POWDER, FOR SOLUTION INTRAVENOUS at 00:09

## 2018-12-17 RX ADMIN — FUROSEMIDE 40 MG: 10 INJECTION, SOLUTION INTRAMUSCULAR; INTRAVENOUS at 19:40

## 2018-12-17 RX ADMIN — ACETAMINOPHEN 650 MG: 650 SUPPOSITORY RECTAL at 03:16

## 2018-12-17 RX ADMIN — SODIUM CHLORIDE 1000 ML: 900 INJECTION, SOLUTION INTRAVENOUS at 01:40

## 2018-12-17 RX ADMIN — HEPARIN SODIUM 5000 UNITS: 5000 INJECTION INTRAVENOUS; SUBCUTANEOUS at 08:50

## 2018-12-17 RX ADMIN — SODIUM CHLORIDE 1000 ML: 900 INJECTION, SOLUTION INTRAVENOUS at 00:53

## 2018-12-17 NOTE — PROGRESS NOTES
0730 bedside verbal report given by nighttime nurse, Michelle Lemon rn. Pt on the vent, resting in bed.  0800 physical assessment completed at this time. 1000 IDR completed: pt to stay intubated. 200 Paged dr Juan Wick about low urine output. 1910 Bedside verbal shift change report given to Denise Davidson rn (oncoming nurse) by Osvaldo Humphreys (offgoing nurse). Report included the following information SBAR, Kardex, Intake/Output and MAR.   1930 Paged dr Master Bolton about urine output, Lasix IV ordered.

## 2018-12-17 NOTE — PROGRESS NOTES
2320-  Pt from ED per stretcher. Pt assisted to icu bed. Placed on monitoring equipment. Gtt and ventilator settings verified. Undocumented bolus addressed with primary ED nurse Franklyn Dominguez. Franklyn Dominguez RN to follow up re: completion of bolus. 0000-  Pt given complete bed bath with chg.  Linens, gown, and bed pad changed. 0030-  Phlebotomy at bedside for scheduled labs. 1538-  Stopped levophed for 's and MAP 90's. Renata Wisdom RN updated re: boluses ordered. Stated day RN did not give subsequent boluses. 56-  Started boluses as ordered on previous shift. 0200-  Pt's sbp 82 off of levophed. Placed back on levophed. Now sbp 140's. Turned levophed gtt off.  0330-  Cardoza catheter care given. 0600-  Mouth care given. Bedside and Verbal shift change report given to Alfred Gna (oncoming nurse) by Jaxon De La Rosa RN   (offgoing nurse). Report included the following information Kardex, Intake/Output, MAR and Recent Results.

## 2018-12-17 NOTE — PROGRESS NOTES
Reason for Readmission:   Acute respiratory failure           RRAT Score and Risk Level:   25 red       Level of Readmission: level 1         Care Conference scheduled:          Resources/supports as identified by patient/family: Daughter Rosario Shultz , mother Jagjit Section         Top Challenges facing patient (as identified by patient/family and CM): Finances/Medication cost?  CCCP Medicaid     Transportation    [employer]    Support system or lack thereof? Home care through [facility]  And personal care, [facility]     Living arrangements? Live with mother         Self-care/ADLs/Cognition? AAO x3         Current Advanced Directive/Advance Care Plan:  No on file         Plan for utilizing home health:   As indicated             Likelihood of additional readmission:  high              Transition of Care Plan:    Based on readmission, the patient's previous Plan of Care   has been evaluated and/or modified. The current Transition of Care Plan is:     Long term care vs home with personal care    Patient unresponsive intubaed on the ventilator. He lives with his elderly mother Salvador Preston . His NOK is his daughter [phone]. Patient has personal care through [facility]. [facility] was [facility] 2018. He was discharge home with home care through his personal care agency. He received personal care Monday thru Friday 9 am - 3 pm,and 9 - 1 pm  Weekends. He was offered [address] as a discharge option and declined as he did not agree with the financial constraints required by Long term admission. He has a longer term history of substance abuse, however, he was not tested on this admission. He has a PCP, Dr India Alvarado. His discharge plan is dependent upon his successful extubation. His discharge options included LTAC, ARU, Long term care or home care.    Care Management Interventions  PCP Verified by CM: Yes  Palliative Care Criteria Met (RRAT>21 & CHF Dx)?: No  Mode of Transport at Discharge:  Other (see comment)(unclear at this time)  Transition of Care Consult (CM Consult): Discharge Planning  MyChart Signup: No  Discharge Durable Medical Equipment: No  Health Maintenance Reviewed: Yes  Physical Therapy Consult: No  Occupational Therapy Consult: No  Speech Therapy Consult: No  Current Support Network: Relative's Home  Confirm Follow Up Transport: Other (see comment)(uncleaqr at this time)  Plan discussed with Pt/Family/Caregiver: Yes  [address] Provided?: No  Discharge Location  Discharge Placement: Other:(aru vs LTC vs hh)

## 2018-12-17 NOTE — CONSULTS
[facility]  ICU Progress Note      Name: Yolanda Brown   : 1950   MRN: [id number]   Date: 2018 1:41 PM     [x]I have reviewed the flowsheet and previous days notes. Events overnight reviewed and discussed with nursing staff. Vital signs and records reviewed. HPI    76 y/o male w/ PMHx of HTN, COPD, and arthritis who presented unresponsive w/ a last known well time of 10AM via EMS to the ED yesterday. Per chart review, the pt is normally AOX4, and the pt's mother found the pt w/ slurred speech and progressed to unresponsive. Pt was intubated in the ED. Subjective:  18  No new events overnight. Remains intubated, not requiring sedation    Follows commands, nods appropriately  Levo off since last night    [x]The patient is unable to give any meaningful history or review of systems because the patient is:  [x]Intubated []Sedated   []Unresponsive      [x]The patient is critically ill on      [x]Mechanical ventilation []Pressors   []BiPAP []                 ROS:A comprehensive review of systems was negative except for that written in the HPI.     Medication Review:  · Pressors - Levo  · Sedation - none  · Antibiotics - levaquin, zosyn,vanc  · Pain - prn tylenol and fentanyl  · GI/ DVT - protonix, SCDs  · Others (other gtts)    Safety Bundles: VAP Bundle/ CAUTI/ Electrolyte Replacement Protocol    Vital Signs:    Visit Vitals  /75   Pulse 83   Temp 99.7 °F (37.6 °C)   Resp 13   Ht 6' (1.829 m)   Wt 104.8 kg (231 lb)   SpO2 100%   BMI 31.33 kg/m²               Temp (24hrs), Av.7 °F (37.6 °C), Min:95.6 °F (35.3 °C), Max:101.8 °F (38.8 °C)       Intake/Output:   Last shift:       07 - 1900  In: -   Out: 280 [Urine:280]  Last 3 shifts: 12/15 1901 -  07  In: 5612.6 [I.V.:5612.6]  Out: 510 [Urine:510]    Intake/Output Summary (Last 24 hours) at 2018 1341  Last data filed at 2018 1300  Gross per 24 hour   Intake 5612.59 ml Output 790 ml   Net 4822.59 ml       Ventilator Settings:  Ventilator Mode: Assist control, VC+  Respiratory Rate  Back-Up Rate: 12  Insp Time (sec): 0.9 sec  I:E Ratio: 1;2  Ventilator Volumes  Vt Set (ml): 550 ml  Vt Exhaled (Machine Breath) (ml): 584 ml  Ve Observed (l/min): 7.49 l/min  Ventilator Pressures  PIP Observed (cm H2O): 22 cm H2O  Plateau Pressure (cm H2O): 17 cm H2O  MAP (cm H2O): 8.2  PEEP/VENT (cm H2O): 5 cm H20    Physical Exam:    General: Intubated, following commands, NAD  HEENT:  Anicteric sclerae; pink palpebral conjunctivae; mucosa moist  Resp:  Clear bilaterally to auscultation; Symmetrical chest expansion, no accessory muscle use; good airway entry; no rales/ wheezing/ rhonchi noted  CV:  S1, S2 present; NSR  GI:  Abdomen soft, non-tender; (+) active bowel sounds  Extremities:  +2 pulses on all extremities; +2 generalized edema; normal capillary refill  Skin:  Warm; no rashes/ lesions noted  Neurologic:  Non-focal; follows commands; nods appropriately, pupils 3 round, reactive  Devices:  OGT, ETT, bailon      DATA:     Current Facility-Administered Medications   Medication Dose Route Frequency    acetaminophen (TYLENOL) suppository 650 mg  650 mg Rectal Q4H PRN    influenza vaccine 2018-19 (6 mos+)(PF) (FLUARIX QUAD/FLULAVAL QUAD) injection 0.5 mL  0.5 mL IntraMUSCular PRIOR TO DISCHARGE    vancomycin (VANCOCIN) 1,250 mg in 0.9% sodium chloride 250 mL IVPB  1,250 mg IntraVENous Q12H    [START ON 12/18/2018] VANCOMYCIN INFORMATION NOTE   Other ONCE    acetaminophen (TYLENOL) suppository 650 mg  650 mg Rectal Q4H PRN    piperacillin-tazobactam (ZOSYN) 3.375 g in  ml ##EXTENDED 4HRS INFUSION  3.375 g IntraVENous Q8H    NOREPINephrine (LEVOPHED) 8 mg in 0.9% NS 250ml infusion  2-16 mcg/min IntraVENous TITRATE    fentaNYL citrate (PF) injection 100 mcg  100 mcg IntraVENous Q1H PRN    levoFLOXacin (LEVAQUIN) 750 mg in D5W IVPB  750 mg IntraVENous Q24H    VANCOMYCIN INFORMATION NOTE   Other Rx Dosing/Monitoring    acetaminophen (TYLENOL) tablet 650 mg  650 mg Oral Q6H PRN    ondansetron (ZOFRAN) injection 4 mg  4 mg IntraVENous Q6H PRN    0.9% sodium chloride infusion  100 mL/hr IntraVENous CONTINUOUS    heparin (porcine) injection 5,000 Units  5,000 Units SubCUTAneous Q8H         Labs: Results:       Chemistry Recent Labs     12/17/18  0030 12/16/18  1550   GLU 94 134*    142   K 3.6 4.3    100   CO2 31 36*   BUN 20* 18   CREA 1.13 1.23   CA 7.5* 8.3*   AGAP 6 6   BUCR 18 15   AP 81 95   TP 7.6 8.6*   ALB 1.9* 2.2*   GLOB 5.7* 6.4*   AGRAT 0.3* 0.3*      CBC w/Diff Recent Labs     12/17/18  0030 12/16/18  1550   WBC 9.3 7.4   RBC 4.31* 4.39*   HGB 12.0* 12.6*   HCT 38.9 40.8    162   GRANS  --  83*   LYMPH  --  8*   EOS  --  0      Coagulation Recent Labs     12/17/18  0030 12/16/18  1550   PTP  --  14.4   INR  --  1.1   APTT 31.5  --        Liver Enzymes Recent Labs     12/17/18  0030   TP 7.6   ALB 1.9*   AP 81   SGOT 60*      ABG Lab Results   Component Value Date/Time    PHI 7.547 (H) 12/16/2018 05:13 PM    PCO2I 42.8 12/16/2018 05:13 PM    PO2I 325 (H) 12/16/2018 05:13 PM    HCO3I 37.3 (H) 12/16/2018 05:13 PM    FIO2I 100 12/16/2018 05:13 PM      Microbiology Recent Labs     12/16/18  1645 12/16/18  1630 12/16/18  1605   CULT NO GROWTH AFTER 14 HOURS NO GROWTH AFTER 14 HOURS NO GROWTH AFTER 13 HOURS          Telemetry: [x]Sinus []A-flutter []Paced    []A-fib []Multiple PVCs                    Imaging:  CXR Results  (Last 48 hours)               12/17/18 1016  XR CHEST PORT Final result    Impression:  IMPRESSION:           1. Focal placement of nasogastric tube into the abdomen. Please see KUB report       2. Otherwise radiographically stable portable chest since 12/16/2018. Endotracheal tube and right IJ venous line in situ. Small to moderate-sized   bilateral pleural effusions and likely bilateral basilar partial atelectasis.    Probable pulmonary venous hypertension       Narrative:  CHEST AP PORTABLE, 12/17/2018, 0929 hours        Comparison prior exam, 12/16/2018, 1555 hours. Findings: There is interval placement of a nasogastric tube, tip extending into the upper   abdomen not discretely visible on this somewhat underpenetrated exam.       Endotracheal tube remains present tip roughly 2.5-3 cm above the reggie. Lungs   are mildly hypoinflated. There is haziness over the lower lung zones associated   with loss of hemidiaphragm margins and blunting of costophrenic sulci. Minimal   right minor fissural thickening is stable. The lower lung zones cannot be   evaluated further. The upper lung zones appear   clear . No evident pneumothorax       The cardiac silhouette is stable and the mediastinum  appears unremarkable. The   pulmonary vascularity is mildly prominent but unchanged       Support catheters/tubes:   As above. Additional right IJ venous catheter has tip in the region of the right   atrium. Overlying numerous leads especially right chest       Misc:    Spondylosis               12/16/18 1617  XR CHEST PORT Final result    Impression:  IMPRESSION:           1. ETT in satisfactory position. Right jugular central venous catheter within   the right atrium. 2. CHF with small to moderate right and small left pleural effusions, linear   atelectasis right lung base and retrocardiac atelectasis and/or infiltrate. Narrative:  EXAM: Chest, portable AP supine, one view       INDICATION: Arrived to emergency department unresponsive. Endotracheal tube   placement. COMPARISON: 11/22/2018       _______________       FINDINGS:       ETT is 3.2 cm above the reggie, right jugular central venous catheter tip   projects at the right atrium. Cardiac silhouette is within normal range in size. Calcified plaque is present   at the aortic arch. No pneumothorax.  Pulmonary vascular redistribution has developed along with   small to moderate right and small left pleural effusion. Parenchymal band at the   right lung base is compatible with atelectasis. Retrocardiac region is somewhat   dense. No pneumothorax appreciated. Degenerative changes noted at both shoulders. _______________                 CT Results  (Last 48 hours)               12/16/18 2037  CT HEAD WO CONT Final result    Impression:  IMPRESSION:       1. No acute intracranial hemorrhage, mass effect, midline shift, or herniation. No definite CT evidence of acute cortical infarct is seen. Please note that   noncontrast head CT may be normal in early acute infarct. 2. Bilateral sphenoid sinus bubbly air-fluid levels potentially representing   acute sinusitis or related to secretions given intubation. Clinical correlation   is recommended. Preliminary report provided by on-call radiology resident. Narrative:  EXAM: CT HEAD W/O CONTRAST       INDICATION: Confusion/delirium, altered level of consciousness, unresponsive       COMPARISON: CT head 11/15/2018       TECHNIQUE: Axial CT imaging of the head was performed without intravenous   contrast.  Additional coronal and sagittal reconstructions were performed. One   or more dose reduction techniques were used on this CT: automated exposure   control, adjustment of the mAs and/or kVp according to patient's size, and   iterative reconstruction techniques. The specific techniques utilized on this CT   exam have been documented in the patient's electronic medical record.   _______________       FINDINGS:       Motion artifact is present which limits evaluation. VENTRICLES/EXTRA-AXIAL SPACES: The ventricles and sulci are normal in their size   and configuration. BRAIN PARENCHYMA: There is no evidence of acute intracranial hemorrhage, mass   effect, midline shift, or herniation. No definite CT evidence of acute cortical   infarct is seen. The gray-white matter differentiation is within normal limits. ORBITS: The bilateral lenses are absent, likely due to prior cataract surgery. PARANASAL SINUSES/MASTOIDS: Bubbly air-fluid levels are present in the bilateral   sphenoid sinuses. Additional mucoperiosteal thickening is seen in the ethmoidal   air cells. Visualized mastoid air cells are clear. OSSEOUS STRUCTURES: No fracture is seen. OTHER: Endotracheal tube is present.         _______________                       IMPRESSION:   · Acute hypoxic respiratory failure requiring mechanical ventilation  · Possible aspiration PNA  Hx  · COPD  · HTN  · Arthritis  · Drug use w/ recent admission for thoracic discitis  · Hep C        PLAN:   · Resp -  VAP bundle. Wean vent settings as tolerated. HOB > 30. Aspiration precautions. Daily CXR and ABG. Pulmonary hygiene. · ID - Febrile but leukocytosis. Trend temp and WBC curve. BCx pending. Continue ABx. Monitor for signs of infection. · CVS - HD stable. Monitor HD status. · Heme/onc - Daily CBC. Monitor H/H and platelets. Monitor for signs of bleeding. · Metabolic - Daily CMP. Trend and replace electrolytes per replacement protocol. · Renal - Trend renal indices. Cardoza for strict I/O.  · Endocrine - Glycemic control per protocol. · Neuro/ Pain/ Sedation - Monitor neuro status. PRN pain meds if needed. · GI - Start tube feeding. Bowel regimen. · Prophylaxis - DVT, GI- SQH, protonix.   · Discussed in interdisciplinary rounds  · Code status- FULL          The patient is: [] acutely ill Risk of deterioration: [x] moderate    [x] critically ill  [] high     []See my orders for details    My assessment/plan was discussed with:  [x]nursing []PT/OT    [x]respiratory therapy [x]Dr. Willie Kong   []family []       Heidi Burrell NP

## 2018-12-17 NOTE — PROGRESS NOTES
Progress Note      Patient: Yolanda Brown               Sex: male          DOA: 12/16/2018       YOB: 1950      Age:  77 y.o.        LOS:  LOS: 1 day               Subjective / Interval Hx  I    Yolanda Brown is a 77 y.o. male  who presents with respiratory failure and intubated. He is on IV abx for pneumonia likely aspiration. Blood cx pending      Objective:      Visit Vitals  /68   Pulse 85   Temp (!) 101.2 °F (38.4 °C)   Resp 13   Ht 6' (1.829 m)   Wt 104.8 kg (231 lb 0.7 oz)   SpO2 100%   BMI 31.33 kg/m²             Physical Exam   Constitutional: He appears well-developed and well-nourished. He is intubated. HENT:   Head: Normocephalic and atraumatic. Cardiovascular: Normal rate and regular rhythm. No murmur heard. Pulmonary/Chest: Effort normal. He is intubated. No respiratory distress. He has no wheezes. He has no rales. Abdominal: Soft. Bowel sounds are normal.   Musculoskeletal: He exhibits no edema. Skin: Skin is warm. No rash noted. No erythema. No pallor. Psychiatric:   intubated         Intake and Output:  Current Shift:  No intake/output data recorded.   Last three shifts:  12/15 1901 - 12/17 0700  In: 5612.6 [I.V.:5612.6]  Out: 510 [Urine:510]    Recent Results (from the past 48 hour(s))   EKG, 12 LEAD, INITIAL    Collection Time: 12/16/18  2:42 PM   Result Value Ref Range    Ventricular Rate 93 BPM    Atrial Rate 93 BPM    P-R Interval 132 ms    QRS Duration 82 ms    Q-T Interval 374 ms    QTC Calculation (Bezet) 465 ms    Calculated P Axis 46 degrees    Calculated R Axis -30 degrees    Calculated T Axis 36 degrees    Diagnosis       Normal sinus rhythm  Possible Left atrial enlargement  Left axis deviation  Septal infarct , age undetermined  Abnormal ECG  When compared with ECG of 14-NOV-2018 12:59,  Septal infarct is now present  T wave inversion no longer evident in Inferior leads  T wave inversion less evident in Anterolateral leads  Confirmed by Yen Valencia, Avery ([id number]) on 12/17/2018 8:32:09 AM     CBC WITH AUTOMATED DIFF    Collection Time: 12/16/18  3:50 PM   Result Value Ref Range    WBC 7.4 4.6 - 13.2 K/uL    RBC 4.39 (L) 4.70 - 5.50 M/uL    HGB 12.6 (L) 13.0 - 16.0 g/dL    HCT 40.8 36.0 - 48.0 %    MCV 92.9 74.0 - 97.0 FL    MCH 28.7 24.0 - 34.0 PG    MCHC 30.9 (L) 31.0 - 37.0 g/dL    RDW 16.6 (H) 11.6 - 14.5 %    PLATELET 861 350 - 775 K/uL    MPV 10.1 9.2 - 11.8 FL    NEUTROPHILS 83 (H) 40 - 73 %    LYMPHOCYTES 8 (L) 21 - 52 %    MONOCYTES 9 3 - 10 %    EOSINOPHILS 0 0 - 5 %    BASOPHILS 0 0 - 2 %    ABS. NEUTROPHILS 6.2 1.8 - 8.0 K/UL    ABS. LYMPHOCYTES 0.6 (L) 0.9 - 3.6 K/UL    ABS. MONOCYTES 0.6 0.05 - 1.2 K/UL    ABS. EOSINOPHILS 0.0 0.0 - 0.4 K/UL    ABS. BASOPHILS 0.0 0.0 - 0.1 K/UL    DF AUTOMATED     PROTHROMBIN TIME + INR    Collection Time: 12/16/18  3:50 PM   Result Value Ref Range    Prothrombin time 14.4 11.5 - 15.2 sec    INR 1.1 0.8 - 1.2     METABOLIC PANEL, COMPREHENSIVE    Collection Time: 12/16/18  3:50 PM   Result Value Ref Range    Sodium 142 136 - 145 mmol/L    Potassium 4.3 3.5 - 5.5 mmol/L    Chloride 100 100 - 108 mmol/L    CO2 36 (H) 21 - 32 mmol/L    Anion gap 6 3.0 - 18 mmol/L    Glucose 134 (H) 74 - 99 mg/dL    BUN 18 7.0 - 18 MG/DL    Creatinine 1.23 0.6 - 1.3 MG/DL    BUN/Creatinine ratio 15 12 - 20      GFR est AA >60 >60 ml/min/1.73m2    GFR est non-AA 58 (L) >60 ml/min/1.73m2    Calcium 8.3 (L) 8.5 - 10.1 MG/DL    Bilirubin, total 0.7 0.2 - 1.0 MG/DL    ALT (SGPT) 20 16 - 61 U/L    AST (SGOT) 15 15 - 37 U/L    Alk.  phosphatase 95 45 - 117 U/L    Protein, total 8.6 (H) 6.4 - 8.2 g/dL    Albumin 2.2 (L) 3.4 - 5.0 g/dL    Globulin 6.4 (H) 2.0 - 4.0 g/dL    A-G Ratio 0.3 (L) 0.8 - 1.7     NT-PRO BNP    Collection Time: 12/16/18  3:50 PM   Result Value Ref Range    NT pro-BNP 1,018 (H) 0 - 900 PG/ML   ETHYL ALCOHOL    Collection Time: 12/16/18  3:50 PM   Result Value Ref Range    ALCOHOL(ETHYL),SERUM <3 0 - 3 MG/DL   LIPASE Collection Time: 12/16/18  3:50 PM   Result Value Ref Range    Lipase 785 (H) 73 - 393 U/L   MAGNESIUM    Collection Time: 12/16/18  3:50 PM   Result Value Ref Range    Magnesium 2.1 1.6 - 2.6 mg/dL   SALICYLATE    Collection Time: 12/16/18  3:50 PM   Result Value Ref Range    Salicylate level <1.8 (L) 2.8 - 20.0 MG/DL   ACETAMINOPHEN    Collection Time: 12/16/18  3:50 PM   Result Value Ref Range    Acetaminophen level <2 (L) 10.0 - 30.0 ug/mL   URINALYSIS W/ RFLX MICROSCOPIC    Collection Time: 12/16/18  4:05 PM   Result Value Ref Range    Color DARK YELLOW      Appearance CLOUDY      Specific gravity 1.028 1.005 - 1.030      pH (UA) 5.5 5.0 - 8.0      Protein 100 (A) NEG mg/dL    Glucose NEGATIVE  NEG mg/dL    Ketone TRACE (A) NEG mg/dL    Bilirubin SMALL (A) NEG      Blood NEGATIVE  NEG      Urobilinogen 1.0 0.2 - 1.0 EU/dL    Nitrites NEGATIVE  NEG      Leukocyte Esterase NEGATIVE  NEG     URINE MICROSCOPIC ONLY    Collection Time: 12/16/18  4:05 PM   Result Value Ref Range    WBC 8 to 12 0 - 4 /hpf    RBC 0 0 - 5 /hpf    Epithelial cells FEW 0 - 5 /lpf    Bacteria 1+ (A) NEG /hpf    Mucus 3+ (A) NEG /lpf    Granular cast 4 to 6 NEG /lpf   CULTURE, BLOOD    Collection Time: 12/16/18  4:30 PM   Result Value Ref Range    Special Requests: NO SPECIAL REQUESTS      Culture result: NO GROWTH AFTER 14 HOURS     CULTURE, BLOOD    Collection Time: 12/16/18  4:45 PM   Result Value Ref Range    Special Requests: NO SPECIAL REQUESTS      Culture result: NO GROWTH AFTER 14 HOURS     POC LACTIC ACID    Collection Time: 12/16/18  5:04 PM   Result Value Ref Range    Lactic Acid (POC) 2.34 (HH) 0.40 - 2.00 mmol/L   POC G3    Collection Time: 12/16/18  5:13 PM   Result Value Ref Range    Device: VENT      FIO2 (POC) 100 %    pH (POC) 7.547 (H) 7.35 - 7.45      pCO2 (POC) 42.8 35.0 - 45.0 MMHG    pO2 (POC) 325 (H) 80 - 100 MMHG    HCO3 (POC) 37.3 (H) 22 - 26 MMOL/L    sO2 (POC) 100 (H) 92 - 97 %    Base excess (POC) 15 mmol/L Mode ASSIST CONTROL      Tidal volume 550 ml    Set Rate 15 bpm    PEEP/CPAP (POC) 8 cmH2O    PIP (POC) 23      Allens test (POC) N/A      Inspiratory Time 0.9 sec    Total resp. rate 15      Site RIGHT RADIAL      Patient temp. 98.0      Specimen type (POC) ARTERIAL      Performed by Jhonny Amaya     Volume control plus YES     EKG, 12 LEAD, SUBSEQUENT    Collection Time: 12/16/18  7:51 PM   Result Value Ref Range    Ventricular Rate 101 BPM    Atrial Rate 101 BPM    P-R Interval 194 ms    QRS Duration 84 ms    Q-T Interval 368 ms    QTC Calculation (Bezet) 477 ms    Calculated P Axis 58 degrees    Calculated R Axis -23 degrees    Calculated T Axis 40 degrees    Diagnosis       Sinus tachycardia  Possible Left atrial enlargement  Septal infarct , age undetermined  T wave inversion in anteroseptal leads  Borderline ECG  Confirmed by Moris Sampson ([id number]) on 12/17/2018 8:34:48 AM     POC LACTIC ACID    Collection Time: 12/16/18 10:00 PM   Result Value Ref Range    Lactic Acid (POC) 1.10 0.40 - 4.02 mmol/L   METABOLIC PANEL, COMPREHENSIVE    Collection Time: 12/17/18 12:30 AM   Result Value Ref Range    Sodium 142 136 - 145 mmol/L    Potassium 3.6 3.5 - 5.5 mmol/L    Chloride 105 100 - 108 mmol/L    CO2 31 21 - 32 mmol/L    Anion gap 6 3.0 - 18 mmol/L    Glucose 94 74 - 99 mg/dL    BUN 20 (H) 7.0 - 18 MG/DL    Creatinine 1.13 0.6 - 1.3 MG/DL    BUN/Creatinine ratio 18 12 - 20      GFR est AA >60 >60 ml/min/1.73m2    GFR est non-AA >60 >60 ml/min/1.73m2    Calcium 7.5 (L) 8.5 - 10.1 MG/DL    Bilirubin, total 0.7 0.2 - 1.0 MG/DL    ALT (SGPT) 21 16 - 61 U/L    AST (SGOT) 60 (H) 15 - 37 U/L    Alk.  phosphatase 81 45 - 117 U/L    Protein, total 7.6 6.4 - 8.2 g/dL    Albumin 1.9 (L) 3.4 - 5.0 g/dL    Globulin 5.7 (H) 2.0 - 4.0 g/dL    A-G Ratio 0.3 (L) 0.8 - 1.7     LIPID PANEL    Collection Time: 12/17/18 12:30 AM   Result Value Ref Range    LIPID PROFILE          Cholesterol, total 114 <200 MG/DL    Triglyceride 81 <150 MG/DL    HDL Cholesterol 36 (L) 40 - 60 MG/DL    LDL, calculated 61.8 0 - 100 MG/DL    VLDL, calculated 16.2 MG/DL    CHOL/HDL Ratio 3.2 0 - 5.0     CBC W/O DIFF    Collection Time: 12/17/18 12:30 AM   Result Value Ref Range    WBC 9.3 4.6 - 13.2 K/uL    RBC 4.31 (L) 4.70 - 5.50 M/uL    HGB 12.0 (L) 13.0 - 16.0 g/dL    HCT 38.9 36.0 - 48.0 %    MCV 90.3 74.0 - 97.0 FL    MCH 27.8 24.0 - 34.0 PG    MCHC 30.8 (L) 31.0 - 37.0 g/dL    RDW 16.6 (H) 11.6 - 14.5 %    PLATELET 295 687 - 749 K/uL    MPV 10.0 9.2 - 11.8 FL   PTT    Collection Time: 12/17/18 12:30 AM   Result Value Ref Range    aPTT 31.5 23.0 - 36.4 SEC   TROPONIN I    Collection Time: 12/17/18 12:30 AM   Result Value Ref Range    Troponin-I, Qt. 0.03 0.0 - 0.045 NG/ML   TROPONIN I    Collection Time: 12/17/18  4:40 AM   Result Value Ref Range    Troponin-I, Qt. 0.04 0.0 - 0.045 NG/ML       Lab/Data Reviewed: All lab results for the last 24 hours reviewed.     XRays were reviewed in past 24 hours    Medications Reviewed            Assessment/Plan     Active Problems:    Altered mental status (12/16/2018)      Acute respiratory failure with hypoxia ([facility] [address] (12/16/2018)      Sinusitis (12/17/2018)      Hx hep C   htn       Care plan     Respiratory Failure   - intubated   - pccm following     Pneumonia   - possibly due to aspiration   - continue IV Vancomycin , Levaquin , zosyn  - blood cx pending     Sinusitis   - tylenol supp for fever  - IV same as above     Encephalopathy   - CT head noted no acute finding   - likely 2/2 resp failure /pna     HTN   - Hold med for now due to low bp    Hx Hep C     Hx Drug use     DVT prophylaxis     Full code       Tonia Marley MD  December 17, 2018

## 2018-12-17 NOTE — H&P
[facility]  Hospitalist Division      History & Physical    Patient: Cydney Kelly MRN: [id number]  [facility]: [id number]    YOB: 1950  Age: 77 y.o. Sex: male    DOA: 2018 LOS:  LOS: 0 days        DOA: 2018        Assessment/Plan     Active Problems:    Altered mental status (2018)      Acute respiratory failure with hypoxia ([address] (2018)        Plan:  1. Acute Hypoxic Resp failure - intubated & sedated   2. ? Aspiration PNA - broad spectrum Abx   3. ? H/o Drug use with recent admission for thoracic discitis   4. H/o HTN - holding PO meds for now   5. H/o COPD   6. H/o Hep C   DVT Px - heparin   FC                 HPI:     Cydney Kelly is a 77 y.o. male who is being admitted for acute hypoxic Resp failure & ? Aspiration PNA   Per ER chart review & EMS report - they were called 2y to pt being confused - no other history is obtainable at this time. Pt brought to the ER - was placed on NRBM with some improvement but later needed to be intubated   Chart review shows PMHx - HTN , COPD & Hep C - concern for IVDA - recent admission for discitis     ER eval - Pt is intubated & sedated - PCCM consulted   Will admit to the ICU for further eval       Past Medical History:   Diagnosis Date    Arthritis     COPD     Hypertension        Past Surgical History:   Procedure Laterality Date    HX REFRACTIVE SURGERY         No family history on file.     Social History     Socioeconomic History    Marital status: SINGLE     Spouse name: Not on file    Number of children: Not on file    Years of education: Not on file    Highest education level: Not on file   Tobacco Use    Smoking status: Former Smoker     Last attempt to quit: 1984     Years since quittin.5    Smokeless tobacco: Former User   Substance and Sexual Activity    Alcohol use: Yes     Comment: sometimes    Drug use: Yes     Types: Heroin, Marijuana, Opiates     Comment: Uses opiates for pain       Prior to Admission medications    Medication Sig Start Date End Date Taking? Authorizing Provider   HYDROcodone-acetaminophen (XODOL) 7.5-300 mg tablet Take 2 Tabs by mouth four (4) times daily as needed. Provider, Cl   gabapentin (NEURONTIN) 400 mg capsule Take 400 mg by mouth two (2) times a day. Indications: NEUROPATHIC PAIN    Kim Sanchez MD   loratadine (CLARITIN) 10 mg tablet Take 10 mg by mouth daily as needed for Allergies. Kim Sanchez MD   ipratropium-albuterol (COMBIVENT RESPIMAT)  mcg/actuation inhaler Take 1 Puff by inhalation every twelve (12) hours. Indications: Chronic Obstructive Pulmonary Disease with Bronchospasms    Kim Sanchez MD   lisinopril (PRINIVIL, ZESTRIL) 20 mg tablet Take  by mouth daily. Kim Sanchez MD   simvastatin (ZOCOR) 10 mg tablet Take  by mouth nightly. Kim Sanchez MD       Allergies   Allergen Reactions    Shellfish Derived Hives       Review of Systems  Review of systems not obtained due to patient factors. Physical Exam:      Visit Vitals  /75   Pulse 100   Temp 97.5 °F (36.4 °C)   Resp 12   Wt 107.6 kg (237 lb 4.8 oz)   SpO2 100%   BMI 32.18 kg/m²       Physical Exam:    Gen: In general, this is a well nourished male in no acute distress - intubated & sedated   HEENT: Sclerae nonicteric. Oral mucous membranes & . Dentition could not be examined  Neck: Supple with midline trachea. CV: RRR without murmur or rub appreciated. Resp:Respirations are unlabored without use of accessory muscles. Lung fields B/L without wheezes or rhonchi. Abd: Soft, nontender, nondistended. Extrem: Extremities are warm, without cyanosis or clubbing. No pitting pretibial edema  Skin: Warm, no visible rashes.   Neuro: Patient is sedated     Labs Reviewed:    Recent Results (from the past 24 hour(s))   EKG, 12 LEAD, INITIAL    Collection Time: 12/16/18  2:42 PM   Result Value Ref Range    Ventricular Rate 95 BPM    Atrial Rate 95 BPM P-R Interval 130 ms    QRS Duration 84 ms    Q-T Interval 374 ms    QTC Calculation (Bezet) 469 ms    Calculated P Axis 46 degrees    Calculated R Axis -30 degrees    Calculated T Axis 41 degrees    Diagnosis       Poor data quality, interpretation may be adversely affected  Sinus rhythm with fusion complexes  Possible Left atrial enlargement  Left axis deviation  Septal infarct , age undetermined  Abnormal ECG  When compared with ECG of 14-NOV-2018 12:59,  fusion complexes are now present  Septal infarct is now present  T wave inversion no longer evident in Inferior leads  T wave inversion less evident in Anterolateral leads     CBC WITH AUTOMATED DIFF    Collection Time: 12/16/18  3:50 PM   Result Value Ref Range    WBC 7.4 4.6 - 13.2 K/uL    RBC 4.39 (L) 4.70 - 5.50 M/uL    HGB 12.6 (L) 13.0 - 16.0 g/dL    HCT 40.8 36.0 - 48.0 %    MCV 92.9 74.0 - 97.0 FL    MCH 28.7 24.0 - 34.0 PG    MCHC 30.9 (L) 31.0 - 37.0 g/dL    RDW 16.6 (H) 11.6 - 14.5 %    PLATELET 787 674 - 705 K/uL    MPV 10.1 9.2 - 11.8 FL    NEUTROPHILS 83 (H) 40 - 73 %    LYMPHOCYTES 8 (L) 21 - 52 %    MONOCYTES 9 3 - 10 %    EOSINOPHILS 0 0 - 5 %    BASOPHILS 0 0 - 2 %    ABS. NEUTROPHILS 6.2 1.8 - 8.0 K/UL    ABS. LYMPHOCYTES 0.6 (L) 0.9 - 3.6 K/UL    ABS. MONOCYTES 0.6 0.05 - 1.2 K/UL    ABS. EOSINOPHILS 0.0 0.0 - 0.4 K/UL    ABS.  BASOPHILS 0.0 0.0 - 0.1 K/UL    DF AUTOMATED     PROTHROMBIN TIME + INR    Collection Time: 12/16/18  3:50 PM   Result Value Ref Range    Prothrombin time 14.4 11.5 - 15.2 sec    INR 1.1 0.8 - 1.2     METABOLIC PANEL, COMPREHENSIVE    Collection Time: 12/16/18  3:50 PM   Result Value Ref Range    Sodium 142 136 - 145 mmol/L    Potassium 4.3 3.5 - 5.5 mmol/L    Chloride 100 100 - 108 mmol/L    CO2 36 (H) 21 - 32 mmol/L    Anion gap 6 3.0 - 18 mmol/L    Glucose 134 (H) 74 - 99 mg/dL    BUN 18 7.0 - 18 MG/DL    Creatinine 1.23 0.6 - 1.3 MG/DL    BUN/Creatinine ratio 15 12 - 20      GFR est AA >60 >60 ml/min/1.73m2    GFR est non-AA 58 (L) >60 ml/min/1.73m2    Calcium 8.3 (L) 8.5 - 10.1 MG/DL    Bilirubin, total 0.7 0.2 - 1.0 MG/DL    ALT (SGPT) 20 16 - 61 U/L    AST (SGOT) 15 15 - 37 U/L    Alk.  phosphatase 95 45 - 117 U/L    Protein, total 8.6 (H) 6.4 - 8.2 g/dL    Albumin 2.2 (L) 3.4 - 5.0 g/dL    Globulin 6.4 (H) 2.0 - 4.0 g/dL    A-G Ratio 0.3 (L) 0.8 - 1.7     NT-PRO BNP    Collection Time: 12/16/18  3:50 PM   Result Value Ref Range    NT pro-BNP 1,018 (H) 0 - 900 PG/ML   ETHYL ALCOHOL    Collection Time: 12/16/18  3:50 PM   Result Value Ref Range    ALCOHOL(ETHYL),SERUM <3 0 - 3 MG/DL   LIPASE    Collection Time: 12/16/18  3:50 PM   Result Value Ref Range    Lipase 785 (H) 73 - 393 U/L   MAGNESIUM    Collection Time: 12/16/18  3:50 PM   Result Value Ref Range    Magnesium 2.1 1.6 - 2.6 mg/dL   SALICYLATE    Collection Time: 12/16/18  3:50 PM   Result Value Ref Range    Salicylate level <0.1 (L) 2.8 - 20.0 MG/DL   ACETAMINOPHEN    Collection Time: 12/16/18  3:50 PM   Result Value Ref Range    Acetaminophen level <2 (L) 10.0 - 30.0 ug/mL   URINALYSIS W/ RFLX MICROSCOPIC    Collection Time: 12/16/18  4:05 PM   Result Value Ref Range    Color DARK YELLOW      Appearance CLOUDY      Specific gravity 1.028 1.005 - 1.030      pH (UA) 5.5 5.0 - 8.0      Protein 100 (A) NEG mg/dL    Glucose NEGATIVE  NEG mg/dL    Ketone TRACE (A) NEG mg/dL    Bilirubin SMALL (A) NEG      Blood NEGATIVE  NEG      Urobilinogen 1.0 0.2 - 1.0 EU/dL    Nitrites NEGATIVE  NEG      Leukocyte Esterase NEGATIVE  NEG     URINE MICROSCOPIC ONLY    Collection Time: 12/16/18  4:05 PM   Result Value Ref Range    WBC 8 to 12 0 - 4 /hpf    RBC 0 0 - 5 /hpf    Epithelial cells FEW 0 - 5 /lpf    Bacteria 1+ (A) NEG /hpf    Mucus 3+ (A) NEG /lpf    Granular cast 4 to 6 NEG /lpf   POC LACTIC ACID    Collection Time: 12/16/18  5:04 PM   Result Value Ref Range    Lactic Acid (POC) 2.34 (HH) 0.40 - 2.00 mmol/L   POC G3    Collection Time: 12/16/18  5:13 PM   Result Value Ref Range    Device: VENT      FIO2 (POC) 100 %    pH (POC) 7.547 (H) 7.35 - 7.45      pCO2 (POC) 42.8 35.0 - 45.0 MMHG    pO2 (POC) 325 (H) 80 - 100 MMHG    HCO3 (POC) 37.3 (H) 22 - 26 MMOL/L    sO2 (POC) 100 (H) 92 - 97 %    Base excess (POC) 15 mmol/L    Mode ASSIST CONTROL      Tidal volume 550 ml    Set Rate 15 bpm    PEEP/CPAP (POC) 8 cmH2O    PIP (POC) 23      Allens test (POC) N/A      Inspiratory Time 0.9 sec    Total resp. rate 15      Site RIGHT RADIAL      Patient temp. 98.0      Specimen type (POC) ARTERIAL      Performed by Dee Lopez     Volume control plus YES     EKG, 12 LEAD, SUBSEQUENT    Collection Time: 12/16/18  7:51 PM   Result Value Ref Range    Ventricular Rate 101 BPM    Atrial Rate 101 BPM    P-R Interval 194 ms    QRS Duration 84 ms    Q-T Interval 368 ms    QTC Calculation (Bezet) 477 ms    Calculated P Axis 58 degrees    Calculated R Axis -23 degrees    Calculated T Axis 40 degrees    Diagnosis       Sinus tachycardia  Possible Left atrial enlargement  Borderline ECG  When compared with ECG of 16-DEC-2018 14:42,  Criteria for Septal infarct are no longer present         Imaging Reviewed:    CT head   Findings:- no acute intracranial abnormality.           Lyndsey Singh MD  12/16/2018, 9:01 PM

## 2018-12-17 NOTE — PROGRESS NOTES
Pharmacy Dosing Services: Vancomycin    Indication: Sepsis    Day of therapy: 2    Other Antimicrobials (Include dose, start day & day of therapy):  Levofloxacin 750 mg IV every 24 hours (started )  Pip/tazo 4.5 gm IV every 6 hours has been modified to 3.375 IV E. I. q8h (started )    Loading dose (date given): 2000 mg  Current Maintenance dose: None at this time    Goal Vancomycin Level: 15-20  (Trough 15-20 for most infections, 20 for meningitis/osteomyelitis, pre-HD level ~25)    Vancomycin Level (if drawn): None at this time     Significant Cultures: pending    Renal function stable? (unstable defined as SCr increase of 0.5 mg/dL or > 50% increase from baseline, whichever is greater) (Y/N): N (Scr > 0.5 mg/dl increase from baseline)     CAPD, Hemodialysis or Renal Replacement Therapy (Y/N): N     Recent Labs     18  0030 18  1550   CREA 1.13 1.23   BUN 20* 18   WBC 9.3 7.4     Temp (24hrs), Av.7 °F (37.6 °C), Min:95.6 °F (35.3 °C), Max:101.8 °F (38.8 °C)    Creatinine Clearance (Creatinine Clearance (ml/min)): 76.1 ml/min    Regimen assessment: renal function stable  Maintenance dose: continue with 1.25 g iv q12h  Next scheduled level: , 1230       Pharmacy will follow daily and adjust medications as appropriate for renal function and/or serum levels.     Thank you,  Martha Hammonds, PHD (4) no limitation

## 2018-12-18 ENCOUNTER — APPOINTMENT (OUTPATIENT)
Dept: GENERAL RADIOLOGY | Age: 68
DRG: 133 | End: 2018-12-18
Attending: NURSE PRACTITIONER
Payer: MEDICAID

## 2018-12-18 LAB
ANION GAP SERPL CALC-SCNC: 8 MMOL/L (ref 3–18)
ARTERIAL PATENCY WRIST A: YES
BACTERIA SPEC CULT: NORMAL
BASE EXCESS BLD CALC-SCNC: 7 MMOL/L
BDY SITE: ABNORMAL
BODY TEMPERATURE: 99
BUN SERPL-MCNC: 23 MG/DL (ref 7–18)
BUN/CREAT SERPL: 17 (ref 12–20)
CA-I SERPL-SCNC: 1.08 MMOL/L (ref 1.12–1.32)
CALCIUM SERPL-MCNC: 7.7 MG/DL (ref 8.5–10.1)
CHLORIDE SERPL-SCNC: 108 MMOL/L (ref 100–108)
CO2 SERPL-SCNC: 30 MMOL/L (ref 21–32)
CREAT SERPL-MCNC: 1.36 MG/DL (ref 0.6–1.3)
DATE LAST DOSE: NORMAL
ERYTHROCYTE [DISTWIDTH] IN BLOOD BY AUTOMATED COUNT: 16.8 % (ref 11.6–14.5)
GAS FLOW.O2 O2 DELIVERY SYS: ABNORMAL L/MIN
GAS FLOW.O2 SETTING OXYMISER: 12 BPM
GLUCOSE SERPL-MCNC: 84 MG/DL (ref 74–99)
HCO3 BLD-SCNC: 29.2 MMOL/L (ref 22–26)
HCT VFR BLD AUTO: 33.8 % (ref 36–48)
HGB BLD-MCNC: 10.6 G/DL (ref 13–16)
INSPIRATION.DURATION SETTING TIME VENT: 0.9 SEC
LIPASE SERPL-CCNC: 102 U/L (ref 73–393)
MAGNESIUM SERPL-MCNC: 1.6 MG/DL (ref 1.6–2.6)
MAGNESIUM SERPL-MCNC: 2.1 MG/DL (ref 1.6–2.6)
MCH RBC QN AUTO: 27.5 PG (ref 24–34)
MCHC RBC AUTO-ENTMCNC: 31.4 G/DL (ref 31–37)
MCV RBC AUTO: 87.8 FL (ref 74–97)
NITRIC:PPM ISTAT,INITR: 0 PPM
O2/TOTAL GAS SETTING VFR VENT: 30 %
PCO2 BLD: 31.4 MMHG (ref 35–45)
PEEP RESPIRATORY: 5 CMH2O
PH BLD: 7.58 [PH] (ref 7.35–7.45)
PHOSPHATE SERPL-MCNC: 1 MG/DL (ref 2.5–4.9)
PHOSPHATE SERPL-MCNC: 2.7 MG/DL (ref 2.5–4.9)
PIP ISTAT,IPIP: 19
PLATELET # BLD AUTO: 124 K/UL (ref 135–420)
PMV BLD AUTO: 10.4 FL (ref 9.2–11.8)
PO2 BLD: 61 MMHG (ref 80–100)
POTASSIUM SERPL-SCNC: 3 MMOL/L (ref 3.5–5.5)
POTASSIUM SERPL-SCNC: 3 MMOL/L (ref 3.5–5.5)
RBC # BLD AUTO: 3.85 M/UL (ref 4.7–5.5)
REPORTED DOSE/TIME,TMG: 1300
SAO2 % BLD: 94 % (ref 92–97)
SERVICE CMNT-IMP: ABNORMAL
SERVICE CMNT-IMP: NORMAL
SODIUM SERPL-SCNC: 146 MMOL/L (ref 136–145)
SPECIMEN TYPE: ABNORMAL
TOTAL RESP. RATE, ITRR: 20
VANCOMYCIN TROUGH SERPL-MCNC: 17.1 UG/ML (ref 10–20)
VENTILATION MODE VENT: ABNORMAL
VOLUME CONTROL PLUS IVLCP: YES
VT SETTING VENT: 550 ML
WBC # BLD AUTO: 7 K/UL (ref 4.6–13.2)

## 2018-12-18 PROCEDURE — 77030037878 HC DRSG MEPILEX >48IN BORD MOLN -B

## 2018-12-18 PROCEDURE — 84100 ASSAY OF PHOSPHORUS: CPT

## 2018-12-18 PROCEDURE — 92610 EVALUATE SWALLOWING FUNCTION: CPT

## 2018-12-18 PROCEDURE — 80202 ASSAY OF VANCOMYCIN: CPT

## 2018-12-18 PROCEDURE — 74011000250 HC RX REV CODE- 250: Performed by: NURSE PRACTITIONER

## 2018-12-18 PROCEDURE — 74011250637 HC RX REV CODE- 250/637: Performed by: NURSE PRACTITIONER

## 2018-12-18 PROCEDURE — 83690 ASSAY OF LIPASE: CPT

## 2018-12-18 PROCEDURE — 77010033678 HC OXYGEN DAILY

## 2018-12-18 PROCEDURE — 84132 ASSAY OF SERUM POTASSIUM: CPT

## 2018-12-18 PROCEDURE — P9045 ALBUMIN (HUMAN), 5%, 250 ML: HCPCS | Performed by: INTERNAL MEDICINE

## 2018-12-18 PROCEDURE — 74011250636 HC RX REV CODE- 250/636: Performed by: FAMILY MEDICINE

## 2018-12-18 PROCEDURE — 94003 VENT MGMT INPAT SUBQ DAY: CPT

## 2018-12-18 PROCEDURE — 92526 ORAL FUNCTION THERAPY: CPT

## 2018-12-18 PROCEDURE — 74011250636 HC RX REV CODE- 250/636: Performed by: NURSE PRACTITIONER

## 2018-12-18 PROCEDURE — 85027 COMPLETE CBC AUTOMATED: CPT

## 2018-12-18 PROCEDURE — 74011250636 HC RX REV CODE- 250/636: Performed by: INTERNAL MEDICINE

## 2018-12-18 PROCEDURE — 74011250636 HC RX REV CODE- 250/636: Performed by: HOSPITALIST

## 2018-12-18 PROCEDURE — 71045 X-RAY EXAM CHEST 1 VIEW: CPT

## 2018-12-18 PROCEDURE — 74011000250 HC RX REV CODE- 250: Performed by: INTERNAL MEDICINE

## 2018-12-18 PROCEDURE — 74011000258 HC RX REV CODE- 258: Performed by: HOSPITALIST

## 2018-12-18 PROCEDURE — 82803 BLOOD GASES ANY COMBINATION: CPT

## 2018-12-18 PROCEDURE — 36600 WITHDRAWAL OF ARTERIAL BLOOD: CPT

## 2018-12-18 PROCEDURE — 65610000006 HC RM INTENSIVE CARE

## 2018-12-18 PROCEDURE — 83735 ASSAY OF MAGNESIUM: CPT

## 2018-12-18 PROCEDURE — 82330 ASSAY OF CALCIUM: CPT

## 2018-12-18 PROCEDURE — 74011250636 HC RX REV CODE- 250/636: Performed by: EMERGENCY MEDICINE

## 2018-12-18 PROCEDURE — C9113 INJ PANTOPRAZOLE SODIUM, VIA: HCPCS | Performed by: NURSE PRACTITIONER

## 2018-12-18 PROCEDURE — 74011000258 HC RX REV CODE- 258: Performed by: INTERNAL MEDICINE

## 2018-12-18 RX ORDER — POTASSIUM CHLORIDE 7.45 MG/ML
10 INJECTION INTRAVENOUS ONCE
Status: COMPLETED | OUTPATIENT
Start: 2018-12-18 | End: 2018-12-18

## 2018-12-18 RX ORDER — POTASSIUM CHLORIDE 7.45 MG/ML
10 INJECTION INTRAVENOUS
Status: DISCONTINUED | OUTPATIENT
Start: 2018-12-18 | End: 2018-12-18

## 2018-12-18 RX ORDER — HYDROCODONE BITARTRATE AND ACETAMINOPHEN 7.5; 325 MG/1; MG/1
1 TABLET ORAL
Status: DISCONTINUED | OUTPATIENT
Start: 2018-12-18 | End: 2018-12-21 | Stop reason: HOSPADM

## 2018-12-18 RX ORDER — LABETALOL HCL 20 MG/4 ML
10 SYRINGE (ML) INTRAVENOUS ONCE
Status: COMPLETED | OUTPATIENT
Start: 2018-12-18 | End: 2018-12-18

## 2018-12-18 RX ORDER — POTASSIUM CHLORIDE 29.8 MG/ML
20 INJECTION INTRAVENOUS ONCE
Status: COMPLETED | OUTPATIENT
Start: 2018-12-18 | End: 2018-12-18

## 2018-12-18 RX ORDER — MAGNESIUM SULFATE HEPTAHYDRATE 40 MG/ML
2 INJECTION, SOLUTION INTRAVENOUS ONCE
Status: COMPLETED | OUTPATIENT
Start: 2018-12-18 | End: 2018-12-18

## 2018-12-18 RX ORDER — ALBUMIN HUMAN 50 G/1000ML
25 SOLUTION INTRAVENOUS ONCE
Status: COMPLETED | OUTPATIENT
Start: 2018-12-18 | End: 2018-12-18

## 2018-12-18 RX ORDER — BUMETANIDE 0.25 MG/ML
1 INJECTION INTRAMUSCULAR; INTRAVENOUS ONCE
Status: COMPLETED | OUTPATIENT
Start: 2018-12-18 | End: 2018-12-18

## 2018-12-18 RX ORDER — FENTANYL CITRATE 50 UG/ML
50 INJECTION, SOLUTION INTRAMUSCULAR; INTRAVENOUS
Status: DISCONTINUED | OUTPATIENT
Start: 2018-12-18 | End: 2018-12-18

## 2018-12-18 RX ORDER — POTASSIUM CHLORIDE 29.8 MG/ML
20 INJECTION INTRAVENOUS
Status: COMPLETED | OUTPATIENT
Start: 2018-12-18 | End: 2018-12-19

## 2018-12-18 RX ADMIN — CALCIUM GLUCONATE 2 G: 94 INJECTION, SOLUTION INTRAVENOUS at 17:17

## 2018-12-18 RX ADMIN — PIPERACILLIN SODIUM,TAZOBACTAM SODIUM 3.38 G: 3; .375 INJECTION, POWDER, FOR SOLUTION INTRAVENOUS at 11:32

## 2018-12-18 RX ADMIN — SODIUM CHLORIDE: 900 INJECTION, SOLUTION INTRAVENOUS at 14:16

## 2018-12-18 RX ADMIN — FENTANYL CITRATE 100 MCG: 50 INJECTION, SOLUTION INTRAMUSCULAR; INTRAVENOUS at 06:10

## 2018-12-18 RX ADMIN — SODIUM CHLORIDE 100 ML/HR: 900 INJECTION, SOLUTION INTRAVENOUS at 04:01

## 2018-12-18 RX ADMIN — HYDROCODONE BITARTRATE AND ACETAMINOPHEN 1 TABLET: 7.5; 325 TABLET ORAL at 18:26

## 2018-12-18 RX ADMIN — LABETALOL 20 MG/4 ML (5 MG/ML) INTRAVENOUS SYRINGE 10 MG: at 23:27

## 2018-12-18 RX ADMIN — POTASSIUM CHLORIDE 20 MEQ: 29.8 INJECTION, SOLUTION INTRAVENOUS at 11:32

## 2018-12-18 RX ADMIN — POTASSIUM CHLORIDE 10 MEQ: 7.46 INJECTION, SOLUTION INTRAVENOUS at 13:41

## 2018-12-18 RX ADMIN — LEVOFLOXACIN 750 MG: 5 INJECTION, SOLUTION INTRAVENOUS at 17:18

## 2018-12-18 RX ADMIN — MAGNESIUM SULFATE HEPTAHYDRATE 2 G: 40 INJECTION, SOLUTION INTRAVENOUS at 10:09

## 2018-12-18 RX ADMIN — HYDROCODONE BITARTRATE AND ACETAMINOPHEN 1 TABLET: 7.5; 325 TABLET ORAL at 14:22

## 2018-12-18 RX ADMIN — HEPARIN SODIUM 5000 UNITS: 5000 INJECTION INTRAVENOUS; SUBCUTANEOUS at 15:46

## 2018-12-18 RX ADMIN — POTASSIUM CHLORIDE 20 MEQ: 29.8 INJECTION, SOLUTION INTRAVENOUS at 21:47

## 2018-12-18 RX ADMIN — POTASSIUM CHLORIDE 10 MEQ: 7.46 INJECTION, SOLUTION INTRAVENOUS at 08:34

## 2018-12-18 RX ADMIN — FENTANYL CITRATE 100 MCG: 50 INJECTION, SOLUTION INTRAMUSCULAR; INTRAVENOUS at 00:10

## 2018-12-18 RX ADMIN — FENTANYL CITRATE 100 MCG: 50 INJECTION, SOLUTION INTRAMUSCULAR; INTRAVENOUS at 08:41

## 2018-12-18 RX ADMIN — FENTANYL CITRATE 50 MCG: 50 INJECTION, SOLUTION INTRAMUSCULAR; INTRAVENOUS at 12:54

## 2018-12-18 RX ADMIN — SODIUM CHLORIDE 1250 MG: 900 INJECTION, SOLUTION INTRAVENOUS at 14:16

## 2018-12-18 RX ADMIN — HEPARIN SODIUM 5000 UNITS: 5000 INJECTION INTRAVENOUS; SUBCUTANEOUS at 08:33

## 2018-12-18 RX ADMIN — POTASSIUM CHLORIDE 20 MEQ: 29.8 INJECTION, SOLUTION INTRAVENOUS at 23:27

## 2018-12-18 RX ADMIN — HEPARIN SODIUM 5000 UNITS: 5000 INJECTION INTRAVENOUS; SUBCUTANEOUS at 23:27

## 2018-12-18 RX ADMIN — SODIUM CHLORIDE 1250 MG: 900 INJECTION, SOLUTION INTRAVENOUS at 01:14

## 2018-12-18 RX ADMIN — ALBUMIN (HUMAN) 25 G: 12.5 INJECTION, SOLUTION INTRAVENOUS at 18:15

## 2018-12-18 RX ADMIN — SODIUM CHLORIDE 40 MG: 9 INJECTION, SOLUTION INTRAMUSCULAR; INTRAVENOUS; SUBCUTANEOUS at 08:40

## 2018-12-18 RX ADMIN — HEPARIN SODIUM 5000 UNITS: 5000 INJECTION INTRAVENOUS; SUBCUTANEOUS at 00:10

## 2018-12-18 RX ADMIN — PIPERACILLIN SODIUM,TAZOBACTAM SODIUM 3.38 G: 3; .375 INJECTION, POWDER, FOR SOLUTION INTRAVENOUS at 20:25

## 2018-12-18 RX ADMIN — HYDROCODONE BITARTRATE AND ACETAMINOPHEN 1 TABLET: 7.5; 325 TABLET ORAL at 22:45

## 2018-12-18 RX ADMIN — BUMETANIDE 1 MG: 0.25 INJECTION INTRAMUSCULAR; INTRAVENOUS at 15:48

## 2018-12-18 RX ADMIN — PIPERACILLIN SODIUM,TAZOBACTAM SODIUM 3.38 G: 3; .375 INJECTION, POWDER, FOR SOLUTION INTRAVENOUS at 04:01

## 2018-12-18 NOTE — PROGRESS NOTES
Received pt on ac/vc+ 10/500/5  30% and placed on SBT at 0808 on spontaneous with PS 7 and tolerating well. RSBI 74 NIF -3.7.     1220 pt extubated to 3lpm NC and tolerating well.

## 2018-12-18 NOTE — PROGRESS NOTES
1900: assumed care of pt from [address]. Pt resting in bed, nods appropriately to questions, moving all extremities to command. VS stable. Will continue to monitor. 0700: Bedside shift change report given to CAMEORN Dutton (oncoming nurse) by Nathan Alejandra RN   (offgoing nurse). Report included the following information SBAR, Intake/Output, MAR and Recent Results.

## 2018-12-18 NOTE — PROGRESS NOTES
[facility]  ICU Progress Note      Name: Sky Medeiros   : 1950   MRN: [id number]   Date: 2018 12:15 PM     [x]I have reviewed the flowsheet and previous days notes. Events overnight reviewed and discussed with nursing staff. Vital signs and records reviewed. Subjective:  18  No new events overnight. Liberated from ventilator- tolerated well  Following commands, moves all extremities  HD stable off pressors                ROS:A comprehensive review of systems was negative except for that written in the HPI.     Medication Review:  · Pressors - none  · Sedation - none   · Antibiotics - zosyn/vanc  · Pain - prn fentanyl  · GI/ DVT - protonix, SQH  · Others (other gtts)- IVF    Safety Bundles:  CAUTI/ Electrolyte Replacement Protocol    Vital Signs:    Visit Vitals  /71   Pulse 100   Temp 100.4 °F (38 °C)   Resp 29   Ht 6' (1.829 m)   Wt 104.8 kg (231 lb)   SpO2 98%   BMI 31.33 kg/m²       O2 Device: Endotracheal tube       Temp (24hrs), Av.2 °F (37.3 °C), Min:98.5 °F (36.9 °C), Max:100.4 °F (38 °C)       Intake/Output:   Last shift:       07 - 1900  In: 400 [I.V.:400]  Out: 137 [Urine:137]  Last 3 shifts: 1901 -  0700  In: 8382.6 [I.V.:7962.6]  Out:  [Urine:2010]    Intake/Output Summary (Last 24 hours) at 2018 1215  Last data filed at 2018 1200  Gross per 24 hour   Intake 3670 ml   Output 1387 ml   Net 2283 ml       Ventilator Settings:  Ventilator Mode: Spontaneous  Respiratory Rate  Back-Up Rate: 10  Insp Time (sec): 0.9 sec  Insp Flow (l/min): 61 l/min  I:E Ratio: 1:6.32  Ventilator Volumes  Vt Set (ml): 500 ml  Vt Exhaled (Machine Breath) (ml): 628 ml  Vt Spont (ml): 407 ml  Ve Observed (l/min): 9.26 l/min  Ventilator Pressures  Pressure Support (cm H2O): 7 cm H2O  PIP Observed (cm H2O): 12 cm H2O  Plateau Pressure (cm H2O): 15 cm H2O  MAP (cm H2O): 8  PEEP/VENT (cm H2O): 5 cm H20    Physical Exam:    General: Awake, alert  HEENT:  Anicteric sclerae; pink palpebral conjunctivae; mucosa moist  Resp:  Coarse bilaterally to auscultation; Symmetrical chest expansion, no accessory muscle use; good airway entry; no rales/ wheezing/ rhonchi noted  CV:  S1, S2 present; NSR  GI:  Abdomen soft, non-tender; (+) active bowel sounds  Extremities:  +2 pulses on all extremities; no edema/ cyanosis/ clubbing noted; normal capillary refill  Skin:  Warm; no rashes/ lesions noted  Neurologic:  Non-focal, follows commands, moves all extremities  Devices:   Central line, ETT, bailon      DATA:     Current Facility-Administered Medications   Medication Dose Route Frequency    sodium phosphate 15 mmol in 0.9% sodium chloride 250 mL infusion   IntraVENous ONCE    fentaNYL citrate (PF) injection 50 mcg  50 mcg IntraVENous Q4H PRN    potassium chloride 20 mEq in 50 ml IVPB  20 mEq IntraVENous ONCE    potassium chloride 10 mEq in 100 ml IVPB  10 mEq IntraVENous ONCE    acetaminophen (TYLENOL) suppository 650 mg  650 mg Rectal Q4H PRN    influenza vaccine 2018-19 (6 mos+)(PF) (FLUARIX QUAD/FLULAVAL QUAD) injection 0.5 mL  0.5 mL IntraMUSCular PRIOR TO DISCHARGE    vancomycin (VANCOCIN) 1,250 mg in 0.9% sodium chloride 250 mL IVPB  1,250 mg IntraVENous Q12H    VANCOMYCIN INFORMATION NOTE   Other ONCE    acetaminophen (TYLENOL) suppository 650 mg  650 mg Rectal Q4H PRN    piperacillin-tazobactam (ZOSYN) 3.375 g in  ml ##EXTENDED 4HRS INFUSION  3.375 g IntraVENous Q8H    pantoprazole (PROTONIX) 40 mg in sodium chloride 0.9% 10 mL injection  40 mg IntraVENous DAILY    docusate (COLACE) 50 mg/5 mL oral liquid 100 mg  100 mg Oral DAILY    NOREPINephrine (LEVOPHED) 8 mg in 0.9% NS 250ml infusion  2-16 mcg/min IntraVENous TITRATE    levoFLOXacin (LEVAQUIN) 750 mg in D5W IVPB  750 mg IntraVENous Q24H    VANCOMYCIN INFORMATION NOTE   Other Rx Dosing/Monitoring    acetaminophen (TYLENOL) tablet 650 mg  650 mg Oral Q6H PRN    ondansetron [facility]) injection 4 mg  4 mg IntraVENous Q6H PRN    0.9% sodium chloride infusion  100 mL/hr IntraVENous CONTINUOUS    heparin (porcine) injection 5,000 Units  5,000 Units SubCUTAneous Q8H         Labs: Results:       Chemistry Recent Labs     12/18/18  0400 12/17/18  0030 12/16/18  1550   GLU 84 94 134*   * 142 142   K 3.0* 3.6 4.3    105 100   CO2 30 31 36*   BUN 23* 20* 18   CREA 1.36* 1.13 1.23   CA 7.7* 7.5* 8.3*   AGAP 8 6 6   BUCR 17 18 15   AP  --  81 95   TP  --  7.6 8.6*   ALB  --  1.9* 2.2*   GLOB  --  5.7* 6.4*   AGRAT  --  0.3* 0.3*      CBC w/Diff Recent Labs     12/18/18  0400 12/17/18  0030 12/16/18  1550   WBC 7.0 9.3 7.4   RBC 3.85* 4.31* 4.39*   HGB 10.6* 12.0* 12.6*   HCT 33.8* 38.9 40.8   * 145 162   GRANS  --   --  83*   LYMPH  --   --  8*   EOS  --   --  0      Coagulation Recent Labs     12/17/18  0030 12/16/18  1550   PTP  --  14.4   INR  --  1.1   APTT 31.5  --        Liver Enzymes Recent Labs     12/17/18  0030   TP 7.6   ALB 1.9*   AP 81   SGOT 60*      ABG Lab Results   Component Value Date/Time    PHI 7.576 (HH) 12/18/2018 04:07 AM    PCO2I 31.4 (L) 12/18/2018 04:07 AM    PO2I 61 (L) 12/18/2018 04:07 AM    HCO3I 29.2 (H) 12/18/2018 04:07 AM    FIO2I 30 12/18/2018 04:07 AM      Microbiology Recent Labs     12/16/18  1645 12/16/18  1630 12/16/18  1605   CULT NO GROWTH 2 DAYS NO GROWTH 2 DAYS NO GROWTH 2 DAYS          Telemetry: [x]Sinus []A-flutter []Paced    []A-fib []Multiple PVCs                    Imaging:  CXR Results  (Last 48 hours)               12/18/18 0848  XR CHEST PORT Final result    Impression:  IMPRESSION:   Little change from previous. Substantial bibasilar density consistent with   atelectasis/infiltrate/effusion. Persistent vascular congestion. Narrative:  AP portable chest, 12/18/2018 at 0831 hours:       Comparison 12/17/2018.        Endotracheal tube is just below the clavicles approximately 4.5 cm above the   reggie in reasonable position. Bibasilar densities consistent with a combination   of lower lobe atelectasis/infiltrate and effusion. Vascular congestion persists   unchanged. The heart is stable but in part obscured. Right internal jugular   central line is at the cavoatrial junction or upper right atrium. 12/17/18 1016  XR CHEST PORT Final result    Impression:  IMPRESSION:           1. Focal placement of nasogastric tube into the abdomen. Please see KUB report       2. Otherwise radiographically stable portable chest since 12/16/2018. Endotracheal tube and right IJ venous line in situ. Small to moderate-sized   bilateral pleural effusions and likely bilateral basilar partial atelectasis. Probable pulmonary venous hypertension       Narrative:  CHEST AP PORTABLE, 12/17/2018, 0929 hours        Comparison prior exam, 12/16/2018, 1555 hours. Findings: There is interval placement of a nasogastric tube, tip extending into the upper   abdomen not discretely visible on this somewhat underpenetrated exam.       Endotracheal tube remains present tip roughly 2.5-3 cm above the reggie. Lungs   are mildly hypoinflated. There is haziness over the lower lung zones associated   with loss of hemidiaphragm margins and blunting of costophrenic sulci. Minimal   right minor fissural thickening is stable. The lower lung zones cannot be   evaluated further. The upper lung zones appear   clear . No evident pneumothorax       The cardiac silhouette is stable and the mediastinum  appears unremarkable. The   pulmonary vascularity is mildly prominent but unchanged       Support catheters/tubes:   As above. Additional right IJ venous catheter has tip in the region of the right   atrium. Overlying numerous leads especially right chest       Misc:    Spondylosis               12/16/18 1617  XR CHEST PORT Final result    Impression:  IMPRESSION:           1. ETT in satisfactory position.  Right jugular central venous catheter within   the right atrium. 2. CHF with small to moderate right and small left pleural effusions, linear   atelectasis right lung base and retrocardiac atelectasis and/or infiltrate. Narrative:  EXAM: Chest, portable AP supine, one view       INDICATION: Arrived to emergency department unresponsive. Endotracheal tube   placement. COMPARISON: 11/22/2018       _______________       FINDINGS:       ETT is 3.2 cm above the reggie, right jugular central venous catheter tip   projects at the right atrium. Cardiac silhouette is within normal range in size. Calcified plaque is present   at the aortic arch. No pneumothorax. Pulmonary vascular redistribution has developed along with   small to moderate right and small left pleural effusion. Parenchymal band at the   right lung base is compatible with atelectasis. Retrocardiac region is somewhat   dense. No pneumothorax appreciated. Degenerative changes noted at both shoulders. _______________                 CT Results  (Last 48 hours)               12/16/18 2037  CT HEAD WO CONT Final result    Impression:  IMPRESSION:       1. No acute intracranial hemorrhage, mass effect, midline shift, or herniation. No definite CT evidence of acute cortical infarct is seen. Please note that   noncontrast head CT may be normal in early acute infarct. 2. Bilateral sphenoid sinus bubbly air-fluid levels potentially representing   acute sinusitis or related to secretions given intubation. Clinical correlation   is recommended. Preliminary report provided by on-call radiology resident. Narrative:  EXAM: CT HEAD W/O CONTRAST       INDICATION: Confusion/delirium, altered level of consciousness, unresponsive       COMPARISON: CT head 11/15/2018       TECHNIQUE: Axial CT imaging of the head was performed without intravenous   contrast.  Additional coronal and sagittal reconstructions were performed.  One   or more dose reduction techniques were used on this CT: automated exposure   control, adjustment of the mAs and/or kVp according to patient's size, and   iterative reconstruction techniques. The specific techniques utilized on this CT   exam have been documented in the patient's electronic medical record.   _______________       FINDINGS:       Motion artifact is present which limits evaluation. VENTRICLES/EXTRA-AXIAL SPACES: The ventricles and sulci are normal in their size   and configuration. BRAIN PARENCHYMA: There is no evidence of acute intracranial hemorrhage, mass   effect, midline shift, or herniation. No definite CT evidence of acute cortical   infarct is seen. The gray-white matter differentiation is within normal limits. ORBITS: The bilateral lenses are absent, likely due to prior cataract surgery. PARANASAL SINUSES/MASTOIDS: Bubbly air-fluid levels are present in the bilateral   sphenoid sinuses. Additional mucoperiosteal thickening is seen in the ethmoidal   air cells. Visualized mastoid air cells are clear. OSSEOUS STRUCTURES: No fracture is seen. OTHER: Endotracheal tube is present.         _______________                       IMPRESSION:   · Acute hypoxic respiratory failure requiring mechanical ventilation- s/p liberated from ventilator  · Possible aspiration PNA- zosyn,vanc  Hx  · COPD  · HTN  · Arthritis  · Drug use w/ recent admission for thoracic discitis  · Hep C        PLAN:   · Resp -  Stable on 3L NC. O2 goal > 90. HOB > 30. Aspiration precautions. Pulmonary hygiene. · ID - Afebrile and leukocytosis. Trend temp and WBC curve. BCx pending. Continue ABx. Monitor for signs of infection. · CVS - HD stable. Monitor HD status. · Heme/onc - Daily CBC. Monitor H/H and platelets. Monitor for signs of bleeding. · Metabolic - Daily CMP. Trend and replace electrolytes per replacement protocol. · Renal - Trend renal indices.   [place] for strict I/O.  · Endocrine - Glycemic control per protocol. · Neuro/ Pain/ Sedation - Monitor neuro status. PRN pain meds if needed. · GI - SLP this afternoon. Bowel regimen. · Prophylaxis - DVT, GI- SQH, protonix.   · PT/OT  · Discussed in interdisciplinary rounds  · Code status- FULL          The patient is: [] acutely ill Risk of deterioration: [x] moderate    [x] critically ill  [] high     [x]See my orders for details    My assessment/plan was discussed with:  [x]nursing []PT/OT    [x]respiratory therapy [x]Dr. Mela Bundy   []family []       Analilia Horne NP

## 2018-12-18 NOTE — PROGRESS NOTES
[facility]  Hospitalist Division           Inpatient Daily Progress Note        Patient: Zackery Mariee MRN: [id number]  CSN: [id number]    YOB: 1950  Age: 77 y.o. Sex: male    DOA: 12/16/2018 LOS:  LOS: 2 days                    Chief Complaint:      Interval History:    Per Dr. Neo Gooden HPI: Perry Tovar is a 77 y.o. male who is being admitted for acute hypoxic Resp failure & ? Aspiration PNA. Per ER chart review & EMS report - they were called 2y to pt being confused - no other history is obtainable at this time. Pt brought to the ER - was placed on NRBM with some improvement but later needed to be intubated . Chart review shows PMHx - HTN , COPD & Hep C - concern for IVDA - recent admission for discitis. ER eval - Pt is intubated & sedated - PCCM consulted. Will admit to the ICU for further eval\" Blood cx NGTD. UDS + benzos, opiates. CT head negative. 12/18/18: Lytes protocol. Extubation per PCCM. Pt alert, follows command promptly. On SBT. TFs on hold. Renal number slightly elevated-monitor. Extubated to 3L nasal cannula-swallow eval, diet per critical care; can d/c fluids if tolerating adequate PO. Fevers continue. Antibiotics (levaquin, zosyn, vancomycin). Blood cultures NGTD 12/16/18. CXR today showing substantial bibasilar density c/w atelectasis/infiltrate/effusion, persistent vascular congestion. Subjective: Intubated     Objective:      Visit Vitals  /60   Pulse 98   Temp 99.9 °F (37.7 °C)   Resp 22   Ht 6' (1.829 m)   Wt 104.8 kg (231 lb)   SpO2 96%   BMI 31.33 kg/m²           Physical Exam:  General appearance: alert   Lungs: clear to auscultation bilaterally; ETT   Heart: regular rate and rhythm, S1, S2 normal   Abdomen: soft, non tender, non distended. Normoactive bowel sounds.    Extremities: extremities normal, atraumatic, no cyanosis or edema  Skin: Skin color, texture, turgor normal.   Neurologic: Follows commands x4; intubated       Intake and Output:  Current Shift:  12/18 0701 - 12/18 1900  In: 100 [I.V.:100]  Out: 85 [Urine:85]  Last three shifts:  12/16 1901 - 12/18 0700  In: 8382.6 [I.V.:7962.6]  Out: 2010 [Urine:2010]    Recent Results (from the past 24 hour(s))   ECHO ADULT COMPLETE    Collection Time: 12/17/18 11:53 AM   Result Value Ref Range    Aortic Valve Systolic Peak Velocity 2.39 cm/s    AoV PG 0.0 mmHg    LVIDd 4.09 (A) 4.2 - 5.9 cm    LVPWd 1.26 (A) 0.6 - 1.0 cm    LVIDs 2.75 cm    IVSd 1.32 (A) 0.6 - 1.0 cm    LV ED Vol A2C 143.5 mL    LV ES Vol A4C 88.1 mL    LV ES Vol BP 83.9 (A) 22 - 58 mL    LVOT d 2.42 cm    LVOT Peak Velocity 113.66 cm/s    LVOT Peak Gradient 5.2 mmHg    MVA (PHT) 6.1 cm2    MV A Hardeep 79.56 cm/s    MV E Hardeep 0.47 cm/s    MV E/A 0.01     BP EF 44.5 (A) 55 - 100 %    LV Ejection Fraction MOD 4C 44 %    LV Ejection Fraction MOD 2C 45 %    LA Vol 4C 89.46 (A) 18 - 58 mL    LA Vol 2C 78.43 (A) 18 - 58 mL    LV Mass .3 (A) 88 - 224 g    LV Mass AL Index 99.0 49 - 115 g/m2    LV ES Vol A2C 78.8 mL    LVES Vol Index BP 37.0 mL/m2    LV ED Vol A4C 157.9 mL    LVED Vol Index BP 66.7 mL/m2    Mitral Valve E Wave Deceleration Time 123.8 ms    Mitral Valve Pressure Half-time 35.9 ms    LV ED Vol .1 67 - 155 ml    LA Vol Index 34.63 16 - 28 ml/m2    LA Vol Index 39.50 16 - 28 ml/m2    LVED Vol Index A4C 69.7 mL/m2    LVED Vol Index A2C 63.4 mL/m2    LVES Vol Index A4C 38.9 mL/m2    LVES Vol Index A2C 34.8 mL/m2    Ao Root D 3.20 cm   DRUG SCREEN, URINE    Collection Time: 12/17/18  2:00 PM   Result Value Ref Range    BENZODIAZEPINES POSITIVE (A) NEG      BARBITURATES NEGATIVE  NEG      THC (TH-CANNABINOL) NEGATIVE  NEG      OPIATES POSITIVE (A) NEG      PCP(PHENCYCLIDINE) NEGATIVE  NEG      COCAINE NEGATIVE  NEG      AMPHETAMINES NEGATIVE  NEG      METHADONE NEGATIVE  NEG      HDSCOM (NOTE)    CBC W/O DIFF    Collection Time: 12/18/18  4:00 AM   Result Value Ref Range    WBC 7.0 4.6 - 13.2 K/uL    RBC 3.85 (L) 4.70 - 5.50 M/uL    HGB 10.6 (L) 13.0 - 16.0 g/dL    HCT 33.8 (L) 36.0 - 48.0 %    MCV 87.8 74.0 - 97.0 FL    MCH 27.5 24.0 - 34.0 PG    MCHC 31.4 31.0 - 37.0 g/dL    RDW 16.8 (H) 11.6 - 14.5 %    PLATELET 868 (L) 181 - 420 K/uL    MPV 10.4 9.2 - 74.0 FL   METABOLIC PANEL, BASIC    Collection Time: 12/18/18  4:00 AM   Result Value Ref Range    Sodium 146 (H) 136 - 145 mmol/L    Potassium 3.0 (L) 3.5 - 5.5 mmol/L    Chloride 108 100 - 108 mmol/L    CO2 30 21 - 32 mmol/L    Anion gap 8 3.0 - 18 mmol/L    Glucose 84 74 - 99 mg/dL    BUN 23 (H) 7.0 - 18 MG/DL    Creatinine 1.36 (H) 0.6 - 1.3 MG/DL    BUN/Creatinine ratio 17 12 - 20      GFR est AA >60 >60 ml/min/1.73m2    GFR est non-AA 52 (L) >60 ml/min/1.73m2    Calcium 7.7 (L) 8.5 - 10.1 MG/DL   LIPASE    Collection Time: 12/18/18  4:00 AM   Result Value Ref Range    Lipase 102 73 - 393 U/L   POC G3    Collection Time: 12/18/18  4:07 AM   Result Value Ref Range    Device: VENT      FIO2 (POC) 30 %    pH (POC) 7.576 (HH) 7.35 - 7.45      pCO2 (POC) 31.4 (L) 35.0 - 45.0 MMHG    pO2 (POC) 61 (L) 80 - 100 MMHG    HCO3 (POC) 29.2 (H) 22 - 26 MMOL/L    sO2 (POC) 94 92 - 97 %    Base excess (POC) 7 mmol/L    Mode ASSIST CONTROL      Tidal volume 550 ml    Set Rate 12 bpm    PEEP/CPAP (POC) 5 cmH2O    PIP (POC) 19      Allens test (POC) YES      Inspiratory Time 0.90 sec    Nitric-ppm (POC) 0 ppm    Total resp. rate 20      Site LEFT RADIAL      Patient temp.  99.00      Specimen type (POC) ARTERIAL      Performed by Joshua Ott     Volume control plus YES     MAGNESIUM    Collection Time: 12/18/18  7:15 AM   Result Value Ref Range    Magnesium 1.6 1.6 - 2.6 mg/dL   PHOSPHORUS    Collection Time: 12/18/18  7:15 AM   Result Value Ref Range    Phosphorus 1.0 (L) 2.5 - 4.9 MG/DL           Lab Results   Component Value Date/Time    Glucose 84 12/18/2018 04:00 AM    Glucose 94 12/17/2018 12:30 AM    Glucose 134 (H) 12/16/2018 03:50 PM    Glucose 98 12/04/2018 05:58 AM    Glucose 80 12/02/2018 05:50 AM      EXAM: CT HEAD W/O CONTRAST     INDICATION: Confusion/delirium, altered level of consciousness, unresponsive     COMPARISON: CT head 11/15/2018     TECHNIQUE: Axial CT imaging of the head was performed without intravenous  contrast.  Additional coronal and sagittal reconstructions were performed. One  or more dose reduction techniques were used on this CT: automated exposure  control, adjustment of the mAs and/or kVp according to patient's size, and  iterative reconstruction techniques. The specific techniques utilized on this CT  exam have been documented in the patient's electronic medical record.  _______________     FINDINGS:     Motion artifact is present which limits evaluation.     VENTRICLES/EXTRA-AXIAL SPACES: The ventricles and sulci are normal in their size  and configuration.     BRAIN PARENCHYMA: There is no evidence of acute intracranial hemorrhage, mass  effect, midline shift, or herniation. No definite CT evidence of acute cortical  infarct is seen. The gray-white matter differentiation is within normal limits.     ORBITS: The bilateral lenses are absent, likely due to prior cataract surgery.     PARANASAL SINUSES/MASTOIDS: Bubbly air-fluid levels are present in the bilateral  sphenoid sinuses. Additional mucoperiosteal thickening is seen in the ethmoidal  air cells. Visualized mastoid air cells are clear.     OSSEOUS STRUCTURES: No fracture is seen.     OTHER: Endotracheal tube is present.       _______________     IMPRESSION  IMPRESSION:     1. No acute intracranial hemorrhage, mass effect, midline shift, or herniation. No definite CT evidence of acute cortical infarct is seen. Please note that  noncontrast head CT may be normal in early acute infarct. 2. Bilateral sphenoid sinus bubbly air-fluid levels potentially representing  acute sinusitis or related to secretions given intubation.  Clinical correlation  is recommended.   AP portable chest, 12/18/2018 at 0831 hours:     Comparison 12/17/2018.     Endotracheal tube is just below the clavicles approximately 4.5 cm above the  reggie in reasonable position. Bibasilar densities consistent with a combination  of lower lobe atelectasis/infiltrate and effusion. Vascular congestion persists  unchanged. The heart is stable but in part obscured. Right internal jugular  central line is at the cavoatrial junction or upper right atrium.     IMPRESSION  IMPRESSION:  Little change from previous. Substantial bibasilar density consistent with  atelectasis/infiltrate/effusion. Persistent vascular congestion. EXAM: Chest, portable AP supine, one view     INDICATION: Arrived to emergency department unresponsive. Endotracheal tube  placement.     COMPARISON: 11/22/2018     _______________     FINDINGS:     ETT is 3.2 cm above the reggie, right jugular central venous catheter tip  projects at the right atrium.      Cardiac silhouette is within normal range in size. Calcified plaque is present  at the aortic arch.      No pneumothorax. Pulmonary vascular redistribution has developed along with  small to moderate right and small left pleural effusion. Parenchymal band at the  right lung base is compatible with atelectasis. Retrocardiac region is somewhat  dense. No pneumothorax appreciated.     Degenerative changes noted at both shoulders. _______________     IMPRESSION  IMPRESSION:        1. ETT in satisfactory position. Right jugular central venous catheter within  the right atrium. 2. CHF with small to moderate right and small left pleural effusions, linear  atelectasis right lung base and retrocardiac atelectasis and/or infiltrate. Normal cavity size and systolic function (ejection fraction normal). Mild concentric hypertrophy observed. The estimated ejection fraction is 61 - 65%. Visually measured ejection fraction. No regional wall motion abnormality noted.  There is mild (grade 1) left ventricular diastolic dysfunction. Left Atrium The cavity size is mildly dilated. Right Ventricle Normal cavity size and global systolic function. Right Atrium The cavity size is mildly dilated. Aortic Valve No stenosis and no regurgitation. Mild aortic valve sclerosis with no evidence of reduced excursion. Mitral Valve No stenosis. Mitral valve non-specific thickening. Trace regurgitation. Tricuspid Valve No stenosis. Non-specific thickening. Tricuspid regurgitation is inadequate for estimation of right ventricular systolic pressure. Pulmonic Valve Not well visualized, normal Doppler findings. Aorta Normal aortic root. IVC/Hepatic Veins Inferior vena cava not well visualized. Pericardium No evidence of pericardial effusion. TTE procedure Findings     TTE Procedure Information Image quality: technically difficult. The view(s) performed were parasternal, apical, subcostal and suprasternal. Unable to use Definity due to contraindication of respiratory distress. Technically difficult study, color flow Doppler was performed and pulse wave and/or continuous wave Doppler was performed.        Assessment/Plan:     Patient Active Problem List   Diagnosis Code    Discitis of thoracic region M46.44    UTI (urinary tract infection) N39.0    Bilateral pleural effusion J90    Lumbar stenosis M48.061    Adenoma of left adrenal gland D35.02    Uncontrolled hypertension I10    Cirrhosis of liver ([facility] [address] K74.60    Hepatitis C B19.20    Obesity E66.9    COPD (chronic obstructive pulmonary disease) ([facility] [address] J44.9    Constipation K59.00    Back pain M54.9    Altered mental status R41.82    Acute respiratory failure with hypoxia (HCC) J96.01    Sinusitis J32.9       A/P:    Plan:    Acute Hypoxic Resp failure  - intubated  -ABGs/CXR/vent management per PCC  -SBT this am, alert, following commands-defer extubation   -extubated 12/18/18 to 3L nasal cannula   -ICS, pulmonary toileting    Aspiration PNA   - broad spectrum Abx ' blood cultures NFTD   -ICS, pulmonary toileting when ext     H/o Drug use with recent admission for thoracic discitis   -monitor withdrawal  -UDS + benzos/opiates     H/o HTN   - holding PO meds for now   -restart when ready     H/o COPD   -currently intubated  -breathing treatments when able     H/o Hep C   -monitor    DVT Px   - heparin            Coit Session, JUAN PABLO [facility] [address]  [phone]  Office:  [phone]

## 2018-12-18 NOTE — CDMP QUERY
Clarification needed for Encephalopathy documented on PN 12/17    Risk: pneumonia, acute resp failure,     Clinical Indicators: Altered mental status     Treatment:  iv abx     Please clarify if this patient is being treated/managed for:    =>Metabolic Encephalopathy sec to  Asp Pneumonia   =>Other Explanation of clinical findings  =>Unable to Determine (no explanation of clinical findings)    Please clarify and document your clinical opinion in the progress notes and discharge summary including the definitive and/or presumptive diagnosis, (suspected or probable), related to the above clinical findings. Please include clinical findings supporting your diagnosis. If you DECLINE this query or would like to communicate with [facility], please utilize the \"[employer] message box\" at the TOP of the Progress Note on the right.       Thank you,  Yamilex Merino RN/CCDS  [phone]

## 2018-12-18 NOTE — PROGRESS NOTES
1145: IJ dressing change completed. 1320: Pt extubated per doctor order. Pt tolerated well. Will continue to monitor. 1846: IJ dressing changed again. Dressing was bleeding.

## 2018-12-18 NOTE — PROGRESS NOTES
Called MD with ABG results and vent changes made per her request, rate decreased to 10 and tidal volume to 500.  Order written

## 2018-12-18 NOTE — PROGRESS NOTES
Physical Exam   Skin: Skin is warm and dry.            Dual assessment completed with [facility]

## 2018-12-18 NOTE — PROGRESS NOTES
Problem: Dysphagia (Adult)  Goal: *Acute Goals and Plan of Care (Insert Text)  Recommendations:  Diet: puree / NT  Meds: crushed  Aspiration Precautions  Oral Care TID    Goals:  Patient will:  1. Tolerate PO trials with 0 s/s overt distress in 4/5 trials  2. Utilize compensatory swallow strategies/maneuvers (decrease bite/sip, size/rate, alt. liq/sol) with min cues in 4/5 trials  3. Perform oral-motor/laryngeal exercises to increase oropharyngeal swallow function with min cues  4. Complete an objective swallow study (i.e., MBSS) to assess swallow integrity, r/o aspiration, and determine of safest LRD, min A      Outcome: Progressing Towards Goal    Speech LAnguage Pathology bedside swallow   evaluation & TREATMENT     Patient: Srinivas Mccauley (67 y.o. male)  Date: 12/18/2018  Primary Diagnosis: Altered mental status  Acute respiratory failure with hypoxia (HCC)       Precautions: Aspiration     PLOF: Independent  ASSESSMENT :  Based on the objective data described below, the patient presents with pharyngeal dysphagia in the setting of ARF. Pt A&Ox4; reports soft solids / thin liquid diet at baseline. Presents with productive cough and hoarse voice s/p extubation. Accepted trials of thin liquids via cup sip with delayed weak cough and decrease in O2 saturations from 100-95; resolved with NT. Adequate oral prep/transit with NT and puree with mildly delayed swallow timing/reflex and hyolaryngeal elevation adequate to palpation. No s/sx aspiration. Labored oral prep/transit with solid requiring NT liquid wash to clear. At this time recommend initiating NT/puree diet with aspiration precautions. SLP will follow for diet tolerance and upgrade as appropriate. Skilled therapy initiated with mod verbal cues for fast, effortful swallows to decrease aspiration risk; (+) response.  Educated pt on aspiration precautions and importance of compensatory swallow techniques to decrease aspiration risk (decrease rate of intake & sip/bite size, upright @HOB for all po intake and ~30 minutes after po); verbalized comprehension. D/w RN, Kushal Davidson. Patient will benefit from skilled intervention to address the above impairments. Patients rehabilitation potential is considered to be Good  Factors which may influence rehabilitation potential include:   []            None noted  []            Mental ability/status  [x]            Medical condition  []            Home/family situation and support systems  []            Safety awareness  []            Pain tolerance/management  []            Other:      PLAN :  Recommendations and Planned Interventions:  NT / puree; meds crushed  Frequency/Duration: Patient will be followed by speech-language pathology 1-2 times per day/4-7 days per week to address goals. Discharge Recommendations: To Be Determined     SUBJECTIVE:   Patient stated I'm hungry. OBJECTIVE:     Past Medical History:   Diagnosis Date    Arthritis     COPD     Hypertension      Past Surgical History:   Procedure Laterality Date    HX REFRACTIVE SURGERY       Prior Level of Function/Home Situation: See below  Home Situation  Home Environment: Private residence  # Steps to Enter: 0  One/Two Story Residence: One story  Living Alone: No  Support Systems: Child(anna)  Patient Expects to be Discharged to[de-identified] Private residence  Current DME Used/Available at Home: None  Diet prior to admission: soft solids / thin liquids  Current Diet:  Puree / NT   Cognitive and Communication Status:  Neurologic State: Alert  Orientation Level: Oriented X4  Cognition: Follows commands  Perception: Appears intact  Perseveration: No perseveration noted  Safety/Judgement: Awareness of environment  Oral Assessment:  Oral Assessment  Labial: No impairment  Dentition: Limited;Natural  Oral Hygiene: fair  Lingual: No impairment  Velum: No impairment  Mandible: No impairment  P.O.  Trials:  Patient Position: HOB 90  Vocal quality prior to P.O.: Breathy;Hoarse  Consistency Presented: Thin liquid; Solid;Puree;Nectar thick liquid; Ice chips  How Presented: Self-fed/presented;SLP-fed/presented;Cup/sip;Spoon     Bolus Acceptance: No impairment  Bolus Formation/Control: No impairment     Propulsion: No impairment  Oral Residue: Less than 10% of bolus  Initiation of Swallow: Delayed (# of seconds)  Laryngeal Elevation: Functional  Aspiration Signs/Symptoms: Weak cough; Decrease in O2 saturations  Pharyngeal Phase Characteristics: Audible swallow  Effective Modifications: Small sips and bites  Cues for Modifications: Minimal       Oral Phase Severity: Minimal  Pharyngeal Phase Severity : Mild-moderate    GCODESwallowing:  Swallow Current Status CK= 40-59%   Swallow Goal Status CI= 1-19%    The severity rating is based on the following outcomes:  ISRAEL Noms Swallow Level 4    Clinical Judgement    PAIN:  Start of Eval/Tx: 0  End of Eval/Tx: 0     After treatment:   []            Patient left in no apparent distress sitting up in chair  [x]            Patient left in no apparent distress in bed  [x]            Call bell left within reach  [x]            Nursing notified  [x]            Family present  []            Caregiver present  []            Bed alarm activated    COMMUNICATION/EDUCATION:   [x]            Aspiration precautions; swallow safety; compensatory techniques. [x]            Patient/family have participated as able in goal setting and plan of care. [x]            Patient/family agree to work toward stated goals and plan of care. []            Patient understands intent and goals of therapy; neutral about participation. []            Patient unable to participate in goal setting/plan of care; educ ongoing with interdisciplinary staff  []         Posted safety precautions in patient's room.     Thank you for this referral.  NIKUNJ Abraham  Time Calculation: 27 mins  Evaluation Time: 18 minutes   Treatment Time: 9 minutes

## 2018-12-18 NOTE — PROGRESS NOTES
0700: Received report on patient from [address]. Patient resting in bed, stable VS, follows ommands, moves all extremities spontaneously, Will continue to monitor. 0830: Tube feed removed due to misplacement.

## 2018-12-18 NOTE — PROGRESS NOTES
Patient ordered urine drug screen which has returned positive for Cocaine and opiates. He live with his elderly mother and has refused Long Term care as a discharge option in the past. He has [facility] Medicaid health insurance. He is 77years old but does not have Medicare health insurance, unclear if he failed to notify or of he never worked enough quarters to qualify. Currently tolerating SBT. His transition plan is dependent upon his successful extubation. Historically he has refused long term care as a discharge option because an accepting facility will absorb his disability check and a lot him  $ 40.00 a month. Case management following.  Kojo Wood RN

## 2018-12-18 NOTE — PROGRESS NOTES
Problem: Falls - Risk of  Goal: *Absence of Falls  Document Sumi Fall Risk and appropriate interventions in the flowsheet. Outcome: Progressing Towards Goal  Fall Risk Interventions:       Mentation Interventions: Bed/chair exit alarm    Medication Interventions: Bed/chair exit alarm    Elimination Interventions: Bed/chair exit alarm             Problem: Pressure Injury - Risk of  Goal: *Prevention of pressure injury  Document Raul Scale and appropriate interventions in the flowsheet.   Outcome: Progressing Towards Goal  Pressure Injury Interventions:  Sensory Interventions: Assess changes in LOC, Assess need for specialty bed, Keep linens dry and wrinkle-free, Minimize linen layers    Moisture Interventions: Apply protective barrier, creams and emollients, Maintain skin hydration (lotion/cream)    Activity Interventions: Pressure redistribution bed/mattress(bed type)    Mobility Interventions: Pressure redistribution bed/mattress (bed type)    Nutrition Interventions: Document food/fluid/supplement intake    Friction and Shear Interventions: HOB 30 degrees or less

## 2018-12-18 NOTE — PROGRESS NOTES
PT orders received and chart reviewed. Per chart review, extubated at [phone]. Per protocol holding mobility 6 hours post extubation. Will follow up 12/19/2018.      Thank you,     Kenn Webb PT, DPT  Office Extension: [phone]  Pager: [phone]

## 2018-12-18 NOTE — PROGRESS NOTES
Problem: Falls - Risk of  Goal: *Absence of Falls  Document Sumi Fall Risk and appropriate interventions in the flowsheet. Outcome: Progressing Towards Goal  Fall Risk Interventions:       Mentation Interventions: Bed/chair exit alarm    Medication Interventions: Bed/chair exit alarm    Elimination Interventions: Bed/chair exit alarm             Problem: Pressure Injury - Risk of  Goal: *Prevention of pressure injury  Document Raul Scale and appropriate interventions in the flowsheet.   Outcome: Progressing Towards Goal  Pressure Injury Interventions:  Sensory Interventions: Assess changes in LOC         Activity Interventions: Pressure redistribution bed/mattress(bed type)    Mobility Interventions: Assess need for specialty bed, Chair cushion    Nutrition Interventions: Document food/fluid/supplement intake    Friction and Shear Interventions: HOB 30 degrees or less

## 2018-12-18 NOTE — PROGRESS NOTES
Problem: Falls - Risk of  Goal: *Absence of Falls  Document Sumi Fall Risk and appropriate interventions in the flowsheet. Outcome: Progressing Towards Goal  Fall Risk Interventions:       Mentation Interventions: Bed/chair exit alarm, Update white board    Medication Interventions: Bed/chair exit alarm    Elimination Interventions: Bed/chair exit alarm, Call light in reach             Problem: Pressure Injury - Risk of  Goal: *Prevention of pressure injury  Document Raul Scale and appropriate interventions in the flowsheet. Outcome: Progressing Towards Goal  Pressure Injury Interventions:  Sensory Interventions: Assess changes in LOC, Turn and reposition approx. every two hours (pillows and wedges if needed), Minimize linen layers, Keep linens dry and wrinkle-free    Moisture Interventions: Apply protective barrier, creams and emollients, Internal/External urinary devices, Maintain skin hydration (lotion/cream)    Activity Interventions: Pressure redistribution bed/mattress(bed type)    Mobility Interventions: Turn and reposition approx.  every two hours(pillow and wedges), Pressure redistribution bed/mattress (bed type)    Nutrition Interventions: Document food/fluid/supplement intake    Friction and Shear Interventions: HOB 30 degrees or less, Minimize layers, Lift sheet

## 2018-12-19 ENCOUNTER — APPOINTMENT (OUTPATIENT)
Dept: GENERAL RADIOLOGY | Age: 68
DRG: 133 | End: 2018-12-19
Attending: NURSE PRACTITIONER
Payer: MEDICAID

## 2018-12-19 LAB
ALBUMIN SERPL-MCNC: 2.5 G/DL (ref 3.4–5)
ALBUMIN/GLOB SERPL: 0.4 {RATIO} (ref 0.8–1.7)
ALP SERPL-CCNC: 76 U/L (ref 45–117)
ALT SERPL-CCNC: 25 U/L (ref 16–61)
ANION GAP SERPL CALC-SCNC: 6 MMOL/L (ref 3–18)
AST SERPL-CCNC: 49 U/L (ref 15–37)
BASOPHILS # BLD: 0 K/UL (ref 0–0.1)
BASOPHILS NFR BLD: 0 % (ref 0–2)
BILIRUB SERPL-MCNC: 0.9 MG/DL (ref 0.2–1)
BUN SERPL-MCNC: 14 MG/DL (ref 7–18)
BUN/CREAT SERPL: 15 (ref 12–20)
CALCIUM SERPL-MCNC: 7.9 MG/DL (ref 8.5–10.1)
CHLORIDE SERPL-SCNC: 103 MMOL/L (ref 100–108)
CO2 SERPL-SCNC: 33 MMOL/L (ref 21–32)
CREAT SERPL-MCNC: 0.92 MG/DL (ref 0.6–1.3)
DIFFERENTIAL METHOD BLD: ABNORMAL
EOSINOPHIL # BLD: 0 K/UL (ref 0–0.4)
EOSINOPHIL NFR BLD: 0 % (ref 0–5)
ERYTHROCYTE [DISTWIDTH] IN BLOOD BY AUTOMATED COUNT: 17.2 % (ref 11.6–14.5)
GLOBULIN SER CALC-MCNC: 5.8 G/DL (ref 2–4)
GLUCOSE SERPL-MCNC: 100 MG/DL (ref 74–99)
HCT VFR BLD AUTO: 36.9 % (ref 36–48)
HGB BLD-MCNC: 11.4 G/DL (ref 13–16)
LYMPHOCYTES # BLD: 1.2 K/UL (ref 0.9–3.6)
LYMPHOCYTES NFR BLD: 23 % (ref 21–52)
MAGNESIUM SERPL-MCNC: 1.7 MG/DL (ref 1.6–2.6)
MCH RBC QN AUTO: 27.7 PG (ref 24–34)
MCHC RBC AUTO-ENTMCNC: 30.9 G/DL (ref 31–37)
MCV RBC AUTO: 89.8 FL (ref 74–97)
MONOCYTES # BLD: 0.5 K/UL (ref 0.05–1.2)
MONOCYTES NFR BLD: 9 % (ref 3–10)
NEUTS SEG # BLD: 3.7 K/UL (ref 1.8–8)
NEUTS SEG NFR BLD: 68 % (ref 40–73)
PHOSPHATE SERPL-MCNC: 4.6 MG/DL (ref 2.5–4.9)
PLATELET # BLD AUTO: 113 K/UL (ref 135–420)
PMV BLD AUTO: 10.4 FL (ref 9.2–11.8)
POTASSIUM SERPL-SCNC: 3.4 MMOL/L (ref 3.5–5.5)
PROT SERPL-MCNC: 8.3 G/DL (ref 6.4–8.2)
RBC # BLD AUTO: 4.11 M/UL (ref 4.7–5.5)
SODIUM SERPL-SCNC: 142 MMOL/L (ref 136–145)
WBC # BLD AUTO: 5.4 K/UL (ref 4.6–13.2)

## 2018-12-19 PROCEDURE — 77010033678 HC OXYGEN DAILY

## 2018-12-19 PROCEDURE — 94660 CPAP INITIATION&MGMT: CPT

## 2018-12-19 PROCEDURE — 74011250636 HC RX REV CODE- 250/636: Performed by: NURSE PRACTITIONER

## 2018-12-19 PROCEDURE — C9113 INJ PANTOPRAZOLE SODIUM, VIA: HCPCS | Performed by: NURSE PRACTITIONER

## 2018-12-19 PROCEDURE — 77030037878 HC DRSG MEPILEX >48IN BORD MOLN -B

## 2018-12-19 PROCEDURE — 74011000258 HC RX REV CODE- 258: Performed by: INTERNAL MEDICINE

## 2018-12-19 PROCEDURE — 94640 AIRWAY INHALATION TREATMENT: CPT

## 2018-12-19 PROCEDURE — 85025 COMPLETE CBC W/AUTO DIFF WBC: CPT

## 2018-12-19 PROCEDURE — 74011250636 HC RX REV CODE- 250/636: Performed by: HOSPITALIST

## 2018-12-19 PROCEDURE — 74011250636 HC RX REV CODE- 250/636: Performed by: INTERNAL MEDICINE

## 2018-12-19 PROCEDURE — 74011250637 HC RX REV CODE- 250/637: Performed by: NURSE PRACTITIONER

## 2018-12-19 PROCEDURE — 74011250637 HC RX REV CODE- 250/637: Performed by: FAMILY MEDICINE

## 2018-12-19 PROCEDURE — 92526 ORAL FUNCTION THERAPY: CPT

## 2018-12-19 PROCEDURE — 74011000250 HC RX REV CODE- 250: Performed by: INTERNAL MEDICINE

## 2018-12-19 PROCEDURE — 84100 ASSAY OF PHOSPHORUS: CPT

## 2018-12-19 PROCEDURE — 5A09357 ASSISTANCE WITH RESPIRATORY VENTILATION, LESS THAN 24 CONSECUTIVE HOURS, CONTINUOUS POSITIVE AIRWAY PRESSURE: ICD-10-PCS | Performed by: INTERNAL MEDICINE

## 2018-12-19 PROCEDURE — 74011000250 HC RX REV CODE- 250: Performed by: HOSPITALIST

## 2018-12-19 PROCEDURE — 65610000006 HC RM INTENSIVE CARE

## 2018-12-19 PROCEDURE — 83735 ASSAY OF MAGNESIUM: CPT

## 2018-12-19 PROCEDURE — 77030035694 HC MSK BIPAP FLL FAC PERFMAX PHIL -B

## 2018-12-19 PROCEDURE — 80053 COMPREHEN METABOLIC PANEL: CPT

## 2018-12-19 RX ORDER — BUMETANIDE 0.25 MG/ML
1 INJECTION INTRAMUSCULAR; INTRAVENOUS ONCE
Status: COMPLETED | OUTPATIENT
Start: 2018-12-19 | End: 2018-12-19

## 2018-12-19 RX ORDER — MAGNESIUM SULFATE 1 G/100ML
1 INJECTION INTRAVENOUS ONCE
Status: COMPLETED | OUTPATIENT
Start: 2018-12-19 | End: 2018-12-19

## 2018-12-19 RX ORDER — ALBUTEROL SULFATE 2.5 MG/.5ML
2.5 SOLUTION RESPIRATORY (INHALATION)
Status: DISCONTINUED | OUTPATIENT
Start: 2018-12-19 | End: 2018-12-21 | Stop reason: HOSPADM

## 2018-12-19 RX ORDER — HYDRALAZINE HYDROCHLORIDE 20 MG/ML
20 INJECTION INTRAMUSCULAR; INTRAVENOUS
Status: DISCONTINUED | OUTPATIENT
Start: 2018-12-19 | End: 2018-12-21 | Stop reason: HOSPADM

## 2018-12-19 RX ORDER — IPRATROPIUM BROMIDE AND ALBUTEROL SULFATE 2.5; .5 MG/3ML; MG/3ML
3 SOLUTION RESPIRATORY (INHALATION)
Status: DISCONTINUED | OUTPATIENT
Start: 2018-12-19 | End: 2018-12-21 | Stop reason: HOSPADM

## 2018-12-19 RX ORDER — LISINOPRIL 40 MG/1
40 TABLET ORAL DAILY
Status: DISCONTINUED | OUTPATIENT
Start: 2018-12-19 | End: 2018-12-21 | Stop reason: HOSPADM

## 2018-12-19 RX ORDER — LISINOPRIL 20 MG/1
20 TABLET ORAL DAILY
Status: DISCONTINUED | OUTPATIENT
Start: 2018-12-19 | End: 2018-12-19

## 2018-12-19 RX ORDER — POTASSIUM CHLORIDE 7.45 MG/ML
10 INJECTION INTRAVENOUS
Status: COMPLETED | OUTPATIENT
Start: 2018-12-19 | End: 2018-12-20

## 2018-12-19 RX ADMIN — POTASSIUM CHLORIDE 10 MEQ: 7.46 INJECTION, SOLUTION INTRAVENOUS at 09:25

## 2018-12-19 RX ADMIN — POTASSIUM CHLORIDE 10 MEQ: 7.46 INJECTION, SOLUTION INTRAVENOUS at 11:30

## 2018-12-19 RX ADMIN — POTASSIUM CHLORIDE 10 MEQ: 7.46 INJECTION, SOLUTION INTRAVENOUS at 10:30

## 2018-12-19 RX ADMIN — POTASSIUM CHLORIDE 20 MEQ: 29.8 INJECTION, SOLUTION INTRAVENOUS at 02:21

## 2018-12-19 RX ADMIN — HYDROCODONE BITARTRATE AND ACETAMINOPHEN 1 TABLET: 7.5; 325 TABLET ORAL at 04:39

## 2018-12-19 RX ADMIN — IPRATROPIUM BROMIDE AND ALBUTEROL SULFATE 3 ML: .5; 3 SOLUTION RESPIRATORY (INHALATION) at 16:02

## 2018-12-19 RX ADMIN — IPRATROPIUM BROMIDE AND ALBUTEROL SULFATE 3 ML: .5; 3 SOLUTION RESPIRATORY (INHALATION) at 20:25

## 2018-12-19 RX ADMIN — PIPERACILLIN SODIUM,TAZOBACTAM SODIUM 3.38 G: 3; .375 INJECTION, POWDER, FOR SOLUTION INTRAVENOUS at 04:39

## 2018-12-19 RX ADMIN — PIPERACILLIN SODIUM,TAZOBACTAM SODIUM 3.38 G: 3; .375 INJECTION, POWDER, FOR SOLUTION INTRAVENOUS at 11:26

## 2018-12-19 RX ADMIN — SODIUM CHLORIDE 1250 MG: 900 INJECTION, SOLUTION INTRAVENOUS at 14:02

## 2018-12-19 RX ADMIN — HYDROCODONE BITARTRATE AND ACETAMINOPHEN 1 TABLET: 7.5; 325 TABLET ORAL at 20:45

## 2018-12-19 RX ADMIN — MAGNESIUM SULFATE HEPTAHYDRATE 1 G: 1 INJECTION, SOLUTION INTRAVENOUS at 09:25

## 2018-12-19 RX ADMIN — POTASSIUM CHLORIDE 10 MEQ: 7.46 INJECTION, SOLUTION INTRAVENOUS at 12:00

## 2018-12-19 RX ADMIN — SODIUM CHLORIDE 40 MG: 9 INJECTION, SOLUTION INTRAMUSCULAR; INTRAVENOUS; SUBCUTANEOUS at 08:11

## 2018-12-19 RX ADMIN — BUMETANIDE 1 MG: 0.25 INJECTION INTRAMUSCULAR; INTRAVENOUS at 11:28

## 2018-12-19 RX ADMIN — POTASSIUM CHLORIDE 10 MEQ: 7.46 INJECTION, SOLUTION INTRAVENOUS at 08:12

## 2018-12-19 RX ADMIN — PIPERACILLIN SODIUM,TAZOBACTAM SODIUM 3.38 G: 3; .375 INJECTION, POWDER, FOR SOLUTION INTRAVENOUS at 20:30

## 2018-12-19 RX ADMIN — HEPARIN SODIUM 5000 UNITS: 5000 INJECTION INTRAVENOUS; SUBCUTANEOUS at 16:36

## 2018-12-19 RX ADMIN — SODIUM CHLORIDE 1250 MG: 900 INJECTION, SOLUTION INTRAVENOUS at 02:23

## 2018-12-19 RX ADMIN — DOCUSATE SODIUM 100 MG: 50 LIQUID ORAL at 08:12

## 2018-12-19 RX ADMIN — HEPARIN SODIUM 5000 UNITS: 5000 INJECTION INTRAVENOUS; SUBCUTANEOUS at 08:12

## 2018-12-19 NOTE — PROGRESS NOTES
Problem: Dysphagia (Adult)  Goal: *Acute Goals and Plan of Care (Insert Text)  Recommendations:  Diet: NPO  Meds: crushed  Aspiration Precautions  Oral Care TID    Goals:  Patient will:  1. Tolerate PO trials with 0 s/s overt distress in 4/5 trials  2. Utilize compensatory swallow strategies/maneuvers (decrease bite/sip, size/rate, alt. liq/sol) with min cues in 4/5 trials  3. Perform oral-motor/laryngeal exercises to increase oropharyngeal swallow function with min cues  4. Complete an objective swallow study (i.e., MBSS) to assess swallow integrity, r/o aspiration, and determine of safest LRD, min A       Outcome: Progressing Towards Goal  Speech language pathology dysphagia treatment    Patient: Marci Fraser (67 y.o. male)  Date: 12/19/2018  Diagnosis: Altered mental status  Acute respiratory failure with hypoxia (HCC) <principal problem not specified>      Precautions: Aspiration       ASSESSMENT:  Pt seen for dysphagia f/u this am; tachypneic and confused. Accepted trials of thin and NT liquid via spoon, straw, and sup sip with immediate and delayed unproductive cough and oral expulsion of >50% of bolus. Tx discontinued as pt placed on BiPAP. Due to respiratory compromise, pt not safe for po intake at this time, with exception of meds crushed in applesauce. SLP will continue to follow for po trials. D/w RN, Toi Rubinstein. Progression toward goals:  []         Improving appropriately and progressing toward goals  [x]         Improving slowly and progressing toward goals  []         Not making progress toward goals and plan of care will be adjusted     PLAN:  Recommendations and Planned Interventions:  NPO with meds crushed in applesauce  Patient continues to benefit from skilled intervention to address the above impairments. Continue treatment per established plan of care. Discharge Recommendations: To Be Determined     SUBJECTIVE:   Patient stated went down the wrong way.     OBJECTIVE:   Cognitive and Communication Status:  Neurologic State: Alert, Eyes open spontaneously  Orientation Level: Oriented to person, Oriented to place  Cognition: Follows commands  Perception: Appears intact  Perseveration: No perseveration noted  Safety/Judgement: Awareness of environment  Dysphagia Treatment:  Oral Assessment:  Oral Assessment  Labial: No impairment  Dentition: Limited, Natural  Oral Hygiene: fair  Lingual: No impairment  Velum: No impairment  Mandible: No impairment  P.O. Trials:   Patient Position: [facility] 90   Vocal quality prior to P.O.: Breathy, Hoarse   Consistency Presented:  Thin liquid, Solid, Puree, Nectar thick liquid, Ice chips   How Presented: Self-fed/presented, SLP-fed/presented, Cup/sip, Spoon       Bolus Acceptance: No impairment   Bolus Formation/Control: No impairment       Propulsion: No impairment   Oral Residue: Less than 10% of bolus   Initiation of Swallow: Delayed (# of seconds)   Laryngeal Elevation: Functional   Aspiration Signs/Symptoms: Weak cough, Decrease in O2 saturations   Pharyngeal Phase Characteristics: Audible swallow   Effective Modifications: Small sips and bites   Cues for Modifications: Minimal         Oral Phase Severity: Minimal   Pharyngeal Phase Severity : Mild-moderate      PAIN:  Start of Tx: 5 (RN notified)  End of Tx: 5 (RN notified)    Dami Oas:  Swallow Current Status CM= 80-99%   Swallow Goal Status CI= 1-19%    The severity rating is based on the following outcomes:  ISRAEL Noms Swallow Level 2    Clinical Judgement    After treatment:   []              Patient left in no apparent distress sitting up in chair  [x]              Patient left in no apparent distress in bed  [x]              Call bell left within reach  [x]              Nursing notified  []              Family present  [x]              Caregiver present  []              Bed alarm activated      COMMUNICATION/EDUCATION:   [x] Aspiration precautions; swallow safety; compensatory techniques  [] Patient unable to participate in education; education ongoing with staff  []  Posted safety precautions in patient's room.   [] Oral-motor/laryngeal strengthening exercises      NIKUNJ Cabrera  Time Calculation: 19 mins

## 2018-12-19 NOTE — PROGRESS NOTES
7117: 1st PT attempt. Respiratory rate 30s-40s. Plan to return on bipap per MD/NP discussion. Not appropriate for mobility at this time. Will continue to follow. PM Update: Remains on bipap. Not appropriate for mobility. Will follow up.     Thank you,     Ever Eden PT, DPT  Office Extension: [phone]  Pager: [phone]

## 2018-12-19 NOTE — DIABETES MGMT
NUTRITIONAL ASSESSMENT AND GLYCEMIC CONTROL PLAN OF CARE     [facility] [address] [place] [address],First Floor           77 y.o.           12/16/2018                 1. Acute UTI    2. Unresponsive state    3. Elevated lactic acid level       []  No Cultural, Congregation or ethnic dietary need identified. []  Cultural, Congregation and ethnic food preferences identified and addressed    [x]  Participated in discharge planning/Interdisciplinary rounds   Food allergies: [x]  No        []  Yes-  ASSESSMENT:   Pt is overweight related to excess caloric intake as evidenced by 130% ideal weight and BMI= 31.3kg/m2. Pt meets criteria for obesity. Pt requiring diet texture modification related to   dysphagia AEB SLP evaluation and diet advanced to pureed  with Nectar Thick Liquids-held while on bipap. Diagnosis-Intake: Inadequate protein-energy intake related to respiratory distress AEB inability to take in oral nourishment while on bipap. Altered nutrition-related lab values: pt meets criteria for prediabetes as evidenced by A1c of 5.8%- glucose well controlled while npo. INTERVENTIONS/PLAN:   Will add Ensure Enlive bid when diet resumes and if  intake is less than 50% requirements. SUBJECTIVE/OBJECTIVE:   Information obtained from:pt  Diet: pureed  with Nectar Thick Liquids- held while on bipap.   Patient Vitals for the past 100 hrs:   % Diet Eaten   12/18/18 1900 25 %   12/19- O  Medications: [x]                Reviewed     Most Recent POC Glucose:   Recent Labs     12/19/18  0350 12/18/18  0400 12/17/18  0030 12/16/18  1550   * 84 94 134*         Labs:   Lab Results   Component Value Date/Time    Hemoglobin A1c 5.8 12/06/2010 04:45 AM     Lab Results   Component Value Date/Time    Sodium 142 12/19/2018 03:50 AM    Potassium 3.4 (L) 12/19/2018 03:50 AM    Chloride 103 12/19/2018 03:50 AM    CO2 33 (H) 12/19/2018 03:50 AM    Anion gap 6 12/19/2018 03:50 AM    Glucose 100 (H) 12/19/2018 03:50 AM    BUN 14 12/19/2018 03:50 AM    Creatinine 0.92 12/19/2018 03:50 AM    Calcium 7.9 (L) 12/19/2018 03:50 AM    Magnesium 1.7 12/19/2018 03:50 AM    Phosphorus 4.6 12/19/2018 03:50 AM    Albumin 2.5 (L) 12/19/2018 03:50 AM     Anthropometrics: IBW : 80.7 kg (178 lb), % IBW (Calculated): 129.78 %, BMI (calculated): 31.3  Wt Readings from Last 1 Encounters:   12/18/18 104.8 kg (231 lb)      Ht Readings from Last 1 Encounters:   12/18/18 6' (1.829 m)     Estimated Nutrition Needs: 2022 Kcals/day, Protein (g): 85 g Fluid (ml): 2000 ml  Based on:   []          Actual BW    [x]          IBW   []            Adjusted BW    Nutrition Diagnoses: nutrient deficit as stated above   Nutrition Interventions: as stated above  Goal:     Intake will meet >75% of energy and protein requirements by 12/24.   Glucose will be within target range of 70-180mg/dl by  12/22    Nutrition Monitoring and Evaluation      []     Monitor po intake on meal rounds  [x]     Continue inpatient monitoring and intervention  []     Other:      Nutrition Risk:  []   High     [x]  Moderate    []  Minimal/Uncompromised    Naseem Bowers RD  pgr [phone]

## 2018-12-19 NOTE — PROGRESS NOTES
OT order received, chart reviewed. 3 attempts for OT evaluation (0815; 0929; 1120); pt on BiPAP all 3 attempts. Will follow up on 12/20/2018.     Yesenia Palacios MS OTR/L  Office Ext: [phone]  Pager: [phone]

## 2018-12-19 NOTE — PROGRESS NOTES
Problem: Falls - Risk of  Goal: *Absence of Falls  Document Sumi Fall Risk and appropriate interventions in the flowsheet. Outcome: Progressing Towards Goal  Fall Risk Interventions:  Mobility Interventions: Bed/chair exit alarm, Patient to call before getting OOB    Mentation Interventions: Bed/chair exit alarm, Door open when patient unattended, More frequent rounding, Room close to nurse's station    Medication Interventions: Assess postural VS orthostatic hypotension, Patient to call before getting OOB, Teach patient to arise slowly    Elimination Interventions: Bed/chair exit alarm, Call light in reach, Toileting schedule/hourly rounds, Urinal in reach             Problem: Pressure Injury - Risk of  Goal: *Prevention of pressure injury  Document Raul Scale and appropriate interventions in the flowsheet. Outcome: Progressing Towards Goal  Pressure Injury Interventions:  Sensory Interventions: Assess changes in LOC, Turn and reposition approx. every two hours (pillows and wedges if needed), Keep linens dry and wrinkle-free, Minimize linen layers    Moisture Interventions: Absorbent underpads, Internal/External urinary devices, Minimize layers    Activity Interventions: Assess need for specialty bed, PT/OT evaluation    Mobility Interventions: Turn and reposition approx.  every two hours(pillow and wedges), Pressure redistribution bed/mattress (bed type)    Nutrition Interventions: Document food/fluid/supplement intake    Friction and Shear Interventions: Minimize layers, HOB 30 degrees or less, Lift sheet

## 2018-12-19 NOTE — PROGRESS NOTES
[facility]  ICU Progress Note      Name: Mady Llanos   : 1950   MRN: [id number]   Date: 2018 12:15 PM     [x]I have reviewed the flowsheet and previous days notes. Events overnight reviewed and discussed with nursing staff. Vital signs and records reviewed. S/p liberated from ventilator 18     Subjective:  18  No new events overnight. Tachypnea, shallow breathing this AM- placed on BIPAP- tolerating well  AOX3, moves all extremities                ROS:A comprehensive review of systems was negative except for that written in the HPI. Medication Review:  · Pressors - none  · Sedation - none   · Antibiotics - zosyn/vanc  · Pain - prn norco  · GI/ DVT - protonix, SQH  · Others (other gtts)- IVF    Safety Bundles: Electrolyte Replacement Protocol    Vital Signs:    Visit Vitals  BP (!) 162/100   Pulse 77   Temp 97.9 °F (36.6 °C)   Resp 18   Ht 6' (1.829 m)   Wt 104.8 kg (231 lb)   SpO2 100%   BMI 31.33 kg/m²       O2 Device: BIPAP   O2 Flow Rate (L/min): 3 l/min   Temp (24hrs), Av.3 °F (36.8 °C), Min:97.9 °F (36.6 °C), Max:98.9 °F (37.2 °C)       Intake/Output:   Last shift:      No intake/output data recorded. Last 3 shifts:  1901 -  0700  In: 4068.2 [P.O.:240;  I.V.:3488.2]  Out: 6035 [Urine:3362]    Intake/Output Summary (Last 24 hours) at 2018 1217  Last data filed at 2018 0500  Gross per 24 hour   Intake 1328.19 ml   Output 2045 ml   Net -716.81 ml       Ventilator Settings:  Ventilator Mode: Spontaneous  Respiratory Rate  Back-Up Rate: 10  Insp Time (sec): 0.9 sec  Insp Flow (l/min): 61 l/min  I:E Ratio: 1:6.32  Ventilator Volumes  Vt Set (ml): 500 ml  Vt Exhaled (Machine Breath) (ml): 628 ml  Vt Spont (ml): 443 ml  Ve Observed (l/min): 7.7 l/min  Ventilator Pressures  Pressure Support (cm H2O): 7 cm H2O  PIP Observed (cm H2O): 12 cm H2O  Plateau Pressure (cm H2O): 15 cm H2O  MAP (cm H2O): 8  PEEP/VENT (cm H2O): 5 cm H20    Physical Exam:    General: Awake, alert  HEENT:  Anicteric sclerae; pink palpebral conjunctivae; mucosa moist  Resp:  Coarse bilaterally to auscultation; Symmetrical chest expansion, no accessory muscle use; good airway entry; scaterred mild wheezing  CV:  S1, S2 present; NSR  GI:  Abdomen soft, non-tender; (+) active bowel sounds  Extremities:  +2 pulses on all extremities; no edema/ cyanosis/ clubbing noted; normal capillary refill  Skin:  Warm; no rashes/ lesions noted  Neurologic:  Non-focal, follows commands, moves all extremities, AOX3  Devices:   Central line      DATA:     Current Facility-Administered Medications   Medication Dose Route Frequency    potassium chloride 10 mEq in 100 ml IVPB  10 mEq IntraVENous Q1H    hydrALAZINE (APRESOLINE) 20 mg/mL injection 20 mg  20 mg IntraVENous Q6H PRN    lisinopril (PRINIVIL, ZESTRIL) tablet 40 mg  40 mg Oral DAILY    HYDROcodone-acetaminophen (NORCO) 7.5-325 mg per tablet 1 Tab  1 Tab Oral Q4H PRN    [START ON 12/20/2018] VANCOMYCIN INFORMATION NOTE   Other ONCE    acetaminophen (TYLENOL) suppository 650 mg  650 mg Rectal Q4H PRN    influenza vaccine 2018-19 (6 mos+)(PF) (FLUARIX QUAD/FLULAVAL QUAD) injection 0.5 mL  0.5 mL IntraMUSCular PRIOR TO DISCHARGE    vancomycin (VANCOCIN) 1,250 mg in 0.9% sodium chloride 250 mL IVPB  1,250 mg IntraVENous Q12H    acetaminophen (TYLENOL) suppository 650 mg  650 mg Rectal Q4H PRN    piperacillin-tazobactam (ZOSYN) 3.375 g in  ml ##EXTENDED 4HRS INFUSION  3.375 g IntraVENous Q8H    pantoprazole (PROTONIX) 40 mg in sodium chloride 0.9% 10 mL injection  40 mg IntraVENous DAILY    docusate (COLACE) 50 mg/5 mL oral liquid 100 mg  100 mg Oral DAILY    NOREPINephrine (LEVOPHED) 8 mg in 0.9% NS 250ml infusion  2-16 mcg/min IntraVENous TITRATE    VANCOMYCIN INFORMATION NOTE   Other Rx Dosing/Monitoring    acetaminophen (TYLENOL) tablet 650 mg  650 mg Oral Q6H PRN    ondansetron (ZOFRAN) injection 4 mg  4 mg IntraVENous Q6H PRN    heparin (porcine) injection 5,000 Units  5,000 Units SubCUTAneous Q8H         Labs: Results:       Chemistry Recent Labs     12/19/18  0350 12/18/18  1606 12/18/18  0400 12/17/18  0030 12/16/18  1550   *  --  84 94 134*     --  146* 142 142   K 3.4* 3.0* 3.0* 3.6 4.3     --  108 105 100   CO2 33*  --  30 31 36*   BUN 14  --  23* 20* 18   CREA 0.92  --  1.36* 1.13 1.23   CA 7.9*  --  7.7* 7.5* 8.3*   AGAP 6  --  8 6 6   BUCR 15  --  17 18 15   AP 76  --   --  81 95   TP 8.3*  --   --  7.6 8.6*   ALB 2.5*  --   --  1.9* 2.2*   GLOB 5.8*  --   --  5.7* 6.4*   AGRAT 0.4*  --   --  0.3* 0.3*      CBC w/Diff Recent Labs     12/19/18  0350 12/18/18  0400 12/17/18  0030 12/16/18  1550   WBC 5.4 7.0 9.3 7.4   RBC 4.11* 3.85* 4.31* 4.39*   HGB 11.4* 10.6* 12.0* 12.6*   HCT 36.9 33.8* 38.9 40.8   * 124* 145 162   GRANS 68  --   --  83*   LYMPH 23  --   --  8*   EOS 0  --   --  0      Coagulation Recent Labs     12/17/18  0030 12/16/18  1550   PTP  --  14.4   INR  --  1.1   APTT 31.5  --        Liver Enzymes Recent Labs     12/19/18  0350   TP 8.3*   ALB 2.5*   AP 76   SGOT 49*      ABG No results found for: PH, PHI, PCO2, PCO2I, PO2, PO2I, HCO3, HCO3I, FIO2, FIO2I   Microbiology Recent Labs     12/16/18  1645 12/16/18  1630 12/16/18  1605   CULT NO GROWTH 3 DAYS NO GROWTH 3 DAYS NO GROWTH 2 DAYS          Telemetry: [x]Sinus []A-flutter []Paced    []A-fib []Multiple PVCs                    Imaging:  CXR Results  (Last 48 hours)               12/18/18 0848  XR CHEST PORT Final result    Impression:  IMPRESSION:   Little change from previous. Substantial bibasilar density consistent with   atelectasis/infiltrate/effusion. Persistent vascular congestion. Narrative:  AP portable chest, 12/18/2018 at 0831 hours:       Comparison 12/17/2018. Endotracheal tube is just below the clavicles approximately 4.5 cm above the   reggie in reasonable position.  Bibasilar densities consistent with a combination   of lower lobe atelectasis/infiltrate and effusion. Vascular congestion persists   unchanged. The heart is stable but in part obscured. Right internal jugular   central line is at the cavoatrial junction or upper right atrium. CT Results  (Last 48 hours)    None                IMPRESSION:   · Acute hypoxic respiratory failure s/p liberated from mechanical ventilation- s/p liberated from ventilator 12/18/18- on BIPAP  · Possible aspiration PNA- zosyn,vanc  Hx  · COPD  · HTN  · Arthritis  · Drug use w/ recent admission for thoracic discitis  · Hep C        PLAN:   · Resp -  Stable on BIPAP. O2 goal > 90. HOB > 30. Aspiration precautions. Pulmonary hygiene. · ID - Afebrile and leukocytosis. Trend temp and WBC curve. BCx NGTD. Continue ABx. Monitor for signs of infection. · CVS - HD stable. Monitor HD status. · Heme/onc - Daily CBC. Monitor H/H and platelets. Monitor for signs of bleeding. · Metabolic - Daily CMP. Trend and replace electrolytes per replacement protocol. · Renal - Trend renal indices. Monitor UOP. · Endocrine - Glycemic control per protocol. · Neuro/ Pain/ Sedation - Monitor neuro status. PRN pain meds if needed. · GI - NPO while on BIPAP. Bowel regimen. · Prophylaxis - DVT, GI- SQH, protonix.   · PT/OT  · Discussed in interdisciplinary rounds  · Code status- FULL          [x]See my orders for details    My assessment/plan was discussed with:  [x]nursing []PT/OT    [x]respiratory therapy [x]Dr. Barry Good   []family []       Oscar Bingham, NP

## 2018-12-19 NOTE — PROGRESS NOTES
[facility]  Hospitalist Division           Inpatient Daily Progress Note        Patient: Sanjeev Eastman MRN: [id number]  CSN: [id number]    YOB: 1950  Age: 77 y.o. Sex: male    DOA: 12/16/2018 LOS:  LOS: 3 days                    Chief Complaint:      Interval History:    Per Dr. Ruddy Bonner HPI: Reji Richards is a 77 y.o. male who is being admitted for acute hypoxic Resp failure & ? Aspiration PNA. Per ER chart review & EMS report - they were called 2y to pt being confused - no other history is obtainable at this time. Pt brought to the ER - was placed on NRBM with some improvement but later needed to be intubated . Chart review shows PMHx - HTN , COPD & Hep C - concern for IVDA - recent admission for discitis. ER eval - Pt is intubated & sedated - PCCM consulted. Will admit to the ICU for further eval\" Blood cx NGTD. UDS + benzos, opiates. CT head negative. 12/18/18: Lytes protocol. Extubation per PCCM. Pt alert, follows command promptly. On SBT. TFs on hold. Renal number slightly elevated-monitor. Extubated to 3L nasal cannula-swallow eval, diet per critical care; can d/c fluids if tolerating adequate PO. Fevers continue. Antibiotics (levaquin, zosyn, vancomycin). Blood cultures NGTD 12/16/18. CXR today showing substantial bibasilar density c/w atelectasis/infiltrate/effusion, persistent vascular congestion. 12/19/18: Lytes protocol. Placed on BiPAP for tachypnea. RR in 40s on my exam. ABG per critical care. CXR 12/18 showing vascular congestion. ICS, pulmonary toileting. Net positive 7.3 L. Bumex added. Monitor I/O. BP remains elevated, hydralazine prn; lisinopril dose increased. Keep in ICU until respiratory status improved. Further recommendations per PCCM. Renal fx better. PT.      Subjective:    BiPAP on   Tachypnea     Objective:      Visit Vitals  /80   Pulse 81   Temp 98.9 °F (37.2 °C)   Resp (!) 41   Ht 6' (1.829 m)   Wt 104.8 kg (231 lb) SpO2 96%   BMI 31.33 kg/m²       Physical Exam:  General appearance: alert   Lungs: diminished b/l   Heart: regular rate and rhythm, S1, S2 normal   Abdomen: soft, non tender, non distended. Normoactive bowel sounds. Extremities: extremities normal, atraumatic, no cyanosis or edema  Skin: Skin color, texture, turgor normal.   Neurologic: Follows commands x4; BiPAP in place       Intake and Output:  Current Shift:  No intake/output data recorded. Last three shifts:  12/17 1901 - 12/19 0700  In: 4068.2 [P.O.:240; I.V.:3488.2]  Out: 7380 [Urine:3362]    Recent Results (from the past 24 hour(s))   CALCIUM, IONIZED    Collection Time: 12/18/18  9:21 AM   Result Value Ref Range    Ionized Calcium 1.08 (L) 1.12 - 1.32 MMOL/L   VANCOMYCIN, TROUGH    Collection Time: 12/18/18  1:10 PM   Result Value Ref Range    Vancomycin,trough 17.1 10.0 - 20.0 ug/mL    Reported dose date: 20181218      Reported dose time: 1300     MAGNESIUM    Collection Time: 12/18/18  4:06 PM   Result Value Ref Range    Magnesium 2.1 1.6 - 2.6 mg/dL   PHOSPHORUS    Collection Time: 12/18/18  4:06 PM   Result Value Ref Range    Phosphorus 2.7 2.5 - 4.9 MG/DL   POTASSIUM    Collection Time: 12/18/18  4:06 PM   Result Value Ref Range    Potassium 3.0 (L) 3.5 - 5.5 mmol/L   CBC WITH AUTOMATED DIFF    Collection Time: 12/19/18  3:50 AM   Result Value Ref Range    WBC 5.4 4.6 - 13.2 K/uL    RBC 4.11 (L) 4.70 - 5.50 M/uL    HGB 11.4 (L) 13.0 - 16.0 g/dL    HCT 36.9 36.0 - 48.0 %    MCV 89.8 74.0 - 97.0 FL    MCH 27.7 24.0 - 34.0 PG    MCHC 30.9 (L) 31.0 - 37.0 g/dL    RDW 17.2 (H) 11.6 - 14.5 %    PLATELET 747 (L) 839 - 420 K/uL    MPV 10.4 9.2 - 11.8 FL    NEUTROPHILS 68 40 - 73 %    LYMPHOCYTES 23 21 - 52 %    MONOCYTES 9 3 - 10 %    EOSINOPHILS 0 0 - 5 %    BASOPHILS 0 0 - 2 %    ABS. NEUTROPHILS 3.7 1.8 - 8.0 K/UL    ABS. LYMPHOCYTES 1.2 0.9 - 3.6 K/UL    ABS. MONOCYTES 0.5 0.05 - 1.2 K/UL    ABS. EOSINOPHILS 0.0 0.0 - 0.4 K/UL    ABS.  BASOPHILS 0.0 0.0 - 0.1 K/UL    DF AUTOMATED     METABOLIC PANEL, COMPREHENSIVE    Collection Time: 12/19/18  3:50 AM   Result Value Ref Range    Sodium 142 136 - 145 mmol/L    Potassium 3.4 (L) 3.5 - 5.5 mmol/L    Chloride 103 100 - 108 mmol/L    CO2 33 (H) 21 - 32 mmol/L    Anion gap 6 3.0 - 18 mmol/L    Glucose 100 (H) 74 - 99 mg/dL    BUN 14 7.0 - 18 MG/DL    Creatinine 0.92 0.6 - 1.3 MG/DL    BUN/Creatinine ratio 15 12 - 20      GFR est AA >60 >60 ml/min/1.73m2    GFR est non-AA >60 >60 ml/min/1.73m2    Calcium 7.9 (L) 8.5 - 10.1 MG/DL    Bilirubin, total 0.9 0.2 - 1.0 MG/DL    ALT (SGPT) 25 16 - 61 U/L    AST (SGOT) 49 (H) 15 - 37 U/L    Alk. phosphatase 76 45 - 117 U/L    Protein, total 8.3 (H) 6.4 - 8.2 g/dL    Albumin 2.5 (L) 3.4 - 5.0 g/dL    Globulin 5.8 (H) 2.0 - 4.0 g/dL    A-G Ratio 0.4 (L) 0.8 - 1.7     PHOSPHORUS    Collection Time: 12/19/18  3:50 AM   Result Value Ref Range    Phosphorus 4.6 2.5 - 4.9 MG/DL   MAGNESIUM    Collection Time: 12/19/18  3:50 AM   Result Value Ref Range    Magnesium 1.7 1.6 - 2.6 mg/dL           Lab Results   Component Value Date/Time    Glucose 100 (H) 12/19/2018 03:50 AM    Glucose 84 12/18/2018 04:00 AM    Glucose 94 12/17/2018 12:30 AM    Glucose 134 (H) 12/16/2018 03:50 PM    Glucose 98 12/04/2018 05:58 AM    AP portable chest, 12/18/2018 at 0831 hours:     Comparison 12/17/2018.     Endotracheal tube is just below the clavicles approximately 4.5 cm above the  reggie in reasonable position. Bibasilar densities consistent with a combination  of lower lobe atelectasis/infiltrate and effusion. Vascular congestion persists  unchanged. The heart is stable but in part obscured. Right internal jugular  central line is at the cavoatrial junction or upper right atrium.     IMPRESSION  IMPRESSION:  Little change from previous. Substantial bibasilar density consistent with  atelectasis/infiltrate/effusion.  Persistent vascular congestion  EXAM: CT HEAD W/O CONTRAST     INDICATION: Confusion/delirium, altered level of consciousness, unresponsive     COMPARISON: CT head 11/15/2018     TECHNIQUE: Axial CT imaging of the head was performed without intravenous  contrast.  Additional coronal and sagittal reconstructions were performed. One  or more dose reduction techniques were used on this CT: automated exposure  control, adjustment of the mAs and/or kVp according to patient's size, and  iterative reconstruction techniques. The specific techniques utilized on this CT  exam have been documented in the patient's electronic medical record.  _______________     FINDINGS:     Motion artifact is present which limits evaluation.     VENTRICLES/EXTRA-AXIAL SPACES: The ventricles and sulci are normal in their size  and configuration.     BRAIN PARENCHYMA: There is no evidence of acute intracranial hemorrhage, mass  effect, midline shift, or herniation. No definite CT evidence of acute cortical  infarct is seen. The gray-white matter differentiation is within normal limits.     ORBITS: The bilateral lenses are absent, likely due to prior cataract surgery.     PARANASAL SINUSES/MASTOIDS: Bubbly air-fluid levels are present in the bilateral  sphenoid sinuses. Additional mucoperiosteal thickening is seen in the ethmoidal  air cells. Visualized mastoid air cells are clear.     OSSEOUS STRUCTURES: No fracture is seen.     OTHER: Endotracheal tube is present.       _______________     IMPRESSION  IMPRESSION:     1. No acute intracranial hemorrhage, mass effect, midline shift, or herniation. No definite CT evidence of acute cortical infarct is seen. Please note that  noncontrast head CT may be normal in early acute infarct. 2. Bilateral sphenoid sinus bubbly air-fluid levels potentially representing  acute sinusitis or related to secretions given intubation.  Clinical correlation  is recommended.   AP portable chest, 12/18/2018 at 0831 hours:     Comparison 12/17/2018.     Endotracheal tube is just below the clavicles approximately 4.5 cm above the  reggie in reasonable position. Bibasilar densities consistent with a combination  of lower lobe atelectasis/infiltrate and effusion. Vascular congestion persists  unchanged. The heart is stable but in part obscured. Right internal jugular  central line is at the cavoatrial junction or upper right atrium.     IMPRESSION  IMPRESSION:  Little change from previous. Substantial bibasilar density consistent with  atelectasis/infiltrate/effusion. Persistent vascular congestion. EXAM: Chest, portable AP supine, one view     INDICATION: Arrived to emergency department unresponsive. Endotracheal tube  placement.     COMPARISON: 11/22/2018     _______________     FINDINGS:     ETT is 3.2 cm above the reggie, right jugular central venous catheter tip  projects at the right atrium.      Cardiac silhouette is within normal range in size. Calcified plaque is present  at the aortic arch.      No pneumothorax. Pulmonary vascular redistribution has developed along with  small to moderate right and small left pleural effusion. Parenchymal band at the  right lung base is compatible with atelectasis. Retrocardiac region is somewhat  dense. No pneumothorax appreciated.     Degenerative changes noted at both shoulders. _______________     IMPRESSION  IMPRESSION:        1. ETT in satisfactory position. Right jugular central venous catheter within  the right atrium. 2. CHF with small to moderate right and small left pleural effusions, linear  atelectasis right lung base and retrocardiac atelectasis and/or infiltrate. Normal cavity size and systolic function (ejection fraction normal). Mild concentric hypertrophy observed. The estimated ejection fraction is 61 - 65%. Visually measured ejection fraction. No regional wall motion abnormality noted. There is mild (grade 1) left ventricular diastolic dysfunction. Left Atrium The cavity size is mildly dilated.    Right Ventricle Normal cavity size and global systolic function. Right Atrium The cavity size is mildly dilated. Aortic Valve No stenosis and no regurgitation. Mild aortic valve sclerosis with no evidence of reduced excursion. Mitral Valve No stenosis. Mitral valve non-specific thickening. Trace regurgitation. Tricuspid Valve No stenosis. Non-specific thickening. Tricuspid regurgitation is inadequate for estimation of right ventricular systolic pressure. Pulmonic Valve Not well visualized, normal Doppler findings. Aorta Normal aortic root. IVC/Hepatic Veins Inferior vena cava not well visualized. Pericardium No evidence of pericardial effusion. TTE procedure Findings     TTE Procedure Information Image quality: technically difficult. The view(s) performed were parasternal, apical, subcostal and suprasternal. Unable to use Definity due to contraindication of respiratory distress. Technically difficult study, color flow Doppler was performed and pulse wave and/or continuous wave Doppler was performed.        Assessment/Plan:     Patient Active Problem List   Diagnosis Code    Discitis of thoracic region M46.44    UTI (urinary tract infection) N39.0    Bilateral pleural effusion J90    Lumbar stenosis M48.061    Adenoma of left adrenal gland D35.02    Uncontrolled hypertension I10    Cirrhosis of liver ([address] K74.60    Hepatitis C B19.20    Obesity E66.9    COPD (chronic obstructive pulmonary disease) ([address] J44.9    Constipation K59.00    Back pain M54.9    Altered mental status R41.82    Acute respiratory failure with hypoxia ([address] J96.01    Sinusitis J32.9       A/P:    Plan:    Acute Hypoxic Resp failure  - intubated-extubated 12/18/18 -3L NC   -ABGs/CXR/vent management per PCCM  -SBT this am, alert, following commands-defer extubation   -extubated 12/18/18 to 3L nasal cannula   -ICS, pulmonary toileting  -placed on BiPAP for tachypnea     Aspiration PNA   - broad spectrum Abx blood cultures NGTD (on vancomycin/zosyn)   -ICS, pulmonary toileting when ext   -BiPAP for tachypnea     H/o Drug use with recent admission for thoracic discitis   -monitor withdrawal  -UDS + benzos/opiates     H/o HTN   -monitor BP  -increase lisinopril dose 40 mg     H/o COPD   -currently on BiPAP  -monitor sats (sat goal > 90%)   -breathing treatments when able     H/o Hep C   -monitor    DVT Px   - heparin    Bowel-colace  GI-protonix         Yvette Jarquin, JUAN PABLO [facility]  Psychiatric:[phone]  Office:  [phone]

## 2018-12-19 NOTE — PROGRESS NOTES
2000: bailon removed a this time, condom cath applied in its place. Patient tolerated well. Will continue to monitor. Bedside and Verbal shift change report given to CAMERON Rosenberg (oncoming nurse) by Michoacano Schrader RN   (offgoing nurse). Report included the following information SBAR, ED Summary, Procedure Summary, Intake/Output, MAR, Recent Results, Cardiac Rhythm sinus tach and Alarm Parameters .

## 2018-12-19 NOTE — PROGRESS NOTES
Placed patient on BIPAP, per MD Lipscomb, for increased WOB, IPAP 40, EPAP 8, backup rate 8, FiO2 0.30. Current vitals: HR 91, SpO2 99, RR 29, /90. Patient tolerating well. Suction setup at bedside.

## 2018-12-19 NOTE — PROGRESS NOTES
2100: assumed care of pt. Head to toe assessment completed. Pt reports pain rated 8/10 in back. Reminded pt of next medication dose time. Linen and gown changed. Catheter care performed. Pt tolerated well. Afebrile and vital signs stable. Call light left in reach. Will continue to monitor closely. 2330:reassessment completed. No acute changes noted at this time. Pt reports pain rated 6/10. No s/s of distress noted. Afebrile and vital signs stable. Call light left in reach. Pt repositioned for comfort. Will continue to monitor closely. 0345: reassessment completed. Pt repositioned for comfort. Reports pain rated 8/10. Reminded of next dose time. Pt is afebrile and vital signs stable. Call light left in reach. Will continue to monitor closely. 0700: Bedside and Verbal shift change report given to Sherri Harris RN (oncoming nurse) by Aletha Ayala RN (offgoing nurse).  Report included the following information SBAR, Kardex, Intake/Output, MAR, Recent Results and Cardiac Rhythm ST.

## 2018-12-19 NOTE — PROGRESS NOTES
Extubated on BiPAP. Interviewed patient. He confirmed his demographic information. He continues to live with his mother. He could not tell me who was caring for her while he was hospitalized. He admitted he used drugs in the past but insist he has not done so since his last admission. His drug screen was positive for opiates and benzodiazepines. We discussed lifestyle choices. He stated he does not want to go to long term care and says he will return home with continuation of personal care. Case mangement will continue to follow for transistion needs.   Lea Nguyen

## 2018-12-19 NOTE — PROGRESS NOTES
0730 - Received pt at bedside from [address], RN, dual skin assessment performed and double RN verified, VSS on 3L, will continue to monitor    1000 - Rounded on patient with treatment team, pt status and goals for the day discussed, will continue to monitor    1300 - Resting in bed comfortably, VSS on BiPAP, remains alert and follows commands, will continue to monitor    1600 - VSS on BiPAP, no s/s of distress    1930 - Bedside and Verbal shift change report given to [address] [place] [address] (oncoming nurse) by Maria C Pruitt RN (offgoing nurse). Report included the following information SBAR, Kardex, Intake/Output, MAR, Recent Results and Cardiac Rhythm Sinus/Sinus Tach.

## 2018-12-19 NOTE — PROGRESS NOTES
completed follow up visit with patient in room [id number] who is kezia off of life support and a Spiritual assessment of patient was done Patient seems to be slightly confused today and not ready to move around yet. No family seen at this time. . Chaplains will continue to follow and will provide pastoral care on an as needed/requested basis    Chaplain Gary Lambert   Board Certified [address] [place] [address]   [phone]

## 2018-12-20 ENCOUNTER — APPOINTMENT (OUTPATIENT)
Dept: GENERAL RADIOLOGY | Age: 68
DRG: 133 | End: 2018-12-20
Attending: INTERNAL MEDICINE
Payer: MEDICAID

## 2018-12-20 LAB
ALBUMIN SERPL-MCNC: 2.1 G/DL (ref 3.4–5)
ALBUMIN/GLOB SERPL: 0.4 {RATIO} (ref 0.8–1.7)
ALP SERPL-CCNC: 65 U/L (ref 45–117)
ALT SERPL-CCNC: 18 U/L (ref 16–61)
ANION GAP SERPL CALC-SCNC: 5 MMOL/L (ref 3–18)
AST SERPL-CCNC: 31 U/L (ref 15–37)
BASOPHILS # BLD: 0 K/UL (ref 0–0.1)
BASOPHILS NFR BLD: 0 % (ref 0–2)
BILIRUB SERPL-MCNC: 0.9 MG/DL (ref 0.2–1)
BUN SERPL-MCNC: 12 MG/DL (ref 7–18)
BUN/CREAT SERPL: 14 (ref 12–20)
CALCIUM SERPL-MCNC: 7.7 MG/DL (ref 8.5–10.1)
CHLORIDE SERPL-SCNC: 100 MMOL/L (ref 100–108)
CO2 SERPL-SCNC: 35 MMOL/L (ref 21–32)
CREAT SERPL-MCNC: 0.88 MG/DL (ref 0.6–1.3)
DATE LAST DOSE: NORMAL
DIFFERENTIAL METHOD BLD: ABNORMAL
EOSINOPHIL # BLD: 0.1 K/UL (ref 0–0.4)
EOSINOPHIL NFR BLD: 1 % (ref 0–5)
ERYTHROCYTE [DISTWIDTH] IN BLOOD BY AUTOMATED COUNT: 16.6 % (ref 11.6–14.5)
GLOBULIN SER CALC-MCNC: 5.2 G/DL (ref 2–4)
GLUCOSE SERPL-MCNC: 74 MG/DL (ref 74–99)
HCT VFR BLD AUTO: 33.1 % (ref 36–48)
HGB BLD-MCNC: 10.5 G/DL (ref 13–16)
LYMPHOCYTES # BLD: 1.9 K/UL (ref 0.9–3.6)
LYMPHOCYTES NFR BLD: 38 % (ref 21–52)
MAGNESIUM SERPL-MCNC: 1.7 MG/DL (ref 1.6–2.6)
MCH RBC QN AUTO: 28 PG (ref 24–34)
MCHC RBC AUTO-ENTMCNC: 31.7 G/DL (ref 31–37)
MCV RBC AUTO: 88.3 FL (ref 74–97)
MONOCYTES # BLD: 0.5 K/UL (ref 0.05–1.2)
MONOCYTES NFR BLD: 9 % (ref 3–10)
NEUTS SEG # BLD: 2.5 K/UL (ref 1.8–8)
NEUTS SEG NFR BLD: 52 % (ref 40–73)
PHOSPHATE SERPL-MCNC: 2.8 MG/DL (ref 2.5–4.9)
PLATELET # BLD AUTO: 113 K/UL (ref 135–420)
PMV BLD AUTO: 10.7 FL (ref 9.2–11.8)
POTASSIUM SERPL-SCNC: 3.4 MMOL/L (ref 3.5–5.5)
PROT SERPL-MCNC: 7.3 G/DL (ref 6.4–8.2)
RBC # BLD AUTO: 3.75 M/UL (ref 4.7–5.5)
REPORTED DOSE,DOSE: NORMAL UNITS
REPORTED DOSE/TIME,TMG: 1300
SODIUM SERPL-SCNC: 140 MMOL/L (ref 136–145)
VANCOMYCIN TROUGH SERPL-MCNC: 18.4 UG/ML (ref 10–20)
WBC # BLD AUTO: 4.9 K/UL (ref 4.6–13.2)

## 2018-12-20 PROCEDURE — 65660000000 HC RM CCU STEPDOWN

## 2018-12-20 PROCEDURE — 80053 COMPREHEN METABOLIC PANEL: CPT

## 2018-12-20 PROCEDURE — 74011000250 HC RX REV CODE- 250: Performed by: INTERNAL MEDICINE

## 2018-12-20 PROCEDURE — 74011250637 HC RX REV CODE- 250/637: Performed by: NURSE PRACTITIONER

## 2018-12-20 PROCEDURE — 97530 THERAPEUTIC ACTIVITIES: CPT

## 2018-12-20 PROCEDURE — 77010033678 HC OXYGEN DAILY

## 2018-12-20 PROCEDURE — 94640 AIRWAY INHALATION TREATMENT: CPT

## 2018-12-20 PROCEDURE — 97162 PT EVAL MOD COMPLEX 30 MIN: CPT

## 2018-12-20 PROCEDURE — 94660 CPAP INITIATION&MGMT: CPT

## 2018-12-20 PROCEDURE — 97166 OT EVAL MOD COMPLEX 45 MIN: CPT

## 2018-12-20 PROCEDURE — 77030021352 HC CBL LD SYS DISP COVD -B

## 2018-12-20 PROCEDURE — C9113 INJ PANTOPRAZOLE SODIUM, VIA: HCPCS | Performed by: NURSE PRACTITIONER

## 2018-12-20 PROCEDURE — 92526 ORAL FUNCTION THERAPY: CPT

## 2018-12-20 PROCEDURE — 85025 COMPLETE CBC W/AUTO DIFF WBC: CPT

## 2018-12-20 PROCEDURE — 74011000258 HC RX REV CODE- 258: Performed by: INTERNAL MEDICINE

## 2018-12-20 PROCEDURE — 74011000250 HC RX REV CODE- 250: Performed by: HOSPITALIST

## 2018-12-20 PROCEDURE — 74011000250 HC RX REV CODE- 250: Performed by: NURSE PRACTITIONER

## 2018-12-20 PROCEDURE — 83735 ASSAY OF MAGNESIUM: CPT

## 2018-12-20 PROCEDURE — 74011250636 HC RX REV CODE- 250/636: Performed by: HOSPITALIST

## 2018-12-20 PROCEDURE — 71045 X-RAY EXAM CHEST 1 VIEW: CPT

## 2018-12-20 PROCEDURE — 74011250636 HC RX REV CODE- 250/636: Performed by: NURSE PRACTITIONER

## 2018-12-20 PROCEDURE — 80202 ASSAY OF VANCOMYCIN: CPT

## 2018-12-20 PROCEDURE — 36591 DRAW BLOOD OFF VENOUS DEVICE: CPT

## 2018-12-20 PROCEDURE — 74011250636 HC RX REV CODE- 250/636: Performed by: INTERNAL MEDICINE

## 2018-12-20 PROCEDURE — 74011250637 HC RX REV CODE- 250/637: Performed by: FAMILY MEDICINE

## 2018-12-20 PROCEDURE — 84100 ASSAY OF PHOSPHORUS: CPT

## 2018-12-20 RX ORDER — POTASSIUM CHLORIDE 7.45 MG/ML
10 INJECTION INTRAVENOUS
Status: DISPENSED | OUTPATIENT
Start: 2018-12-20 | End: 2018-12-20

## 2018-12-20 RX ORDER — BUMETANIDE 0.25 MG/ML
1 INJECTION INTRAMUSCULAR; INTRAVENOUS ONCE
Status: COMPLETED | OUTPATIENT
Start: 2018-12-20 | End: 2018-12-20

## 2018-12-20 RX ADMIN — HEPARIN SODIUM 5000 UNITS: 5000 INJECTION INTRAVENOUS; SUBCUTANEOUS at 16:13

## 2018-12-20 RX ADMIN — SODIUM CHLORIDE 1250 MG: 900 INJECTION, SOLUTION INTRAVENOUS at 13:00

## 2018-12-20 RX ADMIN — HYDROCODONE BITARTRATE AND ACETAMINOPHEN 1 TABLET: 7.5; 325 TABLET ORAL at 16:13

## 2018-12-20 RX ADMIN — POTASSIUM CHLORIDE 10 MEQ: 7.46 INJECTION, SOLUTION INTRAVENOUS at 10:45

## 2018-12-20 RX ADMIN — DOCUSATE SODIUM 100 MG: 50 LIQUID ORAL at 09:10

## 2018-12-20 RX ADMIN — IPRATROPIUM BROMIDE AND ALBUTEROL SULFATE 3 ML: .5; 3 SOLUTION RESPIRATORY (INHALATION) at 15:55

## 2018-12-20 RX ADMIN — IPRATROPIUM BROMIDE AND ALBUTEROL SULFATE 3 ML: .5; 3 SOLUTION RESPIRATORY (INHALATION) at 21:08

## 2018-12-20 RX ADMIN — POTASSIUM CHLORIDE 10 MEQ: 7.46 INJECTION, SOLUTION INTRAVENOUS at 09:11

## 2018-12-20 RX ADMIN — PIPERACILLIN SODIUM,TAZOBACTAM SODIUM 3.38 G: 3; .375 INJECTION, POWDER, FOR SOLUTION INTRAVENOUS at 04:00

## 2018-12-20 RX ADMIN — HYDROCODONE BITARTRATE AND ACETAMINOPHEN 1 TABLET: 7.5; 325 TABLET ORAL at 22:55

## 2018-12-20 RX ADMIN — IPRATROPIUM BROMIDE AND ALBUTEROL SULFATE 3 ML: .5; 3 SOLUTION RESPIRATORY (INHALATION) at 09:47

## 2018-12-20 RX ADMIN — SODIUM CHLORIDE 40 MG: 9 INJECTION, SOLUTION INTRAMUSCULAR; INTRAVENOUS; SUBCUTANEOUS at 10:55

## 2018-12-20 RX ADMIN — IPRATROPIUM BROMIDE AND ALBUTEROL SULFATE 3 ML: .5; 3 SOLUTION RESPIRATORY (INHALATION) at 11:48

## 2018-12-20 RX ADMIN — HEPARIN SODIUM 5000 UNITS: 5000 INJECTION INTRAVENOUS; SUBCUTANEOUS at 09:10

## 2018-12-20 RX ADMIN — HYDROCODONE BITARTRATE AND ACETAMINOPHEN 1 TABLET: 7.5; 325 TABLET ORAL at 00:35

## 2018-12-20 RX ADMIN — HEPARIN SODIUM 5000 UNITS: 5000 INJECTION INTRAVENOUS; SUBCUTANEOUS at 00:00

## 2018-12-20 RX ADMIN — BUMETANIDE 1 MG: 0.25 INJECTION INTRAMUSCULAR; INTRAVENOUS at 10:49

## 2018-12-20 RX ADMIN — SODIUM CHLORIDE 1250 MG: 900 INJECTION, SOLUTION INTRAVENOUS at 00:30

## 2018-12-20 RX ADMIN — PIPERACILLIN SODIUM,TAZOBACTAM SODIUM 3.38 G: 3; .375 INJECTION, POWDER, FOR SOLUTION INTRAVENOUS at 12:00

## 2018-12-20 RX ADMIN — LISINOPRIL 40 MG: 20 TABLET ORAL at 09:10

## 2018-12-20 NOTE — PROGRESS NOTES
Patient has elected to return home with continuation of personal care services and home care as indicated. Patient has [facility] Medicaid health insurance. Some [facility] Medicaid products do have a benefit for rehab, however, due to the low reimbursement rate most facilities decline bed offer. Patient may need a trilogy order for home use. Home oxygen was delivered to his home following his previous admission. Would benefit from pulmonary rehab at discharge, will request an order from patient physician.  Johanna Morrison RN

## 2018-12-20 NOTE — PROGRESS NOTES
Problem: Self Care Deficits Care Plan (Adult)  Goal: *Acute Goals and Plan of Care (Insert Text)  Occupational Therapy Goals  Initiated 12/20/2018 within 7 day(s). 1.  Patient will perform grooming tasks while standing with SBA with < 2 rest breaks. 2.  Patient will perform lower body dressing with supervision/set-up and minimal verbal cues. 3.  Patient will perform functional task in standing for 8 minutes with supervision for balance and verbal cues for activity pacing in prep for ADLs. 4.  Patient will perform toilet transfers with modified independence. 5.  Patient will perform all aspects of toileting with modified independence. 6.  Patient will participate in upper extremity therapeutic exercise/activities for 8 minutes with supervision to maintain BUE strength for functional transfers and ADLs. 7.  Patient will utilize energy conservation techniques during functional activities with minimal verbal cues. Outcome: Progressing Towards Goal  [facility]  Occupational THERAPY: Initial Assessment   INPATIENT: Medicaid: Hospital Day: 5     Patient: Jaya Appiah (67 y.o. male)    Date: 12/20/2018  Primary Diagnosis: Altered mental status  Acute respiratory failure with hypoxia (HCC)   ,  ,   Precautions: Falls  PLOF: Pt was independent with basic self care tasks and functional mobility PTA. ASSESSMENT:   Based on the objective data described below, the patient presents with impairments with regard to bed mobility, activity tolerance, and independence in ADLs due to respiratory failure. Pt up in chair on arrival, c/o 10/10 chronic back pain. Supervision/set-up for oral care and facial hygiene while seated in chair (due to back pain; to ensure safety). CGA/min A to stand with fair balance. Pt deferred further tx due to arrival of lunch. Pt set up with lunch while seated in chair; needs within reach. Recommend continued therapy; recommend [facility] upon d/c.     Recommendations for the next treatment session: ADLs at sink; LB dressing utilizing AE  Mr. Gillespie Foots will benefit from skilled intervention to address the above impairments. His rehabilitation potential is considered to be Good. EDUCATION     Education:  Patient was educated on the following topics: activity pacing  Barriers to Learning/Limitations: None  Compensate with: visual, verbal, tactile, kinesthetic cues/model     PLAN OF CARE:  Problems:  Decreased ROM, Decreased strength affecting function, Decreased ADL/functioning of activities and Decreased transfer abilities    Recommendations and Planned Interventions:  [x]                  Self Care Training                   [x]         Therapeutic Activities  [x]                  Functional Mobility Training    []          Cognitive Retraining  [x]                  Therapeutic Exercises            [x]          Endurance Activities  [x]                  Balance Training                     []          Neuromuscular Re-ed   []                  Visual/Perceptual Training      [x]       Home Safety Training  [x]                  Patient Education                    [x]          Family Training/Education  []                  Other (comment):    Frequency/Duration: Patient will be followed by occupational therapy 3-5 times a week to address goals. Discharge Recommendations: Home Health    Further Equipment Recommendations for Discharge: anticipate noneSUBJECTIVE:  Patient stated: \"My back is still the problem. \"    OBJECTIVE/TREATMENT:     Past Medical History:   Diagnosis Date    Arthritis     COPD     Hypertension      Past Surgical History:   Procedure Laterality Date    HX REFRACTIVE SURGERY        Eval Complexity: History: MEDIUM Complexity : Expanded review of history including physical, cognitive and psychosocial  history ;  Examination: MEDIUM Complexity : 3-5 performance deficits relating to physical, cognitive , or psychosocial skils that result in activity limitations and / or participation restrictions; Decision Making:MEDIUM Complexity : Patient may present with comorbidities that affect occupational performnce. Miniml to moderate modification of tasks or assistance (eg, physical or verbal ) with assesment(s) is necessary to enable patient to complete evaluation     G CODES: Self Care  Current  CJ= 20-39%   Goal  CI= 1-19%.   The severity rating is based on the Other Functional Assessment, MMT, ROM    Prior Level of Function/Home Situation:   Fully active; able to carry on all performance without restriction  Lives with Masood Brandon, Reacher, Sock aid    Cognitive/Behavioral Status:   Neurologic State: alert   Orientation: oriented to time, place, person and situation  Cognition:   appropriate decision making, appropriate for age attention/concentration, appropriate safety awareness and following commands  follows multi-step simple commands/direction   Safety/Judgement: Awareness of environment and Fall prevention    ROM: Within normal limits (BUEs)  MMT: 4+/5 (BUEs)  Coordination: WFL (BUEs)  Hand dominance:Right    Skin: Intact (BUEs)  Edema: None noted (BUEs)  Sensation: Intact (BUEs)    Vision/Perceptual: normal      Functional Status      Indep   Mod I   Sup. /  Set- Up    SBA   CGA   Min Assist   Mod Assist   Max assist   Total Assist   Assist x2    Additional Time   NT   Comments   Rolling []  []  []  []  []    []    []    []  []  []  []  [x]     Supine to sit []  []  []  []  []  []  []  []  []  []  []  [x]     Sit to supine []  []  []  []  []  []  []  []  []  []  []  [x]     Sit to stand []  []  []  []  []  [x]  []  []  []  []  [x]  []     Toilet Transfer []  []  []  []  []  []  []  []  []  []  []  [x]                     Feeding []  []  [x]  []  []  []  []  []  []  []  []  []     Grooming []  []  [x]  []  []  []  []  []  []  []  []  []     Bathing  []  []  []  []  []  []  [x]  []  []  []  []  []     UB Dressing  []  []  [x]  []  []  []  []  []  []  []  []  []     LB Dressing  []  []  []  []  []  []  [x]  []  []  []  []  []     Toileting []  []  []  []  []  []  []  []  [x]  []  []  []  Cardoza catheter       Balance    Good   Fair   Poor   Unable   Comments   Sitting static []  [x]  []  []     Sitting dynamic []  [x]  []  []     Standing static []  [x]  []  []     Standing dynamic []  [x]  []  []       Self Care:  Supervision/set-up for oral care and facial hygiene while seated in chair (due to back pain; to ensure safety)    Pain:   Pre treatment:10/10 (back pain)  Post treatment: 10/10 (RN aware)  Scale: numeric    Activity tolerance:  fair    COMMUNICATION/EDUCATION: Pt educated on role of OT and POC; he verbalized understanding. [x]         Fall prevention education was provided and the patient/caregiver indicated understanding. [x]         Patient/family have participated as able in goal setting and plan of care. [x]         Patient/family agree to work toward stated goals and plan of care. []         Patient understands intent and goals of therapy, but is neutral about his/her participation     The patients plan of care was also discussed with: Physical Therapist, Certified Occupational Therapy Assistant and Registered Nurse.      · After treatment position/precautions:   o Up in chair  o Call light within reach  o RN notified     Recommendations for nursing: up with assist x1 & RW     Thank you for this referral.  Latisha Irizarry MS OTR/L  Time Calculation: 20 mins

## 2018-12-20 NOTE — PROGRESS NOTES
Physical Exam   Skin: Skin is warm and dry. Bedside shift change report was given to me, Melissa Deutsch RN  ( ICU night shift nurse), by Dye RN ( ICU day shift nurse). Report included the following information:  SBAR, kardex, consultations, hemodynamic monitoring, intake/output, MAR, lab results, VS trends, cardiac rhythm noted SR/ST. Skin, neuro, respiratory status, order verification, and critical IV gtt rate verification has been dually noted and verified at the bedside with:  Greg Shaw RN                                       ICU Nurse's Note:    2000: Pt is awake, alert x 3. Pt independently moves all extremities with noted generalized weakness and ongoing chronic back pain issues. He requires assist with all ADLs and repositioning. Neurological: as noted in assessment   Cardiovascular:  NSR/ST on the monitor. Pulmonary: breath sounds coarse, diminished, saturations maintained at 95% on BiPap  GI/: Abdomen is semi-soft, non-distended, hypoactive bowel sounds. Last BM noted 12/18/2018. External condom catheter intact with adequate urine output.   IVF/Critical gtts: IV abx  Skin: as noted above    0710: Bedside change of shift report given to: Dorinda Cee, RN

## 2018-12-20 NOTE — PROGRESS NOTES
Patient is currently on 3 lpm NC, with BIPAP at bedside. No resp distress noted. HR 83, SpO2 100, RR 22, bilat BS course and diminished.

## 2018-12-20 NOTE — PROGRESS NOTES
[facility]  ICU Progress Note      Name: Ozzie Orr   : 1950   MRN: [id number]   Date: 2018 12:15 PM     [x]I have reviewed the flowsheet and previous days notes. Events overnight reviewed and discussed with nursing staff. Vital signs and records reviewed. S/p liberated from ventilator 18     Subjective:  18  No new events overnight. BIPAP overnight- Stable on 3L NC  Per pt- he has CPAP at home after last discharge from [facility]  Clinically much improved today  HD stable                  ROS:A comprehensive review of systems was negative except for that written in the HPI.     Medication Review:  · Pressors - none  · Sedation - none   · Antibiotics - zosyn/vanc  · Pain - prn norco  · GI/ DVT - protonix, SQH  · Others (other gtts)- IVF    Safety Bundles: Electrolyte Replacement Protocol    Vital Signs:    Visit Vitals  /69 (BP 1 Location: Left arm, BP Patient Position: At rest)   Pulse 92   Temp 97.8 °F (36.6 °C)   Resp 29   Ht 6' (1.829 m)   Wt 103.5 kg (228 lb 2.8 oz)   SpO2 100%   BMI 30.95 kg/m²       O2 Device: Nasal cannula   O2 Flow Rate (L/min): 3 l/min   Temp (24hrs), Av °F (36.7 °C), Min:97.8 °F (36.6 °C), Max:98.4 °F (36.9 °C)       Intake/Output:   Last shift:      701 - 1900  In: 590 [P.O.:390; I.V.:200]  Out: 200 [Urine:200]  Last 3 shifts: 1901 -  07  In: 550 [I.V.:550]  Out: 3005 [Urine:3005]    Intake/Output Summary (Last 24 hours) at 2018 1330  Last data filed at 2018 1200  Gross per 24 hour   Intake 1040 ml   Output 2180 ml   Net -1140 ml       Ventilator Settings:  Ventilator Mode: Spontaneous  Respiratory Rate  Back-Up Rate: 10  Insp Time (sec): 0.9 sec  Insp Flow (l/min): 61 l/min  I:E Ratio: 1:6.32  Ventilator Volumes  Vt Set (ml): 500 ml  Vt Exhaled (Machine Breath) (ml): 628 ml  Vt Spont (ml): 511 ml  Ve Observed (l/min): 9.2 l/min  Ventilator Pressures  Pressure Support (cm H2O): 7 cm H2O  PIP Observed (cm H2O): 12 cm H2O  Plateau Pressure (cm H2O): 15 cm H2O  MAP (cm H2O): 8  PEEP/VENT (cm H2O): 5 cm H20    Physical Exam:    General: Awake, alert  HEENT:  Anicteric sclerae; pink palpebral conjunctivae; mucosa moist  Resp:  Coarse bilaterally to auscultation; Symmetrical chest expansion, no accessory muscle use; good airway entry; scaterred mild wheezing  CV:  S1, S2 present; NSR  GI:  Abdomen soft, non-tender; (+) active bowel sounds  Extremities:  +2 pulses on all extremities; no edema/ cyanosis/ clubbing noted; normal capillary refill  Skin:  Warm; no rashes/ lesions noted  Neurologic:  Non-focal, follows commands, moves all extremities, AOX3  Devices:   Central line      DATA:     Current Facility-Administered Medications   Medication Dose Route Frequency    [START ON 12/22/2018] VANCOMYCIN INFORMATION NOTE   Other ONCE    hydrALAZINE (APRESOLINE) 20 mg/mL injection 20 mg  20 mg IntraVENous Q6H PRN    lisinopril (PRINIVIL, ZESTRIL) tablet 40 mg  40 mg Oral DAILY    albuterol-ipratropium (DUO-NEB) 2.5 MG-0.5 MG/3 ML  3 mL Nebulization QID RT    albuterol CONCENTRATE 2.5mg/0.5 mL neb soln  2.5 mg Nebulization Q4H PRN    HYDROcodone-acetaminophen (NORCO) 7.5-325 mg per tablet 1 Tab  1 Tab Oral Q4H PRN    acetaminophen (TYLENOL) suppository 650 mg  650 mg Rectal Q4H PRN    influenza vaccine 2018-19 (6 mos+)(PF) (FLUARIX QUAD/FLULAVAL QUAD) injection 0.5 mL  0.5 mL IntraMUSCular PRIOR TO DISCHARGE    vancomycin (VANCOCIN) 1,250 mg in 0.9% sodium chloride 250 mL IVPB  1,250 mg IntraVENous Q12H    acetaminophen (TYLENOL) suppository 650 mg  650 mg Rectal Q4H PRN    piperacillin-tazobactam (ZOSYN) 3.375 g in  ml ##EXTENDED 4HRS INFUSION  3.375 g IntraVENous Q8H    pantoprazole (PROTONIX) 40 mg in sodium chloride 0.9% 10 mL injection  40 mg IntraVENous DAILY    docusate (COLACE) 50 mg/5 mL oral liquid 100 mg  100 mg Oral DAILY    NOREPINephrine (LEVOPHED) 8 mg in 0.9% NS 250ml infusion  2-16 mcg/min IntraVENous TITRATE    VANCOMYCIN INFORMATION NOTE   Other Rx Dosing/Monitoring    acetaminophen (TYLENOL) tablet 650 mg  650 mg Oral Q6H PRN    ondansetron (ZOFRAN) injection 4 mg  4 mg IntraVENous Q6H PRN    heparin (porcine) injection 5,000 Units  5,000 Units SubCUTAneous Q8H         Labs: Results:       Chemistry Recent Labs     12/20/18  0415 12/19/18  0350 12/18/18  1606 12/18/18  0400   GLU 74 100*  --  84    142  --  146*   K 3.4* 3.4* 3.0* 3.0*    103  --  108   CO2 35* 33*  --  30   BUN 12 14  --  23*   CREA 0.88 0.92  --  1.36*   CA 7.7* 7.9*  --  7.7*   AGAP 5 6  --  8   BUCR 14 15  --  17   AP 65 76  --   --    TP 7.3 8.3*  --   --    ALB 2.1* 2.5*  --   --    GLOB 5.2* 5.8*  --   --    AGRAT 0.4* 0.4*  --   --       CBC w/Diff Recent Labs     12/20/18  0415 12/19/18  0350 12/18/18  0400   WBC 4.9 5.4 7.0   RBC 3.75* 4.11* 3.85*   HGB 10.5* 11.4* 10.6*   HCT 33.1* 36.9 33.8*   * 113* 124*   GRANS 52 68  --    LYMPH 38 23  --    EOS 1 0  --       Coagulation No results for input(s): PTP, INR, APTT in the last 72 hours. No lab exists for component: INREXT, INREXT    Liver Enzymes Recent Labs     12/20/18  0415   TP 7.3   ALB 2.1*   AP 65   SGOT 31      ABG No results found for: PH, PHI, PCO2, PCO2I, PO2, PO2I, HCO3, HCO3I, FIO2, FIO2I   Microbiology No results for input(s): CULT in the last 72 hours. Telemetry: [x]Sinus []A-flutter []Paced    []A-fib []Multiple PVCs                    Imaging:  CXR Results  (Last 48 hours)               12/20/18 0557  XR CHEST PORT Final result    Impression:  IMPRESSION:         1. Interval extubation. 2. Persistent findings suggesting pulmonary edema, stable to mildly progressed. 3. Superimposed bibasilar opacities may represent sequela of edema, atelectasis   or superimposed pneumonia. Associated small bilateral pleural effusions.                Narrative:  EXAM:  PORTABLE CHEST       INDICATION: Follow-up       TECHNIQUE:  Portable, semierect AP view. COMPARISON:  12/18/2018       ____________________       FINDINGS:       SUPPORT DEVICES: Interval extubation. Stable position of right jugular catheter,   tip at the level of the inferior right atrium. HEART AND MEDIASTINUM: Cardiac silhouette is mildly enlarged. Tortuous thoracic   aorta. LUNGS AND PLEURAL SPACES: Persistent prominence of the pulmonary vasculature   with perihilar opacities. No discernible pneumothorax. Suspect small bilateral   pleural effusions. BONY THORAX AND SOFT TISSUES: No acute osseous abnormality. ____________________                 CT Results  (Last 48 hours)    None                IMPRESSION:   · Acute hypoxic respiratory failure s/p liberated from mechanical ventilation- s/p liberated from ventilator 12/18/18- on BIPAP  · Possible aspiration PNA- zosyn,vanc  Hx  · COPD  · HTN  · Arthritis  · Drug use w/ recent admission for thoracic discitis  · Hep C        PLAN:   · Resp -  Stable on 3L NC. O2 goal > 90. HOB > 30. Aspiration precautions. Pulmonary hygiene. Duo-nebs. BIPAP at night. · ID - Afebrile and leukocytosis. Trend temp and WBC curve. BCx NGTD. Continue ABx. Monitor for signs of infection. · CVS - HD stable. Monitor HD status. · Heme/onc - Daily CBC. Monitor H/H and platelets. Monitor for signs of bleeding. · Metabolic - Daily CMP. Trend and replace electrolytes per replacement protocol. · Renal - Trend renal indices. Monitor UOP. · Endocrine - Glycemic control per protocol. · Neuro/ Pain/ Sedation - Monitor neuro status. PRN pain meds if needed. · GI - Dysphagia mech altered diet. Bowel regimen. · Prophylaxis - DVT, GI- SQH, protonix. · PT/OT  · Discussed in interdisciplinary rounds- OK to go to the floor.   · Code status- FULL          [x]See my orders for details    My assessment/plan was discussed with:  [x]nursing [x]PT/OT    [x]respiratory therapy [x] [place]   []family []       Reddy Mensah, ADARSH

## 2018-12-20 NOTE — PROGRESS NOTES
Problem: Dysphagia (Adult)  Goal: *Acute Goals and Plan of Care (Insert Text)  Recommendations:  Diet: mech soft / NT  Meds: crushed  Aspiration Precautions  Oral Care TID    Goals:  Patient will:  1. Tolerate PO trials with 0 s/s overt distress in 4/5 trials  2. Utilize compensatory swallow strategies/maneuvers (decrease bite/sip, size/rate, alt. liq/sol) with min cues in 4/5 trials  3. Perform oral-motor/laryngeal exercises to increase oropharyngeal swallow function with min cues  4. Complete an objective swallow study (i.e., MBSS) to assess swallow integrity, r/o aspiration, and determine of safest LRD, min A       Outcome: Progressing Towards Goal  Speech language pathology dysphagia treatment    Patient: Yolanda Brown (67 y.o. male)  Date: 12/20/2018  Diagnosis: Altered mental status  Acute respiratory failure with hypoxia (HCC) Acute respiratory failure with hypoxia (HCC)      Precautions: Aspiration    PLOF: Independent     ASSESSMENT:  Pt seen for dysphagia f/u this am; BiPAP removed, nasal cannula in place. Of note, pt with possible aspiration PNA at admission. A&Ox3. Accepted trials of thin liquid +straw, NT, puree, and solid with mildly delayed oral prep/transit, delayed swallow timing/reflex, and hyolaryngeal elevation adequate to palpation. Immediate and delayed eructations and mildly wet vocal quality noted with thin liquid via straw and cup sip; resolved with NT liquids. Recommend initiation of mech soft / NT liquid diet with continued aspiration precautions. Pt would possibly benefit from MBSS to further assess pharyngeal structure/fxn and r/o silent aspiration. Educated pt on aspiration precautions and importance of compensatory swallow techniques to decrease aspiration risk (decrease rate of intake & sip/bite size, upright @HOB for all po intake and ~30 minutes after po); verbalized comprehension. SLP will continue to follow for diet tolerance and upgrade as appropriate.  D/w RN, Keny. Progression toward goals:  [x]         Improving appropriately and progressing toward goals  []         Improving slowly and progressing toward goals  []         Not making progress toward goals and plan of care will be adjusted     PLAN:  Recommendations and Planned Interventions:   [facility] soft / NT   Patient continues to benefit from skilled intervention to address the above impairments. Continue treatment per established plan of care. Discharge Recommendations:  Skilled Nursing Facility     SUBJECTIVE:   Patient stated I like that cranberry stuff. OBJECTIVE:   Cognitive and Communication Status:  Neurologic State: Alert, Eyes open spontaneously, Eyes open to voice  Orientation Level: Oriented to person, Oriented to place, Oriented to situation  Cognition: Follows commands  Perception: Appears intact  Perseveration: No perseveration noted  Safety/Judgement: Awareness of environment  Dysphagia Treatment:  Oral Assessment:  Oral Assessment  Labial: No impairment  Dentition: Limited, Natural  Oral Hygiene: fair  Lingual: No impairment  Velum: No impairment  Mandible: No impairment  P.O. Trials:   Patient Position: [facility]   Vocal quality prior to P.O.: Breathy, Hoarse   Consistency Presented:  Thin liquid, Solid, Puree, Nectar thick liquid, Ice chips   How Presented: Self-fed/presented, SLP-fed/presented, Cup/sip, Spoon       Bolus Acceptance: No impairment   Bolus Formation/Control: No impairment       Propulsion: No impairment   Oral Residue: Less than 10% of bolus   Initiation of Swallow: Delayed (# of seconds)   Laryngeal Elevation: Functional   Aspiration Signs/Symptoms: Weak cough, Decrease in O2 saturations   Pharyngeal Phase Characteristics: Audible swallow   Effective Modifications: Small sips and bites   Cues for Modifications: Minimal         Oral Phase Severity: Minimal   Pharyngeal Phase Severity : Mild-moderate     PAIN:  Start of Tx: 2  End of Tx: 2     GCODESwallowing:  Swallow Current Status CJ= 20-39%   Swallow Goal Status CH= 0%    The severity rating is based on the following outcomes:  ISRAEL Noms Swallow Level 5    Clinical Judgement    After treatment:   []              Patient left in no apparent distress sitting up in chair  [x]              Patient left in no apparent distress in bed  [x]              Call bell left within reach  [x]              Nursing notified  []              Family present  [x]              Caregiver present  []              Bed alarm activated      COMMUNICATION/EDUCATION:   [x] Aspiration precautions; swallow safety; compensatory techniques  []        Patient unable to participate in education; education ongoing with staff  []  Posted safety precautions in patient's room.   [] Oral-motor/laryngeal strengthening exercises      NIKUNJ Valdes  Time Calculation: 15 mins

## 2018-12-20 NOTE — PROGRESS NOTES
Patient has been on BIPAP throughout the night. He has no signs of respiratory distress. He has been awake frequently and constantly pulling on the mask. He has been encouraged to leave the mask alone, but he will not. RN aware. Vital signs stable.

## 2018-12-20 NOTE — PROGRESS NOTES
Problem: Pain  Goal: *Control of Pain  Outcome: Progressing Towards Goal  Interventions are ongoing    Problem: Falls - Risk of  Goal: *Absence of Falls  Document Sumi Fall Risk and appropriate interventions in the flowsheet. Outcome: Progressing Towards Goal  Fall Risk Interventions:  Mobility Interventions: Bed/chair exit alarm    Mentation Interventions: Bed/chair exit alarm    Medication Interventions: Assess postural VS orthostatic hypotension    Elimination Interventions: Bed/chair exit alarm             Problem: Pressure Injury - Risk of  Goal: *Prevention of pressure injury  Document Raul Scale and appropriate interventions in the flowsheet. Outcome: Progressing Towards Goal  Pressure Injury Interventions:  Sensory Interventions: Assess changes in LOC    Moisture Interventions: Absorbent underpads    Activity Interventions: Assess need for specialty bed    Mobility Interventions: Turn and reposition approx.  every two hours(pillow and wedges)    Nutrition Interventions: Document food/fluid/supplement intake    Friction and Shear Interventions: Minimize layers

## 2018-12-20 NOTE — PROGRESS NOTES
[facility]  Hospitalist Division           Inpatient Daily Progress Note        Patient: Nilo Carolina MRN: [id number]  CSN: [id number]    YOB: 1950  Age: 77 y.o. Sex: male    DOA: 12/16/2018 LOS:  LOS: 4 days                    Chief Complaint:  Acute respiratory failure with hypoxia     Interval History:    Per Dr. Waqas Edwards HPI: Ester Mcmanus is a 77 y.o. male who is being admitted for acute hypoxic Resp failure & ? Aspiration PNA. Per ER chart review & EMS report - they were called 2y to pt being confused - no other history is obtainable at this time. Pt brought to the ER - was placed on NRBM with some improvement but later needed to be intubated . Chart review shows PMHx - HTN , COPD & Hep C - concern for IVDA - recent admission for discitis. ER eval - Pt is intubated & sedated - PCCM consulted. Will admit to the ICU for further eval\" Blood cx NGTD. UDS + benzos, opiates. CT head negative. 12/18/18: Lytes protocol. Extubation per PCCM. Pt alert, follows command promptly. On SBT. TFs on hold. Renal number slightly elevated-monitor. Extubated to 3L nasal cannula-swallow eval, diet per critical care; can d/c fluids if tolerating adequate PO. Fevers continue. Antibiotics (levaquin, zosyn, vancomycin). Blood cultures NGTD 12/16/18. CXR today showing substantial bibasilar density c/w atelectasis/infiltrate/effusion, persistent vascular congestion. 12/19/18: Lytes protocol. Placed on BiPAP for tachypnea. RR in 40s on my exam. ABG per critical care. CXR 12/18 showing vascular congestion. ICS, pulmonary toileting. Net positive 7.3 L. Bumex added. Monitor I/O. BP remains elevated, hydralazine prn; lisinopril dose increased. Keep in ICU until respiratory status improved. Further recommendations per PCCM. Renal fx better. PT.     12/20/18: On nasal cannula without any issues. BiPAP throughout the night. Remove bailon.  +5L since admission, diuresing well with bumex (-1.3 L past 24 hrs). OOB to chair, ICS, pulmonary toileting. Transfer to floor. Subjective:     NAD  Resting comfortably     Objective:      Visit Vitals  /72   Pulse 77   Temp 98.4 °F (36.9 °C)   Resp 14   Ht 6' (1.829 m)   Wt 103.5 kg (228 lb 2.8 oz)   SpO2 100%   BMI 30.95 kg/m²       Physical Exam:  General appearance: alert   Lungs: cta, diminished b/l   Heart: regular rate and rhythm, S1, S2 normal   Abdomen: soft, non tender, non distended. Normoactive bowel sounds. Extremities: extremities normal, atraumatic, no cyanosis or edema  Skin: Skin color, texture, turgor normal.   Neurologic: no deficits noted       Intake and Output:  Current Shift:  12/20 0701 - 12/20 1900  In: -   Out: 200 [Urine:200]  Last three shifts:  12/18 1901 - 12/20 0700  In: 550 [I.V.:550]  Out: 3005 [Urine:3005]    Recent Results (from the past 24 hour(s))   VANCOMYCIN, TROUGH    Collection Time: 12/20/18 12:30 AM   Result Value Ref Range    Vancomycin,trough 18.4 10.0 - 20.0 ug/mL    Reported dose date: 20181219      Reported dose time: 1300      Reported dose: 1250 MG UNITS   CBC WITH AUTOMATED DIFF    Collection Time: 12/20/18  4:15 AM   Result Value Ref Range    WBC 4.9 4.6 - 13.2 K/uL    RBC 3.75 (L) 4.70 - 5.50 M/uL    HGB 10.5 (L) 13.0 - 16.0 g/dL    HCT 33.1 (L) 36.0 - 48.0 %    MCV 88.3 74.0 - 97.0 FL    MCH 28.0 24.0 - 34.0 PG    MCHC 31.7 31.0 - 37.0 g/dL    RDW 16.6 (H) 11.6 - 14.5 %    PLATELET 198 (L) 513 - 420 K/uL    MPV 10.7 9.2 - 11.8 FL    NEUTROPHILS 52 40 - 73 %    LYMPHOCYTES 38 21 - 52 %    MONOCYTES 9 3 - 10 %    EOSINOPHILS 1 0 - 5 %    BASOPHILS 0 0 - 2 %    ABS. NEUTROPHILS 2.5 1.8 - 8.0 K/UL    ABS. LYMPHOCYTES 1.9 0.9 - 3.6 K/UL    ABS. MONOCYTES 0.5 0.05 - 1.2 K/UL    ABS. EOSINOPHILS 0.1 0.0 - 0.4 K/UL    ABS.  BASOPHILS 0.0 0.0 - 0.1 K/UL    DF AUTOMATED     METABOLIC PANEL, COMPREHENSIVE    Collection Time: 12/20/18  4:15 AM   Result Value Ref Range    Sodium 140 136 - 145 mmol/L    Potassium 3.4 (L) 3.5 - 5.5 mmol/L    Chloride 100 100 - 108 mmol/L    CO2 35 (H) 21 - 32 mmol/L    Anion gap 5 3.0 - 18 mmol/L    Glucose 74 74 - 99 mg/dL    BUN 12 7.0 - 18 MG/DL    Creatinine 0.88 0.6 - 1.3 MG/DL    BUN/Creatinine ratio 14 12 - 20      GFR est AA >60 >60 ml/min/1.73m2    GFR est non-AA >60 >60 ml/min/1.73m2    Calcium 7.7 (L) 8.5 - 10.1 MG/DL    Bilirubin, total 0.9 0.2 - 1.0 MG/DL    ALT (SGPT) 18 16 - 61 U/L    AST (SGOT) 31 15 - 37 U/L    Alk. phosphatase 65 45 - 117 U/L    Protein, total 7.3 6.4 - 8.2 g/dL    Albumin 2.1 (L) 3.4 - 5.0 g/dL    Globulin 5.2 (H) 2.0 - 4.0 g/dL    A-G Ratio 0.4 (L) 0.8 - 1.7     PHOSPHORUS    Collection Time: 12/20/18  4:15 AM   Result Value Ref Range    Phosphorus 2.8 2.5 - 4.9 MG/DL   MAGNESIUM    Collection Time: 12/20/18  4:15 AM   Result Value Ref Range    Magnesium 1.7 1.6 - 2.6 mg/dL           Lab Results   Component Value Date/Time    Glucose 74 12/20/2018 04:15 AM    Glucose 100 (H) 12/19/2018 03:50 AM    Glucose 84 12/18/2018 04:00 AM    Glucose 94 12/17/2018 12:30 AM    Glucose 134 (H) 12/16/2018 03:50 PM    AP portable chest, 12/18/2018 at 0831 hours:     Comparison 12/17/2018.     Endotracheal tube is just below the clavicles approximately 4.5 cm above the  reggie in reasonable position. Bibasilar densities consistent with a combination  of lower lobe atelectasis/infiltrate and effusion. Vascular congestion persists  unchanged. The heart is stable but in part obscured. Right internal jugular  central line is at the cavoatrial junction or upper right atrium.     IMPRESSION  IMPRESSION:  Little change from previous. Substantial bibasilar density consistent with  atelectasis/infiltrate/effusion.  Persistent vascular congestion  EXAM: CT HEAD W/O CONTRAST     INDICATION: Confusion/delirium, altered level of consciousness, unresponsive     COMPARISON: CT head 11/15/2018     TECHNIQUE: Axial CT imaging of the head was performed without intravenous  contrast.  Additional coronal and sagittal reconstructions were performed. One  or more dose reduction techniques were used on this CT: automated exposure  control, adjustment of the mAs and/or kVp according to patient's size, and  iterative reconstruction techniques. The specific techniques utilized on this CT  exam have been documented in the patient's electronic medical record.  _______________     FINDINGS:     Motion artifact is present which limits evaluation.     VENTRICLES/EXTRA-AXIAL SPACES: The ventricles and sulci are normal in their size  and configuration.     BRAIN PARENCHYMA: There is no evidence of acute intracranial hemorrhage, mass  effect, midline shift, or herniation. No definite CT evidence of acute cortical  infarct is seen. The gray-white matter differentiation is within normal limits.     ORBITS: The bilateral lenses are absent, likely due to prior cataract surgery.     PARANASAL SINUSES/MASTOIDS: Bubbly air-fluid levels are present in the bilateral  sphenoid sinuses. Additional mucoperiosteal thickening is seen in the ethmoidal  air cells. Visualized mastoid air cells are clear.     OSSEOUS STRUCTURES: No fracture is seen.     OTHER: Endotracheal tube is present.       _______________     IMPRESSION  IMPRESSION:     1. No acute intracranial hemorrhage, mass effect, midline shift, or herniation. No definite CT evidence of acute cortical infarct is seen. Please note that  noncontrast head CT may be normal in early acute infarct. 2. Bilateral sphenoid sinus bubbly air-fluid levels potentially representing  acute sinusitis or related to secretions given intubation. Clinical correlation  is recommended.   AP portable chest, 12/18/2018 at 0831 hours:     Comparison 12/17/2018.     Endotracheal tube is just below the clavicles approximately 4.5 cm above the  reggie in reasonable position. Bibasilar densities consistent with a combination  of lower lobe atelectasis/infiltrate and effusion.  Vascular congestion persists  unchanged. The heart is stable but in part obscured. Right internal jugular  central line is at the cavoatrial junction or upper right atrium.     IMPRESSION  IMPRESSION:  Little change from previous. Substantial bibasilar density consistent with  atelectasis/infiltrate/effusion. Persistent vascular congestion. EXAM: Chest, portable AP supine, one view     INDICATION: Arrived to emergency department unresponsive. Endotracheal tube  placement.     COMPARISON: 11/22/2018     _______________     FINDINGS:     ETT is 3.2 cm above the reggie, right jugular central venous catheter tip  projects at the right atrium.      Cardiac silhouette is within normal range in size. Calcified plaque is present  at the aortic arch.      No pneumothorax. Pulmonary vascular redistribution has developed along with  small to moderate right and small left pleural effusion. Parenchymal band at the  right lung base is compatible with atelectasis. Retrocardiac region is somewhat  dense. No pneumothorax appreciated.     Degenerative changes noted at both shoulders. _______________     IMPRESSION  IMPRESSION:        1. ETT in satisfactory position. Right jugular central venous catheter within  the right atrium. 2. CHF with small to moderate right and small left pleural effusions, linear  atelectasis right lung base and retrocardiac atelectasis and/or infiltrate. Normal cavity size and systolic function (ejection fraction normal). Mild concentric hypertrophy observed. The estimated ejection fraction is 61 - 65%. Visually measured ejection fraction. No regional wall motion abnormality noted. There is mild (grade 1) left ventricular diastolic dysfunction. Left Atrium The cavity size is mildly dilated. Right Ventricle Normal cavity size and global systolic function. Right Atrium The cavity size is mildly dilated. Aortic Valve No stenosis and no regurgitation.  Mild aortic valve sclerosis with no evidence of reduced excursion. Mitral Valve No stenosis. Mitral valve non-specific thickening. Trace regurgitation. Tricuspid Valve No stenosis. Non-specific thickening. Tricuspid regurgitation is inadequate for estimation of right ventricular systolic pressure. Pulmonic Valve Not well visualized, normal Doppler findings. Aorta Normal aortic root. IVC/Hepatic Veins Inferior vena cava not well visualized. Pericardium No evidence of pericardial effusion. TTE procedure Findings     TTE Procedure Information Image quality: technically difficult. The view(s) performed were parasternal, apical, subcostal and suprasternal. Unable to use Definity due to contraindication of respiratory distress. Technically difficult study, color flow Doppler was performed and pulse wave and/or continuous wave Doppler was performed.        Assessment/Plan:     Patient Active Problem List   Diagnosis Code    Discitis of thoracic region M46.44    UTI (urinary tract infection) N39.0    Bilateral pleural effusion J90    Lumbar stenosis M48.061    Adenoma of left adrenal gland D35.02    Uncontrolled hypertension I10    Cirrhosis of liver ([address] K74.60    Hepatitis C B19.20    Obesity E66.9    COPD (chronic obstructive pulmonary disease) ([address] J44.9    Constipation K59.00    Back pain M54.9    Altered mental status R41.82    Acute respiratory failure with hypoxia ([address] J96.01    Sinusitis J32.9       A/P:    Plan:    Acute Hypoxic Resp failure  - intubated-extubated 12/18/18 -3L NC   -ABGs/CXR/vent management per PCCM  -SBT this am, alert, following commands-defer extubation   -extubated 12/18/18 to 3L nasal cannula   -ICS, pulmonary toileting  -placed on BiPAP for tachypnea ; transitioned to nasal cannula without any issues     Aspiration PNA   - broad spectrum Abx blood cultures NGTD (on vancomycin/zosyn)   -ICS, pulmonary toileting when ext   -BiPAP for tachypnea   -no leukocytosis, afebrile     H/o Drug use with recent admission for thoracic discitis   -monitor withdrawal  -UDS + benzos/opiates     H/o HTN   -monitor BP  -increase lisinopril dose 40 mg -better     H/o COPD   -currently on BiPAP  -monitor sats (sat goal > 90%)   -breathing treatments when able   -per pt has home O2      H/o Hep C   -monitor    DVT Px   - heparin    Bowel-colace  GI-protonix         Yvette Jarquin, NP-C  [facility]  [phone]  Office:  [phone]

## 2018-12-20 NOTE — PROGRESS NOTES
Problem: Mobility Impaired (Adult and Pediatric)  Goal: *Acute Goals and Plan of Care (Insert Text)  Physical Therapy Goals  Initiated 12/20/2018 and to be accomplished within 7 day(s)  1. Patient will move from supine to sit and sit to supine  in bed with modified independence. 2.  Patient will transfer from bed to chair and chair to bed with modified independence using the least restrictive device. 3.  Patient will perform sit to stand with modified independence. 4.  Patient will ambulate with modified independence for 150 feet with the least restrictive device. 5.  Patient will ascend/descend 10 stairs with handrail(s) with modified independence. Outcome: Progressing Towards Goal  PHYSICAL THERAPY: Initial Assessment   INPATIENT: Medicaid: Hospital Day: 5     Patient: Cydney Kelly (67 y.o. male)    Date: 12/20/2018  Primary Diagnosis: Altered mental status  Acute respiratory failure with hypoxia (HCC)   Precautions: Fall, Skin, Seizure  PLOF: Independent    ASSESSMENT :  Patient requires between minimal assistance/contact guard assist and supervision/set-up for bed mobility, transfers and ambulation. Seated in recliner upon entry. Supervision for sit to stand. Bed to bedside commode transfer with ww and supervision/min A for walker handling. Completed dynamic reaching tasks with supervision in sitting. Supervision for sit to stand from bedside commode to chair. Seated in chair with all needs in reach. RN Keny ernst. BP pre mobility 116/69; BP post 134/76. Patient presents with deficits in:  Bed Mobility, Transfers, Gait, Strength, Balance and Stairs    Patient will benefit from skilled intervention to address the above impairments.   Patients rehabilitation potential is considered to be Fair  Factors which may influence rehabilitation potential include:   []         None noted  []         Mental ability/status  [x]         Medical condition  []         Home/family situation and support systems  [] Safety awareness  []         Pain tolerance/management  []         Other:      PLAN :  Recommendations and Planned Interventions:  [x]           Bed Mobility Training             [x]    Neuromuscular Re-Education  [x]           Transfer Training                   []    Orthotic/Prosthetic Training  [x]           Gait Training                          []    Modalities  [x]           Therapeutic Exercises          []    Edema Management/Control  [x]           Therapeutic Activities            [x]    Patient and Family Training/Education  []           Other (comment):      EDUCATION:   Education:  Patient was educated on the following topics: Bed mobility, transfers, ADLs, balance, amb, safety, exercise, role of PT, plan of care, cognition, skin integrity, vitals      Barriers to Learning/Limitations: None  Compensate with: visual, verbal, tactile, kinesthetic cues/model    Frequency/Duration: Patient will be followed by physical therapy 3-5 times a week to address goals. Discharge Recommendations: Home Health  Further Equipment Recommendations for Discharge: rolling walker     SUBJECTIVE:   Patient stated I guess I'm okay.     OBJECTIVE DATA SUMMARY:     Past Medical History:   Diagnosis Date    Arthritis     COPD     Hypertension      Past Surgical History:   Procedure Laterality Date    HX REFRACTIVE SURGERY       Eval Complexity: History: MEDIUM  Complexity : 1-2 comorbidities / personal factors will impact the outcome/ POC Exam:MEDIUM Complexity : 3 Standardized tests and measures addressing body structure, function, activity limitation and / or participation in recreation  Presentation: MEDIUM Complexity : Evolving with changing characteristics  Clinical Decision Making:Medium Complexity [facility] Standing Balance Scale 2+/5 Overall Complexity:MEDIUM    G CODES:Mobility [id number] Current  CK= 40-59%   Goal  CI= 1-19%.   The severity rating is based on the [employer] Balance Scale 2+/5    [facility] Balance Scale 2+/5  0: Pt performs 25% or less of standing activity (Max assist) CN, 100% impaired. 1: Pt supports self with upper extremities but requires therapist assistance. Pt performs 25-50% of effort (Mod assist) CM, 80% to <100% impaired. 1+: Pt supports self with upper extremities but requires therapist assistance. Pt performs >50% effort. (Min assist). CL, 60% to <80% impaired. 2: Pt supports self independently with both upper extremities (walker, crutches, parallel bars). CL, 60% to <80% impaired. 2+: Pt support self independently with 1 upper extremity (cane, crutch, 1 parallel bar). CK, 40% to <60% impaired. 3: Pt stands without upper extremity support for up to 30 seconds. CK, 40% to <60% impaired. 3+: Pt stands without upper extremity support for 30 seconds or greater. CJ, 20% to <40% impaired. 4: Pt independently moves and returns center of gravity 1-2 inches in one plane. CJ, 20% to <40% impaired. 4+: Pt independently moves and returns center of gravity 1-2 inches in multiple planes. CI, 1% to <20% impaired. 5: Pt independently moves and returns center of gravity in all planes greater than 2 inches. CH, 0% impaired.     Prior Level of Function/Home Situation:  Home Situation  Home Environment: Private residence  # Steps to Enter: 0  One/Two Story Residence: One story  Living Alone: No  Support Systems: Child(anna)  Patient Expects to be Discharged to[de-identified] Private residence  Current DME Used/Available at Home: None  Critical Behavior:  Neurologic State: Alert  Orientation Level: Oriented X4  Cognition: Follows commands  Safety/Judgement: Fall prevention  Psychosocial  Patient Behaviors: Cooperative    Manual Muscle Testing (LE)         R     L    Hip Flexion:   5/5 5/5  Knee EXT:   5/5 5/5  Knee FLEX:   5/5 5/5  Ankle DF:   5/5 5/5  _________________________________________________   Tone : BLE normal  Sensation: Intact to light touch BLE  Range Of Motion: BLE AROM WFL    Functional Mobility:      Functional Status      Indep   (I)   Mod I   Super-vision   Min A   Mod A   Max A   Total A   Assist x2 Verbal cues Additional time Not tested   Comments   Rolling []  []  [] []    []    []  []  [] [] [] [x]    Supine to sit []  []  [] []  []  []  []  [] [] [] [x]    Sit to supine []  []  [] []  []  []  []  [] [] [] [x]    Sit to stand []  []  [x] []  []  []  []  [] [] [] []    Stand to sit []  []  [x] []  []  []  []  [] [] [] []    Bed to chair transfers []  []  [x] []  []  []  []  [] [] [] []        Balance   Good   Fair   Poor   Unable   Not tested   Comments   Sitting static [x]  []  []  []  []    Sitting dynamic [x]  []  []  []  []    Standing static []  [x]  []  []  []    Standing dynamic []  [x]  []  []  []      Pain:  Pre treatment pain level: 10   Post treatment pain level: 10    Activity Tolerance:   Fair  Please refer to the flowsheet for vital signs taken during this treatment. After treatment:   [x]         Patient left in no apparent distress sitting up in chair  []         Patient left in no apparent distress in bed  [x]         Call bell left within reach  [x]         Nursing notified  []         Caregiver present  []         Bed alarm activated    COMMUNICATION/EDUCATION:   [x]         Fall prevention education was provided and the patient/caregiver indicated understanding. [x]         Patient/family have participated as able in goal setting and plan of care. [x]         Patient/family agree to work toward stated goals and plan of care. []         Patient understands intent and goals of therapy, but is neutral about his/her participation. []         Patient is unable to participate in goal setting and plan of care.     Thank you for this referral.  Balta Berrios, PT   Time Calculation: 29 mins

## 2018-12-20 NOTE — PROGRESS NOTES
0730  Assumed care of pt from [facility]. Resting quietly on BIPAP, VSS. Morning potassium low, replacement per protocol in force. 0830  Off BIPAP, on n/c 3 lpm, osei well  0930  OOB to chair with minimal assistance   1100  Remains OOB, talking on phone, no dyspnea. Potassium repletion in progress. 1300  OOB, PT and OT have seen pt today. Will start PIV prior to transfer to floor. 1340  TRANSFER - OUT REPORT:    Verbal report given to Esperanza BARNES(name) on Ravin Davis  being transferred to [facility](unit) for routine progression of care       Report consisted of patients Situation, Background, Assessment and   Recommendations(SBAR). Information from the following report(s) SBAR, Kardex, ED Summary, Intake/Output, MAR, Accordion and Recent Results was reviewed with the receiving nurse. Lines:   Triple Lumen 12/15/18 Anterior;Right Internal jugular (Active)   Central Line Being Utilized Yes 12/20/2018  8:00 AM   Criteria for Appropriate Use Irritant/vesicant medication 12/20/2018  8:00 AM   Site Assessment Clean, dry, & intact 12/20/2018  8:00 AM   Infiltration Assessment 0 12/20/2018  8:00 AM   Affected Extremity/Extremities Color distal to insertion site pink (or appropriate for race); Pulses palpable;Range of motion performed 12/20/2018  8:00 AM   Date of Last Dressing Change 12/18/18 12/20/2018 12:00 AM   Dressing Status Clean, dry, & intact 12/20/2018  8:00 AM   Dressing Type Disk with Chlorhexadine gluconate (CHG); Transparent 12/20/2018  8:00 AM   Action Taken Open ports on tubing capped 12/20/2018  8:00 AM   Proximal Hub Color/Line Status Capped 12/20/2018  8:00 AM   Positive Blood Return (Medial Site) Yes 12/20/2018  8:00 AM   Medial Hub Color/Line Status Capped 12/20/2018  8:00 AM   Positive Blood Return (Lateral Site) Yes 12/20/2018  8:00 AM   Distal Hub Color/Line Status Infusing 12/20/2018  8:00 AM   Positive Blood Return (Site #3) Yes 12/20/2018  8:00 AM   Alcohol Cap Used Yes 12/20/2018  8:00 AM Opportunity for questions and clarification was provided.       Patient transported with:   O2 @ 3 liters

## 2018-12-20 NOTE — PROGRESS NOTES
Report received from Pastora Tellez on behalf of Keith Caraballo. Pt transferring from ICU 2705 to [facility] 53.

## 2018-12-20 NOTE — PROGRESS NOTES
Problem: Falls - Risk of  Goal: *Absence of Falls  Document Sumi Fall Risk and appropriate interventions in the flowsheet. Outcome: Progressing Towards Goal  Fall Risk Interventions:  Mobility Interventions: Bed/chair exit alarm, Strengthening exercises (ROM-active/passive)    Mentation Interventions: Bed/chair exit alarm, Adequate sleep, hydration, pain control, Evaluate medications/consider consulting pharmacy, Increase mobility, More frequent rounding, Reorient patient, Room close to nurse's station, Toileting rounds, Update white board    Medication Interventions: Assess postural VS orthostatic hypotension, Evaluate medications/consider consulting pharmacy    Elimination Interventions: Bed/chair exit alarm, Elevated toilet seat, Toileting schedule/hourly rounds             Problem: Pressure Injury - Risk of  Goal: *Prevention of pressure injury  Document Raul Scale and appropriate interventions in the flowsheet. Outcome: Progressing Towards Goal  Pressure Injury Interventions:  Sensory Interventions: Assess changes in LOC, Assess need for specialty bed, Discuss PT/OT consult with provider, Maintain/enhance activity level, Minimize linen layers, Monitor skin under medical devices, Turn and reposition approx.  every two hours (pillows and wedges if needed), Pressure redistribution bed/mattress (bed type)    Moisture Interventions: Absorbent underpads, Assess need for specialty bed, Maintain skin hydration (lotion/cream), Minimize layers, Moisture barrier, Offer toileting Q_hr    Activity Interventions: Assess need for specialty bed, Pressure redistribution bed/mattress(bed type)    Mobility Interventions: Pressure redistribution bed/mattress (bed type), Trapeze to reposition    Nutrition Interventions: Document food/fluid/supplement intake, Discuss nutritional consult with provider    Friction and Shear Interventions: Apply protective barrier, creams and emollients, Foam dressings/transparent film/skin sealants, Lift team/patient mobility team, Minimize layers, Transferring/repositioning devices

## 2018-12-21 ENCOUNTER — HOME HEALTH ADMISSION (OUTPATIENT)
Dept: HOME HEALTH SERVICES | Facility: HOME HEALTH | Age: 68
End: 2018-12-21

## 2018-12-21 VITALS
OXYGEN SATURATION: 93 % | RESPIRATION RATE: 20 BRPM | BODY MASS INDEX: 33.32 KG/M2 | TEMPERATURE: 98.2 F | DIASTOLIC BLOOD PRESSURE: 59 MMHG | WEIGHT: 246.03 LBS | HEART RATE: 73 BPM | SYSTOLIC BLOOD PRESSURE: 143 MMHG | HEIGHT: 72 IN

## 2018-12-21 LAB
ALBUMIN SERPL-MCNC: 2.1 G/DL (ref 3.4–5)
ALBUMIN/GLOB SERPL: 0.4 {RATIO} (ref 0.8–1.7)
ALP SERPL-CCNC: 68 U/L (ref 45–117)
ALT SERPL-CCNC: 21 U/L (ref 16–61)
ANION GAP SERPL CALC-SCNC: 4 MMOL/L (ref 3–18)
AST SERPL-CCNC: 26 U/L (ref 15–37)
BASOPHILS # BLD: 0 K/UL (ref 0–0.1)
BASOPHILS NFR BLD: 0 % (ref 0–2)
BILIRUB SERPL-MCNC: 0.7 MG/DL (ref 0.2–1)
BUN SERPL-MCNC: 14 MG/DL (ref 7–18)
BUN/CREAT SERPL: 15 (ref 12–20)
CALCIUM SERPL-MCNC: 7.8 MG/DL (ref 8.5–10.1)
CHLORIDE SERPL-SCNC: 101 MMOL/L (ref 100–108)
CO2 SERPL-SCNC: 34 MMOL/L (ref 21–32)
CREAT SERPL-MCNC: 0.95 MG/DL (ref 0.6–1.3)
DIFFERENTIAL METHOD BLD: ABNORMAL
EOSINOPHIL # BLD: 0.1 K/UL (ref 0–0.4)
EOSINOPHIL NFR BLD: 2 % (ref 0–5)
ERYTHROCYTE [DISTWIDTH] IN BLOOD BY AUTOMATED COUNT: 16.6 % (ref 11.6–14.5)
GLOBULIN SER CALC-MCNC: 5.5 G/DL (ref 2–4)
GLUCOSE SERPL-MCNC: 83 MG/DL (ref 74–99)
HCT VFR BLD AUTO: 35.2 % (ref 36–48)
HGB BLD-MCNC: 11.2 G/DL (ref 13–16)
LYMPHOCYTES # BLD: 2.1 K/UL (ref 0.9–3.6)
LYMPHOCYTES NFR BLD: 42 % (ref 21–52)
MAGNESIUM SERPL-MCNC: 1.7 MG/DL (ref 1.6–2.6)
MCH RBC QN AUTO: 27.6 PG (ref 24–34)
MCHC RBC AUTO-ENTMCNC: 31.8 G/DL (ref 31–37)
MCV RBC AUTO: 86.7 FL (ref 74–97)
MONOCYTES # BLD: 0.5 K/UL (ref 0.05–1.2)
MONOCYTES NFR BLD: 9 % (ref 3–10)
NEUTS SEG # BLD: 2.3 K/UL (ref 1.8–8)
NEUTS SEG NFR BLD: 47 % (ref 40–73)
PHOSPHATE SERPL-MCNC: 2.5 MG/DL (ref 2.5–4.9)
PLATELET # BLD AUTO: 120 K/UL (ref 135–420)
PMV BLD AUTO: 10.2 FL (ref 9.2–11.8)
POTASSIUM SERPL-SCNC: 3.1 MMOL/L (ref 3.5–5.5)
PROT SERPL-MCNC: 7.6 G/DL (ref 6.4–8.2)
RBC # BLD AUTO: 4.06 M/UL (ref 4.7–5.5)
SODIUM SERPL-SCNC: 139 MMOL/L (ref 136–145)
WBC # BLD AUTO: 4.9 K/UL (ref 4.6–13.2)

## 2018-12-21 PROCEDURE — 74011250636 HC RX REV CODE- 250/636: Performed by: NURSE PRACTITIONER

## 2018-12-21 PROCEDURE — 74011250637 HC RX REV CODE- 250/637: Performed by: NURSE PRACTITIONER

## 2018-12-21 PROCEDURE — 83735 ASSAY OF MAGNESIUM: CPT

## 2018-12-21 PROCEDURE — 85025 COMPLETE CBC W/AUTO DIFF WBC: CPT

## 2018-12-21 PROCEDURE — 94640 AIRWAY INHALATION TREATMENT: CPT

## 2018-12-21 PROCEDURE — 92526 ORAL FUNCTION THERAPY: CPT

## 2018-12-21 PROCEDURE — C9113 INJ PANTOPRAZOLE SODIUM, VIA: HCPCS | Performed by: NURSE PRACTITIONER

## 2018-12-21 PROCEDURE — 97530 THERAPEUTIC ACTIVITIES: CPT

## 2018-12-21 PROCEDURE — 74011000250 HC RX REV CODE- 250: Performed by: INTERNAL MEDICINE

## 2018-12-21 PROCEDURE — 97535 SELF CARE MNGMENT TRAINING: CPT

## 2018-12-21 PROCEDURE — 80053 COMPREHEN METABOLIC PANEL: CPT

## 2018-12-21 PROCEDURE — 36415 COLL VENOUS BLD VENIPUNCTURE: CPT

## 2018-12-21 PROCEDURE — 74011250636 HC RX REV CODE- 250/636: Performed by: HOSPITALIST

## 2018-12-21 PROCEDURE — 77010033678 HC OXYGEN DAILY

## 2018-12-21 PROCEDURE — 84100 ASSAY OF PHOSPHORUS: CPT

## 2018-12-21 RX ORDER — POTASSIUM CHLORIDE 20 MEQ/1
40 TABLET, EXTENDED RELEASE ORAL
Status: COMPLETED | OUTPATIENT
Start: 2018-12-21 | End: 2018-12-21

## 2018-12-21 RX ORDER — OXYCODONE AND ACETAMINOPHEN 5; 325 MG/1; MG/1
1 TABLET ORAL
Qty: 12 TAB | Refills: 0 | Status: SHIPPED | OUTPATIENT
Start: 2018-12-21 | End: 2019-02-23

## 2018-12-21 RX ADMIN — IPRATROPIUM BROMIDE AND ALBUTEROL SULFATE 3 ML: .5; 3 SOLUTION RESPIRATORY (INHALATION) at 11:17

## 2018-12-21 RX ADMIN — HYDROCODONE BITARTRATE AND ACETAMINOPHEN 1 TABLET: 7.5; 325 TABLET ORAL at 03:42

## 2018-12-21 RX ADMIN — SODIUM CHLORIDE 40 MG: 9 INJECTION, SOLUTION INTRAMUSCULAR; INTRAVENOUS; SUBCUTANEOUS at 08:34

## 2018-12-21 RX ADMIN — HYDROCODONE BITARTRATE AND ACETAMINOPHEN 1 TABLET: 7.5; 325 TABLET ORAL at 08:34

## 2018-12-21 RX ADMIN — HEPARIN SODIUM 5000 UNITS: 5000 INJECTION INTRAVENOUS; SUBCUTANEOUS at 00:26

## 2018-12-21 RX ADMIN — HEPARIN SODIUM 5000 UNITS: 5000 INJECTION INTRAVENOUS; SUBCUTANEOUS at 08:33

## 2018-12-21 RX ADMIN — POTASSIUM CHLORIDE 40 MEQ: 20 TABLET, EXTENDED RELEASE ORAL at 08:33

## 2018-12-21 RX ADMIN — LISINOPRIL 40 MG: 20 TABLET ORAL at 08:34

## 2018-12-21 RX ADMIN — IPRATROPIUM BROMIDE AND ALBUTEROL SULFATE 3 ML: .5; 3 SOLUTION RESPIRATORY (INHALATION) at 07:45

## 2018-12-21 NOTE — PROGRESS NOTES
Problem: Falls - Risk of  Goal: *Absence of Falls  Document Sumi Fall Risk and appropriate interventions in the flowsheet. Outcome: Resolved/Met Date Met: 12/21/18  Fall Risk Interventions:  Mobility Interventions: Bed/chair exit alarm, PT Consult for mobility concerns    Mentation Interventions: Bed/chair exit alarm    Medication Interventions: Bed/chair exit alarm    Elimination Interventions: Bed/chair exit alarm, Call light in reach, Urinal in reach             Problem: Pressure Injury - Risk of  Goal: *Prevention of pressure injury  Document Raul Scale and appropriate interventions in the flowsheet.   Outcome: Resolved/Met Date Met: 12/21/18  Pressure Injury Interventions:  Sensory Interventions: Assess changes in LOC    Moisture Interventions: Absorbent underpads, Apply protective barrier, creams and emollients    Activity Interventions: PT/OT evaluation, Increase time out of bed    Mobility Interventions: PT/OT evaluation, Pressure redistribution bed/mattress (bed type)    Nutrition Interventions: Document food/fluid/supplement intake, Discuss nutritional consult with provider, Offer support with meals,snacks and hydration    Friction and Shear Interventions: Apply protective barrier, creams and emollients, Foam dressings/transparent film/skin sealants

## 2018-12-21 NOTE — PROGRESS NOTES
Problem: Falls - Risk of  Goal: *Absence of Falls  Document Sumi Fall Risk and appropriate interventions in the flowsheet. Outcome: Progressing Towards Goal  Fall Risk Interventions:  Mobility Interventions: Bed/chair exit alarm, Patient to call before getting OOB    Mentation Interventions: Adequate sleep, hydration, pain control, Door open when patient unattended    Medication Interventions: Teach patient to arise slowly, Patient to call before getting OOB    Elimination Interventions: Call light in reach, Patient to call for help with toileting needs             Problem: Pressure Injury - Risk of  Goal: *Prevention of pressure injury  Document Raul Scale and appropriate interventions in the flowsheet.   Outcome: Progressing Towards Goal  Pressure Injury Interventions:  Sensory Interventions: Assess changes in LOC    Moisture Interventions: Absorbent underpads, Apply protective barrier, creams and emollients    Activity Interventions: Pressure redistribution bed/mattress(bed type)    Mobility Interventions: Pressure redistribution bed/mattress (bed type)    Nutrition Interventions: Document food/fluid/supplement intake    Friction and Shear Interventions: Apply protective barrier, creams and emollients, Foam dressings/transparent film/skin sealants

## 2018-12-21 NOTE — PROGRESS NOTES
Problem: Dysphagia (Adult)  Goal: *Acute Goals and Plan of Care (Insert Text)  Recommendations:  Diet: mech soft / thin liquid  Meds: per pt preference  Aspiration Precautions  Oral Care TID    Goals:  Patient will:  1. Tolerate PO trials with 0 s/s overt distress in 4/5 trials  2. Utilize compensatory swallow strategies/maneuvers (decrease bite/sip, size/rate, alt. liq/sol) with min cues in 4/5 trials  3. Perform oral-motor/laryngeal exercises to increase oropharyngeal swallow function with min cues  4. Complete an objective swallow study (i.e., MBSS) to assess swallow integrity, r/o aspiration, and determine of safest LRD, min A       Outcome: Progressing Towards Goal  Speech language pathology dysphagia treatment & discharge    Patient: Bijan Larkin (67 y.o. male)  Date: 12/21/2018  Diagnosis: Altered mental status  Acute respiratory failure with hypoxia (HCC) Acute respiratory failure with hypoxia (HCC)      Precautions: Aspiration, Fall, Skin, Seizure    ASSESSMENT:  Pt seen for dysphagia follow up, seated upright in chair. Observed with serial swallows of thin liquid +straw with mildly delayed swallow timing/reflex, however, 0 clinical s/sx aspiration. Pt safe for upgrade to thin liquids at this time. Pt to be d/c home today. Accordingly, SLP to d/c intervention at this time. Educated pt on aspiration precautions and importance of compensatory swallow techniques to decrease aspiration risk (decrease rate of intake & sip/bite size, upright @HOB for all po intake and ~30 minutes after po); verbalized comprehension. D/w Jl BARNES Mitch. Progression toward goals:  [x]         Improving appropriately - goals met/approximated  []         Not making progress/Not appropriate - will d/c POC     PLAN:  Recommendations and Planned Interventions:  Maximum therapeutic gains met; safest, least restrictive diet achieved. D/C ST intervention at this time.    Discharge Recommendations:  Home Health     SUBJECTIVE:   Patient stated That tastes good. OBJECTIVE:   Cognitive and Communication Status:  Neurologic State: Alert  Orientation Level: Oriented X4  Cognition: Follows commands  Perception: Appears intact  Perseveration: No perseveration noted  Safety/Judgement: Fall prevention  Dysphagia Treatment:  Oral Assessment:  Oral Assessment  Labial: No impairment  Dentition: Limited, Natural  Oral Hygiene: fair  Lingual: No impairment  Velum: No impairment  Mandible: No impairment  P.O. Trials:   Patient Position: \[facility] [place] [facility]\"" 90   Vocal quality prior to P.O.: Breathy, Hoarse   Consistency Presented:  Thin liquid, Solid, Puree, Nectar thick liquid, Ice chips   How Presented: Self-fed/presented, SLP-fed/presented, Cup/sip, Spoon       Bolus Acceptance: No impairment   Bolus Formation/Control: No impairment       Propulsion: No impairment   Oral Residue: Less than 10% of bolus   Initiation of Swallow: Delayed (# of seconds)   Laryngeal Elevation: Functional   Aspiration Signs/Symptoms: Weak cough, Decrease in O2 saturations   Pharyngeal Phase Characteristics: Audible swallow   Effective Modifications: Small sips and bites   Cues for Modifications: Minimal         Oral Phase Severity: Minimal   Pharyngeal Phase Severity : Mild-moderate     PAIN:  Start of Tx: 0  End of Tx: 0     GCODESwallowing:  Swallow Current Status CI= 1-19%   Swallow Goal Status CI= 1-19%   Swallow D/C Status CI= 1-19%    The severity rating is based on the following outcomes:  ISRAEL Noms Swallow Level 6    Clinical Judgement    After treatment:   [x]              Patient left in no apparent distress sitting up in chair  []              Patient left in no apparent distress in bed  [x]              Call bell left within reach  [x]              Nursing notified  []              Caregiver present  []              Bed alarm activated      COMMUNICATION/EDUCATION:   [x] Aspiration precautions; swallow safety; compensatory techniques  []        Patient unable to participate in education; education ongoing with staff  []  Posted safety precautions in patient's room.   [] Oral-motor/laryngeal strengthening exercises    NIKUNJ Handley  Time Calculation: 15 mins

## 2018-12-21 NOTE — DISCHARGE SUMMARY
[facility]  Hospitalist Division    Discharge Summary    Patient: Laura Zimmerman MRN: [id number]  CSN: [id number]    YOB: 1950  Age: 77 y.o. Sex: male    DOA: 12/16/2018 LOS:  LOS: 5 days   Discharge Date:      Admission Diagnoses: Altered mental status  Acute respiratory failure with hypoxia [facility])    Discharge Diagnoses:    Problem List as of 12/21/2018 Date Reviewed: 11/14/2018          Codes Class Noted - Resolved    Sinusitis ICD-10-CM: J32.9  ICD-9-CM: 473.9  12/17/2018 - Present        Altered mental status ICD-10-CM: R41.82  ICD-9-CM: 780.97  12/16/2018 - Present        * (Principal) Acute respiratory failure with hypoxia ([facility] ICD-10-CM: J96.01  ICD-9-CM: 518.81  12/16/2018 - Present        Discitis of thoracic region ICD-10-CM: M46.44  ICD-9-CM: 722.92  11/14/2018 - Present        UTI (urinary tract infection) ICD-10-CM: N39.0  ICD-9-CM: 599.0  11/14/2018 - Present        Bilateral pleural effusion ICD-10-CM: J90  ICD-9-CM: 511.9  11/14/2018 - Present        Lumbar stenosis ICD-10-CM: M48.061  ICD-9-CM: 724.02  11/14/2018 - Present        Adenoma of left adrenal gland ICD-10-CM: D35.02  ICD-9-CM: 227.0  11/14/2018 - Present        Uncontrolled hypertension ICD-10-CM: I10  ICD-9-CM: 401.9  11/14/2018 - Present        Cirrhosis of liver ([facility] ICD-10-CM: K74.60  ICD-9-CM: 571.5  11/14/2018 - Present        Hepatitis C ICD-10-CM: B19.20  ICD-9-CM: 070.70  11/14/2018 - Present        Obesity ICD-10-CM: E66.9  ICD-9-CM: 278.00  11/14/2018 - Present        COPD (chronic obstructive pulmonary disease) (HCC) (Chronic) ICD-10-CM: J44.9  ICD-9-CM: 496  11/14/2018 - Present        Constipation ICD-10-CM: K59.00  ICD-9-CM: 564.00  11/14/2018 - Present        Back pain ICD-10-CM: M54.9  ICD-9-CM: 724.5  11/14/2018 - Present              Discharge Condition: Good    Discharge To:  Home with Home Health    Consults: [facility] Course:     Rommel Maribel is a 77 y. o. male who is being admitted for acute hypoxic Resp failure & ?Aspiration PNA. Per ER chart review & EMS report - they were called 2y to pt being confused - no other history is obtainable at this time. Pt brought to the ER - was placed on NRBM with some improvement but later needed to be intubated . Chart review shows PMHx - HTN , COPD & Hep C - concern for IVDA - recent admission for discitis. ER eval - Pt is intubated & sedated - PCCM consulted. Will admit to the ICU for further eval. Blood cx NGTD. UDS + benzos, opiates. CT head negative. 12/18/18: Lytes protocol. Extubation per PCCM. Pt alert, follows command promptly. On SBT. TFs on hold. Renal number slightly elevated-monitor. Extubated to 3L nasal cannula-swallow eval, diet per critical care; can d/c fluids if tolerating adequate PO. Fevers continue. Antibiotics (levaquin, zosyn, vancomycin). Blood cultures NGTD 12/16/18. CXR today showing substantial bibasilar density c/w atelectasis/infiltrate/effusion, persistent vascular congestion. 12/19/18: Lytes protocol. Placed on BiPAP for tachypnea. RR in 40s on my exam. ABG per critical care. CXR 12/18 showing vascular congestion. ICS, pulmonary toileting. Net positive 7.3 L. Bumex added. Monitor I/O. BP remains elevated, hydralazine prn; lisinopril dose increased. Keep in ICU until respiratory status improved. Renal fx better. PT. 12/20/18: Transitioned to nasal cannula without any issues. BiPAP throughout the night. Remove bailon. +5L since admission, diuresing well with bumex (-1.3 L past 24 hrs). OOB to chair, ICS, pulmonary toileting. PT/OT. The patient will be discharged home with personal care services. The patient should follow-up with respective consultants as listed below. Patient to arrange.     Inpatient treatment:     Acute Hypoxic Resp failure  - intubated-extubated 12/18/18 -3L NC   -ABGs/CXR/vent management per PCCM  -SBT this am, alert, following commands-defer extubation -extubated 12/18/18 to 3L nasal cannula   -ICS, pulmonary toileting  -placed on BiPAP for tachypnea ; transitioned to nasal cannula without any issues      Aspiration PNA   - broad spectrum Abx blood cultures NGTD (on vancomycin/zosyn)   -ICS, pulmonary toileting when ext   -BiPAP for tachypnea   -no leukocytosis, afebrile      H/o Drug use with recent admission for thoracic discitis   -monitor withdrawal  -UDS + benzos/opiates      H/o HTN   -monitor BP  -increase lisinopril dose 40 mg -better      H/o COPD   -currently on BiPAP  -monitor sats (sat goal > 90%)   -breathing treatments when able   -per pt has home O2       H/o Hep C   -monitor     DVT Px   - heparin    Bowel-colace  GI-protonix     Physical Exam:    General appearance: alert   Lungs: cta, diminished b/l   Heart: regular rate and rhythm, S1, S2 normal   Abdomen: soft, non tender, non distended. Normoactive bowel sounds. Extremities: extremities normal, atraumatic, no cyanosis or edema  Skin: Skin color, texture, turgor normal.   Neurologic: no deficits noted     Significant Diagnostic Studies:      AP portable chest, 12/18/2018 at 0831 hours:     Comparison 12/17/2018.     Endotracheal tube is just below the clavicles approximately 4.5 cm above the  reggie in reasonable position. Bibasilar densities consistent with a combination  of lower lobe atelectasis/infiltrate and effusion. Vascular congestion persists  unchanged. The heart is stable but in part obscured. Right internal jugular  central line is at the cavoatrial junction or upper right atrium.     IMPRESSION  IMPRESSION:  Little change from previous. Substantial bibasilar density consistent with  atelectasis/infiltrate/effusion.  Persistent vascular congestion  EXAM: CT HEAD W/O CONTRAST     INDICATION: Confusion/delirium, altered level of consciousness, unresponsive     COMPARISON: CT head 11/15/2018     TECHNIQUE: Axial CT imaging of the head was performed without intravenous  contrast.  Additional coronal and sagittal reconstructions were performed. One  or more dose reduction techniques were used on this CT: automated exposure  control, adjustment of the mAs and/or kVp according to patient's size, and  iterative reconstruction techniques. The specific techniques utilized on this CT  exam have been documented in the patient's electronic medical record.  _______________     FINDINGS:     Motion artifact is present which limits evaluation.     VENTRICLES/EXTRA-AXIAL SPACES: The ventricles and sulci are normal in their size  and configuration.     BRAIN PARENCHYMA: There is no evidence of acute intracranial hemorrhage, mass  effect, midline shift, or herniation. No definite CT evidence of acute cortical  infarct is seen. The gray-white matter differentiation is within normal limits.     ORBITS: The bilateral lenses are absent, likely due to prior cataract surgery.     PARANASAL SINUSES/MASTOIDS: Bubbly air-fluid levels are present in the bilateral  sphenoid sinuses. Additional mucoperiosteal thickening is seen in the ethmoidal  air cells. Visualized mastoid air cells are clear.     OSSEOUS STRUCTURES: No fracture is seen.     OTHER: Endotracheal tube is present.       _______________     IMPRESSION  IMPRESSION:     1. No acute intracranial hemorrhage, mass effect, midline shift, or herniation. No definite CT evidence of acute cortical infarct is seen.  Please note that  noncontrast head CT may be normal in early acute infarct. 2. Bilateral sphenoid sinus bubbly air-fluid levels potentially representing  acute sinusitis or related to secretions given intubation. Clinical correlation  is recommended.   AP portable chest, 12/18/2018 at 0831 hours:     Comparison 12/17/2018.     Endotracheal tube is just below the clavicles approximately 4.5 cm above the  reggie in reasonable position. Bibasilar densities consistent with a combination  of lower lobe atelectasis/infiltrate and effusion.  Vascular congestion persists  unchanged. The heart is stable but in part obscured. Right internal jugular  central line is at the cavoatrial junction or upper right atrium.     IMPRESSION  IMPRESSION:  Little change from previous. Substantial bibasilar density consistent with  atelectasis/infiltrate/effusion. Persistent vascular congestion.     EXAM: Chest, portable AP supine, one view     INDICATION: Arrived to emergency department unresponsive. Endotracheal tube  placement.     COMPARISON: 11/22/2018     _______________     FINDINGS:     ETT is 3.2 cm above the reggie, right jugular central venous catheter tip  projects at the right atrium.      Cardiac silhouette is within normal range in size. Calcified plaque is present  at the aortic arch.      No pneumothorax. Pulmonary vascular redistribution has developed along with  small to moderate right and small left pleural effusion. Parenchymal band at the  right lung base is compatible with atelectasis. Retrocardiac region is somewhat  dense. No pneumothorax appreciated.     Degenerative changes noted at both shoulders. _______________     IMPRESSION  IMPRESSION:        1. ETT in satisfactory position. Right jugular central venous catheter within  the right atrium. 2. CHF with small to moderate right and small left pleural effusions, linear  atelectasis right lung base and retrocardiac atelectasis and/or infiltrate.         Normal cavity size and systolic function (ejection fraction normal). Mild concentric hypertrophy observed. The estimated ejection fraction is 61 - 65%. Visually measured ejection fraction. No regional wall motion abnormality noted. There is mild (grade 1) left ventricular diastolic dysfunction. Left Atrium The cavity size is mildly dilated. Right Ventricle Normal cavity size and global systolic function. Right Atrium The cavity size is mildly dilated. Aortic Valve No stenosis and no regurgitation.  Mild aortic valve sclerosis with no evidence of reduced excursion. Mitral Valve No stenosis. Mitral valve non-specific thickening. Trace regurgitation. Tricuspid Valve No stenosis. Non-specific thickening. Tricuspid regurgitation is inadequate for estimation of right ventricular systolic pressure. Pulmonic Valve Not well visualized, normal Doppler findings. Aorta Normal aortic root. IVC/Hepatic Veins Inferior vena cava not well visualized. Pericardium No evidence of pericardial effusion. TTE procedure Findings      TTE Procedure Information Image quality: technically difficult. The view(s) performed were parasternal, apical, subcostal and suprasternal. Unable to use Definity due to contraindication of respiratory distress. Technically difficult study, color flow Doppler was performed and pulse wave and/or continuous wave Doppler was performed. Discharge Medications:          Current Discharge Medication List      CONTINUE these medications which have NOT CHANGED    Details   HYDROcodone-acetaminophen (XODOL) 7.5-300 mg tablet Take 2 Tabs by mouth four (4) times daily as needed. gabapentin (NEURONTIN) 400 mg capsule Take 400 mg by mouth two (2) times a day. Indications: NEUROPATHIC PAIN      loratadine (CLARITIN) 10 mg tablet Take 10 mg by mouth daily as needed for Allergies. ipratropium-albuterol (COMBIVENT RESPIMAT)  mcg/actuation inhaler Take 1 Puff by inhalation every twelve (12) hours. Indications: Chronic Obstructive Pulmonary Disease with Bronchospasms      lisinopril (PRINIVIL, ZESTRIL) 20 mg tablet Take  by mouth daily. simvastatin (ZOCOR) 10 mg tablet Take  by mouth nightly. Activity: Activity as tolerated and PT/OT Eval and Treat    Diet: Dysphagia diet-pureed and Nectar thick liquids    Wound Care: None needed    Follow-up:     The patient should follow-up outpatient with PCP in 1 week post discharge in regards to recent hospitalization. Patient to arrange.      Discharge time > 35 mins   Edilberto Walls Grzegorz Treviño NP  12/21/2018, 7:50 AM

## 2018-12-21 NOTE — PROGRESS NOTES
Problem: Self Care Deficits Care Plan (Adult)  Goal: *Acute Goals and Plan of Care (Insert Text)  Occupational Therapy Goals  Initiated 12/20/2018 within 7 day(s). 1.  Patient will perform grooming tasks while standing with SBA with < 2 rest breaks. 2.  Patient will perform lower body dressing with supervision/set-up and minimal verbal cues. 3.  Patient will perform functional task in standing for 8 minutes with supervision for balance and verbal cues for activity pacing in prep for ADLs. 4.  Patient will perform toilet transfers with modified independence. 5.  Patient will perform all aspects of toileting with modified independence. 6.  Patient will participate in upper extremity therapeutic exercise/activities for 8 minutes with supervision to maintain BUE strength for functional transfers and ADLs. 7.  Patient will utilize energy conservation techniques during functional activities with minimal verbal cues. Outcome: Progressing Towards Goal  Occupational Therapy TREATMENT    Patient: Mady Llanos (67 y.o. male)  Date: 12/21/2018  Diagnosis: Altered mental status  Acute respiratory failure with hypoxia (HCC) Acute respiratory failure with hypoxia (HCC)      Precautions: Fall, Skin, Seizure  Chart, occupational therapy assessment, plan of care, and goals were reviewed. PLOF: Independent    ASSESSMENT:  Pt seen for am ADL retraining. Pt requires max verbal/tactile cues for safety w/RW and functional mobility/transfer to bathroom. Pt does not tolerates standing to perform ADL grooming tasks and demonstrates energy conservation techniques seated on toilet performing ADLs. Pt requires increase time and requires assist for thoroughness w/posterior polly-care. Pt demonstrates bending forward method w/LB bathing and would benefit from adaptive equipment training, ie long handled sponge. Pt left in chair. Anxious for d/c home.    EDUCATION Pt educated on energy conservation techniques w/ADLs and safety w/RW and functional mobility/transfers. Progression toward goals:  [x]          Improving appropriately and progressing toward goals  []          Improving slowly and progressing toward goals  []          Not making progress toward goals and plan of care will be adjusted     PLAN:  Patient continues to benefit from skilled intervention to address the above impairments. Continue treatment per established plan of care. Discharge Recommendations:  Home Health  Further Equipment Recommendations for Discharge:  Shower chair     SUBJECTIVE:   Patient stated I feel like two and a half men.  s/p ADL    OBJECTIVE DATA SUMMARY:       Cognitive/Behavioral Status:  Neurologic State: Alert  Orientation Level: Oriented X4  Cognition: Follows commands  Safety/Judgement: Fall prevention  Functional Mobility and Transfers for ADLs:   Bed Mobility:  Supine to Sit: Stand-by assistance   Transfers:  Bed to Chair: Stand-by assistance(w/RW)   Toilet Transfer : Contact guard assistance(w/grab bar)   Bathroom Mobility: Stand-by assistance(w/RW)   Balance:  Sitting: Intact  Standing: Intact; With support  ADL Intervention:  Grooming  Washing Face: Supervision/set-up  Washing Hands: Supervision/set-up  Brushing Teeth: Supervision/set-up    Upper Body Bathing  Bathing Assistance: Stand-by assistance    Lower Body Bathing  Bathing Assistance: Minimum assistance  Perineal  : Minimum assistance  Position Performed: Seated on toilet;Standing  Lower Body : Minimum assistance  Position Performed: Seated in chair    Upper [address]: Supervision/ set-up    Toileting  Toileting Assistance: Minimum assistance  Bowel Hygiene: Minimum assistance  Clothing Management: Contact guard assistance    Pain:  Pre Treatment:0  Post Treatment:0    Activity Tolerance:    Fair    Please refer to the flowsheet for vital signs taken during this treatment.   After treatment:   [x]  Patient left in no apparent distress sitting up in chair  [] Patient left in no apparent distress in bed  [x]  Call bell left within reach  [x]  Nursing notified  []  Caregiver present  []  Bed alarm activated    ALEJANDRA Wang  Time Calculation: 24 mins

## 2018-12-21 NOTE — ROUTINE PROCESS
1345 Received verbal report from [address] [place] [address]. Will continue to monitor. 1645 Pt arrived to floor. Pt stable. Will continue to monitor. 1820 Pt resting in bed with no complaint of pain and no change to condition. Bedside and Verbal shift change report given to Amber BARNES (oncoming nurse) by Sangita Bettencourt (offgoing nurse).  Report included the following information SBAR, Kardex, Intake/Output, MAR, Recent Results and Cardiac Rhythm SR.

## 2018-12-21 NOTE — PROGRESS NOTES
Offered the pt FOC for home care , he chose [facility]. FOC from placed in the unit chart; copy given to the pt. Referral sent to intake via [facility] and called to intake rep, [facility]. Pt verified the contact information on the face sheet is correct. Care Management Interventions  PCP Verified by CM: Yes  Palliative Care Criteria Met (RRAT>21 & CHF Dx)?: No  Mode of Transport at Discharge:  Other (see comment)(unclear at this time)  Transition of Care Consult (CM Consult): [address]: Yes  MyChart Signup: No  Discharge Durable Medical Equipment: No  Health Maintenance Reviewed: Yes  Physical Therapy Consult: No  Occupational Therapy Consult: No  Speech Therapy Consult: No  Current Support Network: Relative's Home  Confirm Follow Up Transport: Other (see comment)(uncleaqr at this time)  Plan discussed with Pt/Family/Caregiver: Yes  Freedom of Choice Offered: Yes  [address] Provided?: No  Discharge Location  Discharge Placement: Home with home health

## 2018-12-21 NOTE — ROUTINE PROCESS
0740  Bedside and Verbal shift change report given to Marita Cleaning (oncoming nurse) by Bairon Varghese (offgoing nurse). Report included the following information SBAR, Kardex, Intake/Output and MAR.

## 2018-12-23 ENCOUNTER — HOME CARE VISIT (OUTPATIENT)
Dept: HOME HEALTH SERVICES | Facility: HOME HEALTH | Age: 68
End: 2018-12-23

## 2018-12-23 ENCOUNTER — HOME CARE VISIT (OUTPATIENT)
Dept: SCHEDULING | Facility: HOME HEALTH | Age: 68
End: 2018-12-23

## 2018-12-23 ENCOUNTER — APPOINTMENT (OUTPATIENT)
Dept: CT IMAGING | Age: 68
DRG: 133 | End: 2018-12-23
Attending: EMERGENCY MEDICINE
Payer: MEDICAID

## 2018-12-23 ENCOUNTER — HOSPITAL ENCOUNTER (INPATIENT)
Age: 68
LOS: 7 days | Discharge: HOME HEALTH | DRG: 133 | End: 2018-12-30
Attending: EMERGENCY MEDICINE | Admitting: HOSPITALIST
Payer: MEDICAID

## 2018-12-23 ENCOUNTER — APPOINTMENT (OUTPATIENT)
Dept: GENERAL RADIOLOGY | Age: 68
DRG: 133 | End: 2018-12-23
Attending: EMERGENCY MEDICINE
Payer: MEDICAID

## 2018-12-23 DIAGNOSIS — R06.02 SOB (SHORTNESS OF BREATH): ICD-10-CM

## 2018-12-23 DIAGNOSIS — R09.89 JVD (JUGULAR VENOUS DISTENSION): ICD-10-CM

## 2018-12-23 DIAGNOSIS — R09.02 HYPOXIA: Primary | ICD-10-CM

## 2018-12-23 PROBLEM — J96.21 ACUTE ON CHRONIC RESPIRATORY FAILURE WITH HYPOXIA (HCC): Status: ACTIVE | Noted: 2018-12-23

## 2018-12-23 LAB
ANION GAP SERPL CALC-SCNC: 2 MMOL/L (ref 3–18)
APPEARANCE UR: CLEAR
BACTERIA URNS QL MICRO: NEGATIVE /HPF
BASOPHILS # BLD: 0 K/UL (ref 0–0.1)
BASOPHILS NFR BLD: 0 % (ref 0–2)
BILIRUB UR QL: NEGATIVE
BNP SERPL-MCNC: 838 PG/ML (ref 0–900)
BUN SERPL-MCNC: 14 MG/DL (ref 7–18)
BUN/CREAT SERPL: 16 (ref 12–20)
CALCIUM SERPL-MCNC: 8.1 MG/DL (ref 8.5–10.1)
CHLORIDE SERPL-SCNC: 103 MMOL/L (ref 100–108)
CO2 SERPL-SCNC: 38 MMOL/L (ref 21–32)
COLOR UR: YELLOW
CREAT SERPL-MCNC: 0.89 MG/DL (ref 0.6–1.3)
D DIMER PPP FEU-MCNC: 3.37 UG/ML(FEU)
DIFFERENTIAL METHOD BLD: ABNORMAL
EOSINOPHIL # BLD: 0.1 K/UL (ref 0–0.4)
EOSINOPHIL NFR BLD: 1 % (ref 0–5)
EPITH CASTS URNS QL MICRO: NORMAL /LPF (ref 0–5)
ERYTHROCYTE [DISTWIDTH] IN BLOOD BY AUTOMATED COUNT: 17.1 % (ref 11.6–14.5)
GLUCOSE SERPL-MCNC: 99 MG/DL (ref 74–99)
GLUCOSE UR STRIP.AUTO-MCNC: NEGATIVE MG/DL
HCT VFR BLD AUTO: 38.2 % (ref 36–48)
HGB BLD-MCNC: 12 G/DL (ref 13–16)
HGB UR QL STRIP: ABNORMAL
KETONES UR QL STRIP.AUTO: NEGATIVE MG/DL
LEUKOCYTE ESTERASE UR QL STRIP.AUTO: NEGATIVE
LYMPHOCYTES # BLD: 1.8 K/UL (ref 0.9–3.6)
LYMPHOCYTES NFR BLD: 34 % (ref 21–52)
MCH RBC QN AUTO: 27.8 PG (ref 24–34)
MCHC RBC AUTO-ENTMCNC: 31.4 G/DL (ref 31–37)
MCV RBC AUTO: 88.6 FL (ref 74–97)
MONOCYTES # BLD: 0.3 K/UL (ref 0.05–1.2)
MONOCYTES NFR BLD: 5 % (ref 3–10)
NEUTS SEG # BLD: 3.2 K/UL (ref 1.8–8)
NEUTS SEG NFR BLD: 60 % (ref 40–73)
NITRITE UR QL STRIP.AUTO: NEGATIVE
PH UR STRIP: 6.5 [PH] (ref 5–8)
PLATELET # BLD AUTO: 112 K/UL (ref 135–420)
PMV BLD AUTO: 9.7 FL (ref 9.2–11.8)
POTASSIUM SERPL-SCNC: 3.6 MMOL/L (ref 3.5–5.5)
PROT UR STRIP-MCNC: NEGATIVE MG/DL
RBC # BLD AUTO: 4.31 M/UL (ref 4.7–5.5)
RBC #/AREA URNS HPF: NORMAL /HPF (ref 0–5)
SODIUM SERPL-SCNC: 143 MMOL/L (ref 136–145)
SP GR UR REFRACTOMETRY: 1.01 (ref 1–1.03)
TROPONIN I SERPL-MCNC: 0.02 NG/ML (ref 0–0.04)
UROBILINOGEN UR QL STRIP.AUTO: 0.2 EU/DL (ref 0.2–1)
WBC # BLD AUTO: 5.3 K/UL (ref 4.6–13.2)
WBC URNS QL MICRO: 0 /HPF (ref 0–4)

## 2018-12-23 PROCEDURE — 77030029684 HC NEB SM VOL KT MONA -A

## 2018-12-23 PROCEDURE — 74011250636 HC RX REV CODE- 250/636: Performed by: EMERGENCY MEDICINE

## 2018-12-23 PROCEDURE — 74011250637 HC RX REV CODE- 250/637: Performed by: HOSPITALIST

## 2018-12-23 PROCEDURE — 93005 ELECTROCARDIOGRAM TRACING: CPT

## 2018-12-23 PROCEDURE — 74011000258 HC RX REV CODE- 258: Performed by: EMERGENCY MEDICINE

## 2018-12-23 PROCEDURE — 81001 URINALYSIS AUTO W/SCOPE: CPT

## 2018-12-23 PROCEDURE — 74011636320 HC RX REV CODE- 636/320: Performed by: EMERGENCY MEDICINE

## 2018-12-23 PROCEDURE — 77010033678 HC OXYGEN DAILY

## 2018-12-23 PROCEDURE — 71045 X-RAY EXAM CHEST 1 VIEW: CPT

## 2018-12-23 PROCEDURE — 71275 CT ANGIOGRAPHY CHEST: CPT

## 2018-12-23 PROCEDURE — 77030021352 HC CBL LD SYS DISP COVD -B

## 2018-12-23 PROCEDURE — 84484 ASSAY OF TROPONIN QUANT: CPT

## 2018-12-23 PROCEDURE — 74011250636 HC RX REV CODE- 250/636: Performed by: HOSPITALIST

## 2018-12-23 PROCEDURE — 65660000000 HC RM CCU STEPDOWN

## 2018-12-23 PROCEDURE — 74011000250 HC RX REV CODE- 250: Performed by: HOSPITALIST

## 2018-12-23 PROCEDURE — 80048 BASIC METABOLIC PNL TOTAL CA: CPT

## 2018-12-23 PROCEDURE — 96374 THER/PROPH/DIAG INJ IV PUSH: CPT

## 2018-12-23 PROCEDURE — 99285 EMERGENCY DEPT VISIT HI MDM: CPT

## 2018-12-23 PROCEDURE — 83880 ASSAY OF NATRIURETIC PEPTIDE: CPT

## 2018-12-23 PROCEDURE — 85379 FIBRIN DEGRADATION QUANT: CPT

## 2018-12-23 PROCEDURE — 85025 COMPLETE CBC W/AUTO DIFF WBC: CPT

## 2018-12-23 PROCEDURE — 94761 N-INVAS EAR/PLS OXIMETRY MLT: CPT

## 2018-12-23 RX ORDER — GABAPENTIN 400 MG/1
400 CAPSULE ORAL 3 TIMES DAILY
Status: DISCONTINUED | OUTPATIENT
Start: 2018-12-23 | End: 2018-12-30 | Stop reason: HOSPADM

## 2018-12-23 RX ORDER — SIMVASTATIN 10 MG/1
10 TABLET, FILM COATED ORAL
Status: DISCONTINUED | OUTPATIENT
Start: 2018-12-23 | End: 2018-12-30 | Stop reason: HOSPADM

## 2018-12-23 RX ORDER — FUROSEMIDE 10 MG/ML
40 INJECTION INTRAMUSCULAR; INTRAVENOUS
Status: COMPLETED | OUTPATIENT
Start: 2018-12-23 | End: 2018-12-23

## 2018-12-23 RX ORDER — POTASSIUM CHLORIDE 1.5 G/1.77G
20 POWDER, FOR SOLUTION ORAL
Status: DISCONTINUED | OUTPATIENT
Start: 2018-12-24 | End: 2018-12-30 | Stop reason: HOSPADM

## 2018-12-23 RX ORDER — METOPROLOL TARTRATE 50 MG/1
100 TABLET ORAL 2 TIMES DAILY
Status: DISCONTINUED | OUTPATIENT
Start: 2018-12-23 | End: 2018-12-25

## 2018-12-23 RX ORDER — LORATADINE 10 MG/1
10 TABLET ORAL
Status: DISCONTINUED | OUTPATIENT
Start: 2018-12-23 | End: 2018-12-30 | Stop reason: HOSPADM

## 2018-12-23 RX ORDER — ONDANSETRON 4 MG/1
4 TABLET, ORALLY DISINTEGRATING ORAL
Status: DISCONTINUED | OUTPATIENT
Start: 2018-12-23 | End: 2018-12-30 | Stop reason: HOSPADM

## 2018-12-23 RX ORDER — NALOXONE HYDROCHLORIDE 0.4 MG/ML
0.4 INJECTION, SOLUTION INTRAMUSCULAR; INTRAVENOUS; SUBCUTANEOUS AS NEEDED
Status: DISCONTINUED | OUTPATIENT
Start: 2018-12-23 | End: 2018-12-30 | Stop reason: HOSPADM

## 2018-12-23 RX ORDER — SODIUM CHLORIDE 9 MG/ML
49 INJECTION, SOLUTION INTRAVENOUS ONCE
Status: COMPLETED | OUTPATIENT
Start: 2018-12-23 | End: 2018-12-23

## 2018-12-23 RX ORDER — ACETAMINOPHEN 325 MG/1
650 TABLET ORAL
Status: DISCONTINUED | OUTPATIENT
Start: 2018-12-23 | End: 2018-12-30 | Stop reason: HOSPADM

## 2018-12-23 RX ORDER — BUMETANIDE 0.25 MG/ML
1 INJECTION INTRAMUSCULAR; INTRAVENOUS 2 TIMES DAILY
Status: DISCONTINUED | OUTPATIENT
Start: 2018-12-23 | End: 2018-12-24

## 2018-12-23 RX ORDER — LISINOPRIL 40 MG/1
40 TABLET ORAL DAILY
Status: DISCONTINUED | OUTPATIENT
Start: 2018-12-24 | End: 2018-12-25

## 2018-12-23 RX ORDER — HEPARIN SODIUM 5000 [USP'U]/ML
5000 INJECTION, SOLUTION INTRAVENOUS; SUBCUTANEOUS EVERY 8 HOURS
Status: DISCONTINUED | OUTPATIENT
Start: 2018-12-23 | End: 2018-12-30 | Stop reason: HOSPADM

## 2018-12-23 RX ORDER — OXYCODONE AND ACETAMINOPHEN 5; 325 MG/1; MG/1
1 TABLET ORAL
Status: DISCONTINUED | OUTPATIENT
Start: 2018-12-23 | End: 2018-12-30 | Stop reason: HOSPADM

## 2018-12-23 RX ORDER — IPRATROPIUM BROMIDE AND ALBUTEROL SULFATE 2.5; .5 MG/3ML; MG/3ML
3 SOLUTION RESPIRATORY (INHALATION)
Status: DISCONTINUED | OUTPATIENT
Start: 2018-12-23 | End: 2018-12-30 | Stop reason: HOSPADM

## 2018-12-23 RX ORDER — AMLODIPINE BESYLATE 10 MG/1
10 TABLET ORAL DAILY
Status: DISCONTINUED | OUTPATIENT
Start: 2018-12-24 | End: 2018-12-25

## 2018-12-23 RX ADMIN — FUROSEMIDE 40 MG: 10 INJECTION, SOLUTION INTRAMUSCULAR; INTRAVENOUS at 14:42

## 2018-12-23 RX ADMIN — BUMETANIDE 1 MG: 0.25 INJECTION INTRAMUSCULAR; INTRAVENOUS at 21:34

## 2018-12-23 RX ADMIN — HEPARIN SODIUM 5000 UNITS: 5000 INJECTION INTRAVENOUS; SUBCUTANEOUS at 21:40

## 2018-12-23 RX ADMIN — OXYCODONE AND ACETAMINOPHEN 1 TABLET: 5; 325 TABLET ORAL at 21:33

## 2018-12-23 RX ADMIN — METOPROLOL TARTRATE 100 MG: 50 TABLET ORAL at 21:33

## 2018-12-23 RX ADMIN — SODIUM CHLORIDE 49 ML: 900 INJECTION, SOLUTION INTRAVENOUS at 16:36

## 2018-12-23 RX ADMIN — SIMVASTATIN 10 MG: 20 TABLET, FILM COATED ORAL at 21:33

## 2018-12-23 RX ADMIN — IPRATROPIUM BROMIDE AND ALBUTEROL SULFATE 3 ML: .5; 3 SOLUTION RESPIRATORY (INHALATION) at 21:33

## 2018-12-23 RX ADMIN — IOPAMIDOL 71 ML: 755 INJECTION, SOLUTION INTRAVENOUS at 16:35

## 2018-12-23 RX ADMIN — GABAPENTIN 400 MG: 400 CAPSULE ORAL at 21:33

## 2018-12-23 NOTE — H&P
Medicine History and Physical    Patient: Freddi Snellen   Age:  77 y.o. Assessment   Principal Problem:    Acute on chronic respiratory failure with hypoxia ([address] (12/23/2018)    Active Problems:    Bilateral pleural effusion (11/14/2018)      Hypertension (11/14/2018)      COPD (chronic obstructive pulmonary disease) (HCC) (11/14/2018)          Plan     1)  Acute on chronic resp failure 2/2 hypoxia   - Strict I/o   - echo   - Bumex BID IV   - tele monitor   - 2-4 L 02 to maintain 88-92 sats   - duonebs   - final CTA pending , if effusions large consider thora    2)  B/l pleural effusion   - IV bumex   - potassium    3) COPD   - nebs    4)  HTN   - home medications    DISPO  -Pt to be admitted  at this time for reasons addressed above, continued hospitalization for ongoing assessment and treatment indicated     Anticipated Date of Discharge: 2 days  Anticipated Disposition (home, SNF) : home    Chief Complaint:   Chief Complaint   Patient presents with    Shortness of Breath         HPI:   Freddi Snellen is a 77y.o. year old male who presents with  Hypoxia. Patient just discharged for Acute resp failure, PNA and volume overload. He was intubated at that time and discharged yesterday. He arrives today because his nurse noted he was hypoxic in 80's on usual 2 L , although in no distress he is needing 3-4 L now for adequate oxygenation. Xray with volume overload. CTA no pe. Effusion present on prelim. Review of Systems - positive responses in bold type   Constitutional: Negative for fever, chills, diaphoresis and unexpected weight change. HENT: Negative for ear pain, congestion, sore throat, rhinorrhea, drooling, trouble swallowing, neck pain and tinnitus. Eyes: Negative for photophobia, pain, redness and visual disturbance. Respiratory: negative for shortness of breath, cough, choking, chest tightness, wheezing or stridor. Cardiovascular: Negative for chest pain, palpitations and leg swelling. Gastrointestinal: Negative for nausea, vomiting, abdominal pain, diarrhea, constipation, blood in stool, abdominal distention and anal bleeding. Genitourinary: Negative for dysuria, urgency, frequency, hematuria, flank pain and difficulty urinating. Musculoskeletal: Negative for back pain and arthralgias. Skin: Negative for color change, rash and wound. Neurological: Negative for dizziness, seizures, syncope, speech difficulty, light-headedness or headaches. Hematological: Does not bruise/bleed easily. Psychiatric/Behavioral: Negative for suicidal ideas, hallucinations, behavioral problems, self-injury or agitation       Past Medical History:  Past Medical History:   Diagnosis Date    Arthritis     COPD     Hypertension        Past Surgical History:  Past Surgical History:   Procedure Laterality Date    HX REFRACTIVE SURGERY         Family History:  History reviewed. No pertinent family history. Social History:  Social History     Socioeconomic History    Marital status: SINGLE     Spouse name: Not on file    Number of children: Not on file    Years of education: Not on file    Highest education level: Not on file   Tobacco Use    Smoking status: Former Smoker     Last attempt to quit: 1984     Years since quittin.5    Smokeless tobacco: Former User   Substance and Sexual Activity    Alcohol use: Yes     Comment: sometimes    Drug use: Yes     Types: Heroin, Marijuana, Opiates     Comment: Uses opiates for pain       Home Medications:  Prior to Admission medications    Medication Sig Start Date End Date Taking? Authorizing Provider   metoprolol tartrate (LOPRESSOR) 100 mg IR tablet Take 100 mg by mouth two (2) times a day. Provider, Historical   amLODIPine (NORVASC) 10 mg tablet Take 10 mg by mouth daily. Provider, Historical   oxyCODONE-acetaminophen (PERCOCET) 5-325 mg per tablet Take 1 Tab by mouth every six (6) hours as needed for Pain. Max Daily Amount: 4 Tabs. 12/21/18   Elmer Sanford MD   gabapentin (NEURONTIN) 400 mg capsule Take 400 mg by mouth three (3) times daily. Kim Sanchez MD   loratadine (CLARITIN) 10 mg tablet Take 10 mg by mouth daily as needed for Allergies. Kim Sanchez MD   ipratropium-albuterol (COMBIVENT RESPIMAT)  mcg/actuation inhaler Take 1 Puff by inhalation every twelve (12) hours. Indications: Chronic Obstructive Pulmonary Disease with Bronchospasms    Kim Sanchez MD   lisinopril (PRINIVIL, ZESTRIL) 20 mg tablet Take  by mouth daily. Kim Sanchez MD   simvastatin (ZOCOR) 10 mg tablet Take  by mouth nightly. Kim Sanchez MD       Allergies: Allergies   Allergen Reactions    Shellfish Derived Hives           Physical Exam:     Visit Vitals  BP (!) 194/103   Pulse (!) 102   Temp 98.9 °F (37.2 °C)   Resp (!) 36   Ht 6' (1.829 m)   Wt 107.1 kg (236 lb 1.6 oz)   SpO2 93%   BMI 32.02 kg/m²       Physical Exam:  General appearance: alert, cooperative, no distress, appears stated age  Head: Normocephalic, without obvious abnormality, atraumatic  Neck: supple, trachea midline  Lungs: b/l decreased breath sounds at bases  Heart: regular rate and rhythm, S1, S2 normal, no murmur, click, rub or gallop  Abdomen: soft, non-tender. Bowel sounds normal. No masses,  no organomegaly  Extremities: extremities normal, atraumatic, no cyanosis or edema  Skin: Skin color, texture, turgor normal. No rashes or lesions  Neurologic: Grossly normal  Intake and Output:  Current Shift:  12/23 0701 - 12/23 1900  In: -   Out: 475 [Urine:475]  Last three shifts:  No intake/output data recorded.     Lab/Data Reviewed:  CMP:   Lab Results   Component Value Date/Time     12/23/2018 01:51 PM    K 3.6 12/23/2018 01:51 PM     12/23/2018 01:51 PM    CO2 38 (H) 12/23/2018 01:51 PM    AGAP 2 (L) 12/23/2018 01:51 PM    GLU 99 12/23/2018 01:51 PM    BUN 14 12/23/2018 01:51 PM    CREA 0.89 12/23/2018 01:51 PM    GFRAA >60 12/23/2018 01:51 PM    GFRNA >60 12/23/2018 01:51 PM    CA 8.1 (L) 12/23/2018 01:51 PM     CBC:   Lab Results   Component Value Date/Time    WBC 5.3 12/23/2018 01:51 PM    HGB 12.0 (L) 12/23/2018 01:51 PM    HCT 38.2 12/23/2018 01:51 PM     (L) 12/23/2018 01:51 PM     All Cardiac Markers in the last 24 hours:   Lab Results   Component Value Date/Time    TROIQ 0.02 12/23/2018 01:51 PM       Pat Brito MD    December 23, 2018

## 2018-12-23 NOTE — ED NOTES
TRANSFER - ED to INPATIENT REPORT:    SBAR report made available to receiving floor on this patient being transferred to [address] [place] [address] ([facility])  for routine progression of care       Admitting diagnosis Acute on chronic respiratory failure with hypoxia ([address]    Information from the following report(s) ED Summary, Intake/Output and MAR was made available to receiving floor. Lines:   Peripheral IV 12/23/18 Right External jugular (Active)   Site Assessment Clean, dry, & intact 12/23/2018  1:51 PM   Phlebitis Assessment 0 12/23/2018  1:51 PM   Infiltration Assessment 0 12/23/2018  1:51 PM   Dressing Status Clean, dry, & intact 12/23/2018  1:51 PM   Dressing Type Transparent 12/23/2018  1:51 PM   Hub Color/Line Status Patent; Flushed 12/23/2018  1:51 PM       Peripheral IV 12/23/18 Left Antecubital (Active)   Site Assessment Clean, dry, & intact 12/23/2018  3:55 PM   Phlebitis Assessment 0 12/23/2018  3:55 PM   Infiltration Assessment 0 12/23/2018  3:55 PM   Dressing Status Clean, dry, & intact 12/23/2018  3:55 PM   Dressing Type Transparent 12/23/2018  3:55 PM   Hub Color/Line Status Flushed;Patent 12/23/2018  3:55 PM        Medication list unable to confirm    Opportunity for questions and clarification was provided.       Patient is oriented to time, place, person and situation   Patient is  continent and ambulatory with assist, walker     Valuables transported with patient     Patient transported with:   Monitor  O2 @ 3 liters  Registered Nurse  Tech      Vitals w/ MEWS Score (last day)     Date/Time MEWS Score Pulse Resp Temp BP Level of Consciousness SpO2    12/23/18 1800    102  (Abnormal)   28    140/84    95 %    12/23/18 1745    100  40  (Abnormal)     125/66    91 %    12/23/18 1730    106  (Abnormal)   36  (Abnormal)     134/76    95 %    12/23/18 1715    101  (Abnormal)   31  (Abnormal)     142/72    92 %    12/23/18 1700    99  20    139/79    98 %    12/23/18 1645    109  (Abnormal)   21   146/86        12/23/18 1600    102  (Abnormal)   36  (Abnormal)     194/103  (Abnormal)     93 %    12/23/18 1545    104  (Abnormal)   42  (Abnormal)     168/88    94 %    12/23/18 1530    104  (Abnormal)   32  (Abnormal)     161/66    95 %    12/23/18 1515    96  38  (Abnormal)     150/79    93 %    12/23/18 1500    94  43  (Abnormal)     162/82    91 %    12/23/18 1445    93  41  (Abnormal)     155/81    93 %    12/23/18 1430    91  40  (Abnormal)     160/74    93 %    12/23/18 1330    91  26    165/76    93 %    12/23/18 1315    92  22    164/122  (Abnormal)     93 %    12/23/18 1300    91  43  (Abnormal)     167/92  (Abnormal)     96 %    12/23/18 1254  3  95  37  (Abnormal)   98.9 °F (37.2 °C)  158/81  Alert  95 %

## 2018-12-23 NOTE — ED PROVIDER NOTES
EMERGENCY DEPARTMENT HISTORY AND PHYSICAL EXAM    12:55 PM      Date: 12/23/2018  Patient Name: Mady Llanos    History of Presenting Illness     Chief Complaint   Patient presents with    Shortness of Breath         History Provided By: Patient    Chief Complaint: SOB  Duration:  Today  Timing:  N/A  Location: N/A  Quality: Home health reports a O2 saturation in the high 80s. Severity: Severe  Modifying Factors: EMS placed the patient on 4L O2 and his saturation improved to 94%  Associated Symptoms: Normal appetie Denies any other associated signs or symptoms. Additional History (Context): Mady Llanos is a 77 y.o. male with history of COPD, and recent plural effusion and pneumonia who presents with severe SOB today as home health states he had an oxygen saturation in the high 80s. Patient wears 2-3L at home. EMS placed the patient on 4L O2 and his saturation improved to 94%. Patient denies loss of appetite. Reports normal urination and BMs. No missed medications. Patient ambulates with a walker. Patient admitted to [facility] on 12/16/18.2    PCP: Martha Jones MD    Current Outpatient Medications   Medication Sig Dispense Refill    metoprolol tartrate (LOPRESSOR) 100 mg IR tablet Take 100 mg by mouth two (2) times a day.  amLODIPine (NORVASC) 10 mg tablet Take 10 mg by mouth daily.  oxyCODONE-acetaminophen (PERCOCET) 5-325 mg per tablet Take 1 Tab by mouth every six (6) hours as needed for Pain. Max Daily Amount: 4 Tabs. 12 Tab 0    gabapentin (NEURONTIN) 400 mg capsule Take 400 mg by mouth three (3) times daily.  loratadine (CLARITIN) 10 mg tablet Take 10 mg by mouth daily as needed for Allergies.  ipratropium-albuterol (COMBIVENT RESPIMAT)  mcg/actuation inhaler Take 1 Puff by inhalation every twelve (12) hours. Indications: Chronic Obstructive Pulmonary Disease with Bronchospasms      lisinopril (PRINIVIL, ZESTRIL) 20 mg tablet Take  by mouth daily.       simvastatin (ZOCOR) 10 mg tablet Take  by mouth nightly. Past History     Past Medical History:  Past Medical History:   Diagnosis Date    Arthritis     COPD     Hypertension        Past Surgical History:  Past Surgical History:   Procedure Laterality Date    HX REFRACTIVE SURGERY         Family History:  History reviewed. No pertinent family history. Social History:  Social History     Tobacco Use    Smoking status: Former Smoker     Last attempt to quit: 1984     Years since quittin.5    Smokeless tobacco: Former User   Substance Use Topics    Alcohol use: Yes     Comment: sometimes    Drug use: Yes     Types: Heroin, Marijuana, Opiates     Comment: Uses opiates for pain       Allergies: Allergies   Allergen Reactions    Shellfish Derived Hives         Review of Systems       Review of Systems   Constitutional: Negative for fatigue and fever. HENT: Negative for congestion. Eyes: Negative for visual disturbance. Respiratory: Positive for shortness of breath. Cardiovascular: Negative for chest pain and leg swelling. Gastrointestinal: Negative for abdominal pain. Endocrine: Negative for polyuria. Genitourinary: Negative for dysuria. Skin: Negative for rash. Neurological: Negative for weakness. Psychiatric/Behavioral: Negative for behavioral problems. All other systems reviewed and are negative. Physical Exam     Visit Vitals  BP (!) 194/103   Pulse (!) 102   Temp 98.9 °F (37.2 °C)   Resp (!) 36   Ht 6' (1.829 m)   Wt 107.1 kg (236 lb 1.6 oz)   SpO2 93%   BMI 32.02 kg/m²         Physical Exam   Constitutional: He is oriented to person, place, and time. He appears well-developed and well-nourished. HENT:   Head: Normocephalic and atraumatic. Nose: Nose normal.   Eyes: Conjunctivae are normal.   Neck: Neck supple. No tracheal deviation present. Cardiovascular: Normal rate, regular rhythm and normal heart sounds. Pulmonary/Chest: Tachypnea noted.  No respiratory distress. Poor air movement in both lungs. Abdominal: Soft. He exhibits no distension. There is no tenderness. Musculoskeletal: Normal range of motion. He exhibits no edema. Lymphadenopathy:     He has no cervical adenopathy. Neurological: He is alert and oriented to person, place, and time. No cranial nerve deficit. Grossly intact   Skin: Skin is warm and dry. Psychiatric: He has a normal mood and affect. Vitals reviewed. Diagnostic Study Results     Labs -  Recent Results (from the past 12 hour(s))   CBC WITH AUTOMATED DIFF    Collection Time: 12/23/18  1:51 PM   Result Value Ref Range    WBC 5.3 4.6 - 13.2 K/uL    RBC 4.31 (L) 4.70 - 5.50 M/uL    HGB 12.0 (L) 13.0 - 16.0 g/dL    HCT 38.2 36.0 - 48.0 %    MCV 88.6 74.0 - 97.0 FL    MCH 27.8 24.0 - 34.0 PG    MCHC 31.4 31.0 - 37.0 g/dL    RDW 17.1 (H) 11.6 - 14.5 %    PLATELET 394 (L) 884 - 420 K/uL    MPV 9.7 9.2 - 11.8 FL    NEUTROPHILS 60 40 - 73 %    LYMPHOCYTES 34 21 - 52 %    MONOCYTES 5 3 - 10 %    EOSINOPHILS 1 0 - 5 %    BASOPHILS 0 0 - 2 %    ABS. NEUTROPHILS 3.2 1.8 - 8.0 K/UL    ABS. LYMPHOCYTES 1.8 0.9 - 3.6 K/UL    ABS. MONOCYTES 0.3 0.05 - 1.2 K/UL    ABS. EOSINOPHILS 0.1 0.0 - 0.4 K/UL    ABS.  BASOPHILS 0.0 0.0 - 0.1 K/UL    DF AUTOMATED     METABOLIC PANEL, BASIC    Collection Time: 12/23/18  1:51 PM   Result Value Ref Range    Sodium 143 136 - 145 mmol/L    Potassium 3.6 3.5 - 5.5 mmol/L    Chloride 103 100 - 108 mmol/L    CO2 38 (H) 21 - 32 mmol/L    Anion gap 2 (L) 3.0 - 18 mmol/L    Glucose 99 74 - 99 mg/dL    BUN 14 7.0 - 18 MG/DL    Creatinine 0.89 0.6 - 1.3 MG/DL    BUN/Creatinine ratio 16 12 - 20      GFR est AA >60 >60 ml/min/1.73m2    GFR est non-AA >60 >60 ml/min/1.73m2    Calcium 8.1 (L) 8.5 - 10.1 MG/DL   NT-PRO BNP    Collection Time: 12/23/18  1:51 PM   Result Value Ref Range    NT pro- 0 - 900 PG/ML   TROPONIN I    Collection Time: 12/23/18  1:51 PM   Result Value Ref Range    Troponin-I, QT 0.02 0.0 - 0.045 NG/ML   D DIMER    Collection Time: 12/23/18  1:51 PM   Result Value Ref Range    D DIMER 3.37 (H) <0.46 ug/ml(FEU)   EKG, 12 LEAD, INITIAL    Collection Time: 12/23/18  2:47 PM   Result Value Ref Range    Ventricular Rate 85 BPM    Atrial Rate 85 BPM    P-R Interval 182 ms    QRS Duration 86 ms    Q-T Interval 386 ms    QTC Calculation (Bezet) 459 ms    Calculated P Axis 50 degrees    Calculated R Axis -26 degrees    Calculated T Axis 20 degrees    Diagnosis       Normal sinus rhythm  Normal ECG  When compared with ECG of 16-DEC-2018 19:51,  T wave inversion less evident in Anterior leads     URINALYSIS W/ RFLX MICROSCOPIC    Collection Time: 12/23/18  3:23 PM   Result Value Ref Range    Color YELLOW      Appearance CLEAR      Specific gravity 1.007 1.005 - 1.030      pH (UA) 6.5 5.0 - 8.0      Protein NEGATIVE  NEG mg/dL    Glucose NEGATIVE  NEG mg/dL    Ketone NEGATIVE  NEG mg/dL    Bilirubin NEGATIVE  NEG      Blood MODERATE (A) NEG      Urobilinogen 0.2 0.2 - 1.0 EU/dL    Nitrites NEGATIVE  NEG      Leukocyte Esterase NEGATIVE  NEG     URINE MICROSCOPIC ONLY    Collection Time: 12/23/18  3:23 PM   Result Value Ref Range    WBC 0 0 - 4 /hpf    RBC TOO NUMEROUS TO COUNT 0 - 5 /hpf    Epithelial cells 1+ 0 - 5 /lpf    Bacteria NEGATIVE  NEG /hpf       Radiologic Studies -   XR CHEST PORT   Final Result   IMPRESSION:         1. Mild pulmonary edema         2. Bilateral pleural effusions and/or atelectasis      CTA CHEST W OR W WO CONT    (Results Pending)         Medical Decision Making   I am the first provider for this patient. I reviewed the vital signs, available nursing notes, past medical history, past surgical history, family history and social history. Vital Signs-Reviewed the patient's vital signs. Pulse Oximetry Analysis -  91% on 3L O2 (Interpretation)    Cardiac Monitor:  Rate: 94  Rhythm:  Normal Sinus Rhythm     EKG: Interpreted by the EP.    Time Interpreted: 13:02   Rate: 96   Rhythm: Normal Sinus Rhythm    Interpretation: Right bundle branch block. Poor baseline. Records Reviewed: Nursing Notes and Old Medical Records (Time of Review: 12:55 PM)    ED Course: Progress Notes, Reevaluation, and Consults:    ED Course as of Dec 23 1658   Sun Dec 23, 2018   1351 Rt EJ placed- 18G- verbal consent- no difficulty  [BT]   1351 Pt with worseing plural arrusion  [BT]   1425 Given repeated plural effusion, will order d dimer  [BT]      ED Course User Index  [BT] Maryann Gardner MD     2:35 PM Consult: I discussed care with Dr. Angel Marino (Hospitalist). It was a standard discussion including patient history, chief complaint, available diagnostic results, and predicted treatment course. Refused at this point until formal echo is performed. 3:29 PM Consult: I discussed care with Laura (Cardiology). It was a standard discussion including patient history, chief complaint, available diagnostic results, and predicted treatment course. Said given no hypotension or tachycardic no emergency or urgent need. This would be abuse of echo tech to bring them in. Feels pt be admitted and echo as part of his work up. Hospitalist may call to review further. 3:53 PM Called back admitting team. Refusing admission and went off call approximately 1 hour ago. It would be up to another doctor at this point. 4:06 PM Upon chart review. Patient has no history of pericardial effusion on prior visit. Given JVD, SOB, and clear O2 requirement. Will need to be admitted for further workup. While I understand he has currently pending imaging, it is in the patient's best interest and safer to be in a monitored setting. Escalated admission requested. Mentioned by cariology for potential transfer, notified cardiology it would be an EMTALA violation. 4:27 PM Consult: I discussed care with Dr. Caren Oneil (Hospitalist).   It was a standard discussion including patient history, chief complaint, available diagnostic results, and predicted treatment course. Accepts admission. Provider Notes (Medical Decision Making): For Hospitalized Patients:    1. Hospitalization Decision Time:  The decision to hospitalize the patient was made by Sharona Caal MD at 2:30 PM on 12/23/2018    Diagnosis     Clinical Impression:   1. Hypoxia    2. SOB (shortness of breath)    3. JVD (jugular venous distension)        Disposition: Admit    Follow-up Information    None             Medication List      ASK your doctor about these medications    amLODIPine 10 mg tablet  Commonly known as:  NORVASC     COMBIVENT RESPIMAT  mcg/actuation inhaler  Generic drug:  ipratropium-albuterol     gabapentin 400 mg capsule  Commonly known as:  NEURONTIN     lisinopril 20 mg tablet  Commonly known as:  PRINIVIL, ZESTRIL     loratadine 10 mg tablet  Commonly known as:  CLARITIN     metoprolol tartrate 100 mg IR tablet  Commonly known as:  LOPRESSOR     oxyCODONE-acetaminophen 5-325 mg per tablet  Commonly known as:  PERCOCET  Take 1 Tab by mouth every six (6) hours as needed for Pain. Max Daily Amount: 4 Tabs. simvastatin 10 mg tablet  Commonly known as:  ZOCOR          _______________________________       [employer] acting as a scribe for and in the presence of Sharona Caal MD      December 23, 2018 at 4:58 PM       Provider Attestation:      I personally performed the services described in the documentation, reviewed the documentation, as recorded by the scribe in my presence, and it accurately and completely records my words and actions.  December 23, 2018 at 4:58 PM - Sharona Caal MD        _______________________________

## 2018-12-23 NOTE — ED NOTES
5:08 PM Patient is feeling much better after diuresis. 5:08 PM  CTA CHEST  Preliminary Read: No evidence of PE and plural effusion. Findings suggestive of pulmonary hypertension. Scribe [address] acting as a scribe for and in the presence of Jose Bonner MD      December 23, 2018 at 5:09 PM       Provider Attestation:      I personally performed the services described in the documentation, reviewed the documentation, as recorded by the scribe in my presence, and it accurately and completely records my words and actions.  December 23, 2018 at 5:09 PM - Jose Bonner MD

## 2018-12-24 ENCOUNTER — APPOINTMENT (OUTPATIENT)
Dept: NON INVASIVE DIAGNOSTICS | Age: 68
DRG: 133 | End: 2018-12-24
Attending: HOSPITALIST
Payer: MEDICAID

## 2018-12-24 PROBLEM — I50.33 DIASTOLIC CHF, ACUTE ON CHRONIC (HCC): Status: ACTIVE | Noted: 2018-12-24

## 2018-12-24 LAB
ANION GAP SERPL CALC-SCNC: 0 MMOL/L (ref 3–18)
ARTERIAL PATENCY WRIST A: YES
ATRIAL RATE: 85 BPM
BASE EXCESS BLD CALC-SCNC: 18 MMOL/L
BASOPHILS # BLD: 0 K/UL (ref 0–0.1)
BASOPHILS NFR BLD: 0 % (ref 0–2)
BDY SITE: ABNORMAL
BUN SERPL-MCNC: 13 MG/DL (ref 7–18)
BUN/CREAT SERPL: 12 (ref 12–20)
CALCIUM SERPL-MCNC: 8.1 MG/DL (ref 8.5–10.1)
CALCULATED P AXIS, ECG09: 50 DEGREES
CALCULATED R AXIS, ECG10: -26 DEGREES
CALCULATED T AXIS, ECG11: 20 DEGREES
CHLORIDE SERPL-SCNC: 100 MMOL/L (ref 100–108)
CO2 SERPL-SCNC: 43 MMOL/L (ref 21–32)
CREAT SERPL-MCNC: 1.11 MG/DL (ref 0.6–1.3)
DIAGNOSIS, 93000: NORMAL
DIFFERENTIAL METHOD BLD: ABNORMAL
EOSINOPHIL # BLD: 0.1 K/UL (ref 0–0.4)
EOSINOPHIL NFR BLD: 2 % (ref 0–5)
ERYTHROCYTE [DISTWIDTH] IN BLOOD BY AUTOMATED COUNT: 17.2 % (ref 11.6–14.5)
GAS FLOW.O2 O2 DELIVERY SYS: ABNORMAL L/MIN
GAS FLOW.O2 SETTING OXYMISER: 3 L/M
GLUCOSE SERPL-MCNC: 103 MG/DL (ref 74–99)
HCO3 BLD-SCNC: 45.2 MMOL/L (ref 22–26)
HCT VFR BLD AUTO: 40.9 % (ref 36–48)
HGB BLD-MCNC: 12.2 G/DL (ref 13–16)
LYMPHOCYTES # BLD: 2.3 K/UL (ref 0.9–3.6)
LYMPHOCYTES NFR BLD: 37 % (ref 21–52)
MCH RBC QN AUTO: 27.7 PG (ref 24–34)
MCHC RBC AUTO-ENTMCNC: 29.8 G/DL (ref 31–37)
MCV RBC AUTO: 92.7 FL (ref 74–97)
MONOCYTES # BLD: 0.6 K/UL (ref 0.05–1.2)
MONOCYTES NFR BLD: 10 % (ref 3–10)
NEUTS SEG # BLD: 3.1 K/UL (ref 1.8–8)
NEUTS SEG NFR BLD: 51 % (ref 40–73)
P-R INTERVAL, ECG05: 182 MS
PCO2 BLD: 99.1 MMHG (ref 35–45)
PH BLD: 7.27 [PH] (ref 7.35–7.45)
PLATELET # BLD AUTO: 109 K/UL (ref 135–420)
PMV BLD AUTO: 9.9 FL (ref 9.2–11.8)
PO2 BLD: 64 MMHG (ref 80–100)
POTASSIUM SERPL-SCNC: 3.8 MMOL/L (ref 3.5–5.5)
Q-T INTERVAL, ECG07: 386 MS
QRS DURATION, ECG06: 86 MS
QTC CALCULATION (BEZET), ECG08: 459 MS
RBC # BLD AUTO: 4.41 M/UL (ref 4.7–5.5)
SAO2 % BLD: 86 % (ref 92–97)
SERVICE CMNT-IMP: ABNORMAL
SODIUM SERPL-SCNC: 143 MMOL/L (ref 136–145)
SPECIMEN TYPE: ABNORMAL
TOTAL RESP. RATE, ITRR: 18
VENTRICULAR RATE, ECG03: 85 BPM
WBC # BLD AUTO: 6.1 K/UL (ref 4.6–13.2)

## 2018-12-24 PROCEDURE — 74011250637 HC RX REV CODE- 250/637: Performed by: HOSPITALIST

## 2018-12-24 PROCEDURE — 36600 WITHDRAWAL OF ARTERIAL BLOOD: CPT

## 2018-12-24 PROCEDURE — 94640 AIRWAY INHALATION TREATMENT: CPT

## 2018-12-24 PROCEDURE — 74011000250 HC RX REV CODE- 250

## 2018-12-24 PROCEDURE — 97166 OT EVAL MOD COMPLEX 45 MIN: CPT

## 2018-12-24 PROCEDURE — 85025 COMPLETE CBC W/AUTO DIFF WBC: CPT

## 2018-12-24 PROCEDURE — 82803 BLOOD GASES ANY COMBINATION: CPT

## 2018-12-24 PROCEDURE — 77030010547 HC BG URIN W/UMETER -A

## 2018-12-24 PROCEDURE — C9113 INJ PANTOPRAZOLE SODIUM, VIA: HCPCS | Performed by: HOSPITALIST

## 2018-12-24 PROCEDURE — 74011000250 HC RX REV CODE- 250: Performed by: HOSPITALIST

## 2018-12-24 PROCEDURE — 93321 DOPPLER ECHO F-UP/LMTD STD: CPT

## 2018-12-24 PROCEDURE — 65660000001 HC RM ICU INTERMED STEPDOWN

## 2018-12-24 PROCEDURE — 97535 SELF CARE MNGMENT TRAINING: CPT

## 2018-12-24 PROCEDURE — 77030037870 HC GLD SHT PREVALON SAGE -B

## 2018-12-24 PROCEDURE — 5A09457 ASSISTANCE WITH RESPIRATORY VENTILATION, 24-96 CONSECUTIVE HOURS, CONTINUOUS POSITIVE AIRWAY PRESSURE: ICD-10-PCS | Performed by: HOSPITALIST

## 2018-12-24 PROCEDURE — 97162 PT EVAL MOD COMPLEX 30 MIN: CPT

## 2018-12-24 PROCEDURE — 77030037878 HC DRSG MEPILEX >48IN BORD MOLN -B

## 2018-12-24 PROCEDURE — 77010033678 HC OXYGEN DAILY

## 2018-12-24 PROCEDURE — 74011250636 HC RX REV CODE- 250/636: Performed by: HOSPITALIST

## 2018-12-24 PROCEDURE — 77030027138 HC INCENT SPIROMETER -A

## 2018-12-24 PROCEDURE — 94660 CPAP INITIATION&MGMT: CPT

## 2018-12-24 PROCEDURE — 36415 COLL VENOUS BLD VENIPUNCTURE: CPT

## 2018-12-24 PROCEDURE — 80048 BASIC METABOLIC PNL TOTAL CA: CPT

## 2018-12-24 RX ORDER — SODIUM CHLORIDE 9 MG/ML
500 INJECTION, SOLUTION INTRAVENOUS ONCE
Status: COMPLETED | OUTPATIENT
Start: 2018-12-24 | End: 2018-12-24

## 2018-12-24 RX ORDER — PANTOPRAZOLE SODIUM 40 MG/10ML
40 INJECTION, POWDER, LYOPHILIZED, FOR SOLUTION INTRAVENOUS DAILY
Status: DISCONTINUED | OUTPATIENT
Start: 2018-12-24 | End: 2018-12-28

## 2018-12-24 RX ORDER — ARFORMOTEROL TARTRATE 15 UG/2ML
15 SOLUTION RESPIRATORY (INHALATION)
Status: DISCONTINUED | OUTPATIENT
Start: 2018-12-24 | End: 2018-12-30 | Stop reason: HOSPADM

## 2018-12-24 RX ORDER — NOREPINEPHRINE BIT/0.9 % NACL 8 MG/250ML
INFUSION BOTTLE (ML) INTRAVENOUS
Status: COMPLETED
Start: 2018-12-24 | End: 2018-12-24

## 2018-12-24 RX ORDER — BUDESONIDE 0.5 MG/2ML
500 INHALANT ORAL
Status: DISCONTINUED | OUTPATIENT
Start: 2018-12-24 | End: 2018-12-30 | Stop reason: HOSPADM

## 2018-12-24 RX ORDER — NOREPINEPHRINE BIT/0.9 % NACL 8 MG/250ML
2-16 INFUSION BOTTLE (ML) INTRAVENOUS
Status: DISCONTINUED | OUTPATIENT
Start: 2018-12-24 | End: 2018-12-28 | Stop reason: HOSPADM

## 2018-12-24 RX ADMIN — BUDESONIDE 500 MCG: 0.5 INHALANT RESPIRATORY (INHALATION) at 11:54

## 2018-12-24 RX ADMIN — ACETAMINOPHEN 650 MG: 325 TABLET, FILM COATED ORAL at 01:41

## 2018-12-24 RX ADMIN — ARFORMOTEROL TARTRATE 15 MCG: 15 SOLUTION RESPIRATORY (INHALATION) at 19:55

## 2018-12-24 RX ADMIN — IPRATROPIUM BROMIDE AND ALBUTEROL SULFATE 3 ML: .5; 3 SOLUTION RESPIRATORY (INHALATION) at 11:54

## 2018-12-24 RX ADMIN — METHYLPREDNISOLONE SODIUM SUCCINATE 125 MG: 125 INJECTION, POWDER, FOR SOLUTION INTRAMUSCULAR; INTRAVENOUS at 18:30

## 2018-12-24 RX ADMIN — METHYLPREDNISOLONE SODIUM SUCCINATE 40 MG: 40 INJECTION, POWDER, FOR SOLUTION INTRAMUSCULAR; INTRAVENOUS at 21:50

## 2018-12-24 RX ADMIN — SODIUM CHLORIDE 500 ML: 900 INJECTION, SOLUTION INTRAVENOUS at 18:30

## 2018-12-24 RX ADMIN — HEPARIN SODIUM 5000 UNITS: 5000 INJECTION INTRAVENOUS; SUBCUTANEOUS at 20:24

## 2018-12-24 RX ADMIN — HEPARIN SODIUM 5000 UNITS: 5000 INJECTION INTRAVENOUS; SUBCUTANEOUS at 04:45

## 2018-12-24 RX ADMIN — POTASSIUM CHLORIDE 20 MEQ: 1.5 POWDER, FOR SOLUTION ORAL at 09:51

## 2018-12-24 RX ADMIN — Medication 0.5 MCG/MIN: at 19:48

## 2018-12-24 RX ADMIN — ARFORMOTEROL TARTRATE 15 MCG: 15 SOLUTION RESPIRATORY (INHALATION) at 11:54

## 2018-12-24 RX ADMIN — IPRATROPIUM BROMIDE AND ALBUTEROL SULFATE 3 ML: .5; 3 SOLUTION RESPIRATORY (INHALATION) at 19:55

## 2018-12-24 RX ADMIN — IPRATROPIUM BROMIDE AND ALBUTEROL SULFATE 3 ML: .5; 3 SOLUTION RESPIRATORY (INHALATION) at 08:09

## 2018-12-24 RX ADMIN — LISINOPRIL 40 MG: 40 TABLET ORAL at 09:50

## 2018-12-24 RX ADMIN — NOREPINEPHRINE BITARTRATE 0.5 MCG/MIN: 1 INJECTION INTRAVENOUS at 19:48

## 2018-12-24 RX ADMIN — METHYLPREDNISOLONE SODIUM SUCCINATE 40 MG: 40 INJECTION, POWDER, FOR SOLUTION INTRAMUSCULAR; INTRAVENOUS at 14:29

## 2018-12-24 RX ADMIN — BUMETANIDE 1 MG: 0.25 INJECTION INTRAMUSCULAR; INTRAVENOUS at 09:50

## 2018-12-24 RX ADMIN — METOPROLOL TARTRATE 100 MG: 50 TABLET ORAL at 09:50

## 2018-12-24 RX ADMIN — PANTOPRAZOLE SODIUM 40 MG: 40 INJECTION, POWDER, FOR SOLUTION INTRAVENOUS at 14:29

## 2018-12-24 RX ADMIN — BUDESONIDE 500 MCG: 0.5 INHALANT RESPIRATORY (INHALATION) at 19:55

## 2018-12-24 RX ADMIN — OXYCODONE AND ACETAMINOPHEN 1 TABLET: 5; 325 TABLET ORAL at 05:46

## 2018-12-24 RX ADMIN — HEPARIN SODIUM 5000 UNITS: 5000 INJECTION INTRAVENOUS; SUBCUTANEOUS at 14:26

## 2018-12-24 RX ADMIN — GABAPENTIN 400 MG: 400 CAPSULE ORAL at 09:50

## 2018-12-24 RX ADMIN — AMLODIPINE BESYLATE 10 MG: 10 TABLET ORAL at 09:50

## 2018-12-24 NOTE — ROUTINE PROCESS
Admitted patient from ED via stretcher. Alert & oriented and denies nausea. Pt has sob & congestion, O2 at 2L/NC. Call bell within reach. 2042 --  Assessment & admission required documentation review & completed. 2133 - PRN Percocet 1 tab given for pain. 2230 -  Pt verbalized some relief of pain. 0100 - Pt asleep.    0320 - Pt ambulates to BR, had a BM & back to bed.    0546 - PRN Percocet 1 tab given for pain. 8256  -  Bedside and Verbal shift change report given to Kevin IvoryRN (oncoming nurse) by Wesley Jung RN BSN (offgoing nurse). Report given with SBAR, Kardex, Intake/Output, MAR and Recent Results.

## 2018-12-24 NOTE — ROUTINE PROCESS
Bedside, Verbal and Written shift change report given to Koko Roland RN   (oncoming nurse) by Jameel Espinosa RN (offgoing nurse). Report included the following information SBAR, Kardex and MAR.
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
## 2018-12-24 NOTE — PROGRESS NOTES
Problem: Falls - Risk of  Goal: *Absence of Falls  Document Sumi Fall Risk and appropriate interventions in the flowsheet.   Outcome: Progressing Towards Goal  Fall Risk Interventions:  Mobility Interventions: Patient to call before getting OOB, PT Consult for mobility concerns, Strengthening exercises (ROM-active/passive)         Medication Interventions: Assess postural VS orthostatic hypotension, Evaluate medications/consider consulting pharmacy, Patient to call before getting OOB, Teach patient to arise slowly    Elimination Interventions: Call light in reach, Patient to call for help with toileting needs, Toileting schedule/hourly rounds, Toilet paper/wipes in reach, Urinal in reach

## 2018-12-24 NOTE — PROGRESS NOTES
Spoke with Dr. Heidi Lange regarding results of CT scan done 12/23. 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 Dr. Heidi Lange was given Dr. Nieto Shown pager.

## 2018-12-24 NOTE — CONSULTS
[facility] - Consult Note    Consultation request by Dr. Albert Carlton for advice/opinion related to evaluating pleural effusions    Date of  Admission: 12/23/2018 12:52 PM   Primary Care Physician:  Alyse Knight MD     Assessment:     -Fluid overload, likely iatrogenic due to fluid resuscitation during last admission 12/16-12/21, received > 7L IVF according to notes. Persistent moderate bilateral pleural effusions on CT. -Hypoxic respiratory failure. 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  -Will obtain limited echo this admission to evaluate for any new changes.  -Pt getting ABG this AM due to lethargy and confusion.  -May need to consider thoracentesis if effusion do not improve with diuresis. History of Present Illness: This is a 77 y.o. male admitted for Acute on chronic respiratory failure with hypoxia ([facility] [address] Patient complains of:  Shortness of breath    Laura Zimmerman is a 77 y.o. male with PMHx as noted above, who presented to the hospital due to shortness of breath with O2 sats in upper 80's during home health check yesterday. Pt was recently admitted on 12/16/18 with acute hypoxic respiratory failure and discharged on 12/21/2018. He received > 7L IVF during his last admission. His CXR on presentation yesterday is consistent with pulmonary edema. On my initial exam this morning, pt is lethargic and appears only oriented to self. He is unable to provide any further history. Cardiac risk factors: male gender, hypertension      Review of Symptoms:    Review of systems not obtained due to patient factors.      Past Medical History:     Past Medical History: Diagnosis Date    Arthritis     COPD     Hypertension          Social History:     Social History     Socioeconomic History    Marital status: SINGLE     Spouse name: Not on file    Number of children: Not on file    Years of education: Not on file    Highest education level: Not on file   Tobacco Use    Smoking status: Former Smoker     Last attempt to quit: 1984     Years since quittin.5    Smokeless tobacco: Former User   Substance and Sexual Activity    Alcohol use: Yes     Comment: sometimes    Drug use: Yes     Types: Heroin, Marijuana, Opiates     Comment: Uses opiates for pain        Family History:   History reviewed. No pertinent family history. Medications:      Allergies   Allergen Reactions    Shellfish Derived Hives        Current Facility-Administered Medications   Medication Dose Route Frequency    pantoprazole (PROTONIX) injection 40 mg  40 mg IntraVENous DAILY    arformoterol (BROVANA) neb solution 15 mcg  15 mcg Nebulization BID RT    budesonide (PULMICORT) 500 mcg/2 ml nebulizer suspension  500 mcg Nebulization BID RT    methylPREDNISolone (PF) (SOLU-MEDROL) injection 40 mg  40 mg IntraVENous Q8H    amLODIPine (NORVASC) tablet 10 mg  10 mg Oral DAILY    gabapentin (NEURONTIN) capsule 400 mg  400 mg Oral TID    loratadine (CLARITIN) tablet 10 mg  10 mg Oral DAILY PRN    oxyCODONE-acetaminophen (PERCOCET) 5-325 mg per tablet 1 Tab  1 Tab Oral Q6H PRN    metoprolol tartrate (LOPRESSOR) tablet 100 mg  100 mg Oral BID    simvastatin (ZOCOR) tablet 10 mg  10 mg Oral QHS    bumetanide (BUMEX) injection 1 mg  1 mg IntraVENous BID    albuterol-ipratropium (DUO-NEB) 2.5 MG-0.5 MG/3 ML  3 mL Nebulization QID RT    heparin (porcine) injection 5,000 Units  5,000 Units SubCUTAneous Q8H    lisinopril (PRINIVIL, ZESTRIL) tablet 40 mg  40 mg Oral DAILY    acetaminophen (TYLENOL) tablet 650 mg  650 mg Oral Q6H PRN    potassium chloride (KLOR-CON) packet for solution 20 mEq 20 mEq Oral DAILY WITH BREAKFAST    naloxone (NARCAN) injection 0.4 mg  0.4 mg IntraVENous PRN    ondansetron (ZOFRAN ODT) tablet 4 mg  4 mg Oral Q4H PRN         Physical Exam:     Visit Vitals  /64 (BP 1 Location: Left arm, BP Patient Position: Head of bed elevated (Comment degrees))   Pulse (!) 55   Temp 97.4 °F (36.3 °C)   Resp 22   Ht 6' (1.829 m)   Wt 236 lb 1.6 oz (107.1 kg)   SpO2 97%   BMI 32.02 kg/m²     BP Readings from Last 3 Encounters:   12/24/18 108/64   12/21/18 143/59   12/04/18 134/78     Pulse Readings from Last 3 Encounters:   12/24/18 (!) 55   12/21/18 73   12/04/18 83     Wt Readings from Last 3 Encounters:   12/23/18 236 lb 1.6 oz (107.1 kg)   12/21/18 246 lb 0.5 oz (111.6 kg)   12/04/18 238 lb 1.6 oz (108 kg)       General:  Lethargic, cooperative, no apparent distress, appears stated age  Neck:  + JVD  Lungs:  Decreased bibasilar breath sounds  Heart:  Regular rate and rhythm  Abdomen:  abdomen is soft without significant tenderness, masses, organomegaly or guarding  Extremities:  Atraumatic, no edema  Skin: Warm and dry.    Neuro: lethargic, oriented only to self, no involuntary movements  Psych: unable to adequately assess due to condition     Data Review:     Recent Labs     12/24/18  0850 12/23/18  1351   WBC 6.1 5.3   HGB 12.2* 12.0*   HCT 40.9 38.2   * 112*     Recent Labs     12/24/18  0850 12/23/18  1351    143   K 3.8 3.6    103   CO2 43* 38*   * 99   BUN 13 14   CREA 1.11 0.89   CA 8.1* 8.1*       Results for orders placed or performed during the hospital encounter of 12/23/18   EKG, 12 LEAD, INITIAL   Result Value Ref Range    Ventricular Rate 85 BPM    Atrial Rate 85 BPM    P-R Interval 182 ms    QRS Duration 86 ms    Q-T Interval 386 ms    QTC Calculation (Bezet) 459 ms    Calculated P Axis 50 degrees    Calculated R Axis -26 degrees    Calculated T Axis 20 degrees    Diagnosis       Normal sinus rhythm  Leftward axis  Poor R Wave Progression  Baseline wander  When compared with ECG of 16-DEC-2018 19:51,  No significant change was found  Confirmed by Brittney Faustin MD, Amelia Hurley ([id number]) on 12/24/2018 10:20:20 AM         All Cardiac Markers in the last 24 hours:    Lab Results   Component Value Date/Time    TROIQ 0.02 12/23/2018 01:51 PM       Last Lipid:    Lab Results   Component Value Date/Time    Cholesterol, total 114 12/17/2018 12:30 AM    HDL Cholesterol 36 (L) 12/17/2018 12:30 AM    LDL, calculated 61.8 12/17/2018 12:30 AM    Triglyceride 81 12/17/2018 12:30 AM    CHOL/HDL Ratio 3.2 12/17/2018 12:30 AM       Signed By: Elton Orantes PA-C     December 24, 2018      Adina Dash MD

## 2018-12-24 NOTE — PROGRESS NOTES
Problem: Self Care Deficits Care Plan (Adult)  Goal: *Acute Goals and Plan of Care (Insert Text)  Occupational Therapy Goals  Initiated 12/24/2018 within 7 day(s). 1.  Patient will perform grooming tasks while standing with modified independence. 2.  Patient will perform lower body dressing with modified independence. 3.  Patient will perform functional activity in standing for 8 minutes with modified independence and < 2 rest breaks to increase activity tolerance for ADLs and functional transfers. 4.  Patient will perform toilet transfers with modified independence. 5.  Patient will perform all aspects of toileting with modified independence. 6.  Patient will participate in upper extremity therapeutic exercise/activities with modified independence for 8 minutes to maintain BUE strength for functional transfers and ADLs. 7.  Patient will utilize energy conservation techniques during functional activities with minimal verbal cues. Outcome: Progressing Towards Goal  [facility]  Occupational THERAPY: Initial Assessment   INPATIENT: Medicaid: Hospital Day: 2     Patient: Mónica Rowe (67 y.o. male)    Date: 12/24/2018  Primary Diagnosis: Acute on chronic respiratory failure with hypoxia (HCC)   ,  ,   Precautions: Falls  PLOF: Pt was modified independent in functional mobility & ADLs. ASSESSMENT:   Based on the objective data described below, the patient presents with impairments with regard to bed mobility, activity tolerance, BUE function/strength and independence in ADLs. Pt well known to therapy; is mod I in functional mobility & ADLs at baseline. Chronic back pain inhibits full independence in all tasks. Pt supine on arrival, drowsy but following commands. Appears confused at times. Min A for supine-->sit. Fair sitting balance. Min/mod A to manage pants in standing with RW for support; pt with sway back and decreased standing balance due to back pain.  Skilled instruction on positioning for safety and to ensure back integrity. Pt reports noncompliance with wearing back brace at home. Deferred chair transfer at this time due to pt drowsiness; returned back to bed for safety with mod A for BLE management. Recommend continued therapy. Needs within reach. Recommendations for the next treatment session: ADLs at sink  Mr. Redd Spencer will benefit from skilled intervention to address the above impairments. His rehabilitation potential is considered to be Good. EDUCATION     Education:  Patient was educated on the following topics:    Barriers to Learning/Limitations: None  Compensate with: visual, verbal, tactile, kinesthetic cues/model     PLAN OF CARE:  Problems:  Decreased ROM, Decreased strength affecting function, Decreased ADL/functioning of activities, Decreased flexibility/joint flexibility, Decreased transfer abilities, Increased fatigue affecting function and Increased shortness of breath affecting function    Recommendations and Planned Interventions:  [x]                  Self Care Training                   [x]         Therapeutic Activities  [x]                  Functional Mobility Training    []          Cognitive Retraining  [x]                  Therapeutic Exercises            [x]          Endurance Activities  [x]                  Balance Training                     []          Neuromuscular Re-ed   []                  Visual/Perceptual Training      [x]       Home Safety Training  [x]                  Patient Education                    [x]          Family Training/Education  []                  Other (comment):    Frequency/Duration: Patient will be followed by occupational therapy 3-5 times a week to address goals. Discharge Recommendations: Home Health     Further Equipment Recommendations for Discharge: shower chairSUBJECTIVE:  Patient stated: \"I'm back. \"    OBJECTIVE/TREATMENT:     Past Medical History:   Diagnosis Date    Arthritis     COPD     Hypertension      Past Surgical History:   Procedure Laterality Date    HX REFRACTIVE SURGERY        Eval Complexity: History: MEDIUM Complexity : Expanded review of history including physical, cognitive and psychosocial  history ; Examination: MEDIUM Complexity : 3-5 performance deficits relating to physical, cognitive , or psychosocial skils that result in activity limitations and / or participation restrictions; Decision Making:MEDIUM Complexity : Patient may present with comorbidities that affect occupational performnce. Miniml to moderate modification of tasks or assistance (eg, physical or verbal ) with assesment(s) is necessary to enable patient to complete evaluation     G CODES: Self Care  Current  CJ= 20-39%   Goal  CI= 1-19%. The severity rating is based on the Other Functional Assessment ,MMT ,ROM    Prior Level of Function/Home Situation:   Restricted in physically strenuous activity but ambulatory and able to carry out work of a light or sedentary nature (e.g., light house work, office work).    Lives with Family  Walker    Cognitive/Behavioral Status:   Neurologic State: alert   Orientation: oriented to time, place, person and situation  Cognition:  following commands  follows two step commands/direction   Safety/Judgement: Awareness of environment and Fall prevention    ROM: Minimally limited (BUEs)  MMT: 4/5 (BUEs)  Coordination: WFL (BUEs)  Hand dominance:Right    Skin: Intact (BUEs)  Edema: None noted (BUEs)  Sensation: Intact (BUEs)    Vision/Perceptual: normal      Functional Status      Indep   Mod I   Sup. /  Set- Up    SBA   CGA   Min Assist   Mod Assist   Max assist   Total Assist   Assist x2    Additional Time   NT   Comments   Rolling []  []  []  []  []    [x]    []    []  []  []  []  []     Supine to sit []  []  []  []  []  [x]  []  []  []  []  []  []     Sit to supine []  []  []  []  []  []  [x]  []  []  []  []  []  BLE management   Sit to stand []  []  []  []  []  [x]  []  []  []  []  []  [] Toilet Transfer []  []  []  []  []  []  []  []  []  []  []  [x]                     Feeding []  []  [x]  []  []  []  []  []  []  []  []  []     Grooming []  []  [x]  []  []  []  []  []  []  []  []  []     Bathing  []  []  []  []  []  []  [x]  []  []  []  []  []     UB Dressing  []  []  [x]  []  []  []  []  []  []  []  []  []     LB Dressing  []  []  []  []  []  []  []  []  []  []  []  []     Toileting []  []  [x]  []  []  [x]  []  []  []  []  []  []         Balance    Good   Fair   Poor   Unable   Comments   Sitting static []  [x]  []  []     Sitting dynamic []  [x]  []  []     Standing static []  [x]  []  []     Standing dynamic []  [x]  []  []       ADL Intervention:   Min/mod A for donning/doffing sweatpants with skilled instruction on pacing and positioning to ensure safety. Pain:   Pre treatment:  10/10  Post treatment: 10/10 Katie Dailey RN aware)  Scale: numeric    Activity tolerance:  fair    COMMUNICATION/EDUCATION: Pt educated on role of OT and POC; he verbalized understanding. [x]         Fall prevention education was provided and the patient/caregiver indicated understanding. [x]         Patient/family have participated as able in goal setting and plan of care. [x]         Patient/family agree to work toward stated goals and plan of care. []         Patient understands intent and goals of therapy, but is neutral about his/her participation     The patients plan of care was also discussed with: Physical Therapist, Certified Occupational Therapy Assistant and Registered Nurse.      · After treatment position/precautions:   o Supine in bed  o Call light within reach  o RN notified     Thank you for this referral.  Jalil Singh MS OTR/L  Time Calculation: 20 mins

## 2018-12-24 NOTE — PROGRESS NOTES
1416: TRANSFER - IN REPORT:    Verbal report received from [facility], RN (name) on Laura Vitale  being received from Franki Debby (unit) for change in patient condition(hypoxic requiring BiPAP)      Report consisted of patients Situation, Background, Assessment and   Recommendations(SBAR). Information from the following report(s) SBAR, ED Summary and Cardiac Rhythm sinus/ sinus isaiah was reviewed with the receiving nurse. Opportunity for questions and clarification was provided. Assessment completed upon patients arrival to unit and care assumed. Patient admitted to unit on 3L, to be placed on BiPAP by RT. Patient alert to self only. Episode of incontinence noted, cleaned and mepilex applied. Call bell in reach, bed in lowest position. Will continue to monitor. 1820: SBP noted in 70s, lopressor and bumex held at this time. Page to Dr. Jaja Warner to notify, awaiting return call. keppra held at this time as patient is NPO while on BiPAP.     1824: received return call from Dr. Jaja Warner, informed of hypotension and holding of medications. received orders to discontinue diuretics, give 500ml NS bolus and administer 125mg of solumedrol now. 1920: Bedside and Verbal shift change report given to CAMERON Roca (oncoming nurse) by Fabricio Perera RN   (offgoing nurse). Report included the following information SBAR, Kardex, ED Summary, Intake/Output, MAR, Recent Results, Cardiac Rhythm NSR and Alarm Parameters .

## 2018-12-24 NOTE — PROGRESS NOTES
Physical Exam   Skin: Skin is warm and dry.           Skin assessed upon admission to unit with [facility], RN

## 2018-12-24 NOTE — PROGRESS NOTES
Problem: Mobility Impaired (Adult and Pediatric)  Goal: *Acute Goals and Plan of Care (Insert Text)  Physical Therapy Goals   Initiated 12/24/2018 and to be accomplished within 7 days. 1.  Patient will complete all bed mobility with modified independence in order to prepare for EOB/OOB activity. 2.  Patient will perform sit <> stand with modified independence in order to prepare for OOB/gait activity. 3.  Patient will perform bed to chair transfers with modified independence in order to promote mobility and encourage seated activity to progress towards their prior level of function. 4.  Patient will ambulate 75 feet with modified independence using LRAD in order to prepare for safe negotiation of their environment. Outcome: Progressing Towards Goal  PHYSICAL THERAPY: Initial Assessment   INPATIENT: Medicaid: Hospital Day: 2     Patient: Kayleigh Viveros (67 y.o. male)    Date: 12/24/2018  Primary Diagnosis: Acute on chronic respiratory failure with hypoxia (HCC)   ,     Precautions:         PLOF: Ambulation with rolling walker    ASSESSMENT :  Patient supine in bed, agreeable to participation with PT. Patient lives in a single story home with family. MIN A x 1 for supine <> sit with additional time to complete. Demo's good seated balance at EOB; B LE strength 4-/5. Notable forward head and forward trunk flexion in sitting. Patient family present; declining gait or standing activity and requesting to eat breakfast. Patient left sitting EOB with breakfast tray present. All other needs within reach. Patient has no c/o pain with activity. Recommend d/c home with home health. Patient presents with deficits in:  Bed Mobility, Transfers, Gait and Strength    Patient will benefit from skilled intervention to address the above impairments.   Patients rehabilitation potential is considered to be Fair  Factors which may influence rehabilitation potential include:   []         None noted  []         Mental ability/status  [x]         Medical condition  []         Home/family situation and support systems  []         Safety awareness  []         Pain tolerance/management  []         Other:      PLAN :  Recommendations and Planned Interventions:  _           Bed Mobility Training             [x]    Neuromuscular Re-Education  [x]           Transfer Training                   []    Orthotic/Prosthetic Training  [x]           Gait Training                          []    Modalities  [x]           Therapeutic Exercises          []    Edema Management/Control  [x]           Therapeutic Activities            [x]    Patient and Family Training/Education  []           Other (comment):      EDUCATION:   Education:  Patient was educated on the following topics: Purpose of PT, PT POC, bed mobility. Verbalizes understanding. Barriers to Learning/Limitations: None  Compensate with: visual, verbal, tactile, kinesthetic cues/model    Recommendations for the next treatment session: Gait  Frequency/Duration: Patient will be followed by physical therapy 3 - 5 times a week to address goals. Discharge Recommendations: Home Health  Further Equipment Recommendations for Discharge: rolling walker  Factors which may impact discharge planning: N/A     SUBJECTIVE:   Patient stated I have something in my mouth.     OBJECTIVE DATA SUMMARY:     Past Medical History:   Diagnosis Date    Arthritis     COPD     Hypertension      Past Surgical History:   Procedure Laterality Date    HX REFRACTIVE SURGERY           Eval Complexity: History: MEDIUM  Complexity : 1-2 comorbidities / personal factors will impact the outcome/ POC Exam:MEDIUM Complexity : 3 Standardized tests and measures addressing body structure, function, activity limitation and / or participation in recreation  Presentation: MEDIUM Complexity : Evolving with changing characteristics  Clinical Decision Making:Medium Complexity MIN A for mobility  Overall Complexity:MEDIUM    G CODES:Mobility [id number] Current  CJ= 20-39%   Goal  CI= 1-19%. The severity rating is based on the Other MIN A for bed mobility, good balance, 4-/5 LE strength    Prior Level of Function/Home Situation:  Home Situation  Home Environment: Private residence  # Steps to Enter: 0  One/Two Story Residence: One story  Living Alone: No  Support Systems: Family member(s)  Patient Expects to be Discharged to[de-identified] Private residence  Current DME Used/Available at Home: Walker, rolling  Critical Behavior:  Neurologic State: Alert  Orientation Level: Oriented X4  Cognition: Follows commands     Psychosocial  Patient Behaviors: Calm; Cooperative  Purposeful Interaction: Yes     Manual Muscle Testing (LE)         R     L    Hip Flexion:   4-/5  4-/5  Knee EXT:   4-/5  4-/5  Knee FLEX:   4-/5  4-/5  Ankle DF:   4-/5  4-/5  _________________________________________________   Tone : Normal  Sensation: NT  Range Of Motion: WFL    Functional Mobility:      Functional Status      Indep   (I)   Mod I   Super-vision   Min A   Mod A   Max A   Total A   Assist x2 Verbal cues Additional time Not tested   Comments   Rolling []  []  [] []    []    []  []  [] [] [] [x]    Supine to sit []  []  [] [x]  []  []  []  [] [] [] []    Sit to supine []  []  [] []  []  []  []  [] [] [] [x]    Sit to stand []  []  [] []  []  []  []  [] [] [] [x]    Stand to sit []  []  [] []  []  []  []  [] [] [] [x]    Bed to chair transfers []  []  [] []  []  []  []  [] [] [] [x]        Balance    Good   Fair   Poor   Unable   Not tested   Comments   Sitting static [x]  []  []  []  []    Sitting dynamic [x]  []  []  []  []    Standing static []  []  []  []  [x]    Standing dynamic []  []  []  []  [x]      Pain:  None    Vital Signs  Temp: 98.3 °F (36.8 °C)     Pulse (Heart Rate): 82     BP: 153/72     Resp Rate: 22     O2 Sat (%): 97 %    Activity Tolerance:   Fair    Please refer to the flowsheet for vital signs taken during this treatment.   After treatment:   [] Patient left in no apparent distress sitting up in chair  [x]         Patient left in no apparent distress seated edge of bed  [x]         Call bell left within reach  []         Nursing notified  []         Caregiver present  []         Bed alarm activated    COMMUNICATION/EDUCATION:   [x]         Fall prevention education was provided and the patient/caregiver indicated understanding. [x]         Patient/family have participated as able in goal setting and plan of care. [x]         Patient/family agree to work toward stated goals and plan of care. []         Patient understands intent and goals of therapy, but is neutral about his/her participation. []         Patient is unable to participate in goal setting and plan of care.     Thank you for this referral.  Lily Jack   Time Calculation: 14 mins

## 2018-12-24 NOTE — PROGRESS NOTES
conducted an initial consultation and Spiritual Assessment for Job Ren, who is a 77 y.o.,male. Patients Primary Language is: [place]. According to the patients EMR Jewish Affiliation is: No Oriental orthodox. The reason the Patient came to the hospital is:   Patient Active Problem List    Diagnosis Date Noted    Acute on chronic respiratory failure with hypoxia ([address] 12/23/2018    Sinusitis 12/17/2018    Altered mental status 12/16/2018    Acute respiratory failure with hypoxia ([address] 12/16/2018    Discitis of thoracic region 11/14/2018    UTI (urinary tract infection) 11/14/2018    Bilateral pleural effusion 11/14/2018    Lumbar stenosis 11/14/2018    Adenoma of left adrenal gland 11/14/2018    Hypertension 11/14/2018    Cirrhosis of liver ([facility] 11/14/2018    Hepatitis C 11/14/2018    Obesity 11/14/2018    COPD (chronic obstructive pulmonary disease) ([facility] [address] [facility] 11/14/2018    Constipation 11/14/2018    Back pain 11/14/2018        The  provided the following Interventions:  Initiated a relationship of care and support. Explored issues of glendy, spirituality and/or Confucianist needs while hospitalized. Listened empathically. Provided chaplaincy education. Provided information about Spiritual Care Services. Offered prayer and assurance of continued prayers on patient's behalf. Chart reviewed. The following outcomes were achieved:  Patient shared some information about their medical narrative and spiritual journey/beliefs. Patient processed feeling about current hospitalization. Patient expressed gratitude for the 's visit. Assessment:  Patient did not indicate any spiritual or Confucianist issues which require Spiritual Care Services interventions at this time. Patient does not have any Confucianist/cultural needs that will affect patients preferences in health care.     Plan:  Chaplains will continue to follow and will provide pastoral care on an as needed or requested basis.  recommends bedside caregivers page  on duty if patient shows signs of acute spiritual or emotional distress.     [facility]   Staff [address] [facility]   [phone]

## 2018-12-24 NOTE — PROGRESS NOTES
Internal Medicine Progress Note    Patient's Name: Laura Vitale  Admit Date: 12/23/2018  Length of Stay: 1      Assessment/Plan     Active Hospital Problems    Diagnosis Date Noted    Diastolic CHF, acute on chronic ([facility] 12/24/2018    Acute on chronic respiratory failure with hypoxia ([facility] 12/23/2018    COPD (chronic obstructive pulmonary disease) ([facility] 11/14/2018    Bilateral pleural effusion 11/14/2018    Hypertension 11/14/2018     - Cont bumex BID  - Cardiology consulted  - Cont BB/statin/ACEi  - Cont brovana, pulmicort, duonebs  - Add steroids  - Check ABG given hypercarbia on BMP  - t/c BiPAP if hypercapnic  - Discussed imaging findings with radiology, abnormalities seen on imaging was seen on previous imaging in November as well, but this time slightly better. On that admission, it was determined based on imaging that the findings were non-infectious. The patient had refused any type of neurosurgical intervention regardless  - Will clinically monitor  - PT/OT  - Cont acceptable home medications for chronic conditions   - DVT protocol    I have personally reviewed all pertinent labs and films that have officially resulted over the last 24 hours. I have personally checked for all pending labs that are awaiting final results.     Subjective     Pt s/e @ bedside  No major events overnight  Pt slightly more lethargic this AM  Denies CP  Admits some SOB    Objective     Visit Vitals  /72 (BP 1 Location: Left arm, BP Patient Position: Head of bed elevated (Comment degrees))   Pulse 82   Temp 98.3 °F (36.8 °C)   Resp 22   Ht 6' (1.829 m)   Wt 107.1 kg (236 lb 1.6 oz)   SpO2 97%   BMI 32.02 kg/m²       Physical Exam:  General Appearance: NAD, lethargic  Lungs: Decreased BS B/L, mild crackles  CV: RRR, no m/r/g  Abdomen: soft, non-tender, normal bowel sounds  Extremities: no cyanosis, trace peripheral edema  Neuro: No focal deficits, moving all ext    Lab/Data Reviewed:  BMP:   Lab Results   Component Value Date/Time     12/24/2018 08:50 AM    K 3.8 12/24/2018 08:50 AM     12/24/2018 08:50 AM    CO2 43 (HH) 12/24/2018 08:50 AM    AGAP 0 (L) 12/24/2018 08:50 AM     (H) 12/24/2018 08:50 AM    BUN 13 12/24/2018 08:50 AM    CREA 1.11 12/24/2018 08:50 AM    GFRAA >60 12/24/2018 08:50 AM    GFRNA >60 12/24/2018 08:50 AM     CBC:   Lab Results   Component Value Date/Time    WBC 6.1 12/24/2018 08:50 AM    HGB 12.2 (L) 12/24/2018 08:50 AM    HCT 40.9 12/24/2018 08:50 AM     (L) 12/24/2018 08:50 AM       Imaging Reviewed:  Cta Chest W Or W Wo Cont    Result Date: 12/24/2018  EXAM:  CT pulmonary angiogram with intravenous contrast and angiographic MIP reformations. INDICATION:  \"SOB. \" COMPARISON: CT January 11, 2010. DOSE REDUCTION: One or more dose reduction techniques were used on this CT: automated exposure control, adjustment of the mAs and/or kVp according to patient size, and iterative reconstruction techniques. The specific techniques used on this CT exam have been documented in the patient's electronic medical record _______________ FINDINGS:      NOTE:  Pulmonary angiography is timed to optimize imaging of the pulmonary arteries, and is not the routine venous phase that would be used to evaluate malignancy, mediastinal structures and pleural structures. IMAGE QUALITY:  Intermediate. PULMONARY ANGIOGRAM:  Negative for pulmonary embolism. The central pulmonary arteries are dilated. CENTRAL AIRWAYS: Unremarkable. LUNGS AND PLEURAL SPACE:           Diffuse lung disease:  None apparent. Pleural effusion:  Moderate dependent effusions bilaterally, both new. Right lung:  Unchanged marked right hemidiaphragm elevation complete collapse of the lower lobe, new. Partial collapse of the middle lobe, new. Adequate upper lobe aeration. Left lung:  Partial collapse of the lower lobe, new. Minimal dependent upper lobe atelectasis.  Otherwise unremarkable. HEART AND MEDIASTINUM/LOIS: Unremarkable. PARTIALLY IMAGED UPPER ABDOMEN:  Unremarkable. PARTIALLY IMAGED NECK BASE:   Unremarkable. SUPERFICIAL SOFT TISSUES: Unremarkable. BONES: A markedly destructive process centered at the T9-T10 intervertebral disc with associated masslike circumferential tissue accumulation extending into the spinal canal and immediately paravertebral soft tissue is new since the chest CT of 2010, but appears to have improved since the the abdominal CT of November 14, 2018 allowing for differences in phase of contrast at the time of imaging. The previously applied designation of T10-T11 was based upon a lumbar segmental designation of 5 and counting from below, but in any case, the level of the process is the same as on the November 14, 2018 exam. This process erodes the majority of the T9 vertebral body, and the upper third of the T10 vertebral body. There is no appreciable extension into the posterior elements. Additional level of intervertebral disc destructive change at T3-T4 also has circumferential paraspinous soft tissue thickening, but only involves the disc and endplates without severe erosion of the bone stock of the vertebral bodies. _______________     IMPRESSION: 1. Negative for pulmonary embolism. 2.  Multilevel destructive intervertebral disc process, presumably discitis osteomyelitis. --Marked at T9-T10, improved since the 11/14/2018 abdominal CT.         --Mild-to-moderate at T3-T4, not previously imaged. 3.  Moderate pleural effusions with bilateral partial lung collapse. --Complete right lower lobe atelectasis. --Near-complete left lower lobe atelectasis. --Partial middle lobe right lung atelectasis. _______________ Note: The addition to the initial overnight report of the multilevel thoracic spinal destructive process was discussed with Nurse Garay this morning.      Xr Chest Port    Result Date: 12/23/2018  EXAM: AP radiograph of the chest INDICATION: Shortness of breath COMPARISON: December 20, 2018, December 18, 2018 _______________ FINDINGS: Lung volumes are hypoinflated. Heart size and mediastinal contour are within normal limits. Interstitial lung markings remain indistinct. There are hazy opacities obscuring the diaphragms and costophrenic angles. No acute osseous abnormalities. _______________     IMPRESSION: 1. Mild pulmonary edema 2.  Bilateral pleural effusions and/or atelectasis      Medications Reviewed:  Current Facility-Administered Medications   Medication Dose Route Frequency    pantoprazole (PROTONIX) injection 40 mg  40 mg IntraVENous DAILY    amLODIPine (NORVASC) tablet 10 mg  10 mg Oral DAILY    gabapentin (NEURONTIN) capsule 400 mg  400 mg Oral TID    loratadine (CLARITIN) tablet 10 mg  10 mg Oral DAILY PRN    oxyCODONE-acetaminophen (PERCOCET) 5-325 mg per tablet 1 Tab  1 Tab Oral Q6H PRN    metoprolol tartrate (LOPRESSOR) tablet 100 mg  100 mg Oral BID    simvastatin (ZOCOR) tablet 10 mg  10 mg Oral QHS    bumetanide (BUMEX) injection 1 mg  1 mg IntraVENous BID    albuterol-ipratropium (DUO-NEB) 2.5 MG-0.5 MG/3 ML  3 mL Nebulization QID RT    heparin (porcine) injection 5,000 Units  5,000 Units SubCUTAneous Q8H    lisinopril (PRINIVIL, ZESTRIL) tablet 40 mg  40 mg Oral DAILY    acetaminophen (TYLENOL) tablet 650 mg  650 mg Oral Q6H PRN    potassium chloride (KLOR-CON) packet for solution 20 mEq  20 mEq Oral DAILY WITH BREAKFAST    naloxone (NARCAN) injection 0.4 mg  0.4 mg IntraVENous PRN    ondansetron (ZOFRAN ODT) tablet 4 mg  4 mg Oral Q4H PRN           Koko Fitzgerald DO  Internal Medicine, Hospitalist  Pager: [phone]  [facility]

## 2018-12-24 NOTE — PROGRESS NOTES
12/24/18 I called Daughter Amy Gorman [phone] to let her know that her dad was back in the hospital, they have taken him up to the ICU. She asked for and got the number to the nursing station there. I also said that he lives with his mom, and I don't know if she is able to care for herself if he is not there. Ms Germán Kerr says she will contact some one about her. Aditi Sanchez.  [address] [facility]  [phone], beeper [phone]

## 2018-12-25 PROBLEM — J44.1 ACUTE EXACERBATION OF CHRONIC OBSTRUCTIVE PULMONARY DISEASE (COPD) (HCC): Status: ACTIVE | Noted: 2018-12-25

## 2018-12-25 LAB
ANION GAP SERPL CALC-SCNC: 6 MMOL/L (ref 3–18)
BASOPHILS # BLD: 0 K/UL (ref 0–0.1)
BASOPHILS NFR BLD: 0 % (ref 0–2)
BUN SERPL-MCNC: 19 MG/DL (ref 7–18)
BUN/CREAT SERPL: 17 (ref 12–20)
CALCIUM SERPL-MCNC: 8.5 MG/DL (ref 8.5–10.1)
CHLORIDE SERPL-SCNC: 97 MMOL/L (ref 100–108)
CO2 SERPL-SCNC: 38 MMOL/L (ref 21–32)
CREAT SERPL-MCNC: 1.15 MG/DL (ref 0.6–1.3)
DIFFERENTIAL METHOD BLD: ABNORMAL
ECHO LA VOL 2C: 28.95 ML (ref 18–58)
ECHO LA VOL 4C: 21.97 ML (ref 18–58)
ECHO LA VOLUME INDEX A2C: 12.67 ML/M2 (ref 16–28)
ECHO LA VOLUME INDEX A4C: 9.61 ML/M2 (ref 16–28)
ECHO LV EDV A2C: 31.5 ML
ECHO LV EDV A4C: 65.3 ML
ECHO LV EDV BP: 47.4 ML (ref 67–155)
ECHO LV EDV INDEX A4C: 28.6 ML/M2
ECHO LV EDV INDEX BP: 20.7 ML/M2
ECHO LV EDV NDEX A2C: 13.8 ML/M2
ECHO LV EJECTION FRACTION A2C: 56 %
ECHO LV EJECTION FRACTION A4C: 55 %
ECHO LV EJECTION FRACTION BIPLANE: 56.4 % (ref 55–100)
ECHO LV ESV A2C: 13.8 ML
ECHO LV ESV A4C: 29.3 ML
ECHO LV ESV BP: 20.7 ML (ref 22–58)
ECHO LV ESV INDEX A2C: 6 ML/M2
ECHO LV ESV INDEX A4C: 12.8 ML/M2
ECHO LV ESV INDEX BP: 9.1 ML/M2
ECHO LV INTERNAL DIMENSION DIASTOLIC: 4.2 CM (ref 4.2–5.9)
ECHO LV INTERNAL DIMENSION SYSTOLIC: 2.07 CM
ECHO LV IVSD: 1.18 CM (ref 0.6–1)
ECHO LV MASS 2D: 252.1 G (ref 88–224)
ECHO LV MASS INDEX 2D: 110.3 G/M2 (ref 49–115)
ECHO LV POSTERIOR WALL DIASTOLIC: 1.53 CM (ref 0.6–1)
ECHO PULMONARY ARTERY SYSTOLIC PRESSURE (PASP): 30 MMHG
ECHO TV REGURGITANT MAX VELOCITY: -82.67 CM/S
ECHO TV REGURGITANT PEAK GRADIENT: 2.7 MMHG
EOSINOPHIL # BLD: 0 K/UL (ref 0–0.4)
EOSINOPHIL NFR BLD: 0 % (ref 0–5)
ERYTHROCYTE [DISTWIDTH] IN BLOOD BY AUTOMATED COUNT: 16.5 % (ref 11.6–14.5)
GLUCOSE BLD STRIP.AUTO-MCNC: 102 MG/DL (ref 70–110)
GLUCOSE SERPL-MCNC: 184 MG/DL (ref 74–99)
HCT VFR BLD AUTO: 40.9 % (ref 36–48)
HGB BLD-MCNC: 12.5 G/DL (ref 13–16)
LYMPHOCYTES # BLD: 0.8 K/UL (ref 0.9–3.6)
LYMPHOCYTES NFR BLD: 23 % (ref 21–52)
MCH RBC QN AUTO: 27.6 PG (ref 24–34)
MCHC RBC AUTO-ENTMCNC: 30.6 G/DL (ref 31–37)
MCV RBC AUTO: 90.3 FL (ref 74–97)
MONOCYTES # BLD: 0 K/UL (ref 0.05–1.2)
MONOCYTES NFR BLD: 0 % (ref 3–10)
NEUTS SEG # BLD: 2.6 K/UL (ref 1.8–8)
NEUTS SEG NFR BLD: 77 % (ref 40–73)
PLATELET # BLD AUTO: 136 K/UL (ref 135–420)
PMV BLD AUTO: 10.3 FL (ref 9.2–11.8)
POTASSIUM SERPL-SCNC: 3.9 MMOL/L (ref 3.5–5.5)
RBC # BLD AUTO: 4.53 M/UL (ref 4.7–5.5)
SODIUM SERPL-SCNC: 141 MMOL/L (ref 136–145)
WBC # BLD AUTO: 3.4 K/UL (ref 4.6–13.2)

## 2018-12-25 PROCEDURE — 36415 COLL VENOUS BLD VENIPUNCTURE: CPT

## 2018-12-25 PROCEDURE — 74011250636 HC RX REV CODE- 250/636: Performed by: HOSPITALIST

## 2018-12-25 PROCEDURE — 74011250637 HC RX REV CODE- 250/637: Performed by: HOSPITALIST

## 2018-12-25 PROCEDURE — 80048 BASIC METABOLIC PNL TOTAL CA: CPT

## 2018-12-25 PROCEDURE — 82962 GLUCOSE BLOOD TEST: CPT

## 2018-12-25 PROCEDURE — 94640 AIRWAY INHALATION TREATMENT: CPT

## 2018-12-25 PROCEDURE — 74011000250 HC RX REV CODE- 250: Performed by: HOSPITALIST

## 2018-12-25 PROCEDURE — 85025 COMPLETE CBC W/AUTO DIFF WBC: CPT

## 2018-12-25 PROCEDURE — 94660 CPAP INITIATION&MGMT: CPT

## 2018-12-25 PROCEDURE — 65660000001 HC RM ICU INTERMED STEPDOWN

## 2018-12-25 PROCEDURE — C9113 INJ PANTOPRAZOLE SODIUM, VIA: HCPCS | Performed by: HOSPITALIST

## 2018-12-25 RX ORDER — AMLODIPINE BESYLATE 5 MG/1
5 TABLET ORAL DAILY
Status: DISCONTINUED | OUTPATIENT
Start: 2018-12-26 | End: 2018-12-25

## 2018-12-25 RX ORDER — LISINOPRIL 20 MG/1
20 TABLET ORAL DAILY
Status: DISCONTINUED | OUTPATIENT
Start: 2018-12-26 | End: 2018-12-25

## 2018-12-25 RX ORDER — SODIUM CHLORIDE 9 MG/ML
75 INJECTION, SOLUTION INTRAVENOUS CONTINUOUS
Status: DISPENSED | OUTPATIENT
Start: 2018-12-25 | End: 2018-12-26

## 2018-12-25 RX ADMIN — METHYLPREDNISOLONE SODIUM SUCCINATE 40 MG: 40 INJECTION, POWDER, FOR SOLUTION INTRAMUSCULAR; INTRAVENOUS at 13:23

## 2018-12-25 RX ADMIN — GABAPENTIN 400 MG: 400 CAPSULE ORAL at 08:58

## 2018-12-25 RX ADMIN — OXYCODONE AND ACETAMINOPHEN 1 TABLET: 5; 325 TABLET ORAL at 21:28

## 2018-12-25 RX ADMIN — HEPARIN SODIUM 5000 UNITS: 5000 INJECTION INTRAVENOUS; SUBCUTANEOUS at 13:16

## 2018-12-25 RX ADMIN — SIMVASTATIN 10 MG: 20 TABLET, FILM COATED ORAL at 21:28

## 2018-12-25 RX ADMIN — HEPARIN SODIUM 5000 UNITS: 5000 INJECTION INTRAVENOUS; SUBCUTANEOUS at 21:28

## 2018-12-25 RX ADMIN — HEPARIN SODIUM 5000 UNITS: 5000 INJECTION INTRAVENOUS; SUBCUTANEOUS at 05:25

## 2018-12-25 RX ADMIN — BUDESONIDE 500 MCG: 0.5 INHALANT RESPIRATORY (INHALATION) at 08:28

## 2018-12-25 RX ADMIN — BUDESONIDE 500 MCG: 0.5 INHALANT RESPIRATORY (INHALATION) at 20:01

## 2018-12-25 RX ADMIN — OXYCODONE AND ACETAMINOPHEN 1 TABLET: 5; 325 TABLET ORAL at 01:50

## 2018-12-25 RX ADMIN — IPRATROPIUM BROMIDE AND ALBUTEROL SULFATE 3 ML: .5; 3 SOLUTION RESPIRATORY (INHALATION) at 11:47

## 2018-12-25 RX ADMIN — PANTOPRAZOLE SODIUM 40 MG: 40 INJECTION, POWDER, FOR SOLUTION INTRAVENOUS at 08:57

## 2018-12-25 RX ADMIN — IPRATROPIUM BROMIDE AND ALBUTEROL SULFATE 3 ML: .5; 3 SOLUTION RESPIRATORY (INHALATION) at 20:01

## 2018-12-25 RX ADMIN — AMLODIPINE BESYLATE 10 MG: 10 TABLET ORAL at 08:58

## 2018-12-25 RX ADMIN — IPRATROPIUM BROMIDE AND ALBUTEROL SULFATE 3 ML: .5; 3 SOLUTION RESPIRATORY (INHALATION) at 08:28

## 2018-12-25 RX ADMIN — ARFORMOTEROL TARTRATE 15 MCG: 15 SOLUTION RESPIRATORY (INHALATION) at 08:28

## 2018-12-25 RX ADMIN — GABAPENTIN 400 MG: 400 CAPSULE ORAL at 21:28

## 2018-12-25 RX ADMIN — ARFORMOTEROL TARTRATE 15 MCG: 15 SOLUTION RESPIRATORY (INHALATION) at 20:01

## 2018-12-25 RX ADMIN — METHYLPREDNISOLONE SODIUM SUCCINATE 40 MG: 40 INJECTION, POWDER, FOR SOLUTION INTRAMUSCULAR; INTRAVENOUS at 05:25

## 2018-12-25 RX ADMIN — POTASSIUM CHLORIDE 20 MEQ: 1.5 POWDER, FOR SOLUTION ORAL at 08:57

## 2018-12-25 RX ADMIN — IPRATROPIUM BROMIDE AND ALBUTEROL SULFATE 3 ML: .5; 3 SOLUTION RESPIRATORY (INHALATION) at 15:51

## 2018-12-25 RX ADMIN — METHYLPREDNISOLONE SODIUM SUCCINATE 40 MG: 40 INJECTION, POWDER, FOR SOLUTION INTRAMUSCULAR; INTRAVENOUS at 21:28

## 2018-12-25 RX ADMIN — SODIUM CHLORIDE 75 ML/HR: 900 INJECTION, SOLUTION INTRAVENOUS at 16:10

## 2018-12-25 RX ADMIN — LISINOPRIL 40 MG: 40 TABLET ORAL at 08:58

## 2018-12-25 RX ADMIN — GABAPENTIN 400 MG: 400 CAPSULE ORAL at 16:07

## 2018-12-25 RX ADMIN — METOPROLOL TARTRATE 100 MG: 50 TABLET ORAL at 08:58

## 2018-12-25 NOTE — PROGRESS NOTES
Patient taken off BIPAP and placed on 3 lpm NC O2. Current vitals: HR 69, SpO2 100, RR 31, /96. Tolerating NC so far. HHN tx given via mask.

## 2018-12-25 NOTE — PROGRESS NOTES
Problem: Falls - Risk of  Goal: *Absence of Falls  Document Sumi Fall Risk and appropriate interventions in the flowsheet. Outcome: Progressing Towards Goal  Fall Risk Interventions:  Mobility Interventions: Bed/chair exit alarm, Communicate number of staff needed for ambulation/transfer, PT Consult for assist device competence, PT Consult for mobility concerns, Strengthening exercises (ROM-active/passive)    Mentation Interventions: Adequate sleep, hydration, pain control, Bed/chair exit alarm, Door open when patient unattended, Increase mobility, More frequent rounding, Toileting rounds, Update white board, Room close to nurse's station    Medication Interventions: Bed/chair exit alarm, Patient to call before getting OOB    Elimination Interventions: Bed/chair exit alarm, Call light in reach, Toilet paper/wipes in reach, Toileting schedule/hourly rounds             Problem: Breathing Pattern - Ineffective  Goal: *Absence of hypoxia  Outcome: Progressing Towards Goal  Continuous Spo2 monitoring, routine respiratory assessments, collaboration with respiratory therapist. HOB elevated at all times; Suction equipment setup for PRN suctioning.

## 2018-12-25 NOTE — PROGRESS NOTES
[facility]  -  Progress Note      Patient: Kay Tanner MRN: [id number]  SSN: [id number]    YOB: 1950  Age: 77 y.o. Sex: male      Admit Date: 12/23/2018    Assessment:     -Combined hypercapnic and hypoxic respiratory failure. Initially felt to be due to volume overload, but in retrospect this now appears to be most likely related to a COPD exacerbation.  -Moderate bilateral pleural effusions. Felt to be iatrogenic from volume resuscitation during his last hospital admission. This is likely contributing to his respiratory failure. He did have a good response to IV diuretics yesterday with nearly a net -2 L fluid balance. Diuretic stopped due to low blood pressure.  -Echo 12/17/18: EF 61-65% with grade 1 diastolic dysfunction, no RWMA, mild concentric LVH, mildly dilated LA + RA, trace mitral regurgitaiton, repeat limited echocardiogram 12/24 without significant change.  -Hx thoracic discitis 11/2018  -Hx HTN. On multiple outpatient antihypertensive agents  -Hx Hepatitis C/cirrhosis  -Hx COPD      Plan:     Agree with stopping IV diuretic  Would also decrease his scheduled oral antihypertensive agents due to his low blood pressure. Would hold as long as he is requiring vasopressor support. May need to consider thoracentesis if he continues to struggle with his respiratory status.       Subjective:     Sleepy, on BiPAP    Objective:      Patient Vitals for the past 8 hrs:   Temp Pulse Resp BP SpO2   12/25/18 1147     99 %   12/25/18 1000  84 24 111/57 96 %   12/25/18 0900  77 21 142/74 96 %   12/25/18 0831     98 %   12/25/18 0829     100 %   12/25/18 0800 97.7 °F (36.5 °C) 61 17 125/70 99 %   12/25/18 0700  66 23 109/67 97 %   12/25/18 0600  69 19 109/81 97 %   12/25/18 0500  76 18 (!) 85/51 97 %         Patient Vitals for the past 96 hrs:   Weight   12/24/18 1511 107 kg (236 lb)   12/24/18 1425 103.3 kg (227 lb 11.8 oz)   12/23/18 1254 107.1 kg (236 lb 1.6 oz)         Intake/Output Summary (Last 24 hours) at 12/25/2018 1240  Last data filed at 12/25/2018 0900  Gross per 24 hour   Intake 616.54 ml   Output 1050 ml   Net -433.46 ml       Physical Exam:  General:  fatigued, mild distress, appears older than stated age  Neck:  no JVD  Lungs:  diminished breath sounds R base, L base  Heart:  regular rate and rhythm  Abdomen:  abdomen is soft without significant tenderness, masses, organomegaly or guarding  Extremities:  extremities normal, atraumatic, no cyanosis or edema    Data Review:     Labs: Results:       Chemistry Recent Labs     12/25/18  0210 12/24/18  0850 12/23/18  1351   * 103* 99    143 143   K 3.9 3.8 3.6   CL 97* 100 103   CO2 38* 43* 38*   BUN 19* 13 14   CREA 1.15 1.11 0.89   CA 8.5 8.1* 8.1*   AGAP 6 0* 2*   BUCR 17 12 16      CBC w/Diff Recent Labs     12/25/18  0210 12/24/18  0850 12/23/18  1351   WBC 3.4* 6.1 5.3   RBC 4.53* 4.41* 4.31*   HGB 12.5* 12.2* 12.0*   HCT 40.9 40.9 38.2    109* 112*   GRANS 77* 51 60   LYMPH 23 37 34   EOS 0 2 1      Cardiac Enzymes No results found for: CPK, CK, CKMMB, CKMB, RCK3, CKMBT, CKNDX, CKND1, KARYNA, TROPT, TROIQ, BRITTANIE, TROPT, TNIPOC, BNP, BNPP   Coagulation No results for input(s): PTP, INR, APTT in the last 72 hours. No lab exists for component: INREXT    Lipid Panel Lab Results   Component Value Date/Time    Cholesterol, total 114 12/17/2018 12:30 AM    HDL Cholesterol 36 (L) 12/17/2018 12:30 AM    LDL, calculated 61.8 12/17/2018 12:30 AM    VLDL, calculated 16.2 12/17/2018 12:30 AM    Triglyceride 81 12/17/2018 12:30 AM    CHOL/HDL Ratio 3.2 12/17/2018 12:30 AM      BNP No results found for: BNP, BNPP, XBNPT   Liver Enzymes No results for input(s): TP, ALB, TBIL, AP, SGOT, GPT in the last 72 hours.     No lab exists for component: DBIL   Digoxin    Thyroid Studies Lab Results   Component Value Date/Time    TSH 0.30 (L) 11/19/2009 03:26 PM

## 2018-12-25 NOTE — PROGRESS NOTES
Internal Medicine Progress Note    Patient's Name: Juana Ch  Admit Date: 12/23/2018  Length of Stay: 2      Assessment/Plan     Active Hospital Problems    Diagnosis Date Noted    Diastolic CHF, acute on chronic ([facility] 12/24/2018    Acute on chronic respiratory failure with hypoxia ([facility] 12/23/2018    COPD (chronic obstructive pulmonary disease) ([facility] 11/14/2018    Bilateral pleural effusion 11/14/2018    Hypertension 92/06/7292   Diastolic CHF, acute on chronic  POA    - Bumex stopped 2/2 hypotension  - Now on levophed  - Hold antihypertensives for now  - Appreciate cardio recs  - Cont IVFs  - Titrate off levo as able  - Cont brovana, pulmicort, duonebs  - Cont steroids  - BiPAP PRN  - May need to consider thoracentesis meg  - Discussed imaging findings with radiology, abnormalities seen on imaging was seen on previous imaging in November as well, but this time slightly better. On that admission, it was determined based on imaging that the findings were non-infectious. The patient had refused any type of neurosurgical intervention regardless  - Will clinically monitor  - PT/OT  - Cont acceptable home medications for chronic conditions   - DVT protocol    I have personally reviewed all pertinent labs and films that have officially resulted over the last 24 hours. I have personally checked for all pending labs that are awaiting final results.     Subjective     Pt s/e @ bedside  On levophed for hypotension  Off BiPAP  Feeling better    Objective     Visit Vitals  /66   Pulse 80   Temp 97.8 °F (36.6 °C)   Resp 21   Ht 6' (1.829 m)   Wt 107 kg (236 lb)   SpO2 97%   BMI 32.01 kg/m²       Physical Exam:  General Appearance: NAD, on NRB, conversant  Lungs: Decreased BS B/L, no crackles  CV: RRR, no m/r/g  Abdomen: soft, non-tender, normal bowel sounds  Extremities: no cyanosis, trace peripheral edema  Neuro: No focal deficits, moving all ext    Lab/Data Reviewed:  BMP:   Lab Results   Component Value Date/Time     12/25/2018 02:10 AM    K 3.9 12/25/2018 02:10 AM    CL 97 (L) 12/25/2018 02:10 AM    CO2 38 (H) 12/25/2018 02:10 AM    AGAP 6 12/25/2018 02:10 AM     (H) 12/25/2018 02:10 AM    BUN 19 (H) 12/25/2018 02:10 AM    CREA 1.15 12/25/2018 02:10 AM    GFRAA >60 12/25/2018 02:10 AM    GFRNA >60 12/25/2018 02:10 AM     CBC:   Lab Results   Component Value Date/Time    WBC 3.4 (L) 12/25/2018 02:10 AM    HGB 12.5 (L) 12/25/2018 02:10 AM    HCT 40.9 12/25/2018 02:10 AM     12/25/2018 02:10 AM       Imaging Reviewed:  No results found.     Medications Reviewed:  Current Facility-Administered Medications   Medication Dose Route Frequency    [START ON 12/26/2018] lisinopril (PRINIVIL, ZESTRIL) tablet 20 mg  20 mg Oral DAILY    [START ON 12/26/2018] amLODIPine (NORVASC) tablet 5 mg  5 mg Oral DAILY    pantoprazole (PROTONIX) injection 40 mg  40 mg IntraVENous DAILY    arformoterol (BROVANA) neb solution 15 mcg  15 mcg Nebulization BID RT    budesonide (PULMICORT) 500 mcg/2 ml nebulizer suspension  500 mcg Nebulization BID RT    methylPREDNISolone (PF) (SOLU-MEDROL) injection 40 mg  40 mg IntraVENous Q8H    NOREPINephrine (LEVOPHED) 8 mg in 0.9% NS 250ml infusion  2-16 mcg/min IntraVENous TITRATE    gabapentin (NEURONTIN) capsule 400 mg  400 mg Oral TID    loratadine (CLARITIN) tablet 10 mg  10 mg Oral DAILY PRN    oxyCODONE-acetaminophen (PERCOCET) 5-325 mg per tablet 1 Tab  1 Tab Oral Q6H PRN    metoprolol tartrate (LOPRESSOR) tablet 100 mg  100 mg Oral BID    simvastatin (ZOCOR) tablet 10 mg  10 mg Oral QHS    albuterol-ipratropium (DUO-NEB) 2.5 MG-0.5 MG/3 ML  3 mL Nebulization QID RT    heparin (porcine) injection 5,000 Units  5,000 Units SubCUTAneous Q8H    acetaminophen (TYLENOL) tablet 650 mg  650 mg Oral Q6H PRN    potassium chloride (KLOR-CON) packet for solution 20 mEq  20 mEq Oral DAILY WITH BREAKFAST    naloxone (NARCAN) injection 0.4 mg  0.4 mg IntraVENous PRN    ondansetron (ZOFRAN ODT) tablet 4 mg  4 mg Oral Q4H PRN           Laura Maravilla DO  Internal Medicine, Hospitalist  Pager: [phone]  [address] [place] [address]

## 2018-12-25 NOTE — PROGRESS NOTES
Physical Exam   Skin:           Primary Nurse Chanelle Nunez RN and Edvin Busby RN performed a dual skin assessment on this patient No impairment noted  Raul score is 13

## 2018-12-25 NOTE — PROGRESS NOTES
Bedside and Verbal shift change report given to Chanelle RN (oncoming nurse) by Kaleb Naik RN (offgoing nurse). Report included the following information SBAR and Kardex. Bedside and Verbal shift change report given to [facility] (oncoming nurse) by Chanelle RN (offgoing nurse). Report included the following information SBAR and Kardex.

## 2018-12-25 NOTE — ROUTINE PROCESS
0755 Pt received after bedside shift report from [facility], RN. Pt on BIPAP, resting with eyes closed. Respirations even and unlabored. Levopphed infusing at 9 mcg/min. VSS via monitor. Bed locked and in low position. Call bell in reach. Will monitor closely. Bedside shift change report given to Gissell Kessler RN (oncoming nurse) by Elif Thomason RN (offgoing nurse). Report included the following information SBAR, MAR, KARDEX AND RECENT RESULTS.

## 2018-12-26 LAB
ANION GAP SERPL CALC-SCNC: 5 MMOL/L (ref 3–18)
BUN SERPL-MCNC: 26 MG/DL (ref 7–18)
BUN/CREAT SERPL: 25 (ref 12–20)
CALCIUM SERPL-MCNC: 8.3 MG/DL (ref 8.5–10.1)
CHLORIDE SERPL-SCNC: 99 MMOL/L (ref 100–108)
CO2 SERPL-SCNC: 34 MMOL/L (ref 21–32)
CREAT SERPL-MCNC: 1.03 MG/DL (ref 0.6–1.3)
GLUCOSE BLD STRIP.AUTO-MCNC: 123 MG/DL (ref 70–110)
GLUCOSE BLD STRIP.AUTO-MCNC: 161 MG/DL (ref 70–110)
GLUCOSE SERPL-MCNC: 118 MG/DL (ref 74–99)
POTASSIUM SERPL-SCNC: 4 MMOL/L (ref 3.5–5.5)
SODIUM SERPL-SCNC: 138 MMOL/L (ref 136–145)

## 2018-12-26 PROCEDURE — 74011250636 HC RX REV CODE- 250/636: Performed by: HOSPITALIST

## 2018-12-26 PROCEDURE — C9113 INJ PANTOPRAZOLE SODIUM, VIA: HCPCS | Performed by: HOSPITALIST

## 2018-12-26 PROCEDURE — 94660 CPAP INITIATION&MGMT: CPT

## 2018-12-26 PROCEDURE — 94640 AIRWAY INHALATION TREATMENT: CPT

## 2018-12-26 PROCEDURE — 80048 BASIC METABOLIC PNL TOTAL CA: CPT

## 2018-12-26 PROCEDURE — 74011000250 HC RX REV CODE- 250: Performed by: HOSPITALIST

## 2018-12-26 PROCEDURE — 74011250637 HC RX REV CODE- 250/637: Performed by: HOSPITALIST

## 2018-12-26 PROCEDURE — 65660000001 HC RM ICU INTERMED STEPDOWN

## 2018-12-26 PROCEDURE — 36415 COLL VENOUS BLD VENIPUNCTURE: CPT

## 2018-12-26 PROCEDURE — 82962 GLUCOSE BLOOD TEST: CPT

## 2018-12-26 RX ADMIN — ARFORMOTEROL TARTRATE 15 MCG: 15 SOLUTION RESPIRATORY (INHALATION) at 21:01

## 2018-12-26 RX ADMIN — GABAPENTIN 400 MG: 400 CAPSULE ORAL at 22:00

## 2018-12-26 RX ADMIN — GABAPENTIN 400 MG: 400 CAPSULE ORAL at 08:31

## 2018-12-26 RX ADMIN — SIMVASTATIN 10 MG: 20 TABLET, FILM COATED ORAL at 22:00

## 2018-12-26 RX ADMIN — IPRATROPIUM BROMIDE AND ALBUTEROL SULFATE 3 ML: .5; 3 SOLUTION RESPIRATORY (INHALATION) at 11:37

## 2018-12-26 RX ADMIN — METHYLPREDNISOLONE SODIUM SUCCINATE 40 MG: 40 INJECTION, POWDER, FOR SOLUTION INTRAMUSCULAR; INTRAVENOUS at 14:18

## 2018-12-26 RX ADMIN — METHYLPREDNISOLONE SODIUM SUCCINATE 40 MG: 40 INJECTION, POWDER, FOR SOLUTION INTRAMUSCULAR; INTRAVENOUS at 05:44

## 2018-12-26 RX ADMIN — HEPARIN SODIUM 5000 UNITS: 5000 INJECTION INTRAVENOUS; SUBCUTANEOUS at 22:00

## 2018-12-26 RX ADMIN — BUDESONIDE 500 MCG: 0.5 INHALANT RESPIRATORY (INHALATION) at 21:02

## 2018-12-26 RX ADMIN — ARFORMOTEROL TARTRATE 15 MCG: 15 SOLUTION RESPIRATORY (INHALATION) at 08:35

## 2018-12-26 RX ADMIN — HEPARIN SODIUM 5000 UNITS: 5000 INJECTION INTRAVENOUS; SUBCUTANEOUS at 05:45

## 2018-12-26 RX ADMIN — OXYCODONE AND ACETAMINOPHEN 1 TABLET: 5; 325 TABLET ORAL at 09:25

## 2018-12-26 RX ADMIN — BUDESONIDE 500 MCG: 0.5 INHALANT RESPIRATORY (INHALATION) at 08:35

## 2018-12-26 RX ADMIN — IPRATROPIUM BROMIDE AND ALBUTEROL SULFATE 3 ML: .5; 3 SOLUTION RESPIRATORY (INHALATION) at 08:35

## 2018-12-26 RX ADMIN — IPRATROPIUM BROMIDE AND ALBUTEROL SULFATE 3 ML: .5; 3 SOLUTION RESPIRATORY (INHALATION) at 21:02

## 2018-12-26 RX ADMIN — METHYLPREDNISOLONE SODIUM SUCCINATE 40 MG: 40 INJECTION, POWDER, FOR SOLUTION INTRAMUSCULAR; INTRAVENOUS at 22:00

## 2018-12-26 RX ADMIN — PANTOPRAZOLE SODIUM 40 MG: 40 INJECTION, POWDER, FOR SOLUTION INTRAVENOUS at 08:31

## 2018-12-26 RX ADMIN — POTASSIUM CHLORIDE 20 MEQ: 1.5 POWDER, FOR SOLUTION ORAL at 08:31

## 2018-12-26 RX ADMIN — HEPARIN SODIUM 5000 UNITS: 5000 INJECTION INTRAVENOUS; SUBCUTANEOUS at 14:18

## 2018-12-26 RX ADMIN — IPRATROPIUM BROMIDE AND ALBUTEROL SULFATE 3 ML: .5; 3 SOLUTION RESPIRATORY (INHALATION) at 15:41

## 2018-12-26 RX ADMIN — GABAPENTIN 400 MG: 400 CAPSULE ORAL at 16:50

## 2018-12-26 NOTE — PROGRESS NOTES
Internal Medicine Progress Note    Patient's Name: Kay Tanner  Admit Date: 12/23/2018  Length of Stay: 3      Assessment/Plan     Active Hospital Problems    Diagnosis Date Noted    Acute exacerbation of chronic obstructive pulmonary disease (COPD) ([facility] [address] 93/19/8037    Diastolic CHF, acute on chronic (HCC) 12/24/2018    Acute on chronic respiratory failure with hypoxia ([facility] [address] 12/23/2018    Bilateral pleural effusion 11/14/2018    Hypertension 11/14/2018     Acute on chronic resp failure  - XR showing volume overload  - strict I&Os  - iv bumex bid  - telemetry  - maintain O2 88-92  - duoneb  - CTA showing  effusion, Moderate pleural effusions with bilateral partial lung collapse, and neg for PE.  - cardiology consult - appreciate assistance - cont bumex, obtain limited echo  - echo 12/24: EF 56-60%, trace MR, mild TR, no pulm HTN, mild RA and RV dilation  - ABG  - BiPAP PRN  - bumex on hold 2/2 HOTN  - started on levophed, titrate down as tolerated    B/L pleural effusion  - IV bumex  - potassium  - consider thoracentesis    COPD  - duoneb, brovana, pulmicort  - cont steroids    HTN  - cont home meds  - monitor BP  - hold BP meds and bumex 12/25    Hx of thoracic discitis  - Imaging findings were with radiology, abnormalities seen on imaging was seen on previous imaging in November as well, but this time slightly better. On that admission, it was determined based on imaging that the findings were non-infectious. The patient had refused any type of neurosurgical intervention regardless      - Cont acceptable home medications for chronic conditions   - DVT protocol  - PT/OT  - discharge: [facility]    I have personally reviewed all pertinent labs and films that have officially resulted over the last 24 hours. I have personally checked for all pending labs that are awaiting final results. Interval History     Kay Tanner is a 77y.o. year old male who presents to the ED with hypoxia.  Patient was just discharged for acute resp failure, PNA and volume overload. He was intubated at that time and discharged yesterday. He arrives today because his nurse noted he was hypoxic in 80's on usual 2 L. Although in no distress he is needing 3-4 L now for adequate oxygenation. Xray with volume overload. CTA showing  effusion, Moderate pleural effusions with bilateral partial lung collapse, and neg for PE. Pt was admitted and started on IV bumex, nebulizers. Cardiology was consulted. Echo on 12/17/18: EF 61-65% with grade 1 diastolic dysfunction, no RWMA, mild concentric LVH, mildly dilated LA + RA, trace mitral regurgitaiton. Repeat echo on 12/24: EF 56-60%, trace MR, mild TR, no pulm HTN, mild RA and RV dilation. ABG ordered. Pt started on BiPAP. Home meds for HTN and IV bumex on hold starting 12/25 2/2 HOTN. Pt stable on NC on 12/25. Weaning Levophed as tolerated. Subjective     Pt s/e @ bedside. No major events overnight. Pt offers no complaints this AM. Denies CP or SOB. Denies abd pain, nvd. Objective     Visit Vitals  /86   Pulse 75   Temp 97.7 °F (36.5 °C)   Resp 19   Ht 6' (1.829 m)   Wt 107.5 kg (237 lb 1.6 oz)   SpO2 96%   BMI 32.16 kg/m²       Physical Exam:  General Appearance: NAD, conversant  HENT: normocephalic/atraumatic, moist mucus membranes  Neck: No JVD, supple  Lungs: decreased breath sounds bilaterally, normal respiratory effort  CV: RRR, no m/r/g  Abdomen: soft, non-tender, normal bowel sounds  Extremities: no cyanosis, no peripheral edema  Neuro: No focal deficits, motor/sensory intact      Intake and Output:  Current Shift:  No intake/output data recorded.   Last three shifts:  12/24 1901 - 12/26 0700  In: 1901.3 [P.O.:540; I.V.:1361.3]  Out: 1000 [Urine:1000]    Lab/Data Reviewed:  BMP:   Lab Results   Component Value Date/Time     12/26/2018 06:05 AM    K 4.0 12/26/2018 06:05 AM    CL 99 (L) 12/26/2018 06:05 AM    CO2 34 (H) 12/26/2018 06:05 AM    AGAP 5 12/26/2018 06:05 AM     (H) 12/26/2018 06:05 AM    BUN 26 (H) 12/26/2018 06:05 AM    CREA 1.03 12/26/2018 06:05 AM    GFRAA >60 12/26/2018 06:05 AM    GFRNA >60 12/26/2018 06:05 AM     CBC: No results found for: WBC, HGB, HGBEXT, HCT, HCTEXT, PLT, PLTEXT, HGBEXT, HCTEXT, PLTEXT    Imaging Reviewed:  No results found.     Medications Reviewed:  Current Facility-Administered Medications   Medication Dose Route Frequency    pantoprazole (PROTONIX) injection 40 mg  40 mg IntraVENous DAILY    arformoterol (BROVANA) neb solution 15 mcg  15 mcg Nebulization BID RT    budesonide (PULMICORT) 500 mcg/2 ml nebulizer suspension  500 mcg Nebulization BID RT    methylPREDNISolone (PF) (SOLU-MEDROL) injection 40 mg  40 mg IntraVENous Q8H    NOREPINephrine (LEVOPHED) 8 mg in 0.9% NS 250ml infusion  2-16 mcg/min IntraVENous TITRATE    gabapentin (NEURONTIN) capsule 400 mg  400 mg Oral TID    loratadine (CLARITIN) tablet 10 mg  10 mg Oral DAILY PRN    oxyCODONE-acetaminophen (PERCOCET) 5-325 mg per tablet 1 Tab  1 Tab Oral Q6H PRN    simvastatin (ZOCOR) tablet 10 mg  10 mg Oral QHS    albuterol-ipratropium (DUO-NEB) 2.5 MG-0.5 MG/3 ML  3 mL Nebulization QID RT    heparin (porcine) injection 5,000 Units  5,000 Units SubCUTAneous Q8H    acetaminophen (TYLENOL) tablet 650 mg  650 mg Oral Q6H PRN    potassium chloride (KLOR-CON) packet for solution 20 mEq  20 mEq Oral DAILY WITH BREAKFAST    naloxone (NARCAN) injection 0.4 mg  0.4 mg IntraVENous PRN    ondansetron (ZOFRAN ODT) tablet 4 mg  4 mg Oral Q4H PRN         Amy Shah PA-C  [address] [place] [address]  Hospitalist Division  Office:  [phone]  Pager: [phone]

## 2018-12-26 NOTE — PROGRESS NOTES
Problem: Falls - Risk of  Goal: *Absence of Falls  Document Sumi Fall Risk and appropriate interventions in the flowsheet. Outcome: Progressing Towards Goal  Fall Risk Interventions:  Mobility Interventions: Bed/chair exit alarm, Communicate number of staff needed for ambulation/transfer, Strengthening exercises (ROM-active/passive)    Mentation Interventions: Adequate sleep, hydration, pain control, Bed/chair exit alarm, Door open when patient unattended, Increase mobility, More frequent rounding    Medication Interventions: Bed/chair exit alarm, Patient to call before getting OOB    Elimination Interventions: Bed/chair exit alarm, Call light in reach, Patient to call for help with toileting needs, Toilet paper/wipes in reach, Toileting schedule/hourly rounds             Problem: Pressure Injury - Risk of  Goal: *Prevention of pressure injury  Document Raul Scale and appropriate interventions in the flowsheet. Outcome: Progressing Towards Goal  Pressure Injury Interventions:  Sensory Interventions: Assess changes in LOC, Check visual cues for pain, Minimize linen layers, Pressure redistribution bed/mattress (bed type), Turn and reposition approx. every two hours (pillows and wedges if needed)    Moisture Interventions: Absorbent underpads, Maintain skin hydration (lotion/cream), Minimize layers    Activity Interventions: Pressure redistribution bed/mattress(bed type)    Mobility Interventions: Pressure redistribution bed/mattress (bed type), Turn and reposition approx.  every two hours(pillow and wedges)    Nutrition Interventions: Document food/fluid/supplement intake    Friction and Shear Interventions: HOB 30 degrees or less, Lift sheet, Minimize layers, Transferring/repositioning devices

## 2018-12-26 NOTE — PROGRESS NOTES
[facility] - Progress Note  Admit Date: 12/23/2018    Assessment:     -Combined hypercapnic and hypoxic respiratory failure. Initially felt to be due to volume overload, but in retrospect this now appears to be most likely related to a COPD exacerbation.  -Moderate bilateral pleural effusions. Felt to be iatrogenic from volume resuscitation during his last hospital admission. This is likely contributing to his respiratory failure. He did have a good response to IV diuretics yesterday with nearly a net -2 L fluid balance. Diuretic stopped due to low blood pressure.  -Hypotension requiring pressors  -Echo 12/17/18: EF 61-65% with grade 1 diastolic dysfunction, no RWMA, mild concentric LVH, mildly dilated LA + RA, trace mitral regurgitaiton, repeat limited echocardiogram 12/24 without significant change.  -Hx thoracic discitis 11/2018  -Hx HTN. On multiple outpatient antihypertensive agents  -Hx Hepatitis C/cirrhosis  -Hx COPD    Plan:     Breathing better, continue to wean Levo. Hold diuretics for today. Subjective:     No new complaints.      Objective:      Patient Vitals for the past 8 hrs:   Temp Pulse Resp BP SpO2   12/26/18 1137     97 %   12/26/18 1100  97 24 123/66 97 %   12/26/18 1000  99 25 93/58 95 %   12/26/18 0941     96 %   12/26/18 0939     (!) 88 %   12/26/18 0900  97 24 143/79 92 %   12/26/18 0835     97 %   12/26/18 0800 98.6 °F (37 °C) 91 27 141/79 97 %   12/26/18 0700  80 19 126/66 97 %   12/26/18 0500  75 19 119/86 96 %   12/26/18 0400  67 20 112/66 97 %         Patient Vitals for the past 96 hrs:   Weight   12/26/18 0544 107.5 kg (237 lb 1.6 oz)   12/24/18 1511 107 kg (236 lb)   12/24/18 1425 103.3 kg (227 lb 11.8 oz)   12/23/18 1254 107.1 kg (236 lb 1.6 oz)                    Intake/Output Summary (Last 24 hours) at 12/26/2018 1144  Last data filed at 12/26/2018 1100  Gross per 24 hour   Intake 1787.49 ml   Output 200 ml   Net 1587.49 ml Physical Exam:  General:  alert, cooperative, no distress, appears stated age  Neck:  nontender  Lungs: decreased  Heart:  regular rate and rhythm, S1, S2 normal, no murmur, click, rub or gallop  Abdomen:  abdomen is soft without significant tenderness, masses, organomegaly or guarding  Extremities:  extremities normal, atraumatic, no cyanosis or edema    Data Review:     Labs: Results:       Chemistry Recent Labs     12/26/18  0605 12/25/18  0210 12/24/18  0850   * 184* 103*    141 143   K 4.0 3.9 3.8   CL 99* 97* 100   CO2 34* 38* 43*   BUN 26* 19* 13   CREA 1.03 1.15 1.11   CA 8.3* 8.5 8.1*   AGAP 5 6 0*   BUCR 25* 17 12      CBC w/Diff Recent Labs     12/25/18  0210 12/24/18  0850 12/23/18  1351   WBC 3.4* 6.1 5.3   RBC 4.53* 4.41* 4.31*   HGB 12.5* 12.2* 12.0*   HCT 40.9 40.9 38.2    109* 112*   GRANS 77* 51 60   LYMPH 23 37 34   EOS 0 2 1      Cardiac Enzymes No results found for: CPK, CK, CKMMB, CKMB, RCK3, CKMBT, CKNDX, CKND1, KARYNA, TROPT, TROIQ, BRITTANIE, TROPT, TNIPOC, BNP, BNPP   Coagulation No results for input(s): PTP, INR, APTT in the last 72 hours. No lab exists for component: INREXT    Lipid Panel Lab Results   Component Value Date/Time    Cholesterol, total 114 12/17/2018 12:30 AM    HDL Cholesterol 36 (L) 12/17/2018 12:30 AM    LDL, calculated 61.8 12/17/2018 12:30 AM    VLDL, calculated 16.2 12/17/2018 12:30 AM    Triglyceride 81 12/17/2018 12:30 AM    CHOL/HDL Ratio 3.2 12/17/2018 12:30 AM      BNP No results found for: BNP, BNPP, XBNPT   Liver Enzymes No results for input(s): TP, ALB, TBIL, AP, SGOT, GPT in the last 72 hours.     No lab exists for component: DBIL   Digoxin    Thyroid Studies Lab Results   Component Value Date/Time    TSH 0.30 (L) 11/19/2009 03:26 PM          Signed By: Stella Arreguin MD     December 26, 2018

## 2018-12-26 NOTE — PROGRESS NOTES
Problem: Falls - Risk of  Goal: *Absence of Falls  Document Sumi Fall Risk and appropriate interventions in the flowsheet. Outcome: Progressing Towards Goal  Fall Risk Interventions:  Mobility Interventions: Bed/chair exit alarm, Communicate number of staff needed for ambulation/transfer, Strengthening exercises (ROM-active/passive)    Mentation Interventions: Adequate sleep, hydration, pain control, Bed/chair exit alarm, Door open when patient unattended, Increase mobility, More frequent rounding    Medication Interventions: Bed/chair exit alarm, Patient to call before getting OOB    Elimination Interventions: Bed/chair exit alarm, Call light in reach, Patient to call for help with toileting needs, Toilet paper/wipes in reach, Toileting schedule/hourly rounds             Problem: Breathing Pattern - Ineffective  Goal: *Absence of hypoxia  Outcome: Progressing Towards Goal  Collaborate with respiratory therapy; continuous SPO2 ; routine respiratory assessments, suctioning as needed. Problem: Pressure Injury - Risk of  Goal: *Prevention of pressure injury  Document Raul Scale and appropriate interventions in the flowsheet. Outcome: Progressing Towards Goal  Pressure Injury Interventions:  Sensory Interventions: Assess changes in LOC, Check visual cues for pain, Minimize linen layers, Pressure redistribution bed/mattress (bed type), Turn and reposition approx. every two hours (pillows and wedges if needed)    Moisture Interventions: Absorbent underpads, Maintain skin hydration (lotion/cream), Minimize layers    Activity Interventions: Pressure redistribution bed/mattress(bed type)    Mobility Interventions: Pressure redistribution bed/mattress (bed type), Turn and reposition approx.  every two hours(pillow and wedges)    Nutrition Interventions: Document food/fluid/supplement intake    Friction and Shear Interventions: HOB 30 degrees or less, Lift sheet, Minimize layers, Transferring/repositioning devices

## 2018-12-26 NOTE — PROGRESS NOTES
Physical Exam   Skin:           Primary Nurse Chanelle Naidu, CAMERON and Alise Poe RN performed a dual skin assessment on this patient No impairment noted

## 2018-12-26 NOTE — PROGRESS NOTES
0700: Received report from Chanelle RN. Assumed care of patient in bed in low locked position with call bell and phone within reach. 1900: Bedside and Verbal shift change report given to Cortney Franco (oncoming nurse) by Nuria Apple RN  (offgoing nurse). Report included the following information Recent Results, Med Rec Status and Cardiac Rhythm sinus .

## 2018-12-26 NOTE — CDMP QUERY
The medical record reflects the following clinical findings, treatment, and risk factors:     Risk Factors:Admitted with Acute on chronic resp failure 2/2 hypoxia/  B/l pleural effusion. PMH of COPD, on home O2    Clinical Indicators: 10/68 IM- Diastolic CHF, acute on chronic (HCC)   NT pro BNP 12/23 838- not repeated  Admitting CXR- 1. Mild pulmonary edema 2. Bilateral pleural effusions and/or atelectasis  Echo 12/17/18: EF 61-65% with grade 1 diastolic dysfunction, no RWMA, mild concentric LVH, mildly dilated LA + RA, trace mitral regurgitaiton, repeat limited echocardiogram 12/24 without significant change. Cardiology- Fluid overload, likely iatrogenic due to fluid resuscitation during last admission 12/16-12/21, received > 7L IVF according to notes. Persistent moderate bilateral pleural effusions on CT. Treatment: O2, IV bumex    Could you please further claify if dx of Diastolic CHF, acute on chronic is :    =>Diastolic CHF, acute on chronic  POA  =>Diastolic CHF, acute on chronic , not POA  =>Other Explanation of clinical findings  =>Unable to Determine (no explanation of clinical findings)    Please clarify and document your clinical opinion in the progress notes and discharge summary.     Thank you,  [facility],[address], [address]

## 2018-12-26 NOTE — PROGRESS NOTES
Bedside and Verbal shift change report given to Chanelle (oncoming nurse) by Moises Powell (offgoing nurse). Report included the following information SBAR and Kardex. 0710Bedside and Verbal shift change report given to [address] [place] [address] (oncoming nurse) by Chanelle RN (offgoing nurse). Report included the following information SBAR and Kardex.

## 2018-12-27 PROCEDURE — 74011000250 HC RX REV CODE- 250: Performed by: HOSPITALIST

## 2018-12-27 PROCEDURE — 74011250636 HC RX REV CODE- 250/636: Performed by: HOSPITALIST

## 2018-12-27 PROCEDURE — 77030021352 HC CBL LD SYS DISP COVD -B

## 2018-12-27 PROCEDURE — 94640 AIRWAY INHALATION TREATMENT: CPT

## 2018-12-27 PROCEDURE — 97530 THERAPEUTIC ACTIVITIES: CPT

## 2018-12-27 PROCEDURE — 74011250637 HC RX REV CODE- 250/637: Performed by: PHYSICIAN ASSISTANT

## 2018-12-27 PROCEDURE — 65660000000 HC RM CCU STEPDOWN

## 2018-12-27 PROCEDURE — 74011250637 HC RX REV CODE- 250/637: Performed by: HOSPITALIST

## 2018-12-27 PROCEDURE — 94660 CPAP INITIATION&MGMT: CPT

## 2018-12-27 PROCEDURE — C9113 INJ PANTOPRAZOLE SODIUM, VIA: HCPCS | Performed by: HOSPITALIST

## 2018-12-27 RX ORDER — FUROSEMIDE 40 MG/1
40 TABLET ORAL DAILY
Status: DISCONTINUED | OUTPATIENT
Start: 2018-12-28 | End: 2018-12-30 | Stop reason: HOSPADM

## 2018-12-27 RX ORDER — LISINOPRIL 10 MG/1
20 TABLET ORAL DAILY
Status: DISCONTINUED | OUTPATIENT
Start: 2018-12-27 | End: 2018-12-30 | Stop reason: HOSPADM

## 2018-12-27 RX ORDER — AMLODIPINE BESYLATE 5 MG/1
10 TABLET ORAL DAILY
Status: DISCONTINUED | OUTPATIENT
Start: 2018-12-27 | End: 2018-12-30 | Stop reason: HOSPADM

## 2018-12-27 RX ADMIN — AMLODIPINE BESYLATE 10 MG: 10 TABLET ORAL at 13:17

## 2018-12-27 RX ADMIN — BUDESONIDE 500 MCG: 0.5 INHALANT RESPIRATORY (INHALATION) at 07:20

## 2018-12-27 RX ADMIN — IPRATROPIUM BROMIDE AND ALBUTEROL SULFATE 3 ML: .5; 3 SOLUTION RESPIRATORY (INHALATION) at 16:15

## 2018-12-27 RX ADMIN — METHYLPREDNISOLONE SODIUM SUCCINATE 40 MG: 40 INJECTION, POWDER, FOR SOLUTION INTRAMUSCULAR; INTRAVENOUS at 22:00

## 2018-12-27 RX ADMIN — LISINOPRIL 20 MG: 20 TABLET ORAL at 13:17

## 2018-12-27 RX ADMIN — METHYLPREDNISOLONE SODIUM SUCCINATE 40 MG: 40 INJECTION, POWDER, FOR SOLUTION INTRAMUSCULAR; INTRAVENOUS at 13:17

## 2018-12-27 RX ADMIN — OXYCODONE AND ACETAMINOPHEN 1 TABLET: 5; 325 TABLET ORAL at 13:26

## 2018-12-27 RX ADMIN — GABAPENTIN 400 MG: 400 CAPSULE ORAL at 22:00

## 2018-12-27 RX ADMIN — IPRATROPIUM BROMIDE AND ALBUTEROL SULFATE 3 ML: .5; 3 SOLUTION RESPIRATORY (INHALATION) at 20:34

## 2018-12-27 RX ADMIN — POTASSIUM CHLORIDE 20 MEQ: 1.5 POWDER, FOR SOLUTION ORAL at 09:20

## 2018-12-27 RX ADMIN — SIMVASTATIN 10 MG: 20 TABLET, FILM COATED ORAL at 22:00

## 2018-12-27 RX ADMIN — OXYCODONE AND ACETAMINOPHEN 1 TABLET: 5; 325 TABLET ORAL at 20:36

## 2018-12-27 RX ADMIN — IPRATROPIUM BROMIDE AND ALBUTEROL SULFATE 3 ML: .5; 3 SOLUTION RESPIRATORY (INHALATION) at 07:21

## 2018-12-27 RX ADMIN — HEPARIN SODIUM 5000 UNITS: 5000 INJECTION INTRAVENOUS; SUBCUTANEOUS at 05:41

## 2018-12-27 RX ADMIN — ARFORMOTEROL TARTRATE 15 MCG: 15 SOLUTION RESPIRATORY (INHALATION) at 20:22

## 2018-12-27 RX ADMIN — HEPARIN SODIUM 5000 UNITS: 5000 INJECTION INTRAVENOUS; SUBCUTANEOUS at 13:18

## 2018-12-27 RX ADMIN — OXYCODONE AND ACETAMINOPHEN 1 TABLET: 5; 325 TABLET ORAL at 00:36

## 2018-12-27 RX ADMIN — HEPARIN SODIUM 5000 UNITS: 5000 INJECTION INTRAVENOUS; SUBCUTANEOUS at 20:36

## 2018-12-27 RX ADMIN — GABAPENTIN 400 MG: 400 CAPSULE ORAL at 16:52

## 2018-12-27 RX ADMIN — BUDESONIDE 500 MCG: 0.5 INHALANT RESPIRATORY (INHALATION) at 20:22

## 2018-12-27 RX ADMIN — METHYLPREDNISOLONE SODIUM SUCCINATE 40 MG: 40 INJECTION, POWDER, FOR SOLUTION INTRAMUSCULAR; INTRAVENOUS at 05:41

## 2018-12-27 RX ADMIN — GABAPENTIN 400 MG: 400 CAPSULE ORAL at 09:20

## 2018-12-27 RX ADMIN — ARFORMOTEROL TARTRATE 15 MCG: 15 SOLUTION RESPIRATORY (INHALATION) at 07:20

## 2018-12-27 RX ADMIN — IPRATROPIUM BROMIDE AND ALBUTEROL SULFATE 3 ML: .5; 3 SOLUTION RESPIRATORY (INHALATION) at 11:30

## 2018-12-27 RX ADMIN — PANTOPRAZOLE SODIUM 40 MG: 40 INJECTION, POWDER, FOR SOLUTION INTRAVENOUS at 09:20

## 2018-12-27 NOTE — PROGRESS NOTES
Internal Medicine Progress Note    Patient's Name: Laura Vitale  Admit Date: 12/23/2018  Length of Stay: 4      Assessment/Plan     Active Hospital Problems    Diagnosis Date Noted    Acute exacerbation of chronic obstructive pulmonary disease (COPD) ([facility] [address] 70/41/1663    Diastolic CHF, acute on chronic (HCC) 12/24/2018    Acute on chronic respiratory failure with hypoxia ([facility] [address] 12/23/2018    Bilateral pleural effusion 11/14/2018    Hypertension 11/14/2018     Acute on chronic resp failure  - XR showing volume overload  - strict I&Os  - iv bumex bid  - telemetry  - maintain O2 88-92  - duoneb  - CTA showing  effusion, Moderate pleural effusions with bilateral partial lung collapse, and neg for PE.  - cardiology consult - appreciate assistance - cont bumex, obtain limited echo  - echo 12/24: EF 56-60%, trace MR, mild TR, no pulm HTN, mild RA and RV dilation  - ABG 12/24: pH 7.26, pCO2 99.1, pO2 64, HCO3 45.2  - BiPAP PRN  - bumex on hold 2/2 HOTN  - started on levophed, titrate down as tolerated  - bumex restarted    B/L pleural effusion  - IV bumex  - potassium  - consider thoracentesis    COPD  - duoneb, brovana, pulmicort  - cont steroids    HTN  - cont home meds: Norvasc, Lisinopril, Lopressor  - monitor BP  - hold BP meds and bumex 12/25 for HOTN  - resume Norvasc 10mg and Lisinopril 20mg 12/27    Hx of thoracic discitis  - Imaging findings were with radiology, abnormalities seen on imaging was seen on previous imaging in November as well, but this time slightly better. On that admission, it was determined based on imaging that the findings were non-infectious. The patient had refused any type of neurosurgical intervention regardless      - Cont acceptable home medications for chronic conditions   - DVT protocol  - PT/OT  - discharge: [facility]    I have personally reviewed all pertinent labs and films that have officially resulted over the last 24 hours.  I have personally checked for all pending labs that are awaiting final results. Interval History     Rosa Madera is a 77y.o. year old male who presents to the ED with hypoxia. Patient was just discharged for acute resp failure, PNA and volume overload. He was intubated at that time and discharged yesterday. He arrives today because his nurse noted he was hypoxic in 80's on usual 2 L. Although in no distress he is needing 3-4 L now for adequate oxygenation. Xray with volume overload. CTA showing  effusion, Moderate pleural effusions with bilateral partial lung collapse, and neg for PE. Pt was admitted and started on IV bumex, nebulizers. Cardiology was consulted. Echo on 12/17/18: EF 61-65% with grade 1 diastolic dysfunction, no RWMA, mild concentric LVH, mildly dilated LA + RA, trace mitral regurgitaiton. Repeat echo on 12/24: EF 56-60%, trace MR, mild TR, no pulm HTN, mild RA and RV dilation. ABG ordered - pH 7.26, pCO2 99.1, pO2 64, HCO3 45.2. Pt started on BiPAP. Home meds for HTN and IV bumex on hold starting 12/25 2/2 HOTN. Pt stable on NC on 12/25. Weaning Levophed as tolerated. Pt to be transferred to the floor on 12/27. Slowly resuming home BP medications. Cardio restarted Lasix. Subjective     Pt s/e @ bedside. No major events overnight. Pt offers no complaints this AM. Denies CP or SOB. Denies abd pain, nvd. Objective     Visit Vitals  /81   Pulse 74   Temp 97.5 °F (36.4 °C)   Resp 23   Ht 6' (1.829 m)   Wt 110.1 kg (242 lb 12.8 oz)   SpO2 97%   BMI 32.93 kg/m²       Physical Exam:  General Appearance: NAD, conversant  HENT: normocephalic/atraumatic, moist mucus membranes  Neck: No JVD, supple  Lungs: decreased coarse breath sounds bilaterally, normal respiratory effort  CV: RRR, no m/r/g  Abdomen: soft, non-tender, normal bowel sounds  Extremities: no cyanosis, no peripheral edema  Neuro: No focal deficits, motor/sensory intact      Intake and Output:  Current Shift:  No intake/output data recorded.   Last three shifts:  12/25 1901 - 12/27 0700  In: 1746.8 [P.O.:460; I.V.:1286.8]  Out: -     Lab/Data Reviewed:  BMP:   No results found for: NA, K, CL, CO2, AGAP, GLU, BUN, CREA, GFRAA, GFRNA  CBC: No results found for: WBC, HGB, HGBEXT, HCT, HCTEXT, PLT, PLTEXT, HGBEXT, HCTEXT, PLTEXT    Imaging Reviewed:  No results found.     Medications Reviewed:  Current Facility-Administered Medications   Medication Dose Route Frequency    [START ON 12/28/2018] furosemide (LASIX) tablet 40 mg  40 mg Oral DAILY    amLODIPine (NORVASC) tablet 10 mg  10 mg Oral DAILY    lisinopril (PRINIVIL, ZESTRIL) tablet 20 mg  20 mg Oral DAILY    pantoprazole (PROTONIX) injection 40 mg  40 mg IntraVENous DAILY    arformoterol (BROVANA) neb solution 15 mcg  15 mcg Nebulization BID RT    budesonide (PULMICORT) 500 mcg/2 ml nebulizer suspension  500 mcg Nebulization BID RT    methylPREDNISolone (PF) (SOLU-MEDROL) injection 40 mg  40 mg IntraVENous Q8H    NOREPINephrine (LEVOPHED) 8 mg in 0.9% NS 250ml infusion  2-16 mcg/min IntraVENous TITRATE    gabapentin (NEURONTIN) capsule 400 mg  400 mg Oral TID    loratadine (CLARITIN) tablet 10 mg  10 mg Oral DAILY PRN    oxyCODONE-acetaminophen (PERCOCET) 5-325 mg per tablet 1 Tab  1 Tab Oral Q6H PRN    simvastatin (ZOCOR) tablet 10 mg  10 mg Oral QHS    albuterol-ipratropium (DUO-NEB) 2.5 MG-0.5 MG/3 ML  3 mL Nebulization QID RT    heparin (porcine) injection 5,000 Units  5,000 Units SubCUTAneous Q8H    acetaminophen (TYLENOL) tablet 650 mg  650 mg Oral Q6H PRN    potassium chloride (KLOR-CON) packet for solution 20 mEq  20 mEq Oral DAILY WITH BREAKFAST    naloxone (NARCAN) injection 0.4 mg  0.4 mg IntraVENous PRN    ondansetron (ZOFRAN ODT) tablet 4 mg  4 mg Oral Q4H PRN         Deanne Garcia PA-C  [facility]  Hospitalist Division  Office:  [phone]  Pager: [phone]

## 2018-12-27 NOTE — PROGRESS NOTES
[facility]  -  Progress Note      Patient: Ozzie Orr MRN: [id number]  SSN: [id number]    YOB: 1950  Age: 77 y.o. Sex: male      Admit Date: 12/23/2018    Assessment:     -Combined hypercapnic and hypoxic respiratory failure.  Initially felt to be due to volume overload, but in retrospect this now appears to be most likely related to a COPD exacerbation.  -Moderate bilateral pleural effusions.  Felt to be iatrogenic from volume resuscitation during his last hospital admission.  This is likely contributing to his respiratory failure.  He did have a good response to IV diuretics yesterday with nearly a net -2 L fluid balance.  Diuretic stopped due to low blood pressure.  -Hypotension. Initially required vasopressors, they have since been weaned off. He is now hypertensive.  -Echo 12/17/18: EF 61-65% with grade 1 diastolic dysfunction, no RWMA, mild concentric LVH, mildly dilated LA + RA, trace mitral regurgitaiton, repeat limited echocardiogram 12/24 without significant change.  -Hx thoracic discitis 11/2018  -Hx HTN.  On multiple outpatient antihypertensive agents  -Hx Hepatitis C/cirrhosis  -Hx COPD      Plan:     --Would gradually restart his outpatient antihypertensives at a lower dosages. --We will resume furosemide 40 mg daily p.o. starting tomorrow. Stable from a cardiac standpoint to transfer out of the ICU. We will be available if additional questions arise    Subjective:     No new complaints.      Objective:      Patient Vitals for the past 8 hrs:   Temp Pulse Resp BP SpO2   12/27/18 1000  95 27 141/63 93 %   12/27/18 0900  75 27 (!) 182/100 94 %   12/27/18 0800 98.7 °F (37.1 °C) 79 25 174/75 92 %   12/27/18 0721     97 %   12/27/18 0700  74 24 175/78 96 %   12/27/18 0600  74 23 170/81 97 %   12/27/18 0545  77 23 154/80 97 %   12/27/18 0530  69 (!) 33 194/89 97 %   12/27/18 0515  80 23 149/71 96 %   12/27/18 0500  73 21 149/71 97 %   12/27/18 0445  71 21 152/68 97 %   12/27/18 0430  72 20 152/64 97 %   12/27/18 0415  72 24 161/77 97 %   12/27/18 0400  73 14 145/77 98 %   12/27/18 0345  73 14 144/73 98 %   12/27/18 0330  75 16 146/75 98 %   12/27/18 0315  82 24 156/82 98 %         Patient Vitals for the past 96 hrs:   Weight   12/27/18 0613 110.1 kg (242 lb 12.8 oz)   12/26/18 0544 107.5 kg (237 lb 1.6 oz)   12/24/18 1511 107 kg (236 lb)   12/24/18 1425 103.3 kg (227 lb 11.8 oz)   12/23/18 1254 107.1 kg (236 lb 1.6 oz)         Intake/Output Summary (Last 24 hours) at 12/27/2018 1113  Last data filed at 12/26/2018 2346  Gross per 24 hour   Intake 501.83 ml   Output    Net 501.83 ml       Physical Exam:  General:  alert, cooperative, no distress, appears stated age  Neck:  no carotid bruit, no JVD  Lungs:  clear to auscultation bilaterally, diminished breath sounds R base, L base  Heart:  regular rate and rhythm  Abdomen:  abdomen is soft without significant tenderness, masses, organomegaly or guarding  Extremities:  extremities normal, atraumatic, no cyanosis or edema    Data Review:     Labs: Results:       Chemistry Recent Labs     12/26/18  0605 12/25/18  0210   * 184*    141   K 4.0 3.9   CL 99* 97*   CO2 34* 38*   BUN 26* 19*   CREA 1.03 1.15   CA 8.3* 8.5   AGAP 5 6   BUCR 25* 17      CBC w/Diff Recent Labs     12/25/18  0210   WBC 3.4*   RBC 4.53*   HGB 12.5*   HCT 40.9      GRANS 77*   LYMPH 23   EOS 0      Cardiac Enzymes No results found for: CPK, CK, CKMMB, CKMB, RCK3, CKMBT, CKNDX, CKND1, KARYNA, TROPT, TROIQ, BRITTANIE, TROPT, TNIPOC, BNP, BNPP   Coagulation No results for input(s): PTP, INR, APTT in the last 72 hours.     No lab exists for component: INREXT    Lipid Panel Lab Results   Component Value Date/Time    Cholesterol, total 114 12/17/2018 12:30 AM    HDL Cholesterol 36 (L) 12/17/2018 12:30 AM    LDL, calculated 61.8 12/17/2018 12:30 AM    VLDL, calculated 16.2 12/17/2018 12:30 AM    Triglyceride 81 12/17/2018 12:30 AM CHOL/HDL Ratio 3.2 12/17/2018 12:30 AM      BNP No results found for: BNP, BNPP, XBNPT   Liver Enzymes No results for input(s): TP, ALB, TBIL, AP, SGOT, GPT in the last 72 hours.     No lab exists for component: DBIL   Digoxin    Thyroid Studies Lab Results   Component Value Date/Time    TSH 0.30 (L) 11/19/2009 03:26 PM

## 2018-12-27 NOTE — PROGRESS NOTES
Problem: Falls - Risk of  Goal: *Absence of Falls  Document Sumi Fall Risk and appropriate interventions in the flowsheet. Outcome: Progressing Towards Goal  Fall Risk Interventions:  Mobility Interventions: Patient to call before getting OOB, Strengthening exercises (ROM-active/passive)    Mentation Interventions: Adequate sleep, hydration, pain control, Door open when patient unattended    Medication Interventions: Evaluate medications/consider consulting pharmacy    Elimination Interventions: Call light in reach, Toileting schedule/hourly rounds             Problem: Pressure Injury - Risk of  Goal: *Prevention of pressure injury  Document Raul Scale and appropriate interventions in the flowsheet. Outcome: Progressing Towards Goal  Pressure Injury Interventions:  Sensory Interventions: Pressure redistribution bed/mattress (bed type), Turn and reposition approx. every two hours (pillows and wedges if needed)    Moisture Interventions: Absorbent underpads, Internal/External urinary devices    Activity Interventions: Pressure redistribution bed/mattress(bed type)    Mobility Interventions: HOB 30 degrees or less, Pressure redistribution bed/mattress (bed type)    Nutrition Interventions: Document food/fluid/supplement intake    Friction and Shear Interventions: HOB 30 degrees or less, Lift team/patient mobility team, Transferring/repositioning devices               Comments: Patient is progressing towards goals and discharge.

## 2018-12-27 NOTE — PROGRESS NOTES
completed follow up visit with patient  and a Spiritual assessment of patient was done Giuseppeund patient setting up eating his lunch and responded  That he was doing ok at present. Chaplains will continue to follow and will provide pastoral care on an as needed/requested basis    Chaplain Gary Lambert   Board Certified [address] [place] [address]   [phone]

## 2018-12-27 NOTE — DIABETES MGMT
NUTRITIONAL ASSESSMENT GLYCEMIC CONTROL/ PLAN OF CARE     [facility] [address] [facility],First Floor           77 y.o.           12/23/2018                 1. Hypoxia    2. SOB (shortness of breath)    3. JVD (jugular venous distension)       INTERVENTIONS/PLAN:   Monitor po intake, labs and weights. ASSESSMENT:   Pt is 136% ideal weight; pt appears well nourished and po intake is adequate per I/O's. Blood glucose in target range (receiving steroids). Nutrition Diagnoses:   Obesity due to excess energy intake as evidenced by BMI (calculated): 32.9 kg/m2. Altered nutrition related labs due to prediabetes as evidenced by A1C of 5.8%.           SUBJECTIVE/OBJECTIVE:   Information obtained from: chart review    Diet: mechanical soft    Patient Vitals for the past 100 hrs:   % Diet Eaten   12/26/18 1600 100 %   12/26/18 1200 100 %   12/25/18 1300 100 %   12/25/18 0900 75 %         Medications: [x]                Reviewed   Pertinent:  Solu Medrol 40 mg q 8 hours, Lasix    Most Recent POC Glucose:   Recent Labs     12/26/18  0605 12/25/18  0210   * 184*         Labs:   Lab Results   Component Value Date/Time    Hemoglobin A1c 5.8 12/06/2010 04:45 AM     Lab Results   Component Value Date/Time    Sodium 138 12/26/2018 06:05 AM    Potassium 4.0 12/26/2018 06:05 AM    Chloride 99 (L) 12/26/2018 06:05 AM    CO2 34 (H) 12/26/2018 06:05 AM    Anion gap 5 12/26/2018 06:05 AM    Glucose 118 (H) 12/26/2018 06:05 AM    BUN 26 (H) 12/26/2018 06:05 AM    Creatinine 1.03 12/26/2018 06:05 AM    Calcium 8.3 (L) 12/26/2018 06:05 AM    Magnesium 1.7 12/21/2018 05:47 AM    Phosphorus 2.5 12/21/2018 05:47 AM    Albumin 2.1 (L) 12/21/2018 05:47 AM       Anthropometrics: IBW : 80.7 kg (178 lb), % IBW (Calculated): 136.4 %, BMI (calculated): 32.9  Wt Readings from Last 1 Encounters:   12/27/18 110.1 kg (242 lb 12.8 oz)      Ht Readings from Last 1 Encounters:   12/24/18 6' (1.829 m)       Estimated Nutrition Needs:  2280 Kcals/day, Protein (g): 121 g Fluid (ml): 1800 ml  Based on:   [x]          Actual BW    []          ABW   []            Adjusted BW      Nutrition Interventions:  None at this time  Goal:   Blood glucose will be within target range of  mg/dL by 12/30/18. Weight maintenance (+/- 1-2 kg) by 1/7/19.         Nutrition Monitoring and Evaluation      [x]     Monitor po intake on meal rounds  [x]     Continue inpatient monitoring and intervention  []     Other:      Nutrition Risk:  []   High     [x]  Moderate    []  Minimal/Uncompromised    Lyndsay Quispe RD, CDE   Office:  [address] [place] [address] Pager:  [phone]

## 2018-12-27 NOTE — MANAGEMENT PLAN
Discharge/Transition Planning    Spoke with patient again about Nursing Home placement. He again declined due to facility taking all but $40 of money for payment of all services and boarding.  Pt states he will do his own cooking and go back home with his [address] [place] [address] RN BSN  Outcomes Manager  Pager # [phone]

## 2018-12-27 NOTE — PROGRESS NOTES
Problem: Mobility Impaired (Adult and Pediatric)  Goal: *Acute Goals and Plan of Care (Insert Text)  Physical Therapy Goals   Initiated 12/24/2018 and to be accomplished within 7 days. 1.  Patient will complete all bed mobility with modified independence in order to prepare for EOB/OOB activity. 2.  Patient will perform sit <> stand with modified independence in order to prepare for OOB/gait activity. 3.  Patient will perform bed to chair transfers with modified independence in order to promote mobility and encourage seated activity to progress towards their prior level of function. 4.  Patient will ambulate 75 feet with modified independence using LRAD in order to prepare for safe negotiation of their environment. Outcome: Progressing Towards Goal    PHYSICAL THERAPY: Daily TREATMENT Note   INPATIENT: Medicaid: Hospital Day: 5     Patient: Job Ren (67 y.o. male)    Date: 12/27/2018  Primary Diagnosis: Acute on chronic respiratory failure with hypoxia (HCC)   ,  ,   Precautions:      Chart, physical therapy assessment, plan of care and goals were reviewed. PLOF: Independent     ASSESSMENT:  Patient supine in bed, agreeable to participation with PT. MIN A x 1 for supine > sit. Good seated balance. CGA for sit <> stand with RW for support. SPO2 prior to mobility 92% on 3L. Ambulation x 15 ft with RW; SPO2 77% on 3L. Patient has no c/o dizziness or feeling lightheadedness. Patient returned to room; instructed in deep breathing recovery; SPO2 returns to 92%. Patient assisted with ADLs at EOB. Returned supine with MIN A x 1. Left supine with all needs within reach. No c/o pain. SPO2 at end of session 95-96%. Progression toward goals:        Improving appropriately and progressing toward goals. PLAN:  Patient continues to benefit from skilled intervention to address the above impairments. Continue treatment per established plan of care.     EDUCATION:   Education:  Patient was educated on the following topics: bed mobility, RW, safety with mobility, verbalizes understanding. Barriers to Learning/Limitations: None  Compensate with: visual, verbal, tactile, kinesthetic cues/model    Discharge Recommendations:  Home Health  Further Equipment Recommendations for Discharge:  rolling walker  Factors which may impact discharge planning: N/A     SUBJECTIVE:   Patient stated I can walk in the [place], can I get some gingerale.     OBJECTIVE DATA SUMMARY:   Critical Behavior:  Neurologic State: Alert  Orientation Level: Oriented X4  Cognition: Follows commands       G CODE:Mobility [id number] Current  CK= 40-59%   Goal  CJ= 20-39%.   The severity rating is based on the Other MIN A - CGA for mobility    Functional Mobility:      Functional Status      Indep   (I)   Mod I   Super-vision   Min A   Mod A   Max A   Total A   Assist x2 Verbal cues Additional time Not tested   Comments   Rolling []  []  [] []    []    []  []  [] [] [] [x]    Supine to sit []  []  [] [x]  []  []  []  [] [] [] []    Sit to supine []  []  [] [x]  []  []  []  [] [] [] []    Sit to stand []  []  [x] []  []  []  []  [] [] [] [] CGA   Stand to sit []  []  [x] []  []  []  []  [] [] [] []    Bed to chair transfers []  []  [] []  []  []  []  [] [] [] [x]        Balance    Good   Fair   Poor   Unable   Not tested   Comments   Sitting static [x]  []  []  []  []    Sitting dynamic [x]  []  []  []  []    Standing static [x]  []  []  []  [] Good with RW   Standing dynamic []  [x]  []  []  []      Mobility/Gait:   Level of Assistance: Contact guard assistance  Assistive Device: rolling walker  Distance Ambulated: 15 feet     Left Lower Extremity: FWB  Right Lower Extremity: FWB  Base of Support: [facility]  Speed/Shannan: pace decreased (<100 feet/min) and slow  Step Length: WFL  Swing Pattern: WFL  Stance: WFL  Gait Abnormalities: altered arm swing and decreased step clearance    Vital Signs  Temp: 98.1 °F (36.7 °C)     Pulse (Heart Rate): 95     BP: 141/63 Resp Rate: 27     O2 Sat (%): 93 %     Pain:  None    Activity Tolerance:   Fair     After treatment:   Patient left in no apparent distress in bed  Call bell left within reach      Shira SALGUERO Carter   Time Calculation: 29 mins

## 2018-12-28 PROCEDURE — 74011250637 HC RX REV CODE- 250/637: Performed by: INTERNAL MEDICINE

## 2018-12-28 PROCEDURE — 77010033678 HC OXYGEN DAILY

## 2018-12-28 PROCEDURE — 94640 AIRWAY INHALATION TREATMENT: CPT

## 2018-12-28 PROCEDURE — 74011250636 HC RX REV CODE- 250/636: Performed by: HOSPITALIST

## 2018-12-28 PROCEDURE — 97530 THERAPEUTIC ACTIVITIES: CPT

## 2018-12-28 PROCEDURE — 74011250637 HC RX REV CODE- 250/637: Performed by: HOSPITALIST

## 2018-12-28 PROCEDURE — 74011250637 HC RX REV CODE- 250/637: Performed by: PHYSICIAN ASSISTANT

## 2018-12-28 PROCEDURE — 74011000250 HC RX REV CODE- 250: Performed by: HOSPITALIST

## 2018-12-28 PROCEDURE — 97535 SELF CARE MNGMENT TRAINING: CPT

## 2018-12-28 PROCEDURE — 65660000000 HC RM CCU STEPDOWN

## 2018-12-28 RX ORDER — PANTOPRAZOLE SODIUM 40 MG/1
40 TABLET, DELAYED RELEASE ORAL
Status: DISCONTINUED | OUTPATIENT
Start: 2018-12-28 | End: 2018-12-30 | Stop reason: HOSPADM

## 2018-12-28 RX ORDER — METOPROLOL TARTRATE 50 MG/1
50 TABLET ORAL 2 TIMES DAILY
Status: DISCONTINUED | OUTPATIENT
Start: 2018-12-28 | End: 2018-12-30 | Stop reason: HOSPADM

## 2018-12-28 RX ADMIN — HEPARIN SODIUM 5000 UNITS: 5000 INJECTION INTRAVENOUS; SUBCUTANEOUS at 21:49

## 2018-12-28 RX ADMIN — GABAPENTIN 400 MG: 400 CAPSULE ORAL at 21:49

## 2018-12-28 RX ADMIN — LISINOPRIL 20 MG: 20 TABLET ORAL at 09:07

## 2018-12-28 RX ADMIN — FUROSEMIDE 40 MG: 40 TABLET ORAL at 09:02

## 2018-12-28 RX ADMIN — GABAPENTIN 400 MG: 400 CAPSULE ORAL at 09:02

## 2018-12-28 RX ADMIN — BUDESONIDE 500 MCG: 0.5 INHALANT RESPIRATORY (INHALATION) at 07:56

## 2018-12-28 RX ADMIN — HEPARIN SODIUM 5000 UNITS: 5000 INJECTION INTRAVENOUS; SUBCUTANEOUS at 13:20

## 2018-12-28 RX ADMIN — HEPARIN SODIUM 5000 UNITS: 5000 INJECTION INTRAVENOUS; SUBCUTANEOUS at 06:45

## 2018-12-28 RX ADMIN — METHYLPREDNISOLONE SODIUM SUCCINATE 40 MG: 40 INJECTION, POWDER, FOR SOLUTION INTRAMUSCULAR; INTRAVENOUS at 13:20

## 2018-12-28 RX ADMIN — METHYLPREDNISOLONE SODIUM SUCCINATE 40 MG: 40 INJECTION, POWDER, FOR SOLUTION INTRAMUSCULAR; INTRAVENOUS at 21:49

## 2018-12-28 RX ADMIN — ARFORMOTEROL TARTRATE 15 MCG: 15 SOLUTION RESPIRATORY (INHALATION) at 20:30

## 2018-12-28 RX ADMIN — ARFORMOTEROL TARTRATE 15 MCG: 15 SOLUTION RESPIRATORY (INHALATION) at 07:56

## 2018-12-28 RX ADMIN — OXYCODONE AND ACETAMINOPHEN 1 TABLET: 5; 325 TABLET ORAL at 09:02

## 2018-12-28 RX ADMIN — IPRATROPIUM BROMIDE AND ALBUTEROL SULFATE 3 ML: .5; 3 SOLUTION RESPIRATORY (INHALATION) at 07:56

## 2018-12-28 RX ADMIN — SIMVASTATIN 10 MG: 20 TABLET, FILM COATED ORAL at 21:49

## 2018-12-28 RX ADMIN — AMLODIPINE BESYLATE 10 MG: 10 TABLET ORAL at 09:02

## 2018-12-28 RX ADMIN — GABAPENTIN 400 MG: 400 CAPSULE ORAL at 17:16

## 2018-12-28 RX ADMIN — IPRATROPIUM BROMIDE AND ALBUTEROL SULFATE 3 ML: .5; 3 SOLUTION RESPIRATORY (INHALATION) at 15:31

## 2018-12-28 RX ADMIN — METHYLPREDNISOLONE SODIUM SUCCINATE 40 MG: 40 INJECTION, POWDER, FOR SOLUTION INTRAMUSCULAR; INTRAVENOUS at 07:28

## 2018-12-28 RX ADMIN — METOPROLOL TARTRATE 50 MG: 50 TABLET ORAL at 17:16

## 2018-12-28 RX ADMIN — BUDESONIDE 500 MCG: 0.5 INHALANT RESPIRATORY (INHALATION) at 20:30

## 2018-12-28 RX ADMIN — IPRATROPIUM BROMIDE AND ALBUTEROL SULFATE 3 ML: .5; 3 SOLUTION RESPIRATORY (INHALATION) at 20:30

## 2018-12-28 RX ADMIN — OXYCODONE AND ACETAMINOPHEN 1 TABLET: 5; 325 TABLET ORAL at 18:15

## 2018-12-28 RX ADMIN — POTASSIUM CHLORIDE 20 MEQ: 1.5 POWDER, FOR SOLUTION ORAL at 09:02

## 2018-12-28 NOTE — ROUTINE PROCESS
1830 Patient transferred from ICU. Telemetry monitoring started. Patient eating dinner. 1915 Bedside and Verbal shift change report given to [facility] RN (oncoming nurse) by Toan Ruvalcaba RN (offgoing nurse). Report included the following information SBAR, Kardex, Intake/Output, MAR, Recent Results and Cardiac Rhythm NSR.

## 2018-12-28 NOTE — PROGRESS NOTES
Pt is active with [facility] and chooses to continue with them for home care services. Referral sent to intake via [employer] and called to intake rep, Destiny Gaytan, as a pending dc. Pt verified the contact information on the face sheet is correct. Care Management Interventions PCP Verified by CM: Yes 
Palliative Care Criteria Met (RRAT>21 & CHF Dx)?: No 
Mode of Transport at Discharge: Other (see comment)(Medicaid transport) Transition of Care Consult (CM Consult): Home Health [facility] [place] - INPATIENT: Yes MyChart Signup: No 
Discharge Durable Medical Equipment: No 
Physical Therapy Consult: No 
Occupational Therapy Consult: No 
Speech Therapy Consult: No 
Current Support Network: Other(Lives with elderly mom) Confirm Follow Up Transport: Other (see comment) Plan discussed with Pt/Family/Caregiver: Yes Freedom of Choice Offered: Yes Discharge Location Discharge Placement: Home with home health

## 2018-12-28 NOTE — PROGRESS NOTES
Problem: Self Care Deficits Care Plan (Adult) Goal: *Acute Goals and Plan of Care (Insert Text) Occupational Therapy Goals Initiated 12/24/2018 within 7 day(s). 1.  Patient will perform grooming tasks while standing with modified independence. 2.  Patient will perform lower body dressing with modified independence. 3.  Patient will perform functional activity in standing for 8 minutes with modified independence and < 2 rest breaks to increase activity tolerance for ADLs and functional transfers. 4.  Patient will perform toilet transfers with modified independence. 5.  Patient will perform all aspects of toileting with modified independence. 6.  Patient will participate in upper extremity therapeutic exercise/activities with modified independence for 8 minutes to maintain BUE strength for functional transfers and ADLs. 7.  Patient will utilize energy conservation techniques during functional activities with minimal verbal cues. Outcome: Progressing Towards Goal 
Occupational Therapy TREATMENT Patient: Cydney Kelly (67 y.o. male) Date: 12/28/2018 Diagnosis: Acute on chronic respiratory failure with hypoxia (HCC) Acute on chronic respiratory failure with hypoxia (HCC) Precautions:   
Chart, occupational therapy assessment, plan of care, and goals were reviewed. PLOF: Independent ASSESSMENT: 
PT is pleasant and cooperative, motivated for OOB activity. Pt requires vc's for safety w/functional mobility and O2 tubing mgt. Pt performs toileting ADL w/SBA and demonstrates good use of grab bar w/fucntional transfer to toilet. Pt does not tolerates standing for hand hygiene and returns to chair. Pt requires set-up for simple ADL grooming tasks at chair level. No c/o pain or SOB. Pt left in chair and reinforced importance of calling for assistance. EDUCATION Pt educated on safety w/functoinal mobility and O2 tubing mgt Progression toward goals: [x]          Improving appropriately and progressing toward goals 
[]          Improving slowly and progressing toward goals 
[]          Not making progress toward goals and plan of care will be adjusted PLAN: 
Patient continues to benefit from skilled intervention to address the above impairments. Continue treatment per established plan of care. Discharge Recommendations:  Home Health Further Equipment Recommendations for Discharge:  N/A, pt has DME SUBJECTIVE:  
Patient stated I feel good.  OBJECTIVE DATA SUMMARY: 
  
Cognitive/Behavioral Status: 
Neurologic State: Alert Orientation Level: Oriented X4 Cognition: Follows commands Functional Mobility and Transfers for ADLs: 
 Bed Mobility: 
Supine to Sit: Stand-by assistance Transfers: 
Sit to Stand: Stand-by assistance(w/RW) Bed to Chair: Stand-by assistance(w/RW) Toilet Transfer : Stand-by assistance(w/grab bar) Bathroom Mobility: Stand-by assistance(w/RW) Balance: 
Sitting: Intact Standing: Intact; With support ADL Intervention: 
Grooming Washing Face: Supervision/set-up Washing Hands: Supervision/set-up Toileting Toileting Assistance: Stand-by assistance Bowel Hygiene: Stand-by assistance Clothing Management: Stand-by assistance Pain: 
Pre Treatment:0 Post Treatment:0 Activity Tolerance:   
Good Please refer to the flowsheet for vital signs taken during this treatment. After treatment:  
[x]  Patient left in no apparent distress sitting up in chair 
[]  Patient left in no apparent distress in bed 
[x]  Call bell left within reach [x]  Nursing notified 
[]  Caregiver present 
[]  Bed alarm activated Aminata Lim Time Calculation: 23 mins

## 2018-12-28 NOTE — PROGRESS NOTES
Attempted PT, pt declined stating he has been moving a lot today and is awaiting RT. Will continue to follow.  Ellyn Leonard, PTA

## 2018-12-28 NOTE — PROGRESS NOTES
Recd pt on 2 lpm NC 
--Administer neb 1210--Attempt to administer neb - pt eating 1410-Pt checked on by RT Rajiv Banks - stable - too close to next neb will administer next round

## 2018-12-28 NOTE — PROGRESS NOTES
Problem: Falls - Risk of 
Goal: *Absence of Falls Document Debria Check Fall Risk and appropriate interventions in the flowsheet. Outcome: Progressing Towards Goal 
Fall Risk Interventions: 
Mobility Interventions: Patient to call before getting OOB, Bed/chair exit alarm Mentation Interventions: Adequate sleep, hydration, pain control Medication Interventions: Bed/chair exit alarm, Patient to call before getting OOB Elimination Interventions: Call light in reach, Patient to call for help with toileting needs Problem: Pressure Injury - Risk of 
Goal: *Prevention of pressure injury Document Raul Scale and appropriate interventions in the flowsheet. Outcome: Progressing Towards Goal 
Pressure Injury Interventions: 
Sensory Interventions: Assess changes in LOC Moisture Interventions: Absorbent underpads Activity Interventions: Pressure redistribution bed/mattress(bed type) Mobility Interventions: HOB 30 degrees or less Nutrition Interventions: Document food/fluid/supplement intake Friction and Shear Interventions: HOB 30 degrees or less

## 2018-12-28 NOTE — PROGRESS NOTES
Problem: Falls - Risk of  Goal: *Absence of Falls  Document Sumi Fall Risk and appropriate interventions in the flowsheet.   Outcome: Progressing Towards Goal  Fall Risk Interventions:  Mobility Interventions: Bed/chair exit alarm    Mentation Interventions: Adequate sleep, hydration, pain control, Bed/chair exit alarm    Medication Interventions: Bed/chair exit alarm    Elimination Interventions: Bed/chair exit alarm, Call light in reach, Patient to call for help with toileting needs

## 2018-12-28 NOTE — PROGRESS NOTES
Internal Medicine Progress Note Patient's Name: Ghazal Snow Admit Date: 12/23/2018 Length of Stay: 5 Assessment/Plan Active Hospital Problems Diagnosis Date Noted  Acute exacerbation of chronic obstructive pulmonary disease (COPD) ([facility] 12/25/2018  Diastolic CHF, acute on chronic (HCC) 12/24/2018  Acute on chronic respiratory failure with hypoxia ([facility] 12/23/2018  Bilateral pleural effusion 11/14/2018  Hypertension 11/14/2018 Acute on chronic resp failure - XR showing volume overload 
- strict I&Os 
- iv bumex bid 
- telemetry 
- maintain O2 88-92 
- duoneb - CTA showing  effusion, Moderate pleural effusions with bilateral partial lung collapse, and neg for PE. 
- cardiology consult - appreciate assistance - cont bumex, obtain limited echo 
- echo 12/24: EF 56-60%, trace MR, mild TR, no pulm HTN, mild RA and RV dilation - ABG 12/24: pH 7.26, pCO2 99.1, pO2 64, HCO3 45.2 
- BiPAP PRN 
- bumex on hold 2/2 HOTN 
- started on levophed, titrate down as tolerated - Lasix restarted 12/28 B/L pleural effusion - IV bumex - potassium 
- consider thoracentesis COPD 
- scheduled duoneb, brovana, pulmicort 
- cont steroids HTN 
- cont home meds: Norvasc, Lisinopril, Lopressor 
- monitor BP 
- hold BP meds and bumex 12/25 for HOTN 
- resume Norvasc 10mg and Lisinopril 20mg 12/27 
- Lopressor restarted at 50mg Hx of thoracic discitis 
- Imaging findings were with radiology, abnormalities seen on imaging was seen on previous imaging in November as well, but this time slightly better. On that admission, it was determined based on imaging that the findings were non-infectious. The patient had refused any type of neurosurgical intervention regardless 
 
 
- Cont acceptable home medications for chronic conditions  
- DVT protocol 
- PT/OT 
- discharge: [place] I have personally reviewed all pertinent labs and films that have officially resulted over the last 24 hours. I have personally checked for all pending labs that are awaiting final results. Interval History Mariely Downs is a 77y.o. year old male who presents to the ED with hypoxia. Patient was just discharged for acute resp failure, PNA and volume overload. He was intubated at that time and discharged yesterday. He arrives today because his nurse noted he was hypoxic in 80's on usual 2 L. Although in no distress he is needing 3-4 L now for adequate oxygenation. Xray with volume overload. CTA showing  effusion, Moderate pleural effusions with bilateral partial lung collapse, and neg for PE. Pt was admitted and started on IV bumex, nebulizers. Cardiology was consulted. Echo on 12/17/18: EF 61-65% with grade 1 diastolic dysfunction, no RWMA, mild concentric LVH, mildly dilated LA + RA, trace mitral regurgitaiton. Repeat echo on 12/24: EF 56-60%, trace MR, mild TR, no pulm HTN, mild RA and RV dilation. ABG ordered - pH 7.26, pCO2 99.1, pO2 64, HCO3 45.2. Pt started on BiPAP. Home meds for HTN and IV bumex on hold starting 12/25 2/2 HOTN. Pt stable on NC on 12/25. Weaning Levophed as tolerated. Pt to be transferred to the floor on 12/27. Slowly resuming home BP medications. Cardio restarted Lasix. Subjective Pt s/e @ bedside. No major events overnight. Pt reports mild SOB. Denies CP. Denies abd pain, nvd. Objective Visit Vitals /78 Pulse 86 Temp 98.6 °F (37 °C) Resp 22 Ht 6' (1.829 m) Wt 110.1 kg (242 lb 11.6 oz) SpO2 96% BMI 32.92 kg/m² Physical Exam: 
General Appearance: NAD, conversant HENT: normocephalic/atraumatic, moist mucus membranes Neck: No JVD, supple Lungs: decreased coarse breath sounds bilaterally, mild wheezing throughout, normal respiratory effort CV: RRR, no m/r/g Abdomen: soft, non-tender, normal bowel sounds Extremities: no cyanosis, no peripheral edema Neuro: No focal deficits, motor/sensory intact Intake and Output: 
Current Shift:  12/28 0701 - 12/28 1900 In: 360 [P.O.:360] Out: - Last three shifts:  12/26 1901 - 12/28 0700 In: 1054.6 [P.O.:1040; I.V.:14.6] Out: 1400 [QATLU:8584] Lab/Data Reviewed: 
BMP:  
No results found for: NA, K, CL, CO2, AGAP, GLU, BUN, CREA, GFRAA, GFRNA 
CBC: No results found for: WBC, HGB, HGBEXT, HCT, HCTEXT, PLT, PLTEXT, HGBEXT, HCTEXT, PLTEXT Imaging Reviewed: 
No results found. Medications Reviewed: 
Current Facility-Administered Medications Medication Dose Route Frequency  pantoprazole (PROTONIX) tablet 40 mg  40 mg Oral ACB  metoprolol tartrate (LOPRESSOR) tablet 50 mg  50 mg Oral BID  furosemide (LASIX) tablet 40 mg  40 mg Oral DAILY  amLODIPine (NORVASC) tablet 10 mg  10 mg Oral DAILY  lisinopril (PRINIVIL, ZESTRIL) tablet 20 mg  20 mg Oral DAILY  arformoterol (BROVANA) neb solution 15 mcg  15 mcg Nebulization BID RT  
 budesonide (PULMICORT) 500 mcg/2 ml nebulizer suspension  500 mcg Nebulization BID RT  
 methylPREDNISolone (PF) (SOLU-MEDROL) injection 40 mg  40 mg IntraVENous Q8H  
 gabapentin (NEURONTIN) capsule 400 mg  400 mg Oral TID  loratadine (CLARITIN) tablet 10 mg  10 mg Oral DAILY PRN  
 oxyCODONE-acetaminophen (PERCOCET) 5-325 mg per tablet 1 Tab  1 Tab Oral Q6H PRN  
 simvastatin (ZOCOR) tablet 10 mg  10 mg Oral QHS  albuterol-ipratropium (DUO-NEB) 2.5 MG-0.5 MG/3 ML  3 mL Nebulization QID RT  
 heparin (porcine) injection 5,000 Units  5,000 Units SubCUTAneous Q8H  
 acetaminophen (TYLENOL) tablet 650 mg  650 mg Oral Q6H PRN  potassium chloride (KLOR-CON) packet for solution 20 mEq  20 mEq Oral DAILY WITH BREAKFAST  naloxone (NARCAN) injection 0.4 mg  0.4 mg IntraVENous PRN  
 ondansetron (ZOFRAN ODT) tablet 4 mg  4 mg Oral Q4H PRN Sharlotte Finger, PA-C [phone] [facility] Office:  [phone] Pager: [phone]

## 2018-12-28 NOTE — DIABETES MGMT
NUTRITION AND GLYCEMIC CONTROL  FOLLOW UP/ PLAN OF CARE Bijan Larkin           77 y.o.           12/23/2018 1. Hypoxia 2. SOB (shortness of breath) 3. JVD (jugular venous distension) INTERVENTIONS/PLAN:  
Monitor po intake, labs and weights. ASSESSMENT:  
Pt is 136% ideal weight; pt appears well nourished and po intake is adequate per I/O's and meal time observation. Blood glucose in target range (receiving steroids). Nutrition Diagnoses:  
Obesity due to excess energy intake as evidenced by BMI (calculated): 32.9 kg/m2. Altered nutrition related labs due to prediabetes as evidenced by A1C of 5.8%. SUBJECTIVE/OBJECTIVE: Information obtained from: chart review, pt Pt reports he is allergic to shellfish. He states his weight was stable PTA. He prepares his own meals at home. He reports his appetite is very good. Diet: mechanical soft = po intake at lunch - 525 calories, 30 grams protein (100% meal) Patient Vitals for the past 100 hrs: 
 % Diet Eaten 12/28/18 0845 100 % 12/26/18 1600 100 % 12/26/18 1200 100 % 12/25/18 1300 100 % 12/25/18 0900 75 % Medications: [x]                Reviewed Pertinent:  Solu Medrol 40 mg q 8 hours, Lasix Most Recent POC Glucose:  
Recent Labs  
  12/26/18 
0605 * Labs:  
Lab Results Component Value Date/Time Hemoglobin A1c 5.8 12/06/2010 04:45 AM  
 
Lab Results Component Value Date/Time  Sodium 138 12/26/2018 06:05 AM  
 Potassium 4.0 12/26/2018 06:05 AM  
 Chloride 99 (L) 12/26/2018 06:05 AM  
 CO2 34 (H) 12/26/2018 06:05 AM  
 Anion gap 5 12/26/2018 06:05 AM  
 Glucose 118 (H) 12/26/2018 06:05 AM  
 BUN 26 (H) 12/26/2018 06:05 AM  
 Creatinine 1.03 12/26/2018 06:05 AM  
 Calcium 8.3 (L) 12/26/2018 06:05 AM  
 Magnesium 1.7 12/21/2018 05:47 AM  
 Phosphorus 2.5 12/21/2018 05:47 AM  
 Albumin 2.1 (L) 12/21/2018 05:47 AM  
 
 
 Anthropometrics: IBW : 80.7 kg (178 lb), % IBW (Calculated): 136.4 %, BMI (calculated): 32.9 Wt Readings from Last 1 Encounters:  
12/28/18 110.1 kg (242 lb 11.6 oz) Ht Readings from Last 1 Encounters:  
12/24/18 6' (1.829 m) Estimated Nutrition Needs:  2280 Kcals/day, Protein (g): 121 g Fluid (ml): 1800 ml Based on:   [x]          Actual BW    []          ABW   []            Adjusted BW   
 
Nutrition Interventions: 
None at this time Goal:  
Blood glucose will be within target range of  mg/dL by 12/30/18. Weight maintenance (+/- 1-2 kg) by 1/7/19. Nutrition Monitoring and Evaluation   
 
[x]     Monitor po intake on meal rounds 
[x]     Continue inpatient monitoring and intervention 
[]     Other: 
 
 
Nutrition Risk:  []   High     []  Moderate    [x]  Minimal/Uncompromised Nadia Orellana RD, CDE Office:  [phone] Long [place] Pager:  [phone]

## 2018-12-28 NOTE — PROGRESS NOTES
Assumed care of patient, patient up in chair. No c/o pian. Call light within reach. Sbar report received from Yaquelin. 
 
 
1815: Patient c/o pain 8/10 in lower back, prn percocet 5-325 mg po given. 2305:   Bedside / verbal shift change report given to [facility] - CHARLENE RN   (oncoming nurse) by Alyssa Carl RN (offgoing nurse). Report included the following information SBAR, Kardex, Intake/Output, MAR and Recent Results.

## 2018-12-29 PROCEDURE — 74011250637 HC RX REV CODE- 250/637: Performed by: INTERNAL MEDICINE

## 2018-12-29 PROCEDURE — 74011250637 HC RX REV CODE- 250/637: Performed by: HOSPITALIST

## 2018-12-29 PROCEDURE — 97116 GAIT TRAINING THERAPY: CPT

## 2018-12-29 PROCEDURE — 97535 SELF CARE MNGMENT TRAINING: CPT

## 2018-12-29 PROCEDURE — 74011250637 HC RX REV CODE- 250/637: Performed by: PHYSICIAN ASSISTANT

## 2018-12-29 PROCEDURE — 74011000250 HC RX REV CODE- 250: Performed by: HOSPITALIST

## 2018-12-29 PROCEDURE — 74011250636 HC RX REV CODE- 250/636: Performed by: HOSPITALIST

## 2018-12-29 PROCEDURE — 94640 AIRWAY INHALATION TREATMENT: CPT

## 2018-12-29 PROCEDURE — 77010033678 HC OXYGEN DAILY

## 2018-12-29 PROCEDURE — 65660000000 HC RM CCU STEPDOWN

## 2018-12-29 RX ADMIN — AMLODIPINE BESYLATE 10 MG: 10 TABLET ORAL at 09:56

## 2018-12-29 RX ADMIN — OXYCODONE AND ACETAMINOPHEN 1 TABLET: 5; 325 TABLET ORAL at 19:53

## 2018-12-29 RX ADMIN — METOPROLOL TARTRATE 50 MG: 50 TABLET ORAL at 09:56

## 2018-12-29 RX ADMIN — GABAPENTIN 400 MG: 400 CAPSULE ORAL at 22:00

## 2018-12-29 RX ADMIN — HEPARIN SODIUM 5000 UNITS: 5000 INJECTION INTRAVENOUS; SUBCUTANEOUS at 06:22

## 2018-12-29 RX ADMIN — IPRATROPIUM BROMIDE AND ALBUTEROL SULFATE 3 ML: .5; 3 SOLUTION RESPIRATORY (INHALATION) at 20:47

## 2018-12-29 RX ADMIN — HEPARIN SODIUM 5000 UNITS: 5000 INJECTION INTRAVENOUS; SUBCUTANEOUS at 15:40

## 2018-12-29 RX ADMIN — METHYLPREDNISOLONE SODIUM SUCCINATE 40 MG: 40 INJECTION, POWDER, FOR SOLUTION INTRAMUSCULAR; INTRAVENOUS at 06:22

## 2018-12-29 RX ADMIN — ACETAMINOPHEN 650 MG: 325 TABLET, FILM COATED ORAL at 16:21

## 2018-12-29 RX ADMIN — OXYCODONE AND ACETAMINOPHEN 1 TABLET: 5; 325 TABLET ORAL at 01:19

## 2018-12-29 RX ADMIN — PANTOPRAZOLE SODIUM 40 MG: 40 TABLET, DELAYED RELEASE ORAL at 09:56

## 2018-12-29 RX ADMIN — IPRATROPIUM BROMIDE AND ALBUTEROL SULFATE 3 ML: .5; 3 SOLUTION RESPIRATORY (INHALATION) at 15:39

## 2018-12-29 RX ADMIN — LISINOPRIL 20 MG: 20 TABLET ORAL at 09:56

## 2018-12-29 RX ADMIN — IPRATROPIUM BROMIDE AND ALBUTEROL SULFATE 3 ML: .5; 3 SOLUTION RESPIRATORY (INHALATION) at 11:45

## 2018-12-29 RX ADMIN — METHYLPREDNISOLONE SODIUM SUCCINATE 40 MG: 40 INJECTION, POWDER, FOR SOLUTION INTRAMUSCULAR; INTRAVENOUS at 15:39

## 2018-12-29 RX ADMIN — ARFORMOTEROL TARTRATE 15 MCG: 15 SOLUTION RESPIRATORY (INHALATION) at 20:47

## 2018-12-29 RX ADMIN — GABAPENTIN 400 MG: 400 CAPSULE ORAL at 09:56

## 2018-12-29 RX ADMIN — SIMVASTATIN 10 MG: 20 TABLET, FILM COATED ORAL at 22:00

## 2018-12-29 RX ADMIN — METOPROLOL TARTRATE 50 MG: 50 TABLET ORAL at 17:46

## 2018-12-29 RX ADMIN — BUDESONIDE 500 MCG: 0.5 INHALANT RESPIRATORY (INHALATION) at 20:47

## 2018-12-29 RX ADMIN — FUROSEMIDE 40 MG: 40 TABLET ORAL at 09:56

## 2018-12-29 RX ADMIN — METHYLPREDNISOLONE SODIUM SUCCINATE 40 MG: 40 INJECTION, POWDER, FOR SOLUTION INTRAMUSCULAR; INTRAVENOUS at 22:00

## 2018-12-29 RX ADMIN — POTASSIUM CHLORIDE 20 MEQ: 1.5 POWDER, FOR SOLUTION ORAL at 09:56

## 2018-12-29 RX ADMIN — ARFORMOTEROL TARTRATE 15 MCG: 15 SOLUTION RESPIRATORY (INHALATION) at 09:56

## 2018-12-29 RX ADMIN — BUDESONIDE 500 MCG: 0.5 INHALANT RESPIRATORY (INHALATION) at 09:56

## 2018-12-29 RX ADMIN — HEPARIN SODIUM 5000 UNITS: 5000 INJECTION INTRAVENOUS; SUBCUTANEOUS at 22:00

## 2018-12-29 RX ADMIN — OXYCODONE AND ACETAMINOPHEN 1 TABLET: 5; 325 TABLET ORAL at 10:28

## 2018-12-29 RX ADMIN — GABAPENTIN 400 MG: 400 CAPSULE ORAL at 15:39

## 2018-12-29 NOTE — PROGRESS NOTES
Problem: Mobility Impaired (Adult and Pediatric) Goal: *Acute Goals and Plan of Care (Insert Text) Physical Therapy Goals Initiated 12/24/2018 and to be accomplished within 7 days. 1.  Patient will complete all bed mobility with modified independence in order to prepare for EOB/OOB activity. 2.  Patient will perform sit <> stand with modified independence in order to prepare for OOB/gait activity. 3.  Patient will perform bed to chair transfers with modified independence in order to promote mobility and encourage seated activity to progress towards their prior level of function. 4.  Patient will ambulate 75 feet with modified independence using LRAD in order to prepare for safe negotiation of their environment. Outcome: Progressing Towards Goal 
 
PHYSICAL THERAPY: Daily TREATMENT Note INPATIENT: Medicaid: Hospital Day: 7 Patient: Moises Rodgers (89 y.o. male)    Date: 12/29/2018 Primary Diagnosis: Acute on chronic respiratory failure with hypoxia (HCC)  
,  ,  
Precautions:   
 
 
Chart, physical therapy assessment, plan of care and goals were reviewed. PLOF: with RW at home ASSESSMENT: 
Pt progressing well with therapy today, found in recliner, supervision for sit<>stand transfers, gait training X 150 ft with RW, 4L portable O2; pt required frequent standing rest breaks; educated pt on activity pacing and strategies for energy conservation in the home. Pt overall demonstrating much improved activity tolerance today, reporting some fatigue but no SOB after activity. Progression toward goals: 
      Improving appropriately and progressing toward goals PLAN: 
Patient continues to benefit from skilled intervention to address the above impairments. Continue treatment per established plan of care. EDUCATION:  
Education:  Patient was educated on the following topics: activity pacing Barriers to Learning/Limitations: None Compensate with: visual, verbal, tactile, kinesthetic cues/model Discharge Recommendations:  Home Health Further Equipment Recommendations for Discharge:  N/A Factors which may impact discharge planning: none SUBJECTIVE:  
Patient stated I have already been up in the chair for an hour this morning.  OBJECTIVE DATA SUMMARY:  
Critical Behavior: 
Neurologic State: Alert Orientation Level: Oriented X4 Cognition: Follows commands Functional Mobility: 
 
 
Functional Status Indep (I) Mod I Super-vision Min A Mod A Max A Total A Assist x2 Verbal cues Additional time Not tested Comments Rolling []  []  [] []    []    []  []  [] [] [] [x] Supine to sit []  []  [] []  []  []  []  [] [] [] [x] Sit to supine []  []  [] []  []  []  []  [] [] [] [x] Sit to stand []  []  [x] []  []  []  []  [] [] [x] [] Stand to sit []  []  [x] []  []  []  []  [] [] [x] [] Bed to chair transfers []  []  [] []  []  []  []  [] [] [] [x] Balance Good Carine Schuster Poor Unable Not tested Comments Sitting static [x]  []  []  []  [] Sitting dynamic [x]  []  []  []  []   
Standing static [x]  []  []  []  []   
Standing dynamic [x]  []  []  []  [] With support Mobility/Gait:  
Level of Assistance: Supervision Assistive Device: portable oxygen and rolling walker Distance Ambulated: 150 feet Base of Support: widened Speed/Shannan: pace decreased (<100 feet/min) Step Length: left shortened and right shortened Swing Pattern: [facility] Stance: time Gait Abnormalities: knee flexion in stance Stairs:  
Level of Assistance: Unable at this time Vital Signs Temp: 98.2 °F (36.8 °C) Pulse (Heart Rate): 60    
BP: 126/64 Resp Rate: 24    
O2 Sat (%): 91 %Pain:Pre treatment pain level:0 Post treatment pain level:0 Activity Tolerance:  
Fair After treatment:  
Patient left in no apparent distress sitting up in chair Call bell left within reach Nursing notified Zena Vega PTA Time Calculation: 17 mins

## 2018-12-29 NOTE — PROGRESS NOTES
Problem: Self Care Deficits Care Plan (Adult) Goal: *Acute Goals and Plan of Care (Insert Text) Occupational Therapy Goals Initiated 12/24/2018 within 7 day(s). 1.  Patient will perform grooming tasks while standing with modified independence. 2.  Patient will perform lower body dressing with modified independence. 3.  Patient will perform functional activity in standing for 8 minutes with modified independence and < 2 rest breaks to increase activity tolerance for ADLs and functional transfers. 4.  Patient will perform toilet transfers with modified independence. 5.  Patient will perform all aspects of toileting with modified independence. 6.  Patient will participate in upper extremity therapeutic exercise/activities with modified independence for 8 minutes to maintain BUE strength for functional transfers and ADLs. 7.  Patient will utilize energy conservation techniques during functional activities with minimal verbal cues. Outcome: Progressing Towards Goal 
Occupational Therapy TREATMENT Patient: Ghazal Snow (67 y.o. male) Date: 12/29/2018 Diagnosis: Acute on chronic respiratory failure with hypoxia (HCC) Acute on chronic respiratory failure with hypoxia (HCC) Precautions:   
Chart, occupational therapy assessment, plan of care, and goals were reviewed. PLOF: Independent ASSESSMENT: 
Pt OOB seated in chair upon entry. Pt seen for ADL retraining using energy conservation techniques. (see functional levels below) Pt requires assist w/LB bathing and will benefit from continued adaptive equipment training, long handled sponge. Pt left in chair and reinforced importance of calling for assistance. Issued sock aid. EDUCATION Pt educated on benefits of shower chair and use of adaptive equipment w/LB dressing task. Progression toward goals: 
[x]          Improving appropriately and progressing toward goals 
[]          Improving slowly and progressing toward goals []          Not making progress toward goals and plan of care will be adjusted PLAN: 
Patient continues to benefit from skilled intervention to address the above impairments. Continue treatment per established plan of care. Discharge Recommendations:  Home Health for safety check Further Equipment Recommendations for Discharge:  shower chair SUBJECTIVE:  
Patient stated I was using my Mom's.  reference shower chair OBJECTIVE DATA SUMMARY: 
  
Cognitive/Behavioral Status: 
Neurologic State: Alert Orientation Level: Oriented X4 Cognition: Follows commands Functional Mobility and Transfers for ADLs: 
 Transfers: 
Sit to Stand: Supervision Bed to Chair: Stand-by assistance Toilet Transfer : Stand-by assistance(w/grab bar) Bathroom Mobility: Stand-by assistance(w/o AD) Balance: 
Sitting: Intact Standing: Impaired; Without support ADL Intervention: 
Grooming Washing Face: Supervision/set-up Washing Hands: Supervision/set-up Brushing Teeth: Supervision/set-up Upper Body Bathing Bathing Assistance: Stand-by assistance Position Performed: Seated in chair Lower Body Bathing Bathing Assistance: Minimum assistance Perineal  : Stand-by assistance Position Performed: Standing;Seated in chair Lower Body : Minimum assistance Position Performed: Bending forward method;Seated in chair Upper Body Dressing Assistance Hospital Gown: Supervision/ set-up Lower Body Dressing Assistance Socks: Stand-by assistance Leg Crossed Method Used: No 
Position Performed: Seated in chair Cues: Feli Arevalo Adaptive Equipment Used: Sock aid Toileting Toileting Assistance: Stand-by assistance Bladder Hygiene: Modified independent(seated) Clothing Management: Stand-by assistance Pain: 
Pre Treatment:8 Post Treatment:8 Pain Scale 1: Numeric (0 - 10) Pain Intensity 1: 8 Pain Location 1: Back Pain Orientation 1: Lower Pain Description 1: Aching Pain Intervention(s) 1: Medication (see MAR) Activity Tolerance:   
Good Please refer to the flowsheet for vital signs taken during this treatment. After treatment:  
[x]  Patient left in no apparent distress sitting up in chair 
[]  Patient left in no apparent distress in bed 
[x]  Call bell left within reach [x]  Nursing notified 
[]  Caregiver present 
[]  Bed alarm activated Jeffrey Dyer Time Calculation: 45 mins

## 2018-12-29 NOTE — PROGRESS NOTES
0700 - Received report from Belkis Mace. Assumed care of patient. Patient laying in bed alert and oriented X3. Call bell and phone within reach. Pt voiced no complaints at this time will continue to monitor. 1200 - Bedside shift report given to [facility]  Report included the following information Recent Results, Med Rec Status and Cardiac Rhythm sinus.

## 2018-12-29 NOTE — PROGRESS NOTES
Problem: Falls - Risk of 
Goal: *Absence of Falls Document Geovanni Adrianataryn Fall Risk and appropriate interventions in the flowsheet. Outcome: Progressing Towards Goal 
Fall Risk Interventions: 
Mobility Interventions: Assess mobility with egress test, PT Consult for mobility concerns, Utilize walker, cane, or other assistive device Mentation Interventions: Adequate sleep, hydration, pain control, Update white board, Toileting rounds, Room close to nurse's station Medication Interventions: Patient to call before getting OOB, Teach patient to arise slowly Elimination Interventions: Call light in reach, Patient to call for help with toileting needs, Toilet paper/wipes in reach, Toileting schedule/hourly rounds, Urinal in reach History of Falls Interventions: Door open when patient unattended, Room close to nurse's station Problem: Pressure Injury - Risk of 
Goal: *Prevention of pressure injury Document Raul Scale and appropriate interventions in the flowsheet. Outcome: Progressing Towards Goal 
Pressure Injury Interventions: 
Sensory Interventions: Assess changes in LOC, Minimize linen layers, Turn and reposition approx. every two hours (pillows and wedges if needed) Moisture Interventions: Maintain skin hydration (lotion/cream), Minimize layers, Moisture barrier, Offer toileting Q_hr Activity Interventions: Increase time out of bed, Pressure redistribution bed/mattress(bed type) Mobility Interventions: HOB 30 degrees or less, Pressure redistribution bed/mattress (bed type), PT/OT evaluation, Turn and reposition approx. every two hours(pillow and wedges) Nutrition Interventions: Document food/fluid/supplement intake Friction and Shear Interventions: Minimize layers

## 2018-12-29 NOTE — PROGRESS NOTES
Internal Medicine Progress Note Patient's Name: Bijan Larkin Admit Date: 12/23/2018 Length of Stay: 6 Assessment/Plan Active Hospital Problems Diagnosis Date Noted  Acute exacerbation of chronic obstructive pulmonary disease (COPD) ([facility] [address] 12/25/2018  Diastolic CHF, acute on chronic (HCC) 12/24/2018  Acute on chronic respiratory failure with hypoxia ([facility] [address] 12/23/2018  Bilateral pleural effusion 11/14/2018  Hypertension 11/14/2018 Diastolic CHF, acute on chronic  POA 
 
- Cont bumex - BP in approp range - Appreciate cardio recs 
- Cont brovana, pulmicort, duonebs - Cont steroids - Recheck CXR in AM to assess effusions 
- PT/OT 
- If remains stable by meg, may be able to d/c home w/ home health 
- Cont acceptable home medications for chronic conditions  
- DVT protocol I have personally reviewed all pertinent labs and films that have officially resulted over the last 24 hours. I have personally checked for all pending labs that are awaiting final results. Subjective Pt s/e @ bedside Doing well Still c/o some congestions, but otherwise feeling good Ambulating w/ PT Feeling better Objective Visit Vitals /65 Pulse 66 Temp 98.4 °F (36.9 °C) Resp 22 Ht 6' (1.829 m) Wt 110.1 kg (242 lb 11.6 oz) SpO2 95% BMI 32.92 kg/m² Physical Exam: 
General Appearance: NAD, conversant Lungs: Decreased BS B/L, no crackles CV: RRR, no m/r/g Abdomen: soft, non-tender, normal bowel sounds Extremities: no cyanosis, trace peripheral edema Neuro: No focal deficits, moving all ext Lab/Data Reviewed: 
BMP:  
No results found for: NA, K, CL, CO2, AGAP, GLU, BUN, CREA, GFRAA, GFRNA 
CBC:  
No results found for: WBC, HGB, HGBEXT, HCT, HCTEXT, PLT, PLTEXT, HGBEXT, HCTEXT, PLTEXT Imaging Reviewed: 
No results found. Medications Reviewed: 
Current Facility-Administered Medications Medication Dose Route Frequency  pantoprazole (PROTONIX) tablet 40 mg  40 mg Oral ACB  metoprolol tartrate (LOPRESSOR) tablet 50 mg  50 mg Oral BID  furosemide (LASIX) tablet 40 mg  40 mg Oral DAILY  amLODIPine (NORVASC) tablet 10 mg  10 mg Oral DAILY  lisinopril (PRINIVIL, ZESTRIL) tablet 20 mg  20 mg Oral DAILY  arformoterol (BROVANA) neb solution 15 mcg  15 mcg Nebulization BID RT  
 budesonide (PULMICORT) 500 mcg/2 ml nebulizer suspension  500 mcg Nebulization BID RT  
 methylPREDNISolone (PF) (SOLU-MEDROL) injection 40 mg  40 mg IntraVENous Q8H  
 gabapentin (NEURONTIN) capsule 400 mg  400 mg Oral TID  loratadine (CLARITIN) tablet 10 mg  10 mg Oral DAILY PRN  
 oxyCODONE-acetaminophen (PERCOCET) 5-325 mg per tablet 1 Tab  1 Tab Oral Q6H PRN  
 simvastatin (ZOCOR) tablet 10 mg  10 mg Oral QHS  albuterol-ipratropium (DUO-NEB) 2.5 MG-0.5 MG/3 ML  3 mL Nebulization QID RT  
 heparin (porcine) injection 5,000 Units  5,000 Units SubCUTAneous Q8H  
 acetaminophen (TYLENOL) tablet 650 mg  650 mg Oral Q6H PRN  potassium chloride (KLOR-CON) packet for solution 20 mEq  20 mEq Oral DAILY WITH BREAKFAST  naloxone (NARCAN) injection 0.4 mg  0.4 mg IntraVENous PRN  
 ondansetron (ZOFRAN ODT) tablet 4 mg  4 mg Oral Q4H PRN Chapo Anthony DO Internal Medicine, Hospitalist 
Pager: [phone] [facility]

## 2018-12-30 ENCOUNTER — HOME HEALTH ADMISSION (OUTPATIENT)
Dept: HOME HEALTH SERVICES | Facility: HOME HEALTH | Age: 68
End: 2018-12-30

## 2018-12-30 VITALS
SYSTOLIC BLOOD PRESSURE: 119 MMHG | TEMPERATURE: 98.5 F | WEIGHT: 252.43 LBS | HEART RATE: 77 BPM | RESPIRATION RATE: 22 BRPM | BODY MASS INDEX: 34.19 KG/M2 | HEIGHT: 72 IN | DIASTOLIC BLOOD PRESSURE: 61 MMHG | OXYGEN SATURATION: 98 %

## 2018-12-30 LAB
BACTERIA SPEC CULT: NORMAL
SERVICE CMNT-IMP: NORMAL

## 2018-12-30 PROCEDURE — 74011250637 HC RX REV CODE- 250/637: Performed by: INTERNAL MEDICINE

## 2018-12-30 PROCEDURE — 94640 AIRWAY INHALATION TREATMENT: CPT

## 2018-12-30 PROCEDURE — 74011250636 HC RX REV CODE- 250/636: Performed by: HOSPITALIST

## 2018-12-30 PROCEDURE — 74011000250 HC RX REV CODE- 250: Performed by: HOSPITALIST

## 2018-12-30 PROCEDURE — 74011250637 HC RX REV CODE- 250/637: Performed by: HOSPITALIST

## 2018-12-30 PROCEDURE — 74011250637 HC RX REV CODE- 250/637: Performed by: PHYSICIAN ASSISTANT

## 2018-12-30 RX ORDER — FUROSEMIDE 40 MG/1
40 TABLET ORAL DAILY
Qty: 30 TAB | Refills: 0 | Status: SHIPPED | OUTPATIENT
Start: 2018-12-30 | End: 2019-02-23

## 2018-12-30 RX ORDER — PREDNISONE 10 MG/1
TABLET ORAL
Qty: 60 TAB | Refills: 0 | Status: SHIPPED | OUTPATIENT
Start: 2018-12-30 | End: 2019-02-23

## 2018-12-30 RX ORDER — POTASSIUM CHLORIDE 20 MEQ/1
20 TABLET, EXTENDED RELEASE ORAL DAILY
Qty: 30 TAB | Refills: 0 | Status: SHIPPED | OUTPATIENT
Start: 2018-12-30 | End: 2019-02-23

## 2018-12-30 RX ORDER — METOPROLOL TARTRATE 50 MG/1
50 TABLET ORAL 2 TIMES DAILY
Qty: 60 TAB | Refills: 0 | Status: SHIPPED | OUTPATIENT
Start: 2018-12-30 | End: 2019-02-23

## 2018-12-30 RX ADMIN — FUROSEMIDE 40 MG: 40 TABLET ORAL at 08:47

## 2018-12-30 RX ADMIN — BUDESONIDE 500 MCG: 0.5 INHALANT RESPIRATORY (INHALATION) at 08:32

## 2018-12-30 RX ADMIN — AMLODIPINE BESYLATE 10 MG: 10 TABLET ORAL at 08:46

## 2018-12-30 RX ADMIN — POTASSIUM CHLORIDE 20 MEQ: 1.5 POWDER, FOR SOLUTION ORAL at 08:46

## 2018-12-30 RX ADMIN — OXYCODONE AND ACETAMINOPHEN 1 TABLET: 5; 325 TABLET ORAL at 08:46

## 2018-12-30 RX ADMIN — LISINOPRIL 20 MG: 20 TABLET ORAL at 08:46

## 2018-12-30 RX ADMIN — GABAPENTIN 400 MG: 400 CAPSULE ORAL at 08:46

## 2018-12-30 RX ADMIN — ARFORMOTEROL TARTRATE 15 MCG: 15 SOLUTION RESPIRATORY (INHALATION) at 08:32

## 2018-12-30 RX ADMIN — METHYLPREDNISOLONE SODIUM SUCCINATE 40 MG: 40 INJECTION, POWDER, FOR SOLUTION INTRAMUSCULAR; INTRAVENOUS at 08:47

## 2018-12-30 RX ADMIN — PANTOPRAZOLE SODIUM 40 MG: 40 TABLET, DELAYED RELEASE ORAL at 08:47

## 2018-12-30 RX ADMIN — HEPARIN SODIUM 5000 UNITS: 5000 INJECTION INTRAVENOUS; SUBCUTANEOUS at 08:47

## 2018-12-30 RX ADMIN — IPRATROPIUM BROMIDE AND ALBUTEROL SULFATE 3 ML: .5; 3 SOLUTION RESPIRATORY (INHALATION) at 08:32

## 2018-12-30 RX ADMIN — METOPROLOL TARTRATE 50 MG: 50 TABLET ORAL at 08:46

## 2018-12-30 NOTE — DISCHARGE INSTRUCTIONS
DISCHARGE SUMMARY from Nurse    PATIENT INSTRUCTIONS:    After general anesthesia or intravenous sedation, for 24 hours or while taking prescription Narcotics:  · Limit your activities  · Do not drive and operate hazardous machinery  · Do not make important personal or business decisions  · Do  not drink alcoholic beverages  · If you have not urinated within 8 hours after discharge, please contact your surgeon on call. Report the following to your surgeon:  · Excessive pain, swelling, redness or odor of or around the surgical area  · Temperature over 100.5  · Nausea and vomiting lasting longer than 4 hours or if unable to take medications  · Any signs of decreased circulation or nerve impairment to extremity: change in color, persistent  numbness, tingling, coldness or increase pain  · Any questions    What to do at Home:  Recommended activity: Activity as tolerated,     If you experience any of the following symptoms excessive shortness of breath, lightheaded or weakness, please follow up with your primary care physician or nearest emergency department. *  Please give a list of your current medications to your Primary Care Provider. *  Please update this list whenever your medications are discontinued, doses are      changed, or new medications (including over-the-counter products) are added. *  Please carry medication information at all times in case of emergency situations. These are general instructions for a healthy lifestyle:    No smoking/ No tobacco products/ Avoid exposure to second hand smoke  Surgeon General's Warning:  Quitting smoking now greatly reduces serious risk to your health.     Obesity, smoking, and sedentary lifestyle greatly increases your risk for illness    A healthy diet, regular physical exercise & weight monitoring are important for maintaining a healthy lifestyle    You may be retaining fluid if you have a history of heart failure or if you experience any of the following symptoms:  Weight gain of 3 pounds or more overnight or 5 pounds in a week, increased swelling in our hands or feet or shortness of breath while lying flat in bed. Please call your doctor as soon as you notice any of these symptoms; do not wait until your next office visit. Recognize signs and symptoms of STROKE:    F-face looks uneven    A-arms unable to move or move unevenly    S-speech slurred or non-existent    T-time-call 911 as soon as signs and symptoms begin-DO NOT go       Back to bed or wait to see if you get better-TIME IS BRAIN. Warning Signs of HEART ATTACK     Call 911 if you have these symptoms:   Chest discomfort. Most heart attacks involve discomfort in the center of the chest that lasts more than a few minutes, or that goes away and comes back. It can feel like uncomfortable pressure, squeezing, fullness, or pain.  Discomfort in other areas of the upper body. Symptoms can include pain or discomfort in one or both arms, the back, neck, jaw, or stomach.  Shortness of breath with or without chest discomfort.  Other signs may include breaking out in a cold sweat, nausea, or lightheadedness. Don't wait more than five minutes to call 911 - MINUTES MATTER! Fast action can save your life. Calling 911 is almost always the fastest way to get lifesaving treatment. Emergency Medical Services staff can begin treatment when they arrive -- up to an hour sooner than if someone gets to the hospital by car. The discharge information has been reviewed with the patient. The patient verbalized understanding. Discharge medications reviewed with the patient and appropriate educational materials and side effects teaching were provided.   ___________________________________________________________________________________________________________________________________

## 2018-12-30 NOTE — ANCILLARY DISCHARGE INSTRUCTIONS
Core Measures Patient, RRAT Score is 32, Patient scheduled transitional care follow up with Dr. Radha Mccarthy on 1/14/18 at 11:00 am. Nurse Navigator made aware of discharge plan and pcp follow up appointment.

## 2018-12-30 NOTE — DISCHARGE SUMMARY
Internal Medicine Discharge Summary        Patient: Kay Tanner    YOB: 1950    Age:  77 y.o. Admit Date: 12/23/2018    Discharge Date: 12/30/2018    LOS:  LOS: 7 days     Discharge To:  Home with Home Health    Consults: Cardiology    Admission Diagnoses: Acute on chronic respiratory failure with hypoxia [facility])    Discharge Diagnoses:    Problem List as of 12/30/2018 Date Reviewed: 11/14/2018          Codes Class Noted - Resolved    Acute exacerbation of chronic obstructive pulmonary disease (COPD) ([facility] ICD-10-CM: J44.1  ICD-9-CM: 491.21  12/25/2018 - Present        Diastolic CHF, acute on chronic ([facility] ICD-10-CM: I50.33  ICD-9-CM: 428.33, 428.0  12/24/2018 - Present        * (Principal) Acute on chronic respiratory failure with hypoxia ([facility] ICD-10-CM: J96.21  ICD-9-CM: 518.84, 799.02  12/23/2018 - Present        Sinusitis ICD-10-CM: J32.9  ICD-9-CM: 473.9  12/17/2018 - Present        Altered mental status ICD-10-CM: R41.82  ICD-9-CM: 780.97  12/16/2018 - Present        Acute respiratory failure with hypoxia ([facility] ICD-10-CM: J96.01  ICD-9-CM: 518.81  12/16/2018 - Present        Discitis of thoracic region ICD-10-CM: M46.44  ICD-9-CM: 722.92  11/14/2018 - Present        UTI (urinary tract infection) ICD-10-CM: N39.0  ICD-9-CM: 599.0  11/14/2018 - Present        Bilateral pleural effusion ICD-10-CM: J90  ICD-9-CM: 511.9  11/14/2018 - Present        Lumbar stenosis ICD-10-CM: M48.061  ICD-9-CM: 724.02  11/14/2018 - Present        Adenoma of left adrenal gland ICD-10-CM: D35.02  ICD-9-CM: 227.0  11/14/2018 - Present        Hypertension ICD-10-CM: I10  ICD-9-CM: 401.9  11/14/2018 - Present        Cirrhosis of liver ([facility] ICD-10-CM: K74.60  ICD-9-CM: 571.5  11/14/2018 - Present        Hepatitis C ICD-10-CM: B19.20  ICD-9-CM: 070.70  11/14/2018 - Present        Obesity ICD-10-CM: E66.9  ICD-9-CM: 278.00  11/14/2018 - Present        Constipation ICD-10-CM: K59.00  ICD-9-CM: 564.00 11/14/2018 - Present        Back pain ICD-10-CM: M54.9  ICD-9-CM: 724.5  11/14/2018 - Present              Discharge Condition:  Improved    Procedures: None         Hospital Course: Rommel López is a 77y.o. year old male who presents to the ED with hypoxia. Patient was just discharged for acute resp failure, PNA and volume overload. He was intubated at that time and discharged yesterday. He arrives today because his nurse noted he was hypoxic in 80's on usual 2 L. Although in no distress he is needing 3-4 L now for adequate oxygenation. Xray with volume overload. CTA showing  effusion, Moderate pleural effusions with bilateral partial lung collapse, and neg for PE. Pt was admitted and started on IV bumex, nebulizers. Cardiology was consulted. Echo on 12/17/18: EF 61-65% with grade 1 diastolic dysfunction, no RWMA, mild concentric LVH, mildly dilated LA + RA, trace mitral regurgitaiton. Repeat echo on 12/24: EF 56-60%, trace MR, mild TR, no pulm HTN, mild RA and RV dilation. ABG ordered. Pt started on BiPAP. Home meds for HTN and IV bumex on hold starting 12/25 2/2 HOTN. Pt stable on NC on 12/25. Able to be weaned off levophed. BP remained stable and patient was eventually able to be restarted on home BP meds. Lasix was restarted as well per cardiology recommendations. Patient continued to improve daily and oxygen saturations were back to his baseline levels. He was ambulating well with assistance. His back pain was much improved as well. He had imaging done on admission and the findings were discussed with radiology, abnormalities seen on imaging was seen on previous imaging in November as well, but this time slightly better. On that admission, it was determined based on imaging that the findings were non-infectious. The patient had refused any type of neurosurgical intervention regardless. He was transitioned to prednisone taper at discharge.  The rest of the patient's chronic conditions were managed appropriately during their admission. They were medically stable at the time of discharge. Visit Vitals  /61   Pulse 77   Temp 98.5 °F (36.9 °C)   Resp 22   Ht 6' (1.829 m)   Wt 114.5 kg (252 lb 6.8 oz)   SpO2 98%   BMI 34.24 kg/m²       Physical Exam at Discharge:  General Appearance: NAD, conversant  HENT: normocephalic/atraumatic, moist mucus membranes  Lungs: CTA with normal respiratory effort  CV: RRR, no m/r/g  Abdomen: soft, non-tender, normal bowel sounds  Extremities: no cyanosis, no peripheral edema  Neuro: moves all extremities, no focal deficits  Psych: appropriate affect, alert and oriented to person, place and time    Labs Prior to Discharge:  Labs: Results:       Chemistry No results for input(s): GLU, NA, K, CL, CO2, BUN, CREA, CA, AGAP, BUCR, TBIL, GPT, AP, TP, ALB, GLOB, AGRAT in the last 72 hours. CBC w/Diff No results for input(s): WBC, RBC, HGB, HCT, PLT, GRANS, LYMPH, EOS, HGBEXT, HCTEXT, PLTEXT in the last 72 hours. Cardiac Enzymes No results for input(s): CPK, CKND1, KARYNA in the last 72 hours. No lab exists for component: CKRMB, TROIP   Coagulation No results for input(s): PTP, INR, APTT in the last 72 hours. No lab exists for component: INREXT    Lipid Panel Lab Results   Component Value Date/Time    Cholesterol, total 114 12/17/2018 12:30 AM    HDL Cholesterol 36 (L) 12/17/2018 12:30 AM    LDL, calculated 61.8 12/17/2018 12:30 AM    VLDL, calculated 16.2 12/17/2018 12:30 AM    Triglyceride 81 12/17/2018 12:30 AM    CHOL/HDL Ratio 3.2 12/17/2018 12:30 AM      BNP No results for input(s): BNPP in the last 72 hours. Liver Enzymes No results for input(s): TP, ALB, TBIL, AP, SGOT, GPT in the last 72 hours. No lab exists for component: DBIL   Thyroid Studies Lab Results   Component Value Date/Time    TSH 0.30 (L) 11/19/2009 03:26 PM            Significant Imaging:  Xr Abd (kub)    Result Date: 12/17/2018  Abdomen supine/ KUB No prior exams.  Findings: Nasogastric tube extends into the left mid abdomen, tip in the region of the antrum of perhaps slightly elongated stomach. Single supine film shows no definite extraluminal free air or abnormal calcifications. The bowel gas pattern is within normal limits for supine exam.  Large body habitus. Probable small left pleural effusion. Spondylosis. Cardoza catheter superimposed over the region of the urinary bladder, paracentral pelvis. Impression: 1. Nasogastric tube in situ. Cardoza catheter in situ. 2. Left pleural effusion      Ct Head Wo Cont    Result Date: 12/17/2018  EXAM: CT HEAD W/O CONTRAST INDICATION: Confusion/delirium, altered level of consciousness, unresponsive COMPARISON: CT head 11/15/2018 TECHNIQUE: Axial CT imaging of the head was performed without intravenous contrast.  Additional coronal and sagittal reconstructions were performed. One or more dose reduction techniques were used on this CT: automated exposure control, adjustment of the mAs and/or kVp according to patient's size, and iterative reconstruction techniques. The specific techniques utilized on this CT exam have been documented in the patient's electronic medical record. _______________ FINDINGS: Motion artifact is present which limits evaluation. VENTRICLES/EXTRA-AXIAL SPACES: The ventricles and sulci are normal in their size and configuration. BRAIN PARENCHYMA: There is no evidence of acute intracranial hemorrhage, mass effect, midline shift, or herniation. No definite CT evidence of acute cortical infarct is seen. The gray-white matter differentiation is within normal limits. ORBITS: The bilateral lenses are absent, likely due to prior cataract surgery. PARANASAL SINUSES/MASTOIDS: Bubbly air-fluid levels are present in the bilateral sphenoid sinuses. Additional mucoperiosteal thickening is seen in the ethmoidal air cells. Visualized mastoid air cells are clear. OSSEOUS STRUCTURES: No fracture is seen. OTHER: Endotracheal tube is present. _______________     IMPRESSION: 1. No acute intracranial hemorrhage, mass effect, midline shift, or herniation. No definite CT evidence of acute cortical infarct is seen. Please note that noncontrast head CT may be normal in early acute infarct. 2. Bilateral sphenoid sinus bubbly air-fluid levels potentially representing acute sinusitis or related to secretions given intubation. Clinical correlation is recommended. Preliminary report provided by on-call radiology resident. Cta Chest W Or W Wo Cont    Result Date: 12/24/2018  EXAM:  CT pulmonary angiogram with intravenous contrast and angiographic MIP reformations. INDICATION:  \"SOB. \" COMPARISON: CT January 11, 2010. DOSE REDUCTION: One or more dose reduction techniques were used on this CT: automated exposure control, adjustment of the mAs and/or kVp according to patient size, and iterative reconstruction techniques. The specific techniques used on this CT exam have been documented in the patient's electronic medical record _______________ FINDINGS:      NOTE:  Pulmonary angiography is timed to optimize imaging of the pulmonary arteries, and is not the routine venous phase that would be used to evaluate malignancy, mediastinal structures and pleural structures. IMAGE QUALITY:  Intermediate. PULMONARY ANGIOGRAM:  Negative for pulmonary embolism. The central pulmonary arteries are dilated. CENTRAL AIRWAYS: Unremarkable. LUNGS AND PLEURAL SPACE:           Diffuse lung disease:  None apparent. Pleural effusion:  Moderate dependent effusions bilaterally, both new. Right lung:  Unchanged marked right hemidiaphragm elevation complete collapse of the lower lobe, new. Partial collapse of the middle lobe, new. Adequate upper lobe aeration. Left lung:  Partial collapse of the lower lobe, new. Minimal dependent upper lobe atelectasis. Otherwise unremarkable. HEART AND MEDIASTINUM/LOIS: Unremarkable.       PARTIALLY IMAGED UPPER ABDOMEN:  Unremarkable. PARTIALLY IMAGED NECK BASE:   Unremarkable. SUPERFICIAL SOFT TISSUES: Unremarkable. BONES: A markedly destructive process centered at the T9-T10 intervertebral disc with associated masslike circumferential tissue accumulation extending into the spinal canal and immediately paravertebral soft tissue is new since the chest CT of 2010, but appears to have improved since the the abdominal CT of November 14, 2018 allowing for differences in phase of contrast at the time of imaging. The previously applied designation of T10-T11 was based upon a lumbar segmental designation of 5 and counting from below, but in any case, the level of the process is the same as on the November 14, 2018 exam. This process erodes the majority of the T9 vertebral body, and the upper third of the T10 vertebral body. There is no appreciable extension into the posterior elements. Additional level of intervertebral disc destructive change at T3-T4 also has circumferential paraspinous soft tissue thickening, but only involves the disc and endplates without severe erosion of the bone stock of the vertebral bodies. _______________     IMPRESSION: 1. Negative for pulmonary embolism. 2.  Multilevel destructive intervertebral disc process, presumably discitis osteomyelitis. --Marked at T9-T10, improved since the 11/14/2018 abdominal CT.         --Mild-to-moderate at T3-T4, not previously imaged. 3.  Moderate pleural effusions with bilateral partial lung collapse. --Complete right lower lobe atelectasis. --Near-complete left lower lobe atelectasis. --Partial middle lobe right lung atelectasis. _______________ Note: The addition to the initial overnight report of the multilevel thoracic spinal destructive process was discussed with Nurse Garay this morning.      Xr Chest Port    Result Date: 12/23/2018  EXAM: AP radiograph of the chest INDICATION: Shortness of breath COMPARISON: December 20, 2018, December 18, 2018 _______________ FINDINGS: Lung volumes are hypoinflated. Heart size and mediastinal contour are within normal limits. Interstitial lung markings remain indistinct. There are hazy opacities obscuring the diaphragms and costophrenic angles. No acute osseous abnormalities. _______________     IMPRESSION: 1. Mild pulmonary edema 2. Bilateral pleural effusions and/or atelectasis    Xr Chest Port    Result Date: 12/20/2018  EXAM:  PORTABLE CHEST INDICATION:  Follow-up TECHNIQUE:  Portable, semierect AP view. COMPARISON:  12/18/2018 ____________________ FINDINGS: SUPPORT DEVICES: Interval extubation. Stable position of right jugular catheter, tip at the level of the inferior right atrium. HEART AND MEDIASTINUM: Cardiac silhouette is mildly enlarged. Tortuous thoracic aorta. LUNGS AND PLEURAL SPACES: Persistent prominence of the pulmonary vasculature with perihilar opacities. No discernible pneumothorax. Suspect small bilateral pleural effusions. BONY THORAX AND SOFT TISSUES: No acute osseous abnormality. ____________________     IMPRESSION:  1. Interval extubation. 2. Persistent findings suggesting pulmonary edema, stable to mildly progressed. 3. Superimposed bibasilar opacities may represent sequela of edema, atelectasis or superimposed pneumonia. Associated small bilateral pleural effusions. Xr Chest Port    Result Date: 12/18/2018  AP portable chest, 12/18/2018 at 0831 hours: Comparison 12/17/2018. Endotracheal tube is just below the clavicles approximately 4.5 cm above the reggie in reasonable position. Bibasilar densities consistent with a combination of lower lobe atelectasis/infiltrate and effusion. Vascular congestion persists unchanged. The heart is stable but in part obscured. Right internal jugular central line is at the cavoatrial junction or upper right atrium. IMPRESSION: Little change from previous.  Substantial bibasilar density consistent with atelectasis/infiltrate/effusion. Persistent vascular congestion. Xr Chest Port    Result Date: 12/17/2018  CHEST AP PORTABLE, 12/17/2018, 0929 hours Comparison prior exam, 12/16/2018, 1555 hours. Findings: There is interval placement of a nasogastric tube, tip extending into the upper abdomen not discretely visible on this somewhat underpenetrated exam. Endotracheal tube remains present tip roughly 2.5-3 cm above the reggie. Lungs are mildly hypoinflated. There is haziness over the lower lung zones associated with loss of hemidiaphragm margins and blunting of costophrenic sulci. Minimal right minor fissural thickening is stable. The lower lung zones cannot be evaluated further. The upper lung zones appear   clear . No evident pneumothorax The cardiac silhouette is stable and the mediastinum  appears unremarkable. The pulmonary vascularity is mildly prominent but unchanged Support catheters/tubes: As above. Additional right IJ venous catheter has tip in the region of the right atrium. Overlying numerous leads especially right chest Misc:  Spondylosis     IMPRESSION: 1. Focal placement of nasogastric tube into the abdomen. Please see KUB report 2. Otherwise radiographically stable portable chest since 12/16/2018. Endotracheal tube and right IJ venous line in situ. Small to moderate-sized bilateral pleural effusions and likely bilateral basilar partial atelectasis. Probable pulmonary venous hypertension    Xr Chest Port    Result Date: 12/17/2018  EXAM: Chest, portable AP supine, one view INDICATION: Arrived to emergency department unresponsive. Endotracheal tube placement. COMPARISON: 11/22/2018 _______________ FINDINGS: ETT is 3.2 cm above the reggie, right jugular central venous catheter tip projects at the right atrium. Cardiac silhouette is within normal range in size. Calcified plaque is present at the aortic arch. No pneumothorax.  Pulmonary vascular redistribution has developed along with small to moderate right and small left pleural effusion. Parenchymal band at the right lung base is compatible with atelectasis. Retrocardiac region is somewhat dense. No pneumothorax appreciated. Degenerative changes noted at both shoulders. _______________     IMPRESSION: 1. ETT in satisfactory position. Right jugular central venous catheter within the right atrium. 2. CHF with small to moderate right and small left pleural effusions, linear atelectasis right lung base and retrocardiac atelectasis and/or infiltrate. Discharge Medications:     Current Discharge Medication List      START taking these medications    Details   furosemide (LASIX) 40 mg tablet Take 1 Tab by mouth daily. Qty: 30 Tab, Refills: 0      potassium chloride (K-DUR, KLOR-CON) 20 mEq tablet Take 1 Tab by mouth daily. Qty: 30 Tab, Refills: 0      predniSONE (DELTASONE) 10 mg tablet Take 5 tabs PO daily x 5 d, then 4 tabs PO daily x 4 d, then 3 tabs PO daily x 3 d, then 2 tabs PO daily x 2 d, then 1 tab PO daily x 1 d  Qty: 60 Tab, Refills: 0         CONTINUE these medications which have CHANGED    Details   metoprolol tartrate (LOPRESSOR) 50 mg tablet Take 1 Tab by mouth two (2) times a day. Qty: 60 Tab, Refills: 0         CONTINUE these medications which have NOT CHANGED    Details   amLODIPine (NORVASC) 10 mg tablet Take 10 mg by mouth daily. gabapentin (NEURONTIN) 400 mg capsule Take 400 mg by mouth three (3) times daily. lisinopril (PRINIVIL, ZESTRIL) 20 mg tablet Take  by mouth daily. simvastatin (ZOCOR) 10 mg tablet Take  by mouth nightly. oxyCODONE-acetaminophen (PERCOCET) 5-325 mg per tablet Take 1 Tab by mouth every six (6) hours as needed for Pain. Max Daily Amount: 4 Tabs. Qty: 12 Tab, Refills: 0    Associated Diagnoses: Spinal stenosis of lumbar region without neurogenic claudication      loratadine (CLARITIN) 10 mg tablet Take 10 mg by mouth daily as needed for Allergies.       ipratropium-albuterol (COMBIVENT RESPIMAT)  mcg/actuation inhaler Take 1 Puff by inhalation every twelve (12) hours. Indications: Chronic Obstructive Pulmonary Disease with Bronchospasms             Activity: PT/OT per Home Health    Diet: Resume previous diet    Wound Care: As directed    Follow-up:   Please follow up with your PCP within 7 days to discuss your recent hospitalization. Patient to arrange.          Total time spent including time spent on final examination and discharge discussion, discharge documentation and records reviewed and medication reconciliation: > 30 minutes    Liliam Ibrahim DO  Internal Medicine, Hospitalist  Pager: [phone] [facility]

## 2018-12-30 NOTE — PROGRESS NOTES
8312:  Bedside and Verbal shift change report given to Ellyn Marte, RN (oncoming nurse) by [facility], RN (offgoing nurse). Report included the following information SBAR, Kardex, MAR and Recent Results. Patient awake, AOX4, on telemetry, IV saline locked. Voiced no concerns, bed in lowest/locked position with call bell in reach. 0830:  Assessment completed 1027:  Notified patient of d/c orders. Patient calling for ride. 1142:  Educated patient on D/C papers including medications. Patient voiced understanding. Patient waiting of his ride. 1254:  Patient D/C off unit with discharge instructions in hand. All belongings bagged and sent home with patient.

## 2018-12-31 ENCOUNTER — HOME CARE VISIT (OUTPATIENT)
Dept: HOME HEALTH SERVICES | Facility: HOME HEALTH | Age: 68
End: 2018-12-31

## 2019-01-21 ENCOUNTER — HOSPITAL ENCOUNTER (INPATIENT)
Age: 69
LOS: 33 days | Discharge: NURSING, REHAB OR LONG-TERM CARE | DRG: 049 | End: 2019-02-23
Attending: EMERGENCY MEDICINE | Admitting: HOSPITALIST
Payer: MEDICAID

## 2019-01-21 ENCOUNTER — APPOINTMENT (OUTPATIENT)
Dept: CT IMAGING | Age: 69
DRG: 049 | End: 2019-01-21
Attending: EMERGENCY MEDICINE
Payer: MEDICAID

## 2019-01-21 ENCOUNTER — HOSPITAL ENCOUNTER (EMERGENCY)
Age: 69
Discharge: HOME | DRG: 049 | End: 2019-01-21
Attending: EMERGENCY MEDICINE
Payer: MEDICAID

## 2019-01-21 ENCOUNTER — APPOINTMENT (OUTPATIENT)
Dept: GENERAL RADIOLOGY | Age: 69
DRG: 049 | End: 2019-01-21
Attending: EMERGENCY MEDICINE
Payer: MEDICAID

## 2019-01-21 VITALS
SYSTOLIC BLOOD PRESSURE: 138 MMHG | WEIGHT: 252 LBS | HEART RATE: 110 BPM | TEMPERATURE: 98.5 F | DIASTOLIC BLOOD PRESSURE: 85 MMHG | OXYGEN SATURATION: 99 % | HEIGHT: 72 IN | BODY MASS INDEX: 34.13 KG/M2 | RESPIRATION RATE: 20 BRPM

## 2019-01-21 DIAGNOSIS — J90 PLEURAL EFFUSION: ICD-10-CM

## 2019-01-21 DIAGNOSIS — M54.50 CHRONIC MIDLINE LOW BACK PAIN WITHOUT SCIATICA: ICD-10-CM

## 2019-01-21 DIAGNOSIS — G89.29 CHRONIC MIDLINE LOW BACK PAIN WITHOUT SCIATICA: ICD-10-CM

## 2019-01-21 DIAGNOSIS — F11.11 HISTORY OF HEROIN ABUSE (HCC): ICD-10-CM

## 2019-01-21 DIAGNOSIS — R78.81 BACTEREMIA: Primary | ICD-10-CM

## 2019-01-21 DIAGNOSIS — M46.44 DISCITIS OF THORACIC REGION: ICD-10-CM

## 2019-01-21 DIAGNOSIS — M46.24 CHRONIC OSTEOMYELITIS OF THORACIC SPINE (HCC): Primary | ICD-10-CM

## 2019-01-21 LAB
ALBUMIN SERPL-MCNC: 2.5 G/DL (ref 3.4–5)
ALBUMIN/GLOB SERPL: 0.4 {RATIO} (ref 0.8–1.7)
ALP SERPL-CCNC: 183 U/L (ref 45–117)
ALT SERPL-CCNC: 33 U/L (ref 16–61)
ANION GAP SERPL CALC-SCNC: 6 MMOL/L (ref 3–18)
APPEARANCE UR: CLEAR
AST SERPL-CCNC: 28 U/L (ref 15–37)
ATRIAL RATE: 121 BPM
BASOPHILS # BLD: 0 K/UL (ref 0–0.1)
BASOPHILS # BLD: 0 K/UL (ref 0–0.1)
BASOPHILS NFR BLD: 0 % (ref 0–2)
BASOPHILS NFR BLD: 0 % (ref 0–2)
BILIRUB SERPL-MCNC: 1 MG/DL (ref 0.2–1)
BILIRUB UR QL: NEGATIVE
BNP SERPL-MCNC: 149 PG/ML (ref 0–900)
BUN SERPL-MCNC: 15 MG/DL (ref 7–18)
BUN/CREAT SERPL: 18 (ref 12–20)
CALCIUM SERPL-MCNC: 9.1 MG/DL (ref 8.5–10.1)
CALCULATED P AXIS, ECG09: 41 DEGREES
CALCULATED R AXIS, ECG10: -41 DEGREES
CALCULATED T AXIS, ECG11: 49 DEGREES
CHLORIDE SERPL-SCNC: 95 MMOL/L (ref 100–108)
CO2 SERPL-SCNC: 35 MMOL/L (ref 21–32)
COLOR UR: ABNORMAL
CREAT SERPL-MCNC: 0.84 MG/DL (ref 0.6–1.3)
DIAGNOSIS, 93000: NORMAL
DIFFERENTIAL METHOD BLD: ABNORMAL
DIFFERENTIAL METHOD BLD: ABNORMAL
EOSINOPHIL # BLD: 0 K/UL (ref 0–0.4)
EOSINOPHIL # BLD: 0 K/UL (ref 0–0.4)
EOSINOPHIL NFR BLD: 0 % (ref 0–5)
EOSINOPHIL NFR BLD: 0 % (ref 0–5)
ERYTHROCYTE [DISTWIDTH] IN BLOOD BY AUTOMATED COUNT: 15.9 % (ref 11.6–14.5)
ERYTHROCYTE [DISTWIDTH] IN BLOOD BY AUTOMATED COUNT: 16.3 % (ref 11.6–14.5)
GLOBULIN SER CALC-MCNC: 6.9 G/DL (ref 2–4)
GLUCOSE SERPL-MCNC: 107 MG/DL (ref 74–99)
GLUCOSE UR STRIP.AUTO-MCNC: NEGATIVE MG/DL
HCT VFR BLD AUTO: 41.8 % (ref 36–48)
HCT VFR BLD AUTO: 50.5 % (ref 36–48)
HGB BLD-MCNC: 13.5 G/DL (ref 13–16)
HGB BLD-MCNC: 16.1 G/DL (ref 13–16)
HGB UR QL STRIP: NEGATIVE
KETONES UR QL STRIP.AUTO: NEGATIVE MG/DL
LACTATE BLD-SCNC: 0.83 MMOL/L (ref 0.4–2)
LACTATE BLD-SCNC: 1.93 MMOL/L (ref 0.4–2)
LEUKOCYTE ESTERASE UR QL STRIP.AUTO: NEGATIVE
LYMPHOCYTES # BLD: 1.5 K/UL (ref 0.9–3.6)
LYMPHOCYTES # BLD: 2.6 K/UL (ref 0.9–3.6)
LYMPHOCYTES NFR BLD: 15 % (ref 21–52)
LYMPHOCYTES NFR BLD: 24 % (ref 21–52)
MAGNESIUM SERPL-MCNC: 2.1 MG/DL (ref 1.6–2.6)
MCH RBC QN AUTO: 28.4 PG (ref 24–34)
MCH RBC QN AUTO: 28.5 PG (ref 24–34)
MCHC RBC AUTO-ENTMCNC: 31.9 G/DL (ref 31–37)
MCHC RBC AUTO-ENTMCNC: 32.3 G/DL (ref 31–37)
MCV RBC AUTO: 87.8 FL (ref 74–97)
MCV RBC AUTO: 89.4 FL (ref 74–97)
MONOCYTES # BLD: 0.5 K/UL (ref 0.05–1.2)
MONOCYTES # BLD: 0.9 K/UL (ref 0.05–1.2)
MONOCYTES NFR BLD: 5 % (ref 3–10)
MONOCYTES NFR BLD: 8 % (ref 3–10)
NEUTS SEG # BLD: 7.1 K/UL (ref 1.8–8)
NEUTS SEG # BLD: 8.2 K/UL (ref 1.8–8)
NEUTS SEG NFR BLD: 68 % (ref 40–73)
NEUTS SEG NFR BLD: 80 % (ref 40–73)
NITRITE UR QL STRIP.AUTO: NEGATIVE
P-R INTERVAL, ECG05: 178 MS
PH UR STRIP: >8.5 [PH] (ref 5–8)
PLATELET # BLD AUTO: 172 K/UL (ref 135–420)
PLATELET # BLD AUTO: 185 K/UL (ref 135–420)
PMV BLD AUTO: 10 FL (ref 9.2–11.8)
PMV BLD AUTO: 11.2 FL (ref 9.2–11.8)
POTASSIUM SERPL-SCNC: 3.9 MMOL/L (ref 3.5–5.5)
PROT SERPL-MCNC: 9.4 G/DL (ref 6.4–8.2)
PROT UR STRIP-MCNC: NEGATIVE MG/DL
Q-T INTERVAL, ECG07: 308 MS
QRS DURATION, ECG06: 68 MS
QTC CALCULATION (BEZET), ECG08: 437 MS
RBC # BLD AUTO: 4.76 M/UL (ref 4.7–5.5)
RBC # BLD AUTO: 5.65 M/UL (ref 4.7–5.5)
SODIUM SERPL-SCNC: 136 MMOL/L (ref 136–145)
SP GR UR REFRACTOMETRY: 1.02 (ref 1–1.03)
TROPONIN I SERPL-MCNC: <0.02 NG/ML (ref 0–0.04)
UROBILINOGEN UR QL STRIP.AUTO: 4 EU/DL (ref 0.2–1)
VENTRICULAR RATE, ECG03: 121 BPM
WBC # BLD AUTO: 10.2 K/UL (ref 4.6–13.2)
WBC # BLD AUTO: 10.6 K/UL (ref 4.6–13.2)

## 2019-01-21 PROCEDURE — 96375 TX/PRO/DX INJ NEW DRUG ADDON: CPT

## 2019-01-21 PROCEDURE — 77030029684 HC NEB SM VOL KT MONA -A

## 2019-01-21 PROCEDURE — 65270000029 HC RM PRIVATE

## 2019-01-21 PROCEDURE — 87040 BLOOD CULTURE FOR BACTERIA: CPT

## 2019-01-21 PROCEDURE — 77010033678 HC OXYGEN DAILY

## 2019-01-21 PROCEDURE — 84484 ASSAY OF TROPONIN QUANT: CPT

## 2019-01-21 PROCEDURE — 74011250637 HC RX REV CODE- 250/637: Performed by: EMERGENCY MEDICINE

## 2019-01-21 PROCEDURE — 80053 COMPREHEN METABOLIC PANEL: CPT

## 2019-01-21 PROCEDURE — 74011000250 HC RX REV CODE- 250: Performed by: EMERGENCY MEDICINE

## 2019-01-21 PROCEDURE — 74011250636 HC RX REV CODE- 250/636: Performed by: EMERGENCY MEDICINE

## 2019-01-21 PROCEDURE — 94640 AIRWAY INHALATION TREATMENT: CPT

## 2019-01-21 PROCEDURE — 74011636320 HC RX REV CODE- 636/320: Performed by: EMERGENCY MEDICINE

## 2019-01-21 PROCEDURE — 93005 ELECTROCARDIOGRAM TRACING: CPT

## 2019-01-21 PROCEDURE — 71275 CT ANGIOGRAPHY CHEST: CPT

## 2019-01-21 PROCEDURE — 80307 DRUG TEST PRSMV CHEM ANLYZR: CPT

## 2019-01-21 PROCEDURE — 96374 THER/PROPH/DIAG INJ IV PUSH: CPT

## 2019-01-21 PROCEDURE — 74011000258 HC RX REV CODE- 258: Performed by: EMERGENCY MEDICINE

## 2019-01-21 PROCEDURE — 83605 ASSAY OF LACTIC ACID: CPT

## 2019-01-21 PROCEDURE — 83735 ASSAY OF MAGNESIUM: CPT

## 2019-01-21 PROCEDURE — 87186 SC STD MICRODIL/AGAR DIL: CPT

## 2019-01-21 PROCEDURE — 96365 THER/PROPH/DIAG IV INF INIT: CPT

## 2019-01-21 PROCEDURE — 81001 URINALYSIS AUTO W/SCOPE: CPT

## 2019-01-21 PROCEDURE — 85025 COMPLETE CBC W/AUTO DIFF WBC: CPT

## 2019-01-21 PROCEDURE — 99283 EMERGENCY DEPT VISIT LOW MDM: CPT

## 2019-01-21 PROCEDURE — 83880 ASSAY OF NATRIURETIC PEPTIDE: CPT

## 2019-01-21 PROCEDURE — 81003 URINALYSIS AUTO W/O SCOPE: CPT

## 2019-01-21 PROCEDURE — 71045 X-RAY EXAM CHEST 1 VIEW: CPT

## 2019-01-21 PROCEDURE — 87077 CULTURE AEROBIC IDENTIFY: CPT

## 2019-01-21 PROCEDURE — 99285 EMERGENCY DEPT VISIT HI MDM: CPT

## 2019-01-21 RX ORDER — IBUPROFEN 400 MG/1
400 TABLET ORAL
Qty: 30 TAB | Refills: 0 | Status: SHIPPED | OUTPATIENT
Start: 2019-01-21 | End: 2019-02-23

## 2019-01-21 RX ORDER — IPRATROPIUM BROMIDE AND ALBUTEROL SULFATE 2.5; .5 MG/3ML; MG/3ML
3 SOLUTION RESPIRATORY (INHALATION)
Status: COMPLETED | OUTPATIENT
Start: 2019-01-21 | End: 2019-01-21

## 2019-01-21 RX ORDER — MAGNESIUM SULFATE HEPTAHYDRATE 40 MG/ML
2 INJECTION, SOLUTION INTRAVENOUS ONCE
Status: COMPLETED | OUTPATIENT
Start: 2019-01-21 | End: 2019-01-21

## 2019-01-21 RX ORDER — OXYCODONE HYDROCHLORIDE 5 MG/1
5 TABLET ORAL
Status: COMPLETED | OUTPATIENT
Start: 2019-01-21 | End: 2019-01-21

## 2019-01-21 RX ORDER — ACETAMINOPHEN 500 MG
1000 TABLET ORAL ONCE
Status: COMPLETED | OUTPATIENT
Start: 2019-01-21 | End: 2019-01-21

## 2019-01-21 RX ORDER — OXYCODONE HYDROCHLORIDE 5 MG/1
5 TABLET ORAL
Qty: 16 TAB | Refills: 0 | Status: SHIPPED | OUTPATIENT
Start: 2019-01-21 | End: 2019-02-23

## 2019-01-21 RX ORDER — SODIUM CHLORIDE 0.9 % (FLUSH) 0.9 %
5-10 SYRINGE (ML) INJECTION AS NEEDED
Status: DISCONTINUED | OUTPATIENT
Start: 2019-01-21 | End: 2019-01-21 | Stop reason: HOSPADM

## 2019-01-21 RX ORDER — NALOXONE HYDROCHLORIDE 4 MG/.1ML
SPRAY NASAL
Qty: 2 EACH | Refills: 0 | Status: SHIPPED | OUTPATIENT
Start: 2019-01-21 | End: 2019-02-23

## 2019-01-21 RX ORDER — ACETAMINOPHEN 325 MG/1
650 TABLET ORAL
Qty: 120 TAB | Refills: 0 | Status: SHIPPED | OUTPATIENT
Start: 2019-01-21

## 2019-01-21 RX ORDER — KETOROLAC TROMETHAMINE 30 MG/ML
15 INJECTION, SOLUTION INTRAMUSCULAR; INTRAVENOUS ONCE
Status: COMPLETED | OUTPATIENT
Start: 2019-01-21 | End: 2019-01-21

## 2019-01-21 RX ADMIN — ACETAMINOPHEN 1000 MG: 500 TABLET, FILM COATED ORAL at 02:06

## 2019-01-21 RX ADMIN — SODIUM CHLORIDE 1000 MG: 900 INJECTION, SOLUTION INTRAVENOUS at 22:46

## 2019-01-21 RX ADMIN — IPRATROPIUM BROMIDE AND ALBUTEROL SULFATE 3 ML: .5; 3 SOLUTION RESPIRATORY (INHALATION) at 03:05

## 2019-01-21 RX ADMIN — IPRATROPIUM BROMIDE AND ALBUTEROL SULFATE 3 ML: .5; 3 SOLUTION RESPIRATORY (INHALATION) at 03:06

## 2019-01-21 RX ADMIN — MAGNESIUM SULFATE HEPTAHYDRATE 2 G: 40 INJECTION, SOLUTION INTRAVENOUS at 03:03

## 2019-01-21 RX ADMIN — OXYCODONE HYDROCHLORIDE 5 MG: 5 TABLET ORAL at 02:06

## 2019-01-21 RX ADMIN — KETOROLAC TROMETHAMINE 15 MG: 30 INJECTION, SOLUTION INTRAMUSCULAR at 02:07

## 2019-01-21 RX ADMIN — IOPAMIDOL 150 ML: 612 INJECTION, SOLUTION INTRAVENOUS at 04:35

## 2019-01-21 RX ADMIN — METHYLPREDNISOLONE SODIUM SUCCINATE 125 MG: 125 INJECTION, POWDER, FOR SOLUTION INTRAMUSCULAR; INTRAVENOUS at 03:02

## 2019-01-21 RX ADMIN — SODIUM CHLORIDE, SODIUM LACTATE, POTASSIUM CHLORIDE, AND CALCIUM CHLORIDE 1000 ML: 600; 310; 30; 20 INJECTION, SOLUTION INTRAVENOUS at 03:01

## 2019-01-21 RX ADMIN — PIPERACILLIN SODIUM,TAZOBACTAM SODIUM 3.38 G: 3; .375 INJECTION, POWDER, FOR SOLUTION INTRAVENOUS at 23:46

## 2019-01-21 NOTE — ED NOTES
Patient is a difficult stick. >3 attempts for PIV access made by 2 different Rn's. Dr. Trish Gutierrez to do US guided PIV at bedside

## 2019-01-21 NOTE — ED PROVIDER NOTES
EMERGENCY DEPARTMENT HISTORY AND PHYSICAL EXAM 
 
1:48 AM 
 
 
Date: 1/21/2019 Patient Name: Akilah Wang History of Presenting Illness Chief Complaint Patient presents with  Back Pain  Abdominal Pain History Provided By: Patient Additional History (Context): Akilah Wang is a 77 y.o. male with discitis, lumbar stenosis, COPD, and other noted PMHx who presents with complaints of chronic back pain. He notes that the pain worsened recently after a \"beehive\" fell on his back 3 days ago. The pt described the pain as \"feeling like a pancake. \" He denies SOB or problems with urination. He notes that he is still having regular BM and he can feel the toilet paper when he wipes himself. The pt was recently prescribed Percocet for similar Sx, which he notes has been working. No other concerns or symptoms at this time. PCP: Miko Kelly MD 
 
Current Outpatient Medications Medication Sig Dispense Refill  oxyCODONE IR (ROXICODONE) 5 mg immediate release tablet Take 1 Tab by mouth every six (6) hours as needed for Pain. Max Daily Amount: 20 mg. 16 Tab 0  
 naloxone (NARCAN) 4 mg/actuation nasal spray Use 1 spray intranasally, then discard. Repeat with new spray every 2 min as needed for opioid overdose symptoms, alternating nostrils. 2 Each 0  
 acetaminophen (TYLENOL) 325 mg tablet Take 2 Tabs by mouth every four (4) hours as needed for Pain. 120 Tab 0  ibuprofen (MOTRIN) 400 mg tablet Take 1 Tab by mouth every six (6) hours as needed for Pain. 30 Tab 0  
 metoprolol tartrate (LOPRESSOR) 50 mg tablet Take 1 Tab by mouth two (2) times a day. 60 Tab 0  
 furosemide (LASIX) 40 mg tablet Take 1 Tab by mouth daily. 30 Tab 0  
 potassium chloride (K-DUR, KLOR-CON) 20 mEq tablet Take 1 Tab by mouth daily. 30 Tab 0  predniSONE (DELTASONE) 10 mg tablet Take 5 tabs PO daily x 5 d, then 4 tabs PO daily x 4 d, then 3 tabs PO daily x 3 d, then 2 tabs PO daily x 2 d, then 1 tab PO daily x 1 d 60 Tab 0  
 amLODIPine (NORVASC) 10 mg tablet Take 10 mg by mouth daily.  oxyCODONE-acetaminophen (PERCOCET) 5-325 mg per tablet Take 1 Tab by mouth every six (6) hours as needed for Pain. Max Daily Amount: 4 Tabs. 12 Tab 0  
 gabapentin (NEURONTIN) 400 mg capsule Take 400 mg by mouth three (3) times daily.  loratadine (CLARITIN) 10 mg tablet Take 10 mg by mouth daily as needed for Allergies.  ipratropium-albuterol (COMBIVENT RESPIMAT)  mcg/actuation inhaler Take 1 Puff by inhalation every twelve (12) hours. Indications: Chronic Obstructive Pulmonary Disease with Bronchospasms  lisinopril (PRINIVIL, ZESTRIL) 20 mg tablet Take  by mouth daily.  simvastatin (ZOCOR) 10 mg tablet Take  by mouth nightly. Past History Past Medical History: 
Past Medical History:  
Diagnosis Date  Arthritis  COPD  Hypertension Past Surgical History: 
Past Surgical History:  
Procedure Laterality Date  HX REFRACTIVE SURGERY Family History: 
History reviewed. No pertinent family history. Social History: 
Social History Tobacco Use  Smoking status: Former Smoker Last attempt to quit: 1984 Years since quittin.6  Smokeless tobacco: Former User Substance Use Topics  Alcohol use: Yes Comment: sometimes  Drug use: Yes Types: Heroin, Marijuana, Opiates Comment: Uses opiates for pain Allergies: Allergies Allergen Reactions  Shellfish Derived Hives Review of Systems Review of Systems Constitutional: Negative for diaphoresis and fever. HENT: Negative for rhinorrhea, sneezing and sore throat. Eyes: Negative for pain. Respiratory: Negative for shortness of breath. Cardiovascular: Negative for chest pain. Gastrointestinal: Negative for abdominal pain and vomiting. Genitourinary: Negative for dysuria. Musculoskeletal: Positive for back pain. Neurological: Negative for light-headedness and headaches. Psychiatric/Behavioral: The patient is not nervous/anxious. All other systems reviewed and are negative. Physical Exam  
 
Visit Vitals /85 (BP 1 Location: Left arm) Pulse (!) 110 Temp 98.5 °F (36.9 °C) Resp 20 Ht 6' (1.829 m) Wt 114.3 kg (252 lb) SpO2 99% BMI 34.18 kg/m² Physical Exam  
Constitutional: He is oriented to person, place, and time. He appears well-developed and well-nourished. HENT:  
Head: Normocephalic and atraumatic. Eyes: EOM are normal. Pupils are equal, round, and reactive to light. Right eye exhibits no discharge. Left eye exhibits no discharge. Neck: Normal range of motion. Neck supple. No JVD present. No tracheal deviation present. Cardiovascular: Regular rhythm and normal heart sounds. Tachycardia present. No murmur heard. Pulmonary/Chest: Effort normal. Tachypnea noted. No respiratory distress. He has decreased breath sounds. He has no wheezes. He has no rales. Mild retractions No obvious crackles Breath sounds decreased at bases Abdominal: Soft. Bowel sounds are normal. He exhibits no distension. There is no tenderness. There is no rebound. Musculoskeletal: Normal range of motion. He exhibits no tenderness or deformity. No fluctuance over spine Right paraspinal tenderness No midline tenderness to palpation Neurological: He is alert and oriented to person, place, and time. He has normal strength. No cranial nerve deficit.  
Thena [place] - Babinski No clonnus No hyperflexia Skin: Skin is warm and dry. No rash noted. No erythema. No skin change Psychiatric: He has a normal mood and affect. His behavior is normal.  
Nursing note and vitals reviewed. Diagnostic Study Results Labs - No results found for this or any previous visit (from the past 12 hour(s)). Radiologic Studies -  
CTA CHEST ABD PELV W CONT Final Result IMPRESSION:  
  
 1.  Atherosclerotic vascular disease but without evidence of thoracic or  
abdominal aortic aneurysm or dissection. 2.  Cirrhotic hepatic morphology with splenomegaly and varices, consistent with  
underlying portal hypertension. 3.  Dilatation of the central pulmonary arteries suggesting underlying pulmonary  
arterial hypertension. There is no evidence of central or segmental pulmonary  
embolus. 4.  Destructive changes at the T9-10 level, consistent with the patient's  
history of discitis/osteomyelitis. There appears to be intraspinal and  
paraspinal phlegmon. The overall appearance is grossly unchanged but would be  
better characterized by MRI which could be performed, as clinically warranted. Destructive changes are also present at T3-4 but to a lesser extent and also  
without gross interval change. 5.  Moderate-sized bilateral pleural effusions, slightly decreased in size with  
extensive atelectasis in both lungs. 6.  Additional ancillary findings, as described above. Preliminary results were provided to the emergency room by the radiology  
resident at the time of initial interpretation. XR CHEST PORT Final Result IMPRESSION:  
  
  
1. Very poor inspiration. Findings of CHF and interstitial edema prior exam have  
resolved. There continue be findings suspicious for bilateral pleural effusions  
and possible bilateral lower lobe areas of atelectasis. Medical Decision Making I am the first provider for this patient. I reviewed the vital signs, available nursing notes, past medical history, past surgical history, family history and social history. Vital Signs-Reviewed the patient's vital signs. EKG: Interpreted by the EP. Time Interpreted: 01:10 Rate: 121bpm 
 Rhythm: Sinus Tachycardia Interpretation: Normal intervals, LAD, no signs of ischemia Records Reviewed: Nursing Notes and Old Medical Records Provider Notes (Medical Decision Making): MDM Number of Diagnoses or Management Options Chronic midline low back pain without sciatica:  
Chronic osteomyelitis of thoracic spine [facility]):  
Pleural effusion:  
Diagnosis management comments: Diff dx: pe, copd, pna, disciitis, cauda equina Pt very poor historian, has had chronic back pain, chart review shows known t9/t10 and t5 disc destruction, biopsied by IR and deemed not to be infectious or need abx back in Nov 2018. Imaging in Dec showing this is improving or at least stable, however pt reporting extremely worse. Likely 2/2 him running out of opiates, will treat here but very cautious at d/c as hx of accicdental OD. No red flag back pain sx, will bladder scan for PVR, he is requiring 2L but appears he is on home 2L, will treat for COPD. Is quite tachypneic, at risk for PE given sedentary lifestyle, opiate use, will scan chest which will further characterize any worsening disc dz. Neuro exam benign, no UMN signs. Doubt cauda equina, denies issues with bowel or bladder or saddle anesthesia, pt refuses MRI when offered to rule out. Will have pt f/u with NSGY, they have been involved and have not wanted to operate or intervene in past, pt has also repeatedly refused intervention or rehabilitation, has not been able to tolerate MRI either. Today stating he would like intervention 2/2 pain. If able to send home, will have him f/u with NSGY in clinic. Reeval: pain much better controlled, ok w/dc. Imaging shows unchanged if not improved pleural effusions, no evidence of PE, unchanged erosion of spine. Pt provided information to f/u with NSGY for further evaluation. Few days of pain meds provided along with narcan script Safe for d/c, careful return precautions given. Pt/family comfortable with plan Carmen Villarreal MD 
 
 
 
Diagnosis Clinical Impression: 1. Chronic osteomyelitis of thoracic spine (HCC) 2. Chronic midline low back pain without sciatica 3. Pleural effusion Disposition: Discharged Follow-up Information Follow up With Specialties Details Why Contact Info Jayme Oquendo MD Internal Medicine In 2 days  [address] [facility] [address] [place] [address] [id number] 
[phone] [facility] EMERGENCY DEPT Emergency Medicine  As needed, If symptoms worsen [address] 
[phone] Lakeshia Perez MD Neurosurgery In 3 days regarding your back pain [address] [facility] [address] [place] [address] [id number] [phone] Medication List  
  
START taking these medications   
acetaminophen 325 mg tablet Commonly known as:  TYLENOL Take 2 Tabs by mouth every four (4) hours as needed for Pain. ibuprofen 400 mg tablet Commonly known as:  MOTRIN Take 1 Tab by mouth every six (6) hours as needed for Pain. 
  
naloxone 4 mg/actuation nasal spray Commonly known as:  [employer] Use 1 spray intranasally, then discard. Repeat with new spray every 2 min as needed for opioid overdose symptoms, alternating nostrils. oxyCODONE IR 5 mg immediate release tablet Commonly known as:  Vaibhav Barahona Take 1 Tab by mouth every six (6) hours as needed for Pain. Max Daily Amount: 20 mg. CONTINUE taking these medications   
amLODIPine 10 mg tablet Commonly known as:  Liang Feldman COMBIVENT RESPIMAT  mcg/actuation inhaler Generic drug:  ipratropium-albuterol 
  
furosemide 40 mg tablet Commonly known as:  LASIX Take 1 Tab by mouth daily. gabapentin 400 mg capsule Commonly known as:  NEURONTIN 
  
lisinopril 20 mg tablet Commonly known as:  PRINIVIL, ZESTRIL 
  
loratadine 10 mg tablet Commonly known as:  CLARITIN 
  
metoprolol tartrate 50 mg tablet Commonly known as:  LOPRESSOR Take 1 Tab by mouth two (2) times a day. oxyCODONE-acetaminophen 5-325 mg per tablet Commonly known as:  PERCOCET 
 Take 1 Tab by mouth every six (6) hours as needed for Pain. Max Daily Amount: 4 Tabs. potassium chloride 20 mEq tablet Commonly known as:  KGARY ALMANZAROR-CON Take 1 Tab by mouth daily. predniSONE 10 mg tablet Commonly known as:  Ramos Harish Take 5 tabs PO daily x 5 d, then 4 tabs PO daily x 4 d, then 3 tabs PO daily x 3 d, then 2 tabs PO daily x 2 d, then 1 tab PO daily x 1 d 
  
simvastatin 10 mg tablet Commonly known as:  ZOCOR Where to Get Your Medications Information about where to get these medications is not yet available Ask your nurse or doctor about these medications · acetaminophen 325 mg tablet · ibuprofen 400 mg tablet · naloxone 4 mg/actuation nasal spray 
· oxyCODONE IR 5 mg immediate release tablet 
  
 
_______________________________ Attestations: 
Scribe Attestation Stephanie Mckeon acting as a scribe for and in the presence of Blaine Morrow MD     
January 21, 2019 at 1:48 AM 
    
Provider Attestation:     
I personally performed the services described in the documentation, reviewed the documentation, as recorded by the scribe in my presence, and it accurately and completely records my words and actions. January 21, 2019 at 1:48 AM - Blaine Morrow MD   
_______________________________

## 2019-01-21 NOTE — PROGRESS NOTES
Referral received to assist with disposition. Pt was discharged this morning but no one came to door so pt was brought back to ED. Tried to call home but phone was busy. Called pt's daughter, Greg Navarro [phone] and confirmed that [phone] is correct #. She states that she's at Dr's office and can't assist at this time.

## 2019-01-21 NOTE — PROGRESS NOTES
Able to speak with pt's mother. She states that she was sleep this morning and didn't hear the doorbell. She states that she will be home waiting for pt.

## 2019-01-21 NOTE — DISCHARGE INSTRUCTIONS
Patient Education        Back Pain: Care Instructions  Your Care Instructions    Back pain has many possible causes. It is often related to problems with muscles and ligaments of the back. It may also be related to problems with the nerves, discs, or bones of the back. Moving, lifting, standing, sitting, or sleeping in an awkward way can strain the back. Sometimes you don't notice the injury until later. Arthritis is another common cause of back pain. Although it may hurt a lot, back pain usually improves on its own within several weeks. Most people recover in 12 weeks or less. Using good home treatment and being careful not to stress your back can help you feel better sooner. Follow-up care is a key part of your treatment and safety. Be sure to make and go to all appointments, and call your doctor if you are having problems. It's also a good idea to know your test results and keep a list of the medicines you take. How can you care for yourself at home? · Sit or lie in positions that are most comfortable and reduce your pain. Try one of these positions when you lie down:  ? Lie on your back with your knees bent and supported by large pillows. ? Lie on the floor with your legs on the seat of a sofa or chair. ? Lie on your side with your knees and hips bent and a pillow between your legs. ? Lie on your stomach if it does not make pain worse. · Do not sit up in bed, and avoid soft couches and twisted positions. Bed rest can help relieve pain at first, but it delays healing. Avoid bed rest after the first day of back pain. · Change positions every 30 minutes. If you must sit for long periods of time, take breaks from sitting. Get up and walk around, or lie in a comfortable position. · Try using a heating pad on a low or medium setting for 15 to 20 minutes every 2 or 3 hours. Try a warm shower in place of one session with the heating pad. · You can also try an ice pack for 10 to 15 minutes every 2 to 3 hours. Put a thin cloth between the ice pack and your skin. · Take pain medicines exactly as directed. ? If the doctor gave you a prescription medicine for pain, take it as prescribed. ? If you are not taking a prescription pain medicine, ask your doctor if you can take an over-the-counter medicine. · Take short walks several times a day. You can start with 5 to 10 minutes, 3 or 4 times a day, and work up to longer walks. Walk on level surfaces and avoid hills and stairs until your back is better. · Return to work and other activities as soon as you can. Continued rest without activity is usually not good for your back. · To prevent future back pain, do exercises to stretch and strengthen your back and stomach. Learn how to use good posture, safe lifting techniques, and proper body mechanics. When should you call for help? Call your doctor now or seek immediate medical care if:    · You have new or worsening numbness in your legs.     · You have new or worsening weakness in your legs. (This could make it hard to stand up.)     · You lose control of your bladder or bowels.    Watch closely for changes in your health, and be sure to contact your doctor if:    · You have a fever, lose weight, or don't feel well.     · You do not get better as expected. Where can you learn more? Go to http://vinod-joshua.info/. Enter H139 in the search box to learn more about \"Back Pain: Care Instructions. \"  Current as of: September 20, 2018  Content Version: 11.9  © 3880-5402 [employer]. Care instructions adapted under license by [employer] (which disclaims liability or warranty for this information). If you have questions about a medical condition or this instruction, always ask your healthcare professional. [place] [address] any warranty or liability for your use of this information.

## 2019-01-21 NOTE — ED NOTES
Pt returned via [address] because the patient was not able to get into his home. Pt lives with his mother and nephew and per EMS nobody came to the door therefore the patient was returned to ED. Pt was seen for back pain in which he started have \"subsided\" since he was given pain medication.

## 2019-01-21 NOTE — ED NOTES
Pt taken home via [facility]. I have reviewed discharge instructions with the patient. The patient verbalized understanding. IVs removed.

## 2019-01-21 NOTE — ED NOTES
Informed Dr. Ainsley Sicard that pt hypoxic, tachycardic, and tachypneic, that SIRS criteria met. Per Dr. Ainsley Sicard will initiate sepsis bundle. Awaiting orders. Will continue to monitor.

## 2019-01-21 NOTE — ED TRIAGE NOTES
Pt brought to ED via EMS for c/o lower back pain that radiates to his lower abdominal quadrant that has been ongoing x2 weeks. Pt states that he was seen at [place] [address] 1 week ago for the same problem, was given percocet which helped with the back pain, but has been out of the percocet.   
 
Pt /w hx of COPD, 88% on room air, wears 2lpm NC

## 2019-01-21 NOTE — ED NOTES
Pt transported via [address] [place] [address],[address] and verified by phone someone is present at the residence.

## 2019-01-22 ENCOUNTER — APPOINTMENT (OUTPATIENT)
Dept: GENERAL RADIOLOGY | Age: 69
DRG: 049 | End: 2019-01-22
Attending: HOSPITALIST
Payer: MEDICAID

## 2019-01-22 LAB
ALBUMIN SERPL-MCNC: 1.9 G/DL (ref 3.4–5)
ALBUMIN SERPL-MCNC: 2.2 G/DL (ref 3.4–5)
ALBUMIN/GLOB SERPL: 0.3 {RATIO} (ref 0.8–1.7)
ALBUMIN/GLOB SERPL: 0.4 {RATIO} (ref 0.8–1.7)
ALP SERPL-CCNC: 132 U/L (ref 45–117)
ALP SERPL-CCNC: 141 U/L (ref 45–117)
ALT SERPL-CCNC: 24 U/L (ref 16–61)
ALT SERPL-CCNC: 26 U/L (ref 16–61)
AMPHET UR QL SCN: NEGATIVE
ANION GAP SERPL CALC-SCNC: 2 MMOL/L (ref 3–18)
ANION GAP SERPL CALC-SCNC: 6 MMOL/L (ref 3–18)
APPEARANCE UR: CLEAR
APTT PPP: 29.4 SEC (ref 23–36.4)
ARTERIAL PATENCY WRIST A: YES
AST SERPL-CCNC: 16 U/L (ref 15–37)
AST SERPL-CCNC: 16 U/L (ref 15–37)
BACTERIA URNS QL MICRO: NEGATIVE /HPF
BARBITURATES UR QL SCN: NEGATIVE
BASE EXCESS BLD CALC-SCNC: 8 MMOL/L
BDY SITE: ABNORMAL
BENZODIAZ UR QL: NEGATIVE
BILIRUB SERPL-MCNC: 0.4 MG/DL (ref 0.2–1)
BILIRUB SERPL-MCNC: 0.5 MG/DL (ref 0.2–1)
BILIRUB UR QL: ABNORMAL
BNP SERPL-MCNC: 84 PG/ML (ref 0–900)
BODY TEMPERATURE: 97.2
BUN SERPL-MCNC: 18 MG/DL (ref 7–18)
BUN SERPL-MCNC: 19 MG/DL (ref 7–18)
BUN/CREAT SERPL: 27 (ref 12–20)
BUN/CREAT SERPL: 30 (ref 12–20)
CALCIUM SERPL-MCNC: 8.2 MG/DL (ref 8.5–10.1)
CALCIUM SERPL-MCNC: 8.8 MG/DL (ref 8.5–10.1)
CANNABINOIDS UR QL SCN: NEGATIVE
CHLORIDE SERPL-SCNC: 101 MMOL/L (ref 100–108)
CHLORIDE SERPL-SCNC: 103 MMOL/L (ref 100–108)
CHOLEST SERPL-MCNC: 186 MG/DL
CO2 SERPL-SCNC: 31 MMOL/L (ref 21–32)
CO2 SERPL-SCNC: 37 MMOL/L (ref 21–32)
COCAINE UR QL SCN: NEGATIVE
COLOR UR: ABNORMAL
CREAT SERPL-MCNC: 0.64 MG/DL (ref 0.6–1.3)
CREAT SERPL-MCNC: 0.67 MG/DL (ref 0.6–1.3)
CRP SERPL-MCNC: 7.4 MG/DL (ref 0–0.3)
EPITH CASTS URNS QL MICRO: NORMAL /LPF (ref 0–5)
ERYTHROCYTE [DISTWIDTH] IN BLOOD BY AUTOMATED COUNT: 16.1 % (ref 11.6–14.5)
ERYTHROCYTE [SEDIMENTATION RATE] IN BLOOD: 38 MM/HR (ref 0–20)
GAS FLOW.O2 O2 DELIVERY SYS: ABNORMAL L/MIN
GAS FLOW.O2 SETTING OXYMISER: 3 L/M
GLOBULIN SER CALC-MCNC: 5.5 G/DL (ref 2–4)
GLOBULIN SER CALC-MCNC: 5.7 G/DL (ref 2–4)
GLUCOSE BLD STRIP.AUTO-MCNC: 126 MG/DL (ref 70–110)
GLUCOSE SERPL-MCNC: 121 MG/DL (ref 74–99)
GLUCOSE SERPL-MCNC: 95 MG/DL (ref 74–99)
GLUCOSE UR STRIP.AUTO-MCNC: NEGATIVE MG/DL
HCO3 BLD-SCNC: 36.9 MMOL/L (ref 22–26)
HCT VFR BLD AUTO: 42.9 % (ref 36–48)
HDLC SERPL-MCNC: 39 MG/DL (ref 40–60)
HDLC SERPL: 4.8 {RATIO} (ref 0–5)
HDSCOM,HDSCOM: NORMAL
HGB BLD-MCNC: 13.5 G/DL (ref 13–16)
HGB UR QL STRIP: ABNORMAL
KETONES UR QL STRIP.AUTO: NEGATIVE MG/DL
LDLC SERPL CALC-MCNC: 125.4 MG/DL (ref 0–100)
LEUKOCYTE ESTERASE UR QL STRIP.AUTO: NEGATIVE
LIPID PROFILE,FLP: ABNORMAL
MCH RBC QN AUTO: 28.2 PG (ref 24–34)
MCHC RBC AUTO-ENTMCNC: 31.5 G/DL (ref 31–37)
MCV RBC AUTO: 89.6 FL (ref 74–97)
METHADONE UR QL: NEGATIVE
NITRITE UR QL STRIP.AUTO: NEGATIVE
O2/TOTAL GAS SETTING VFR VENT: 0.32 %
OPIATES UR QL: NEGATIVE
PCO2 BLD: 97 MMHG (ref 35–45)
PCP UR QL: NEGATIVE
PH BLD: 7.18 [PH] (ref 7.35–7.45)
PH UR STRIP: 5.5 [PH] (ref 5–8)
PLATELET # BLD AUTO: 178 K/UL (ref 135–420)
PMV BLD AUTO: 11.3 FL (ref 9.2–11.8)
PO2 BLD: 74 MMHG (ref 80–100)
POTASSIUM SERPL-SCNC: 4.4 MMOL/L (ref 3.5–5.5)
POTASSIUM SERPL-SCNC: 4.4 MMOL/L (ref 3.5–5.5)
PROT SERPL-MCNC: 7.4 G/DL (ref 6.4–8.2)
PROT SERPL-MCNC: 7.9 G/DL (ref 6.4–8.2)
PROT UR STRIP-MCNC: ABNORMAL MG/DL
RBC # BLD AUTO: 4.79 M/UL (ref 4.7–5.5)
RBC #/AREA URNS HPF: NORMAL /HPF (ref 0–5)
SAO2 % BLD: 90 % (ref 92–97)
SERVICE CMNT-IMP: ABNORMAL
SODIUM SERPL-SCNC: 140 MMOL/L (ref 136–145)
SODIUM SERPL-SCNC: 140 MMOL/L (ref 136–145)
SP GR UR REFRACTOMETRY: >1.03 (ref 1–1.03)
SPECIMEN TYPE: ABNORMAL
TOTAL RESP. RATE, ITRR: 30
TRIGL SERPL-MCNC: 108 MG/DL (ref ?–150)
UROBILINOGEN UR QL STRIP.AUTO: 2 EU/DL (ref 0.2–1)
VLDLC SERPL CALC-MCNC: 21.6 MG/DL
WBC # BLD AUTO: 10.5 K/UL (ref 4.6–13.2)
WBC URNS QL MICRO: NORMAL /HPF (ref 0–4)

## 2019-01-22 PROCEDURE — 74011250636 HC RX REV CODE- 250/636

## 2019-01-22 PROCEDURE — 74011250637 HC RX REV CODE- 250/637: Performed by: HOSPITALIST

## 2019-01-22 PROCEDURE — 65660000001 HC RM ICU INTERMED STEPDOWN

## 2019-01-22 PROCEDURE — 5A09457 ASSISTANCE WITH RESPIRATORY VENTILATION, 24-96 CONSECUTIVE HOURS, CONTINUOUS POSITIVE AIRWAY PRESSURE: ICD-10-PCS | Performed by: HOSPITALIST

## 2019-01-22 PROCEDURE — 74011000258 HC RX REV CODE- 258: Performed by: HOSPITALIST

## 2019-01-22 PROCEDURE — 80053 COMPREHEN METABOLIC PANEL: CPT

## 2019-01-22 PROCEDURE — 87077 CULTURE AEROBIC IDENTIFY: CPT

## 2019-01-22 PROCEDURE — 82962 GLUCOSE BLOOD TEST: CPT

## 2019-01-22 PROCEDURE — 80061 LIPID PANEL: CPT

## 2019-01-22 PROCEDURE — 85027 COMPLETE CBC AUTOMATED: CPT

## 2019-01-22 PROCEDURE — 82803 BLOOD GASES ANY COMBINATION: CPT

## 2019-01-22 PROCEDURE — 74011250636 HC RX REV CODE- 250/636: Performed by: HOSPITALIST

## 2019-01-22 PROCEDURE — 85652 RBC SED RATE AUTOMATED: CPT

## 2019-01-22 PROCEDURE — 36415 COLL VENOUS BLD VENIPUNCTURE: CPT

## 2019-01-22 PROCEDURE — 94660 CPAP INITIATION&MGMT: CPT

## 2019-01-22 PROCEDURE — 87186 SC STD MICRODIL/AGAR DIL: CPT

## 2019-01-22 PROCEDURE — 77030035694 HC MSK BIPAP FLL FAC PERFMAX PHIL -B

## 2019-01-22 PROCEDURE — 36600 WITHDRAWAL OF ARTERIAL BLOOD: CPT

## 2019-01-22 PROCEDURE — 77010033678 HC OXYGEN DAILY

## 2019-01-22 PROCEDURE — 74011250636 HC RX REV CODE- 250/636: Performed by: EMERGENCY MEDICINE

## 2019-01-22 PROCEDURE — 83880 ASSAY OF NATRIURETIC PEPTIDE: CPT

## 2019-01-22 PROCEDURE — 86140 C-REACTIVE PROTEIN: CPT

## 2019-01-22 PROCEDURE — 74011000258 HC RX REV CODE- 258: Performed by: EMERGENCY MEDICINE

## 2019-01-22 PROCEDURE — 77030021352 HC CBL LD SYS DISP COVD -B

## 2019-01-22 PROCEDURE — 71045 X-RAY EXAM CHEST 1 VIEW: CPT

## 2019-01-22 PROCEDURE — 85730 THROMBOPLASTIN TIME PARTIAL: CPT

## 2019-01-22 PROCEDURE — 87040 BLOOD CULTURE FOR BACTERIA: CPT

## 2019-01-22 RX ORDER — AMLODIPINE BESYLATE 10 MG/1
10 TABLET ORAL DAILY
Status: DISCONTINUED | OUTPATIENT
Start: 2019-01-22 | End: 2019-02-09

## 2019-01-22 RX ORDER — GABAPENTIN 400 MG/1
400 CAPSULE ORAL 3 TIMES DAILY
Status: DISCONTINUED | OUTPATIENT
Start: 2019-01-22 | End: 2019-02-23 | Stop reason: HOSPADM

## 2019-01-22 RX ORDER — LISINOPRIL 20 MG/1
20 TABLET ORAL DAILY
Status: DISCONTINUED | OUTPATIENT
Start: 2019-01-22 | End: 2019-02-09

## 2019-01-22 RX ORDER — NALOXONE HYDROCHLORIDE 0.4 MG/ML
0.4 INJECTION, SOLUTION INTRAMUSCULAR; INTRAVENOUS; SUBCUTANEOUS ONCE
Status: COMPLETED | OUTPATIENT
Start: 2019-01-22 | End: 2019-01-22

## 2019-01-22 RX ORDER — NALOXONE HYDROCHLORIDE 0.4 MG/ML
INJECTION, SOLUTION INTRAMUSCULAR; INTRAVENOUS; SUBCUTANEOUS
Status: COMPLETED
Start: 2019-01-22 | End: 2019-01-22

## 2019-01-22 RX ORDER — OXYCODONE HYDROCHLORIDE 5 MG/1
5 TABLET ORAL
Status: DISCONTINUED | OUTPATIENT
Start: 2019-01-22 | End: 2019-01-22

## 2019-01-22 RX ORDER — METOPROLOL TARTRATE 50 MG/1
50 TABLET ORAL 2 TIMES DAILY
Status: DISCONTINUED | OUTPATIENT
Start: 2019-01-22 | End: 2019-02-09

## 2019-01-22 RX ORDER — ONDANSETRON 2 MG/ML
4 INJECTION INTRAMUSCULAR; INTRAVENOUS
Status: DISCONTINUED | OUTPATIENT
Start: 2019-01-22 | End: 2019-02-23 | Stop reason: HOSPADM

## 2019-01-22 RX ORDER — SIMVASTATIN 10 MG/1
10 TABLET, FILM COATED ORAL
Status: DISCONTINUED | OUTPATIENT
Start: 2019-01-22 | End: 2019-02-23 | Stop reason: HOSPADM

## 2019-01-22 RX ORDER — ACETAMINOPHEN 325 MG/1
650 TABLET ORAL
Status: DISCONTINUED | OUTPATIENT
Start: 2019-01-22 | End: 2019-02-23 | Stop reason: HOSPADM

## 2019-01-22 RX ORDER — HEPARIN SODIUM 5000 [USP'U]/ML
5000 INJECTION, SOLUTION INTRAVENOUS; SUBCUTANEOUS EVERY 8 HOURS
Status: DISCONTINUED | OUTPATIENT
Start: 2019-01-22 | End: 2019-02-23 | Stop reason: HOSPADM

## 2019-01-22 RX ORDER — SODIUM CHLORIDE 9 MG/ML
75 INJECTION, SOLUTION INTRAVENOUS CONTINUOUS
Status: DISCONTINUED | OUTPATIENT
Start: 2019-01-22 | End: 2019-01-22

## 2019-01-22 RX ADMIN — HEPARIN SODIUM 5000 UNITS: 5000 INJECTION INTRAVENOUS; SUBCUTANEOUS at 09:25

## 2019-01-22 RX ADMIN — SIMVASTATIN 10 MG: 10 TABLET, FILM COATED ORAL at 02:14

## 2019-01-22 RX ADMIN — SODIUM CHLORIDE 1250 MG: 900 INJECTION, SOLUTION INTRAVENOUS at 17:57

## 2019-01-22 RX ADMIN — SODIUM CHLORIDE 75 ML/HR: 900 INJECTION, SOLUTION INTRAVENOUS at 02:05

## 2019-01-22 RX ADMIN — OXYCODONE HYDROCHLORIDE 5 MG: 5 TABLET ORAL at 09:26

## 2019-01-22 RX ADMIN — GABAPENTIN 400 MG: 100 CAPSULE ORAL at 09:18

## 2019-01-22 RX ADMIN — GABAPENTIN 400 MG: 100 CAPSULE ORAL at 17:58

## 2019-01-22 RX ADMIN — HEPARIN SODIUM 5000 UNITS: 5000 INJECTION INTRAVENOUS; SUBCUTANEOUS at 02:14

## 2019-01-22 RX ADMIN — PIPERACILLIN SODIUM,TAZOBACTAM SODIUM 3.38 G: 3; .375 INJECTION, POWDER, FOR SOLUTION INTRAVENOUS at 09:26

## 2019-01-22 RX ADMIN — LISINOPRIL 20 MG: 20 TABLET ORAL at 09:18

## 2019-01-22 RX ADMIN — ONDANSETRON 4 MG: 2 INJECTION INTRAMUSCULAR; INTRAVENOUS at 23:46

## 2019-01-22 RX ADMIN — OXYCODONE HYDROCHLORIDE 5 MG: 5 TABLET ORAL at 03:11

## 2019-01-22 RX ADMIN — PIPERACILLIN SODIUM,TAZOBACTAM SODIUM 3.38 G: 3; .375 INJECTION, POWDER, FOR SOLUTION INTRAVENOUS at 05:49

## 2019-01-22 RX ADMIN — METOPROLOL TARTRATE 50 MG: 50 TABLET ORAL at 17:58

## 2019-01-22 RX ADMIN — NALOXONE HYDROCHLORIDE 0.4 MG: 0.4 INJECTION, SOLUTION INTRAMUSCULAR; INTRAVENOUS; SUBCUTANEOUS at 14:42

## 2019-01-22 RX ADMIN — METOPROLOL TARTRATE 50 MG: 50 TABLET ORAL at 09:17

## 2019-01-22 RX ADMIN — AMLODIPINE BESYLATE 10 MG: 10 TABLET ORAL at 09:17

## 2019-01-22 RX ADMIN — PIPERACILLIN SODIUM,TAZOBACTAM SODIUM 3.38 G: 3; .375 INJECTION, POWDER, FOR SOLUTION INTRAVENOUS at 20:02

## 2019-01-22 RX ADMIN — HEPARIN SODIUM 5000 UNITS: 5000 INJECTION INTRAVENOUS; SUBCUTANEOUS at 17:58

## 2019-01-22 RX ADMIN — SODIUM CHLORIDE 750 MG: 900 INJECTION, SOLUTION INTRAVENOUS at 01:00

## 2019-01-22 RX ADMIN — SODIUM CHLORIDE 500 MG: 900 INJECTION, SOLUTION INTRAVENOUS at 02:06

## 2019-01-22 NOTE — CALL BACK NOTE
Pt with h/o heroin use; apparent ED visit today for back pain. Lab called with +blood culture results. Called pt's home number listed (only number provided) and phone is not working. Police called and asked to bring in patient.  - rebeca, simona

## 2019-01-22 NOTE — CONSULTS
INFECTIOUS DISEASE CONSULT NOTE       Requested by: Dr Brittany Wilson    Reason for consult: BSI, discitis    Date of admission: 1/21/2019    Date of consult: January 22, 2019      ABX:     Current abx Prior abx   Vanco/Zosyn 1/21-1 Cefepime/vanco  11/14   2, Ceftriaxone 11/16 - 3      Assessment -> Rec:      BSI 2 of 2 GNR  - source ? Discitis Phlegmon seen on CT  - denies any urinary complain but had Serratia in urine in past. Currently UA is negative - Await final ID and sensitivity  - continue on Zosyn for now  - repeat Blcx x2   Destructive T10-11 changes ?discitis/OM- chronic back pain  - mid back pain x 5 months, fell, incontinent of urine  - CTAP 11/2018: severe destructive changes T 10-11, paravertebral sot tissue infiltration, extension to anterior epidural space w/ cord compression   - h/o IVDU - last use in July 2018  - uncooperative for MRI given claustrophobia  - In past tried but unable for MRI or aspiration as anesthesia apparently not available for conscious sedation for this  - Bone/ gallium scans 11/27 neg for discitis/OM   - refused stabilization procedure per Dr. Lia Martell in past  - was not given long term Abx then  - CT 1/21 with destructive changes T9-10 levels c/w discitis/OM and intraspinal and paraspinal phlegmon.  Overall appearance is grossly unchanged and pain also not different from last time -> check inflammatory markers  -> repeat trial for MRI - pt agreeable  -> needs aspiration of fluid/phlegmon for possible abscess  -> Continue on Vanco and Zosyn for now   Bilateral pleural effusions- chronic with  atelectasis - monitor   Hepatomegaly, splenic hilar & perisplenic varices- concern for cirrhosis and portal HTN  - seen on CTAP again     H/o IVDU  - HIV ab NR 11/19 ,Hep BsAg neg (last reported ivdu August 2018, has hep C and thinks hep b immune) -> denies anything currently     COPD     HTN     DJD     Allergy [place]      MICROBIOLOGY:   11/14   urcx >100,000 Serratia marcescens              blcx NG x 2  11/15   CrAG neg, fungitell 375 (reported 11/17)  11/29   blcx for fungus - ngtd              fungitell 113    1/21 Blcx x2 GNR    1/21 UA neg       LINES AND CATHETERS:   piv    HPI:     Mr Arvin Ochoa is a 77 y.o. AA male with PMH of arthritis, chronic back pain, HTN, COPD, IVDU who was admitted 1/21 for back pain. Pt is known to our service from last admission in November when he was admitted for acute on chronic back pain. He on CT was found to have discitis/OM of T10-11 area. He refused MRI given claustrophobia despite ativan and pain meds. He was considered for conscious sedation but didn;t work out. He was evaluated for spine stabilization which he refused. His cx were negative and WBC scan was negative for back uptake. He was discharged on no Abx and for follow up imaging. He came to ED 1/21 for acute on chronic back pain. He was evaluated in ED and discharged home. His 2 of 2 blcx came back positive for GNR and he was called but he didn't pick the phone and police and then EMS was called and he was brought to ED. Interestingly, his WBC is normal and has been afebrile. He was admitted and was started on IV Vanco and Zosyn. CT showed known discitis/OM and phlegmon. We were asked for further eval and management.        Past medical history:     Past Medical History:   Diagnosis Date    Arthritis     COPD     Hypertension        Social History:     Social History     Socioeconomic History    Marital status: SINGLE     Spouse name: Not on file    Number of children: Not on file    Years of education: Not on file    Highest education level: Not on file   Social Needs    Financial resource strain: Not on file    Food insecurity - worry: Not on file    Food insecurity - inability: Not on file   [employer] needs - medical: Not on file   [employer] needs - non-medical: Not on file   Occupational History    Not on file   Tobacco Use    Smoking status: Former Smoker     Last attempt to quit: 1984     Years since quittin.6    Smokeless tobacco: Former User   Substance and Sexual Activity    Alcohol use: Yes     Comment: sometimes    Drug use: Yes     Types: Heroin, Marijuana, Opiates     Comment: Uses opiates for pain    Sexual activity: Not on file   Other Topics Concern    Not on file   Social History Narrative    Not on file       Family History:   No family history on file. Allergies: Allergies   Allergen Reactions    Shellfish Derived Hives         Home Medications:     Medications Prior to Admission   Medication Sig    metoprolol tartrate (LOPRESSOR) 50 mg tablet Take 1 Tab by mouth two (2) times a day.  furosemide (LASIX) 40 mg tablet Take 1 Tab by mouth daily.  amLODIPine (NORVASC) 10 mg tablet Take 10 mg by mouth daily.  gabapentin (NEURONTIN) 400 mg capsule Take 400 mg by mouth three (3) times daily.  loratadine (CLARITIN) 10 mg tablet Take 10 mg by mouth daily as needed for Allergies.  lisinopril (PRINIVIL, ZESTRIL) 20 mg tablet Take  by mouth daily.  simvastatin (ZOCOR) 10 mg tablet Take  by mouth nightly.  oxyCODONE IR (ROXICODONE) 5 mg immediate release tablet Take 1 Tab by mouth every six (6) hours as needed for Pain. Max Daily Amount: 20 mg.    naloxone (NARCAN) 4 mg/actuation nasal spray Use 1 spray intranasally, then discard. Repeat with new spray every 2 min as needed for opioid overdose symptoms, alternating nostrils.  acetaminophen (TYLENOL) 325 mg tablet Take 2 Tabs by mouth every four (4) hours as needed for Pain.  ibuprofen (MOTRIN) 400 mg tablet Take 1 Tab by mouth every six (6) hours as needed for Pain.  potassium chloride (K-DUR, KLOR-CON) 20 mEq tablet Take 1 Tab by mouth daily.     predniSONE (DELTASONE) 10 mg tablet Take 5 tabs PO daily x 5 d, then 4 tabs PO daily x 4 d, then 3 tabs PO daily x 3 d, then 2 tabs PO daily x 2 d, then 1 tab PO daily x 1 d    oxyCODONE-acetaminophen (PERCOCET) 5-325 mg per tablet Take 1 Tab by mouth every six (6) hours as needed for Pain. Max Daily Amount: 4 Tabs.  ipratropium-albuterol (COMBIVENT RESPIMAT)  mcg/actuation inhaler Take 1 Puff by inhalation every twelve (12) hours. Indications: Chronic Obstructive Pulmonary Disease with Bronchospasms       Current Medications:     Current Facility-Administered Medications   Medication Dose Route Frequency    amLODIPine (NORVASC) tablet 10 mg  10 mg Oral DAILY    gabapentin (NEURONTIN) capsule 400 mg  400 mg Oral TID    lisinopril (PRINIVIL, ZESTRIL) tablet 20 mg  20 mg Oral DAILY    metoprolol tartrate (LOPRESSOR) tablet 50 mg  50 mg Oral BID    oxyCODONE IR (ROXICODONE) tablet 5 mg  5 mg Oral Q4H PRN    simvastatin (ZOCOR) tablet 10 mg  10 mg Oral QHS    acetaminophen (TYLENOL) tablet 650 mg  650 mg Oral Q6H PRN    ondansetron (ZOFRAN) injection 4 mg  4 mg IntraVENous Q6H PRN    0.9% sodium chloride infusion  75 mL/hr IntraVENous CONTINUOUS    heparin (porcine) injection 5,000 Units  5,000 Units SubCUTAneous Q8H    VANCOMYCIN INFORMATION NOTE   Other Rx Dosing/Monitoring    piperacillin-tazobactam (ZOSYN) 3.375 g in 0.9% sodium chloride (MBP/ADV) 100 mL MBP  #EXTENDED 4-HOUR INFUSION##  3.375 g IntraVENous Q8H    vancomycin (VANCOCIN) 1,250 mg in 0.9% sodium chloride 250 mL IVPB  1,250 mg IntraVENous Q12H    [START ON 1/24/2019] VANCOMYCIN INFORMATION NOTE   Other ONCE       Review of Systems:   12 points ROS done. Pertinent positives and negatives are as follows, ROS otherwise negative. Constitutional: Negative for fever, chills. HENT: Negative for ear pain, congestion, sore throat, rhinorrhea. Eyes: Negative for pain, redness and visual disturbance. Respiratory: negative for shortness of breath, cough, chest tightness, wheezing. Cardiovascular: Negative for chest pain, palpitations and leg swelling.    Gastrointestinal: Negative for nausea, vomiting, abdominal pain or diarrhea  Genitourinary: Negative for dysuria   Musculoskeletal: +ve for chronic back pain   Skin: Negative for ulcers, rash  Neurological: Negative for dizziness, syncope, light-headedness or headaches. Hematological:Negative for easy bruising or bleeding. Psychiatric/Behavioral: Negative anxiety, depression. Physical Exam:  Vitals  Temp (24hrs), Av.2 °F (36.8 °C), Min:97.8 °F (36.6 °C), Max:98.5 °F (36.9 °C)    Visit Vitals  /70 (BP 1 Location: Left arm, BP Patient Position: At rest)   Pulse 98   Temp 97.8 °F (36.6 °C)   Resp 18   Ht 6' (1.829 m)   Wt 98.5 kg (217 lb 1.6 oz)   SpO2 91%   BMI 29.44 kg/m²       General: Well-developed, 77y.o. year-old, male, in no acute distress, ch sick appearing  HEENT: Normocephalic, anicteric sclerae, Pupils equal, round reactive to light, no oropharyngeal lesions, poor dentition. No sinus tenderness. Neck: Supple, no lymphadenopathy, masses or thyromegaly  Chest: Symmetrical expansion  Lungs: Clear to auscultation bilaterally, no dullness  Heart: Regular rhythm, no murmurs, rubs or gallops, No JVD  Abdomen: Soft, non-tender,non distended, no organomegaly, BS+  Musculoskeletal: back tenderness+, No edema. No clubbing or cyanosis  CNS: AAOx3. Grossly normal.No NR  SKIN: No skin lesion or rash.  Dry, warm, intact          Labs: Results:   Chemistry Recent Labs     19  0455 19  2340 19  0132   GLU 95 121* 107*    140 136   K 4.4 4.4 3.9    101 95*   CO2 31 37* 35*   BUN 18 19* 15   CREA 0.67 0.64 0.84   CA 8.2* 8.8 9.1   AGAP 6 2* 6   BUCR 27* 30* 18   * 141* 183*   TP 7.4 7.9 9.4*   ALB 1.9* 2.2* 2.5*   GLOB 5.5* 5.7* 6.9*   AGRAT 0.3* 0.4* 0.4*      CBC w/Diff Recent Labs     19  0455 19  2340 19  0132   WBC 10.5 10.2 10.6   RBC 4.79 4.76 5.65*   HGB 13.5 13.5 16.1*   HCT 42.9 41.8 50.5*    172 185   GRANS  --  80* 68   LYMPH  --  15* 24   EOS  --  0 0 Microbiology Recent Labs     01/21/19  0132 01/21/19  0120   CULT AEROBIC AND ANAEROBIC BOTTLES GRAM NEGATIVE RODS* AEROBIC AND ANAEROBIC BOTTLES GRAM NEGATIVE RODS*          Imaging-    Results from Hospital Encounter encounter on 01/21/19   XR CHEST PORT    Narrative EXAM: XR CHEST PORT    CLINICAL INDICATION/HISTORY: COPD  -Additional: None    COMPARISON: Single view chest 12/23/2018    TECHNIQUE: Portable frontal view of the chest    _______________    FINDINGS:    SUPPORT DEVICES: None. HEART AND MEDIASTINUM: Suboptimally evaluated secondary to very poor  inspiration. Pulmonary vascularity appears normal which is an improvement over  previous examination. LUNGS AND PLEURAL SPACES: Bilateral interstitial infiltrates present on prior  examination have resolved. Both costophrenic angles remain blunted lower lobe  areas of atelectasis or infiltrate. BONY THORAX AND SOFT TISSUES: Unremarkable.    _______________      Impression IMPRESSION:      1. Very poor inspiration. Findings of CHF and interstitial edema prior exam have  resolved. There continue be findings suspicious for bilateral pleural effusions  and possible bilateral lower lobe areas of atelectasis. Results from [place] encounter on 01/21/19   CTA CHEST ABD PELV W CONT    Narrative EXAM: CTA of the chest, abdomen, and pelvis with contrast 1/21/2019. INDICATION: Tachypnea. Discitis/osteomyelitis involving the thoracic spine. COMPARISON: CT angiogram of the chest from 12/23/2018. TECHNIQUE: CT angiography of the chest, abdomen, and pelvis was performed during  the dynamic infusion of 100cc of Isovue-300. Multiplanar reformats were  generated. MIP reconstructions were also obtained from the source data. Dose reduction techniques:  Automated exposure control, mAs and/or kVp  reductions based on patient size, and iterative reconstruction.   The specific  techniques utilized on this CT exam have been documented in the patient's  electronic medical record. Digital imaging and communications in medicine (DICOM) format image data are  available to non-affiliated external healthcare facilities or entities on a  secure, media free, reciprocally searchable database, maintained by the  healthcare system, with patient authorization for at least a 12 month period  after this study. _______________    FINDINGS:    CHEST:    MEDIASTINUM AND LYMPH NODES: Atherosclerotic vascular disease is noted with  vascular calcifications throughout the thoracic aorta. There is no evidence of  thoracic aortic aneurysm or dissection. The main pulmonary artery is prominent  with slight prominence of the central pulmonary arteries which may represent the  sequela of underlying pulmonary arterial hypertension. Respiratory motion  degrades the evaluation but there is no evidence of central or segmental  pulmonary embolus. No abnormal mediastinal masses are present. There is no mediastinal or hilar  lymph node enlargement. Subcentimeter nodules are identified in both thyroid  lobes and the appearance is grossly unchanged. LUNGS AND AIRWAYS: Atelectasis is noted in both lower lobes with partial  atelectasis of the lingula and upper lobes. There is also near complete  atelectasis of the right middle lobe. The aerated portions of the lungs are  clear. The visualized portions of the tracheobronchial tree are patent. PLEURA: Moderate sized bilateral pleural effusions are again noted and both  appear to have slightly decreased in size.    ===============    ABDOMEN/PELVIS:    LIVER, BILIARY: The liver is small with a nodular hepatic contour. Probable  small hepatic cysts are noted and are unchanged. The gallbladder is grossly  unremarkable. There is no significant biliary dilatation. PANCREAS: Unremarkable. SPLEEN: The spleen is enlarged but unchanged in size and configuration.    Pronounced perisplenic varices are noted with a spontaneous left splenorenal  shunt. ADRENALS: Unremarkable. KIDNEYS: WNL. LYMPH NODES: No enlarged lymph nodes. PERITONEAL CAVITY AND GASTROINTESTINAL TRACT: No free intraperitoneal air or  fluid. No bowel dilation or suggestion of wall thickening. No polly-intestinal  inflammatory changes. PELVIC ORGANS: Evaluation of the pelvis demonstrates a Cardoza catheter within the  partially contracted urinary bladder. No abnormal intrapelvic masses or fluid  collections are demonstrable. VASCULATURE: Atherosclerotic vascular disease is present without evidence of  abdominal aortic aneurysm or dissection. BONES: Lytic and sclerotic changes are again noted extending along the endplates  of the inferior T3 and superior T4 vertebrae with mild paraspinal inflammatory  changes, consistent with the patient's history of discitis osteomyelitis and  without significant interval change. More advanced lytic and sclerotic changes  are present at the T9-10 level with paraspinal phlegmon and probable intraspinal  soft tissue which appears to partially efface the thecal sac but is poorly  evaluated by CT. The overall appearance is also grossly unchanged. No new  lytic or sclerotic osseous abnormalities are suggested. OTHER: None.  _______________      Impression IMPRESSION:    1. Atherosclerotic vascular disease but without evidence of thoracic or  abdominal aortic aneurysm or dissection. 2.  Cirrhotic hepatic morphology with splenomegaly and varices, consistent with  underlying portal hypertension. 3.  Dilatation of the central pulmonary arteries suggesting underlying pulmonary  arterial hypertension. There is no evidence of central or segmental pulmonary  embolus. 4.  Destructive changes at the T9-10 level, consistent with the patient's  history of discitis/osteomyelitis. There appears to be intraspinal and  paraspinal phlegmon.   The overall appearance is grossly unchanged but would be  better characterized by MRI which could be performed, as clinically warranted. Destructive changes are also present at T3-4 but to a lesser extent and also  without gross interval change. 5.  Moderate-sized bilateral pleural effusions, slightly decreased in size with  extensive atelectasis in both lungs. 6.  Additional ancillary findings, as described above. Preliminary results were provided to the emergency room by the radiology  resident at the time of initial interpretation.         ---------------------------------------------------------------------------------------------------------------  I have independently examined the patient and reviewed all lab studies and imgaing as well as review of nursing notes and physican notes from the past 24 hours. The plan of care has been discussed with the patient/relative and all questions are answered. Dragon medical dictation software was used for portions of this report. Unintended errors may occur.      Gera Allen MD  1/22/2019    [facility] AT THE [facility]  [phone]

## 2019-01-22 NOTE — PROGRESS NOTES
RRT called: I responded to RRT. Patient was reported to be confused from baseline and not responding to questions appropriately. I evaluated the patient extensively. On arrival, he appeared encephalopathic. His eyes were open but he was not making any sense and speech impaired. His vitals were all WNL other than increased RR. I know the patient from prior care and overall appearance of facial symmetry seemed to be at baseline. Pupils were pinpoint and sluggish to respond to light. His mucosa was dry. Lung sound were diminished B/L w/ mild exp wheeze. He was tachypneic as well. Heart was RRR. Lower extremities w/ trace edema B/L. Neuro exam showed no obv focal findings other than slurred speech. My differential for his acute metabolic encephalopathy included opiate effects along with an additional respiratory component. Although he had the oxycodone around 9 or so AM, he may have poor tolerance. I elected to hold off on a code S as there wasn't anything jumping out that pointed to stroke as cause of his acute problem. I had nursing give him 0.4 of narcan and his lethargy improved immediately. He sat up more and began conversing more, but still remained confused and tachypneic. I had asked for ABG as well initially due to suspected hypercapnea given his reported COPD history and evidence of chronic hypercapnea based off chemistry bicarbonate. ABG came back as acute on chronic hypercapnic respiratory failure w/ pH near 7.1. Will put on BiPAP and transfer to stepdown. I had also ordered STAT CXR and BNP for thoroughness sake. I discussed the RRT with Dr. Marquita Best, attending on case. Tavo Bhandari,  Internal Medicine, Hospitalist 
Pager: [phone] [facility]

## 2019-01-22 NOTE — ED PROVIDER NOTES
EMERGENCY DEPARTMENT HISTORY AND PHYSICAL EXAM 
 
Date: 1/21/2019 Patient Name: Bettina Varma History of Presenting Illness Chief Complaint Patient presents with  Abnormal Lab Results History Provided By: Patient Chief Complaint: abnormal lab results Additional History (Context): Bettina Varma is a 77 y.o. male with IVDU c/o back pain with positive blood cultures today who presents with persistent back pain. Seen in ED today; d/c'd home. Received call from lab re: gram negative rods culture result. Police called to home when pt's phone stated was disconnected. Left VM w/his daughter. She called me back and got mom on phone. Pt was sleeping. Police arrived and determined he couldn't ambulate so EMS medic 10 called and brought pt back to ED. Still tachycardic, c/o pain. Admitting to hospitalist with IV ABX. PCP: Leland Porras MD 
 
Current Facility-Administered Medications Medication Dose Route Frequency Provider Last Rate Last Dose  vancomycin (VANCOCIN) 1,000 mg in 0.9% sodium chloride (MBP/ADV) 250 mL adv  1,000 mg IntraVENous NOW Bre Roberts  mL/hr at 01/21/19 2246 1,000 mg at 01/21/19 2246  piperacillin-tazobactam (ZOSYN) 3.375 g in 0.9% sodium chloride (MBP/ADV) 100 mL MBP  3.375 g IntraVENous NOW Bre Roberts PA      
 [START ON 1/22/2019] vancomycin (VANCOCIN) 1,250 mg in 0.9% sodium chloride 250 mL IVPB  1,250 mg IntraVENous ONCE Bre Roberts PA Current Outpatient Medications Medication Sig Dispense Refill  oxyCODONE IR (ROXICODONE) 5 mg immediate release tablet Take 1 Tab by mouth every six (6) hours as needed for Pain. Max Daily Amount: 20 mg. 16 Tab 0  
 naloxone (NARCAN) 4 mg/actuation nasal spray Use 1 spray intranasally, then discard. Repeat with new spray every 2 min as needed for opioid overdose symptoms, alternating nostrils.  2 Each 0  
 acetaminophen (TYLENOL) 325 mg tablet Take 2 Tabs by mouth every four (4) hours as needed for Pain. 120 Tab 0  ibuprofen (MOTRIN) 400 mg tablet Take 1 Tab by mouth every six (6) hours as needed for Pain. 30 Tab 0  
 metoprolol tartrate (LOPRESSOR) 50 mg tablet Take 1 Tab by mouth two (2) times a day. 60 Tab 0  
 furosemide (LASIX) 40 mg tablet Take 1 Tab by mouth daily. 30 Tab 0  
 potassium chloride (K-DUR, KLOR-CON) 20 mEq tablet Take 1 Tab by mouth daily. 30 Tab 0  predniSONE (DELTASONE) 10 mg tablet Take 5 tabs PO daily x 5 d, then 4 tabs PO daily x 4 d, then 3 tabs PO daily x 3 d, then 2 tabs PO daily x 2 d, then 1 tab PO daily x 1 d 60 Tab 0  
 amLODIPine (NORVASC) 10 mg tablet Take 10 mg by mouth daily.  oxyCODONE-acetaminophen (PERCOCET) 5-325 mg per tablet Take 1 Tab by mouth every six (6) hours as needed for Pain. Max Daily Amount: 4 Tabs. 12 Tab 0  
 gabapentin (NEURONTIN) 400 mg capsule Take 400 mg by mouth three (3) times daily.  loratadine (CLARITIN) 10 mg tablet Take 10 mg by mouth daily as needed for Allergies.  ipratropium-albuterol (COMBIVENT RESPIMAT)  mcg/actuation inhaler Take 1 Puff by inhalation every twelve (12) hours. Indications: Chronic Obstructive Pulmonary Disease with Bronchospasms  lisinopril (PRINIVIL, ZESTRIL) 20 mg tablet Take  by mouth daily.  simvastatin (ZOCOR) 10 mg tablet Take  by mouth nightly. Past History Past Medical History: 
Past Medical History:  
Diagnosis Date  Arthritis  COPD  Hypertension Past Surgical History: 
Past Surgical History:  
Procedure Laterality Date  HX REFRACTIVE SURGERY Family History: No family history on file. Social History: 
Social History Tobacco Use  Smoking status: Former Smoker Last attempt to quit: 1984 Years since quittin.6  Smokeless tobacco: Former User Substance Use Topics  Alcohol use: Yes Comment: sometimes  Drug use: Yes Types: Heroin, Marijuana, Opiates Comment: Uses opiates for pain Allergies: Allergies Allergen Reactions  Shellfish Derived Hives Review of Systems Review of Systems Constitutional: Negative for fever. HENT: Negative. Eyes: Negative. Respiratory: Negative. Cardiovascular: Negative. Gastrointestinal: Negative. Endocrine: Negative. Genitourinary: Negative. Musculoskeletal: Positive for back pain and gait problem. Skin: Negative. Allergic/Immunologic: Negative. Hematological: Negative. Psychiatric/Behavioral: Negative. All other systems reviewed and are negative. All Other Systems Negative Physical Exam  
 
Vitals:  
 01/21/19 2230 01/21/19 2316 BP: 152/86 SpO2:  99% Physical Exam  
Constitutional: Vital signs are normal. He appears well-developed and well-nourished. He is active. Non-toxic appearance. He does not appear ill. He appears distressed. HENT:  
Head: Normocephalic and atraumatic. Neck: Normal range of motion. Neck supple. Carotid bruit is not present. No tracheal deviation present. No thyromegaly present. Cardiovascular: Regular rhythm and normal heart sounds. Exam reveals no gallop and no friction rub. No murmur heard. Tachycardiac rate Pulmonary/Chest: Effort normal and breath sounds normal. No stridor. No respiratory distress. He has no wheezes. He has no rales. He exhibits no tenderness. Abdominal: Soft. He exhibits no distension and no mass. There is no tenderness. There is no rebound, no guarding and no CVA tenderness. Musculoskeletal: He exhibits tenderness. Midline mid-thoracic and bilateral CVA TTP. Neurological: He is alert. Skin: Skin is warm, dry and intact. He is not diaphoretic. No pallor. Psychiatric: He has a normal mood and affect. His speech is normal and behavior is normal. Judgment and thought content normal.  
Nursing note and vitals reviewed. Diagnostic Study Results Labs - 
  
 Recent Results (from the past 12 hour(s)) URINALYSIS W/ RFLX MICROSCOPIC Collection Time: 01/21/19 11:20 PM  
Result Value Ref Range Color DARK YELLOW Appearance CLEAR Specific gravity >1.030 (H) 1.005 - 1.030  
 pH (UA) 5.5 5.0 - 8.0 Protein TRACE (A) NEG mg/dL Glucose NEGATIVE  NEG mg/dL Ketone NEGATIVE  NEG mg/dL Bilirubin SMALL (A) NEG Blood TRACE (A) NEG Urobilinogen 2.0 (H) 0.2 - 1.0 EU/dL Nitrites NEGATIVE  NEG Leukocyte Esterase NEGATIVE  NEG    
POC LACTIC ACID Collection Time: 01/21/19 11:40 PM  
Result Value Ref Range Lactic Acid (POC) 0.83 0.40 - 2.00 mmol/L Radiologic Studies - No orders to display CT Results  (Last 48 hours) 01/21/19 0434  CTA CHEST ABD PELV W CONT Final result Impression:  IMPRESSION:  
   
1. Atherosclerotic vascular disease but without evidence of thoracic or  
abdominal aortic aneurysm or dissection. 2.  Cirrhotic hepatic morphology with splenomegaly and varices, consistent with  
underlying portal hypertension. 3.  Dilatation of the central pulmonary arteries suggesting underlying pulmonary  
arterial hypertension. There is no evidence of central or segmental pulmonary  
embolus. 4.  Destructive changes at the T9-10 level, consistent with the patient's  
history of discitis/osteomyelitis. There appears to be intraspinal and  
paraspinal phlegmon. The overall appearance is grossly unchanged but would be  
better characterized by MRI which could be performed, as clinically warranted. Destructive changes are also present at T3-4 but to a lesser extent and also  
without gross interval change. 5.  Moderate-sized bilateral pleural effusions, slightly decreased in size with  
extensive atelectasis in both lungs. 6.  Additional ancillary findings, as described above. Preliminary results were provided to the emergency room by the radiology resident at the time of initial interpretation. Narrative:  EXAM: CTA of the chest, abdomen, and pelvis with contrast 1/21/2019. INDICATION: Tachypnea. Discitis/osteomyelitis involving the thoracic spine. COMPARISON: CT angiogram of the chest from 12/23/2018. TECHNIQUE: CT angiography of the chest, abdomen, and pelvis was performed during  
the dynamic infusion of 100cc of Isovue-300. Multiplanar reformats were  
generated. MIP reconstructions were also obtained from the source data. Dose reduction techniques:  Automated exposure control, mAs and/or kVp  
reductions based on patient size, and iterative reconstruction. The specific  
techniques utilized on this CT exam have been documented in the patient's  
electronic medical record. Digital imaging and communications in medicine (DICOM) format image data are  
available to non-affiliated external healthcare facilities or entities on a  
secure, media free, reciprocally searchable database, maintained by the  
healthcare system, with patient authorization for at least a 12 month period  
after this study. _______________ FINDINGS:  
   
CHEST:  
   
MEDIASTINUM AND LYMPH NODES: Atherosclerotic vascular disease is noted with  
vascular calcifications throughout the thoracic aorta. There is no evidence of  
thoracic aortic aneurysm or dissection. The main pulmonary artery is prominent  
with slight prominence of the central pulmonary arteries which may represent the  
sequela of underlying pulmonary arterial hypertension. Respiratory motion  
degrades the evaluation but there is no evidence of central or segmental  
pulmonary embolus. No abnormal mediastinal masses are present. There is no mediastinal or hilar  
lymph node enlargement. Subcentimeter nodules are identified in both thyroid  
lobes and the appearance is grossly unchanged. LUNGS AND AIRWAYS: Atelectasis is noted in both lower lobes with partial  
atelectasis of the lingula and upper lobes. There is also near complete  
atelectasis of the right middle lobe. The aerated portions of the lungs are  
clear. The visualized portions of the tracheobronchial tree are patent. PLEURA: Moderate sized bilateral pleural effusions are again noted and both  
appear to have slightly decreased in size.  
   
===============  
   
ABDOMEN/PELVIS:  
   
LIVER, BILIARY: The liver is small with a nodular hepatic contour. Probable  
small hepatic cysts are noted and are unchanged. The gallbladder is grossly  
unremarkable. There is no significant biliary dilatation. PANCREAS: Unremarkable. SPLEEN: The spleen is enlarged but unchanged in size and configuration. Pronounced perisplenic varices are noted with a spontaneous left splenorenal  
shunt. ADRENALS: Unremarkable. KIDNEYS: WNL. LYMPH NODES: No enlarged lymph nodes. PERITONEAL CAVITY AND GASTROINTESTINAL TRACT: No free intraperitoneal air or  
fluid. No bowel dilation or suggestion of wall thickening. No polly-intestinal  
inflammatory changes. PELVIC ORGANS: Evaluation of the pelvis demonstrates a Cardoza catheter within the  
partially contracted urinary bladder. No abnormal intrapelvic masses or fluid  
collections are demonstrable. VASCULATURE: Atherosclerotic vascular disease is present without evidence of  
abdominal aortic aneurysm or dissection. BONES: Lytic and sclerotic changes are again noted extending along the endplates  
of the inferior T3 and superior T4 vertebrae with mild paraspinal inflammatory  
changes, consistent with the patient's history of discitis osteomyelitis and  
without significant interval change.   More advanced lytic and sclerotic changes  
are present at the T9-10 level with paraspinal phlegmon and probable intraspinal  
 soft tissue which appears to partially efface the thecal sac but is poorly  
evaluated by CT. The overall appearance is also grossly unchanged. No new  
lytic or sclerotic osseous abnormalities are suggested. OTHER: None.  
_______________ CXR Results  (Last 48 hours) 01/21/19 0205  XR CHEST PORT Final result Impression:  IMPRESSION:  
   
   
1. Very poor inspiration. Findings of CHF and interstitial edema prior exam have  
resolved. There continue be findings suspicious for bilateral pleural effusions  
and possible bilateral lower lobe areas of atelectasis. Narrative:  EXAM: XR CHEST PORT  
   
CLINICAL INDICATION/HISTORY: COPD  
-Additional: None COMPARISON: Single view chest 12/23/2018 TECHNIQUE: Portable frontal view of the chest  
   
_______________ FINDINGS:  
   
SUPPORT DEVICES: None. HEART AND MEDIASTINUM: Suboptimally evaluated secondary to very poor  
inspiration. Pulmonary vascularity appears normal which is an improvement over  
previous examination. LUNGS AND PLEURAL SPACES: Bilateral interstitial infiltrates present on prior  
examination have resolved. Both costophrenic angles remain blunted lower lobe  
areas of atelectasis or infiltrate. BONY THORAX AND SOFT TISSUES: Unremarkable.  
   
_______________ Medical Decision Making I am the first provider for this patient. I reviewed the vital signs, available nursing notes, past medical history, past surgical history, family history and social history. Vital Signs-Reviewed the patient's vital signs. Records Reviewed: Nursing Notes, Old Medical Records and Previous Radiology Studies Procedures: 
Left external jugular venous IV placement Date/Time: 1/21/2019 11:45 PM 
Performed by: AMANDA Zaldivar Authorized by: AMANDA Zaldivar Consent:  
  Consent obtained:  Verbal 
  Consent given by:  Patient Risks discussed:  Pain Indications:  
  Indications:  Needs peripheral IV in IVDU Anesthesia (see MAR for exact dosages): Anesthesia method:  None Post-procedure details:  
  Patient tolerance of procedure: Tolerated well, no immediate complications Provider Notes (Medical Decision Making): spoke with Dr. Adriane Lancaster; pt has had MRI attempts previously and phlegmon does appear stable and he does not want pt to have MRI at this point. MED RECONCILIATION: 
Current Facility-Administered Medications Medication Dose Route Frequency  vancomycin (VANCOCIN) 1,000 mg in 0.9% sodium chloride (MBP/ADV) 250 mL adv  1,000 mg IntraVENous NOW  piperacillin-tazobactam (ZOSYN) 3.375 g in 0.9% sodium chloride (MBP/ADV) 100 mL MBP  3.375 g IntraVENous NOW  
 [START ON 1/22/2019] vancomycin (VANCOCIN) 1,250 mg in 0.9% sodium chloride 250 mL IVPB  1,250 mg IntraVENous ONCE Current Outpatient Medications Medication Sig  
 oxyCODONE IR (ROXICODONE) 5 mg immediate release tablet Take 1 Tab by mouth every six (6) hours as needed for Pain. Max Daily Amount: 20 mg.  
 naloxone (NARCAN) 4 mg/actuation nasal spray Use 1 spray intranasally, then discard. Repeat with new spray every 2 min as needed for opioid overdose symptoms, alternating nostrils.  acetaminophen (TYLENOL) 325 mg tablet Take 2 Tabs by mouth every four (4) hours as needed for Pain.  ibuprofen (MOTRIN) 400 mg tablet Take 1 Tab by mouth every six (6) hours as needed for Pain.  metoprolol tartrate (LOPRESSOR) 50 mg tablet Take 1 Tab by mouth two (2) times a day.  furosemide (LASIX) 40 mg tablet Take 1 Tab by mouth daily.  potassium chloride (K-DUR, KLOR-CON) 20 mEq tablet Take 1 Tab by mouth daily.  predniSONE (DELTASONE) 10 mg tablet Take 5 tabs PO daily x 5 d, then 4 tabs PO daily x 4 d, then 3 tabs PO daily x 3 d, then 2 tabs PO daily x 2 d, then 1 tab PO daily x 1 d  
 amLODIPine (NORVASC) 10 mg tablet Take 10 mg by mouth daily.  oxyCODONE-acetaminophen (PERCOCET) 5-325 mg per tablet Take 1 Tab by mouth every six (6) hours as needed for Pain. Max Daily Amount: 4 Tabs.  gabapentin (NEURONTIN) 400 mg capsule Take 400 mg by mouth three (3) times daily.  loratadine (CLARITIN) 10 mg tablet Take 10 mg by mouth daily as needed for Allergies.  ipratropium-albuterol (COMBIVENT RESPIMAT)  mcg/actuation inhaler Take 1 Puff by inhalation every twelve (12) hours. Indications: Chronic Obstructive Pulmonary Disease with Bronchospasms  lisinopril (PRINIVIL, ZESTRIL) 20 mg tablet Take  by mouth daily.  simvastatin (ZOCOR) 10 mg tablet Take  by mouth nightly. Disposition: 
admit Follow-up Information None Current Discharge Medication List  
  
 
 
 
Core Measures: 
 
Critical Care Time:  
Critical Care Time:  
I have spent 35 minutes of critical care time involved in lab review, consultations with specialist, family decision-making, and documentation. During this entire length of time I was immediately available to the patient. 
  
Critical Care: The reason for providing this level of medical care for this critically ill patient was due a critical illness that impaired one or more vital organ systems such that there was a high probability of imminent or life threatening deterioration in the patients condition. This care involved high complexity decision making to assess, manipulate, and support vital system functions, to treat this degreee vital organ system failure and to prevent further life threatening deterioration of the patients condition. Diagnosis Clinical Impression: 1. Bacteremia 2. History of heroin abuse

## 2019-01-22 NOTE — PROGRESS NOTES
Reason for Admission: bacteremia RRAT Score:    29 Resources/supports as identified by patient/family:  Has Medicaid insurance. Lives with his mother. Has personal caregivers 7 days/wk Top Challenges facing patient (as identified by patient/family and CM): Finances/Medication cost?  [place] Transportation? States mother will pick him up at discharge Support system or lack thereof? As above Living arrangements? Lives with mother in one story home with 10 steps to enter. States steps have not been a problem for him Self-care/ADLs/Cognition? Alert and oriented x4 Current Advanced Directive/Advance Care Plan:  Not on file Plan for utilizing home health:  CM to follow and assess Likelihood of readmission: high/red Transition of Care Plan: Interviewed pt and verified all face sheet information. Pt states that he lives with his mother. Has personal caregivers 7 days/wk, 5 hours/day. They assist with bathing, toileting, light housekeeping. He is unable to recall name of agency. He states that he is mostly independent with ambulation, uses cane and walker. Uses home oxygen at 2 liters per nasal canula. [employer] is his O2 supply company. He stats that his mother may be picking him up at discharge. Patient has designated ____mother____________________ to participate in his/her discharge plan and to receive any needed information. Name: Maryanne Harris Address: 
Phone number: [phone] Care Management Interventions PCP Verified by CM: Yes Last Visit to PCP: 08/22/18 Mode of Transport at Discharge: Self Transition of Care Consult (CM Consult): Discharge Planning Current Support Network: Relative's Home Confirm Follow Up Transport: Family Plan discussed with Pt/Family/Caregiver: Yes Discharge Location Discharge Placement: Other:(home)

## 2019-01-22 NOTE — ED NOTES
Advised Nicole PALACIOS and Romayne Monica that IV access has not been obtained as patient has poor access due to IVDA history. Several attempts have been made to establish an IV (3X)

## 2019-01-22 NOTE — PROGRESS NOTES
Problem: Falls - Risk of 
Goal: *Absence of Falls Document Celine Goodson Fall Risk and appropriate interventions in the flowsheet. Outcome: Progressing Towards Goal 
Fall Risk Interventions: 
Mobility Interventions: Bed/chair exit alarm Medication Interventions: Bed/chair exit alarm Elimination Interventions: Bed/chair exit alarm, Call light in reach Problem: Pressure Injury - Risk of 
Goal: *Prevention of pressure injury Document Raul Scale and appropriate interventions in the flowsheet. Outcome: Progressing Towards Goal 
Pressure Injury Interventions: 
Sensory Interventions: Assess changes in LOC, Minimize linen layers Moisture Interventions: Minimize layers Activity Interventions: PT/OT evaluation, Assess need for specialty bed Mobility Interventions: Assess need for specialty bed, PT/OT evaluation Nutrition Interventions: Document food/fluid/supplement intake Friction and Shear Interventions: Minimize layers

## 2019-01-22 NOTE — H&P
[address] [place], [place] [address] History & Physical 
PatientDiane Collins MRN: [id number]  CSN: [id number] YOB: 1950  Age: 77 y.o. Sex: male DOA: 1/21/2019 LOS:  LOS: 0 days DOA: 1/21/2019 Assessment/Plan Active Problems: 
  Bacteremia (1/21/2019) Plan: 1. Bacteremia - Bld cx X2 - showing GNR - Will admit for IV Abx , ID consult 2. H/o IV Heroin use - says he hasnt used any drugs since July 2018 - stopped smoking 3. Chronic back pain - likely 2y to mass as seen on CT chest/ back - was supposed to get MRI but canceled - pt has been extensively evaluated in the past for the same problem but has refused MRI 4. HTN - resume home meds, monitor BP  
5. HLPD - continue Zocor 6. Neuropathy - continue neurontin DVT Px - heparin FC  
 
 
 
 
 
 
 
HPI:  
 
Kavon Conrad is a 77 y.o. male who is being admitted for Bacteremia Pt mentions he was in the ER earlier today for chronic back pain - has a known mass - likely paraspinal / intraspinal phlegmon at T9 -- he has been extensively evaluated for this mass during previous admissions but has refused MRI - chart reviewed from 11/2018 Pt was discharged from the ER but was called back for + blood Cx - 2of 2 bottles growing GNR Pt has  H/o IVDA - has been using heroin in the past but mentions he stopped since July 2018, UDS negative Will admit pt for IV Abx Past Medical History:  
Diagnosis Date  Arthritis  COPD  Hypertension Past Surgical History:  
Procedure Laterality Date  HX REFRACTIVE SURGERY No family history on file. Social History Socioeconomic History  Marital status: SINGLE Spouse name: Not on file  Number of children: Not on file  Years of education: Not on file  Highest education level: Not on file Tobacco Use  Smoking status: Former Smoker Last attempt to quit: 6/1/1984 Years since quittin.6  Smokeless tobacco: Former User Substance and Sexual Activity  Alcohol use: Yes Comment: sometimes  Drug use: Yes Types: Heroin, Marijuana, Opiates Comment: Uses opiates for pain Prior to Admission medications Medication Sig Start Date End Date Taking? Authorizing Provider  
oxyCODONE IR (ROXICODONE) 5 mg immediate release tablet Take 1 Tab by mouth every six (6) hours as needed for Pain. Max Daily Amount: 20 mg. 19   Renee Shetty MD  
naloxone [facility]) 4 mg/actuation nasal spray Use 1 spray intranasally, then discard. Repeat with new spray every 2 min as needed for opioid overdose symptoms, alternating nostrils. 19   Renee Shetty MD  
acetaminophen (TYLENOL) 325 mg tablet Take 2 Tabs by mouth every four (4) hours as needed for Pain. 19   Renee Shetty MD  
ibuprofen (MOTRIN) 400 mg tablet Take 1 Tab by mouth every six (6) hours as needed for Pain. 19   Renee Sehtty MD  
metoprolol tartrate (LOPRESSOR) 50 mg tablet Take 1 Tab by mouth two (2) times a day. 18   Bria SOLORZANO DO  
furosemide (LASIX) 40 mg tablet Take 1 Tab by mouth daily. 18   Bria SOLORZANO DO  
potassium chloride (K-DUR, KLOR-CON) 20 mEq tablet Take 1 Tab by mouth daily. 18   Cammie Bilis, DO  
predniSONE (DELTASONE) 10 mg tablet Take 5 tabs PO daily x 5 d, then 4 tabs PO daily x 4 d, then 3 tabs PO daily x 3 d, then 2 tabs PO daily x 2 d, then 1 tab PO daily x 1 d 18   Cammie Bilis, DO  
amLODIPine (NORVASC) 10 mg tablet Take 10 mg by mouth daily. Provider, Historical  
oxyCODONE-acetaminophen (PERCOCET) 5-325 mg per tablet Take 1 Tab by mouth every six (6) hours as needed for Pain. Max Daily Amount: 4 Tabs. 18   Jacquie Hawkins MD  
gabapentin (NEURONTIN) 400 mg capsule Take 400 mg by mouth three (3) times daily.     Other, MD Kim  
 loratadine (CLARITIN) 10 mg tablet Take 10 mg by mouth daily as needed for Allergies. Kim Sanchez MD  
ipratropium-albuterol (COMBIVENT RESPIMAT)  mcg/actuation inhaler Take 1 Puff by inhalation every twelve (12) hours. Indications: Chronic Obstructive Pulmonary Disease with Bronchospasms    Kim Sanchez MD  
lisinopril (PRINIVIL, ZESTRIL) 20 mg tablet Take  by mouth daily. Kim Sanchez MD  
simvastatin (ZOCOR) 10 mg tablet Take  by mouth nightly. Kim Sanchez MD  
 
 
Allergies Allergen Reactions  Shellfish Derived Hives Review of Systems A comprehensive review of systems was negative except for that written in the History of Present Illness. Physical Exam:  
  
There were no vitals taken for this visit. Physical Exam: 
 
Gen: In general, this is a well nourished male in no acute distress HEENT: Sclerae nonicteric. Oral mucous membranes moist. Dentition poor Neck: Supple with midline trachea. CV: RRR without murmur or rub appreciated. Resp:Respirations are unlabored without use of accessory muscles. Lung fields B/L without wheezes or rhonchi. Abd: Soft, nontender, nondistended. Extrem: Extremities are warm, without cyanosis or clubbing. No pitting pretibial edema Skin: Warm, no visible rashes. Neuro: Patient is alert, oriented, and cooperative. No obvious focal defects. Moves all 4 extremities. Labs Reviewed: 
 
Recent Results (from the past 24 hour(s)) EKG, 12 LEAD, INITIAL Collection Time: 01/21/19  1:08 AM  
Result Value Ref Range Ventricular Rate 121 BPM  
 Atrial Rate 121 BPM  
 P-R Interval 178 ms QRS Duration 68 ms Q-T Interval 308 ms QTC Calculation (Bezet) 437 ms Calculated P Axis 41 degrees Calculated R Axis -41 degrees Calculated T Axis 49 degrees Diagnosis Sinus tachycardia Left anterior fascicular block Abnormal ECG When compared with ECG of 23-DEC-2018 14:47, No significant change was found Confirmed by Kerry Mast ([id number]) on 1/21/2019 10:45:38 AM 
  
CULTURE, BLOOD Collection Time: 01/21/19  1:20 AM  
Result Value Ref Range Special Requests: NO SPECIAL REQUESTS    
 GRAM STAIN GRAM NEGATIVE RODS AEROBIC AND ANAEROBIC BOTTLES    
 GRAM STAIN     
  SMEAR CALLED TO AND CORRECTLY REPEATED BY: [facility]NE AVERY,[facility] [id number] AT 2013 TO [facility] Culture result: Sent to [facility] for ID/Susceptibility if indicated Culture result: CULTURE IN PROGRESS,FURTHER UPDATES TO FOLLOW POC LACTIC ACID Collection Time: 01/21/19  1:31 AM  
Result Value Ref Range Lactic Acid (POC) 1.93 0.40 - 2.00 mmol/L  
CBC WITH AUTOMATED DIFF Collection Time: 01/21/19  1:32 AM  
Result Value Ref Range WBC 10.6 4.6 - 13.2 K/uL  
 RBC 5.65 (H) 4.70 - 5.50 M/uL  
 HGB 16.1 (H) 13.0 - 16.0 g/dL HCT 50.5 (H) 36.0 - 48.0 % MCV 89.4 74.0 - 97.0 FL  
 MCH 28.5 24.0 - 34.0 PG  
 MCHC 31.9 31.0 - 37.0 g/dL  
 RDW 16.3 (H) 11.6 - 14.5 % PLATELET 089 760 - 082 K/uL MPV 11.2 9.2 - 11.8 FL  
 NEUTROPHILS 68 40 - 73 % LYMPHOCYTES 24 21 - 52 % MONOCYTES 8 3 - 10 % EOSINOPHILS 0 0 - 5 % BASOPHILS 0 0 - 2 %  
 ABS. NEUTROPHILS 7.1 1.8 - 8.0 K/UL  
 ABS. LYMPHOCYTES 2.6 0.9 - 3.6 K/UL  
 ABS. MONOCYTES 0.9 0.05 - 1.2 K/UL  
 ABS. EOSINOPHILS 0.0 0.0 - 0.4 K/UL  
 ABS. BASOPHILS 0.0 0.0 - 0.1 K/UL  
 DF AUTOMATED METABOLIC PANEL, COMPREHENSIVE Collection Time: 01/21/19  1:32 AM  
Result Value Ref Range Sodium 136 136 - 145 mmol/L Potassium 3.9 3.5 - 5.5 mmol/L Chloride 95 (L) 100 - 108 mmol/L  
 CO2 35 (H) 21 - 32 mmol/L Anion gap 6 3.0 - 18 mmol/L Glucose 107 (H) 74 - 99 mg/dL BUN 15 7.0 - 18 MG/DL Creatinine 0.84 0.6 - 1.3 MG/DL  
 BUN/Creatinine ratio 18 12 - 20 GFR est AA >60 >60 ml/min/1.73m2 GFR est non-AA >60 >60 ml/min/1.73m2 Calcium 9.1 8.5 - 10.1 MG/DL  Bilirubin, total 1.0 0.2 - 1.0 MG/DL  
 ALT (SGPT) 33 16 - 61 U/L  
 AST (SGOT) 28 15 - 37 U/L  
 Alk. phosphatase 183 (H) 45 - 117 U/L Protein, total 9.4 (H) 6.4 - 8.2 g/dL Albumin 2.5 (L) 3.4 - 5.0 g/dL Globulin 6.9 (H) 2.0 - 4.0 g/dL A-G Ratio 0.4 (L) 0.8 - 1.7 NT-PRO BNP Collection Time: 01/21/19  1:32 AM  
Result Value Ref Range NT pro- 0 - 900 PG/ML  
MAGNESIUM Collection Time: 01/21/19  1:32 AM  
Result Value Ref Range Magnesium 2.1 1.6 - 2.6 mg/dL TROPONIN I Collection Time: 01/21/19  1:32 AM  
Result Value Ref Range Troponin-I, QT <0.02 0.0 - 0.045 NG/ML  
CULTURE, BLOOD Collection Time: 01/21/19  1:32 AM  
Result Value Ref Range Special Requests: NO SPECIAL REQUESTS    
 GRAM STAIN GRAM NEGATIVE RODS AEROBIC AND ANAEROBIC BOTTLES    
 GRAM STAIN     
  SMEAR CALLED TO AND CORRECTLY REPEATED BY: MARYANNE AVERY RN [facility] 63 ED ON [id number] AT 2013 TO [facility] Culture result: Sent to  [facility] for ID/Susceptibility if indicated Culture result: CULTURE IN PROGRESS,FURTHER UPDATES TO FOLLOW URINALYSIS W/ RFLX MICROSCOPIC Collection Time: 01/21/19  2:54 AM  
Result Value Ref Range Color DARK YELLOW Appearance CLEAR Specific gravity 1.019 1.005 - 1.030    
 pH (UA) >8.5 (H) 5.0 - 8.0 Protein NEGATIVE  NEG mg/dL Glucose NEGATIVE  NEG mg/dL Ketone NEGATIVE  NEG mg/dL Bilirubin NEGATIVE  NEG Blood NEGATIVE  NEG Urobilinogen 4.0 (H) 0.2 - 1.0 EU/dL Nitrites NEGATIVE  NEG Leukocyte Esterase NEGATIVE  NEG Imaging Reviewed: CTA chest Abd & pelvis IMPRESSION: 
  
1. Atherosclerotic vascular disease but without evidence of thoracic or 
abdominal aortic aneurysm or dissection. 
  
2. Cirrhotic hepatic morphology with splenomegaly and varices, consistent with 
underlying portal hypertension. 
  
3.  Dilatation of the central pulmonary arteries suggesting underlying pulmonary 
arterial hypertension.   There is no evidence of central or segmental pulmonary 
embolus. 
  
 4.  Destructive changes at the T9-10 level, consistent with the patient's 
history of discitis/osteomyelitis. There appears to be intraspinal and 
paraspinal phlegmon. The overall appearance is grossly unchanged but would be 
better characterized by MRI which could be performed, as clinically warranted. Destructive changes are also present at T3-4 but to a lesser extent and also 
without gross interval change. 
  
5. Moderate-sized bilateral pleural effusions, slightly decreased in size with 
extensive atelectasis in both lungs. 
  
6. Additional ancillary findings, as described above. 
  
Preliminary results were provided to the emergency room by the radiology 
resident at the time of initial interpretation.  
 
 
 
Rl Parmar MD 
1/21/2019, 10:59 PM

## 2019-01-22 NOTE — PROGRESS NOTES
Problem: Falls - Risk of 
Goal: *Absence of Falls Document [facility] Fall Risk and appropriate interventions in the flowsheet. Outcome: Progressing Towards Goal 
Fall Risk Interventions: 
Mobility Interventions: Bed/chair exit alarm Mentation Interventions: Adequate sleep, hydration, pain control, Bed/chair exit alarm, Increase mobility Medication Interventions: Bed/chair exit alarm Elimination Interventions: Bed/chair exit alarm, Call light in reach Problem: Pressure Injury - Risk of 
Goal: *Prevention of pressure injury Document Raul Scale and appropriate interventions in the flowsheet. Outcome: Progressing Towards Goal 
Pressure Injury Interventions: 
Sensory Interventions: Assess changes in LOC, Minimize linen layers Moisture Interventions: Minimize layers Activity Interventions: PT/OT evaluation, Assess need for specialty bed Mobility Interventions: Assess need for specialty bed, PT/OT evaluation Nutrition Interventions: Document food/fluid/supplement intake Friction and Shear Interventions: Minimize layers

## 2019-01-22 NOTE — PROGRESS NOTES
Patient placed on BIPAP: IPAP 18, EPAP 8, backup rate 8, FiO2 0.40. Current vitals: HR 94, SpO2 100, RR 47. Suction setup and ambu bag/mask at bedside.

## 2019-01-22 NOTE — PROGRESS NOTES
Pharmacy Dosing Services: Vancomycin Indication: BSI Day of therapy: 1 Other Antimicrobials (Include dose, start day & day of therapy): 
Pip/tazo 3.375 gm IV every 8 hours extended infusion Loading dose (date given): 2250 mg 
Current Maintenance dose: none at this time Goal Vancomycin Level: 15-20 
(Trough 15-20 for most infections, 20 for meningitis/osteomyelitis, pre-HD level ~25) Vancomycin Level (if drawn): none at this time Significant Cultures:  
 Blood - GNR - pending Renal function stable? (unstable defined as SCr increase of 0.5 mg/dL or > 50% increase from baseline, whichever is greater) (Y/N): Y  
 
CAPD, Hemodialysis or Renal Replacement Therapy (Y/N): N Recent Labs  
  19 
0455 19 
2340 19 
0132 CREA 0.67 0.64 0.84 BUN 18 19* 15 WBC 10.5 10.2 10.6 Temp (24hrs), Av.8 °F (36.6 °C), Min:96.5 °F (35.8 °C), Max:98.5 °F (36.9 °C) Creatinine Clearance (Creatinine Clearance (ml/min)): > 100 ml/min Regimen assessment: New start Maintenance dose: 1250 mg IV every 12 hours Next scheduled level:  at [facility] will follow daily and adjust medications as appropriate for renal function and/or serum levels.  
 
Thank you, 
Mitch Kerr, PHARMD

## 2019-01-22 NOTE — ROUTINE PROCESS
Admit pt from ED via stretcher. Alert & oriented X 3. Vancomycin IV still infusing. Denies nausea & sob. Dual skin assessment performed with CAMERON Leonardo. Skin is dry & flaky otherwise intact. Pt reports ambulates with walker. Abdomen is obese. Call bell within reach. 4044 -  Given tv dinner. Ate 100%. 4304 -  PRN Roxicodone given for abdominal pain. 0500 -  Patient resting quietly and no apparent distress. 0730  -  Bedside and Verbal shift change report given to Angeles Harrison RN (oncoming nurse) by Dejon Caba RN BSN (offgoing nurse). Report given with SBAR, Kardex, Intake/Output, MAR and Recent Results.

## 2019-01-22 NOTE — PROGRESS NOTES
Arrived at RRT- pt was unresponsive but is arousable now - pt is staring forward & has no threat -vitals are stable - Narcan 0.4mg given IVP per Dr. Samson Singh due to [place] abarca given earlier- pt did wake up & is talking now- Code S was origianally called but now cancelled - ABGS obtained & CO2 100- pt will come to ICU for BIPAP-pt transported to 2705 & placed on BIPAP

## 2019-01-22 NOTE — PROGRESS NOTES
1515 TRANSFER - IN REPORT: 
 
Verbal report received from [facility] (name) on Kingston Been  being received from 2W(unit) for routine progression of care Report consisted of patients Situation, Background, Assessment and  
Recommendations(SBAR). Information from the following report(s) SBAR, Kardex, Intake/Output and MAR was reviewed with the receiving nurse. Opportunity for questions and clarification was provided. Assessment completed upon patients arrival to unit and care assumed. 1538 Physical assessment completed at this time. 1910 Bedside verbal shift change report given to philip Denise (oncoming nurse) by Lashanda Joiner (offgoing nurse). Report included the following information SBAR, Kardex, Intake/Output and MAR.

## 2019-01-22 NOTE — PROGRESS NOTES
Bedside and Verbal shift change report given to [facility] (oncoming nurse) by Lorri Villaseñor RN (offgoing nurse). Report included the following information SBAR, Kardex, Intake/Output, Recent Results and Cardiac Rhythm NSR to Sinus tach. Pt alert and oriented x4, vitals WNL, no acute concerns noted, resting in bed, bed locked and low, call bell in reach, urinal in reach, IV infusing, will continue to monitor. 1430- CNA reported to nurse that pt was difficult to arouse. This RN came to bedside to assess. Phlebotomy was at bedside and told RN he did not wake for lab stick. RN firmly sternal rubbed and pt did not wake. Rapid was called, fresh vitals taken, glucose check completed. Vitals WNL BP was in the 835'A systolic. Tele called and pt running NSR. Glucose 126. ABG showed CO2 was 100. Narcan 0.4 mg given and pt responded to the medication. Confusion still noted. Order for transfer to stepdown care and room transfer placed. ICU RN and this RN transported pt to ICU with bed management. 1500- Bedside report given to ICU nurse after transfer and BIPAP was started. 12- Family notified of lvl of care transfer.

## 2019-01-22 NOTE — PROGRESS NOTES
conducted an initial consultation and Spiritual Assessment for Akilah Wang, who is a 77 y.o.,male. Patients Primary Language is: [place]. According to the patients EMR Adventist Affiliation is: No preference. The reason the Patient came to the hospital is:  
Patient Active Problem List  
 Diagnosis Date Noted  Bacteremia 01/21/2019  Acute exacerbation of chronic obstructive pulmonary disease (COPD) ([address] 12/25/2018  Diastolic CHF, acute on chronic (HCC) 12/24/2018  Acute on chronic respiratory failure with hypoxia ([address] 12/23/2018  Sinusitis 12/17/2018  Altered mental status 12/16/2018  Acute respiratory failure with hypoxia ([address] 12/16/2018  Discitis of thoracic region 11/14/2018  UTI (urinary tract infection) 11/14/2018  Bilateral pleural effusion 11/14/2018  Lumbar stenosis 11/14/2018  Adenoma of left adrenal gland 11/14/2018  Hypertension 11/14/2018  Cirrhosis of liver ([facility] 11/14/2018  Hepatitis C 11/14/2018  Obesity 11/14/2018  Constipation 11/14/2018  Back pain 11/14/2018 The  provided the following Interventions: 
Initiated a relationship of care and support with patient in room 2103 today. Listened empathically as he shared his story of being here and how  He was feeling. Patient says he hopes that he can get some medicine to take  So he can feel better. Provided information about Spiritual Care Services. Offered prayer and assurance of continued prayers on patients behalf. The following outcomes were achieved: 
Patient shared limited information about his medical narrative . Patient processed feeling about current hospitalization. Patient expressed gratitude for pastoral care visit. Assessment: 
Patient does not have any Alevism/cultural needs that will affect patients preferences in health care.  
There are no further spiritual or Alevism issues which require Spiritual Care Services interventions at this time. Plan: 
Chaplains will continue to follow and will provide pastoral care on an as needed/requested basis Minor Sacha Emery 3 Board Certified [employer] Spiritual Care  
[phone]

## 2019-01-22 NOTE — MED STUDENT NOTES
*ATTENTION:  This note has been created by a medical student for educational purposes only. Please do not refer to the content of this note for clinical decision-making, billing, or other purposes. Please see attending physicians note to obtain clinical information on this patient. * *ATTENTION:  This note has been created by a medical student for educational purposes only. Please do not refer to the content of this note for clinical decision-making, billing, or other purposes. Please see attending physicians note to obtain clinical information on this patient. * Student Progress Note Please refer to attendings daily rounding note for full details Rere Song MRN: [id number]  CSN: [id number] YOB: 1950  Age: 77 y.o. Sex: male DOA: 1/21/2019 LOS:  LOS: 1 day Chief Complaint:  Bacteremia Subjective:  
Patient is a 76 yo male who presented to the ED complaining of constant chronic lower back pain that has been worsening over the past 2 weeks. He reports that pain occasionally goes into his left lower quadrant. He was seen at [facility] 1 week ago for the same symptoms. Reports that he was doing well on Percocet but ran out. He was discharged home from [facility] ED but brought back same day due to positive blood cultures. Patient states that he continues to have throbbing pain in his lower back. Reports that he is willing to proceed with an MRI as he is more worried about his back pain than claustrophobia at this point. Objective:  
  
Visit Vitals /70 (BP 1 Location: Left arm, BP Patient Position: At rest) Pulse 98 Temp 97.8 °F (36.6 °C) Resp 18 Ht 6' (1.829 m) Wt 98.5 kg (217 lb 1.6 oz) SpO2 91% BMI 29.44 kg/m² Physical Exam: 
Gen: Well developed, well nourished male. No acute distress HEENT: Atraumatic/normocephalic. PERRL. Normal appearing nose, ears, throat. CV: Regular rhythm with no murmurs, rubs, or gallops. Resp: Decreased breath sounds bilaterally. No accessory muscle use. No wheezes or rales. Abd: Soft, nontender, nondistended. Normal bowel sounds Extrem: Moving all 4 extremities. Skin: Color, texture, and turgor appear normal  
Neuro: Alert and oriented to person, place, time. I have reviewed the patient's Labs and Radiology studies. Assessment:  
78 yo male admitted for bacteremia Other problems include: 
Chronic low back pain Discitis History IVDA (heroin) COPD 
HTN 
HLD Plan:  
Continue IVF and IV Zosyn, Levaquin, and Vancomyin. Will consult ID. Hx of IV Heroin use, patient notes last use was July 2018. UDS negative. Mass seen on CT chest/abd/pelvis. Will need MRI for further evaluation and treatment. Continue home medications for HTN/HLD/Neuropathy Continue DVT prophylaxis Eston Fish 1/22/2019 
11:29 AM

## 2019-01-22 NOTE — ED NOTES
TRANSFER - ED to INPATIENT REPORT: 
 
SBAR report made available to receiving floor on this patient being transferred to [facility] (2100)  for routine progression of care Admitting diagnosis Bacteremia Information from the following report(s) SBAR, Kardex and ED Summary was made available to receiving floor. Lines:  
Peripheral IV 01/21/19 Left Other(comment) (Active) Site Assessment Clean, dry, & intact 1/21/2019 11:55 PM  
Phlebitis Assessment 0 1/21/2019 11:55 PM  
Infiltration Assessment 0 1/21/2019 11:55 PM  
Dressing Status Clean, dry, & intact 1/21/2019 11:55 PM  
Dressing Type Transparent 1/21/2019 11:55 PM  
Hub Color/Line Status Green 1/21/2019 11:55 PM  
Alcohol Cap Used No 1/21/2019 11:55 PM  
  
 
Medication list updated today Opportunity for questions and clarification was provided. Patient is oriented to time, place, person and situation Patient is  continent and ambulatory with assist 
  
Valuables transported with patient Patient transported with: 
 Monitor O2 @ 2 liters Tech Vitals w/ MEWS Score (last day) Date/Time MEWS Score Pulse Resp Temp BP Level of Consciousness SpO2  
 01/22/19 0100    99      134/69    99 % 01/21/19 23:16:26              99 % 01/21/19 2230          152/86    

## 2019-01-23 ENCOUNTER — APPOINTMENT (OUTPATIENT)
Dept: CT IMAGING | Age: 69
DRG: 049 | End: 2019-01-23
Attending: HOSPITALIST
Payer: MEDICAID

## 2019-01-23 LAB
BACTERIA SPEC CULT: ABNORMAL
GRAM STN SPEC: ABNORMAL
INR PPP: 1.1 (ref 0.8–1.2)
PROTHROMBIN TIME: 13.4 SEC (ref 11.5–15.2)
SERVICE CMNT-IMP: ABNORMAL
SERVICE CMNT-IMP: ABNORMAL

## 2019-01-23 PROCEDURE — 0R993ZZ DRAINAGE OF THORACIC VERTEBRAL DISC, PERCUTANEOUS APPROACH: ICD-10-PCS | Performed by: RADIOLOGY

## 2019-01-23 PROCEDURE — 77030021352 HC CBL LD SYS DISP COVD -B

## 2019-01-23 PROCEDURE — 87075 CULTR BACTERIA EXCEPT BLOOD: CPT

## 2019-01-23 PROCEDURE — C1729 CATH, DRAINAGE: HCPCS

## 2019-01-23 PROCEDURE — 36415 COLL VENOUS BLD VENIPUNCTURE: CPT

## 2019-01-23 PROCEDURE — 10030 IMG GID FLU COLL DRG SFT TIS: CPT

## 2019-01-23 PROCEDURE — 87077 CULTURE AEROBIC IDENTIFY: CPT

## 2019-01-23 PROCEDURE — 74011250636 HC RX REV CODE- 250/636: Performed by: HOSPITALIST

## 2019-01-23 PROCEDURE — 85610 PROTHROMBIN TIME: CPT

## 2019-01-23 PROCEDURE — 74011250636 HC RX REV CODE- 250/636: Performed by: INTERNAL MEDICINE

## 2019-01-23 PROCEDURE — 94660 CPAP INITIATION&MGMT: CPT

## 2019-01-23 PROCEDURE — 87070 CULTURE OTHR SPECIMN AEROBIC: CPT

## 2019-01-23 PROCEDURE — 87102 FUNGUS ISOLATION CULTURE: CPT

## 2019-01-23 PROCEDURE — 74011250636 HC RX REV CODE- 250/636: Performed by: RADIOLOGY

## 2019-01-23 PROCEDURE — 77010033678 HC OXYGEN DAILY

## 2019-01-23 PROCEDURE — 74011000258 HC RX REV CODE- 258: Performed by: HOSPITALIST

## 2019-01-23 PROCEDURE — 65660000000 HC RM CCU STEPDOWN

## 2019-01-23 PROCEDURE — 74011250637 HC RX REV CODE- 250/637: Performed by: HOSPITALIST

## 2019-01-23 PROCEDURE — 74011000250 HC RX REV CODE- 250: Performed by: RADIOLOGY

## 2019-01-23 PROCEDURE — 74011000258 HC RX REV CODE- 258: Performed by: INTERNAL MEDICINE

## 2019-01-23 PROCEDURE — 87186 SC STD MICRODIL/AGAR DIL: CPT

## 2019-01-23 RX ORDER — LIDOCAINE HYDROCHLORIDE 10 MG/ML
20 INJECTION, SOLUTION EPIDURAL; INFILTRATION; INTRACAUDAL; PERINEURAL
Status: COMPLETED | OUTPATIENT
Start: 2019-01-23 | End: 2019-01-23

## 2019-01-23 RX ORDER — CIPROFLOXACIN 2 MG/ML
400 INJECTION, SOLUTION INTRAVENOUS EVERY 12 HOURS
Status: DISCONTINUED | OUTPATIENT
Start: 2019-01-23 | End: 2019-01-28 | Stop reason: SDUPTHER

## 2019-01-23 RX ORDER — LIDOCAINE HYDROCHLORIDE 10 MG/ML
1-5 INJECTION INFILTRATION; PERINEURAL
Status: ACTIVE | OUTPATIENT
Start: 2019-01-23 | End: 2019-01-24

## 2019-01-23 RX ORDER — LIDOCAINE HYDROCHLORIDE 10 MG/ML
20 INJECTION, SOLUTION EPIDURAL; INFILTRATION; INTRACAUDAL; PERINEURAL
Status: DISCONTINUED | OUTPATIENT
Start: 2019-01-23 | End: 2019-02-23 | Stop reason: HOSPADM

## 2019-01-23 RX ADMIN — METOPROLOL TARTRATE 50 MG: 50 TABLET ORAL at 09:05

## 2019-01-23 RX ADMIN — GABAPENTIN 400 MG: 100 CAPSULE ORAL at 22:23

## 2019-01-23 RX ADMIN — HEPARIN SODIUM 5000 UNITS: 5000 INJECTION INTRAVENOUS; SUBCUTANEOUS at 09:08

## 2019-01-23 RX ADMIN — CIPROFLOXACIN 400 MG: 2 INJECTION, SOLUTION INTRAVENOUS at 22:23

## 2019-01-23 RX ADMIN — CEFTAZIDIME 1 G: 1 INJECTION, POWDER, FOR SOLUTION INTRAMUSCULAR; INTRAVENOUS at 10:58

## 2019-01-23 RX ADMIN — SODIUM CHLORIDE 1250 MG: 900 INJECTION, SOLUTION INTRAVENOUS at 07:35

## 2019-01-23 RX ADMIN — CIPROFLOXACIN 400 MG: 2 INJECTION, SOLUTION INTRAVENOUS at 10:57

## 2019-01-23 RX ADMIN — SODIUM BICARBONATE 1 ML: 0.2 INJECTION, SOLUTION INTRAVENOUS at 16:36

## 2019-01-23 RX ADMIN — LISINOPRIL 20 MG: 20 TABLET ORAL at 09:05

## 2019-01-23 RX ADMIN — GABAPENTIN 400 MG: 100 CAPSULE ORAL at 09:05

## 2019-01-23 RX ADMIN — ACETAMINOPHEN 650 MG: 325 TABLET ORAL at 17:55

## 2019-01-23 RX ADMIN — LIDOCAINE HYDROCHLORIDE 4 ML: 10 INJECTION, SOLUTION EPIDURAL; INFILTRATION; INTRACAUDAL; PERINEURAL at 16:42

## 2019-01-23 RX ADMIN — ACETAMINOPHEN 650 MG: 325 TABLET ORAL at 09:08

## 2019-01-23 RX ADMIN — GABAPENTIN 400 MG: 100 CAPSULE ORAL at 17:59

## 2019-01-23 RX ADMIN — PIPERACILLIN SODIUM,TAZOBACTAM SODIUM 3.38 G: 3; .375 INJECTION, POWDER, FOR SOLUTION INTRAVENOUS at 03:52

## 2019-01-23 RX ADMIN — LIDOCAINE HYDROCHLORIDE 2 ML: 10 INJECTION, SOLUTION EPIDURAL; INFILTRATION; INTRACAUDAL; PERINEURAL at 16:36

## 2019-01-23 RX ADMIN — HEPARIN SODIUM 5000 UNITS: 5000 INJECTION INTRAVENOUS; SUBCUTANEOUS at 03:17

## 2019-01-23 RX ADMIN — CEFTAZIDIME 1 G: 1 INJECTION, POWDER, FOR SOLUTION INTRAMUSCULAR; INTRAVENOUS at 17:59

## 2019-01-23 RX ADMIN — AMLODIPINE BESYLATE 10 MG: 10 TABLET ORAL at 09:05

## 2019-01-23 RX ADMIN — METOPROLOL TARTRATE 50 MG: 50 TABLET ORAL at 17:55

## 2019-01-23 RX ADMIN — SIMVASTATIN 10 MG: 10 TABLET, FILM COATED ORAL at 22:23

## 2019-01-23 NOTE — PROGRESS NOTES
2333: PT orders received and chart reviewed. Refused mobility d/t 8/10 back pain. RN Elva aware. 1107: 2nd PT attempt. Remains drowsy. Not appropriate for mobility. Will follow up. Thank you, Lyn Kelly PT, DPT Office Extension: [phone] Pager: [phone]

## 2019-01-23 NOTE — PROGRESS NOTES
2000 Assumed care of pt after bedside report with pt lying in bed with HOB elevated and BIPAP in progress at prescribed settings Neuro intact. Monitor denotes NSR without ectopy. Lungs clear diminished in bases. Abd distended, obese, soft. Condom catheter in place. Denies c/o pain at present. 2200 Pt status unchanged. Denies c/o pain, discomfort, SOB. 2345 Ppt c/o abd discomfort, nausea. Zofran 4 mg IV given. 01/23/2019 0000 Condom catheter draining lina, yellowish urine without difficulty. 0300 Pt agitated and pulling off BIPAP mask. Pt refusing to wear it anymore. O2 applied via humidification at 5 L/NC. 0400 Pt status unchanged. 0600 Condom catheter draining lina to yellow urine. Pt denies pain.

## 2019-01-23 NOTE — PROGRESS NOTES
Problem: Pressure Injury - Risk of 
Goal: *Prevention of pressure injury Document Raul Scale and appropriate interventions in the flowsheet. Outcome: Progressing Towards Goal 
Pressure Injury Interventions: 
Sensory Interventions: Check visual cues for pain Moisture Interventions: Absorbent underpads, Check for incontinence Q2 hours and as needed Activity Interventions: Pressure redistribution bed/mattress(bed type) Mobility Interventions: HOB 30 degrees or less, Pressure redistribution bed/mattress (bed type) Nutrition Interventions: Document food/fluid/supplement intake Friction and Shear Interventions: Foam dressings/transparent film/skin sealants, HOB 30 degrees or less

## 2019-01-23 NOTE — CONSULTS
INFECTIOUS DISEASE PROGRESS NOTE           Reason for consult: BSI, discitis    D    ABX:     Current abx Prior abx   Zosyn 1/21-2  cipro 1/23  0 Cefepime/vanco  11/14   2, Ceftriaxone 11/16 - 3; Zosyn 1/21-2      Assessment -> Rec:      BSI 2 of 2 Serratia marcescens  - source ? Discitis Phlegmon seen on CT  - denies any urinary complain but had Serratia in urine in past. Currently UA is negative -  final ID and sensitivity reviewed  - discontinue vanco/ Zosyn   --ceftaz/cipro  - repeat Blcx x2 1/22 ngtd   Destructive T10-11 changes ?discitis/OM- chronic back pain  - mid back pain x 5 months, fell, incontinent of urine  - CTAP 11/2018: severe destructive changes T 10-11, paravertebral sot tissue infiltration, extension to anterior epidural space w/ cord compression   - h/o IVDU - last use in July 2018  - H/O uncooperative for MRI given claustrophobia  -currently pt ok with getting MRI - will discuss  - Bone/ gallium scans 11/27 neg for discitis/OM   - refused stabilization procedure per Dr. James Smith in past  - was not given long term Abx then  - CT 1/21 with destructive changes T9-10 levels c/w discitis/OM and intraspinal and paraspinal phlegmon.  Overall appearance is grossly unchanged and pain also not different from last time -> inflammatory markers: esr 38, crp 7.4  -> repeat trial for MRI - pt agreeable  -> needs aspiration of fluid/phlegmon for possible abscess  ->disContinue on Vanco and Zosyn    Ceftaz/cipro started   Bilateral pleural effusions- chronic with  atelectasis - monitor   Hepatomegaly, splenic hilar & perisplenic varices- concern for cirrhosis and portal HTN  - seen on CTAP again     H/o IVDU  - HIV ab NR 11/19 ,Hep BsAg neg (last reported ivdu August 2018, has hep C and thinks hep b immune) -> denies anything currently     COPD     HTN     DJD     Allergy Shelfish        MICROBIOLOGY:   11/14   urcx >100,000 Serratia marcescens blcx NG x 2  11/15   CrAG neg, fungitell 375 (reported )     blcx for fungus - ngtd              fungitell 113     Blcx x2 Serratia marcescens     UA neg       Blcx x 2 ngtd     LINES AND CATHETERS:   piv    CC     Pain in back unchanged. Wishes to proceed with mri    Past medical history:     Past Medical History:   Diagnosis Date    Arthritis     COPD     Hypertension            Allergies: Allergies   Allergen Reactions    Shellfish Derived Hives           Current Medications:     Current Facility-Administered Medications   Medication Dose Route Frequency    cefTAZidime (FORTAZ) 1 g in 0.9% sodium chloride (MBP/ADV) 50 mL MBP  1 g IntraVENous Q8H    ciprofloxacin (CIPRO) 400 mg IVPB (premix)  400 mg IntraVENous Q12H    amLODIPine (NORVASC) tablet 10 mg  10 mg Oral DAILY    gabapentin (NEURONTIN) capsule 400 mg  400 mg Oral TID    lisinopril (PRINIVIL, ZESTRIL) tablet 20 mg  20 mg Oral DAILY    metoprolol tartrate (LOPRESSOR) tablet 50 mg  50 mg Oral BID    simvastatin (ZOCOR) tablet 10 mg  10 mg Oral QHS    acetaminophen (TYLENOL) tablet 650 mg  650 mg Oral Q6H PRN    ondansetron (ZOFRAN) injection 4 mg  4 mg IntraVENous Q6H PRN    heparin (porcine) injection 5,000 Units  5,000 Units SubCUTAneous Q8H         Physical Exam:  Vitals  Temp (24hrs), Av.5 °F (36.4 °C), Min:97.1 °F (36.2 °C), Max:98.6 °F (37 °C)    Visit Vitals  /55   Pulse 72   Temp 97.5 °F (36.4 °C)   Resp 26   Ht 6' (1.829 m)   Wt 103.5 kg (228 lb 3.2 oz)   SpO2 98%   BMI 30.95 kg/m²       General: Well-developed, 77y.o. year-old, male, in no acute distress, ch sick appearing  HEENT: Normocephalic, anicteric sclerae, Pupils equal, round reactive to light, no oropharyngeal lesions, poor dentition. No sinus tenderness.     Chest: Symmetrical expansion  Lungs: Clear to auscultation bilaterally, no dullness  Heart: Regular rhythm, no murmurs, rubs or gallops, No JVD  Abdomen: Soft, non-tender,non distended, no organomegaly, BS+  Musculoskeletal: back tenderness+, No edema. No clubbing or cyanosis  CNS: AAOx3. Grossly normal.No NR  SKIN: No skin lesion or rash. Dry, warm, intact      PIV, condom catheter in place    Labs: Results:   Chemistry Recent Labs     01/22/19  0455 01/21/19  2340 01/21/19  0132   GLU 95 121* 107*    140 136   K 4.4 4.4 3.9    101 95*   CO2 31 37* 35*   BUN 18 19* 15   CREA 0.67 0.64 0.84   CA 8.2* 8.8 9.1   AGAP 6 2* 6   BUCR 27* 30* 18   * 141* 183*   TP 7.4 7.9 9.4*   ALB 1.9* 2.2* 2.5*   GLOB 5.5* 5.7* 6.9*   AGRAT 0.3* 0.4* 0.4*      CBC w/Diff Recent Labs     01/22/19  0455 01/21/19  2340 01/21/19  0132   WBC 10.5 10.2 10.6   RBC 4.79 4.76 5.65*   HGB 13.5 13.5 16.1*   HCT 42.9 41.8 50.5*    172 185   GRANS  --  80* 68   LYMPH  --  15* 24   EOS  --  0 0      Microbiology Recent Labs     01/22/19  1427 01/22/19  1420 01/21/19  0132 01/21/19  0120   CULT NO GROWTH AFTER 16 HOURS NO GROWTH AFTER 16 HOURS AEROBIC AND ANAEROBIC BOTTLES SERRATIA MARCESCENS*  For Susceptibility Refer to Culture  [id number]   AEROBIC AND ANAEROBIC BOTTLES SERRATIA MARCESCENS*          Imaging-    Results from Hospital Encounter encounter on 01/21/19   XR CHEST PORT    Narrative EXAM: XR CHEST PORT    CLINICAL HISTORY: Bacteremia.  -Additional: None. TECHNIQUE: A portable semiupright AP radiographic view of the chest is compared  with the radiograph of 01/21/2019.  _______________    FINDINGS:  Poor inspiratory volumes limit evaluation. There is central vascular prominence. Haziness at both lung bases likely represents at least moderate pleural  effusions, larger on the right. No pneumothorax. The cardiac silhouette remains  predominantly obscured, with no mediastinal or hilar contour changes. Osseous  structures are stable.    _______________      Impression IMPRESSION:    Poor inspiratory volumes.  Bilateral pleural effusions are present, larger on the  right.    _______________       Results from Hospital Encounter encounter on 01/21/19   CTA CHEST ABD PELV W CONT    Narrative EXAM: CTA of the chest, abdomen, and pelvis with contrast 1/21/2019. INDICATION: Tachypnea. Discitis/osteomyelitis involving the thoracic spine. COMPARISON: CT angiogram of the chest from 12/23/2018. TECHNIQUE: CT angiography of the chest, abdomen, and pelvis was performed during  the dynamic infusion of 100cc of Isovue-300. Multiplanar reformats were  generated. MIP reconstructions were also obtained from the source data. Dose reduction techniques:  Automated exposure control, mAs and/or kVp  reductions based on patient size, and iterative reconstruction. The specific  techniques utilized on this CT exam have been documented in the patient's  electronic medical record. Digital imaging and communications in medicine (DICOM) format image data are  available to non-affiliated external healthcare facilities or entities on a  secure, media free, reciprocally searchable database, maintained by the  healthcare system, with patient authorization for at least a 12 month period  after this study. _______________    FINDINGS:    CHEST:    MEDIASTINUM AND LYMPH NODES: Atherosclerotic vascular disease is noted with  vascular calcifications throughout the thoracic aorta. There is no evidence of  thoracic aortic aneurysm or dissection. The main pulmonary artery is prominent  with slight prominence of the central pulmonary arteries which may represent the  sequela of underlying pulmonary arterial hypertension. Respiratory motion  degrades the evaluation but there is no evidence of central or segmental  pulmonary embolus. No abnormal mediastinal masses are present. There is no mediastinal or hilar  lymph node enlargement. Subcentimeter nodules are identified in both thyroid  lobes and the appearance is grossly unchanged.     LUNGS AND AIRWAYS: Atelectasis is noted in both lower lobes with partial  atelectasis of the lingula and upper lobes. There is also near complete  atelectasis of the right middle lobe. The aerated portions of the lungs are  clear. The visualized portions of the tracheobronchial tree are patent. PLEURA: Moderate sized bilateral pleural effusions are again noted and both  appear to have slightly decreased in size.    ===============    ABDOMEN/PELVIS:    LIVER, BILIARY: The liver is small with a nodular hepatic contour. Probable  small hepatic cysts are noted and are unchanged. The gallbladder is grossly  unremarkable. There is no significant biliary dilatation. PANCREAS: Unremarkable. SPLEEN: The spleen is enlarged but unchanged in size and configuration. Pronounced perisplenic varices are noted with a spontaneous left splenorenal  shunt. ADRENALS: Unremarkable. KIDNEYS: WNL. LYMPH NODES: No enlarged lymph nodes. PERITONEAL CAVITY AND GASTROINTESTINAL TRACT: No free intraperitoneal air or  fluid. No bowel dilation or suggestion of wall thickening. No polly-intestinal  inflammatory changes. PELVIC ORGANS: Evaluation of the pelvis demonstrates a Cardoza catheter within the  partially contracted urinary bladder. No abnormal intrapelvic masses or fluid  collections are demonstrable. VASCULATURE: Atherosclerotic vascular disease is present without evidence of  abdominal aortic aneurysm or dissection. BONES: Lytic and sclerotic changes are again noted extending along the endplates  of the inferior T3 and superior T4 vertebrae with mild paraspinal inflammatory  changes, consistent with the patient's history of discitis osteomyelitis and  without significant interval change. More advanced lytic and sclerotic changes  are present at the T9-10 level with paraspinal phlegmon and probable intraspinal  soft tissue which appears to partially efface the thecal sac but is poorly  evaluated by CT.   The overall appearance is also grossly unchanged. No new  lytic or sclerotic osseous abnormalities are suggested. OTHER: None.  _______________      Impression IMPRESSION:    1. Atherosclerotic vascular disease but without evidence of thoracic or  abdominal aortic aneurysm or dissection. 2.  Cirrhotic hepatic morphology with splenomegaly and varices, consistent with  underlying portal hypertension. 3.  Dilatation of the central pulmonary arteries suggesting underlying pulmonary  arterial hypertension. There is no evidence of central or segmental pulmonary  embolus. 4.  Destructive changes at the T9-10 level, consistent with the patient's  history of discitis/osteomyelitis. There appears to be intraspinal and  paraspinal phlegmon. The overall appearance is grossly unchanged but would be  better characterized by MRI which could be performed, as clinically warranted. Destructive changes are also present at T3-4 but to a lesser extent and also  without gross interval change. 5.  Moderate-sized bilateral pleural effusions, slightly decreased in size with  extensive atelectasis in both lungs. 6.  Additional ancillary findings, as described above.     Preliminary results were provided to the emergency room by the radiology  resident at the time of initial interpretation.         ---------------------------------------------------------------------------------------------------------------  I have independently examined the patient and reviewed all lab studies and imgaing as well as review of nursing notes and physican notes       Darvin Ferguson MD  1/23/2019    [facility] AT THE [facility]  [phone]

## 2019-01-23 NOTE — ROUTINE PROCESS
0710 Bedside report given to Marlene Moore RN (oncoming nurse) per Sj Flores RN (offgoing nurse) utilizing SBAR, MAR, Kardex, I&O, results review, and EKG interpretation with rate and rhythm.

## 2019-01-23 NOTE — MED STUDENT NOTES
*ATTENTION:  This note has been created by a medical student for educational purposes only. Please do not refer to the content of this note for clinical decision-making, billing, or other purposes. Please see attending physicians note to obtain clinical information on this patient. * *ATTENTION:  This note has been created by a medical student for educational purposes only. Please do not refer to the content of this note for clinical decision-making, billing, or other purposes. Please see attending physicians note to obtain clinical information on this patient. * Student Progress Note Please refer to attendings daily rounding note for full details Ceasar Adame MRN: [id number]  CSN: [id number] YOB: 1950  Age: 77 y.o. Sex: male DOA: 1/21/2019 LOS:  LOS: 2 days Chief Complaint:  Bacteremia Subjective:  
Patient is a 78 yo male who presented to the ED complaining of constant chronic lower back pain that has been worsening over the past 2 weeks. He reports that pain occasionally goes into his left lower quadrant. He was seen at [facility] 1 week ago for the same symptoms. Reports that he was doing well on Percocet but ran out. He was discharged home from [facility] ED but brought back same day due to positive blood cultures. Patient placed on BIPAP and moved to the ICU after he started experiencing confusion and became tachypnic after receiving oxycodone. ABG showed hypercapneic respiratory failure. He was taken off of BIPAP this AM. Currently resting comfortably on 6L of 02 by Nc. Objective:  
  
Visit Vitals /46 Pulse 65 Temp 97.5 °F (36.4 °C) Resp 26 Ht 6' (1.829 m) Wt 103.5 kg (228 lb 3.2 oz) SpO2 98% BMI 30.95 kg/m² Physical Exam: 
Gen: Well developed, well nourished male. No acute distress HEENT: Atraumatic/normocephalic. PERRL. Normal appearing nose, ears, throat. CV: Regular rhythm with no murmurs, rubs, or gallops. Resp: Tachypnic with decreased breath sounds bilaterally. No accessory muscle use. No wheezes or rales. Abd: Soft, nontender, nondistended. Normal bowel sounds Extrem: Moving all 4 extremities. Skin: Color, texture, and turgor appear normal  
 
I have reviewed the patient's Labs and Radiology studies. Assessment:  
76 yo male admitted for bacteremia Other problems include: 
Chronic low back pain Discitis History IVDA (heroin) COPD 
HTN 
HLD Plan:  
Continue IVF. ID consulted patient. Notes discontinue Vancomycin/Zosyn and start Ceftazidime/Ciprofloxacin. Hx of IV Heroin use, patient notes last use was July 2018. UDS negative. Discitis phlegmon seen on CT chest/abd/pelvis. Will need MRI for further evaluation and treatment. Pt agrees to proceed with MRI. ID notes that mass may need aspiration of fluid as it may be a possible abscess. Continue home medications for HTN/HLD/Neuropathy Continue DVT prophylaxis Aidan Torres 1/23/2019 
11:29 AM

## 2019-01-23 NOTE — PROGRESS NOTES
Assumed care of patient. Patient arrived to floor via stretcher from procedure. Alert and oriented to person and place. O2 via NC at 4L, no SOB noted, lungs coarse bilaterally. Abd SNT, BSx4. DTV. CORDOVA, generalized weakness. Pt rating pain 10/10. Medications given per orders. Family at bedside, call bell in reach, bed low, wheels locked. CTM.

## 2019-01-23 NOTE — PROGRESS NOTES
Progress Note Late Entry:  Patient was seen and managed 1/22/19 prior to RRT. Note entered as late entry for 1/22/19 Patient: Toni Rueda               Sex: male          DOA: 1/21/2019 YOB: 1950      Age:  77 y.o.        LOS:  LOS: 2 days CHIEF COMPLAINT:  Back pain and bacteremia Subjective:  
 
Patient feels okay. No distress Complains of back pain, mild Objective:  
  
Visit Vitals /50 Pulse 80 Temp 97.4 °F (36.3 °C) Resp 25 Ht 6' (1.829 m) Wt 98.5 kg (217 lb 1.6 oz) SpO2 100% BMI 29.44 kg/m² Physical Exam: 
Gen:  No distress, no complaint Lungs:  Clear bilaterally, no wheeze or rhonchi Heart:  Regular rate and rhythm, no murmurs or gallops Abdomen:  Soft, non-tender, normal bowel sounds Lab/Data Reviewed: All lab results for the last 24 hours reviewed. Assessment/Plan Active Problems: 
  Bacteremia (1/21/2019) Back pain Plan: 
Continue IV antibiotics ID consult BP control DVT prophylaxis

## 2019-01-23 NOTE — PROGRESS NOTES
Problem: Falls - Risk of 
Goal: *Absence of Falls Document Lola End Fall Risk and appropriate interventions in the flowsheet. Outcome: Progressing Towards Goal 
Fall Risk Interventions: 
Mobility Interventions: Bed/chair exit alarm Mentation Interventions: Bed/chair exit alarm, Evaluate medications/consider consulting pharmacy Medication Interventions: Bed/chair exit alarm, Evaluate medications/consider consulting pharmacy Elimination Interventions: Bed/chair exit alarm, Call light in reach, Toileting schedule/hourly rounds

## 2019-01-23 NOTE — ROUTINE PROCESS
Order received for PICC insertion. Second set BC's not completed and discussed with Dr Elsa Flores who requests no PICC insertion until cultures are complete. Talked with Abhay Lawson RN caring for patient and will dc order for now and can reorder when needed. Has PIV access per RN.

## 2019-01-23 NOTE — PROGRESS NOTES
2000,received pt on BIPAP of 18/8 40% resting with eyes closed no distress noted at this time will monitor though the shift. 
0300,pt asking to take off BIPAP wanting a break,pt placed on 5liter NC no distress noted.

## 2019-01-23 NOTE — PROGRESS NOTES
Vascular & Interventional Radiology Brief Procedure Note Interventional Radiologist: Radha Espinosa MD 
 
Assistants: None Pre-operative Diagnosis:  T 10/11 discititis Post-operative Diagnosis: Same as pre-op dx Procedure(s) Performed:  CT guided disc aspiration Anesthesia:  Local  
 
Findings:  3-4 cc of bloody fluid Complications: None Estimated Blood Loss:  minimal 
 
Tubes and Drains: None Specimens: fluid sent to lab for culture and gram stain Condition: Good Disposition:  Return to floor Plan: observe Radha Espinosa MD 
[facility] Vascular & Interventional Radiology 1/23/2019

## 2019-01-23 NOTE — PROGRESS NOTES
Condom catheter became dislodged from urometer when we positioned patient from CT table to bed. Condom catheter removed from penis.

## 2019-01-23 NOTE — PROGRESS NOTES
Received patient OFF BIPAP, on 4 lpm NC O2 (titrated down from 6 lpm). Current vitals: HR 92, SpO2 100, RR 32, /60. BIPAP @ bedside on standby. -PSS

## 2019-01-23 NOTE — PROGRESS NOTES
Progress Note Patient: Evelyn Moulton               Sex: male          DOA: 1/21/2019 YOB: 1950      Age:  77 y.o.        LOS:  LOS: 2 days CHIEF COMPLAINT:  Bacteremia with potential discitis/phlegmon Subjective:  
 
Patient awake today and talking No distress Objective:  
  
Visit Vitals /55 Pulse 83 Temp 97.8 °F (36.6 °C) Resp 24 Ht 6' (1.829 m) Wt 103.5 kg (228 lb 3.2 oz) SpO2 91% BMI 30.95 kg/m² Physical Exam: 
Gen:  Patient is in no distress. No complaint HEENT and Neck:  PERRL, No cervical tenderness or masses Lungs:  Clear bilaterally. No rales, no wheeze. Normal effort. Heart:  Regular Rate and Rhythm. No murmur or gallop. No JVD. No edema. Abdomen:  Soft, non tender, no masses, no Hepatosplenomegally Extremities:  No digital clubbing, no cyanosis Neuro:  Alert and oriented, Normal affect, Cranial nerves intact, No sensory deficits Lab/Data Reviewed: 
 
Labs reviewed Assessment/Plan Principal Problem: 
  Bacteremia (1/21/2019) Active Problems: 
  Back pain (11/14/2018) Discitis of thoracic region (11/14/2018) Hypertension (11/14/2018) Hepatitis C (11/14/2018) Plan: 
Continue with IV antibiotics Aspiration of abscess/phlegmon with IR Monitor respiratory status Discussed with Mother at the bedside DVT prophylaxis

## 2019-01-24 ENCOUNTER — APPOINTMENT (OUTPATIENT)
Dept: GENERAL RADIOLOGY | Age: 69
DRG: 049 | End: 2019-01-24
Attending: HOSPITALIST
Payer: MEDICAID

## 2019-01-24 LAB
ANION GAP SERPL CALC-SCNC: 7 MMOL/L (ref 3–18)
ARTERIAL PATENCY WRIST A: YES
ARTERIAL PATENCY WRIST A: YES
BASE EXCESS BLD CALC-SCNC: 15 MMOL/L
BASE EXCESS BLD CALC-SCNC: 15 MMOL/L
BASOPHILS # BLD: 0 K/UL (ref 0–0.1)
BASOPHILS NFR BLD: 0 % (ref 0–2)
BDY SITE: ABNORMAL
BDY SITE: ABNORMAL
BODY TEMPERATURE: 98.5
BODY TEMPERATURE: 99.6
BUN SERPL-MCNC: 19 MG/DL (ref 7–18)
BUN/CREAT SERPL: 25 (ref 12–20)
CALCIUM SERPL-MCNC: 8.7 MG/DL (ref 8.5–10.1)
CHLORIDE SERPL-SCNC: 99 MMOL/L (ref 100–108)
CO2 SERPL-SCNC: 36 MMOL/L (ref 21–32)
CREAT SERPL-MCNC: 0.75 MG/DL (ref 0.6–1.3)
DIFFERENTIAL METHOD BLD: ABNORMAL
EOSINOPHIL # BLD: 0 K/UL (ref 0–0.4)
EOSINOPHIL NFR BLD: 0 % (ref 0–5)
ERYTHROCYTE [DISTWIDTH] IN BLOOD BY AUTOMATED COUNT: 16 % (ref 11.6–14.5)
GAS FLOW.O2 O2 DELIVERY SYS: ABNORMAL L/MIN
GAS FLOW.O2 O2 DELIVERY SYS: ABNORMAL L/MIN
GLUCOSE SERPL-MCNC: 97 MG/DL (ref 74–99)
HCO3 BLD-SCNC: 41.2 MMOL/L (ref 22–26)
HCO3 BLD-SCNC: 42.1 MMOL/L (ref 22–26)
HCT VFR BLD AUTO: 48.2 % (ref 36–48)
HGB BLD-MCNC: 14.2 G/DL (ref 13–16)
LYMPHOCYTES # BLD: 1 K/UL (ref 0.9–3.6)
LYMPHOCYTES NFR BLD: 11 % (ref 21–52)
MCH RBC QN AUTO: 28 PG (ref 24–34)
MCHC RBC AUTO-ENTMCNC: 29.5 G/DL (ref 31–37)
MCV RBC AUTO: 94.9 FL (ref 74–97)
MONOCYTES # BLD: 0.6 K/UL (ref 0.05–1.2)
MONOCYTES NFR BLD: 7 % (ref 3–10)
NEUTS SEG # BLD: 7 K/UL (ref 1.8–8)
NEUTS SEG NFR BLD: 82 % (ref 40–73)
O2/TOTAL GAS SETTING VFR VENT: 0.5 %
O2/TOTAL GAS SETTING VFR VENT: 0.55 %
PCO2 BLD: 83.6 MMHG (ref 35–45)
PCO2 BLD: 92.1 MMHG (ref 35–45)
PEEP RESPIRATORY: 8 CMH2O
PEEP RESPIRATORY: 8 CMH2O
PH BLD: 7.27 [PH] (ref 7.35–7.45)
PH BLD: 7.3 [PH] (ref 7.35–7.45)
PIP ISTAT,IPIP: 18
PIP ISTAT,IPIP: 18
PLATELET # BLD AUTO: 149 K/UL (ref 135–420)
PMV BLD AUTO: 10.5 FL (ref 9.2–11.8)
PO2 BLD: 107 MMHG (ref 80–100)
PO2 BLD: 57 MMHG (ref 80–100)
POTASSIUM SERPL-SCNC: 4.7 MMOL/L (ref 3.5–5.5)
RBC # BLD AUTO: 5.08 M/UL (ref 4.7–5.5)
SAO2 % BLD: 81 % (ref 92–97)
SAO2 % BLD: 97 % (ref 92–97)
SERVICE CMNT-IMP: ABNORMAL
SERVICE CMNT-IMP: ABNORMAL
SODIUM SERPL-SCNC: 142 MMOL/L (ref 136–145)
SPECIMEN TYPE: ABNORMAL
SPECIMEN TYPE: ABNORMAL
TOTAL RESP. RATE, ITRR: 12
TOTAL RESP. RATE, ITRR: 38
WBC # BLD AUTO: 8.6 K/UL (ref 4.6–13.2)

## 2019-01-24 PROCEDURE — 74011000250 HC RX REV CODE- 250: Performed by: INTERNAL MEDICINE

## 2019-01-24 PROCEDURE — 77010033678 HC OXYGEN DAILY

## 2019-01-24 PROCEDURE — 74011250637 HC RX REV CODE- 250/637: Performed by: HOSPITALIST

## 2019-01-24 PROCEDURE — 94660 CPAP INITIATION&MGMT: CPT

## 2019-01-24 PROCEDURE — 94669 MECHANICAL CHEST WALL OSCILL: CPT

## 2019-01-24 PROCEDURE — 74011250637 HC RX REV CODE- 250/637: Performed by: INTERNAL MEDICINE

## 2019-01-24 PROCEDURE — 85025 COMPLETE CBC W/AUTO DIFF WBC: CPT

## 2019-01-24 PROCEDURE — 65660000001 HC RM ICU INTERMED STEPDOWN

## 2019-01-24 PROCEDURE — 71045 X-RAY EXAM CHEST 1 VIEW: CPT

## 2019-01-24 PROCEDURE — 94640 AIRWAY INHALATION TREATMENT: CPT

## 2019-01-24 PROCEDURE — 80048 BASIC METABOLIC PNL TOTAL CA: CPT

## 2019-01-24 PROCEDURE — 36415 COLL VENOUS BLD VENIPUNCTURE: CPT

## 2019-01-24 PROCEDURE — 77030035694 HC MSK BIPAP FLL FAC PERFMAX PHIL -B

## 2019-01-24 PROCEDURE — 74011250636 HC RX REV CODE- 250/636: Performed by: INTERNAL MEDICINE

## 2019-01-24 PROCEDURE — 36600 WITHDRAWAL OF ARTERIAL BLOOD: CPT

## 2019-01-24 PROCEDURE — 74011250636 HC RX REV CODE- 250/636: Performed by: HOSPITALIST

## 2019-01-24 PROCEDURE — 94664 DEMO&/EVAL PT USE INHALER: CPT

## 2019-01-24 PROCEDURE — 74011000258 HC RX REV CODE- 258: Performed by: INTERNAL MEDICINE

## 2019-01-24 PROCEDURE — 82803 BLOOD GASES ANY COMBINATION: CPT

## 2019-01-24 RX ORDER — GUAIFENESIN 100 MG/5ML
100 SOLUTION ORAL EVERY 4 HOURS
Status: DISCONTINUED | OUTPATIENT
Start: 2019-01-24 | End: 2019-01-24

## 2019-01-24 RX ORDER — POTASSIUM CHLORIDE 20 MEQ/1
40 TABLET, EXTENDED RELEASE ORAL ONCE
Status: DISCONTINUED | OUTPATIENT
Start: 2019-01-24 | End: 2019-01-24

## 2019-01-24 RX ORDER — IPRATROPIUM BROMIDE AND ALBUTEROL SULFATE 2.5; .5 MG/3ML; MG/3ML
3 SOLUTION RESPIRATORY (INHALATION)
Status: DISCONTINUED | OUTPATIENT
Start: 2019-01-24 | End: 2019-02-07

## 2019-01-24 RX ORDER — FUROSEMIDE 10 MG/ML
40 INJECTION INTRAMUSCULAR; INTRAVENOUS
Status: COMPLETED | OUTPATIENT
Start: 2019-01-24 | End: 2019-01-24

## 2019-01-24 RX ORDER — GUAIFENESIN 100 MG/5ML
100 SOLUTION ORAL
Status: DISCONTINUED | OUTPATIENT
Start: 2019-01-24 | End: 2019-01-24

## 2019-01-24 RX ORDER — GUAIFENESIN 100 MG/5ML
400 SOLUTION ORAL EVERY 4 HOURS
Status: DISCONTINUED | OUTPATIENT
Start: 2019-01-24 | End: 2019-02-23 | Stop reason: HOSPADM

## 2019-01-24 RX ADMIN — CEFTAZIDIME 1 G: 1 INJECTION, POWDER, FOR SOLUTION INTRAMUSCULAR; INTRAVENOUS at 01:07

## 2019-01-24 RX ADMIN — HEPARIN SODIUM 5000 UNITS: 5000 INJECTION INTRAVENOUS; SUBCUTANEOUS at 09:29

## 2019-01-24 RX ADMIN — CEFTAZIDIME 1 G: 1 INJECTION, POWDER, FOR SOLUTION INTRAMUSCULAR; INTRAVENOUS at 10:47

## 2019-01-24 RX ADMIN — CIPROFLOXACIN 400 MG: 2 INJECTION, SOLUTION INTRAVENOUS at 22:58

## 2019-01-24 RX ADMIN — GUAIFENESIN 400 MG: 100 SOLUTION ORAL at 20:08

## 2019-01-24 RX ADMIN — GUAIFENESIN 400 MG: 100 SOLUTION ORAL at 23:00

## 2019-01-24 RX ADMIN — SIMVASTATIN 10 MG: 10 TABLET, FILM COATED ORAL at 23:00

## 2019-01-24 RX ADMIN — GABAPENTIN 400 MG: 100 CAPSULE ORAL at 23:00

## 2019-01-24 RX ADMIN — CEFTAZIDIME 1 G: 1 INJECTION, POWDER, FOR SOLUTION INTRAMUSCULAR; INTRAVENOUS at 17:39

## 2019-01-24 RX ADMIN — HEPARIN SODIUM 5000 UNITS: 5000 INJECTION INTRAVENOUS; SUBCUTANEOUS at 17:39

## 2019-01-24 RX ADMIN — CIPROFLOXACIN 400 MG: 2 INJECTION, SOLUTION INTRAVENOUS at 11:50

## 2019-01-24 RX ADMIN — FUROSEMIDE 40 MG: 10 INJECTION, SOLUTION INTRAMUSCULAR; INTRAVENOUS at 06:32

## 2019-01-24 RX ADMIN — IPRATROPIUM BROMIDE AND ALBUTEROL SULFATE 3 ML: .5; 3 SOLUTION RESPIRATORY (INHALATION) at 20:00

## 2019-01-24 NOTE — PROGRESS NOTES
Notes by primary team confirm they are aware of culture results, on ceftazadime. No further ER care necessary for blood culture results.    
 
Juan Prince PA-C

## 2019-01-24 NOTE — PROGRESS NOTES
Day 2 of attempting OT evaluation. Pt on Bipap both attempts. Will follow up. Balta Peterson MS OTR/L Office Ext: [phone] Pager: [phone]

## 2019-01-24 NOTE — MED STUDENT NOTES
*ATTENTION:  This note has been created by a medical student for educational purposes only. Please do not refer to the content of this note for clinical decision-making, billing, or other purposes. Please see attending physicians note to obtain clinical information on this patient. * *ATTENTION:  This note has been created by a medical student for educational purposes only. Please do not refer to the content of this note for clinical decision-making, billing, or other purposes. Please see attending physicians note to obtain clinical information on this patient. * Student Progress Note Please refer to attendings daily rounding note for full details PatientSung Pearce MRN: [id number]  CSN: [id number] YOB: 1950  Age: 77 y.o. Sex: male DOA: 1/21/2019 LOS:  LOS: 3 days Chief Complaint:  Bacteremia Subjective:  
Patient is a 78 yo male who presented to the ED complaining of constant chronic lower back pain that has been worsening over the past 2 weeks. He reports that pain occasionally goes into his left lower quadrant. He was seen at [facility] 1 week ago for the same symptoms. Reports that he was doing well on Percocet but ran out. He was discharged home from [facility] ED but brought back same day due to positive blood cultures. Patient had CT guided disc aspiration yesterday. He was moved to the floor but started experiencing SOB again. RRT called and patient was placed on BIPAP and moved to the ICU. Pt currently resting comfortably and tolerating the BIPAP well. Objective:  
  
Visit Vitals /62 Pulse (!) 111 Temp 99.6 °F (37.6 °C) Resp 21 Ht 6' (1.829 m) Wt 100.2 kg (221 lb) SpO2 98% BMI 29.97 kg/m² Physical Exam: 
Gen: Well developed, well nourished male. No acute distress HEENT: Atraumatic/normocephalic. PERRL. Normal appearing nose, ears, throat. CV: Regular rhythm with no murmurs, rubs, or gallops. Resp: Tachypnic with decreased breath sounds bilaterally. No accessory muscle use. No wheezes or rales. Abd: Soft, nontender, nondistended. Normal bowel sounds Extrem: Moving all 4 extremities. Skin: Color, texture, and turgor appear normal  
Neuro: Not interactive I have reviewed the patient's Labs and Radiology studies. Assessment:  
76 yo male admitted for bacteremia Other problems include: 
Chronic low back pain Discitis History IVDA (heroin) COPD 
HTN 
HLD Plan:  
Continue IVF and IV abx Discitis phlegmon seen on CT chest/abd/pelvis. IR performed CT guided disc aspiration. Cultures pending. Continue BIPAP, will follow respiratory status. Continue home medications for HTN/HLD/Neuropathy Continue DVT prophylaxis Jolene Tena 1/24/2019 
10:51AM

## 2019-01-24 NOTE — DIABETES MGMT
Nutrition/Glycemic Control: Pt screened for nutritional status. He is 124% of his ideal weight with a BMI= 29.9kg/m2. He is receiving the cardiac diet which was held when he was on bipap. He is well nourished from available data. Will monitor intake when diet resumes.  Dilip FREEMAN

## 2019-01-24 NOTE — PROGRESS NOTES
1900  -- Bedside, Verbal and Written shift change report given to [id number] Sin Clark (oncoming nurse) by [facility] nurse). Report included the following information SBAR, Kardex, Intake/Output, MAR and Recent Results. Allergy and Fall band placed at wrist.  
   
2223 -- PM medications administered, pt tolerated with ease, will continue to monitor. 
  
0015 -- Shift reassessment, pt condition unchanged, will continue to monitor. 1 -- RN at bedside with CNA for vitals and hygiene care. Pt has AMS and increased effort to breath. O2 increased to 5L. Charge RN and RT called to bedside. RRT called. Pt transferred to [place] [id number] -- Bedside, Verbal and Written shift change report given to [id number] Kev Carlson by EDMOND (offgoing nurse). Report included the following information SBAR, Kardex, Intake/Output, MAR and Recent Results. Skin assessment completed.

## 2019-01-24 NOTE — PROGRESS NOTES
Responded to RRT  for pt in respiratory distress. Pt responding to voice but not following commands and not making sense, O2 sats 89-90% on 4L, RR 40-50. Pt moved to ICU for placement on Bipap and closer monitoring as stepdown status. CXR ordered. Pt moved to ICU in bed with O2 and cardiac monitor.

## 2019-01-24 NOTE — PROGRESS NOTES
Received pt on 18/8 bipap 50% fio2. Breath sounds are diminished with absent breath sounds on the right. . spo2 94 .  
 
1500 placed pt on vest for cpt and pt is tolerating well. spo2 100%  RR 12. Pt continues on bipap 18/8 50% and ABG done.

## 2019-01-24 NOTE — PROGRESS NOTES
4053 Received pt via bed and bedside report in ICU 2705 after pt transferred from [facility] via bed after RRT. Received bedside report from [facility]. Pt very lethargic and not talking at present. Pt tachypneic with labored breathin. Pt placed on BIPAP at prescribed settings per RT. Connected to monitor, NBP cuff, pulse ox. STAT  PCXR done as ordered. 2387 Wet reading to [facility] called to nurse per Dr. Lazaro Stoner, radiologist. 7974 Lasix 40  Mg IV push given as ordered. Urinal at bedside.

## 2019-01-24 NOTE — PROGRESS NOTES
Spoke with Dr. Dayana Frost regarding absent breath sounds on R on spo2 92% on BIPAP. Critical care not consulted at this time.

## 2019-01-24 NOTE — PROGRESS NOTES
9131: PT orders received and chart reviewed. Patient on Bipap and sleeping soundly. Will f/u with patient. 1051: Second attempt, patient remains on bipap. RT at bedside. Will f/u with patient. Ann Way PT, DPT Office extension: [phone] Pager #: [phone]

## 2019-01-24 NOTE — PROGRESS NOTES
INFECTIOUS DISEASE FOLLOW UP NOTE :-  
 
Admit Date: 1/21/2019 ABX:  
  
Current abx Prior abx  
ceftaz 1/23-1 
cipro 1/23  -1 Cefepime/vanco  11/14   2, Ceftriaxone 11/16 - 3; Zosyn 1/21-2  
  
Assessment -> Rec:  
  
BSI 2 of 2 Serratia marcescens 
- source ? Discitis Phlegmon seen on CT 
- denies any urinary complain but had Serratia in urine in past. Currently UA is negative - Ucx was not sent but CT with normal kidneys and nothing sig on CT and pt asymptomatic except some incontinence -  final ID and sensitivity reviewed - pt on Ceftaz/cipro to cover Serratia 
- repeat Blcx x2 1/22 ngtd Destructive T10-11 changes ?discitis/OM- chronic back pain 
- mid back pain x 5 months, fell, incontinent of urine - CTAP 11/2018: severe destructive changes T 10-11, paravertebral sot tissue infiltration, extension to anterior epidural space w/ cord compression  
- h/o IVDU - last use in July 2018 
- H/O uncooperative for MRI given claustrophobia in past 
- Bone/ gallium scans 11/27 neg for discitis/OM  
- refused stabilization procedure per Dr. Bandar Sanders in past 
- was not given long term Abx then 
- CT 1/21 with destructive changes T9-10 levels c/w discitis/OM and intraspinal and paraspinal phlegmon. Overall appearance is grossly unchanged and pain also not different from last time -> inflammatory markers: esr 38, crp 7.4 
-> s/p aspiration of fluid/phlegmon 1/23- await results thought pt on BSabx and this can alter the results 
-> Abx as above 
-> Await for final duration but likely will need long term Abx 
-> d/w Dr Vanessa Vaughn Acute hypoxic resp distress- RRT called 1/23 and again 1/24 sec to hypercapnic resp failure - pt needs Bipap at night 
- currently in ICU Bilateral pleural effusions- chronic with  atelectasis - monitor Hepatomegaly, splenic hilar & perisplenic varices- concern for cirrhosis and portal HTN 
- seen on CTAP again    
H/o IVDU - HIV ab NR  ,Hep BsAg neg (last reported ivdu 2018, has hep C and thinks hep b immune) -> denies anything currently 
   
COPD    
HTN    
DJD    
[facility]    
  
MICROBIOLOGY:  
   urcx >100,000 Serratia marcescens             blcx NG x 2 
11/15   CrAG neg, fungitell 375 (reported ) 
   blcx for fungus - ngtd 
            fungitell 113 
  
     Blcx x2 Serratia marcescens      UA neg 
     Blcx x 2 ngtd  Aspiration cx g/s neg, Cx IP,NOMAN IP 
  
LINES AND CATHETERS:  
piv 
  
 
MEDICATIONS:  
 
Current Facility-Administered Medications Medication Dose Route Frequency  cefTAZidime (FORTAZ) 1 g in 0.9% sodium chloride (MBP/ADV) 50 mL MBP  1 g IntraVENous Q8H  
 ciprofloxacin (CIPRO) 400 mg IVPB (premix)  400 mg IntraVENous Q12H  
 lidocaine (PF) (XYLOCAINE) 10 mg/mL (1 %) injection 20 mL  20 mL SubCUTAneous Rad Multiple  amLODIPine (NORVASC) tablet 10 mg  10 mg Oral DAILY  gabapentin (NEURONTIN) capsule 400 mg  400 mg Oral TID  lisinopril (PRINIVIL, ZESTRIL) tablet 20 mg  20 mg Oral DAILY  metoprolol tartrate (LOPRESSOR) tablet 50 mg  50 mg Oral BID  simvastatin (ZOCOR) tablet 10 mg  10 mg Oral QHS  acetaminophen (TYLENOL) tablet 650 mg  650 mg Oral Q6H PRN  
 ondansetron (ZOFRAN) injection 4 mg  4 mg IntraVENous Q6H PRN  
 heparin (porcine) injection 5,000 Units  5,000 Units SubCUTAneous Q8H SUBJECTIVE :  
 
Interval notes reviewed. Pt in ICU. Afebrile. RRT called earlier as was hypoxic and hypercapnia. On Bipap now. Very lethargic and unable to wake him up. D/w RN. No family at bedside. D/w Dr Leo Parkinson. OBJECTIVE Visit Vitals /61 (BP 1 Location: Left arm, BP Patient Position: At rest) Pulse 97 Temp 99.6 °F (37.6 °C) Resp 18 Ht 6' (1.829 m) Wt 103.5 kg (228 lb 3.2 oz) SpO2 94% BMI 30.95 kg/m² Temp (24hrs), Av.8 °F (37.1 °C), Min:97.8 °F (36.6 °C), Max:99.8 °F (37.7 °C) Gen:on bipap, lethargic, drowsy HENT: on Bipap, couldn't evaluate, pupils equal and reactive Chest: Symmetric expansion, Crackles+ CV:S1S2 RR, tachy, No JVD, No edema Abd:Soft, NT, ND, BS+ Skin:No jaundice, cyanosis, clubbing. No decubitus Muscsk: Normal muscle tone/strength CNS: Lethargic Labs: Results:  
Chemistry Recent Labs  
  01/24/19 
7914 01/22/19 
0455 01/21/19 
2340 GLU 97 95 121*  140 140  
K 4.7 4.4 4.4 CL 99* 103 101 CO2 36* 31 37* BUN 19* 18 19* CREA 0.75 0.67 0.64 CA 8.7 8.2* 8.8 AGAP 7 6 2*  
BUCR 25* 27* 30* AP  --  132* 141* TP  --  7.4 7.9 ALB  --  1.9* 2.2*  
GLOB  --  5.5* 5.7* AGRAT  --  0.3* 0.4* CBC w/Diff Recent Labs  
  01/24/19 
0442 01/22/19 
0455 01/21/19 
2340 WBC 8.6 10.5 10.2 RBC 5.08 4.79 4.76 HGB 14.2 13.5 13.5 HCT 48.2* 42.9 41.8  178 172 GRANS 82*  --  80* LYMPH 11*  --  15* EOS 0  --  0  
  
 
 
RADIOLOGY :   
 
 
 
Imaging Results from Hospital Encounter encounter on 01/21/19 XR CHEST PORT Narrative EXAM: XR CHEST PORT 
 
CLINICAL HISTORY: Bacteremia. 
-Additional: None. TECHNIQUE: A portable semiupright AP radiographic view of the chest is compared 
with the radiograph of 01/21/2019. 
_______________ FINDINGS: 
Poor inspiratory volumes limit evaluation. There is central vascular prominence. Haziness at both lung bases likely represents at least moderate pleural 
effusions, larger on the right. No pneumothorax. The cardiac silhouette remains 
predominantly obscured, with no mediastinal or hilar contour changes. Osseous 
structures are stable. 
 
_______________ Impression IMPRESSION: 
 
Poor inspiratory volumes. Bilateral pleural effusions are present, larger on the 
right. 
 
_______________ Results from Hospital Encounter encounter on 01/21/19 CT GUIDE CATH FLUID DRAIN SOFT TISSUE Narrative CT GUIDED ASPIRATION OF T10-11 disc space:  
 
Indication: Discitis T10-11. COMPARISON: CTA chest, abdomen and pelvis 1/21/2019. CTA chest 12/23/2018 Technique/findings: After the procedure, as well as its risks, benefits and 
alternatives were explained to the patient and his daughter, and all questions 
answered, written informed consent was obtained from the daughter and witnessed. Procedure was performed using only local anesthesia. The fluid collection in the T10-11 disc space was localized under CT guidance. The skin was marked, prepped and draped in sterile fashion and bicarbonate 
buffered lidocaine was used for local anesthesia. An 19 gauge needle was 
advanced into the collection. A total of 3-4 ml of bloody fluid was aspirated. The fluid was sent for laboratory evaluation. The patient tolerated the 
procedure well and there were no immediate complications. Standard post 
procedure pause. One or more dose reduction techniques were used on this CT: automated exposure 
control, adjustment of the mAs and/or kVp according to patient size, and 
iterative reconstruction techniques. The specific techniques used on this CT 
exam have been documented in the patient's electronic medical record. Digital 
Imaging and Communications in Medicine (DICOM) format image data are available 
to nonaffiliated external healthcare facilities or entities on a secure, media 
free, reciprocally searchable basis with patient authorization for at least a 
12-month period after this study. GUIDANCE: CT guidance was used to position (and confirm the position of) the 
needle. Image(s) saved in PACS: CT Impression IMPRESSION: 
 
Successful CT guided aspiration of the T10-11 disc space. I have independently examined the patient and reviewed lab studies and imgaing as well as review of nursing notes and physican notes from the past 24 hours. Dragon medical dictation software was used for portions of this report. Unintended errors may occur.   
 
Konstantin Chawla MD 
 January 24, 2019 [facility] [phone]

## 2019-01-24 NOTE — ROUTINE PROCESS
0710 Bedside report given to Hoyle Gowers RN (oncoming nurse) per Ravi Orellana RN (offgoing nurse) utilizing SBAR, MAR, Kardex, I&O, results review, and EKG interpretation with rate and rhythm.

## 2019-01-24 NOTE — CONSULTS
Pulmonary consultation  Requesting clinician: Romero Vera MD  Indication: Respiratory failure requiring BiPAP    History  49-year-old male that presented to the emergency room on 1/19 complaining of an exacerbation of chronic back pain. He was treated and discharged to home. Evaluation included blood cultures that proved to be positive for Serratia marcescens. The patient was asked to return to the hospital and was admitted for IV antibiotics. He has a history of polysubstance abuse including tobacco, alcohol, cocaine and heroin. Remarkably, the patient's urine toxicology screen was negative on 1/22/19. He has baseline CO2 retention with a typical PaCO2 value of roughly 70. He has had problems with symptomatic pleural effusions, diffuse lung atelectasis and hypoxemia on previous hospital stays. He has a history of T10-11 discitis and this was aspirated yesterday afternoon. Early this morning the patient was found to be lethargic and minimally responsive. He had labored breathing and was tachypneic with respiratory rates in the 40s. He was placed on BiPAP and transferred to the ICU. ABG on BiPAP was 7.27/92/57. Chest x-ray showed evidence of bilateral pleural effusions with possible associated infiltrates. These x-ray changes have been seen previously and there is possibly baseline elevation of the right hemidiaphragm. His white blood cell count was 8.6 and there were no bands. BUN was 19 and the creatinine was 0.75. Even with BiPAP he was slow to awaken and respond. He has a cough that is productive of yellowish sputum. CT scan of the chest from 1/21/19 does show a right lower lobe infiltrate with bilateral basilar effusions. Neither effusion appears to be loculated. Review of systems  The patient appears somewhat delirious and does not reliably answer questions.     Past medical history  Hypertension  Diastolic heart failure and left ventricular hypertrophy  Hepatitis C  T9-10 discitis with chronic back pain  Lumbar stenosis  Polysubstance abuse  COPD  Left adrenal gland adenoma  Medical therapy noncompliance      Allergies   Allergen Reactions    Shellfish Derived Hives       No current facility-administered medications on file prior to encounter. Current Outpatient Medications on File Prior to Encounter   Medication Sig Dispense Refill    metoprolol tartrate (LOPRESSOR) 50 mg tablet Take 1 Tab by mouth two (2) times a day. 60 Tab 0    furosemide (LASIX) 40 mg tablet Take 1 Tab by mouth daily. 30 Tab 0    amLODIPine (NORVASC) 10 mg tablet Take 10 mg by mouth daily.  gabapentin (NEURONTIN) 400 mg capsule Take 400 mg by mouth three (3) times daily.  loratadine (CLARITIN) 10 mg tablet Take 10 mg by mouth daily as needed for Allergies.  lisinopril (PRINIVIL, ZESTRIL) 20 mg tablet Take  by mouth daily.  simvastatin (ZOCOR) 10 mg tablet Take  by mouth nightly.  oxyCODONE IR (ROXICODONE) 5 mg immediate release tablet Take 1 Tab by mouth every six (6) hours as needed for Pain. Max Daily Amount: 20 mg. 16 Tab 0    naloxone (NARCAN) 4 mg/actuation nasal spray Use 1 spray intranasally, then discard. Repeat with new spray every 2 min as needed for opioid overdose symptoms, alternating nostrils. 2 Each 0    acetaminophen (TYLENOL) 325 mg tablet Take 2 Tabs by mouth every four (4) hours as needed for Pain. 120 Tab 0    ibuprofen (MOTRIN) 400 mg tablet Take 1 Tab by mouth every six (6) hours as needed for Pain. 30 Tab 0    potassium chloride (K-DUR, KLOR-CON) 20 mEq tablet Take 1 Tab by mouth daily. 30 Tab 0    predniSONE (DELTASONE) 10 mg tablet Take 5 tabs PO daily x 5 d, then 4 tabs PO daily x 4 d, then 3 tabs PO daily x 3 d, then 2 tabs PO daily x 2 d, then 1 tab PO daily x 1 d 60 Tab 0    oxyCODONE-acetaminophen (PERCOCET) 5-325 mg per tablet Take 1 Tab by mouth every six (6) hours as needed for Pain. Max Daily Amount: 4 Tabs.  12 Tab 0    ipratropium-albuterol (COMBIVENT RESPIMAT)  mcg/actuation inhaler Take 1 Puff by inhalation every twelve (12) hours. Indications: Chronic Obstructive Pulmonary Disease with Bronchospasms       Social history  Former cigarette smoker. Has a history of IV drug use. He has used heroin, cocaine and marijuana. He appears to abuse opiates for his back pain. He has lived with his mother for essentially his entire life. Family history  No history appears to be available in the EMR and the patient's mental status precludes a thorough history. Exam  He is off BiPAP. He is in no respiratory distress at rest.  He has a coarse forceful cough with slightly yellowish sputum. There is no hemoptysis. His affect is impossible to  because of delirium  Blood pressure 135/63, pulse 96, temperature 98.5 °F (36.9 °C), resp. rate (!) 33, height 6' (1.829 m), weight 100.2 kg (221 lb), SpO2 99 %. There is no overt sign of acute head trauma. Sclera are anicteric. Gaze is conjugate. The extraocular muscles appear intact. He will not cooperate with a neuro examination. Poor dentition with slightly dry oral mucosa  Trachea is midline. No lymphadenopathy. No jugular venous distention. Lungs show no wheezing or rhonchi. Heart has a regular rate and rhythm. There is no appreciable murmur or gallop. Abdomen is soft nontender, and nondistended. No cyanosis or clubbing  No facial rash. Labs: WBC 8.6 without bands. Hemoglobin 14.2 and platelets are 147. Sodium is 142, potassium 4.7, chloride 99 and HCO3 is 36. BUN is 19 and creatinine is 0.75. Albumin is 1.9. The following films show the right lower lobe infiltrate versus atelectasis. There are bilateral pleural effusions left greater than right. Echocardiogram from 12/24/18 shows normal LV systolic function. There is no evidence of pulmonary hypertension, but both right-sided chambers are mildly dilated.   There was concentric left ventricular hypertrophy. Assessment  Acute on chronic hypercapnic and hypoxic respiratory failure. Serratia marcescens bacteremia  Right lower lobe pneumonia  Bilateral pleural effusions left greater than right  T9 through 10 discitis status post aspiration, culture negative to date  Medical therapy noncompliance    Plan  Continue antibiotics but consider expanding antibiotics as necessary to cover aspiration pathogens  Follow discitis cultures  As needed BiPAP  COPD therapies focusing additionally on anti-inflammatory therapies to help clear secretions    The patient is critically ill with organ failure.   He is at high risk for further respiratory decompensation and possible total critical care time without physician overlap or procedure time included: 65 minutes

## 2019-01-24 NOTE — PROGRESS NOTES
Progress Note Patient: Tiburcio Sifuentes               Sex: male          DOA: 1/21/2019 YOB: 1950      Age:  77 y.o.        LOS:  LOS: 3 days CHIEF COMPLAINT:  Bacteremia, s/p phlegmon aspiration, recurrent respiratory failure requiring BiPap Subjective:  
 
Patient is quiet Tolerating BiPap relatively well Objective:  
  
Visit Vitals /62 Pulse (!) 111 Temp 99.6 °F (37.6 °C) Resp 21 Ht 6' (1.829 m) Wt 100.2 kg (221 lb) SpO2 98% BMI 29.97 kg/m² Physical Exam: 
Gen:  Quiet, not interacting. Tolerating BiPap HEENT and Neck:  NCAT, face mask in place Lungs:  Clear bilaterally. No rales, no wheeze. Normal effort. Heart:  Regular Rate and Rhythm. No murmur or gallop. No JVD. No edema. Abdomen:  Soft, non tender, no masses, no Hepatosplenomegally Extremities:  No digital clubbing, no cyanosis Neuro:  Not interactive. Normal tone Lab/Data Reviewed: 
BMP:  
Lab Results Component Value Date/Time  01/24/2019 04:42 AM  
 K 4.7 01/24/2019 04:42 AM  
 CL 99 (L) 01/24/2019 04:42 AM  
 CO2 36 (H) 01/24/2019 04:42 AM  
 AGAP 7 01/24/2019 04:42 AM  
 GLU 97 01/24/2019 04:42 AM  
 BUN 19 (H) 01/24/2019 04:42 AM  
 CREA 0.75 01/24/2019 04:42 AM  
 GFRAA >60 01/24/2019 04:42 AM  
 GFRNA >60 01/24/2019 04:42 AM  
 
CBC:  
Lab Results Component Value Date/Time WBC 8.6 01/24/2019 04:42 AM  
 HGB 14.2 01/24/2019 04:42 AM  
 HCT 48.2 (H) 01/24/2019 04:42 AM  
  01/24/2019 04:42 AM  
 
 
Assessment/Plan Principal Problem: 
  Bacteremia (1/21/2019) Active Problems: 
  Back pain (11/14/2018) Discitis of thoracic region (11/14/2018) Hypertension (11/14/2018) Hepatitis C (11/14/2018) Acute on chronic respiratory failure Plan: 
Continue with antibiotics Patient will need 6 weeks of IV antibiotics, from a disposition perspective, his management is complicated by the fact that he is a known IV drug abuse history Continue with BiPap Consult with Pulmonology, discussed with team 
Follow respiratory status DVT prophylaxis.

## 2019-01-24 NOTE — PROGRESS NOTES
RRT called this Am for SOB Pt seen & evaluated is SOB - AAOx3 - is not on IVF Was in the ICU during the early part of his hosp stay PCXR from 1/22 - shows large bilateral pleural effusions Will place pt on BiPAP , get CXR port & transfer to ICU for further eval

## 2019-01-24 NOTE — PROGRESS NOTES
Problem: Falls - Risk of 
Goal: *Absence of Falls Document [place] Fall Risk and appropriate interventions in the flowsheet. Outcome: Progressing Towards Goal 
Fall Risk Interventions: 
Mobility Interventions: Bed/chair exit alarm, Communicate number of staff needed for ambulation/transfer, Patient to call before getting OOB, PT Consult for mobility concerns, PT Consult for assist device competence Mentation Interventions: Bed/chair exit alarm, Door open when patient unattended, Eyeglasses and hearing aids, More frequent rounding, Reorient patient, Room close to nurse's station, Update white board Medication Interventions: Bed/chair exit alarm, Evaluate medications/consider consulting pharmacy Elimination Interventions: Bed/chair exit alarm, Call light in reach, Urinal in reach, Patient to call for help with toileting needs Problem: Pressure Injury - Risk of 
Goal: *Prevention of pressure injury Document Raul Scale and appropriate interventions in the flowsheet. Outcome: Progressing Towards Goal 
Pressure Injury Interventions: 
Sensory Interventions: Check visual cues for pain Moisture Interventions: Absorbent underpads Activity Interventions: Pressure redistribution bed/mattress(bed type), PT/OT evaluation Mobility Interventions: Pressure redistribution bed/mattress (bed type), PT/OT evaluation Nutrition Interventions: Document food/fluid/supplement intake Friction and Shear Interventions: Foam dressings/transparent film/skin sealants, Transferring/repositioning devices, Apply protective barrier, creams and emollients

## 2019-01-24 NOTE — CONSULTS
[facility]  Pulmonary, Critical Care, and Sleep Medicine      Name: Xiomara Estrada MRN: [id number]   : 1950 Hospital: [address] [place] [address]   Date: 2019          Critical Care Initial Patient Consult    Requesting MD:Donnell                                                  Reason for CC Consult:Hypercapnic respiratory failure    IMPRESSION:   · Acute on chronic recurrent hypercapnic and hypoxic respiratory failure requiring BIPAP  · Bacteremia- S. Marcescens- Ceftazidime  · S/p phlegmon aspiration  Hx  · COPD  · Medication/CPAP noncompliance  · HTN  · Hyperlipidemia  · Chronic back pain  · Neuropathy  · IV heroin use  · Hep C      RECOMMENDATIONS:   · Resp - Stable on BIPAP. O2 > 90. HOB > 30. Pulmonary hygiene. Guaifenesin. Start Chest PT.  · ID - Afebrile and aleukocytosis. Trend temp and WBC curve. BCx +S. Marcescens. Follow repeat BCx. Continue Ceftazidime. · CVS - HD stable. Norvasc, Metoprolol, Lisinopril, Zocor. Consider IV meds if remains on BIPAP. · Heme/Onc- Daily CBC. H/H stable. No signs of bleeding. · Metabolic - Daily BMP. Trend and replace electrolytes per replacement protocol. · Renal - Trend renal indices. Monitor UOP. · Endocrine - Glycemic control per protocol. · Neuro/ Pain/ Sedation - Monitor neuro status. Avoid sedating medications. · GI - NPO while on BIPAP. · Prophylaxis - DVT, GI- SQH  · Code status- FULL     Subjective/History: This patient has been seen and evaluated at the request of Dr. Dayana Frost for hypercapnic respiratory failure. Patient is a 77 y.o. male w/ PMHx of COPD, HTN, and substance abuse who was admitted for GNR Bacteremia. Pt initially admitted stepdown to ICU- was transferred to floor and soon after RRT called for acute on chronic hypercapnic respiratory failure. Placed on BIPAP and transferred to ICU. Pt seen and evaluated in ICU. Pt currently on BIPAP.   Pt is known to PCCM team.  Traditional course of care- BIPAP, mucolytics and chest PT. Previous admission pt has admitted to being noncompliant w/ CPAP at home. When pt transfers to floor will beed BIPAP QHS. The patient is critically ill and can not provide additional history due to BIPAP. Physical Exam:    General:  Lethargic, on BIPAP   Head:  Normocephalic, without obvious abnormality, atraumatic. Eyes:  Conjunctivae/corneas clear. PERRL, EOMs intact. Nose: Nares normal. Septum midline. Mucosa normal. No drainage or sinus tenderness. Throat: Lips, mucosa, and tongue normal. Teeth and gums normal.   Neck: Supple, symmetrical, trachea midline, no adenopathy, thyroid: no enlargment/tenderness/nodules, no carotid bruit and no JVD. Back:   Symmetric, no curvature. ROM normal.   Lungs:   Diminished R lung. ERIN clear to auscultation. Both lungs diminished in bases. Chest wall:  No tenderness or deformity. Heart:  Regular rate and rhythm, NSR, S1, S2 normal, no murmur, click, rub or gallop. Abdomen:   Soft, non-tender. Bowel sounds normal. No masses,  No organomegaly. Extremities: Extremities normal, atraumatic, no cyanosis or edema. Pulses: 2+ and symmetric all extremities. Skin: Skin color, texture, turgor normal. No rashes or lesions. Normal capillary refill.    Lymph nodes: Cervical, supraclavicular, and axillary nodes normal.   Neurologic: Grossly nonfocal         Telemetry:normal sinus rhythm      Ramiro Rubalcava NP

## 2019-01-24 NOTE — PROGRESS NOTES
01/24/19 3538 CPAP/BIPAP  
CPAP/BIPAP Start/Stop On  
Called to patient room for [phone] for patient with tachypnea and desaturation. Patient found on 5LPM NC with SPO2 91% and respiratory rate 48. Breath sounds diminished LLL and no appreciable breath sounds on the right.  to the bedside and orders received to transfer patient to 2700 and start BIPAP. BIPAP 18/8, 50% with back-up rate of 8 started. Patient tolerated well and respiratory rate is decreasing on the BIPAP. Some air movement now noted RUL, clear ERIN. Otherwise dinimished throughout. CXR pending.

## 2019-01-25 LAB
ANION GAP SERPL CALC-SCNC: 5 MMOL/L (ref 3–18)
BASOPHILS # BLD: 0 K/UL (ref 0–0.1)
BASOPHILS NFR BLD: 0 % (ref 0–2)
BUN SERPL-MCNC: 21 MG/DL (ref 7–18)
BUN/CREAT SERPL: 33 (ref 12–20)
CALCIUM SERPL-MCNC: 8.4 MG/DL (ref 8.5–10.1)
CHLORIDE SERPL-SCNC: 96 MMOL/L (ref 100–108)
CO2 SERPL-SCNC: 39 MMOL/L (ref 21–32)
CREAT SERPL-MCNC: 0.64 MG/DL (ref 0.6–1.3)
DIFFERENTIAL METHOD BLD: ABNORMAL
EOSINOPHIL # BLD: 0 K/UL (ref 0–0.4)
EOSINOPHIL NFR BLD: 0 % (ref 0–5)
ERYTHROCYTE [DISTWIDTH] IN BLOOD BY AUTOMATED COUNT: 15.9 % (ref 11.6–14.5)
GLUCOSE SERPL-MCNC: 81 MG/DL (ref 74–99)
HCT VFR BLD AUTO: 42.9 % (ref 36–48)
HGB BLD-MCNC: 12.8 G/DL (ref 13–16)
LYMPHOCYTES # BLD: 1.8 K/UL (ref 0.9–3.6)
LYMPHOCYTES NFR BLD: 33 % (ref 21–52)
MCH RBC QN AUTO: 27.9 PG (ref 24–34)
MCHC RBC AUTO-ENTMCNC: 29.8 G/DL (ref 31–37)
MCV RBC AUTO: 93.7 FL (ref 74–97)
MONOCYTES # BLD: 0.3 K/UL (ref 0.05–1.2)
MONOCYTES NFR BLD: 6 % (ref 3–10)
NEUTS SEG # BLD: 3.4 K/UL (ref 1.8–8)
NEUTS SEG NFR BLD: 61 % (ref 40–73)
PLATELET # BLD AUTO: 150 K/UL (ref 135–420)
PMV BLD AUTO: 10.5 FL (ref 9.2–11.8)
POTASSIUM SERPL-SCNC: 4.6 MMOL/L (ref 3.5–5.5)
RBC # BLD AUTO: 4.58 M/UL (ref 4.7–5.5)
SODIUM SERPL-SCNC: 140 MMOL/L (ref 136–145)
WBC # BLD AUTO: 5.5 K/UL (ref 4.6–13.2)

## 2019-01-25 PROCEDURE — 65660000001 HC RM ICU INTERMED STEPDOWN

## 2019-01-25 PROCEDURE — 85025 COMPLETE CBC W/AUTO DIFF WBC: CPT

## 2019-01-25 PROCEDURE — 36415 COLL VENOUS BLD VENIPUNCTURE: CPT

## 2019-01-25 PROCEDURE — 74011250636 HC RX REV CODE- 250/636: Performed by: INTERNAL MEDICINE

## 2019-01-25 PROCEDURE — 94640 AIRWAY INHALATION TREATMENT: CPT

## 2019-01-25 PROCEDURE — 74011250637 HC RX REV CODE- 250/637: Performed by: HOSPITALIST

## 2019-01-25 PROCEDURE — 77010033678 HC OXYGEN DAILY

## 2019-01-25 PROCEDURE — 74011000250 HC RX REV CODE- 250: Performed by: INTERNAL MEDICINE

## 2019-01-25 PROCEDURE — 80048 BASIC METABOLIC PNL TOTAL CA: CPT

## 2019-01-25 PROCEDURE — 74011250637 HC RX REV CODE- 250/637: Performed by: INTERNAL MEDICINE

## 2019-01-25 PROCEDURE — 74011000258 HC RX REV CODE- 258: Performed by: INTERNAL MEDICINE

## 2019-01-25 PROCEDURE — 74011250636 HC RX REV CODE- 250/636: Performed by: HOSPITALIST

## 2019-01-25 PROCEDURE — 94669 MECHANICAL CHEST WALL OSCILL: CPT

## 2019-01-25 PROCEDURE — 94660 CPAP INITIATION&MGMT: CPT

## 2019-01-25 RX ORDER — LORAZEPAM 2 MG/ML
1 INJECTION INTRAMUSCULAR ONCE
Status: COMPLETED | OUTPATIENT
Start: 2019-01-25 | End: 2019-01-25

## 2019-01-25 RX ORDER — SODIUM CHLORIDE 9 MG/ML
250 INJECTION, SOLUTION INTRAVENOUS ONCE
Status: COMPLETED | OUTPATIENT
Start: 2019-01-26 | End: 2019-01-26

## 2019-01-25 RX ADMIN — LISINOPRIL 20 MG: 20 TABLET ORAL at 09:32

## 2019-01-25 RX ADMIN — CEFTAZIDIME 1 G: 1 INJECTION, POWDER, FOR SOLUTION INTRAMUSCULAR; INTRAVENOUS at 09:34

## 2019-01-25 RX ADMIN — GABAPENTIN 400 MG: 100 CAPSULE ORAL at 09:34

## 2019-01-25 RX ADMIN — CEFTAZIDIME 1 G: 1 INJECTION, POWDER, FOR SOLUTION INTRAMUSCULAR; INTRAVENOUS at 02:11

## 2019-01-25 RX ADMIN — GUAIFENESIN 400 MG: 100 SOLUTION ORAL at 09:34

## 2019-01-25 RX ADMIN — METOPROLOL TARTRATE 50 MG: 50 TABLET ORAL at 17:53

## 2019-01-25 RX ADMIN — GUAIFENESIN 400 MG: 100 SOLUTION ORAL at 20:35

## 2019-01-25 RX ADMIN — GUAIFENESIN 400 MG: 100 SOLUTION ORAL at 17:52

## 2019-01-25 RX ADMIN — GUAIFENESIN 400 MG: 100 SOLUTION ORAL at 03:48

## 2019-01-25 RX ADMIN — HEPARIN SODIUM 5000 UNITS: 5000 INJECTION INTRAVENOUS; SUBCUTANEOUS at 02:10

## 2019-01-25 RX ADMIN — IPRATROPIUM BROMIDE AND ALBUTEROL SULFATE 3 ML: .5; 3 SOLUTION RESPIRATORY (INHALATION) at 15:25

## 2019-01-25 RX ADMIN — CIPROFLOXACIN 400 MG: 2 INJECTION, SOLUTION INTRAVENOUS at 22:00

## 2019-01-25 RX ADMIN — HEPARIN SODIUM 5000 UNITS: 5000 INJECTION INTRAVENOUS; SUBCUTANEOUS at 09:34

## 2019-01-25 RX ADMIN — CIPROFLOXACIN 400 MG: 2 INJECTION, SOLUTION INTRAVENOUS at 12:51

## 2019-01-25 RX ADMIN — AMLODIPINE BESYLATE 10 MG: 10 TABLET ORAL at 09:33

## 2019-01-25 RX ADMIN — IPRATROPIUM BROMIDE AND ALBUTEROL SULFATE 3 ML: .5; 3 SOLUTION RESPIRATORY (INHALATION) at 07:35

## 2019-01-25 RX ADMIN — HEPARIN SODIUM 5000 UNITS: 5000 INJECTION INTRAVENOUS; SUBCUTANEOUS at 17:53

## 2019-01-25 RX ADMIN — CEFTAZIDIME 1 G: 1 INJECTION, POWDER, FOR SOLUTION INTRAMUSCULAR; INTRAVENOUS at 17:53

## 2019-01-25 RX ADMIN — METOPROLOL TARTRATE 50 MG: 50 TABLET ORAL at 09:33

## 2019-01-25 RX ADMIN — SIMVASTATIN 10 MG: 10 TABLET, FILM COATED ORAL at 21:15

## 2019-01-25 RX ADMIN — LORAZEPAM 1 MG: 2 INJECTION INTRAMUSCULAR; INTRAVENOUS at 21:55

## 2019-01-25 RX ADMIN — GUAIFENESIN 400 MG: 100 SOLUTION ORAL at 12:52

## 2019-01-25 RX ADMIN — IPRATROPIUM BROMIDE AND ALBUTEROL SULFATE 3 ML: .5; 3 SOLUTION RESPIRATORY (INHALATION) at 21:14

## 2019-01-25 RX ADMIN — SODIUM CHLORIDE 250 ML: 900 INJECTION, SOLUTION INTRAVENOUS at 23:35

## 2019-01-25 RX ADMIN — ACETAMINOPHEN 650 MG: 325 TABLET ORAL at 20:35

## 2019-01-25 RX ADMIN — GABAPENTIN 400 MG: 100 CAPSULE ORAL at 17:54

## 2019-01-25 RX ADMIN — IPRATROPIUM BROMIDE AND ALBUTEROL SULFATE 3 ML: .5; 3 SOLUTION RESPIRATORY (INHALATION) at 11:32

## 2019-01-25 RX ADMIN — GABAPENTIN 400 MG: 100 CAPSULE ORAL at 21:15

## 2019-01-25 NOTE — PROGRESS NOTES
5997: Day 3 of attempting PT evaluation. PT evaluation attempted, patient on bipap. Will f/u with patient . 1329: Patient eating lunch. Will f/u with patient. Todd Wu PT, DPT Office extension: [phone] Pager #: [phone]

## 2019-01-25 NOTE — PROGRESS NOTES
Holding OT evaluation for today. Pt has been on bipap this AM. Not appropriate for mobilization. Will follow up as schedule permits. Olena Shepherd MS OTR/L Office Ext: [phone] Pager: [phone]

## 2019-01-25 NOTE — PROGRESS NOTES
[facility] ICU Progress Note Name: Charu Glez : 1950 MRN: [id number] Date: 2019 1:11 PM 
  
[x]I have reviewed the flowsheet and previous days notes. Events overnight reviewed and discussed with nursing staff. Vital signs and records reviewed. HPI Patient is a 77 y.o. male w/ PMHx of COPD, HTN, and substance abuse who was admitted for GNR Bacteremia. Pt initially admitted stepdown to ICU- was transferred to floor and soon after RRT called for acute on chronic hypercapnic respiratory failure. Placed on BIPAP and transferred to ICU Subjective: 
19 No new events overnight. Remained on BIPAP overnight Currently on 5L NC Alert to self w/ periods of confusion 
 
 
[x]The patient is critically ill on     
[]Mechanical ventilation []Pressors  
[x]BiPAP [] ROS:A comprehensive review of systems was negative except for that written in the HPI. Medication Review: · Pressors - none · Sedation - none · Antibiotics - Ceftazidime/cipro · Pain - PRn tylenol · GI/ DVT - protonix, SQH 
· Others (other gtts) Safety Bundles: VAP Bundle/ Severe Sepsis Protocol/ Electrolyte Replacement Protocol Physical Exam: 
 
General: Awake, lethargic, NAD HEENT:  Anicteric sclerae; pink palpebral conjunctivae; mucosa moist 
Resp:  Improved air movement to RUL, L lung clear to auscultation, Diminished in bases; Symmetrical chest expansion, no accessory muscle use; good airway entry; no rales/ wheezing/ rhonchi noted CV:  S1, S2 present; regular rate and rhythm, NSR 
GI:  Abdomen soft, non-tender; (+) active bowel sounds Extremities:  +2 pulses on all extremities; no edema/ cyanosis/ clubbing noted; normal capillary refill Skin:  Warm; no rashes/ lesions noted Neurologic:  Non-focal 
Devices:  None Telemetry: []Sinus []A-flutter []Paced []A-fib []Multiple PVCs IMPRESSION:  
 · Acute on chronic recurrent hypercapnic and hypoxic respiratory failure requiring BIPAP · Bacteremia- S. Marcescens- Ceftazidime · S/p phlegmon aspiration · RLL PNA Hx 
· COPD · Medication/CPAP noncompliance · HTN 
· Hyperlipidemia · Chronic back pain · Neuropathy · IV heroin use · Hep C PLAN:  
· Resp - Stable on 5L NC. BIPAP as needed. O2 > 90. HOB > 30. Pulmonary hygiene. Guaifenesin. Chest PT. 
· ID - Afebrile and aleukocytosis. Trend temp and WBC curve. BCx +S. Marcescens. Repeat BCx NGTD. Follow discitis Cx. Continue ABx. · CVS - HD stable. Norvasc, Metoprolol, Lisinopril, Zocor. · Heme/Onc- Daily CBC. H/H stable. No signs of bleeding. · Metabolic - Daily BMP. Trend and replace electrolytes per replacement protocol. · Renal - Trend renal indices. Monitor UOP. · Endocrine - Glycemic control per protocol. · Neuro/ Pain/ Sedation - Monitor neuro status. Avoid sedating medications. · GI - Cardiac diet · Prophylaxis - DVT, GI- SQH, protonix · Code status- FULL [x]See my orders for details My assessment/plan was discussed with: 
[x]nursing []PT/OT [x]respiratory therapy [x]Dr. Marzena Sen  
[]family [] Adela Capellan, ADARSH

## 2019-01-25 NOTE — PROGRESS NOTES
completed follow up visit with patient in room 2705 this morning and a Spiritual assessment of patient was done. Patient currently on C-Pap machine so communication had its difficulties but he did manage to share his feelings and hopes. Charles Wells will continue to follow and will provide pastoral care on an as needed/requested basis Rupert 3 Board Certified [employer] [facility]ual [facility]  
[phone]

## 2019-01-25 NOTE — PROGRESS NOTES
INFECTIOUS DISEASE FOLLOW UP NOTE :-  
 
We are available over weekend and plan to f/u Monday. Dr Kary Hoff is on call and can be reached at [phone] if any problem or question for ID prior. Admit Date: 1/21/2019 ABX:  
  
Current abx Prior abx  
ceftaz 1/23-2 
cipro 1/23  -2 Cefepime/vanco  11/14   2, Ceftriaxone 11/16 - 3; Zosyn 1/21-2  
  
Assessment -> Rec:  
  
BSI 2 of 2 Serratia marcescens 
- source ? Discitis Phlegmon seen on CT 
- denies any urinary complain but had Serratia in urine in past. Currently UA is negative - Ucx was not sent but CT with normal kidneys and nothing sig on CT and pt asymptomatic except some incontinence -  continue on Ceftaz/cipro to cover Serratia 
- repeat Blcx x2 1/22 ngtd Destructive T10-11 changes ?discitis/OM- chronic back pain 
- mid back pain x 5 months, fell, incontinent of urine - CTAP 11/2018: severe destructive changes T 10-11, paravertebral sot tissue infiltration, extension to anterior epidural space w/ cord compression  
- h/o IVDU - last use in July 2018 
- H/O uncooperative for MRI given claustrophobia in past 
- Bone/ gallium scans 11/27 neg for discitis/OM  
- refused stabilization procedure per Dr. Vivek Velasco in past 
- was not given long term Abx then 
- CT 1/21 with destructive changes T9-10 levels c/w discitis/OM and intraspinal and paraspinal phlegmon. Overall appearance is grossly unchanged and pain also not different from last time -> inflammatory markers: esr 38, crp 7.4 
-> s/p aspiration of fluid/phlegmon 1/23- await results though pt was on BSabx which can alter the results 
-> Abx as above 
-> Await for final duration but likely will need long term Abx ?6-8 weeks 
-> d/w Dr David José Acute hypoxic resp distress- RRT called 1/23 and again 1/24 sec to hypercapnic resp failure - pt continues to need Bipap - currently in ICU  
 Bilateral pleural effusions- chronic with  atelectasis - at risk for aspirations but not showing any signs currently (no fever, no leucocytosis, no sig cough) hence will not broaden Abx at this time but low threshold to add Anaerobic coverage if he and worsening 
- check Spcx if possible - ? Need for aspiration of fluid if doesn't respond to diuretics- defer to PCCM Hepatomegaly, splenic hilar & perisplenic varices- concern for cirrhosis and portal HTN 
- seen on CTAP again    
H/o IVDU 
- HIV ab NR 11/19 ,Hep BsAg neg (last reported ivdu August 2018, has hep C and thinks hep b immune) -> denies anything currently 
-> will still make outpt IV Abx difficult 
   
COPD    
HTN    
DJD    
Allergy Shelfish    
  
MICROBIOLOGY:  
11/14   urcx >100,000 Serratia marcescens             blcx NG x 2 
11/15   CrAG neg, fungitell 375 (reported 11/17) 
11/29   blcx for fungus - ngtd 
            fungitell 113 
  
1/21     Blcx x2 Serratia marcescens 1/21     UA neg 
1/22     Blcx x 2 ngtd 1/23 Aspiration cx g/s neg, Cx IP,NOMAN IP 
  
LINES AND CATHETERS:  
piv 
  
 
MEDICATIONS:  
 
Current Facility-Administered Medications Medication Dose Route Frequency  albuterol-ipratropium (DUO-NEB) 2.5 MG-0.5 MG/3 ML  3 mL Nebulization QID RT  
 guaiFENesin (ROBITUSSIN) 100 mg/5 mL oral liquid 400 mg  400 mg Oral Q4H  
 cefTAZidime (FORTAZ) 1 g in 0.9% sodium chloride (MBP/ADV) 50 mL MBP  1 g IntraVENous Q8H  
 ciprofloxacin (CIPRO) 400 mg IVPB (premix)  400 mg IntraVENous Q12H  
 lidocaine (PF) (XYLOCAINE) 10 mg/mL (1 %) injection 20 mL  20 mL SubCUTAneous Rad Multiple  amLODIPine (NORVASC) tablet 10 mg  10 mg Oral DAILY  gabapentin (NEURONTIN) capsule 400 mg  400 mg Oral TID  lisinopril (PRINIVIL, ZESTRIL) tablet 20 mg  20 mg Oral DAILY  metoprolol tartrate (LOPRESSOR) tablet 50 mg  50 mg Oral BID  simvastatin (ZOCOR) tablet 10 mg  10 mg Oral QHS  acetaminophen (TYLENOL) tablet 650 mg  650 mg Oral Q6H PRN  
 ondansetron (ZOFRAN) injection 4 mg  4 mg IntraVENous Q6H PRN  
 heparin (porcine) injection 5,000 Units  5,000 Units SubCUTAneous Q8H SUBJECTIVE :  
 
Interval notes reviewed. Pt in ICU. Afebrile. Still on Bipap. More awake and trying to talk. Pain in back slightly better. D/w him that aspiration cx neg so far but will still need long term Abx. Feeling hungry. D/w RN. No family at bedside. D/w Dr Leax Urias. OBJECTIVE Visit Vitals /60 Pulse 86 Temp 98.1 °F (36.7 °C) Resp 20 Ht 6' (1.829 m) Wt 100.2 kg (221 lb) SpO2 100% BMI 29.97 kg/m² Temp (24hrs), Av.4 °F (36.9 °C), Min:98.1 °F (36.7 °C), Max:99 °F (37.2 °C) Gen:on bipap, awake and alert HENT: on Bipap, couldn't evaluate, pupils equal and reactive Chest: Symmetric expansion, Crackles+ CV:S1S2 RR, tachy, No JVD, No edema Abd:Soft, NT, ND, BS+ Skin:No jaundice, cyanosis, clubbing. No decubitus Muscsk: Normal muscle tone/strength CNS: AA and follows command Labs: Results:  
Chemistry Recent Labs  
  19 
0340 19 
7416 GLU 81 97  142  
K 4.6 4.7 CL 96* 99* CO2 39* 36*  
BUN 21* 19* CREA 0.64 0.75 CA 8.4* 8.7 AGAP 5 7 BUCR 33* 25* CBC w/Diff Recent Labs  
  19 
0340 19 
0442 WBC 5.5 8.6  
RBC 4.58* 5.08  
HGB 12.8* 14.2 HCT 42.9 48.2*  
 149 GRANS 61 82* LYMPH 33 11* EOS 0 0  
  
 
 
RADIOLOGY :   
 
 
 
Imaging Results from Hospital Encounter encounter on 19 XR CHEST PORT Addendum Addendum: Initial preliminary report was provided to the ED by the on-call radiology resident. Nova Naik MD 2019  9:35 AM        
 Narrative EXAM: XR CHEST PORT 
 
CLINICAL INDICATION/HISTORY: sob >Additional: None COMPARISON: 2018 >Reference exam: None. TECHNIQUE: Portable chest. 
 
_______________ FINDINGS: 
 
 SUPPORT LINES AND TUBES: Overlying monitor leads. HEART AND MEDIASTINUM: The cardiac mediastinal silhouette is obscured by 
bilateral airspace opacities, right greater than left. Pulmonary vascular 
congestion with hazy bilateral perihilar interstitial opacities. LUNGS AND PLEURAL SPACES: The lungs are underexpanded. Large right-sided pleural 
effusion with associated atelectasis seen is right heart border and right 
hemidiaphragm with a small amount of fluid tracking along the right thoracic 
wall. Moderate size left pleural effusion is noted. BONY THORAX AND SOFT TISSUES: The bones and soft tissues are within normal 
limits. _______________ Impression IMPRESSION: 
 
Large right and moderate left pleural effusions with evidence of pulmonary 
vascular congestion and interstitial edema suggesting CHF. Possible superimposed 
pneumonia. Results from Hospital Encounter encounter on 01/21/19 CT GUIDE CATH FLUID DRAIN SOFT TISSUE Narrative CT GUIDED ASPIRATION OF T10-11 disc space:  
 
Indication: Discitis T10-11. COMPARISON: CTA chest, abdomen and pelvis 1/21/2019. CTA chest 12/23/2018 Technique/findings: After the procedure, as well as its risks, benefits and 
alternatives were explained to the patient and his daughter, and all questions 
answered, written informed consent was obtained from the daughter and witnessed. Procedure was performed using only local anesthesia. The fluid collection in the T10-11 disc space was localized under CT guidance. The skin was marked, prepped and draped in sterile fashion and bicarbonate 
buffered lidocaine was used for local anesthesia. An 19 gauge needle was 
advanced into the collection. A total of 3-4 ml of bloody fluid was aspirated. The fluid was sent for laboratory evaluation. The patient tolerated the 
procedure well and there were no immediate complications. Standard post 
procedure pause. One or more dose reduction techniques were used on this CT: automated exposure 
control, adjustment of the mAs and/or kVp according to patient size, and 
iterative reconstruction techniques. The specific techniques used on this CT 
exam have been documented in the patient's electronic medical record. Digital 
Imaging and Communications in Medicine (DICOM) format image data are available 
to nonaffiliated external healthcare facilities or entities on a secure, media 
free, reciprocally searchable basis with patient authorization for at least a 
12-month period after this study. GUIDANCE: CT guidance was used to position (and confirm the position of) the 
needle. Image(s) saved in PACS: CT Impression IMPRESSION: 
 
Successful CT guided aspiration of the T10-11 disc space. I have independently examined the patient and reviewed lab studies and imgaing as well as review of nursing notes and physican notes from the past 24 hours. Dragon medical dictation software was used for portions of this report. Unintended errors may occur. Lou Bishop MD 
January 25, 2019 [facility] [phone]

## 2019-01-25 NOTE — PROGRESS NOTES
Progress Note Patient: Bryanna Lopez               Sex: male          DOA: 1/21/2019 YOB: 1950      Age:  77 y.o.        LOS:  LOS: 4 days CHIEF COMPLAINT:  Bacteremia, Chronic respiratory failure Subjective:  
 
Patient awake and alert No distress Objective:  
  
Visit Vitals /77 Pulse 81 Temp 98.7 °F (37.1 °C) Resp (!) 32 Ht 6' (1.829 m) Wt 101 kg (222 lb 10.6 oz) SpO2 94% BMI 30.20 kg/m² Physical Exam: 
Gen:  No distress, no complaint Lungs:  Clear bilaterally, no wheeze or rhonchi Heart:  Regular rate and rhythm, no murmurs or gallops Abdomen:  Soft, non-tender, normal bowel sounds Lab/Data Reviewed: 
BMP:  
Lab Results Component Value Date/Time  01/25/2019 03:40 AM  
 K 4.6 01/25/2019 03:40 AM  
 CL 96 (L) 01/25/2019 03:40 AM  
 CO2 39 (H) 01/25/2019 03:40 AM  
 AGAP 5 01/25/2019 03:40 AM  
 GLU 81 01/25/2019 03:40 AM  
 BUN 21 (H) 01/25/2019 03:40 AM  
 CREA 0.64 01/25/2019 03:40 AM  
 GFRAA >60 01/25/2019 03:40 AM  
 GFRNA >60 01/25/2019 03:40 AM  
 
CBC:  
Lab Results Component Value Date/Time WBC 5.5 01/25/2019 03:40 AM  
 HGB 12.8 (L) 01/25/2019 03:40 AM  
 HCT 42.9 01/25/2019 03:40 AM  
  01/25/2019 03:40 AM  
 
 
 
 
Assessment/Plan Principal Problem: 
  Bacteremia (1/21/2019) Active Problems: 
  Back pain (11/14/2018) Discitis of thoracic region (11/14/2018) Hypertension (11/14/2018) Hepatitis C (11/14/2018) Plan: 
Continue with Antibiotics Patient needs CPap at night Discussed with patient and family at the bedside BP control DVT prophylaxis Discussed with patient and mother at the bedside.

## 2019-01-25 NOTE — CDMP QUERY
Pt admitted with bacteremia. Pt noted to have periods of confusion. If possible, please document in the progress notes and d/c summary if you are evaluating and / or treating any of the following:  Anoxic Encephalopathy  Metabolic Encephalopathy  Septic Encephalopathy  Toxic Encephalopathy  Encephalopathy due to medications or drugs (please specify)  Toxic Metabolic Encephalopathy  Other Encephalopathy- please specify  Clinically unable to determine The medical record reflects the following: 
   Risk Factors: Bacteremia; acute respiratory failure Clinical Indicators: 1/25  Progress notes Pulmonology -The patient appears somewhat delirious and does not reliably answer          questions ICU- Alert to self w/ periods of confusion  
   (H&P-Neuro: Patient is alert, oriented, and cooperative. No obvious focal defects. Moves all 4 extremities.) Treatment: ICU mgmt; BIPAP; Hector Peto If you DECLINE this query or would like to communicate with [facility], please utilize the \"[employer] message box\" at the TOP of the Progress Note on the right. Thank you, Dyan Cai RN/CCDS 
[phone]

## 2019-01-25 NOTE — PROGRESS NOTES
Problem: Falls - Risk of 
Goal: *Absence of Falls Document Celine Goodson Fall Risk and appropriate interventions in the flowsheet. Outcome: Progressing Towards Goal 
Fall Risk Interventions: 
Mobility Interventions: Bed/chair exit alarm Mentation Interventions: Bed/chair exit alarm Medication Interventions: Bed/chair exit alarm Elimination Interventions: Call light in reach, Bed/chair exit alarm History of Falls Interventions: Bed/chair exit alarm Problem: Pressure Injury - Risk of 
Goal: *Prevention of pressure injury Document Raul Scale and appropriate interventions in the flowsheet. Outcome: Progressing Towards Goal 
Pressure Injury Interventions: 
Sensory Interventions: Assess changes in LOC, Discuss PT/OT consult with provider, Minimize linen layers Moisture Interventions: Minimize layers, Absorbent underpads Activity Interventions: Assess need for specialty bed, PT/OT evaluation Mobility Interventions: Assess need for specialty bed, PT/OT evaluation Nutrition Interventions: Document food/fluid/supplement intake Friction and Shear Interventions: Apply protective barrier, creams and emollients, Minimize layers

## 2019-01-25 NOTE — PROGRESS NOTES
1910 Bedside verbal report received from Evelia. Pt on BIpap, resting in bed. 2000 Physical assessment completed at this time. 710 Bedside verbal shift change report given to Geneva Brennan (oncoming nurse) by Sharona Berkowitz RN (offgoing nurse). Report included the following information SBAR, Kardex, Intake/Output and MAR.

## 2019-01-26 ENCOUNTER — APPOINTMENT (OUTPATIENT)
Dept: GENERAL RADIOLOGY | Age: 69
DRG: 049 | End: 2019-01-26
Attending: INTERNAL MEDICINE
Payer: MEDICAID

## 2019-01-26 PROBLEM — J18.9 PNA (PNEUMONIA): Status: ACTIVE | Noted: 2019-01-26

## 2019-01-26 LAB
ANION GAP SERPL CALC-SCNC: 5 MMOL/L (ref 3–18)
ARTERIAL PATENCY WRIST A: YES
BASE EXCESS BLD CALC-SCNC: 13 MMOL/L
BASOPHILS # BLD: 0 K/UL (ref 0–0.1)
BASOPHILS NFR BLD: 0 % (ref 0–2)
BDY SITE: ABNORMAL
BODY TEMPERATURE: 98.6
BUN SERPL-MCNC: 19 MG/DL (ref 7–18)
BUN/CREAT SERPL: 40 (ref 12–20)
CALCIUM SERPL-MCNC: 7.5 MG/DL (ref 8.5–10.1)
CHLORIDE SERPL-SCNC: 101 MMOL/L (ref 100–108)
CO2 SERPL-SCNC: 37 MMOL/L (ref 21–32)
CREAT SERPL-MCNC: 0.48 MG/DL (ref 0.6–1.3)
DIFFERENTIAL METHOD BLD: ABNORMAL
EOSINOPHIL # BLD: 0 K/UL (ref 0–0.4)
EOSINOPHIL NFR BLD: 1 % (ref 0–5)
ERYTHROCYTE [DISTWIDTH] IN BLOOD BY AUTOMATED COUNT: 15.3 % (ref 11.6–14.5)
GAS FLOW.O2 O2 DELIVERY SYS: ABNORMAL L/MIN
GAS FLOW.O2 SETTING OXYMISER: 4 L/M
GLUCOSE SERPL-MCNC: 86 MG/DL (ref 74–99)
HCO3 BLD-SCNC: 37.3 MMOL/L (ref 22–26)
HCT VFR BLD AUTO: 36.7 % (ref 36–48)
HGB BLD-MCNC: 11.5 G/DL (ref 13–16)
LYMPHOCYTES # BLD: 1.8 K/UL (ref 0.9–3.6)
LYMPHOCYTES NFR BLD: 33 % (ref 21–52)
MCH RBC QN AUTO: 28.3 PG (ref 24–34)
MCHC RBC AUTO-ENTMCNC: 31.3 G/DL (ref 31–37)
MCV RBC AUTO: 90.2 FL (ref 74–97)
MONOCYTES # BLD: 0.5 K/UL (ref 0.05–1.2)
MONOCYTES NFR BLD: 8 % (ref 3–10)
NEUTS SEG # BLD: 3.2 K/UL (ref 1.8–8)
NEUTS SEG NFR BLD: 58 % (ref 40–73)
O2/TOTAL GAS SETTING VFR VENT: 0.36 %
PCO2 BLD: 57.5 MMHG (ref 35–45)
PH BLD: 7.42 [PH] (ref 7.35–7.45)
PLATELET # BLD AUTO: 136 K/UL (ref 135–420)
PMV BLD AUTO: 10.4 FL (ref 9.2–11.8)
PO2 BLD: 67 MMHG (ref 80–100)
POTASSIUM SERPL-SCNC: 3.6 MMOL/L (ref 3.5–5.5)
RBC # BLD AUTO: 4.07 M/UL (ref 4.7–5.5)
SAO2 % BLD: 93 % (ref 92–97)
SERVICE CMNT-IMP: ABNORMAL
SODIUM SERPL-SCNC: 143 MMOL/L (ref 136–145)
SPECIMEN TYPE: ABNORMAL
TOTAL RESP. RATE, ITRR: 30
WBC # BLD AUTO: 5.5 K/UL (ref 4.6–13.2)

## 2019-01-26 PROCEDURE — C9113 INJ PANTOPRAZOLE SODIUM, VIA: HCPCS | Performed by: NURSE PRACTITIONER

## 2019-01-26 PROCEDURE — 74011250637 HC RX REV CODE- 250/637: Performed by: HOSPITALIST

## 2019-01-26 PROCEDURE — 74011000250 HC RX REV CODE- 250: Performed by: INTERNAL MEDICINE

## 2019-01-26 PROCEDURE — 36600 WITHDRAWAL OF ARTERIAL BLOOD: CPT

## 2019-01-26 PROCEDURE — 74011250636 HC RX REV CODE- 250/636: Performed by: NURSE PRACTITIONER

## 2019-01-26 PROCEDURE — 71045 X-RAY EXAM CHEST 1 VIEW: CPT

## 2019-01-26 PROCEDURE — 74011000258 HC RX REV CODE- 258: Performed by: INTERNAL MEDICINE

## 2019-01-26 PROCEDURE — 82803 BLOOD GASES ANY COMBINATION: CPT

## 2019-01-26 PROCEDURE — 77010033678 HC OXYGEN DAILY

## 2019-01-26 PROCEDURE — 94660 CPAP INITIATION&MGMT: CPT

## 2019-01-26 PROCEDURE — 80048 BASIC METABOLIC PNL TOTAL CA: CPT

## 2019-01-26 PROCEDURE — 85025 COMPLETE CBC W/AUTO DIFF WBC: CPT

## 2019-01-26 PROCEDURE — 74011250636 HC RX REV CODE- 250/636: Performed by: INTERNAL MEDICINE

## 2019-01-26 PROCEDURE — 65660000001 HC RM ICU INTERMED STEPDOWN

## 2019-01-26 PROCEDURE — 74011250637 HC RX REV CODE- 250/637: Performed by: INTERNAL MEDICINE

## 2019-01-26 PROCEDURE — 74011250636 HC RX REV CODE- 250/636: Performed by: HOSPITALIST

## 2019-01-26 PROCEDURE — 94640 AIRWAY INHALATION TREATMENT: CPT

## 2019-01-26 RX ORDER — POTASSIUM CHLORIDE 1.5 G/1.77G
40 POWDER, FOR SOLUTION ORAL
Status: COMPLETED | OUTPATIENT
Start: 2019-01-26 | End: 2019-01-26

## 2019-01-26 RX ADMIN — GABAPENTIN 400 MG: 100 CAPSULE ORAL at 18:17

## 2019-01-26 RX ADMIN — GABAPENTIN 400 MG: 100 CAPSULE ORAL at 22:18

## 2019-01-26 RX ADMIN — LISINOPRIL 20 MG: 20 TABLET ORAL at 10:14

## 2019-01-26 RX ADMIN — ACETAMINOPHEN 650 MG: 325 TABLET ORAL at 10:28

## 2019-01-26 RX ADMIN — IPRATROPIUM BROMIDE AND ALBUTEROL SULFATE 3 ML: .5; 3 SOLUTION RESPIRATORY (INHALATION) at 21:23

## 2019-01-26 RX ADMIN — ACETAMINOPHEN 650 MG: 325 TABLET ORAL at 22:00

## 2019-01-26 RX ADMIN — GABAPENTIN 400 MG: 100 CAPSULE ORAL at 10:13

## 2019-01-26 RX ADMIN — CEFTAZIDIME 1 G: 1 INJECTION, POWDER, FOR SOLUTION INTRAMUSCULAR; INTRAVENOUS at 10:16

## 2019-01-26 RX ADMIN — GUAIFENESIN 400 MG: 100 SOLUTION ORAL at 00:09

## 2019-01-26 RX ADMIN — GUAIFENESIN 400 MG: 100 SOLUTION ORAL at 12:19

## 2019-01-26 RX ADMIN — SIMVASTATIN 10 MG: 10 TABLET, FILM COATED ORAL at 22:19

## 2019-01-26 RX ADMIN — GUAIFENESIN 400 MG: 100 SOLUTION ORAL at 18:16

## 2019-01-26 RX ADMIN — GUAIFENESIN 400 MG: 100 SOLUTION ORAL at 20:45

## 2019-01-26 RX ADMIN — IPRATROPIUM BROMIDE AND ALBUTEROL SULFATE 3 ML: .5; 3 SOLUTION RESPIRATORY (INHALATION) at 08:23

## 2019-01-26 RX ADMIN — POTASSIUM CHLORIDE 40 MEQ: 1.5 POWDER, FOR SOLUTION ORAL at 10:15

## 2019-01-26 RX ADMIN — HEPARIN SODIUM 5000 UNITS: 5000 INJECTION INTRAVENOUS; SUBCUTANEOUS at 01:30

## 2019-01-26 RX ADMIN — HEPARIN SODIUM 5000 UNITS: 5000 INJECTION INTRAVENOUS; SUBCUTANEOUS at 10:15

## 2019-01-26 RX ADMIN — CEFTAZIDIME 1 G: 1 INJECTION, POWDER, FOR SOLUTION INTRAMUSCULAR; INTRAVENOUS at 01:30

## 2019-01-26 RX ADMIN — GUAIFENESIN 400 MG: 100 SOLUTION ORAL at 04:17

## 2019-01-26 RX ADMIN — CIPROFLOXACIN 400 MG: 2 INJECTION, SOLUTION INTRAVENOUS at 22:15

## 2019-01-26 RX ADMIN — CEFTAZIDIME 1 G: 1 INJECTION, POWDER, FOR SOLUTION INTRAMUSCULAR; INTRAVENOUS at 18:17

## 2019-01-26 RX ADMIN — HEPARIN SODIUM 5000 UNITS: 5000 INJECTION INTRAVENOUS; SUBCUTANEOUS at 18:17

## 2019-01-26 RX ADMIN — METOPROLOL TARTRATE 50 MG: 50 TABLET ORAL at 10:12

## 2019-01-26 RX ADMIN — CIPROFLOXACIN 400 MG: 2 INJECTION, SOLUTION INTRAVENOUS at 12:19

## 2019-01-26 RX ADMIN — GUAIFENESIN 400 MG: 100 SOLUTION ORAL at 23:56

## 2019-01-26 RX ADMIN — METOPROLOL TARTRATE 50 MG: 50 TABLET ORAL at 18:17

## 2019-01-26 RX ADMIN — IPRATROPIUM BROMIDE AND ALBUTEROL SULFATE 3 ML: .5; 3 SOLUTION RESPIRATORY (INHALATION) at 17:15

## 2019-01-26 RX ADMIN — SODIUM CHLORIDE 40 MG: 9 INJECTION, SOLUTION INTRAMUSCULAR; INTRAVENOUS; SUBCUTANEOUS at 10:15

## 2019-01-26 RX ADMIN — IPRATROPIUM BROMIDE AND ALBUTEROL SULFATE 3 ML: .5; 3 SOLUTION RESPIRATORY (INHALATION) at 13:25

## 2019-01-26 RX ADMIN — AMLODIPINE BESYLATE 10 MG: 10 TABLET ORAL at 10:14

## 2019-01-26 NOTE — PROGRESS NOTES
PHYSICAL THERAPY NOTE 
 
10:27AM  PT Order noted. Chart reviewed. Attempted to see patient. Respiratory Rate running high (as high as 45-46 rpm). Informed patient's nurse will check back on patient later on today. 15:41PM  Attempted to see patient for the second time. Patient sound asleep, RR still running high 30s-40s. Informed patient's nurse will re-attempt PT tomorrow. Isabella Doing.  Levonne Knee

## 2019-01-26 NOTE — PROGRESS NOTES
Hospitalist Progress Note Gautam Fernández MD 
Internal medicine/ Hospitalist 
 
Daily Progress Note: 1/26/2019 12:38 PM   
Interval history / Subjective:  
Evelyn Moulton is a 77 y.o. male with h/o IVDA,COPD,Diastolic chf,chronic resp failure,pleural effusion,hep C,who is being admitted for Bacteremia. 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 He is on atbx iv. On 1/24/19,he underwent CT guided T10-T11 but the cultures have been negative. CXR on 1/24 showing bilateral pleural effusion with possible supra-imposed right lower lobe pneumonia. He was transferred to ICU on 1/24 for pna with resp failure. Currently on iv cipro and ceftazidime. Current Facility-Administered Medications Medication Dose Route Frequency  pantoprazole (PROTONIX) 40 mg in sodium chloride 0.9% 10 mL injection  40 mg IntraVENous DAILY  albuterol-ipratropium (DUO-NEB) 2.5 MG-0.5 MG/3 ML  3 mL Nebulization QID RT  
 guaiFENesin (ROBITUSSIN) 100 mg/5 mL oral liquid 400 mg  400 mg Oral Q4H  
 cefTAZidime (FORTAZ) 1 g in 0.9% sodium chloride (MBP/ADV) 50 mL MBP  1 g IntraVENous Q8H  
 ciprofloxacin (CIPRO) 400 mg IVPB (premix)  400 mg IntraVENous Q12H  
 lidocaine (PF) (XYLOCAINE) 10 mg/mL (1 %) injection 20 mL  20 mL SubCUTAneous Rad Multiple  amLODIPine (NORVASC) tablet 10 mg  10 mg Oral DAILY  gabapentin (NEURONTIN) capsule 400 mg  400 mg Oral TID  lisinopril (PRINIVIL, ZESTRIL) tablet 20 mg  20 mg Oral DAILY  metoprolol tartrate (LOPRESSOR) tablet 50 mg  50 mg Oral BID  simvastatin (ZOCOR) tablet 10 mg  10 mg Oral QHS  acetaminophen (TYLENOL) tablet 650 mg  650 mg Oral Q6H PRN  
 ondansetron (ZOFRAN) injection 4 mg  4 mg IntraVENous Q6H PRN  
 heparin (porcine) injection 5,000 Units  5,000 Units SubCUTAneous Q8H Review of Systems Sleepy but wakes up when called by name,saying he is fine. Objective:  
 
Visit Vitals /61 Pulse (!) 107 Temp 98.1 °F (36.7 °C) Resp (!) 38 Ht 6' (1.829 m) Wt 99.1 kg (218 lb 8 oz) SpO2 98% BMI 29.63 kg/m² O2 Flow Rate (L/min): 4 l/min O2 Device: Nasal cannula Temp (24hrs), Av.2 °F (36.8 °C), Min:97.4 °F (36.3 °C), Max:98.7 °F (37.1 °C) No intake/output data recorded.  1901 -  0700 In: 400 [I.V.:400] Out: 5 [Urine:1] Physical Exam: 
Gen:  No distress, no complaint. On BIPAP Lungs:  Clear bilaterally, no wheeze or rhonchi Heart:  Regular rate and rhythm, no murmurs or gallops Abdomen:  Soft, non-tender, normal bowel sounds Extr:no edema,good pedis pulses. Neuro:sleepy but wakes up when called by name,no deficit noted. 
  
Data Review Recent Results (from the past 12 hour(s)) CBC WITH AUTOMATED DIFF Collection Time: 19  4:20 AM  
Result Value Ref Range WBC 5.5 4.6 - 13.2 K/uL  
 RBC 4.07 (L) 4.70 - 5.50 M/uL  
 HGB 11.5 (L) 13.0 - 16.0 g/dL HCT 36.7 36.0 - 48.0 % MCV 90.2 74.0 - 97.0 FL  
 MCH 28.3 24.0 - 34.0 PG  
 MCHC 31.3 31.0 - 37.0 g/dL  
 RDW 15.3 (H) 11.6 - 14.5 % PLATELET 892 049 - 662 K/uL MPV 10.4 9.2 - 11.8 FL  
 NEUTROPHILS 58 40 - 73 % LYMPHOCYTES 33 21 - 52 % MONOCYTES 8 3 - 10 % EOSINOPHILS 1 0 - 5 % BASOPHILS 0 0 - 2 %  
 ABS. NEUTROPHILS 3.2 1.8 - 8.0 K/UL  
 ABS. LYMPHOCYTES 1.8 0.9 - 3.6 K/UL  
 ABS. MONOCYTES 0.5 0.05 - 1.2 K/UL  
 ABS. EOSINOPHILS 0.0 0.0 - 0.4 K/UL  
 ABS. BASOPHILS 0.0 0.0 - 0.1 K/UL  
 DF AUTOMATED METABOLIC PANEL, BASIC Collection Time: 19  4:20 AM  
Result Value Ref Range Sodium 143 136 - 145 mmol/L  Potassium 3.6 3.5 - 5.5 mmol/L  
 Chloride 101 100 - 108 mmol/L  
 CO2 37 (H) 21 - 32 mmol/L Anion gap 5 3.0 - 18 mmol/L Glucose 86 74 - 99 mg/dL BUN 19 (H) 7.0 - 18 MG/DL Creatinine 0.48 (L) 0.6 - 1.3 MG/DL  
 BUN/Creatinine ratio 40 (H) 12 - 20 GFR est AA >60 >60 ml/min/1.73m2 GFR est non-AA >60 >60 ml/min/1.73m2 Calcium 7.5 (L) 8.5 - 10.1 MG/DL Assessment/Plan:  
 
Principal Problem: 
  Bacteremia (1/21/2019) Active Problems: 
  Discitis of thoracic region (11/14/2018) Bilateral pleural effusion (11/14/2018) Hypertension (11/14/2018) Hepatitis C (11/14/2018) Back pain (11/14/2018) Acute on chronic respiratory failure with hypoxia ([address] (12/23/2018) PNA (pneumonia) (1/26/2019) Care Plan 1. Bacteremia with Serratia Marcescens  
 -Source ?discitis 
 -Fluid from T-spine aspiration - culture negative 
 -Pt on ceftazidime/cipro 
 -ID following 
 -Follow cx results. 2.Discitis of thoracic region 
 -S/p CT guided T10-T11 aspiration - cx negative 3. PNA 
 -On atbx 4. Bilateral pleural effusion 
 -Present in previous films. Pulmonary/critical care following 5. Acute on chronic respiratory failure with hypoxia  
 -Currently on 5 liters NC. Also using BIPAP at night and prn 
 -Critical care team following. 6.Hypertension 7. Hepatitis C 8. Back pain  
 -Continue other meds related to above issues. DVT prophylaxis:sc heparin Full code Disposition:tbd

## 2019-01-26 NOTE — PROGRESS NOTES
[facility]. Pulmonary, Critical Care, and Sleep Medicine Name: Tiburcio Sifuentes MRN: [id number] : 1950 Hospital: [address] [place] [address] Date: 2019  Admission Date: 2019 Chart and notes reviewed. Data reviewed. I have evaluated all findings. [x]I have reviewed the flowsheet and previous days notes. []The patient is unable to give any meaningful history or review of systems because the patient is: 
[]Intubated []Sedated  
[]Unresponsive [x]The patient is critically ill on     
[]Mechanical ventilation []Pressors  
[x]BiPAP [] Overnight issues: 
Spent most of the day yesterday without requiring BiPAP. BiPAP overnight. Noted transient drop in BP overnight and required 500cc of IV fluids to which he responded. This morning patient is awake and alert. ROS:Pertinent items are noted in HPI. Events and notes from last 24 hours reviewed. Care plan discussed on multidisciplinary rounds. Patient Active Problem List  
Diagnosis Code  Discitis of thoracic region M46.44  
 UTI (urinary tract infection) N39.0  Bilateral pleural effusion J90  
 Lumbar stenosis M48.061  
 Adenoma of left adrenal gland D35.02  
 Hypertension I10  
 Cirrhosis of liver ([facility] [address] K74.60  Hepatitis C B19.20  Obesity E66.9  
 Constipation K59.00  Back pain M54.9  Altered mental status R41.82  
 Acute respiratory failure with hypoxia (HCC) J96.01  
 Sinusitis J32.9  Acute on chronic respiratory failure with hypoxia (HCC) L39.90  
 Diastolic CHF, acute on chronic (HCC) I50.33  
 Acute exacerbation of chronic obstructive pulmonary disease (COPD) ([facility] [address] J44.1  Bacteremia R78.81 Vital Signs: 
Visit Vitals /60 (BP 1 Location: Left arm) Pulse 76 Temp 98.1 °F (36.7 °C) Resp 14 Ht 6' (1.829 m) Wt 99.1 kg (218 lb 8 oz) SpO2 100% BMI 29.63 kg/m² O2 Device: Nasal cannula O2 Flow Rate (L/min): 4 l/min Temp (24hrs), Av.3 °F (36.8 °C), Min:97.4 °F (36.3 °C), Max:98.8 °F (37.1 °C) Intake/Output:  
Last shift:      No intake/output data recorded. Last 3 shifts:  1901 -  0700 In: 400 [I.V.:400] Out: 5 [Urine:1] Intake/Output Summary (Last 24 hours) at 2019 1114 Last data filed at 2019 0130 Gross per 24 hour Intake 400 ml Output 4 ml Net 396 ml Ventilator Settings: 
  
  
Ventilator Volumes Vt Spont (ml): 528 ml Ve Observed (l/min): 8.6 l/min Current Facility-Administered Medications Medication Dose Route Frequency  pantoprazole (PROTONIX) 40 mg in sodium chloride 0.9% 10 mL injection  40 mg IntraVENous DAILY  albuterol-ipratropium (DUO-NEB) 2.5 MG-0.5 MG/3 ML  3 mL Nebulization QID RT  
 guaiFENesin (ROBITUSSIN) 100 mg/5 mL oral liquid 400 mg  400 mg Oral Q4H  
 cefTAZidime (FORTAZ) 1 g in 0.9% sodium chloride (MBP/ADV) 50 mL MBP  1 g IntraVENous Q8H  
 ciprofloxacin (CIPRO) 400 mg IVPB (premix)  400 mg IntraVENous Q12H  
 amLODIPine (NORVASC) tablet 10 mg  10 mg Oral DAILY  gabapentin (NEURONTIN) capsule 400 mg  400 mg Oral TID  lisinopril (PRINIVIL, ZESTRIL) tablet 20 mg  20 mg Oral DAILY  metoprolol tartrate (LOPRESSOR) tablet 50 mg  50 mg Oral BID  simvastatin (ZOCOR) tablet 10 mg  10 mg Oral QHS  heparin (porcine) injection 5,000 Units  5,000 Units SubCUTAneous Q8H Telemetry:  
 
Physical Exam:  
General: in no apparent distress, non-toxic, in no respiratory distress and acyanotic, alert, oriented times 3 and afebrile HEENT: Normal 
 Neck: No abnormally enlarged lymph nodes. Chest: increased AP diameter Lungs: decreased air exchange bibasilar, no dullness/ tenderness/ rash Heart: Regular rate and rhythm Abdomen: non distended, bowel sounds normoactive, tympanic, abdomen is soft without significant tenderness, masses, organomegaly or guarding Extremity: negative Neuro: alert Skin: Skin color, texture, turgor normal. No rashes or lesions DATA: 
MAR reviewed and pertinent medications noted or modified as needed Labs: 
Recent Labs  
  01/26/19 
0420 01/25/19 
0340 01/24/19 
5390 WBC 5.5 5.5 8.6 HGB 11.5* 12.8* 14.2 HCT 36.7 42.9 48.2*  
 150 149 Recent Labs  
  01/26/19 
0420 01/25/19 
0340 01/24/19 
0442 01/23/19 
1305  140 142  --   
K 3.6 4.6 4.7  --   
 96* 99*  --   
CO2 37* 39* 36*  --   
GLU 86 81 97  --   
BUN 19* 21* 19*  --   
CREA 0.48* 0.64 0.75  --   
CA 7.5* 8.4* 8.7  --   
INR  --   --   --  1.1 No results for input(s): PH, PCO2, PO2, HCO3, FIO2 in the last 72 hours. Recent Labs  
  01/24/19 
1505 01/24/19 
1046 FIO2I 0.55 0.50 HCO3I 41.2* 42.1* PCO2I 83.6* 92.1*  
PHI 7.301* 7.271* PO2I 107* 57* Imaging: 
[x]                           I have personally reviewed the patients radiographs and reports High complexity decision making was performed during this consultation and evaluation. [x]       Pt is at high risk for further organ failure and dysfunction. Critical care time spent  minutes with patient exclusive of procedures. IMPRESSION:  
· Acute on chronic hypoxic and hypercapnic respiratory failure · Serratia Marcescens bacteremia. · Bilateral pleural effusions which likely related to HFpEF. · Bilateral lower lobes air space processes and additionally there is right middle lobe atelectasis. · Documented history of COPD - however no PFTs on chart to assess the degree. · Hx of HTN 
· Hx of diastolic heart failure and LVH 
· Hx of Hepatitis C 
· Hx of T9-T10 discitis with chronic back pain. · Hx of polysubstance abuse Morna Rout PLAN:  
Neurological: - Sedation: Continue to adhere to pain, agitation and delirium guidelines in the ICU.  
- ICU delirium assessment: Continue to assess, prevent and manage delirium using the CAM-ICU tool. - Early Mobility and Exercise: Continue to assess for optimizing mobility and exercise to the best of this patient's ability Respiratory: 
Off BiPAP and currently on NC. Follow up on ABGs and CXR. Follow up on pleural effusions. Continue COPD management with nebulized bronchodilators. Can use BiPAP HS and PRN. Can be transferred out to the floor. Cardiac: 
Monitor hemodynamics. Renal: 
Currently not requiring renal support. Monitor urine OP and renal functions and Cr. Need for bailon: No 
 
GI: 
Diet: As ordered. Stress ulcer ppx: Protonix. Endocrinology: 
Glycemic control: As per the ICU protocol. ID: Follow ID recommendations for antimicrobial coverage. Follow up on the cultures data. Best practice : 
 
Glycemic control IHI ICU bundles: 
Sress ulcer prophylaxis DVT prophylaxis Need for Lines, bailon assessed. Goals of care were discussed with the patient.   
   
Pavan Berry MD

## 2019-01-26 NOTE — PROGRESS NOTES
Problem: Falls - Risk of 
Goal: *Absence of Falls Document Lona Fearing Fall Risk and appropriate interventions in the flowsheet. Outcome: Progressing Towards Goal 
Fall Risk Interventions: 
Mobility Interventions: Bed/chair exit alarm Mentation Interventions: Bed/chair exit alarm Medication Interventions: Bed/chair exit alarm Elimination Interventions: Bed/chair exit alarm, Call light in reach History of Falls Interventions: Bed/chair exit alarm Problem: Pressure Injury - Risk of 
Goal: *Prevention of pressure injury Document Raul Scale and appropriate interventions in the flowsheet. Outcome: Progressing Towards Goal 
Pressure Injury Interventions: 
Sensory Interventions: Assess changes in LOC Moisture Interventions: Absorbent underpads Activity Interventions: Assess need for specialty bed Mobility Interventions: HOB 30 degrees or less Nutrition Interventions: Document food/fluid/supplement intake Friction and Shear Interventions: HOB 30 degrees or less, Foam dressings/transparent film/skin sealants

## 2019-01-26 NOTE — PROGRESS NOTES
01/25/19 1900 CPAP/BIPAP  
CPAP/BIPAP Start/Stop On  
 was placed back on BIPAP due to tachypnea. He is now resting comfortably. 0400:  has been on BIPAP throughout the night. Respiratory rate stable at 15-18 on BIPAP. He did require Ativan X1 for continued use throughout the night. Vital signs have remained stable.

## 2019-01-26 NOTE — PROGRESS NOTES
Recd pt on bipap:  Ipap=18, epap=8, Fio2=30% 
--pt sleeping- no resp distress observed 
--administer neb --Verbal order for ABG  
 
7.420, CO2=57.5, PO2=67, 93%, HCO3=37.3 on 4 lpm NC

## 2019-01-26 NOTE — PROGRESS NOTES
Physical Exam  
Skin:  
 
  
Bedside shift change report was given to Maritza velazquez RN  ( ICU night shift nurse), by  Tana Villalobos RN ( ICU day shift nurse). Report included the following information:  SBAR, kardex, consultations, hemodynamic monitoring, intake/output, MAR, lab results, VS trends, cardiac rhythm noted NSR . Skin, neuro, respiratory status, order verification, and critical IV gtt rate verification has been dually noted and verified at the bedside with:    Tana Villalobos RN                                 
ICU Nurse's Note: 
2000: Pt is alert x 2, intermittent confusion, able to be verbally redirected. . Pt independently moves all extremities with noted generalized weakness. He requires assist with all ADLs and repositioning. Neurological: as noted in assessment Cardiovascular: SR on the monitor. Pulmonary: breath sounds coarse, diminished, saturations maintained at 99% on BiPap GI/: Abdomen is obese, soft, non-distended. Last BM noted   01/25/2019, pt is incontinent of bowel and bladder. IVF/Critical gtts: IV abx Skin: as noted above 
 
0710: Bedside change of shift report given to: Tana Villalobos RN

## 2019-01-27 LAB
ANION GAP SERPL CALC-SCNC: 4 MMOL/L (ref 3–18)
BASOPHILS # BLD: 0 K/UL (ref 0–0.1)
BASOPHILS NFR BLD: 0 % (ref 0–2)
BUN SERPL-MCNC: 13 MG/DL (ref 7–18)
BUN/CREAT SERPL: 22 (ref 12–20)
CA-I SERPL-SCNC: 1.01 MMOL/L (ref 1.12–1.32)
CALCIUM SERPL-MCNC: 8.2 MG/DL (ref 8.5–10.1)
CHLORIDE SERPL-SCNC: 101 MMOL/L (ref 100–108)
CO2 SERPL-SCNC: 37 MMOL/L (ref 21–32)
CREAT SERPL-MCNC: 0.58 MG/DL (ref 0.6–1.3)
DIFFERENTIAL METHOD BLD: ABNORMAL
EOSINOPHIL # BLD: 0 K/UL (ref 0–0.4)
EOSINOPHIL NFR BLD: 0 % (ref 0–5)
ERYTHROCYTE [DISTWIDTH] IN BLOOD BY AUTOMATED COUNT: 15.2 % (ref 11.6–14.5)
GLUCOSE SERPL-MCNC: 99 MG/DL (ref 74–99)
HCT VFR BLD AUTO: 38.8 % (ref 36–48)
HGB BLD-MCNC: 12.3 G/DL (ref 13–16)
LYMPHOCYTES # BLD: 1.8 K/UL (ref 0.9–3.6)
LYMPHOCYTES NFR BLD: 27 % (ref 21–52)
MAGNESIUM SERPL-MCNC: 1.9 MG/DL (ref 1.6–2.6)
MCH RBC QN AUTO: 28.1 PG (ref 24–34)
MCHC RBC AUTO-ENTMCNC: 31.7 G/DL (ref 31–37)
MCV RBC AUTO: 88.8 FL (ref 74–97)
MONOCYTES # BLD: 0.4 K/UL (ref 0.05–1.2)
MONOCYTES NFR BLD: 6 % (ref 3–10)
NEUTS SEG # BLD: 4.5 K/UL (ref 1.8–8)
NEUTS SEG NFR BLD: 67 % (ref 40–73)
PHOSPHATE SERPL-MCNC: 3.1 MG/DL (ref 2.5–4.9)
PLATELET # BLD AUTO: 156 K/UL (ref 135–420)
PMV BLD AUTO: 10.6 FL (ref 9.2–11.8)
POTASSIUM SERPL-SCNC: 3.9 MMOL/L (ref 3.5–5.5)
RBC # BLD AUTO: 4.37 M/UL (ref 4.7–5.5)
SODIUM SERPL-SCNC: 142 MMOL/L (ref 136–145)
WBC # BLD AUTO: 6.7 K/UL (ref 4.6–13.2)

## 2019-01-27 PROCEDURE — 80048 BASIC METABOLIC PNL TOTAL CA: CPT

## 2019-01-27 PROCEDURE — C9113 INJ PANTOPRAZOLE SODIUM, VIA: HCPCS | Performed by: NURSE PRACTITIONER

## 2019-01-27 PROCEDURE — 74011250637 HC RX REV CODE- 250/637: Performed by: INTERNAL MEDICINE

## 2019-01-27 PROCEDURE — 74011250636 HC RX REV CODE- 250/636: Performed by: HOSPITALIST

## 2019-01-27 PROCEDURE — 65270000029 HC RM PRIVATE

## 2019-01-27 PROCEDURE — 94760 N-INVAS EAR/PLS OXIMETRY 1: CPT

## 2019-01-27 PROCEDURE — 84100 ASSAY OF PHOSPHORUS: CPT

## 2019-01-27 PROCEDURE — 74011250636 HC RX REV CODE- 250/636: Performed by: INTERNAL MEDICINE

## 2019-01-27 PROCEDURE — 74011250636 HC RX REV CODE- 250/636: Performed by: NURSE PRACTITIONER

## 2019-01-27 PROCEDURE — 74011000258 HC RX REV CODE- 258: Performed by: INTERNAL MEDICINE

## 2019-01-27 PROCEDURE — 74011000250 HC RX REV CODE- 250: Performed by: INTERNAL MEDICINE

## 2019-01-27 PROCEDURE — 77010033678 HC OXYGEN DAILY

## 2019-01-27 PROCEDURE — 74011250637 HC RX REV CODE- 250/637: Performed by: HOSPITALIST

## 2019-01-27 PROCEDURE — 85025 COMPLETE CBC W/AUTO DIFF WBC: CPT

## 2019-01-27 PROCEDURE — 83735 ASSAY OF MAGNESIUM: CPT

## 2019-01-27 PROCEDURE — 94640 AIRWAY INHALATION TREATMENT: CPT

## 2019-01-27 PROCEDURE — 82330 ASSAY OF CALCIUM: CPT

## 2019-01-27 PROCEDURE — 36415 COLL VENOUS BLD VENIPUNCTURE: CPT

## 2019-01-27 RX ORDER — POTASSIUM CHLORIDE 750 MG/1
10 TABLET, FILM COATED, EXTENDED RELEASE ORAL ONCE
Status: COMPLETED | OUTPATIENT
Start: 2019-01-27 | End: 2019-01-27

## 2019-01-27 RX ADMIN — ACETAMINOPHEN 650 MG: 325 TABLET ORAL at 04:40

## 2019-01-27 RX ADMIN — METOPROLOL TARTRATE 50 MG: 50 TABLET ORAL at 17:09

## 2019-01-27 RX ADMIN — IPRATROPIUM BROMIDE AND ALBUTEROL SULFATE 3 ML: .5; 3 SOLUTION RESPIRATORY (INHALATION) at 08:00

## 2019-01-27 RX ADMIN — SODIUM CHLORIDE 40 MG: 9 INJECTION, SOLUTION INTRAMUSCULAR; INTRAVENOUS; SUBCUTANEOUS at 10:25

## 2019-01-27 RX ADMIN — CIPROFLOXACIN 400 MG: 2 INJECTION, SOLUTION INTRAVENOUS at 11:00

## 2019-01-27 RX ADMIN — GABAPENTIN 400 MG: 100 CAPSULE ORAL at 17:09

## 2019-01-27 RX ADMIN — GUAIFENESIN 400 MG: 100 SOLUTION ORAL at 17:09

## 2019-01-27 RX ADMIN — ACETAMINOPHEN 650 MG: 325 TABLET ORAL at 14:05

## 2019-01-27 RX ADMIN — METOPROLOL TARTRATE 50 MG: 50 TABLET ORAL at 10:25

## 2019-01-27 RX ADMIN — GABAPENTIN 400 MG: 100 CAPSULE ORAL at 10:26

## 2019-01-27 RX ADMIN — GUAIFENESIN 400 MG: 100 SOLUTION ORAL at 23:59

## 2019-01-27 RX ADMIN — SIMVASTATIN 10 MG: 10 TABLET, FILM COATED ORAL at 22:09

## 2019-01-27 RX ADMIN — LISINOPRIL 20 MG: 20 TABLET ORAL at 10:26

## 2019-01-27 RX ADMIN — CEFTAZIDIME 1 G: 1 INJECTION, POWDER, FOR SOLUTION INTRAMUSCULAR; INTRAVENOUS at 19:43

## 2019-01-27 RX ADMIN — CEFTAZIDIME 1 G: 1 INJECTION, POWDER, FOR SOLUTION INTRAMUSCULAR; INTRAVENOUS at 10:24

## 2019-01-27 RX ADMIN — CIPROFLOXACIN 400 MG: 2 INJECTION, SOLUTION INTRAVENOUS at 22:09

## 2019-01-27 RX ADMIN — CEFTAZIDIME 1 G: 1 INJECTION, POWDER, FOR SOLUTION INTRAMUSCULAR; INTRAVENOUS at 02:00

## 2019-01-27 RX ADMIN — IPRATROPIUM BROMIDE AND ALBUTEROL SULFATE 3 ML: .5; 3 SOLUTION RESPIRATORY (INHALATION) at 16:24

## 2019-01-27 RX ADMIN — GUAIFENESIN 400 MG: 100 SOLUTION ORAL at 04:40

## 2019-01-27 RX ADMIN — GUAIFENESIN 400 MG: 100 SOLUTION ORAL at 19:43

## 2019-01-27 RX ADMIN — HEPARIN SODIUM 5000 UNITS: 5000 INJECTION INTRAVENOUS; SUBCUTANEOUS at 17:08

## 2019-01-27 RX ADMIN — ACETAMINOPHEN 650 MG: 325 TABLET ORAL at 19:43

## 2019-01-27 RX ADMIN — IPRATROPIUM BROMIDE AND ALBUTEROL SULFATE 3 ML: .5; 3 SOLUTION RESPIRATORY (INHALATION) at 20:25

## 2019-01-27 RX ADMIN — POTASSIUM CHLORIDE 10 MEQ: 750 TABLET, FILM COATED, EXTENDED RELEASE ORAL at 10:26

## 2019-01-27 RX ADMIN — HEPARIN SODIUM 5000 UNITS: 5000 INJECTION INTRAVENOUS; SUBCUTANEOUS at 10:25

## 2019-01-27 RX ADMIN — GUAIFENESIN 400 MG: 100 SOLUTION ORAL at 10:25

## 2019-01-27 RX ADMIN — AMLODIPINE BESYLATE 10 MG: 10 TABLET ORAL at 10:26

## 2019-01-27 RX ADMIN — GABAPENTIN 400 MG: 100 CAPSULE ORAL at 22:09

## 2019-01-27 RX ADMIN — HEPARIN SODIUM 5000 UNITS: 5000 INJECTION INTRAVENOUS; SUBCUTANEOUS at 02:00

## 2019-01-27 RX ADMIN — GUAIFENESIN 400 MG: 100 SOLUTION ORAL at 13:53

## 2019-01-27 NOTE — PROGRESS NOTES
Recd pt on 4 lpm NC - pt awake & alert - no SOB reported --pt remained off the bipap overnight without complication 
--administer neb 
 
1300--Pt eating

## 2019-01-27 NOTE — PROGRESS NOTES
Physical Exam  
Skin:  
 
  
Bedside shift change report was given to Julio Cesar velazquez RN  ( ICU night shift nurse), by  Betsy Turpin RN ( ICU day shift nurse). Report included the following information:  SBAR, kardex, consultations, hemodynamic monitoring, intake/output, MAR, lab results, VS trends, cardiac rhythm noted NSR . Skin, neuro, respiratory status, order verification, and critical IV gtt rate verification has been dually noted and verified at the bedside with:    Betsy Turpin RN                                 
ICU Nurse's Note: 
2000: Pt is alert x 3, pleasant, conversant and able to follow commands. Pt independently moves all extremities with noted generalized weakness. He requires assist with toileting ADLs and repositioning. Neurological: as noted in assessment Cardiovascular: SR on the monitor. Pulmonary: breath sounds coarse, diminished, saturations maintained at 99% on 4 LPM via NC 
GI/: Abdomen is obese, soft, non-distended. Last BM noted   01/26/2019, pt has been appropriately voiding in the urinal without incident. IVF/Critical gtts: IV abx Skin: as noted above 
 
0710: Bedside change of shift report given to:Vi Vincent RN

## 2019-01-27 NOTE — PROGRESS NOTES
1500 -- Patient on the unit, A/O x4, pain noted, already medicated prior to arriving to unit, vital signs taken, cardiac monitor applied, 4L NC applied, call bell in reach, will continue to monitor. 1709 -- Meds given. Bedside shift change report given to Amos Monreal RN (oncoming nurse) by Jack Buenrostro RN (offgoing nurse). Report included the following information SBAR, Kardex, Intake/Output, MAR and Recent Results.

## 2019-01-27 NOTE — PROGRESS NOTES
1884: Patient not appropriate to be seen x 4 days. Will d/c orders at this time. Please re-order when patient appropriate for participation with PT. Thank you for this referral, Perla Banks PT, DPT Office extension: [phone] Pager #: [phone]

## 2019-01-27 NOTE — PROGRESS NOTES
Problem: Falls - Risk of 
Goal: *Absence of Falls Document Reino Horn Fall Risk and appropriate interventions in the flowsheet. Outcome: Progressing Towards Goal 
Fall Risk Interventions: 
Mobility Interventions: Bed/chair exit alarm, Patient to call before getting OOB, Strengthening exercises (ROM-active/passive), Assess mobility with egress test 
 
Mentation Interventions: Adequate sleep, hydration, pain control, Bed/chair exit alarm, Eyeglasses and hearing aids, Evaluate medications/consider consulting pharmacy, Increase mobility, More frequent rounding, Reorient patient, Toileting rounds, Update white board Medication Interventions: Bed/chair exit alarm, Patient to call before getting OOB, Evaluate medications/consider consulting pharmacy, Teach patient to arise slowly Elimination Interventions: Call light in reach, Bed/chair exit alarm, Patient to call for help with toileting needs, Toileting schedule/hourly rounds, Urinal in reach, Toilet paper/wipes in reach History of Falls Interventions: Bed/chair exit alarm, Room close to nurse's station, Evaluate medications/consider consulting pharmacy, Door open when patient unattended Problem: Pressure Injury - Risk of 
Goal: *Prevention of pressure injury Document Raul Scale and appropriate interventions in the flowsheet. Outcome: Progressing Towards Goal 
Pressure Injury Interventions: 
Sensory Interventions: Assess changes in LOC, Assess need for specialty bed, Avoid rigorous massage over bony prominences, Check visual cues for pain, Minimize linen layers, Maintain/enhance activity level, Keep linens dry and wrinkle-free, Turn and reposition approx. every two hours (pillows and wedges if needed), Monitor skin under medical devices Moisture Interventions: Absorbent underpads, Apply protective barrier, creams and emollients, Maintain skin hydration (lotion/cream), Check for incontinence Q2 hours and as needed, Offer toileting Q_hr, Minimize layers, Moisture barrier Activity Interventions: Pressure redistribution bed/mattress(bed type), PT/OT evaluation Mobility Interventions: HOB 30 degrees or less, Pressure redistribution bed/mattress (bed type), Turn and reposition approx. every two hours(pillow and wedges) Nutrition Interventions: Document food/fluid/supplement intake, Discuss nutritional consult with provider, Offer support with meals,snacks and hydration Friction and Shear Interventions: Apply protective barrier, creams and emollients, Lift sheet, Lift team/patient mobility team, Minimize layers, HOB 30 degrees or less, Transferring/repositioning devices

## 2019-01-27 NOTE — PROGRESS NOTES
Hospitalist Progress Note Jasvir Silva MD 
Internal medicine/ Hospitalist 
 
Daily Progress Note: 1/27/2019 
   
Interval history / Subjective:  
Tiburcio Sifuentes is a 77 y.o. male with h/o IVDA,COPD,Diastolic chf,chronic resp failure,pleural effusion,hep C,who is being admitted for Bacteremia. 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 He is on atbx iv. On 1/24/19,he underwent CT guided T10-T11 but the cultures have been negative. CXR on 1/24 showing bilateral pleural effusion with possible supra-imposed right lower lobe pneumonia. He was transferred to ICU on 1/24 for pna with resp failure. Currently on iv cipro and ceftazidime. Current Facility-Administered Medications Medication Dose Route Frequency  pantoprazole (PROTONIX) 40 mg in sodium chloride 0.9% 10 mL injection  40 mg IntraVENous DAILY  albuterol-ipratropium (DUO-NEB) 2.5 MG-0.5 MG/3 ML  3 mL Nebulization QID RT  
 guaiFENesin (ROBITUSSIN) 100 mg/5 mL oral liquid 400 mg  400 mg Oral Q4H  
 cefTAZidime (FORTAZ) 1 g in 0.9% sodium chloride (MBP/ADV) 50 mL MBP  1 g IntraVENous Q8H  
 ciprofloxacin (CIPRO) 400 mg IVPB (premix)  400 mg IntraVENous Q12H  
 lidocaine (PF) (XYLOCAINE) 10 mg/mL (1 %) injection 20 mL  20 mL SubCUTAneous Rad Multiple  amLODIPine (NORVASC) tablet 10 mg  10 mg Oral DAILY  gabapentin (NEURONTIN) capsule 400 mg  400 mg Oral TID  lisinopril (PRINIVIL, ZESTRIL) tablet 20 mg  20 mg Oral DAILY  metoprolol tartrate (LOPRESSOR) tablet 50 mg  50 mg Oral BID  simvastatin (ZOCOR) tablet 10 mg  10 mg Oral QHS  acetaminophen (TYLENOL) tablet 650 mg  650 mg Oral Q6H PRN  
 ondansetron (ZOFRAN) injection 4 mg  4 mg IntraVENous Q6H PRN  
 heparin (porcine) injection 5,000 Units  5,000 Units SubCUTAneous Q8H Review of Systems More awake today,feeling much better. Nurse saying that patient has tolerated his breakfast well. Also reporting that icu attending has ordered transfer to medical floor. Objective:  
 
Visit Vitals /81 Pulse (!) 112 Temp 97.8 °F (36.6 °C) Resp (!) 36 Ht 6' (1.829 m) Wt 101.1 kg (222 lb 14.2 oz) SpO2 97% BMI 30.23 kg/m² O2 Flow Rate (L/min): 4 l/min O2 Device: Nasal cannula Temp (24hrs), Av.3 °F (36.8 °C), Min:97.8 °F (36.6 °C), Max:98.7 °F (37.1 °C) No intake/output data recorded.  1901 -  0700 In: 7629 [P.O.:240; I.V.:800] Out: 3000 [[facility]:3629] Physical Exam: 
Gen:  Not in distress,lying comfortably in bed 
Lungs:  Clear bilaterally, no wheeze or rhonchi Heart:  Regular rate and rhythm, no murmurs or gallops Abdomen:  Soft, non-tender, normal bowel sounds Extr:no edema,good pedis pulses. Neuro:aaox3,grossly intact. Psychosocial:interacting well,no mood problem. 
  
Data Review Recent Results (from the past 12 hour(s)) CBC WITH AUTOMATED DIFF Collection Time: 19  2:40 AM  
Result Value Ref Range WBC 6.7 4.6 - 13.2 K/uL  
 RBC 4.37 (L) 4.70 - 5.50 M/uL  
 HGB 12.3 (L) 13.0 - 16.0 g/dL HCT 38.8 36.0 - 48.0 % MCV 88.8 74.0 - 97.0 FL  
 MCH 28.1 24.0 - 34.0 PG  
 MCHC 31.7 31.0 - 37.0 g/dL  
 RDW 15.2 (H) 11.6 - 14.5 % PLATELET 994 139 - 350 K/uL MPV 10.6 9.2 - 11.8 FL  
 NEUTROPHILS 67 40 - 73 % LYMPHOCYTES 27 21 - 52 % MONOCYTES 6 3 - 10 % EOSINOPHILS 0 0 - 5 % BASOPHILS 0 0 - 2 %  
 ABS. NEUTROPHILS 4.5 1.8 - 8.0 K/UL  
 ABS. LYMPHOCYTES 1.8 0.9 - 3.6 K/UL  
 ABS. MONOCYTES 0.4 0.05 - 1.2 K/UL  
 ABS. EOSINOPHILS 0.0 0.0 - 0.4 K/UL  
 ABS. BASOPHILS 0.0 0.0 - 0.1 K/UL  
 DF AUTOMATED METABOLIC PANEL, BASIC Collection Time: 01/27/19  2:40 AM  
Result Value Ref Range Sodium 142 136 - 145 mmol/L Potassium 3.9 3.5 - 5.5 mmol/L Chloride 101 100 - 108 mmol/L  
 CO2 37 (H) 21 - 32 mmol/L Anion gap 4 3.0 - 18 mmol/L Glucose 99 74 - 99 mg/dL BUN 13 7.0 - 18 MG/DL Creatinine 0.58 (L) 0.6 - 1.3 MG/DL  
 BUN/Creatinine ratio 22 (H) 12 - 20 GFR est AA >60 >60 ml/min/1.73m2 GFR est non-AA >60 >60 ml/min/1.73m2 Calcium 8.2 (L) 8.5 - 10.1 MG/DL MAGNESIUM Collection Time: 01/27/19  2:40 AM  
Result Value Ref Range Magnesium 1.9 1.6 - 2.6 mg/dL PHOSPHORUS Collection Time: 01/27/19  2:40 AM  
Result Value Ref Range Phosphorus 3.1 2.5 - 4.9 MG/DL  
CALCIUM, IONIZED Collection Time: 01/27/19  2:40 AM  
Result Value Ref Range Ionized Calcium 1.01 (L) 1.12 - 1.32 MMOL/L Assessment/Plan:  
 
Principal Problem: 
  Bacteremia (1/21/2019) Active Problems: 
  Discitis of thoracic region (11/14/2018) Bilateral pleural effusion (11/14/2018) Hypertension (11/14/2018) Hepatitis C (11/14/2018) Back pain (11/14/2018) Acute on chronic respiratory failure with hypoxia ([address] (12/23/2018) PNA (pneumonia) (1/26/2019) Care Plan 1. Bacteremia with Serratia Marcescens  
 -Source ?discitis 
 -Fluid from T-spine aspiration - culture negative 
 -Pt on ceftazidime/cipro 
 -ID following - will continue to follow recommendations 
 -Pt afebrile and WBC 6.7 today 
 -Transfer to medical floor. 2.Discitis of thoracic region 
 -S/p CT guided T10-T11 aspiration - cx negative 3. PNA 
 -On atbx 4. Bilateral pleural effusion 
 -Present in previous films. Pulmonary/critical care following 5. Acute on chronic respiratory failure with hypoxia  
 -Was on 5 liters NC. Also using BIPAP at night and prn on 1/26 
 -Today on 4 liters NC with O2sat 97% -Critical care team following. Pulmonary will continue to follow -Will continue to taper oxygen by resp therapist as tolerated 6. Hypertension 7. Hepatitis C 8. Back pain  
 -Continue other meds related to above issues. DVT prophylaxis:sc heparin Full code Disposition:[place]

## 2019-01-27 NOTE — PROGRESS NOTES
[employer] [facility]. Pulmonary, Critical Care, and Sleep Medicine Name: Alexander Bright MRN: [id number] : 1950 Hospital: [facility] Date: 2019  Admission Date: 2019 Chart and notes reviewed. Data reviewed. I have evaluated all findings. [x]I have reviewed the flowsheet and previous days notes. []The patient is unable to give any meaningful history or review of systems because the patient is: 
[]Intubated []Sedated  
[]Unresponsive [x]The patient is critically ill on     
[]Mechanical ventilation []Pressors  
[x]BiPAP [] Overnight issues: 
No major issues overnight. Appears well. Denies any worsening SOB. Feels well overall. No pains. Able to eat and drink OK. No GI issues. ROS:Pertinent items are noted in HPI. Events and notes from last 24 hours reviewed. Care plan discussed on multidisciplinary rounds. Patient Active Problem List  
Diagnosis Code  Discitis of thoracic region M46.44  
 UTI (urinary tract infection) N39.0  Bilateral pleural effusion J90  
 Lumbar stenosis M48.061  
 Adenoma of left adrenal gland D35.02  
 Hypertension I10  
 Cirrhosis of liver ([facility] [address] K74.60  Hepatitis C B19.20  Obesity E66.9  
 Constipation K59.00  Back pain M54.9  Altered mental status R41.82  
 Acute respiratory failure with hypoxia (HCC) J96.01  
 Sinusitis J32.9  Acute on chronic respiratory failure with hypoxia (HCC) U38.55  
 Diastolic CHF, acute on chronic (HCC) I50.33  
 Acute exacerbation of chronic obstructive pulmonary disease (COPD) ([facility] [address] J44.1  Bacteremia R78.81  
 PNA (pneumonia) J18.9 Vital Signs: 
Visit Vitals /70 (BP 1 Location: Left arm) Pulse 94 Temp 97.8 °F (36.6 °C) Resp 30 Ht 6' (1.829 m) Wt 101.1 kg (222 lb 14.2 oz) SpO2 97% BMI 30.23 kg/m² O2 Device: Nasal cannula O2 Flow Rate (L/min): 4 l/min Temp (24hrs), Av.3 °F (36.8 °C), Min:97.8 °F (36.6 °C), Max:98.7 °F (37.1 °C) Intake/Output:  
Last shift:      No intake/output data recorded. Last 3 shifts: 1901 -  0700 In: 4035 [P.O.:240; I.V.:800] Out: 3000 [SRYPJ:0086] Intake/Output Summary (Last 24 hours) at 2019 8224 Last data filed at 2019 0700 Gross per 24 hour Intake 790 ml Output 3000 ml Net -2210 ml Ventilator Settings: 
  
  
Ventilator Volumes Vt Spont (ml): 528 ml Ve Observed (l/min): 8.6 l/min Current Facility-Administered Medications Medication Dose Route Frequency  potassium chloride SR (KLOR-CON 10) tablet 10 mEq  10 mEq Oral ONCE  pantoprazole (PROTONIX) 40 mg in sodium chloride 0.9% 10 mL injection  40 mg IntraVENous DAILY  albuterol-ipratropium (DUO-NEB) 2.5 MG-0.5 MG/3 ML  3 mL Nebulization QID RT  
 guaiFENesin (ROBITUSSIN) 100 mg/5 mL oral liquid 400 mg  400 mg Oral Q4H  
 cefTAZidime (FORTAZ) 1 g in 0.9% sodium chloride (MBP/ADV) 50 mL MBP  1 g IntraVENous Q8H  
 ciprofloxacin (CIPRO) 400 mg IVPB (premix)  400 mg IntraVENous Q12H  
 amLODIPine (NORVASC) tablet 10 mg  10 mg Oral DAILY  gabapentin (NEURONTIN) capsule 400 mg  400 mg Oral TID  lisinopril (PRINIVIL, ZESTRIL) tablet 20 mg  20 mg Oral DAILY  metoprolol tartrate (LOPRESSOR) tablet 50 mg  50 mg Oral BID  simvastatin (ZOCOR) tablet 10 mg  10 mg Oral QHS  heparin (porcine) injection 5,000 Units  5,000 Units SubCUTAneous Q8H Telemetry:  
 
Physical Exam:  
General: in no apparent distress, non-toxic, in no respiratory distress and acyanotic, alert, oriented times 3 and afebrile HEENT: Normal 
 Neck: No abnormally enlarged lymph nodes. Chest: increased AP diameter Lungs: decreased air exchange bibasilar, no dullness/ tenderness/ rash Heart: Regular rate and rhythm  Abdomen: non distended, bowel sounds normoactive, tympanic, abdomen is soft without significant tenderness, masses, organomegaly or guarding Extremity: negative Neuro: alert Skin: Skin color, texture, turgor normal. No rashes or lesions DATA: 
MAR reviewed and pertinent medications noted or modified as needed Labs: 
Recent Labs  
  01/27/19 
0240 01/26/19 
0420 01/25/19 
0340 WBC 6.7 5.5 5.5 HGB 12.3* 11.5* 12.8* HCT 38.8 36.7 42.9  136 150 Recent Labs  
  01/27/19 
0240 01/26/19 
0420 01/25/19 
0340  143 140  
K 3.9 3.6 4.6  101 96* CO2 37* 37* 39* GLU 99 86 81 BUN 13 19* 21* CREA 0.58* 0.48* 0.64 CA 8.2* 7.5* 8.4* MG 1.9  --   --   
PHOS 3.1  --   -- No results for input(s): PH, PCO2, PO2, HCO3, FIO2 in the last 72 hours. Recent Labs  
  01/26/19 
1353 01/24/19 
1505 01/24/19 
1046 FIO2I 0.36 0.55 0.50 HCO3I 37.3* 41.2* 42.1* PCO2I 57.5* 83.6* 92.1*  
PHI 7.420 7.301* 7.271* PO2I 67* 107* 57* Imaging: 
[x]                           I have personally reviewed the patients radiographs and reports High complexity decision making was performed during this consultation and evaluation. [x]       Pt is at high risk for further organ failure and dysfunction. Critical care time spent  minutes with patient exclusive of procedures. IMPRESSION:  
· Acute on chronic hypoxic and hypercapnic respiratory failure · Serratia Marcescens bacteremia. · Bilateral pleural effusions which likely related to HFpEF. · Bilateral lower lobes air space processes and additionally there is right middle lobe atelectasis. · Documented history of COPD - however no PFTs on chart to assess the degree. · Hx of HTN 
· Hx of diastolic heart failure and LVH 
· Hx of Hepatitis C 
· Hx of T9-T10 discitis with chronic back pain. · Hx of polysubstance abuse Heddie Campo PLAN:  
Neurological: - Sedation: Continue to adhere to pain, agitation and delirium guidelines in the ICU. - ICU delirium assessment: Continue to assess, prevent and manage delirium using the CAM-ICU tool. - Early Mobility and Exercise: Continue to assess for optimizing mobility and exercise to the best of this patient's ability Respiratory: 
Off BiPAP and currently on NC. Doing well. Can be transferred to the floor. Follow up on pleural effusions. This should resolve with CHF management and if persist then consider thoracentesis. Continue COPD management with nebulized bronchodilators. Can use BiPAP HS and PRN. Can be transferred out to the floor. Cardiac: 
Monitor hemodynamics. Renal: 
Currently not requiring renal support. Monitor urine OP and renal functions and Cr. Need for bailon: No 
 
GI: 
Diet: As ordered. Stress ulcer ppx: Protonix. Endocrinology: 
Glycemic control: As per the ICU protocol. ID: Follow ID recommendations for antimicrobial coverage. Follow up on the cultures data. Best practice : 
 
Glycemic control IHI ICU bundles: 
Sress ulcer prophylaxis DVT prophylaxis Need for Lines, bailon assessed. Goals of care were discussed with the patient.   
   
Rimma Elizalde MD

## 2019-01-28 LAB
ALBUMIN SERPL-MCNC: 2.1 G/DL (ref 3.4–5)
ALBUMIN/GLOB SERPL: 0.4 {RATIO} (ref 0.8–1.7)
ALP SERPL-CCNC: 114 U/L (ref 45–117)
ALT SERPL-CCNC: 16 U/L (ref 16–61)
ANION GAP SERPL CALC-SCNC: 5 MMOL/L (ref 3–18)
AST SERPL-CCNC: 13 U/L (ref 15–37)
BACTERIA SPEC CULT: ABNORMAL
BACTERIA SPEC CULT: NORMAL
BACTERIA SPEC CULT: NORMAL
BASOPHILS # BLD: 0 K/UL (ref 0–0.1)
BASOPHILS NFR BLD: 0 % (ref 0–2)
BILIRUB SERPL-MCNC: 0.4 MG/DL (ref 0.2–1)
BUN SERPL-MCNC: 10 MG/DL (ref 7–18)
BUN/CREAT SERPL: 15 (ref 12–20)
CA-I SERPL-SCNC: 0.97 MMOL/L (ref 1.12–1.32)
CALCIUM SERPL-MCNC: 8.2 MG/DL (ref 8.5–10.1)
CHLORIDE SERPL-SCNC: 101 MMOL/L (ref 100–108)
CO2 SERPL-SCNC: 37 MMOL/L (ref 21–32)
CREAT SERPL-MCNC: 0.67 MG/DL (ref 0.6–1.3)
DIFFERENTIAL METHOD BLD: ABNORMAL
EOSINOPHIL # BLD: 0 K/UL (ref 0–0.4)
EOSINOPHIL NFR BLD: 1 % (ref 0–5)
ERYTHROCYTE [DISTWIDTH] IN BLOOD BY AUTOMATED COUNT: 15.3 % (ref 11.6–14.5)
GLOBULIN SER CALC-MCNC: 4.8 G/DL (ref 2–4)
GLUCOSE SERPL-MCNC: 89 MG/DL (ref 74–99)
GRAM STN SPEC: ABNORMAL
GRAM STN SPEC: ABNORMAL
GRAM STN SPEC: NORMAL
GRAM STN SPEC: NORMAL
HCT VFR BLD AUTO: 39.5 % (ref 36–48)
HGB BLD-MCNC: 12.1 G/DL (ref 13–16)
LYMPHOCYTES # BLD: 2.2 K/UL (ref 0.9–3.6)
LYMPHOCYTES NFR BLD: 37 % (ref 21–52)
MAGNESIUM SERPL-MCNC: 1.9 MG/DL (ref 1.6–2.6)
MCH RBC QN AUTO: 27.8 PG (ref 24–34)
MCHC RBC AUTO-ENTMCNC: 30.6 G/DL (ref 31–37)
MCV RBC AUTO: 90.6 FL (ref 74–97)
MONOCYTES # BLD: 0.3 K/UL (ref 0.05–1.2)
MONOCYTES NFR BLD: 6 % (ref 3–10)
NEUTS SEG # BLD: 3.3 K/UL (ref 1.8–8)
NEUTS SEG NFR BLD: 56 % (ref 40–73)
PHOSPHATE SERPL-MCNC: 3.2 MG/DL (ref 2.5–4.9)
PLATELET # BLD AUTO: 147 K/UL (ref 135–420)
PMV BLD AUTO: 10.8 FL (ref 9.2–11.8)
POTASSIUM SERPL-SCNC: 3.6 MMOL/L (ref 3.5–5.5)
PROT SERPL-MCNC: 6.9 G/DL (ref 6.4–8.2)
RBC # BLD AUTO: 4.36 M/UL (ref 4.7–5.5)
SERVICE CMNT-IMP: ABNORMAL
SERVICE CMNT-IMP: NORMAL
SERVICE CMNT-IMP: NORMAL
SODIUM SERPL-SCNC: 143 MMOL/L (ref 136–145)
WBC # BLD AUTO: 5.8 K/UL (ref 4.6–13.2)

## 2019-01-28 PROCEDURE — 87040 BLOOD CULTURE FOR BACTERIA: CPT

## 2019-01-28 PROCEDURE — 74011250637 HC RX REV CODE- 250/637: Performed by: HOSPITALIST

## 2019-01-28 PROCEDURE — 94640 AIRWAY INHALATION TREATMENT: CPT

## 2019-01-28 PROCEDURE — 74011000258 HC RX REV CODE- 258: Performed by: INTERNAL MEDICINE

## 2019-01-28 PROCEDURE — 83735 ASSAY OF MAGNESIUM: CPT

## 2019-01-28 PROCEDURE — 74011250636 HC RX REV CODE- 250/636: Performed by: INTERNAL MEDICINE

## 2019-01-28 PROCEDURE — 80053 COMPREHEN METABOLIC PANEL: CPT

## 2019-01-28 PROCEDURE — 74011250636 HC RX REV CODE- 250/636: Performed by: NURSE PRACTITIONER

## 2019-01-28 PROCEDURE — 65270000029 HC RM PRIVATE

## 2019-01-28 PROCEDURE — 85025 COMPLETE CBC W/AUTO DIFF WBC: CPT

## 2019-01-28 PROCEDURE — 77010033678 HC OXYGEN DAILY

## 2019-01-28 PROCEDURE — 74011000250 HC RX REV CODE- 250: Performed by: INTERNAL MEDICINE

## 2019-01-28 PROCEDURE — C9113 INJ PANTOPRAZOLE SODIUM, VIA: HCPCS | Performed by: NURSE PRACTITIONER

## 2019-01-28 PROCEDURE — 74011000250 HC RX REV CODE- 250: Performed by: NURSE PRACTITIONER

## 2019-01-28 PROCEDURE — 36415 COLL VENOUS BLD VENIPUNCTURE: CPT

## 2019-01-28 PROCEDURE — 74011250637 HC RX REV CODE- 250/637: Performed by: INTERNAL MEDICINE

## 2019-01-28 PROCEDURE — 82330 ASSAY OF CALCIUM: CPT

## 2019-01-28 PROCEDURE — 84100 ASSAY OF PHOSPHORUS: CPT

## 2019-01-28 PROCEDURE — 74011250636 HC RX REV CODE- 250/636: Performed by: HOSPITALIST

## 2019-01-28 RX ORDER — TRAMADOL HYDROCHLORIDE 50 MG/1
50 TABLET ORAL
Status: COMPLETED | OUTPATIENT
Start: 2019-01-28 | End: 2019-01-28

## 2019-01-28 RX ORDER — PANTOPRAZOLE SODIUM 40 MG/1
40 TABLET, DELAYED RELEASE ORAL DAILY
Status: DISCONTINUED | OUTPATIENT
Start: 2019-01-29 | End: 2019-02-23 | Stop reason: HOSPADM

## 2019-01-28 RX ORDER — CIPROFLOXACIN 500 MG/1
500 TABLET ORAL EVERY 12 HOURS
Status: DISCONTINUED | OUTPATIENT
Start: 2019-01-28 | End: 2019-02-05

## 2019-01-28 RX ADMIN — TRAMADOL HYDROCHLORIDE 50 MG: 50 TABLET, FILM COATED ORAL at 07:26

## 2019-01-28 RX ADMIN — GUAIFENESIN 400 MG: 100 SOLUTION ORAL at 03:52

## 2019-01-28 RX ADMIN — GABAPENTIN 400 MG: 100 CAPSULE ORAL at 08:30

## 2019-01-28 RX ADMIN — HEPARIN SODIUM 5000 UNITS: 5000 INJECTION INTRAVENOUS; SUBCUTANEOUS at 01:51

## 2019-01-28 RX ADMIN — GUAIFENESIN 400 MG: 100 SOLUTION ORAL at 20:01

## 2019-01-28 RX ADMIN — ACETAMINOPHEN 650 MG: 325 TABLET ORAL at 01:56

## 2019-01-28 RX ADMIN — IPRATROPIUM BROMIDE AND ALBUTEROL SULFATE 3 ML: .5; 3 SOLUTION RESPIRATORY (INHALATION) at 12:15

## 2019-01-28 RX ADMIN — METOPROLOL TARTRATE 50 MG: 50 TABLET ORAL at 08:30

## 2019-01-28 RX ADMIN — SIMVASTATIN 10 MG: 10 TABLET, FILM COATED ORAL at 21:12

## 2019-01-28 RX ADMIN — CIPROFLOXACIN 500 MG: 500 TABLET, FILM COATED ORAL at 21:12

## 2019-01-28 RX ADMIN — HEPARIN SODIUM 5000 UNITS: 5000 INJECTION INTRAVENOUS; SUBCUTANEOUS at 10:00

## 2019-01-28 RX ADMIN — ACETAMINOPHEN 650 MG: 325 TABLET ORAL at 15:58

## 2019-01-28 RX ADMIN — GABAPENTIN 400 MG: 100 CAPSULE ORAL at 21:12

## 2019-01-28 RX ADMIN — IPRATROPIUM BROMIDE AND ALBUTEROL SULFATE 3 ML: .5; 3 SOLUTION RESPIRATORY (INHALATION) at 20:04

## 2019-01-28 RX ADMIN — CIPROFLOXACIN 400 MG: 2 INJECTION, SOLUTION INTRAVENOUS at 10:51

## 2019-01-28 RX ADMIN — GUAIFENESIN 400 MG: 100 SOLUTION ORAL at 08:30

## 2019-01-28 RX ADMIN — CEFTAZIDIME 1 G: 1 INJECTION, POWDER, FOR SOLUTION INTRAMUSCULAR; INTRAVENOUS at 01:51

## 2019-01-28 RX ADMIN — LISINOPRIL 20 MG: 20 TABLET ORAL at 08:30

## 2019-01-28 RX ADMIN — HEPARIN SODIUM 5000 UNITS: 5000 INJECTION INTRAVENOUS; SUBCUTANEOUS at 17:18

## 2019-01-28 RX ADMIN — METOPROLOL TARTRATE 50 MG: 50 TABLET ORAL at 17:17

## 2019-01-28 RX ADMIN — GABAPENTIN 400 MG: 100 CAPSULE ORAL at 15:58

## 2019-01-28 RX ADMIN — CEFTAZIDIME 1 G: 1 INJECTION, POWDER, FOR SOLUTION INTRAMUSCULAR; INTRAVENOUS at 10:51

## 2019-01-28 RX ADMIN — SODIUM CHLORIDE 40 MG: 9 INJECTION, SOLUTION INTRAMUSCULAR; INTRAVENOUS; SUBCUTANEOUS at 08:30

## 2019-01-28 RX ADMIN — IPRATROPIUM BROMIDE AND ALBUTEROL SULFATE 3 ML: .5; 3 SOLUTION RESPIRATORY (INHALATION) at 09:51

## 2019-01-28 RX ADMIN — AMLODIPINE BESYLATE 10 MG: 10 TABLET ORAL at 08:30

## 2019-01-28 RX ADMIN — GUAIFENESIN 400 MG: 100 SOLUTION ORAL at 15:58

## 2019-01-28 RX ADMIN — IPRATROPIUM BROMIDE AND ALBUTEROL SULFATE 3 ML: .5; 3 SOLUTION RESPIRATORY (INHALATION) at 15:32

## 2019-01-28 RX ADMIN — CEFTAZIDIME 1 G: 1 INJECTION, POWDER, FOR SOLUTION INTRAMUSCULAR; INTRAVENOUS at 17:17

## 2019-01-28 NOTE — PROGRESS NOTES
Spoke with patient in room, He stated that he does not want LTC. Plan is to return home with home health and his already established personal care.

## 2019-01-28 NOTE — PROGRESS NOTES
Progress Note Patient: Brenda Celestin               Sex: male          DOA: 1/21/2019 YOB: 1950      Age:  77 y.o.        LOS:  LOS: 8 days CHIEF COMPLAINT:  Discitis Subjective:  
 
Patient awake and talking No distress Objective:  
  
Visit Vitals /70 Pulse 78 Temp 97.5 °F (36.4 °C) Resp 18 Ht 6' (1.829 m) Wt 102.3 kg (225 lb 9.4 oz) SpO2 96% BMI 30.60 kg/m² Physical Exam: 
Gen:  No distress, no complaint Lungs:  Clear bilaterally, no wheeze or rhonchi Heart:  Regular rate and rhythm, no murmurs or gallops Abdomen:  Soft, non-tender, normal bowel sounds Lab/Data Reviewed: All lab results for the last 24 hours reviewed. Assessment/Plan Principal Problem: 
  Bacteremia (1/21/2019) Active Problems: 
  Back pain (11/14/2018) Metabolic encephalopathy (1/99/2044) Overview: Associated with sepsis, present on admission. Discitis of thoracic region (11/14/2018) Hypertension (11/14/2018) Hepatitis C (11/14/2018) Bilateral pleural effusion (11/14/2018) Acute on chronic respiratory failure with hypoxia ([address] (12/23/2018) PNA (pneumonia) (1/26/2019) Plan: 
Continue with long term antibiotics BP control Follow respiratory status, patient needs some sort of CPap at night DVT prophylaxis.

## 2019-01-28 NOTE — PROGRESS NOTES
Problem: Falls - Risk of 
Goal: *Absence of Falls Document Itz Masters Fall Risk and appropriate interventions in the flowsheet. Outcome: Progressing Towards Goal 
Fall Risk Interventions: 
Mobility Interventions: Communicate number of staff needed for ambulation/transfer Mentation Interventions: Adequate sleep, hydration, pain control, Door open when patient unattended, Evaluate medications/consider consulting pharmacy, More frequent rounding, Reorient patient, Room close to nurse's station, Update white board Medication Interventions: Evaluate medications/consider consulting pharmacy, Patient to call before getting OOB Elimination Interventions: Call light in reach, Patient to call for help with toileting needs History of Falls Interventions: Door open when patient unattended, Room close to nurse's station Problem: Pressure Injury - Risk of 
Goal: *Prevention of pressure injury Document Raul Scale and appropriate interventions in the flowsheet. Outcome: Progressing Towards Goal 
Pressure Injury Interventions: 
Sensory Interventions: Assess changes in LOC, Keep linens dry and wrinkle-free, Pressure redistribution bed/mattress (bed type) Moisture Interventions: Absorbent underpads, Maintain skin hydration (lotion/cream) Activity Interventions: Pressure redistribution bed/mattress(bed type) Mobility Interventions: HOB 30 degrees or less, Pressure redistribution bed/mattress (bed type) Nutrition Interventions: Document food/fluid/supplement intake Friction and Shear Interventions: HOB 30 degrees or less

## 2019-01-28 NOTE — PROGRESS NOTES
INFECTIOUS DISEASE FOLLOW UP NOTE :-  
 
 
 
Admit Date: 1/21/2019 ABX:  
  
Current abx Prior abx  
ceftaz 1/23-5 
cipro 1/23  -5 Cefepime/vanco  11/14   2, Ceftriaxone 11/16 - 3; Zosyn 1/21-2  
  
Assessment -> Rec:  
  
BSI 2 of 2 Serratia marcescens 
- source ? Discitis Phlegmon seen on CT 
- denies any urinary complain but had Serratia in urine in past. Currently UA is negative - Ucx was not sent but CT with normal kidneys and nothing sig on CT and pt asymptomatic except some incontinence -  continue on Ceftaz/cipro to cover Serratia 
- repeat Blcx 1/22 1/2 positive - will repeat blcx again today Destructive T10-11 changes ?discitis/OM- chronic back pain 
- mid back pain x 5 months, fell, incontinent of urine - CTAP 11/2018: severe destructive changes T 10-11, paravertebral sot tissue infiltration, extension to anterior epidural space w/ cord compression  
- h/o IVDU - last use in July 2018 
- H/O uncooperative for MRI given claustrophobia in past 
- Bone/ gallium scans 11/27 neg for discitis/OM  
- refused stabilization procedure per Dr. James Smith in past 
- was not given long term Abx then 
- CT 1/21 with destructive changes T9-10 levels c/w discitis/OM and intraspinal and paraspinal phlegmon. Overall appearance is grossly unchanged and pain also not different from last time -> inflammatory markers: esr 38, crp 7.4 
-> s/p aspiration of fluid/phlegmon 1/23- await results though pt was on BSabx which can alter the results 
-> Abx as above 
-> will need long term Abx ?6-8 weeks Acute hypoxic resp distress- RRT called 1/23 and again 1/24 sec to hypercapnic resp failure - off BIPAP now on NC Bilateral pleural effusions- chronic with  atelectasis - monitor Hepatomegaly, splenic hilar & perisplenic varices- concern for cirrhosis and portal HTN 
- seen on CTAP again    
H/o IVDU - HIV ab NR 11/19 ,Hep BsAg neg (last reported ivdu August 2018, has hep C and thinks hep b immune) -> denies anything currently 
-> will be difficult to decide for PICC candidate or not given IVDU June 2018 
   
COPD    
HTN    
DJD    
Allergy [place]    
  
MICROBIOLOGY:  
11/14   urcx >100,000 Serratia marcescens             blcx NG x 2 
11/15   CrAG neg, fungitell 375 (reported 11/17) 
11/29   blcx for fungus - ngtd 
            fungitell 113 
  
1/21     Blcx x2 Serratia marcescens 1/21     UA neg 
1/22     Blcx x 2 1/2 Serratia 1/23 Aspiration cx g/s neg, Cx NTD,NOMAN IP 
  
LINES AND CATHETERS:  
piv 
  
 
MEDICATIONS:  
 
Current Facility-Administered Medications Medication Dose Route Frequency  ciprofloxacin HCl (CIPRO) tablet 500 mg  500 mg Oral Q12H  
 [START ON 1/29/2019] pantoprazole (PROTONIX) tablet 40 mg  40 mg Oral DAILY  albuterol-ipratropium (DUO-NEB) 2.5 MG-0.5 MG/3 ML  3 mL Nebulization QID RT  
 guaiFENesin (ROBITUSSIN) 100 mg/5 mL oral liquid 400 mg  400 mg Oral Q4H  
 cefTAZidime (FORTAZ) 1 g in 0.9% sodium chloride (MBP/ADV) 50 mL MBP  1 g IntraVENous Q8H  
 lidocaine (PF) (XYLOCAINE) 10 mg/mL (1 %) injection 20 mL  20 mL SubCUTAneous Rad Multiple  amLODIPine (NORVASC) tablet 10 mg  10 mg Oral DAILY  gabapentin (NEURONTIN) capsule 400 mg  400 mg Oral TID  lisinopril (PRINIVIL, ZESTRIL) tablet 20 mg  20 mg Oral DAILY  metoprolol tartrate (LOPRESSOR) tablet 50 mg  50 mg Oral BID  simvastatin (ZOCOR) tablet 10 mg  10 mg Oral QHS  acetaminophen (TYLENOL) tablet 650 mg  650 mg Oral Q6H PRN  
 ondansetron (ZOFRAN) injection 4 mg  4 mg IntraVENous Q6H PRN  
 heparin (porcine) injection 5,000 Units  5,000 Units SubCUTAneous Q8H SUBJECTIVE :  
 
Interval notes reviewed. Pt out of ICU, afebrile, c/o back pain and wanting pain medication, currently getting nebs currently. OBJECTIVE Visit Vitals /66 Pulse 74 Temp 98.4 °F (36.9 °C) Resp 22 Ht 6' (1.829 m) Wt 101.1 kg (222 lb 14.2 oz) SpO2 96% BMI 30.23 kg/m² Temp (24hrs), Av.1 °F (36.7 °C), Min:97.4 °F (36.3 °C), Max:98.6 °F (37 °C) Gen:on bipap, awake and alert HENT: getting nebs, couldn't evaluate, pupils equal and reactive Chest: Symmetric expansion, wheezing+ CV:S1S2 RR, tachy, No JVD, No edema Abd:Soft, NT, ND, BS+ Skin:No jaundice, cyanosis, clubbing. No decubitus Muscsk: Normal muscle tone/strength CNS: AA and follows command Labs: Results:  
Chemistry Recent Labs  
  19 
6116 19 
0240 19 
4029 GLU 89 99 86  142 143  
K 3.6 3.9 3.6  101 101 CO2 37* 37* 37* BUN 10 13 19* CREA 0.67 0.58* 0.48* CA 8.2* 8.2* 7.5* AGAP 5 4 5 BUCR 15 22* 40*   --   --   
TP 6.9  --   --   
ALB 2.1*  --   --   
GLOB 4.8*  --   --   
AGRAT 0.4*  --   --   
  
CBC w/Diff Recent Labs  
  19 
0508 19 
0240 19 
0420 WBC 5.8 6.7 5.5  
RBC 4.36* 4.37* 4.07* HGB 12.1* 12.3* 11.5* HCT 39.5 38.8 36.7  156 136 GRANS 56 67 58 LYMPH 37 27 33 EOS 1 0 1 RADIOLOGY :   
 
 
 
Imaging Results from Hospital Encounter encounter on 19 XR CHEST PORT Narrative EXAM: CHEST RADIOGRAPH, SINGLE VIEW CLINICAL INDICATION/HISTORY: Tube Placement COMPARISON: 2019 TECHNIQUE: Portable frontal view of the chest was obtained.  
 
_______________ FINDINGS: 
 
SUPPORT DEVICES: Cardiac leads and wires overlie the anterior chest. There is no 
visualized tube given indication of tube placement. HEART AND MEDIASTINUM: Cardiomediastinal silhouette appears unchanged. Tortuous 
and atherosclerotic thoracic aorta. Indistinct central pulmonary vasculature, 
unchanged. LUNGS AND PLEURAL SPACES: Low lung volumes with layering right greater than left 
pleural effusions, each with adjacent basilar patchy opacity. Diffusely increased interstitial markings, especially throughout perihilar and basilar 
distributions appear unchanged. No pneumothorax. BONY THORAX AND SOFT TISSUES: No acute osseous abnormality. _______________ Impression IMPRESSION: 
 
1. No visualized tube given provided history of tube placement. 2. Unchanged cardiomegaly with right greater than left pleural effusions, 
adjacent basilar atelectasis/infiltrate and mild pulmonary interstitial edema. Results from Hospital Encounter encounter on 01/21/19 CT GUIDE CATH FLUID DRAIN SOFT TISSUE Narrative CT GUIDED ASPIRATION OF T10-11 disc space:  
 
Indication: Discitis T10-11. COMPARISON: CTA chest, abdomen and pelvis 1/21/2019. CTA chest 12/23/2018 Technique/findings: After the procedure, as well as its risks, benefits and 
alternatives were explained to the patient and his daughter, and all questions 
answered, written informed consent was obtained from the daughter and witnessed. Procedure was performed using only local anesthesia. The fluid collection in the T10-11 disc space was localized under CT guidance. The skin was marked, prepped and draped in sterile fashion and bicarbonate 
buffered lidocaine was used for local anesthesia. An 19 gauge needle was 
advanced into the collection. A total of 3-4 ml of bloody fluid was aspirated. The fluid was sent for laboratory evaluation. The patient tolerated the 
procedure well and there were no immediate complications. Standard post 
procedure pause. One or more dose reduction techniques were used on this CT: automated exposure 
control, adjustment of the mAs and/or kVp according to patient size, and 
iterative reconstruction techniques. The specific techniques used on this CT 
exam have been documented in the patient's electronic medical record. Digital 
Imaging and Communications in Medicine (DICOM) format image data are available to nonaffiliated external healthcare facilities or entities on a secure, media 
free, reciprocally searchable basis with patient authorization for at least a 
12-month period after this study. GUIDANCE: CT guidance was used to position (and confirm the position of) the 
needle. Image(s) saved in PACS: CT Impression IMPRESSION: 
 
Successful CT guided aspiration of the T10-11 disc space. I have independently examined the patient and reviewed lab studies and imgaing as well as review of nursing notes and physican notes from the past 24 hours. Earle Alvarado MD 
January 28, 2019 [facility] [phone]

## 2019-01-28 NOTE — PROGRESS NOTES
1944  [facility] in bed resting comfortably. 2lnc in place. NSR on telemetry box # 15. Piv # 20 to right arm  Intact. Call bell within reach, side rails up x 3, bed low and locked. No complaints offered, pt instructed to call for assistance. 7241  Pt with complaint of pain to lower back call placed to hospitalist new order for tramadol 50 mg x 1 dose. 4540  Bedside and Verbal shift change report given to Renee Yeboah (oncoming nurse) by Rin Lion (offgoing nurse). Report included the following information SBAR, Kardex, Intake/Output, MAR, Recent Results and Cardiac Rhythm NSR.

## 2019-01-29 PROBLEM — G93.41 METABOLIC ENCEPHALOPATHY: Status: ACTIVE | Noted: 2019-01-29

## 2019-01-29 LAB
ANION GAP SERPL CALC-SCNC: 0 MMOL/L (ref 3–18)
BACTERIA SPEC CULT: ABNORMAL
BACTERIA SPEC CULT: ABNORMAL
BASOPHILS # BLD: 0 K/UL (ref 0–0.1)
BASOPHILS NFR BLD: 0 % (ref 0–2)
BUN SERPL-MCNC: 8 MG/DL (ref 7–18)
BUN/CREAT SERPL: 13 (ref 12–20)
CA-I SERPL-SCNC: 1.17 MMOL/L (ref 1.12–1.32)
CALCIUM SERPL-MCNC: 8.4 MG/DL (ref 8.5–10.1)
CHLORIDE SERPL-SCNC: 103 MMOL/L (ref 100–108)
CO2 SERPL-SCNC: 39 MMOL/L (ref 21–32)
CREAT SERPL-MCNC: 0.64 MG/DL (ref 0.6–1.3)
DIFFERENTIAL METHOD BLD: ABNORMAL
EOSINOPHIL # BLD: 0 K/UL (ref 0–0.4)
EOSINOPHIL NFR BLD: 1 % (ref 0–5)
ERYTHROCYTE [DISTWIDTH] IN BLOOD BY AUTOMATED COUNT: 15.5 % (ref 11.6–14.5)
GLUCOSE SERPL-MCNC: 77 MG/DL (ref 74–99)
HCT VFR BLD AUTO: 41 % (ref 36–48)
HGB BLD-MCNC: 12.5 G/DL (ref 13–16)
LYMPHOCYTES # BLD: 2.4 K/UL (ref 0.9–3.6)
LYMPHOCYTES NFR BLD: 33 % (ref 21–52)
MAGNESIUM SERPL-MCNC: 1.9 MG/DL (ref 1.6–2.6)
MCH RBC QN AUTO: 28.3 PG (ref 24–34)
MCHC RBC AUTO-ENTMCNC: 30.5 G/DL (ref 31–37)
MCV RBC AUTO: 92.8 FL (ref 74–97)
MONOCYTES # BLD: 0.7 K/UL (ref 0.05–1.2)
MONOCYTES NFR BLD: 10 % (ref 3–10)
NEUTS SEG # BLD: 4 K/UL (ref 1.8–8)
NEUTS SEG NFR BLD: 56 % (ref 40–73)
PHOSPHATE SERPL-MCNC: 3.1 MG/DL (ref 2.5–4.9)
PLATELET # BLD AUTO: 148 K/UL (ref 135–420)
PMV BLD AUTO: 10.8 FL (ref 9.2–11.8)
POTASSIUM SERPL-SCNC: 4 MMOL/L (ref 3.5–5.5)
RBC # BLD AUTO: 4.42 M/UL (ref 4.7–5.5)
SERVICE CMNT-IMP: ABNORMAL
SODIUM SERPL-SCNC: 142 MMOL/L (ref 136–145)
WBC # BLD AUTO: 7.2 K/UL (ref 4.6–13.2)

## 2019-01-29 PROCEDURE — 84100 ASSAY OF PHOSPHORUS: CPT

## 2019-01-29 PROCEDURE — 83735 ASSAY OF MAGNESIUM: CPT

## 2019-01-29 PROCEDURE — 74011000250 HC RX REV CODE- 250: Performed by: INTERNAL MEDICINE

## 2019-01-29 PROCEDURE — 94640 AIRWAY INHALATION TREATMENT: CPT

## 2019-01-29 PROCEDURE — 74011250636 HC RX REV CODE- 250/636: Performed by: HOSPITALIST

## 2019-01-29 PROCEDURE — 77010033678 HC OXYGEN DAILY

## 2019-01-29 PROCEDURE — 85025 COMPLETE CBC W/AUTO DIFF WBC: CPT

## 2019-01-29 PROCEDURE — 74011250637 HC RX REV CODE- 250/637: Performed by: HOSPITALIST

## 2019-01-29 PROCEDURE — 74011250636 HC RX REV CODE- 250/636: Performed by: INTERNAL MEDICINE

## 2019-01-29 PROCEDURE — 94760 N-INVAS EAR/PLS OXIMETRY 1: CPT

## 2019-01-29 PROCEDURE — 80048 BASIC METABOLIC PNL TOTAL CA: CPT

## 2019-01-29 PROCEDURE — 36415 COLL VENOUS BLD VENIPUNCTURE: CPT

## 2019-01-29 PROCEDURE — 65270000029 HC RM PRIVATE

## 2019-01-29 PROCEDURE — 74011000258 HC RX REV CODE- 258: Performed by: INTERNAL MEDICINE

## 2019-01-29 PROCEDURE — 82330 ASSAY OF CALCIUM: CPT

## 2019-01-29 PROCEDURE — 74011250637 HC RX REV CODE- 250/637: Performed by: INTERNAL MEDICINE

## 2019-01-29 RX ADMIN — METOPROLOL TARTRATE 50 MG: 50 TABLET ORAL at 08:43

## 2019-01-29 RX ADMIN — METOPROLOL TARTRATE 50 MG: 50 TABLET ORAL at 17:13

## 2019-01-29 RX ADMIN — HEPARIN SODIUM 5000 UNITS: 5000 INJECTION INTRAVENOUS; SUBCUTANEOUS at 01:45

## 2019-01-29 RX ADMIN — GUAIFENESIN 400 MG: 100 SOLUTION ORAL at 17:13

## 2019-01-29 RX ADMIN — ACETAMINOPHEN 650 MG: 325 TABLET ORAL at 21:25

## 2019-01-29 RX ADMIN — SIMVASTATIN 10 MG: 10 TABLET, FILM COATED ORAL at 21:25

## 2019-01-29 RX ADMIN — HEPARIN SODIUM 5000 UNITS: 5000 INJECTION INTRAVENOUS; SUBCUTANEOUS at 17:13

## 2019-01-29 RX ADMIN — GUAIFENESIN 400 MG: 100 SOLUTION ORAL at 12:03

## 2019-01-29 RX ADMIN — ACETAMINOPHEN 650 MG: 325 TABLET ORAL at 12:03

## 2019-01-29 RX ADMIN — CEFTAZIDIME 1 G: 1 INJECTION, POWDER, FOR SOLUTION INTRAMUSCULAR; INTRAVENOUS at 10:00

## 2019-01-29 RX ADMIN — GUAIFENESIN 400 MG: 100 SOLUTION ORAL at 04:22

## 2019-01-29 RX ADMIN — AMLODIPINE BESYLATE 10 MG: 10 TABLET ORAL at 08:42

## 2019-01-29 RX ADMIN — GUAIFENESIN 400 MG: 100 SOLUTION ORAL at 08:43

## 2019-01-29 RX ADMIN — GABAPENTIN 400 MG: 100 CAPSULE ORAL at 08:43

## 2019-01-29 RX ADMIN — PANTOPRAZOLE SODIUM 40 MG: 40 TABLET, DELAYED RELEASE ORAL at 08:43

## 2019-01-29 RX ADMIN — LISINOPRIL 20 MG: 20 TABLET ORAL at 08:43

## 2019-01-29 RX ADMIN — IPRATROPIUM BROMIDE AND ALBUTEROL SULFATE 3 ML: .5; 3 SOLUTION RESPIRATORY (INHALATION) at 07:51

## 2019-01-29 RX ADMIN — GABAPENTIN 400 MG: 100 CAPSULE ORAL at 17:13

## 2019-01-29 RX ADMIN — IPRATROPIUM BROMIDE AND ALBUTEROL SULFATE 3 ML: .5; 3 SOLUTION RESPIRATORY (INHALATION) at 20:53

## 2019-01-29 RX ADMIN — ACETAMINOPHEN 650 MG: 325 TABLET ORAL at 04:19

## 2019-01-29 RX ADMIN — GUAIFENESIN 400 MG: 100 SOLUTION ORAL at 21:25

## 2019-01-29 RX ADMIN — CEFTAZIDIME 1 G: 1 INJECTION, POWDER, FOR SOLUTION INTRAMUSCULAR; INTRAVENOUS at 17:17

## 2019-01-29 RX ADMIN — CIPROFLOXACIN 500 MG: 500 TABLET, FILM COATED ORAL at 08:43

## 2019-01-29 RX ADMIN — IPRATROPIUM BROMIDE AND ALBUTEROL SULFATE 3 ML: .5; 3 SOLUTION RESPIRATORY (INHALATION) at 15:14

## 2019-01-29 RX ADMIN — IPRATROPIUM BROMIDE AND ALBUTEROL SULFATE 3 ML: .5; 3 SOLUTION RESPIRATORY (INHALATION) at 11:35

## 2019-01-29 RX ADMIN — GABAPENTIN 400 MG: 100 CAPSULE ORAL at 21:25

## 2019-01-29 RX ADMIN — CEFTAZIDIME 1 G: 1 INJECTION, POWDER, FOR SOLUTION INTRAMUSCULAR; INTRAVENOUS at 01:42

## 2019-01-29 RX ADMIN — CIPROFLOXACIN 500 MG: 500 TABLET, FILM COATED ORAL at 21:24

## 2019-01-29 NOTE — PROGRESS NOTES
Patient is unable to communicate at this time as he is resting peacefully at present.  offered prayer and left Spiritual Care brochure. Chaplains will continue to follow and will provide pastoral care on an as needed/requested basis. Rupert Simon Board Certified [employer] [facility]  
[phone]

## 2019-01-29 NOTE — PROGRESS NOTES
Day 4 of attempting OT evaluation. Pt refusing 2 attempts secondary to 10/10 pain. Pt agreeable to attempt in the AM on 1/30/19. Will follow up. Kimberly Howard MS OTR/L Office Ext: [phone] Pager: [phone]

## 2019-01-29 NOTE — PROGRESS NOTES
Bedside shift change report given to me (oncoming nurse) by Chandler Rico (offgoing nurse). Report included the following information SBAR, Kardex, Intake/Output, MAR and Cardiac Rhythm NSR. 
 
1945  Shift assessment completed. 0000  Pt asleep, no distress noted.  
 
Bedside shift report given to Marci Aceves, RN

## 2019-01-29 NOTE — PROGRESS NOTES
INFECTIOUS DISEASE FOLLOW UP NOTE :-  
 
 
 
Admit Date: 1/21/2019s Current abx Prior abx  
ceftaz 1/23-6, cipro 1/23-6 Cefepime/vanco  11/14   2, Ceftriaxone 11/16 - 3; Zosyn 1/21-2  
  
Assessment -> Rec:  
  
BSI 2 of 2 Serratia marcescens 1/21 
- source ~ Discitis Phlegmon seen on CT- NEW IR aspiration 1/23 chucky culture grew Serratia  
- denies any urinary complain but had Serratia in urine in past. Currently UA is negative - Ucx was not sent but CT with normal kidneys and nothing sig on CT and pt asymptomatic except some incontinenc 
- repeat Blcx 1/22 1/2 positive  -  continue on Ceftaz/cipro to cover Serratia 
->monitor repeat blcx from 1/28- if further positive check echo Destructive T10-11 changes NEW discitis/OM 
- mid back pain x 5 months, fell, incontinent of urine - CTAP 11/2018: severe destructive changes T 10-11, paravertebral sot tissue infiltration, extension to anterior epidural space w/ cord compression  
- h/o IVDU - last use in July 2018 
- H/O uncooperative for MRI given claustrophobia in past 
- Bone/ gallium scans 11/27 neg for discitis/OM  
- refused stabilization procedure per Dr. Kiarra Smith in past 
- was not given long term Abx then 
- CT 1/21 with destructive changes T9-10 levels c/w discitis/OM and intraspinal and paraspinal phlegmon. Overall appearance is grossly unchanged and pain also not different from last time 
-inflammatory markers: esr 38, crp 7.4 
- s/p aspiration of fluid/phlegmon 1/23 NEW (+) Serratia in anaerobic culture  ->monitor cultures  
-> Abx as above 
-> will need long term Abx ?6-8 weeks 
->any need for NSGY f/u? --our notes indicate refused stabilization surgery previously Acute hypoxic resp distress- RRT called 1/23 and again 1/24 sec to hypercapnic resp failure - off BIPAP now on NC Bilateral pleural effusions- chronic with  atelectasis - monitor Hepatomegaly, splenic hilar & perisplenic varices- concern for cirrhosis and portal HTN 
- seen on CTAP again    
H/o IVDU 
- HIV ab NR 11/19 ,Hep BsAg neg (last reported ivdu August 2018, has hep C and thinks hep b immune) - denies anything currently 
-> will be difficult to decide for PICC candidate or not given IVDU June 2018 
   
COPD    
HTN    
DJD    
Allergy [place]    
  
MICROBIOLOGY:  
11/14   urcx >100,000 Serratia marcescens             blcx NG x 2 
11/15   CrAG neg, fungitell 375 (reported 11/17) 
11/29   blcx for fungus - ngtd 
            fungitell 113 
  
1/21     Blcx x2 Serratia marcescens R cefazolin UA neg 
1/22 Blcx 1 of 2 Serratia 1/23 Aspiration cx g/s neg, Cx NG 
 NOMAN rare Serratia R Cefazolin Fungal NOS 
1/28  bctx x 2 ngtd 
  
LINES AND CATHETERS:  
piv 
  
 
MEDICATIONS:  
 
Current Facility-Administered Medications Medication Dose Route Frequency  ciprofloxacin HCl (CIPRO) tablet 500 mg  500 mg Oral Q12H  pantoprazole (PROTONIX) tablet 40 mg  40 mg Oral DAILY  albuterol-ipratropium (DUO-NEB) 2.5 MG-0.5 MG/3 ML  3 mL Nebulization QID RT  
 guaiFENesin (ROBITUSSIN) 100 mg/5 mL oral liquid 400 mg  400 mg Oral Q4H  
 cefTAZidime (FORTAZ) 1 g in 0.9% sodium chloride (MBP/ADV) 50 mL MBP  1 g IntraVENous Q8H  
 lidocaine (PF) (XYLOCAINE) 10 mg/mL (1 %) injection 20 mL  20 mL SubCUTAneous Rad Multiple  amLODIPine (NORVASC) tablet 10 mg  10 mg Oral DAILY  gabapentin (NEURONTIN) capsule 400 mg  400 mg Oral TID  lisinopril (PRINIVIL, ZESTRIL) tablet 20 mg  20 mg Oral DAILY  metoprolol tartrate (LOPRESSOR) tablet 50 mg  50 mg Oral BID  simvastatin (ZOCOR) tablet 10 mg  10 mg Oral QHS  acetaminophen (TYLENOL) tablet 650 mg  650 mg Oral Q6H PRN  
 ondansetron (ZOFRAN) injection 4 mg  4 mg IntraVENous Q6H PRN  
 heparin (porcine) injection 5,000 Units  5,000 Units SubCUTAneous Q8H SUBJECTIVE :  
 
 Interval notes reviewed. D/w pt today IR aspiration grew Serratia anaerobically from , will need long-term abx. Says breathing is ok denied fever or rash, says some GI upset initially with po cipro now better OBJECTIVE Visit Vitals /70 Pulse 78 Temp 97.5 °F (36.4 °C) Resp 18 Ht 6' (1.829 m) Wt 102.3 kg (225 lb 9.4 oz) SpO2 95% BMI 30.60 kg/m² Temp (24hrs), Av.3 °F (36.3 °C), Min:96.6 °F (35.9 °C), Max:97.8 °F (36.6 °C) Gen:on NC O2, awake and alert HEENT: muddy sclerae, pupils equal and reactive Chest: Symmetric expansion, wheezing+ CVS:S1S2 RR, tachy, No JVD, No edema Abd:Soft, NT, ND, BS+ Skin:No jaundice, cyanosis, clubbing. No decubitus Muscsk: Normal muscle tone/strength CNS: AA and follows command Labs: Results:  
Chemistry Recent Labs  
  19 
0715 01/28/19 
2004 01/27/19 
0240 GLU 77 89 99  143 142  
K 4.0 3.6 3.9  101 101 CO2 39* 37* 37* BUN 8 10 13 CREA 0.64 0.67 0.58* CA 8.4* 8.2* 8.2* AGAP 0* 5 4 BUCR 13 15 22* AP  --  114  --   
TP  --  6.9  --   
ALB  --  2.1*  --   
GLOB  --  4.8*  --   
AGRAT  --  0.4*  --   
  
CBC w/Diff Recent Labs  
  19 
0715 01/28/19 
0508 19 
0240 WBC 7.2 5.8 6.7  
RBC 4.42* 4.36* 4.37* HGB 12.5* 12.1* 12.3*  
HCT 41.0 39.5 38.8  147 156 GRANS 56 56 67 LYMPH 33 37 27 EOS 1 1 0 RADIOLOGY :   
 
 
 
Imaging Results from Hospital Encounter encounter on 19 XR CHEST PORT Narrative EXAM: CHEST RADIOGRAPH, SINGLE VIEW CLINICAL INDICATION/HISTORY: Tube Placement COMPARISON: 2019 TECHNIQUE: Portable frontal view of the chest was obtained.  
 
_______________ FINDINGS: 
 
SUPPORT DEVICES: Cardiac leads and wires overlie the anterior chest. There is no 
visualized tube given indication of tube placement. HEART AND MEDIASTINUM: Cardiomediastinal silhouette appears unchanged. Tortuous and atherosclerotic thoracic aorta. Indistinct central pulmonary vasculature, 
unchanged. LUNGS AND PLEURAL SPACES: Low lung volumes with layering right greater than left 
pleural effusions, each with adjacent basilar patchy opacity. Diffusely 
increased interstitial markings, especially throughout perihilar and basilar 
distributions appear unchanged. No pneumothorax. BONY THORAX AND SOFT TISSUES: No acute osseous abnormality. _______________ Impression IMPRESSION: 
 
1. No visualized tube given provided history of tube placement. 2. Unchanged cardiomegaly with right greater than left pleural effusions, 
adjacent basilar atelectasis/infiltrate and mild pulmonary interstitial edema. Results from Hospital Encounter encounter on 01/21/19 CT GUIDE CATH FLUID DRAIN SOFT TISSUE Narrative CT GUIDED ASPIRATION OF T10-11 disc space:  
 
Indication: Discitis T10-11. COMPARISON: CTA chest, abdomen and pelvis 1/21/2019. CTA chest 12/23/2018 Technique/findings: After the procedure, as well as its risks, benefits and 
alternatives were explained to the patient and his daughter, and all questions 
answered, written informed consent was obtained from the daughter and witnessed. Procedure was performed using only local anesthesia. The fluid collection in the T10-11 disc space was localized under CT guidance. The skin was marked, prepped and draped in sterile fashion and bicarbonate 
buffered lidocaine was used for local anesthesia. An 19 gauge needle was 
advanced into the collection. A total of 3-4 ml of bloody fluid was aspirated. The fluid was sent for laboratory evaluation. The patient tolerated the 
procedure well and there were no immediate complications. Standard post 
procedure pause.  
 
One or more dose reduction techniques were used on this CT: automated exposure 
control, adjustment of the mAs and/or kVp according to patient size, and 
 iterative reconstruction techniques. The specific techniques used on this CT 
exam have been documented in the patient's electronic medical record. Digital 
Imaging and Communications in Medicine (DICOM) format image data are available 
to nonaffiliated external healthcare facilities or entities on a secure, media 
free, reciprocally searchable basis with patient authorization for at least a 
12-month period after this study. GUIDANCE: CT guidance was used to position (and confirm the position of) the 
needle. Image(s) saved in PACS: CT Impression IMPRESSION: 
 
Successful CT guided aspiration of the T10-11 disc space. I have independently examined the patient and reviewed lab studies and imgaing as well as review of nursing notes and physican notes from the past 24 hours. Gigi Selby MD 
January 29, 2019 [facility] [phone]

## 2019-01-29 NOTE — PROGRESS NOTES
Problem: Falls - Risk of 
Goal: *Absence of Falls Document Shaniqua Rene Fall Risk and appropriate interventions in the flowsheet. Outcome: Progressing Towards Goal 
Fall Risk Interventions: 
Mobility Interventions: Patient to call before getting OOB, PT Consult for mobility concerns Mentation Interventions: Door open when patient unattended, Adequate sleep, hydration, pain control, Room close to nurse's station Medication Interventions: Teach patient to arise slowly, Evaluate medications/consider consulting pharmacy, Patient to call before getting OOB Elimination Interventions: Call light in reach, Patient to call for help with toileting needs History of Falls Interventions: Door open when patient unattended, Room close to nurse's station Problem: Pressure Injury - Risk of 
Goal: *Prevention of pressure injury Document Raul Scale and appropriate interventions in the flowsheet. Outcome: Progressing Towards Goal 
Pressure Injury Interventions: 
Sensory Interventions: Assess changes in LOC, Keep linens dry and wrinkle-free, Maintain/enhance activity level, Minimize linen layers, Monitor skin under medical devices Moisture Interventions: Absorbent underpads, Limit adult briefs, Maintain skin hydration (lotion/cream), Minimize layers Activity Interventions: Pressure redistribution bed/mattress(bed type), PT/OT evaluation Mobility Interventions: HOB 30 degrees or less, Pressure redistribution bed/mattress (bed type) Nutrition Interventions: Document food/fluid/supplement intake Friction and Shear Interventions: Minimize layers, HOB 30 degrees or less

## 2019-01-29 NOTE — PROGRESS NOTES
Progress Note Patient: Bob Hoover               Sex: male          DOA: 1/21/2019 YOB: 1950      Age:  77 y.o.        LOS:  LOS: 8 days CHIEF COMPLAINT:  Bacteremia, abscess involving back Subjective:  
 
Patient awake and alert No distress Objective:  
  
Visit Vitals /70 Pulse 78 Temp 97.5 °F (36.4 °C) Resp 18 Ht 6' (1.829 m) Wt 102.3 kg (225 lb 9.4 oz) SpO2 95% BMI 30.60 kg/m² Physical Exam: 
Gen:  No distress, no complaint Lungs:  Clear bilaterally, no wheeze or rhonchi Heart:  Regular rate and rhythm, no murmurs or gallops Abdomen:  Soft, non-tender, normal bowel sounds Lab/Data Reviewed: 
BMP:  
Lab Results Component Value Date/Time  01/29/2019 07:15 AM  
 K 4.0 01/29/2019 07:15 AM  
  01/29/2019 07:15 AM  
 CO2 39 (H) 01/29/2019 07:15 AM  
 AGAP 0 (L) 01/29/2019 07:15 AM  
 GLU 77 01/29/2019 07:15 AM  
 BUN 8 01/29/2019 07:15 AM  
 CREA 0.64 01/29/2019 07:15 AM  
 GFRAA >60 01/29/2019 07:15 AM  
 GFRNA >60 01/29/2019 07:15 AM  
 
CBC:  
Lab Results Component Value Date/Time WBC 7.2 01/29/2019 07:15 AM  
 HGB 12.5 (L) 01/29/2019 07:15 AM  
 HCT 41.0 01/29/2019 07:15 AM  
  01/29/2019 07:15 AM  
 
 
 
 
Assessment/Plan Principal Problem: 
  Bacteremia (1/21/2019) Active Problems: 
  Back pain (11/14/2018) Metabolic encephalopathy (4/09/5044) Overview: Associated with sepsis, present on admission. Discitis of thoracic region (11/14/2018) Hypertension (11/14/2018) Hepatitis C (11/14/2018) Bilateral pleural effusion (11/14/2018) Acute on chronic respiratory failure with hypoxia ([address] (12/23/2018) PNA (pneumonia) (1/26/2019) Plan: 
Continue with antibiotics Follow respiratory status, discussed with Unit Manager about getting CPAP for night use. BP control Mobilize as possible.

## 2019-01-30 LAB
CA-I SERPL-SCNC: 1.11 MMOL/L (ref 1.12–1.32)
MAGNESIUM SERPL-MCNC: 1.9 MG/DL (ref 1.6–2.6)
PHOSPHATE SERPL-MCNC: 2.9 MG/DL (ref 2.5–4.9)

## 2019-01-30 PROCEDURE — 74011250636 HC RX REV CODE- 250/636: Performed by: INTERNAL MEDICINE

## 2019-01-30 PROCEDURE — 74011250637 HC RX REV CODE- 250/637: Performed by: INTERNAL MEDICINE

## 2019-01-30 PROCEDURE — 74011250636 HC RX REV CODE- 250/636: Performed by: HOSPITALIST

## 2019-01-30 PROCEDURE — 36415 COLL VENOUS BLD VENIPUNCTURE: CPT

## 2019-01-30 PROCEDURE — 74011000258 HC RX REV CODE- 258: Performed by: INTERNAL MEDICINE

## 2019-01-30 PROCEDURE — 94760 N-INVAS EAR/PLS OXIMETRY 1: CPT

## 2019-01-30 PROCEDURE — 65270000029 HC RM PRIVATE

## 2019-01-30 PROCEDURE — 82330 ASSAY OF CALCIUM: CPT

## 2019-01-30 PROCEDURE — 84100 ASSAY OF PHOSPHORUS: CPT

## 2019-01-30 PROCEDURE — 77010033678 HC OXYGEN DAILY

## 2019-01-30 PROCEDURE — 97530 THERAPEUTIC ACTIVITIES: CPT

## 2019-01-30 PROCEDURE — 97166 OT EVAL MOD COMPLEX 45 MIN: CPT

## 2019-01-30 PROCEDURE — 94640 AIRWAY INHALATION TREATMENT: CPT

## 2019-01-30 PROCEDURE — 74011250637 HC RX REV CODE- 250/637: Performed by: HOSPITALIST

## 2019-01-30 PROCEDURE — 94660 CPAP INITIATION&MGMT: CPT

## 2019-01-30 PROCEDURE — 74011000250 HC RX REV CODE- 250: Performed by: INTERNAL MEDICINE

## 2019-01-30 PROCEDURE — 83735 ASSAY OF MAGNESIUM: CPT

## 2019-01-30 RX ADMIN — HEPARIN SODIUM 5000 UNITS: 5000 INJECTION INTRAVENOUS; SUBCUTANEOUS at 11:29

## 2019-01-30 RX ADMIN — GUAIFENESIN 400 MG: 100 SOLUTION ORAL at 16:34

## 2019-01-30 RX ADMIN — HEPARIN SODIUM 5000 UNITS: 5000 INJECTION INTRAVENOUS; SUBCUTANEOUS at 01:37

## 2019-01-30 RX ADMIN — IPRATROPIUM BROMIDE AND ALBUTEROL SULFATE 3 ML: .5; 3 SOLUTION RESPIRATORY (INHALATION) at 07:28

## 2019-01-30 RX ADMIN — METOPROLOL TARTRATE 50 MG: 50 TABLET ORAL at 08:20

## 2019-01-30 RX ADMIN — GUAIFENESIN 400 MG: 100 SOLUTION ORAL at 08:19

## 2019-01-30 RX ADMIN — SIMVASTATIN 10 MG: 10 TABLET, FILM COATED ORAL at 21:41

## 2019-01-30 RX ADMIN — IPRATROPIUM BROMIDE AND ALBUTEROL SULFATE 3 ML: .5; 3 SOLUTION RESPIRATORY (INHALATION) at 15:11

## 2019-01-30 RX ADMIN — GUAIFENESIN 400 MG: 100 SOLUTION ORAL at 01:37

## 2019-01-30 RX ADMIN — CEFTAZIDIME 1 G: 1 INJECTION, POWDER, FOR SOLUTION INTRAMUSCULAR; INTRAVENOUS at 11:28

## 2019-01-30 RX ADMIN — PANTOPRAZOLE SODIUM 40 MG: 40 TABLET, DELAYED RELEASE ORAL at 08:20

## 2019-01-30 RX ADMIN — GUAIFENESIN 400 MG: 100 SOLUTION ORAL at 21:41

## 2019-01-30 RX ADMIN — GUAIFENESIN 400 MG: 100 SOLUTION ORAL at 05:29

## 2019-01-30 RX ADMIN — GABAPENTIN 400 MG: 100 CAPSULE ORAL at 08:20

## 2019-01-30 RX ADMIN — LISINOPRIL 20 MG: 20 TABLET ORAL at 08:19

## 2019-01-30 RX ADMIN — IPRATROPIUM BROMIDE AND ALBUTEROL SULFATE 3 ML: .5; 3 SOLUTION RESPIRATORY (INHALATION) at 11:14

## 2019-01-30 RX ADMIN — AMLODIPINE BESYLATE 10 MG: 10 TABLET ORAL at 08:20

## 2019-01-30 RX ADMIN — METOPROLOL TARTRATE 50 MG: 50 TABLET ORAL at 17:27

## 2019-01-30 RX ADMIN — CEFTAZIDIME 1 G: 1 INJECTION, POWDER, FOR SOLUTION INTRAMUSCULAR; INTRAVENOUS at 17:27

## 2019-01-30 RX ADMIN — GABAPENTIN 400 MG: 100 CAPSULE ORAL at 16:34

## 2019-01-30 RX ADMIN — CIPROFLOXACIN 500 MG: 500 TABLET, FILM COATED ORAL at 21:42

## 2019-01-30 RX ADMIN — CEFTAZIDIME 1 G: 1 INJECTION, POWDER, FOR SOLUTION INTRAMUSCULAR; INTRAVENOUS at 03:05

## 2019-01-30 RX ADMIN — ACETAMINOPHEN 650 MG: 325 TABLET ORAL at 16:34

## 2019-01-30 RX ADMIN — GABAPENTIN 400 MG: 100 CAPSULE ORAL at 21:41

## 2019-01-30 RX ADMIN — IPRATROPIUM BROMIDE AND ALBUTEROL SULFATE 3 ML: .5; 3 SOLUTION RESPIRATORY (INHALATION) at 21:32

## 2019-01-30 RX ADMIN — ACETAMINOPHEN 650 MG: 325 TABLET ORAL at 08:20

## 2019-01-30 RX ADMIN — CIPROFLOXACIN 500 MG: 500 TABLET, FILM COATED ORAL at 08:20

## 2019-01-30 RX ADMIN — GUAIFENESIN 400 MG: 100 SOLUTION ORAL at 11:29

## 2019-01-30 NOTE — PROGRESS NOTES
Received bedside report from King cooney RN to [facility]. Pt is AxO 4. IV flushed and patent. Pt denies pain at this time. Report included the follow information SBAR, Kardex, Procedure Summary, Intake/Output, MAR, Recent Lab Results, and Cardiac Rhythm @ SR. Pt educated on call bell when in need of help/assistance. Pt verbalizes understanding. Will resume care and monitor pt.  
 
2125- due meds given. Pt c/o pain. Prn tylenol given. 0010- Reassessment complete. No changes noted. BiPap on. 
 
0137- due meds given. Bipap  
 
61 51 81- due abx hung 
 
0400- reassessment complete. No changes noted. Pt sleeping comfortably in bed. Will continue to monitor. 7504- due med administered Bedside shift change report given to King cooney RN (oncoming nurse) by CAMERON Atkinson (offgoing nurse). Report included the following information SBAR, Kardex, Intake/Output, MAR, Accordion, Recent Results and Cardiac Rhythm sinus rhythm.

## 2019-01-30 NOTE — PROGRESS NOTES
Progress Note Patient: Bettina Varma               Sex: male          DOA: 1/21/2019 YOB: 1950      Age:  77 y.o.        LOS:  LOS: 9 days CHIEF COMPLAINT:  Bacteremia  Discitis Subjective:  
 
Patient feels okay No distress Objective:  
  
Visit Vitals /62 (BP 1 Location: Left arm, BP Patient Position: Sitting) Pulse 80 Temp 98.4 °F (36.9 °C) Resp 20 Ht 6' (1.829 m) Wt 105.2 kg (232 lb) SpO2 91% BMI 31.46 kg/m² Physical Exam: 
Gen:  No distress, no complaint Lungs:  Clear bilaterally, no wheeze or rhonchi Heart:  Regular rate and rhythm, no murmurs or gallops Abdomen:  Soft, non-tender, normal bowel sounds Lab/Data Reviewed: 
 
Labs reviewed Assessment/Plan Principal Problem: 
  Bacteremia (1/21/2019) Active Problems: 
  Back pain (11/14/2018) Metabolic encephalopathy (5/90/1981) Overview: Associated with sepsis, present on admission. Discitis of thoracic region (11/14/2018) Hypertension (11/14/2018) Hepatitis C (11/14/2018) Bilateral pleural effusion (11/14/2018) Acute on chronic respiratory failure with hypoxia ([address] (12/23/2018) PNA (pneumonia) (1/26/2019) Plan: 
Continue with long term antibiotics Working toward disposition BP control Mobilize DVT prophylaxis.

## 2019-01-30 NOTE — CONSULTS
[facility]  Name: Emily Zambrano   : 1950   MRN: [id number]   Date: 2019    []I have reviewed the flowsheet and previous days notes. []The patient is unable to give any meaningful history or review of systems because the patient is:  []Intubated []Sedated   []Unresponsive      []The patient is critically ill on      []Mechanical ventilation []Pressors   []BiPAP []   S: Feels better, aware on micro Dx: no dyspnea or CP, no fever, chills or hemoptysis              ROS:A comprehensive review of systems was negative except for that written in the HPI. Events and notes from last 24 hours reviewed. Care plan discussed on multidisciplinary rounds. Vital Signs:    Visit Vitals  /68   Pulse 89   Temp 98.7 °F (37.1 °C)   Resp 20   Ht 6' (1.829 m)   Wt 105.2 kg (232 lb)   SpO2 93%   BMI 31.46 kg/m²       O2 Device: Nasal cannula   O2 Flow Rate (L/min): 4 l/min   Temp (24hrs), Av.3 °F (36.8 °C), Min:97.8 °F (36.6 °C), Max:98.7 °F (37.1 °C)       Intake/Output:   Last shift:       07 -  190  In: 214 [P.O.:740]  Out: 800 [Urine:800]  Last 3 shifts:  190 -  0700  In: 763 [P.O.:720; I.V.:100]  Out: 2000 [Urine:2000]    Intake/Output Summary (Last 24 hours) at 2019 1211  Last data filed at 2019 0836  Gross per 24 hour   Intake 1150 ml   Output 1250 ml   Net -100 ml     Hemodynamics:       :      Ventilator Settings:        Ventilator Volumes  Vt Spont (ml): 412 ml  Ve Observed (l/min): 9.5 l/min       Physical Exam:    General: in no apparent distress, well developed and well nourished, non-toxic, in no respiratory distress and acyanotic, alert, oriented times 3 and afebrile   HEENT: Normal   Neck: No abnormally enlarged lymph nodes.    Chest: increased AP diameter   Lungs: decreased air exchange bilaterally, distant lung sounds and prolonged expiration  no dullness/ tenderness/ rash   Heart: Regular rate and rhythm or S1S2 present   Abdomen: non distended, bowel sounds normoactive, tympanic, abdomen is soft without significant tenderness, masses, organomegaly or guarding   Extremity: 1+ edema   Neuro: alert   Skin: Skin color, texture, turgor normal. No rashes or lesions      DATA:   Current Facility-Administered Medications   Medication Dose Route Frequency    ciprofloxacin HCl (CIPRO) tablet 500 mg  500 mg Oral Q12H    pantoprazole (PROTONIX) tablet 40 mg  40 mg Oral DAILY    albuterol-ipratropium (DUO-NEB) 2.5 MG-0.5 MG/3 ML  3 mL Nebulization QID RT    guaiFENesin (ROBITUSSIN) 100 mg/5 mL oral liquid 400 mg  400 mg Oral Q4H    cefTAZidime (FORTAZ) 1 g in 0.9% sodium chloride (MBP/ADV) 50 mL MBP  1 g IntraVENous Q8H    lidocaine (PF) (XYLOCAINE) 10 mg/mL (1 %) injection 20 mL  20 mL SubCUTAneous Rad Multiple    amLODIPine (NORVASC) tablet 10 mg  10 mg Oral DAILY    gabapentin (NEURONTIN) capsule 400 mg  400 mg Oral TID    lisinopril (PRINIVIL, ZESTRIL) tablet 20 mg  20 mg Oral DAILY    metoprolol tartrate (LOPRESSOR) tablet 50 mg  50 mg Oral BID    simvastatin (ZOCOR) tablet 10 mg  10 mg Oral QHS    acetaminophen (TYLENOL) tablet 650 mg  650 mg Oral Q6H PRN    ondansetron (ZOFRAN) injection 4 mg  4 mg IntraVENous Q6H PRN    heparin (porcine) injection 5,000 Units  5,000 Units SubCUTAneous Q8H       Telemetry: []Sinus []A-flutter []Paced    []A-fib []Multiple PVCs                  Labs:  Recent Labs     01/29/19  0715 01/28/19  0508   WBC 7.2 5.8   HGB 12.5* 12.1*   HCT 41.0 39.5    147     Recent Labs     01/30/19  0510 01/29/19  0715 01/28/19  0508   NA  --  142 143   K  --  4.0 3.6   CL  --  103 101   CO2  --  39* 37*   GLU  --  77 89   BUN  --  8 10   CREA  --  0.64 0.67   CA  --  8.4* 8.2*   MG 1.9 1.9 1.9   PHOS 2.9 3.1 3.2   ALB  --   --  2.1*   SGOT  --   --  13*   ALT  --   --  16     No results for input(s): PH, PCO2, PO2, HCO3, FIO2 in the last 72 hours.     Imaging:  [x]I have personally reviewed the patients radiographs  []Radiographs reviewed with radiologist   [] No CXR study available for review today   [x]No change from prior, tubes and lines in adequate position  []Improved   []Worsening       IMPRESSION:   Patient Active Problem List   Diagnosis Code    Discitis of thoracic region M46.44    UTI (urinary tract infection) N39.0    Bilateral pleural effusion J90    Lumbar stenosis M48.061    Adenoma of left adrenal gland D35.02    Hypertension I10    Cirrhosis of liver ([facility] K74.60    Hepatitis C B19.20    Obesity E66.9    Constipation K59.00    Back pain M54.9    Altered mental status R41.82    Acute respiratory failure with hypoxia (HCC) J96.01    Sinusitis J32.9    Acute on chronic respiratory failure with hypoxia (HCC) G42.23    Diastolic CHF, acute on chronic (HCC) I50.33    Acute exacerbation of chronic obstructive pulmonary disease (COPD) (HCC) J44.1    Bacteremia R78.81    PNA (pneumonia) E82.2    Metabolic encephalopathy E36.11 ·     ·    PLAN:   · Continue therapy  · Dr Lea Alfonso will see him intermittently as needed     The patient is: [x] acutely ill Risk of deterioration: [x] moderate    [] critically ill  [] high     []See my orders for details    My assessment, plan of care, findings, medications, side effects etc were discussed with:  [x]nursing []PT/OT    []respiratory therapy []   []family []     []Total critical care time exclusive of procedures  minutes  An Young MD

## 2019-01-30 NOTE — PROGRESS NOTES
Problem: Self Care Deficits Care Plan (Adult) Goal: *Acute Goals and Plan of Care (Insert Text) Occupational Therapy Goals Initiated 1/30/2019 within 7 day(s). 1.  Patient will perform grooming tasks while standing with stand-by assistance for balance. 2.  Patient will perform lower body dressing with minimal assistance utilizing adaptive equipment/strategies, prn. 
3.  Patient will perform functional task in standing for 8 minutes with supervision and < 2 rest breaks to increase activity tolerance for functional transfers & ADLs. 4.  Patient will perform toilet transfers with supervision. 5.  Patient will perform all aspects of toileting with supervision. 6.  Patient will participate in upper extremity therapeutic exercise/activities for 8 minutes to increase BUE strength for functional transfers and ADLs. 7.  Patient will utilize energy conservation techniques during functional activities with minimal verbal cues. Outcome: [address] Occupational THERAPY: Initial Assessment INPATIENT: Medicaid: Hospital Day: 10 Patient: Mago Troy (6 y.o. male)    Date: 1/30/2019 Primary Diagnosis: Bacteremia  
,  ,  
Precautions: Falls PLOF: Pt was independent with most ADLs; utilizing RW and wheelchair for functional mobility PTA. ASSESSMENT:  
Based on the objective data described below, the patient presents with impairments with regard to bed mobility, activity tolerance, and independence in ADLs. Pt supine on arrival. C/o 10/10 back pain; agreeable to therapy. Min/mod A for supine-->sit. Fair sitting balance. Pt demo'ing anterior lean; skilled instructions on proper sitting balance to ensure safety. Functional transfer x2 attempts with min/mod A x2; fair/fair- standing balance. Pt able to side step towards [facility] with min A for balance in prep for [facility] transfer.  Fair safety awareness and decreased activity tolerance. O2 92-93%. Pt left in chair position with needs within reach. Recommend continued therapy. Recommendations for the next treatment session:  
Mr. Leroy Preston will benefit from skilled intervention to address the above impairments. His rehabilitation potential is considered to be Good. EDUCATION Education:  Patient was educated on the following topics: importance of mobility; role of OT and POC Barriers to Learning/Limitations: None Compensate with: visual, verbal, tactile, kinesthetic cues/model PLAN OF CARE:  
Problems:  Decreased ROM, Decreased strength affecting function, Decreased ADL/functioning of activities and Decreased transfer abilities Recommendations and Planned Interventions: 
[x]                  Self Care Training                   [x]         Therapeutic Activities [x]                  Functional Mobility Training    []          Cognitive Retraining 
[x]                  Therapeutic Exercises            [x]          Endurance Activities [x]                  Balance Training                     []          Neuromuscular Re-ed []                  Visual/Perceptual Training      [x]       Home Safety Training 
[x]                  Patient Education                    [x]          Family Training/Education []                  Other (comment): Frequency/Duration: Patient will be followed by occupational therapy 3-5 times a week to address goals. Discharge Recommendations: SNF Further Equipment Recommendations for Discharge: Anticipate none SUBJECTIVE:  
Patient stated: \"It's just a nagging pain\" (referring to back pain) OBJECTIVE/TREATMENT:  
 
Past Medical History:  
Diagnosis Date  Arthritis  COPD  Hypertension Past Surgical History:  
Procedure Laterality Date  HX REFRACTIVE SURGERY Eval Complexity: History: MEDIUM Complexity : Expanded review of history including physical, cognitive and psychosocial  history ;  Examination: MEDIUM Complexity : 3-5 performance deficits relating to physical, cognitive , or psychosocial skils that result in activity limitations and / or participation restrictions; Decision Making:MEDIUM Complexity : Patient may present with comorbidities that affect occupational performnce. Miniml to moderate modification of tasks or assistance (eg, physical or verbal ) with assesment(s) is necessary to enable patient to complete evaluation Prior Level of Function/Home Situation: Ambulatory and capable of self-care but unable to carry out any work activities. Up and about more than 50% of waking hours. Lives with Family Sue Mcdonald Cognitive/Behavioral Status:  
Neurologic State: alert Orientation: oriented to time, place, person and situation Cognition:   appropriate decision making, appropriate for age attention/concentration, appropriate safety awareness and following commands  follows multi-step complex commands/direction Safety/Judgement: Awareness of environment and Fall prevention ROM: Minimally limited (BUEs) secondary to back pain MMT: 3+/5 (BUEs) Coordination: BUEs [facility] Hand dominance:Right Skin: Intact (BUEs) Edema: None noted (BUEs) Sensation: Intact (BUEs) Vision/Perceptual: normal 
 
 
Functional Status Indep Mod I  
Sup. / 
Set- Up SBA CGA Min Assist  
Mod Assist  
Max assist  
Total Assist  
Assist x2 Additional Time NT Comments Rolling []  []  []  []  []    [x]    []    []  []  []  []  [] Supine to sit []  []  []  []  []  [x]  [x]  []  []  []  []  [] Sit to supine []  []  []  []  []  []  [x]  []  []  []  []  []  BLE management Sit to stand []  []  []  []  []  [x]  [x]  []  []  [x]  []  [] Toilet Transfer []  []  []  []  []  []  []  []  []  []  []  [x] Feeding []  []  [x]  []  []  []  []  []  []  []  []  []    
Grooming []  []  [x]  []  []  []  []  []  []  []  []  [] Bathing  []  []  []  []  []  []  [x]  []  []  []  []  []    
UB Dressing  []  []  [x]  []  []  []  []  []  []  []  []  []    
LB Dressing  []  []  []  []  []  []  [x]  []  []  []  []  [] Toileting []  []  []  []  []  []  [x]  []  []  []  []  [] Balance Good [address] Poor Unable Comments Sitting static []  [x]  []  [] Sitting dynamic []  [x]  []  []    
Standing static []  [x]  []  []  Fair-  
Standing dynamic []  []  []  [x] Therapeutic Activity:  
Functional transfer x2 attempts with min/mod A x2; fair/fair- standing balance. Pt able to side step towards [facility] with min A for balance in prep for [facility] transfer. Fair safety awareness and decreased activity tolerance. Pain:  
Pre treatment: 10/10 Post treatment: 10/10 Scale: numeric Activity tolerance:  fair COMMUNICATION/EDUCATION: Pt educated on role of OT and POC; he verbalized understanding. [x]         Fall prevention education was provided and the patient/caregiver indicated understanding. [x]         Patient/family have participated as able in goal setting and plan of care. [x]         Patient/family agree to work toward stated goals and plan of care. []         Patient understands intent and goals of therapy, but is neutral about his/her participation The patients plan of care was also discussed with: Physical Therapist, Certified Occupational Therapy Assistant and Registered Nurse. · After treatment position/precautions:  
o Supine in bed 
o Call light within reach RN notified Pt left in chair position Recommendations for nursing: up with assist x1 & RW Thank you for this referral. 
Daniel Tesfaye MS OTR/L Time Calculation: 21 mins

## 2019-01-30 NOTE — PROGRESS NOTES
INFECTIOUS DISEASE FOLLOW UP NOTE :-  
 
 
 
Admit Date: 1/21/2019s Current abx Prior abx  
ceftaz 1/23-7, cipro 1/23-7 Cefepime/vanco  11/14   2, Ceftriaxone 11/16 - 3; Zosyn 1/21-2  
  
Assessment -> Rec:  
  
BSI 2 of 2 Serratia marcescens 1/21 
- source ~ Discitis Phlegmon seen on CT- NEW IR aspiration 1/23 chucky culture grew Serratia  
- denies any urinary complain but had Serratia in urine in past. Currently UA is negative - Ucx was not sent but CT with normal kidneys and nothing sig on CT and pt asymptomatic except some incontinenc 
- repeat Blcx 1/22 1/2 positive  -  continue on Ceftaz/cipro to cover Serratia 
->monitor repeat blcx from 1/28-still ngtd- prelim- if further positive check echo Destructive T10-11 changes NEW discitis/OM 
- mid back pain x 5 months, fell, incontinent of urine - CTAP 11/2018: severe destructive changes T 10-11, paravertebral sot tissue infiltration, extension to anterior epidural space w/ cord compression  
- h/o IVDU - last use in July 2018 
- H/O uncooperative for MRI given claustrophobia in past 
- Bone/ gallium scans 11/27 neg for discitis/OM  
- refused stabilization procedure per Dr. James Smith in past 
- was not given long term Abx then 
- CT 1/21 with destructive changes T9-10 levels c/w discitis/OM and intraspinal and paraspinal phlegmon. Overall appearance is grossly unchanged and pain also not different from last time 
-inflammatory markers: esr 38, crp 7.4 
- s/p aspiration of fluid/phlegmon 1/23 NEW (+) Serratia in anaerobic culture  ->monitor cultures  
-> Abx as above 
-> will need long term Abx ?6-8 weeks 
->any need for NSGY f/u? --? Need for stabilization Acute hypoxic resp distress- RRT called 1/23 and again 1/24 sec to hypercapnic resp failure - off BIPAP now on NC Bilateral pleural effusions- chronic with  atelectasis - monitor Hepatomegaly, splenic hilar & perisplenic varices- concern for cirrhosis and portal HTN 
- seen on CTAP again    
H/o IVDU 
- HIV ab NR 11/19 ,Hep BsAg neg (last reported ivdu August 2018, has hep C and thinks hep b immune) - denies anything currently 
-> will be difficult to decide for PICC candidate or not given IVDU June 2018 
   
COPD    
HTN    
DJD    
Allergy [place]    
  
MICROBIOLOGY:  
11/14   urcx >100,000 Serratia marcescens             blcx NG x 2 
11/15   CrAG neg, fungitell 375 (reported 11/17) 
11/29   blcx for fungus - ngtd 
            fungitell 113 
  
1/21     Blcx x2 Serratia marcescens R cefazolin UA neg 
1/22 Blcx 1 of 2 Serratia 1/23 Aspiration cx g/s neg, Cx NG 
 NOMAN rare Serratia R Cefazolin Fungal NOS 
1/28  bctx x 2 ngtd 
  
LINES AND CATHETERS:  
piv 
  
 
MEDICATIONS:  
 
Current Facility-Administered Medications Medication Dose Route Frequency  ciprofloxacin HCl (CIPRO) tablet 500 mg  500 mg Oral Q12H  pantoprazole (PROTONIX) tablet 40 mg  40 mg Oral DAILY  albuterol-ipratropium (DUO-NEB) 2.5 MG-0.5 MG/3 ML  3 mL Nebulization QID RT  
 guaiFENesin (ROBITUSSIN) 100 mg/5 mL oral liquid 400 mg  400 mg Oral Q4H  
 cefTAZidime (FORTAZ) 1 g in 0.9% sodium chloride (MBP/ADV) 50 mL MBP  1 g IntraVENous Q8H  
 lidocaine (PF) (XYLOCAINE) 10 mg/mL (1 %) injection 20 mL  20 mL SubCUTAneous Rad Multiple  amLODIPine (NORVASC) tablet 10 mg  10 mg Oral DAILY  gabapentin (NEURONTIN) capsule 400 mg  400 mg Oral TID  lisinopril (PRINIVIL, ZESTRIL) tablet 20 mg  20 mg Oral DAILY  metoprolol tartrate (LOPRESSOR) tablet 50 mg  50 mg Oral BID  simvastatin (ZOCOR) tablet 10 mg  10 mg Oral QHS  acetaminophen (TYLENOL) tablet 650 mg  650 mg Oral Q6H PRN  
 ondansetron (ZOFRAN) injection 4 mg  4 mg IntraVENous Q6H PRN  
 heparin (porcine) injection 5,000 Units  5,000 Units SubCUTAneous Q8H SUBJECTIVE :  
 
 Interval notes reviewed. D/w pt today IR aspiration grew Serratia anaerobically from , will need long-term abx. Says breathing is ok denied fever or rash, says some GI upset initially with po cipro now better OBJECTIVE Visit Vitals /68 Pulse 89 Temp 98.7 °F (37.1 °C) Resp 20 Ht 6' (1.829 m) Wt 105.2 kg (232 lb) SpO2 93% BMI 31.46 kg/m² Temp (24hrs), Av.3 °F (36.8 °C), Min:97.8 °F (36.6 °C), Max:98.7 °F (37.1 °C) Gen:on NC O2, awake and alert HEENT: muddy sclerae, pupils equal and reactive Chest: Symmetric expansion, wheezing+ CVS:S1S2 RR, tachy, No JVD, No edema Abd:Soft, NT, ND, BS+ Skin:No jaundice, cyanosis, clubbing. No decubitus Muscsk: Normal muscle tone/strength CNS: AA and follows command Labs: Results:  
Chemistry Recent Labs  
  19 
0715 19 
0816 GLU 77 89  143  
K 4.0 3.6  101 CO2 39* 37* BUN 8 10 CREA 0.64 0.67 CA 8.4* 8.2* AGAP 0* 5 BUCR 13 15 AP  --  114 TP  --  6.9 ALB  --  2.1*  
GLOB  --  4.8* AGRAT  --  0.4* CBC w/Diff Recent Labs  
  19 
0715 19 
8796 WBC 7.2 5.8  
RBC 4.42* 4.36* HGB 12.5* 12.1*  
HCT 41.0 39.5  147 GRANS 56 56 LYMPH 33 37 EOS 1 1 RADIOLOGY :   
 
 
 
Imaging Results from Hospital Encounter encounter on 19 XR CHEST PORT Narrative EXAM: CHEST RADIOGRAPH, SINGLE VIEW CLINICAL INDICATION/HISTORY: Tube Placement COMPARISON: 2019 TECHNIQUE: Portable frontal view of the chest was obtained.  
 
_______________ FINDINGS: 
 
SUPPORT DEVICES: Cardiac leads and wires overlie the anterior chest. There is no 
visualized tube given indication of tube placement. HEART AND MEDIASTINUM: Cardiomediastinal silhouette appears unchanged. Tortuous 
and atherosclerotic thoracic aorta. Indistinct central pulmonary vasculature, 
unchanged.   
 
LUNGS AND PLEURAL SPACES: Low lung volumes with layering right greater than left 
pleural effusions, each with adjacent basilar patchy opacity. Diffusely 
increased interstitial markings, especially throughout perihilar and basilar 
distributions appear unchanged. No pneumothorax. BONY THORAX AND SOFT TISSUES: No acute osseous abnormality. _______________ Impression IMPRESSION: 
 
1. No visualized tube given provided history of tube placement. 2. Unchanged cardiomegaly with right greater than left pleural effusions, 
adjacent basilar atelectasis/infiltrate and mild pulmonary interstitial edema. Results from Hospital Encounter encounter on 01/21/19 CT GUIDE CATH FLUID DRAIN SOFT TISSUE Narrative CT GUIDED ASPIRATION OF T10-11 disc space:  
 
Indication: Discitis T10-11. COMPARISON: CTA chest, abdomen and pelvis 1/21/2019. CTA chest 12/23/2018 Technique/findings: After the procedure, as well as its risks, benefits and 
alternatives were explained to the patient and his daughter, and all questions 
answered, written informed consent was obtained from the daughter and witnessed. Procedure was performed using only local anesthesia. The fluid collection in the T10-11 disc space was localized under CT guidance. The skin was marked, prepped and draped in sterile fashion and bicarbonate 
buffered lidocaine was used for local anesthesia. An 19 gauge needle was 
advanced into the collection. A total of 3-4 ml of bloody fluid was aspirated. The fluid was sent for laboratory evaluation. The patient tolerated the 
procedure well and there were no immediate complications. Standard post 
procedure pause. One or more dose reduction techniques were used on this CT: automated exposure 
control, adjustment of the mAs and/or kVp according to patient size, and 
iterative reconstruction techniques. The specific techniques used on this CT 
exam have been documented in the patient's electronic medical record.  Digital 
 Imaging and Communications in Medicine (DICOM) format image data are available 
to nonaffiliated external healthcare facilities or entities on a secure, media 
free, reciprocally searchable basis with patient authorization for at least a 
12-month period after this study. GUIDANCE: CT guidance was used to position (and confirm the position of) the 
needle. Image(s) saved in PACS: CT Impression IMPRESSION: 
 
Successful CT guided aspiration of the T10-11 disc space. I have independently examined the patient and reviewed lab studies and imgaing as well as review of nursing notes and physican notes Kayleen Leonard MD 
January 30, 2019 [facility] [phone]

## 2019-01-30 NOTE — PROGRESS NOTES
Problem: Falls - Risk of 
Goal: *Absence of Falls Document Kathleen King Fall Risk and appropriate interventions in the flowsheet. Outcome: Progressing Towards Goal 
Fall Risk Interventions: 
Mobility Interventions: Patient to call before getting OOB Mentation Interventions: Adequate sleep, hydration, pain control, Door open when patient unattended, Bed/chair exit alarm Medication Interventions: Patient to call before getting OOB, Teach patient to arise slowly Elimination Interventions: Bed/chair exit alarm, Call light in reach History of Falls Interventions: Door open when patient unattended, Bed/chair exit alarm Problem: Pressure Injury - Risk of 
Goal: *Prevention of pressure injury Document Raul Scale and appropriate interventions in the flowsheet. Outcome: Progressing Towards Goal 
Pressure Injury Interventions: 
Sensory Interventions: Assess changes in LOC, Keep linens dry and wrinkle-free Moisture Interventions: Absorbent underpads Activity Interventions: Increase time out of bed, Pressure redistribution bed/mattress(bed type) Mobility Interventions: HOB 30 degrees or less, Pressure redistribution bed/mattress (bed type) Nutrition Interventions: Document food/fluid/supplement intake, Offer support with meals,snacks and hydration Friction and Shear Interventions: HOB 30 degrees or less

## 2019-01-30 NOTE — PROGRESS NOTES
Spoke with [facility] they have declined patient. Spoke with patient and gave SNF list to patient. I explain LTC and that Medicaid pays for LTC and his income would go to paying for LTC. He is  interested  in [place] facilities and he stated that I could match out to see what facility has a LTC bed. He is Tentatively considering LTC based on accepts in the [place] area.

## 2019-01-30 NOTE — PROGRESS NOTES
Nutrition initial assessment/Plan of care RECOMMENDATIONS:  
 
1. Cardiac diet 2. Monitor weight, labs and PO intake 3. RD to follow GOALS: 
 
1. PO intake meets >75% of protein/calorie needs by 2/6 
2. Weight Maintenance (+/- 1-2 lb by 2/6) ASSESSMENT:  
 
Weight status is classified as obese per BMI of 31.5. PO intake appears adequate. Labs noted. Patient with hypoalbuminemia and hypocalcemia. Nutrition recommendations listed. RD to follow. Nutrition Diagnoses:  
Obesity related to excessive energy intake as evidenced by BMI of 31.5. Nutrition Risk:  [] High  [] Moderate [x]  Low SUBJECTIVE/OBJECTIVE:  
  
LOS. Transferred from ICU. Admitted with bacteremia. Has history of COPD and HTN. Patient reports having a good appetite and eating all of meals. Observed 100% intake of lunch meal during visit. Has food allergy to shellfish. Reports having limited dentition but declined change in diet texture. Will monitor. Information Obtained from:  
 [x] Chart Review [x] Patient 
 [] Family/Caregiver 
 [] Nurse/Physician 
 [] Interdisciplinary Meeting/Rounds Diet: Cardiac diet Medications: [x] Reviewed Allergies: [x] Reviewed (Shellfish) Encounter Diagnoses ICD-10-CM ICD-9-CM 1. Bacteremia R78.81 790.7 2. History of heroin abuse Z87.898 305.53 Past Medical History:  
Diagnosis Date  Arthritis  COPD  Hypertension Labs:   
Lab Results Component Value Date/Time Sodium 142 01/29/2019 07:15 AM  
 Potassium 4.0 01/29/2019 07:15 AM  
 Chloride 103 01/29/2019 07:15 AM  
 CO2 39 (H) 01/29/2019 07:15 AM  
 Anion gap 0 (L) 01/29/2019 07:15 AM  
 Glucose 77 01/29/2019 07:15 AM  
 BUN 8 01/29/2019 07:15 AM  
 Creatinine 0.64 01/29/2019 07:15 AM  
 Calcium 8.4 (L) 01/29/2019 07:15 AM  
 Magnesium 1.9 01/30/2019 05:10 AM  
 Phosphorus 2.9 01/30/2019 05:10 AM  
 Albumin 2.1 (L) 01/28/2019 05:08 AM  
 
Anthropometrics: BMI (calculated): 31.5 Last 3 Recorded Weights in this Encounter 01/27/19 0700 01/28/19 2111 01/30/19 1060 Weight: 101.1 kg (222 lb 14.2 oz) 102.3 kg (225 lb 9.4 oz) 105.2 kg (232 lb) Ht Readings from Last 1 Encounters:  
01/22/19 6' (1.829 m) Weight Metrics 1/30/2019 1/21/2019 1/21/2019 1/21/2019 12/30/2018 12/23/2018 12/21/2018 Weight 232 lb - 252 lb - 252 lb 6.8 oz - 246 lb 0.5 oz  
BMI - 31.46 kg/m2 - 34.18 kg/m2 - 34.24 kg/m2 33.37 kg/m2 Patient Vitals for the past 100 hrs: 
 % Diet Eaten 01/30/19 0836 100 % 01/30/19 0800 100 % 01/29/19 1200 100 % 01/29/19 0800 100 % 01/28/19 0800 100 % 01/27/19 1817 100 % 01/27/19 1400 100 % 01/27/19 0900 100 % IBW: 178 lb %IBW: 130% Estimated Nutrition Needs: [x] MSJ Calories: 1677-6339 Kcal Based on:   [x] Actual BW   
Protein:    g Based on:   [x] Actual BW Fluid:       6483-9979 ml Based on:   [x] Actual BW  
 
 [x] No Cultural, Holiness or ethnic dietary need identified. [] Cultural, Holiness and ethnic food preferences identified and addressed Wt Status:  [] Normal (18.6 - 24.9) [] Underweight (<18.5) [] Overweight (25 - 29.9) [x] Mild Obesity (30 - 34.9)  [] Moderate Obesity (35 - 39.9) [] Morbid Obesity (40+) Nutrition Problems Identified:  
[] Suboptimal PO intake [x] Food Allergies (Shellfish) [] Difficulty chewing/swallowing/poor dentition 
[] Constipation/Diarrhea  
[] Nausea/Vomiting  
[] None 
[] Other:  
 
Plan:  
[x] Therapeutic Diet 
[]  Obtained/adjusted food preferences/tolerances and/or snacks options  
[]  Supplements added  
[] Occupational therapy following for feeding techniques []  HS snack added  
[]  Modify diet texture  
[x]  Modify diet for food allergies []  Assist with menu selection  
[x]  Monitor PO intake on meal rounds  
[x]  Continue inpatient monitoring and intervention  
[]  Participated in discharge planning/Interdisciplinary rounds/Team meetings  
[]  Other:  
 
Education Needs: [] Not appropriate for teaching at this time due to: 
 [x] Identified and addressed Nutrition Monitoring and Evaluation: 
[x] Continue ongoing monitoring and intervention 
[] Other [address]

## 2019-01-31 ENCOUNTER — APPOINTMENT (OUTPATIENT)
Dept: GENERAL RADIOLOGY | Age: 69
DRG: 049 | End: 2019-01-31
Attending: INTERNAL MEDICINE
Payer: MEDICAID

## 2019-01-31 LAB
CA-I SERPL-SCNC: 1.13 MMOL/L (ref 1.12–1.32)
CRP SERPL-MCNC: 2.1 MG/DL (ref 0–0.3)
ERYTHROCYTE [SEDIMENTATION RATE] IN BLOOD: 57 MM/HR (ref 0–20)
MAGNESIUM SERPL-MCNC: 1.8 MG/DL (ref 1.6–2.6)
PHOSPHATE SERPL-MCNC: 2.5 MG/DL (ref 2.5–4.9)

## 2019-01-31 PROCEDURE — 94640 AIRWAY INHALATION TREATMENT: CPT

## 2019-01-31 PROCEDURE — 94760 N-INVAS EAR/PLS OXIMETRY 1: CPT

## 2019-01-31 PROCEDURE — 97530 THERAPEUTIC ACTIVITIES: CPT

## 2019-01-31 PROCEDURE — 97110 THERAPEUTIC EXERCISES: CPT

## 2019-01-31 PROCEDURE — 85652 RBC SED RATE AUTOMATED: CPT

## 2019-01-31 PROCEDURE — 77010033678 HC OXYGEN DAILY

## 2019-01-31 PROCEDURE — 82330 ASSAY OF CALCIUM: CPT

## 2019-01-31 PROCEDURE — 74011250637 HC RX REV CODE- 250/637: Performed by: HOSPITALIST

## 2019-01-31 PROCEDURE — 74011250636 HC RX REV CODE- 250/636: Performed by: HOSPITALIST

## 2019-01-31 PROCEDURE — 74011250636 HC RX REV CODE- 250/636: Performed by: INTERNAL MEDICINE

## 2019-01-31 PROCEDURE — 74011000258 HC RX REV CODE- 258: Performed by: INTERNAL MEDICINE

## 2019-01-31 PROCEDURE — 84100 ASSAY OF PHOSPHORUS: CPT

## 2019-01-31 PROCEDURE — 65270000029 HC RM PRIVATE

## 2019-01-31 PROCEDURE — 94660 CPAP INITIATION&MGMT: CPT

## 2019-01-31 PROCEDURE — 36415 COLL VENOUS BLD VENIPUNCTURE: CPT

## 2019-01-31 PROCEDURE — 74011000250 HC RX REV CODE- 250: Performed by: INTERNAL MEDICINE

## 2019-01-31 PROCEDURE — 74011250637 HC RX REV CODE- 250/637: Performed by: INTERNAL MEDICINE

## 2019-01-31 PROCEDURE — 83735 ASSAY OF MAGNESIUM: CPT

## 2019-01-31 PROCEDURE — 86140 C-REACTIVE PROTEIN: CPT

## 2019-01-31 RX ADMIN — IPRATROPIUM BROMIDE AND ALBUTEROL SULFATE 3 ML: .5; 3 SOLUTION RESPIRATORY (INHALATION) at 11:12

## 2019-01-31 RX ADMIN — GABAPENTIN 400 MG: 100 CAPSULE ORAL at 17:32

## 2019-01-31 RX ADMIN — CEFTAZIDIME 1 G: 1 INJECTION, POWDER, FOR SOLUTION INTRAMUSCULAR; INTRAVENOUS at 01:01

## 2019-01-31 RX ADMIN — IPRATROPIUM BROMIDE AND ALBUTEROL SULFATE 3 ML: .5; 3 SOLUTION RESPIRATORY (INHALATION) at 15:26

## 2019-01-31 RX ADMIN — ACETAMINOPHEN 650 MG: 325 TABLET ORAL at 04:47

## 2019-01-31 RX ADMIN — AMLODIPINE BESYLATE 10 MG: 10 TABLET ORAL at 08:54

## 2019-01-31 RX ADMIN — GUAIFENESIN 400 MG: 100 SOLUTION ORAL at 21:17

## 2019-01-31 RX ADMIN — GUAIFENESIN 400 MG: 100 SOLUTION ORAL at 17:33

## 2019-01-31 RX ADMIN — SIMVASTATIN 10 MG: 10 TABLET, FILM COATED ORAL at 21:17

## 2019-01-31 RX ADMIN — CEFTAZIDIME 1 G: 1 INJECTION, POWDER, FOR SOLUTION INTRAMUSCULAR; INTRAVENOUS at 10:24

## 2019-01-31 RX ADMIN — METOPROLOL TARTRATE 50 MG: 50 TABLET ORAL at 17:32

## 2019-01-31 RX ADMIN — CEFTAZIDIME 1 G: 1 INJECTION, POWDER, FOR SOLUTION INTRAMUSCULAR; INTRAVENOUS at 17:33

## 2019-01-31 RX ADMIN — CIPROFLOXACIN 500 MG: 500 TABLET, FILM COATED ORAL at 21:17

## 2019-01-31 RX ADMIN — PANTOPRAZOLE SODIUM 40 MG: 40 TABLET, DELAYED RELEASE ORAL at 08:54

## 2019-01-31 RX ADMIN — GABAPENTIN 400 MG: 100 CAPSULE ORAL at 21:17

## 2019-01-31 RX ADMIN — ACETAMINOPHEN 650 MG: 325 TABLET ORAL at 19:38

## 2019-01-31 RX ADMIN — GUAIFENESIN 400 MG: 100 SOLUTION ORAL at 08:54

## 2019-01-31 RX ADMIN — HEPARIN SODIUM 5000 UNITS: 5000 INJECTION INTRAVENOUS; SUBCUTANEOUS at 01:01

## 2019-01-31 RX ADMIN — HEPARIN SODIUM 5000 UNITS: 5000 INJECTION INTRAVENOUS; SUBCUTANEOUS at 10:24

## 2019-01-31 RX ADMIN — LISINOPRIL 20 MG: 20 TABLET ORAL at 08:54

## 2019-01-31 RX ADMIN — GABAPENTIN 400 MG: 100 CAPSULE ORAL at 08:54

## 2019-01-31 RX ADMIN — IPRATROPIUM BROMIDE AND ALBUTEROL SULFATE 3 ML: .5; 3 SOLUTION RESPIRATORY (INHALATION) at 20:40

## 2019-01-31 RX ADMIN — HEPARIN SODIUM 5000 UNITS: 5000 INJECTION INTRAVENOUS; SUBCUTANEOUS at 17:33

## 2019-01-31 RX ADMIN — METOPROLOL TARTRATE 50 MG: 50 TABLET ORAL at 08:54

## 2019-01-31 RX ADMIN — CIPROFLOXACIN 500 MG: 500 TABLET, FILM COATED ORAL at 08:54

## 2019-01-31 RX ADMIN — GUAIFENESIN 400 MG: 100 SOLUTION ORAL at 04:47

## 2019-01-31 RX ADMIN — IPRATROPIUM BROMIDE AND ALBUTEROL SULFATE 3 ML: .5; 3 SOLUTION RESPIRATORY (INHALATION) at 08:02

## 2019-01-31 NOTE — PROGRESS NOTES
Problem: Falls - Risk of 
Goal: *Absence of Falls Document Carlos Ivy Fall Risk and appropriate interventions in the flowsheet. Outcome: Progressing Towards Goal 
Fall Risk Interventions: 
Mobility Interventions: Bed/chair exit alarm, Communicate number of staff needed for ambulation/transfer, Patient to call before getting OOB, PT Consult for mobility concerns, PT Consult for assist device competence, Strengthening exercises (ROM-active/passive) Mentation Interventions: Adequate sleep, hydration, pain control Medication Interventions: Bed/chair exit alarm, Patient to call before getting OOB, Teach patient to arise slowly Elimination Interventions: Call light in reach, Elevated toilet seat, Patient to call for help with toileting needs, Toileting schedule/hourly rounds History of Falls Interventions: Consult care management for discharge planning Problem: Pressure Injury - Risk of 
Goal: *Prevention of pressure injury Document Raul Scale and appropriate interventions in the flowsheet. Outcome: Progressing Towards Goal 
Pressure Injury Interventions: 
Sensory Interventions: Maintain/enhance activity level, Pressure redistribution bed/mattress (bed type) Moisture Interventions: Absorbent underpads, Assess need for specialty bed, Check for incontinence Q2 hours and as needed Activity Interventions: Increase time out of bed, Pressure redistribution bed/mattress(bed type), PT/OT evaluation Mobility Interventions: Float heels, HOB 30 degrees or less, Pressure redistribution bed/mattress (bed type), PT/OT evaluation Nutrition Interventions: Document food/fluid/supplement intake, Discuss nutritional consult with provider Friction and Shear Interventions: HOB 30 degrees or less, Lift sheet, Apply protective barrier, creams and emollients

## 2019-01-31 NOTE — PROGRESS NOTES
INFECTIOUS DISEASE FOLLOW UP NOTE :-  
 
 
 
Admit Date: 1/21/2019s Current abx Prior abx  
ceftaz 1/23-8, cipro 1/23-8 Cefepime/vanco  11/14   2, Ceftriaxone 11/16 - 3; Zosyn 1/21-2  
  
Assessment -> Rec:  
  
BSI 2 of 2 Serratia marcescens 1/21 
- source ~ Discitis Phlegmon seen on CT- NEW IR aspiration 1/23 chucky culture grew Serratia  
- denies any urinary complain but had Serratia in urine in past. Currently UA is negative - Ucx was not sent but CT with normal kidneys and nothing sig on CT and pt asymptomatic except some incontinenc 
- repeat Blcx 1/22 1/2 positive  -  continue on Ceftaz/cipro to cover Serratia 
->monitor repeat blcx from 1/28-ngtd- - Destructive T10-11 changes NEW discitis/OM 
- mid back pain x 5 months, fell, incontinent of urine - CTAP 11/2018: severe destructive changes T 10-11, paravertebral sot tissue infiltration, extension to anterior epidural space w/ cord compression  
- h/o IVDU - last use in July 2018 
- H/O uncooperative for MRI given claustrophobia in past 
- Bone/ gallium scans 11/27 neg for discitis/OM  
- refused stabilization procedure per Dr. Kiarra Smith in past 
- was not given long term Abx then 
- CT 1/21 with destructive changes T9-10 levels c/w discitis/OM and intraspinal and paraspinal phlegmon. Overall appearance is grossly unchanged and pain also not different from last time 
-inflammatory markers: esr 38, crp 7.4 
- s/p aspiration of fluid/phlegmon 1/23 NEW (+) Serratia in anaerobic culture  ->monitor cultures  
--re-check inflammatory markers 
-> Abx as above 
-> will need long term Abx ?6-8 weeks- at 2 weeks, may consider monotherapy with quinolone- will discuss 
->any need for NSGY f/u? --? Need for stabilization- can be arranged for after dc Acute hypoxic resp distress- RRT called 1/23 and again 1/24 sec to hypercapnic resp failure - off BIPAP now on NC -still 4L  
 Bilateral pleural effusions- chronic with  atelectasis - review fu cxr- may need thoracentesis if effusions still significant - to improve oxygenation Hepatomegaly, splenic hilar & perisplenic varices- concern for cirrhosis and portal HTN 
- seen on CTAP again    
H/o IVDU 
- HIV ab NR 11/19 ,Hep BsAg neg (last reported ivdu August 2018, has hep C and thinks hep b immune) - denies anything currently 
-> will be difficult to decide for PICC candidate or not given IVDU June 2018 
   
COPD    
HTN    
DJD    
Allergy [place]    
  
MICROBIOLOGY:  
11/14   urcx >100,000 Serratia marcescens             blcx NG x 2 
11/15   CrAG neg, fungitell 375 (reported 11/17) 
11/29   blcx for fungus - ngtd 
            fungitell 113 
  
1/21     Blcx x2 Serratia marcescens R cefazolin UA neg 
1/22 Blcx 1 of 2 Serratia 1/23 Aspiration cx g/s neg, Cx NG 
 NOMAN rare Serratia R Cefazolin Fungal NOS 
1/28  bctx x 2 ngtd 
  
LINES AND CATHETERS:  
piv-1/24 
  
 
MEDICATIONS:  
 
Current Facility-Administered Medications Medication Dose Route Frequency  ciprofloxacin HCl (CIPRO) tablet 500 mg  500 mg Oral Q12H  pantoprazole (PROTONIX) tablet 40 mg  40 mg Oral DAILY  albuterol-ipratropium (DUO-NEB) 2.5 MG-0.5 MG/3 ML  3 mL Nebulization QID RT  
 guaiFENesin (ROBITUSSIN) 100 mg/5 mL oral liquid 400 mg  400 mg Oral Q4H  
 cefTAZidime (FORTAZ) 1 g in 0.9% sodium chloride (MBP/ADV) 50 mL MBP  1 g IntraVENous Q8H  
 lidocaine (PF) (XYLOCAINE) 10 mg/mL (1 %) injection 20 mL  20 mL SubCUTAneous Rad Multiple  amLODIPine (NORVASC) tablet 10 mg  10 mg Oral DAILY  gabapentin (NEURONTIN) capsule 400 mg  400 mg Oral TID  lisinopril (PRINIVIL, ZESTRIL) tablet 20 mg  20 mg Oral DAILY  metoprolol tartrate (LOPRESSOR) tablet 50 mg  50 mg Oral BID  simvastatin (ZOCOR) tablet 10 mg  10 mg Oral QHS  acetaminophen (TYLENOL) tablet 650 mg  650 mg Oral Q6H PRN  
  ondansetron (ZOFRAN) injection 4 mg  4 mg IntraVENous Q6H PRN  
 heparin (porcine) injection 5,000 Units  5,000 Units SubCUTAneous Q8H SUBJECTIVE :  
 
Interval notes reviewed. D/w pt  IR aspiration grew Serratia anaerobically from , will need long-term abx. Says breathing is ok. Still on 4L nasal O2. denied fever or rash, says some GI upset initially with po cipro now better OBJECTIVE Visit Vitals /58 Pulse 75 Temp 98.6 °F (37 °C) Resp 18 Ht 6' (1.829 m) Wt 104.9 kg (231 lb 3.2 oz) SpO2 91% BMI 31.36 kg/m² Temp (24hrs), Av.4 °F (36.9 °C), Min:97.4 °F (36.3 °C), Max:99.1 °F (37.3 °C) Gen:on NC O2, awake and alert HEENT: muddy sclerae, pupils equal and reactive Chest: Symmetric expansion, wheezing+, diminished at bases CVS:S1S2 RR, tachy, No JVD, No edema Abd:Soft, NT, ND, BS+ Skin:No jaundice, cyanosis, clubbing. No decubitus or rash 
  piv intact Labs: Results:  
Chemistry Recent Labs  
  19 
0715 GLU 77   
K 4.0  
 CO2 39* BUN 8  
CREA 0.64 CA 8.4* AGAP 0* BUCR 13  
  
CBC w/Diff Recent Labs  
  19 
0715 WBC 7.2 RBC 4.42* HGB 12.5* HCT 41.0  
 GRANS 56 LYMPH 33 EOS 1  
  
 
 
RADIOLOGY :   
 
 
 
Imaging Results from Hospital Encounter encounter on 19 XR CHEST PORT Narrative EXAM: CHEST RADIOGRAPH, SINGLE VIEW CLINICAL INDICATION/HISTORY: Tube Placement COMPARISON: 2019 TECHNIQUE: Portable frontal view of the chest was obtained.  
 
_______________ FINDINGS: 
 
SUPPORT DEVICES: Cardiac leads and wires overlie the anterior chest. There is no 
visualized tube given indication of tube placement. HEART AND MEDIASTINUM: Cardiomediastinal silhouette appears unchanged. Tortuous 
and atherosclerotic thoracic aorta. Indistinct central pulmonary vasculature, 
unchanged. LUNGS AND PLEURAL SPACES: Low lung volumes with layering right greater than left pleural effusions, each with adjacent basilar patchy opacity. Diffusely 
increased interstitial markings, especially throughout perihilar and basilar 
distributions appear unchanged. No pneumothorax. BONY THORAX AND SOFT TISSUES: No acute osseous abnormality. _______________ Impression IMPRESSION: 
 
1. No visualized tube given provided history of tube placement. 2. Unchanged cardiomegaly with right greater than left pleural effusions, 
adjacent basilar atelectasis/infiltrate and mild pulmonary interstitial edema. Results from Hospital Encounter encounter on 01/21/19 CT GUIDE CATH FLUID DRAIN SOFT TISSUE Narrative CT GUIDED ASPIRATION OF T10-11 disc space:  
 
Indication: Discitis T10-11. COMPARISON: CTA chest, abdomen and pelvis 1/21/2019. CTA chest 12/23/2018 Technique/findings: After the procedure, as well as its risks, benefits and 
alternatives were explained to the patient and his daughter, and all questions 
answered, written informed consent was obtained from the daughter and witnessed. Procedure was performed using only local anesthesia. The fluid collection in the T10-11 disc space was localized under CT guidance. The skin was marked, prepped and draped in sterile fashion and bicarbonate 
buffered lidocaine was used for local anesthesia. An 19 gauge needle was 
advanced into the collection. A total of 3-4 ml of bloody fluid was aspirated. The fluid was sent for laboratory evaluation. The patient tolerated the 
procedure well and there were no immediate complications. Standard post 
procedure pause. One or more dose reduction techniques were used on this CT: automated exposure 
control, adjustment of the mAs and/or kVp according to patient size, and 
iterative reconstruction techniques. The specific techniques used on this CT 
exam have been documented in the patient's electronic medical record.  Digital 
 Imaging and Communications in Medicine (DICOM) format image data are available 
to nonaffiliated external healthcare facilities or entities on a secure, media 
free, reciprocally searchable basis with patient authorization for at least a 
12-month period after this study. GUIDANCE: CT guidance was used to position (and confirm the position of) the 
needle. Image(s) saved in PACS: CT Impression IMPRESSION: 
 
Successful CT guided aspiration of the T10-11 disc space. I have independently examined the patient and reviewed lab studies and imgaing as well as review of nursing notes and physican notes Kavin Brasher MD 
January 31, 2019 [facility] [phone]

## 2019-01-31 NOTE — PROGRESS NOTES
Progress Note Patient: Janice Estes               Sex: male          DOA: 1/21/2019 YOB: 1950      Age:  77 y.o.        LOS:  LOS: 10 days CHIEF COMPLAINT:  Bacteremia  Discitis Subjective:  
 
Patient feels okay No distress Discussed with CM Objective:  
  
Visit Vitals /60 Pulse (!) 101 Temp 99.1 °F (37.3 °C) Resp 20 Ht 6' (1.829 m) Wt 104.9 kg (231 lb 3.2 oz) SpO2 94% BMI 31.36 kg/m² Physical Exam: 
Gen:  No distress, no complaint Lungs:  Clear bilaterally, no wheeze or rhonchi Heart:  Regular rate and rhythm, no murmurs or gallops Abdomen:  Soft, non-tender, normal bowel sounds Lab/Data Reviewed: 
 
Labs reviewed Assessment/Plan Principal Problem: 
  Bacteremia (1/21/2019) Active Problems: 
  Discitis of thoracic region (11/14/2018) Bilateral pleural effusion (11/14/2018) Hypertension (11/14/2018) Hepatitis C (11/14/2018) Back pain (11/14/2018) Acute on chronic respiratory failure with hypoxia ([address] (12/23/2018) PNA (pneumonia) (1/26/2019) Metabolic encephalopathy (7/83/9504) Overview: Associated with sepsis, present on admission. Plan: 
Continue with long term antibiotics; possible d/c on PO cipro, will await ID recs tomorrow Working toward disposition BP control Mobilize DVT prophylaxis.

## 2019-01-31 NOTE — PROGRESS NOTES
Received report from [facility]. Patient has no complaints except for back pain and medication was given as prescribed. Had a few instances where he took oxygen off but was able to get it back on quickly. Bedside and Verbal shift change report given to Sujit Ferrara (oncoming nurse) by Calvni Rush (offgoing nurse). Report included the following information SBAR, Kardex, MAR, Recent Results, Med Rec Status and Cardiac Rhythm sr.

## 2019-01-31 NOTE — PROGRESS NOTES
Problem: Falls - Risk of 
Goal: *Absence of Falls Document Edilia Pickett Fall Risk and appropriate interventions in the flowsheet. Outcome: Progressing Towards Goal 
Fall Risk Interventions: 
Mobility Interventions: Bed/chair exit alarm, Communicate number of staff needed for ambulation/transfer, Patient to call before getting OOB, PT Consult for mobility concerns, PT Consult for assist device competence, Strengthening exercises (ROM-active/passive) Mentation Interventions: Adequate sleep, hydration, pain control Medication Interventions: Bed/chair exit alarm, Patient to call before getting OOB, Teach patient to arise slowly Elimination Interventions: Call light in reach, Elevated toilet seat, Patient to call for help with toileting needs, Toileting schedule/hourly rounds History of Falls Interventions: Consult care management for discharge planning

## 2019-01-31 NOTE — PROGRESS NOTES
Problem: Self Care Deficits Care Plan (Adult) Goal: *Acute Goals and Plan of Care (Insert Text) Occupational Therapy Goals Initiated 1/30/2019 within 7 day(s). 1.  Patient will perform grooming tasks while standing with stand-by assistance for balance. 2.  Patient will perform lower body dressing with minimal assistance utilizing adaptive equipment/strategies, prn. 
3.  Patient will perform functional task in standing for 8 minutes with supervision and < 2 rest breaks to increase activity tolerance for functional transfers & ADLs. 4.  Patient will perform toilet transfers with supervision. 5.  Patient will perform all aspects of toileting with supervision. 6.  Patient will participate in upper extremity therapeutic exercise/activities for 8 minutes to increase BUE strength for functional transfers and ADLs. 7.  Patient will utilize energy conservation techniques during functional activities with minimal verbal cues. Outcome: Progressing Towards Goal 
[facility] 
OCCUPATIONAL THERAPY: Daily Note INPATIENT: Medicaid: Hospital Day: 11 Patient: Olivia Kong (40 y.o. male)    Date: 1/31/2019 Primary Diagnosis: Bacteremia  
,  ,  
Precautions: Falls PLOF: Pt was independent with most basic self care tasks and utilized RW and WC for functional mobility PTA. ASSESSMENT : Pt supine on arrival, agreeable and motivated to work with therapy. Min A & additional time to maneuver to EOB. Fair/fair- sitting balance & kyphotic posture at EOB with skilled instruction on proper positioning to ensure spinal integrity. CGA/min A to stand with RW & maneuver to recliner chair. BUE TherEx x10 each with rest breaks due to back pain & decreased activity tolerance; therEx written on communication board for visual reinforcement. Pt demo'ing good progress towards functional goals & demo's/verbalizes motivation. Pt reporting 8/10 back pain at end of session; this pain is chronic at this point. Edna Avila, RN aware. PLAN : 
Patient continues to benefit from skilled intervention to address the above impairments. Continue treatment per established plan of care. EDUCATION:  
Education:  Patient was educated on the following topics: importance of mobility, role of OT and updated POC Progression toward goals: 
[x]          Improving appropriately and progressing toward goals 
[]          Improving slowly and progressing toward goals 
[]          Not making progress toward goals and plan of care will be adjusted Barriers to Learning/Limitations: None Compensate with: visual, verbal, tactile, kinesthetic cues/model Discharge Recommendations: Franki Bains Further Equipment Recommendations for Discharge: anticipate none SUBJECTIVE:  
Patient stated: \"Do you have any books? \" OBJECTIVE/TREATMENT:  
 
Past Medical History:  
Diagnosis Date  Arthritis  COPD  Hypertension Past Surgical History:  
Procedure Laterality Date  HX REFRACTIVE SURGERY Functional Status Indep (I) Mod I Stand-by Assist  
Contact Guard Min Assist  
Mod Assist  
Max assist  
Total Assist  
Comments Rolling []  []  []  []    [x]    []    []  [] Supine to sit []  []  []  []  [x]  []  []  [] Sit to supine []  []  []  []  []  []  []  []  n/t Sit to stand []  []  []  []  [x]  []  []  []    
Stand to sit []  []  []  [x]  []  []  []  [] Bed to chair transfers []  []  []  [x]  [x]  []  []  []  Additional time Balance Good Anastasia Panda Poor Unable Comments Sitting static []  [x]  []  [] Sitting dynamic []  [x]  []  []    
Standing static []  [x]  []  []    
Standing dynamic []  [x]  []  [] Reaction Time []  []  []  [] Therapeutic Exercise:  
AROM, SHLD flexion, Elbow flexion and elbow extension x10 each with supervision and skilled instruction for pacing & positioning due to pt report of back pain & fatigue Therapeutic Activity: Pt performed functional transfer from EOB-->chair in prep for transitioning to bathroom & toilet transfer. Fair standing balance with RW. Skilled instruction on body positioning. Pain:  
Pre treatment:  8/10 Post treatment:  8/10 Scale: numeric Activity tolerance:  
fair COMMUNICATION/EDUCATION:  
Education:  Patient was educated on the following topics: importance of mobiilty Treatment/Session Assessment: · After treatment position/precautions:  
o Up in chair 
o Call light within reach 
o RN notified Recommendations/Intent for next treatment session: \"Next visit will focus on advancements to more challenging activities\". Thank you for this referral. 
Tina Vázquez MS OTR/L Time Calculation: 23 mins

## 2019-02-01 ENCOUNTER — APPOINTMENT (OUTPATIENT)
Dept: GENERAL RADIOLOGY | Age: 69
DRG: 049 | End: 2019-02-01
Attending: INTERNAL MEDICINE
Payer: MEDICAID

## 2019-02-01 LAB
CA-I SERPL-SCNC: 1.11 MMOL/L (ref 1.12–1.32)
MAGNESIUM SERPL-MCNC: 1.9 MG/DL (ref 1.6–2.6)
PHOSPHATE SERPL-MCNC: 2.8 MG/DL (ref 2.5–4.9)

## 2019-02-01 PROCEDURE — 83735 ASSAY OF MAGNESIUM: CPT

## 2019-02-01 PROCEDURE — 77010033678 HC OXYGEN DAILY

## 2019-02-01 PROCEDURE — 74011250637 HC RX REV CODE- 250/637: Performed by: INTERNAL MEDICINE

## 2019-02-01 PROCEDURE — 84100 ASSAY OF PHOSPHORUS: CPT

## 2019-02-01 PROCEDURE — P9047 ALBUMIN (HUMAN), 25%, 50ML: HCPCS | Performed by: INTERNAL MEDICINE

## 2019-02-01 PROCEDURE — 74011250636 HC RX REV CODE- 250/636: Performed by: INTERNAL MEDICINE

## 2019-02-01 PROCEDURE — 36415 COLL VENOUS BLD VENIPUNCTURE: CPT

## 2019-02-01 PROCEDURE — 82330 ASSAY OF CALCIUM: CPT

## 2019-02-01 PROCEDURE — 74011250636 HC RX REV CODE- 250/636: Performed by: HOSPITALIST

## 2019-02-01 PROCEDURE — 94640 AIRWAY INHALATION TREATMENT: CPT

## 2019-02-01 PROCEDURE — 74011000258 HC RX REV CODE- 258: Performed by: INTERNAL MEDICINE

## 2019-02-01 PROCEDURE — 74011250637 HC RX REV CODE- 250/637: Performed by: HOSPITALIST

## 2019-02-01 PROCEDURE — 74011000250 HC RX REV CODE- 250: Performed by: INTERNAL MEDICINE

## 2019-02-01 PROCEDURE — 65270000029 HC RM PRIVATE

## 2019-02-01 PROCEDURE — 71046 X-RAY EXAM CHEST 2 VIEWS: CPT

## 2019-02-01 PROCEDURE — 97535 SELF CARE MNGMENT TRAINING: CPT

## 2019-02-01 PROCEDURE — 97530 THERAPEUTIC ACTIVITIES: CPT

## 2019-02-01 PROCEDURE — 94660 CPAP INITIATION&MGMT: CPT

## 2019-02-01 RX ORDER — ALBUMIN HUMAN 250 G/1000ML
12.5 SOLUTION INTRAVENOUS 2 TIMES DAILY
Status: COMPLETED | OUTPATIENT
Start: 2019-02-01 | End: 2019-02-02

## 2019-02-01 RX ORDER — BUMETANIDE 0.25 MG/ML
1 INJECTION INTRAMUSCULAR; INTRAVENOUS 2 TIMES DAILY
Status: DISCONTINUED | OUTPATIENT
Start: 2019-02-01 | End: 2019-02-05

## 2019-02-01 RX ORDER — METHOCARBAMOL 500 MG/1
1000 TABLET, FILM COATED ORAL 4 TIMES DAILY
Status: DISCONTINUED | OUTPATIENT
Start: 2019-02-01 | End: 2019-02-02

## 2019-02-01 RX ORDER — TRAMADOL HYDROCHLORIDE 50 MG/1
50 TABLET ORAL
Status: DISCONTINUED | OUTPATIENT
Start: 2019-02-01 | End: 2019-02-02

## 2019-02-01 RX ADMIN — IPRATROPIUM BROMIDE AND ALBUTEROL SULFATE 3 ML: .5; 3 SOLUTION RESPIRATORY (INHALATION) at 20:59

## 2019-02-01 RX ADMIN — ACETAMINOPHEN 650 MG: 325 TABLET ORAL at 05:28

## 2019-02-01 RX ADMIN — HEPARIN SODIUM 5000 UNITS: 5000 INJECTION INTRAVENOUS; SUBCUTANEOUS at 02:48

## 2019-02-01 RX ADMIN — CEFTAZIDIME 1 G: 1 INJECTION, POWDER, FOR SOLUTION INTRAMUSCULAR; INTRAVENOUS at 02:48

## 2019-02-01 RX ADMIN — PANTOPRAZOLE SODIUM 40 MG: 40 TABLET, DELAYED RELEASE ORAL at 08:08

## 2019-02-01 RX ADMIN — GUAIFENESIN 400 MG: 100 SOLUTION ORAL at 05:24

## 2019-02-01 RX ADMIN — CIPROFLOXACIN 500 MG: 500 TABLET, FILM COATED ORAL at 21:45

## 2019-02-01 RX ADMIN — BUMETANIDE 1 MG: 0.25 INJECTION INTRAMUSCULAR; INTRAVENOUS at 18:08

## 2019-02-01 RX ADMIN — AMLODIPINE BESYLATE 10 MG: 10 TABLET ORAL at 08:08

## 2019-02-01 RX ADMIN — CEFTAZIDIME 1 G: 1 INJECTION, POWDER, FOR SOLUTION INTRAMUSCULAR; INTRAVENOUS at 10:56

## 2019-02-01 RX ADMIN — LISINOPRIL 20 MG: 20 TABLET ORAL at 08:08

## 2019-02-01 RX ADMIN — METOPROLOL TARTRATE 50 MG: 50 TABLET ORAL at 08:08

## 2019-02-01 RX ADMIN — HEPARIN SODIUM 5000 UNITS: 5000 INJECTION INTRAVENOUS; SUBCUTANEOUS at 10:56

## 2019-02-01 RX ADMIN — IPRATROPIUM BROMIDE AND ALBUTEROL SULFATE 3 ML: .5; 3 SOLUTION RESPIRATORY (INHALATION) at 16:03

## 2019-02-01 RX ADMIN — GABAPENTIN 400 MG: 100 CAPSULE ORAL at 15:08

## 2019-02-01 RX ADMIN — GUAIFENESIN 400 MG: 100 SOLUTION ORAL at 08:08

## 2019-02-01 RX ADMIN — GUAIFENESIN 400 MG: 100 SOLUTION ORAL at 12:42

## 2019-02-01 RX ADMIN — GABAPENTIN 400 MG: 100 CAPSULE ORAL at 08:08

## 2019-02-01 RX ADMIN — IPRATROPIUM BROMIDE AND ALBUTEROL SULFATE 3 ML: .5; 3 SOLUTION RESPIRATORY (INHALATION) at 07:43

## 2019-02-01 RX ADMIN — GUAIFENESIN 400 MG: 100 SOLUTION ORAL at 15:08

## 2019-02-01 RX ADMIN — ACETAMINOPHEN 650 MG: 325 TABLET ORAL at 15:11

## 2019-02-01 RX ADMIN — TRAMADOL HYDROCHLORIDE 50 MG: 50 TABLET, FILM COATED ORAL at 17:13

## 2019-02-01 RX ADMIN — HEPARIN SODIUM 5000 UNITS: 5000 INJECTION INTRAVENOUS; SUBCUTANEOUS at 17:13

## 2019-02-01 RX ADMIN — CEFTAZIDIME 1 G: 1 INJECTION, POWDER, FOR SOLUTION INTRAMUSCULAR; INTRAVENOUS at 17:13

## 2019-02-01 RX ADMIN — GABAPENTIN 400 MG: 100 CAPSULE ORAL at 21:44

## 2019-02-01 RX ADMIN — METHOCARBAMOL TABLETS 1000 MG: 500 TABLET, COATED ORAL at 21:44

## 2019-02-01 RX ADMIN — CIPROFLOXACIN 500 MG: 500 TABLET, FILM COATED ORAL at 08:08

## 2019-02-01 RX ADMIN — METHOCARBAMOL TABLETS 1000 MG: 500 TABLET, COATED ORAL at 17:13

## 2019-02-01 RX ADMIN — IPRATROPIUM BROMIDE AND ALBUTEROL SULFATE 3 ML: .5; 3 SOLUTION RESPIRATORY (INHALATION) at 11:41

## 2019-02-01 RX ADMIN — SIMVASTATIN 10 MG: 10 TABLET, FILM COATED ORAL at 21:45

## 2019-02-01 RX ADMIN — ALBUMIN (HUMAN) 12.5 G: 0.25 INJECTION, SOLUTION INTRAVENOUS at 18:07

## 2019-02-01 RX ADMIN — METOPROLOL TARTRATE 50 MG: 50 TABLET ORAL at 17:13

## 2019-02-01 RX ADMIN — GUAIFENESIN 400 MG: 100 SOLUTION ORAL at 21:45

## 2019-02-01 NOTE — PROGRESS NOTES
Problem: Self Care Deficits Care Plan (Adult) Goal: *Acute Goals and Plan of Care (Insert Text) Occupational Therapy Goals Initiated 1/30/2019 within 7 day(s). 1.  Patient will perform grooming tasks while standing with stand-by assistance for balance. 2.  Patient will perform lower body dressing with minimal assistance utilizing adaptive equipment/strategies, prn. 
3.  Patient will perform functional task in standing for 8 minutes with supervision and < 2 rest breaks to increase activity tolerance for functional transfers & ADLs. 4.  Patient will perform toilet transfers with supervision. 5.  Patient will perform all aspects of toileting with supervision. 6.  Patient will participate in upper extremity therapeutic exercise/activities for 8 minutes to increase BUE strength for functional transfers and ADLs. 7.  Patient will utilize energy conservation techniques during functional activities with minimal verbal cues. Outcome: Progressing Towards Goal 
[facility] 
OCCUPATIONAL THERAPY: Daily Note INPATIENT: Medicaid: Hospital Day: 12 Patient: Brenda Celestin (71 y.o. male)    Date: 2/1/2019 Primary Diagnosis: Bacteremia  
,  ,  
Precautions: Falls ; oxygen PLOF: Pt was independent with most ADLs PTA; utilized WC and RW for functional mobility. ASSESSMENT : Pt supine on arrival, c/o 10/10 back pain (just received pain meds), agreeable to therapy. Min/mod A for supine-->sit. Min A for UB dressing with mod vc's for UB positioning for spinal integrity. Total A to don bilateral socks secondary to back pain. Supervision/set-up for facial hygiene. Functional transfer from EOB-->chair in prep for bathroom mobility & BSC transfer; increased time to manage RW due to back pain. Skilled instruction on BLE and back positioning to ensure spinal integrity. Pt with increased SOB during session x2; O2 82%; increased to 92% within 2 mins with education on proper breathing techniques.  Pt left up in chair with needs within reach. Recommend continued therapy. PLAN : 
Patient continues to benefit from skilled intervention to address the above impairments. Continue treatment per established plan of care. EDUCATION:  
Education:  Patient was educated on the following topics: role of OT and POC Progression toward goals: 
[x]          Improving appropriately and progressing toward goals 
[]          Improving slowly and progressing toward goals 
[]          Not making progress toward goals and plan of care will be adjusted Barriers to Learning/Limitations: None Compensate with: visual, verbal, tactile, kinesthetic cues/model Discharge Recommendations: Franki Bains Further Equipment Recommendations for Discharge: bedside commode SUBJECTIVE:  
Patient stated: \"I'm hurting more today than yesterday. \" OBJECTIVE/TREATMENT:  
 
Past Medical History:  
Diagnosis Date  Arthritis  COPD  Hypertension Past Surgical History:  
Procedure Laterality Date  HX REFRACTIVE SURGERY Functional Status Indep (I) Mod I Stand-by Assist  
Contact Guard Min Assist  
Mod Assist  
Max assist  
Total Assist  
Comments Rolling []  []  []  []    [x]    []    []  [] Supine to sit []  []  []  []  [x]  [x]  []  [] Sit to supine []  []  []  []  []  []  []  []  n/t Sit to stand []  []  []  []  []  [x]  []  []    
Stand to sit []  []  []  []  [x]  []  []  [] Bed to chair transfers []  []  []  []  [x]  []  []  []  Additional time Balance Good Crystal Varghese Poor Unable Comments Sitting static []  [x]  []  [] Sitting dynamic []  [x]  []  []    
Standing static []  [x]  []  []    
Standing dynamic []  [x]  []  [] Reaction Time []  []  []  [] Therapeutic Activity: Functional reaching across midline and in all planes in prep for item retrieval for ADLs.  Functional transfer from EOB-->chair in prep for bathroom mobility & BSC transfer; increased time to manage RW due to back pain. Skilled instruction on BLE and back positioning to ensure spinal integrity. ADL Intervention:  
Min A for UB dressing with mod vc's for UB positioning for spinal integrity. Total A to don bilateral socks secondary to back pain. Supervision/set-up for facial hygiene. Pain:  
Pre treatment:  10/10 Post treatment:  10/10 Scale: numeric Location: Back Activity tolerance:  
fair COMMUNICATION/EDUCATION:  
Education:  Patient was educated on the following topics: role of OT and updated POC Treatment/Session Assessment: · After treatment position/precautions:  
o Up in chair 
o Bed/Chair-wheels locked 
o Call light within reach Recommendations/Intent for next treatment session: \"Next visit will focus on advancements to more challenging activities\". Thank you for this referral. 
Douglas Deutsch, OT Time Calculation: 25 mins

## 2019-02-01 NOTE — PROGRESS NOTES
Problem: Falls - Risk of 
Goal: *Absence of Falls Document Hamp Sandie Fall Risk and appropriate interventions in the flowsheet. Outcome: Progressing Towards Goal 
Fall Risk Interventions: 
Mobility Interventions: (P) Patient to call before getting OOB Mentation Interventions: (P) Door open when patient unattended, Evaluate medications/consider consulting pharmacy, More frequent rounding, Reorient patient, Room close to nurse's station, Update white board Medication Interventions: (P) Patient to call before getting OOB Elimination Interventions: (P) Call light in reach, Patient to call for help with toileting needs History of Falls Interventions: Door open when patient unattended Problem: Pressure Injury - Risk of 
Goal: *Prevention of pressure injury Document Raul Scale and appropriate interventions in the flowsheet. Outcome: Progressing Towards Goal 
Pressure Injury Interventions: 
Sensory Interventions: (P) Pressure redistribution bed/mattress (bed type) Moisture Interventions: (P) Absorbent underpads, Maintain skin hydration (lotion/cream) Activity Interventions: (P) Pressure redistribution bed/mattress(bed type) Mobility Interventions: (P) Pressure redistribution bed/mattress (bed type) Nutrition Interventions: (P) Document food/fluid/supplement intake Friction and Shear Interventions: (P) HOB 30 degrees or less

## 2019-02-01 NOTE — PROGRESS NOTES
1919  Received bedside verbal shift report from [employer]. Pt lying in bed resting comfortably. 2lnc in place. NSR on telemetry box # 31. Piv # 20  to right arm intact. Call bell within reach, side rails up x 3, bed low and locked. No complaints offered, pt instructed to call for assistance [phone]  Reassessment completed. Bedside and Verbal shift change report given to [facility] RN (oncoming nurse) by [address] [place] [address] (offgoing nurse). Report included the following information SBAR, Kardex, Intake/Output, MAR, Recent Results and Cardiac Rhythm NSR.

## 2019-02-01 NOTE — PROGRESS NOTES
No accepting facilities at this time, will needErtapenem 1 gm q 24 hours 6-8 weeks (for osteo) per ID and avoid PICC w/ h/o IVDU per ID patient has Medicaid

## 2019-02-01 NOTE — PROGRESS NOTES
Pulmonary progress note 
  
History 68-year-old male admitted to the hospital with a Serratia marcescens bacteremia.  At baseline he has PaCO2 retention at roughly 79. Maria Elena Mike was transferred to the ICU on 1/24/19 for what appears to be a right lower lobe pneumonia.   
  
He was walking in the [place] earlier today. He does not feel particularly short of breath Blood pressure 116/77, pulse 81, temperature 98.7 °F (37.1 °C), resp. rate (!) 32, height 6' (1.829 m), weight 101 kg (222 lb 10.6 oz), SpO2 91 %. 
  
Sclera are anicteric.  Gaze is conjugate. Perfecto Lek is midline.  Breath sounds are diminished.  They are otherwise clear anteriorly and laterally.  Regular rate and rhythm.  Abdomen is soft.  No cyanosis or clubbing.  No gross motor deficit. 
  
Labs: White blood cell count is normal without bands.  HCO3 is 39.  Creatinine is 0.64.     
  
Assessment Acute on chronic hypercapnic and hypoxic respiratory failure resolving towards baseline Serratia marcescens bacteremia Right lower lobe pneumonia Bilateral pleural effusions T9-10 discitis secondary to Serratia marcescens Medical therapy noncompliance 
  
Plan Continue antibiotics As needed BiPAP 
COPD therapies Increase activity as tolerated Repeat chest x-ray for follow-up on bilateral effusions and right lower lobe infiltrate

## 2019-02-01 NOTE — PROGRESS NOTES
Progress Note Patient: Arlyn Moreno               Sex: male          DOA: 1/21/2019 YOB: 1950      Age:  77 y.o.        LOS:  LOS: 11 days CHIEF COMPLAINT:  Bacteremia  Discitis Subjective:  
 
Patient feels okay No distress C/o back pain with ambulation Discussed with ID and CM Objective:  
  
Visit Vitals /76 (BP 1 Location: Right arm, BP Patient Position: At rest) Pulse 74 Temp 98.4 °F (36.9 °C) Resp 18 Ht 6' (1.829 m) Wt 104.9 kg (231 lb 3.2 oz) SpO2 96% BMI 31.36 kg/m² Physical Exam: 
Gen:  No distress, no complaint Lungs:  Clear bilaterally, no wheeze or rhonchi Heart:  Regular rate and rhythm, no murmurs or gallops Abdomen:  Soft, non-tender, normal bowel sounds Lab/Data Reviewed: 
 
Labs reviewed Assessment/Plan Principal Problem: 
  Bacteremia (1/21/2019) Active Problems: 
  Discitis of thoracic region (11/14/2018) Bilateral pleural effusion (11/14/2018) Hypertension (11/14/2018) Hepatitis C (11/14/2018) Back pain (11/14/2018) Acute on chronic respiratory failure with hypoxia ([address] (12/23/2018) PNA (pneumonia) (1/26/2019) Metabolic encephalopathy (2/16/1306) Overview: Associated with sepsis, present on admission. Plan: 
Continue with long term antibiotics; per ID will need Ertapenem 1g Q24h for 6-8 weeks for osteomyelitis/discitis. Avoid PICC due to h/o IVDU Working toward disposition - no accepting facilities at this time per ID 
BP control Pain control Will need Neurosurg follow up Mobilize DVT prophylaxis.

## 2019-02-01 NOTE — PROGRESS NOTES
INFECTIOUS DISEASE FOLLOW UP NOTE : 
 
 
 
Admit Date: 1/21/2019 Will plan to check back on Monday 2/4/2019. I will be available on call for ID this weekend and can be reached through pager [phone] if needed. Current abx Prior abx  
ceftaz 1/23-7, cipro 1/23-7 Cefepime/vanco  11/14   2, Ceftriaxone 11/16 - 3; Zosyn 1/21-2  
  
Assessment -> Rec:  
  
BSI 2 of 2 Serratia marcescens 1/21 
- source ~ Discitis Phlegmon seen on CT- NEW IR aspiration 1/23 chucky culture grew Serratia  
- denies any urinary complain but had Serratia in urine in past. Currently UA is negative - Ucx was not sent but CT with normal kidneys and nothing sig on CT and pt asymptomatic except some incontinenc 
- repeat Blcx 1/22 1/2 positive  -> dc Ceftaz/cipro to cover Serratia 
-> Ertapenem 1 gm q 24 hours 6-8 weeks (for osteo) 
->monitor repeat blcx from 1/28-still ngtd- prelim- if further positive check echo Destructive T10-11 changes NEW discitis/OM 
- mid back pain x 5 months, fell, incontinent of urine - CTAP 11/2018: severe destructive changes T 10-11, paravertebral sot tissue infiltration, extension to anterior epidural space w/ cord compression  
- h/o IVDU - last use in July 2018 
- H/O uncooperative for MRI given claustrophobia in past 
- Bone/ gallium scans 11/27 neg for discitis/OM  
- refused stabilization procedure per Dr. Nichole Srivastava in past 
- was not given long term Abx then 
- CT 1/21 with destructive changes T9-10 levels c/w discitis/OM and intraspinal and paraspinal phlegmon. Overall appearance is grossly unchanged and pain also not different from last time 
-inflammatory markers: esr 38, crp 7.4 
- s/p aspiration of fluid/phlegmon 1/23 NEW (+) Serratia in anaerobic culture - potential for emergent AmpC- beta-lactamase- mediated resistance to third-generation cephalosporins that may not be evident during initial susceptibility testing    -> monitor cultures  
-> Abx as above 
-> will need long term Abx ?6-8 weeks 
-> any need for NSGY f/u? --? Need for stabilization 
-> avoid PICC w/ h/o IVDU Acute hypoxic resp distress- RRT called 1/23 and again 1/24 sec to hypercapnic resp failure - off BIPAP now on NC Bilateral pleural effusions- chronic with  atelectasis - monitor Hepatomegaly, splenic hilar & perisplenic varices- concern for cirrhosis and portal HTN 
- seen on CTAP again    
H/o IVDU 
- HIV ab NR 11/19 ,Hep BsAg neg (last reported ivdu August 2018, has hep C and thinks hep b immune) - denies anything currently -  
   
COPD    
HTN    
DJD    
Allergy [place]    
  
MICROBIOLOGY:  
11/14   urcx >100,000 Serratia marcescens             blcx NG x 2 
11/15   CrAG neg, fungitell 375 (reported 11/17) 
11/29   blcx for fungus - ngtd 
            fungitell 113 
  
1/21     Blcx x2 Serratia marcescens R cefazolin UA neg 
1/22 Blcx 1 of 2 Serratia 1/23 Aspiration cx g/s neg, Cx NG 
 NOMAN rare Serratia R Cefazolin Fungal NOS 
1/28  bctx x 2 ngtd 
  
LINES AND CATHETERS:  
piv 
  
 
MEDICATIONS:  
 
Current Facility-Administered Medications Medication Dose Route Frequency  ciprofloxacin HCl (CIPRO) tablet 500 mg  500 mg Oral Q12H  pantoprazole (PROTONIX) tablet 40 mg  40 mg Oral DAILY  albuterol-ipratropium (DUO-NEB) 2.5 MG-0.5 MG/3 ML  3 mL Nebulization QID RT  
 guaiFENesin (ROBITUSSIN) 100 mg/5 mL oral liquid 400 mg  400 mg Oral Q4H  
 cefTAZidime (FORTAZ) 1 g in 0.9% sodium chloride (MBP/ADV) 50 mL MBP  1 g IntraVENous Q8H  
 lidocaine (PF) (XYLOCAINE) 10 mg/mL (1 %) injection 20 mL  20 mL SubCUTAneous Rad Multiple  amLODIPine (NORVASC) tablet 10 mg  10 mg Oral DAILY  gabapentin (NEURONTIN) capsule 400 mg  400 mg Oral TID  lisinopril (PRINIVIL, ZESTRIL) tablet 20 mg  20 mg Oral DAILY  metoprolol tartrate (LOPRESSOR) tablet 50 mg  50 mg Oral BID  
  simvastatin (ZOCOR) tablet 10 mg  10 mg Oral QHS  acetaminophen (TYLENOL) tablet 650 mg  650 mg Oral Q6H PRN  
 ondansetron (ZOFRAN) injection 4 mg  4 mg IntraVENous Q6H PRN  
 heparin (porcine) injection 5,000 Units  5,000 Units SubCUTAneous Q8H SUBJECTIVE :  
 
Interval notes reviewed. Sleeping comfortably. Easily awakened but says he has back pain even at rest - aggravated by movement. Afebrile OBJECTIVE Visit Vitals /76 (BP 1 Location: Right arm, BP Patient Position: At rest) Pulse 74 Temp 98.4 °F (36.9 °C) Resp 18 Ht 6' (1.829 m) Wt 104.9 kg (231 lb 3.2 oz) SpO2 96% BMI 31.36 kg/m² Temp (24hrs), Av °F (36.7 °C), Min:97.6 °F (36.4 °C), Max:98.6 °F (37 °C) Gen:on NC O2, awake and alert HEENT: muddy sclerae, pupils equal and reactive Chest: Symmetric expansion, no crackles or wheezing CVS:S1S2 RR, tachy, No JVD, No edema Abd:Soft, NT, ND, BS+ Skin:No jaundice, cyanosis, clubbing. No decubitus Muscsk: Normal muscle tone/strength CNS: AA and follows command Labs: Results:  
Chemistry No results for input(s): GLU, NA, K, CL, CO2, BUN, CREA, CA, AGAP, BUCR, TBIL, GPT, AP, TP, ALB, GLOB, AGRAT in the last 72 hours. CBC w/Diff No results for input(s): WBC, RBC, HGB, HCT, PLT, GRANS, LYMPH, EOS, HGBEXT, HCTEXT, PLTEXT, HGBEXT, HCTEXT, PLTEXT in the last 72 hours. RADIOLOGY :   
 
Imaging Results from Hospital Encounter encounter on 19 XR CHEST PA LAT Narrative EXAM: XR CHEST PA LAT INDICATION: Hypoxia COMPARISON: Several prior exams, most recently 2019. FINDINGS: PA and lateral radiographs of the chest demonstrate low lung volumes 
with central vascular congestion and mild bilateral interstitial opacities. Posteriorly layering pleural effusions are present bilaterally, right slightly 
larger than left. No pneumothorax. Cardiac size and mediastinal contours appear stable, predominately obscured. No acute osseous abnormality. Destructive 
changes at the T10-T11 disc space level are not well demonstrated on the lateral 
projection secondary overlying soft tissues. Impression  Impression: 1. Posteriorly layering pleural effusions, central vascular congestion, and 
interstitial edema similar to immediate comparison exam. 
2. Unchanged bibasilar atelectasis or infiltrate. Results from Hospital Encounter encounter on 01/21/19 CT GUIDE CATH FLUID DRAIN SOFT TISSUE Narrative CT GUIDED ASPIRATION OF T10-11 disc space:  
 
Indication: Discitis T10-11. COMPARISON: CTA chest, abdomen and pelvis 1/21/2019. CTA chest 12/23/2018 Technique/findings: After the procedure, as well as its risks, benefits and 
alternatives were explained to the patient and his daughter, and all questions 
answered, written informed consent was obtained from the daughter and witnessed. Procedure was performed using only local anesthesia. The fluid collection in the T10-11 disc space was localized under CT guidance. The skin was marked, prepped and draped in sterile fashion and bicarbonate 
buffered lidocaine was used for local anesthesia. An 19 gauge needle was 
advanced into the collection. A total of 3-4 ml of bloody fluid was aspirated. The fluid was sent for laboratory evaluation. The patient tolerated the 
procedure well and there were no immediate complications. Standard post 
procedure pause. One or more dose reduction techniques were used on this CT: automated exposure 
control, adjustment of the mAs and/or kVp according to patient size, and 
iterative reconstruction techniques. The specific techniques used on this CT 
exam have been documented in the patient's electronic medical record. Digital 
Imaging and Communications in Medicine (DICOM) format image data are available 
to nonaffiliated external healthcare facilities or entities on a secure, media free, reciprocally searchable basis with patient authorization for at least a 
12-month period after this study. GUIDANCE: CT guidance was used to position (and confirm the position of) the 
needle. Image(s) saved in PACS: CT Impression IMPRESSION: 
 
Successful CT guided aspiration of the T10-11 disc space. Tootie Gtz MD 
February 1, 2019 [facility] [phone]

## 2019-02-01 NOTE — ROUTINE PROCESS
Bedside and Verbal shift change report given to keesha barber rn (oncoming nurse) by Bailey Powell rn (offgoing nurse). Report included the following information SBAR, Kardex and MAR.

## 2019-02-02 LAB
ANION GAP SERPL CALC-SCNC: 1 MMOL/L (ref 3–18)
ARTERIAL PATENCY WRIST A: ABNORMAL
ARTERIAL PATENCY WRIST A: YES
BASE EXCESS BLD CALC-SCNC: 18 MMOL/L
BASE EXCESS BLD CALC-SCNC: 23 MMOL/L
BDY SITE: ABNORMAL
BDY SITE: ABNORMAL
BODY TEMPERATURE: 97.6
BUN SERPL-MCNC: 13 MG/DL (ref 7–18)
BUN/CREAT SERPL: 20 (ref 12–20)
CA-I SERPL-SCNC: 1.05 MMOL/L (ref 1.12–1.32)
CALCIUM SERPL-MCNC: 8.1 MG/DL (ref 8.5–10.1)
CHLORIDE SERPL-SCNC: 101 MMOL/L (ref 100–108)
CO2 SERPL-SCNC: 41 MMOL/L (ref 21–32)
CREAT SERPL-MCNC: 0.65 MG/DL (ref 0.6–1.3)
GAS FLOW.O2 O2 DELIVERY SYS: ABNORMAL L/MIN
GAS FLOW.O2 O2 DELIVERY SYS: ABNORMAL L/MIN
GAS FLOW.O2 SETTING OXYMISER: 4 L/M
GLUCOSE SERPL-MCNC: 87 MG/DL (ref 74–99)
HCO3 BLD-SCNC: 45.4 MMOL/L (ref 22–26)
HCO3 BLD-SCNC: 48.2 MMOL/L (ref 22–26)
MAGNESIUM SERPL-MCNC: 1.9 MG/DL (ref 1.6–2.6)
O2/TOTAL GAS SETTING VFR VENT: 36 %
O2/TOTAL GAS SETTING VFR VENT: 40 %
PCO2 BLD: 101.3 MMHG (ref 35–45)
PCO2 BLD: 81 MMHG (ref 35–45)
PEEP RESPIRATORY: 10 CMH2O
PH BLD: 7.26 [PH] (ref 7.35–7.45)
PH BLD: 7.38 [PH] (ref 7.35–7.45)
PHOSPHATE SERPL-MCNC: 3.7 MG/DL (ref 2.5–4.9)
PIP ISTAT,IPIP: 20
PO2 BLD: 68 MMHG (ref 80–100)
PO2 BLD: 96 MMHG (ref 80–100)
POTASSIUM SERPL-SCNC: 5.5 MMOL/L (ref 3.5–5.5)
SAO2 % BLD: 89 % (ref 92–97)
SAO2 % BLD: 97 % (ref 92–97)
SERVICE CMNT-IMP: ABNORMAL
SERVICE CMNT-IMP: ABNORMAL
SODIUM SERPL-SCNC: 143 MMOL/L (ref 136–145)
SPECIMEN TYPE: ABNORMAL
SPECIMEN TYPE: ABNORMAL
TOTAL RESP. RATE, ITRR: 15

## 2019-02-02 PROCEDURE — 77030035694 HC MSK BIPAP FLL FAC PERFMAX PHIL -B

## 2019-02-02 PROCEDURE — 77030037878 HC DRSG MEPILEX >48IN BORD MOLN -B

## 2019-02-02 PROCEDURE — 82803 BLOOD GASES ANY COMBINATION: CPT

## 2019-02-02 PROCEDURE — 83735 ASSAY OF MAGNESIUM: CPT

## 2019-02-02 PROCEDURE — 82330 ASSAY OF CALCIUM: CPT

## 2019-02-02 PROCEDURE — 36600 WITHDRAWAL OF ARTERIAL BLOOD: CPT

## 2019-02-02 PROCEDURE — 74011250637 HC RX REV CODE- 250/637: Performed by: HOSPITALIST

## 2019-02-02 PROCEDURE — 74011250636 HC RX REV CODE- 250/636: Performed by: HOSPITALIST

## 2019-02-02 PROCEDURE — 77010033678 HC OXYGEN DAILY

## 2019-02-02 PROCEDURE — 36415 COLL VENOUS BLD VENIPUNCTURE: CPT

## 2019-02-02 PROCEDURE — 74011000250 HC RX REV CODE- 250: Performed by: INTERNAL MEDICINE

## 2019-02-02 PROCEDURE — 74011250636 HC RX REV CODE- 250/636: Performed by: INTERNAL MEDICINE

## 2019-02-02 PROCEDURE — 84100 ASSAY OF PHOSPHORUS: CPT

## 2019-02-02 PROCEDURE — 94760 N-INVAS EAR/PLS OXIMETRY 1: CPT

## 2019-02-02 PROCEDURE — P9047 ALBUMIN (HUMAN), 25%, 50ML: HCPCS | Performed by: INTERNAL MEDICINE

## 2019-02-02 PROCEDURE — 74011000258 HC RX REV CODE- 258: Performed by: INTERNAL MEDICINE

## 2019-02-02 PROCEDURE — 80048 BASIC METABOLIC PNL TOTAL CA: CPT

## 2019-02-02 PROCEDURE — 94660 CPAP INITIATION&MGMT: CPT

## 2019-02-02 PROCEDURE — 94640 AIRWAY INHALATION TREATMENT: CPT

## 2019-02-02 PROCEDURE — 74011250637 HC RX REV CODE- 250/637: Performed by: INTERNAL MEDICINE

## 2019-02-02 PROCEDURE — 65660000001 HC RM ICU INTERMED STEPDOWN

## 2019-02-02 RX ADMIN — IPRATROPIUM BROMIDE AND ALBUTEROL SULFATE 3 ML: .5; 3 SOLUTION RESPIRATORY (INHALATION) at 19:59

## 2019-02-02 RX ADMIN — IPRATROPIUM BROMIDE AND ALBUTEROL SULFATE 3 ML: .5; 3 SOLUTION RESPIRATORY (INHALATION) at 15:14

## 2019-02-02 RX ADMIN — ALBUMIN (HUMAN) 12.5 G: 0.25 INJECTION, SOLUTION INTRAVENOUS at 17:12

## 2019-02-02 RX ADMIN — CEFTAZIDIME 1 G: 1 INJECTION, POWDER, FOR SOLUTION INTRAMUSCULAR; INTRAVENOUS at 18:00

## 2019-02-02 RX ADMIN — CEFTAZIDIME 1 G: 1 INJECTION, POWDER, FOR SOLUTION INTRAMUSCULAR; INTRAVENOUS at 02:17

## 2019-02-02 RX ADMIN — GUAIFENESIN 400 MG: 100 SOLUTION ORAL at 05:10

## 2019-02-02 RX ADMIN — GUAIFENESIN 400 MG: 100 SOLUTION ORAL at 09:27

## 2019-02-02 RX ADMIN — IPRATROPIUM BROMIDE AND ALBUTEROL SULFATE 3 ML: .5; 3 SOLUTION RESPIRATORY (INHALATION) at 07:44

## 2019-02-02 RX ADMIN — GABAPENTIN 400 MG: 100 CAPSULE ORAL at 09:23

## 2019-02-02 RX ADMIN — HEPARIN SODIUM 5000 UNITS: 5000 INJECTION INTRAVENOUS; SUBCUTANEOUS at 09:32

## 2019-02-02 RX ADMIN — CIPROFLOXACIN 500 MG: 500 TABLET, FILM COATED ORAL at 09:23

## 2019-02-02 RX ADMIN — HEPARIN SODIUM 5000 UNITS: 5000 INJECTION INTRAVENOUS; SUBCUTANEOUS at 02:18

## 2019-02-02 RX ADMIN — CEFTAZIDIME 1 G: 1 INJECTION, POWDER, FOR SOLUTION INTRAMUSCULAR; INTRAVENOUS at 09:28

## 2019-02-02 RX ADMIN — METHOCARBAMOL TABLETS 1000 MG: 500 TABLET, COATED ORAL at 09:22

## 2019-02-02 RX ADMIN — HEPARIN SODIUM 5000 UNITS: 5000 INJECTION INTRAVENOUS; SUBCUTANEOUS at 18:00

## 2019-02-02 RX ADMIN — TRAMADOL HYDROCHLORIDE 50 MG: 50 TABLET, FILM COATED ORAL at 05:10

## 2019-02-02 RX ADMIN — BUMETANIDE 1 MG: 0.25 INJECTION INTRAMUSCULAR; INTRAVENOUS at 17:12

## 2019-02-02 RX ADMIN — LISINOPRIL 20 MG: 20 TABLET ORAL at 09:24

## 2019-02-02 RX ADMIN — AMLODIPINE BESYLATE 10 MG: 10 TABLET ORAL at 09:22

## 2019-02-02 RX ADMIN — IPRATROPIUM BROMIDE AND ALBUTEROL SULFATE 3 ML: .5; 3 SOLUTION RESPIRATORY (INHALATION) at 12:35

## 2019-02-02 RX ADMIN — BUMETANIDE 1 MG: 0.25 INJECTION INTRAMUSCULAR; INTRAVENOUS at 09:27

## 2019-02-02 RX ADMIN — PANTOPRAZOLE SODIUM 40 MG: 40 TABLET, DELAYED RELEASE ORAL at 09:23

## 2019-02-02 RX ADMIN — METOPROLOL TARTRATE 50 MG: 50 TABLET ORAL at 09:23

## 2019-02-02 RX ADMIN — ALBUMIN (HUMAN) 12.5 G: 0.25 INJECTION, SOLUTION INTRAVENOUS at 09:34

## 2019-02-02 NOTE — PROGRESS NOTES
Problem: Falls - Risk of 
Goal: *Absence of Falls Document Cy Cherokee Fall Risk and appropriate interventions in the flowsheet. Outcome: Progressing Towards Goal 
Fall Risk Interventions: 
Mobility Interventions: Patient to call before getting OOB, Communicate number of staff needed for ambulation/transfer Mentation Interventions: Adequate sleep, hydration, pain control, Door open when patient unattended, More frequent rounding, Room close to nurse's station, Update white board, Evaluate medications/consider consulting pharmacy Medication Interventions: Patient to call before getting OOB, Teach patient to arise slowly Elimination Interventions: Call light in reach, Patient to call for help with toileting needs, Urinal in reach History of Falls Interventions: Door open when patient unattended, Room close to nurse's station, Evaluate medications/consider consulting pharmacy Problem: Pressure Injury - Risk of 
Goal: *Prevention of pressure injury Document Raul Scale and appropriate interventions in the flowsheet. Outcome: Progressing Towards Goal 
Pressure Injury Interventions: 
Sensory Interventions: Assess changes in LOC, Check visual cues for pain, Keep linens dry and wrinkle-free, Maintain/enhance activity level, Monitor skin under medical devices, Pressure redistribution bed/mattress (bed type) Moisture Interventions: Absorbent underpads, Maintain skin hydration (lotion/cream) Activity Interventions: Pressure redistribution bed/mattress(bed type) Mobility Interventions: Pressure redistribution bed/mattress (bed type) Nutrition Interventions: Document food/fluid/supplement intake Friction and Shear Interventions: HOB 30 degrees or less

## 2019-02-02 NOTE — PROGRESS NOTES
ABG is done. Results given to Dr. Jack Pearce,. Patient  Is tranferred to ICU Without complication. Patient is placed on Bipap. In-line nebulizer treatment given.

## 2019-02-02 NOTE — PROGRESS NOTES
1918  Received bedside verbal shift report from [facility]. Pt lying in bed resting comfortably. 2lnc in place. NSR on telemetry box # 31. Piv # 20  to right arm intact. Call bell within reach, side rails up x 3, bed low and locked. No complaints offered, pt instructed to call for assistance 0730  Bedside and Verbal shift change report given to Kylie Clark (oncoming nurse) by Theodore May (offgoing nurse). Report included the following information SBAR, Kardex, Intake/Output, MAR, Recent Results and Cardiac Rhythm NSR.

## 2019-02-02 NOTE — PROGRESS NOTES
Pulmonary progress note 
  
History 25-year-old male admitted to the hospital with a Serratia marcescens bacteremia and discitis.  At baseline he has PaCO2 retention at roughly 79. Ana Becker was transferred to the ICU on 1/24/19 for what appears to be a right lower lobe pneumonia.   
  
He feels more short of breath today. He does not believe that he can take a deep breath. Blood pressure 107/54, pulse 86, temperature 98.2 °F (36.8 °C), resp. rate 18, height 6' (1.829 m), weight 104.9 kg (231 lb 3.2 oz), SpO2 93 %. Sclera are anicteric. Jelly Necessary is conjugate. Marchia Ely is midline.  Breath sounds are diminished.  Chest wall excursion is limited. He has conversational dyspnea after several words.  Regular rate and rhythm.  Abdomen is soft.  No cyanosis or clubbing.  No gross motor deficit. Lab 1/28/19: Albumin 2.1 PA and lateral chest x-ray from today shows large bilateral pleural effusions. 
 
  
Assessment Acute on chronic hypercapnic and hypoxic respiratory failure Bilateral pleural effusions Hypoalbuminemia Serratia marcescens bacteremia and T9 - T10 discitis Medical therapy noncompliance 
  
Plan Continue antibiotics As needed BiPAP 
COPD therapies Increase activity as tolerated Albumin diuresis

## 2019-02-02 NOTE — PROGRESS NOTES
Progress Note Patient: Tiburcio Sifuentes               Sex: male          DOA: 1/21/2019 YOB: 1950      Age:  77 y.o.        LOS:  LOS: 12 days CHIEF COMPLAINT:  Bacteremia  Discitis Subjective: This AM pt is lethargic ABG obtained shows hypercarbia (100) and acidosis (pH 7.25) Objective:  
  
Visit Vitals /62 (BP 1 Location: Left arm, BP Patient Position: At rest) Pulse 74 Temp 97.9 °F (36.6 °C) Resp 18 Ht 6' (1.829 m) Wt 104.9 kg (231 lb 3.2 oz) SpO2 90% BMI 31.36 kg/m² Physical Exam: 
Gen:  No distress, no complaint Lungs:  Clear bilaterally, no wheeze or rhonchi Heart:  Regular rate and rhythm, no murmurs or gallops Abdomen:  Soft, non-tender, normal bowel sounds Lab/Data Reviewed: 
 
Labs reviewed Assessment/Plan Principal Problem: 
  Bacteremia (1/21/2019) Active Problems: 
  Discitis of thoracic region (11/14/2018) Bilateral pleural effusion (11/14/2018) Hypertension (11/14/2018) Hepatitis C (11/14/2018) Back pain (11/14/2018) Acute on chronic respiratory failure with hypoxia ([address] (12/23/2018) PNA (pneumonia) (1/26/2019) Metabolic encephalopathy (5/39/4502) Overview: Associated with sepsis, present on admission. Plan: · Transfer to Step Down for BIPAP for hypercarbic resp failure. · D/c pain meds · Continue with long term antibiotics; per ID will need Ertapenem 1g Q24h for 6-8 weeks for osteomyelitis/discitis. Avoid PICC due to h/o IVDU · Working toward disposition - no accepting facilities at this time per ID · BP control · Pain control · Will need Neurosurg follow up · Mobilize · DVT prophylaxis.

## 2019-02-02 NOTE — ROUTINE PROCESS
56- Assumed care. Pt in bed awake. Alert and oriented x 4. Lethargic. Delayed responses. No c/o pain. Pt is noted to be shallow breathing. Call light in reach. 5211- Dr. Mohan Credit paged for critical CO2 23 
 
1871- Dr. Mohan Credit returned page and is aware of the above. No new orders. 1709- Due meds given. Tolerated well.   
 
1147- Report called to [facility] - DAVID in ICU 2600.  
 
1220- Pt transferred to ICU

## 2019-02-03 LAB
ANION GAP SERPL CALC-SCNC: ABNORMAL MMOL/L (ref 3–18)
BACTERIA SPEC CULT: NORMAL
BACTERIA SPEC CULT: NORMAL
BUN SERPL-MCNC: 19 MG/DL (ref 7–18)
BUN/CREAT SERPL: 26 (ref 12–20)
CA-I SERPL-SCNC: 1.13 MMOL/L (ref 1.12–1.32)
CALCIUM SERPL-MCNC: 8.8 MG/DL (ref 8.5–10.1)
CHLORIDE SERPL-SCNC: 94 MMOL/L (ref 100–108)
CO2 SERPL-SCNC: >45 MMOL/L (ref 21–32)
CREAT SERPL-MCNC: 0.73 MG/DL (ref 0.6–1.3)
GLUCOSE SERPL-MCNC: 88 MG/DL (ref 74–99)
MAGNESIUM SERPL-MCNC: 1.9 MG/DL (ref 1.6–2.6)
PHOSPHATE SERPL-MCNC: 3.3 MG/DL (ref 2.5–4.9)
POTASSIUM SERPL-SCNC: 4.4 MMOL/L (ref 3.5–5.5)
SERVICE CMNT-IMP: NORMAL
SERVICE CMNT-IMP: NORMAL
SODIUM SERPL-SCNC: 143 MMOL/L (ref 136–145)

## 2019-02-03 PROCEDURE — 94660 CPAP INITIATION&MGMT: CPT

## 2019-02-03 PROCEDURE — 77030010545

## 2019-02-03 PROCEDURE — 74011250636 HC RX REV CODE- 250/636: Performed by: INTERNAL MEDICINE

## 2019-02-03 PROCEDURE — 74011250637 HC RX REV CODE- 250/637: Performed by: HOSPITALIST

## 2019-02-03 PROCEDURE — 74011000250 HC RX REV CODE- 250: Performed by: INTERNAL MEDICINE

## 2019-02-03 PROCEDURE — 83735 ASSAY OF MAGNESIUM: CPT

## 2019-02-03 PROCEDURE — 77010033678 HC OXYGEN DAILY

## 2019-02-03 PROCEDURE — 84100 ASSAY OF PHOSPHORUS: CPT

## 2019-02-03 PROCEDURE — 65660000000 HC RM CCU STEPDOWN

## 2019-02-03 PROCEDURE — 82330 ASSAY OF CALCIUM: CPT

## 2019-02-03 PROCEDURE — 74011250636 HC RX REV CODE- 250/636: Performed by: HOSPITALIST

## 2019-02-03 PROCEDURE — 74011000258 HC RX REV CODE- 258: Performed by: INTERNAL MEDICINE

## 2019-02-03 PROCEDURE — 74011250637 HC RX REV CODE- 250/637: Performed by: INTERNAL MEDICINE

## 2019-02-03 PROCEDURE — 94640 AIRWAY INHALATION TREATMENT: CPT

## 2019-02-03 PROCEDURE — 80048 BASIC METABOLIC PNL TOTAL CA: CPT

## 2019-02-03 RX ADMIN — PANTOPRAZOLE SODIUM 40 MG: 40 TABLET, DELAYED RELEASE ORAL at 11:50

## 2019-02-03 RX ADMIN — HEPARIN SODIUM 5000 UNITS: 5000 INJECTION INTRAVENOUS; SUBCUTANEOUS at 02:41

## 2019-02-03 RX ADMIN — GABAPENTIN 400 MG: 100 CAPSULE ORAL at 16:20

## 2019-02-03 RX ADMIN — GUAIFENESIN 400 MG: 100 SOLUTION ORAL at 10:00

## 2019-02-03 RX ADMIN — GUAIFENESIN 400 MG: 100 SOLUTION ORAL at 16:20

## 2019-02-03 RX ADMIN — CEFTAZIDIME 1 G: 1 INJECTION, POWDER, FOR SOLUTION INTRAMUSCULAR; INTRAVENOUS at 11:50

## 2019-02-03 RX ADMIN — BUMETANIDE 1 MG: 0.25 INJECTION INTRAMUSCULAR; INTRAVENOUS at 16:20

## 2019-02-03 RX ADMIN — CIPROFLOXACIN 500 MG: 500 TABLET, FILM COATED ORAL at 21:35

## 2019-02-03 RX ADMIN — BUMETANIDE 1 MG: 0.25 INJECTION INTRAMUSCULAR; INTRAVENOUS at 11:49

## 2019-02-03 RX ADMIN — AMLODIPINE BESYLATE 10 MG: 10 TABLET ORAL at 11:50

## 2019-02-03 RX ADMIN — CEFTAZIDIME 1 G: 1 INJECTION, POWDER, FOR SOLUTION INTRAMUSCULAR; INTRAVENOUS at 02:34

## 2019-02-03 RX ADMIN — GABAPENTIN 400 MG: 100 CAPSULE ORAL at 21:35

## 2019-02-03 RX ADMIN — METOPROLOL TARTRATE 50 MG: 50 TABLET ORAL at 17:09

## 2019-02-03 RX ADMIN — HEPARIN SODIUM 5000 UNITS: 5000 INJECTION INTRAVENOUS; SUBCUTANEOUS at 17:08

## 2019-02-03 RX ADMIN — SIMVASTATIN 10 MG: 10 TABLET, FILM COATED ORAL at 21:35

## 2019-02-03 RX ADMIN — GABAPENTIN 400 MG: 100 CAPSULE ORAL at 11:50

## 2019-02-03 RX ADMIN — IPRATROPIUM BROMIDE AND ALBUTEROL SULFATE 3 ML: .5; 3 SOLUTION RESPIRATORY (INHALATION) at 07:22

## 2019-02-03 RX ADMIN — HEPARIN SODIUM 5000 UNITS: 5000 INJECTION INTRAVENOUS; SUBCUTANEOUS at 11:50

## 2019-02-03 RX ADMIN — IPRATROPIUM BROMIDE AND ALBUTEROL SULFATE 3 ML: .5; 3 SOLUTION RESPIRATORY (INHALATION) at 15:12

## 2019-02-03 RX ADMIN — IPRATROPIUM BROMIDE AND ALBUTEROL SULFATE 3 ML: .5; 3 SOLUTION RESPIRATORY (INHALATION) at 12:59

## 2019-02-03 RX ADMIN — METOPROLOL TARTRATE 50 MG: 50 TABLET ORAL at 11:50

## 2019-02-03 RX ADMIN — GUAIFENESIN 400 MG: 100 SOLUTION ORAL at 21:36

## 2019-02-03 RX ADMIN — IPRATROPIUM BROMIDE AND ALBUTEROL SULFATE 3 ML: .5; 3 SOLUTION RESPIRATORY (INHALATION) at 20:04

## 2019-02-03 RX ADMIN — CIPROFLOXACIN 500 MG: 500 TABLET, FILM COATED ORAL at 11:50

## 2019-02-03 RX ADMIN — LISINOPRIL 20 MG: 20 TABLET ORAL at 11:49

## 2019-02-03 RX ADMIN — CEFTAZIDIME 1 G: 1 INJECTION, POWDER, FOR SOLUTION INTRAMUSCULAR; INTRAVENOUS at 18:03

## 2019-02-03 NOTE — PROGRESS NOTES
1300: TRANSFER - IN REPORT: 
 
Verbal report received from Evelia (name) on Laura Antunez  being received from [facility] (unit) for change in patient condition(need bipap ) Report consisted of patients Situation, Background, Assessment and  
Recommendations(SBAR). Information from the following report(s) Cardiac Rhythm sinus  was reviewed with the receiving nurse. Opportunity for questions and clarification was provided. Assessment completed upon patients arrival to unit and care assumed.

## 2019-02-03 NOTE — PROGRESS NOTES
0700: Received report from [facility]  patient in bed in low locked position with call bell and phone in reach. Dual skin and neuro complete. Assumed care of patient in bed in low locked position with call bell and phone within reach. 1600: TRANSFER - OUT REPORT: 
 
Verbal report given to Lamar Rasheed (name) on Melford Kocher  being transferred to [facility] (unit) for routine progression of care Report consisted of patients Situation, Background, Assessment and  
Recommendations(SBAR). Information from the following report(s) Recent Results, Cardiac Rhythm sinus  and Alarm Parameters  was reviewed with the receiving nurse. Lines:  
Peripheral IV 02/02/19 Left;Upper Arm (Active) Site Assessment Clean, dry, & intact 2/3/2019 12:00 PM  
Phlebitis Assessment 0 2/3/2019 12:00 PM  
Infiltration Assessment 0 2/3/2019 12:00 PM  
Dressing Status Clean, dry, & intact 2/3/2019 12:00 PM  
Dressing Type Transparent 2/3/2019 12:00 PM  
Hub Color/Line Status Blue 2/3/2019 12:00 PM  
Action Taken Open ports on tubing capped 2/3/2019 12:00 PM  
Alcohol Cap Used Yes 2/3/2019 12:00 PM  
  
 
Opportunity for questions and clarification was provided. Patient transported with: 
 O2 @ 4 liters Registered Nurse

## 2019-02-03 NOTE — PROGRESS NOTES
Problem: Falls - Risk of 
Goal: *Absence of Falls Document Aissatou Mccarthy Fall Risk and appropriate interventions in the flowsheet. Outcome: Progressing Towards Goal 
Fall Risk Interventions: 
Mobility Interventions: Communicate number of staff needed for ambulation/transfer, Bed/chair exit alarm Mentation Interventions: Adequate sleep, hydration, pain control, Bed/chair exit alarm, Door open when patient unattended, Eyeglasses and hearing aids, More frequent rounding, Room close to nurse's station Medication Interventions: Bed/chair exit alarm, Teach patient to arise slowly Elimination Interventions: Bed/chair exit alarm, Call light in reach, Toileting schedule/hourly rounds, Patient to call for help with toileting needs History of Falls Interventions: Bed/chair exit alarm, Door open when patient unattended, Room close to nurse's station Problem: Pressure Injury - Risk of 
Goal: *Prevention of pressure injury Document Raul Scale and appropriate interventions in the flowsheet. Outcome: Progressing Towards Goal 
Pressure Injury Interventions: 
Sensory Interventions: Assess changes in LOC, Float heels, Keep linens dry and wrinkle-free, Maintain/enhance activity level, Minimize linen layers, Pressure redistribution bed/mattress (bed type), Turn and reposition approx. every two hours (pillows and wedges if needed) Moisture Interventions: Check for incontinence Q2 hours and as needed, Maintain skin hydration (lotion/cream), Minimize layers, Moisture barrier, Apply protective barrier, creams and emollients, Absorbent underpads Activity Interventions: Pressure redistribution bed/mattress(bed type) Mobility Interventions: Pressure redistribution bed/mattress (bed type), HOB 30 degrees or less, Float heels, Turn and reposition approx. every two hours(pillow and wedges) Nutrition Interventions: Document food/fluid/supplement intake Friction and Shear Interventions: Apply protective barrier, creams and emollients, Foam dressings/transparent film/skin sealants, HOB 30 degrees or less, Lift sheet

## 2019-02-03 NOTE — ROUTINE PROCESS
1630chart opened for review- this nurse receiving patient TRANSFER - IN REPORT: 
 
Verbal report received from [facility] RN(name) on Chao Beulah  being received from ICU (unit) for routine progression of care Report consisted of patients Situation, Background, Assessment and  
Recommendations(SBAR). Information from the following report(s) SBAR and Kardex was reviewed with the receiving nurse. Opportunity for questions and clarification was provided. Assessment completed upon patients arrival to unit and care assumed. 1730 pt arrived via stretcher, alert and oriented x4, tele box #2, kelsey

## 2019-02-03 NOTE — PROGRESS NOTES
2/2/10 19:45- Report received from previous nurse. Patient resting quietly in bed with Bipap on. Call light in reach. No c/o at this time. Assessment completed-see flow sheet. Continue to monitor. Bed alarm on for safety. 20:12- ABG done at this time by RT. CO2=81 Improved from this AM.  
21:00- patient pulling at or taking Bipap mask off- reminded several times that must be kept on. Patient moved to ICU 8. Continue to monitor.

## 2019-02-03 NOTE — PROGRESS NOTES
Progress Note Patient: Maday Holman               Sex: male          DOA: 1/21/2019 YOB: 1950      Age:  77 y.o.        LOS:  LOS: 13 days CHIEF COMPLAINT:  Bacteremia  Discitis Subjective:  
 
Pt seen in step down He was on BIPAP last night. This AM pt A&Ox3, much more alert. Conversational. Wants to eat. Objective:  
  
Visit Vitals /75 Pulse 98 Temp 98.5 °F (36.9 °C) Resp 21 Ht 6' (1.829 m) Wt 104.9 kg (231 lb 3.2 oz) SpO2 95% BMI 31.36 kg/m² Physical Exam: 
Gen:  No distress, no complaint Lungs:  Clear bilaterally, no wheeze or rhonchi Heart:  Regular rate and rhythm, no murmurs or gallops Abdomen:  Soft, non-tender, normal bowel sounds Lab/Data Reviewed: 
 
Labs reviewed Assessment/Plan Principal Problem: 
  Bacteremia (1/21/2019) Active Problems: 
  Discitis of thoracic region (11/14/2018) Bilateral pleural effusion (11/14/2018) Hypertension (11/14/2018) Hepatitis C (11/14/2018) Back pain (11/14/2018) Acute on chronic respiratory failure with hypoxia ([facility] [address] (12/23/2018) PNA (pneumonia) (1/26/2019) Metabolic encephalopathy (1/76/7760) Overview: Associated with sepsis, present on admission. Plan: · Transfer back to floor today. · D/c pain meds. No sedating pain meds as pt is at high risk for hypercarbic resp failure. · Continue with long term antibiotics; per ID will need Ertapenem 1g Q24h for 6-8 weeks for osteomyelitis/discitis. Avoid PICC due to h/o IVDU · Working toward disposition - no accepting facilities at this time per ID · BP control · Pain control · Will need Neurosurg follow up · Mobilize · DVT prophylaxis.

## 2019-02-04 ENCOUNTER — APPOINTMENT (OUTPATIENT)
Dept: CT IMAGING | Age: 69
DRG: 049 | End: 2019-02-04
Attending: INTERNAL MEDICINE
Payer: MEDICAID

## 2019-02-04 ENCOUNTER — APPOINTMENT (OUTPATIENT)
Dept: GENERAL RADIOLOGY | Age: 69
DRG: 049 | End: 2019-02-04
Attending: INTERNAL MEDICINE
Payer: MEDICAID

## 2019-02-04 LAB
ANION GAP SERPL CALC-SCNC: ABNORMAL MMOL/L (ref 3–18)
BUN SERPL-MCNC: 24 MG/DL (ref 7–18)
BUN/CREAT SERPL: 30 (ref 12–20)
CA-I SERPL-SCNC: 1.06 MMOL/L (ref 1.12–1.32)
CALCIUM SERPL-MCNC: 8.7 MG/DL (ref 8.5–10.1)
CHLORIDE SERPL-SCNC: 90 MMOL/L (ref 100–108)
CO2 SERPL-SCNC: >45 MMOL/L (ref 21–32)
CREAT SERPL-MCNC: 0.79 MG/DL (ref 0.6–1.3)
GLUCOSE SERPL-MCNC: 93 MG/DL (ref 74–99)
MAGNESIUM SERPL-MCNC: 1.8 MG/DL (ref 1.6–2.6)
PHOSPHATE SERPL-MCNC: 3.1 MG/DL (ref 2.5–4.9)
POTASSIUM SERPL-SCNC: 3.9 MMOL/L (ref 3.5–5.5)
SODIUM SERPL-SCNC: 141 MMOL/L (ref 136–145)

## 2019-02-04 PROCEDURE — 94640 AIRWAY INHALATION TREATMENT: CPT

## 2019-02-04 PROCEDURE — 77010033678 HC OXYGEN DAILY

## 2019-02-04 PROCEDURE — 84100 ASSAY OF PHOSPHORUS: CPT

## 2019-02-04 PROCEDURE — 74011250636 HC RX REV CODE- 250/636: Performed by: INTERNAL MEDICINE

## 2019-02-04 PROCEDURE — 65660000000 HC RM CCU STEPDOWN

## 2019-02-04 PROCEDURE — 36415 COLL VENOUS BLD VENIPUNCTURE: CPT

## 2019-02-04 PROCEDURE — 74011000250 HC RX REV CODE- 250: Performed by: INTERNAL MEDICINE

## 2019-02-04 PROCEDURE — 80048 BASIC METABOLIC PNL TOTAL CA: CPT

## 2019-02-04 PROCEDURE — 74011250637 HC RX REV CODE- 250/637: Performed by: HOSPITALIST

## 2019-02-04 PROCEDURE — 74011250636 HC RX REV CODE- 250/636: Performed by: HOSPITALIST

## 2019-02-04 PROCEDURE — 74011000258 HC RX REV CODE- 258: Performed by: INTERNAL MEDICINE

## 2019-02-04 PROCEDURE — 83735 ASSAY OF MAGNESIUM: CPT

## 2019-02-04 PROCEDURE — 71045 X-RAY EXAM CHEST 1 VIEW: CPT

## 2019-02-04 PROCEDURE — 71250 CT THORAX DX C-: CPT

## 2019-02-04 PROCEDURE — 74011250637 HC RX REV CODE- 250/637: Performed by: INTERNAL MEDICINE

## 2019-02-04 PROCEDURE — 82330 ASSAY OF CALCIUM: CPT

## 2019-02-04 RX ADMIN — CEFTAZIDIME 1 G: 1 INJECTION, POWDER, FOR SOLUTION INTRAMUSCULAR; INTRAVENOUS at 09:55

## 2019-02-04 RX ADMIN — ACETAMINOPHEN 650 MG: 325 TABLET ORAL at 18:25

## 2019-02-04 RX ADMIN — AMLODIPINE BESYLATE 10 MG: 10 TABLET ORAL at 11:55

## 2019-02-04 RX ADMIN — METOPROLOL TARTRATE 50 MG: 50 TABLET ORAL at 17:14

## 2019-02-04 RX ADMIN — CEFTAZIDIME 1 G: 1 INJECTION, POWDER, FOR SOLUTION INTRAMUSCULAR; INTRAVENOUS at 01:24

## 2019-02-04 RX ADMIN — HEPARIN SODIUM 5000 UNITS: 5000 INJECTION INTRAVENOUS; SUBCUTANEOUS at 09:55

## 2019-02-04 RX ADMIN — METOPROLOL TARTRATE 50 MG: 50 TABLET ORAL at 09:12

## 2019-02-04 RX ADMIN — GUAIFENESIN 400 MG: 100 SOLUTION ORAL at 22:07

## 2019-02-04 RX ADMIN — GABAPENTIN 400 MG: 100 CAPSULE ORAL at 17:14

## 2019-02-04 RX ADMIN — GUAIFENESIN 400 MG: 100 SOLUTION ORAL at 12:01

## 2019-02-04 RX ADMIN — CEFTAZIDIME 1 G: 1 INJECTION, POWDER, FOR SOLUTION INTRAMUSCULAR; INTRAVENOUS at 17:15

## 2019-02-04 RX ADMIN — BUMETANIDE 1 MG: 0.25 INJECTION INTRAMUSCULAR; INTRAVENOUS at 17:14

## 2019-02-04 RX ADMIN — ACETAMINOPHEN 650 MG: 325 TABLET ORAL at 08:22

## 2019-02-04 RX ADMIN — BUMETANIDE 1 MG: 0.25 INJECTION INTRAMUSCULAR; INTRAVENOUS at 09:10

## 2019-02-04 RX ADMIN — PANTOPRAZOLE SODIUM 40 MG: 40 TABLET, DELAYED RELEASE ORAL at 08:22

## 2019-02-04 RX ADMIN — SIMVASTATIN 10 MG: 10 TABLET, FILM COATED ORAL at 22:06

## 2019-02-04 RX ADMIN — GUAIFENESIN 400 MG: 100 SOLUTION ORAL at 01:22

## 2019-02-04 RX ADMIN — IPRATROPIUM BROMIDE AND ALBUTEROL SULFATE 3 ML: .5; 3 SOLUTION RESPIRATORY (INHALATION) at 07:19

## 2019-02-04 RX ADMIN — GABAPENTIN 400 MG: 100 CAPSULE ORAL at 22:07

## 2019-02-04 RX ADMIN — IPRATROPIUM BROMIDE AND ALBUTEROL SULFATE 3 ML: .5; 3 SOLUTION RESPIRATORY (INHALATION) at 20:06

## 2019-02-04 RX ADMIN — LISINOPRIL 20 MG: 20 TABLET ORAL at 11:56

## 2019-02-04 RX ADMIN — IPRATROPIUM BROMIDE AND ALBUTEROL SULFATE 3 ML: .5; 3 SOLUTION RESPIRATORY (INHALATION) at 15:02

## 2019-02-04 RX ADMIN — HEPARIN SODIUM 5000 UNITS: 5000 INJECTION INTRAVENOUS; SUBCUTANEOUS at 17:14

## 2019-02-04 RX ADMIN — CIPROFLOXACIN 500 MG: 500 TABLET, FILM COATED ORAL at 22:07

## 2019-02-04 RX ADMIN — ACETAMINOPHEN 650 MG: 325 TABLET ORAL at 22:06

## 2019-02-04 RX ADMIN — GUAIFENESIN 400 MG: 100 SOLUTION ORAL at 08:21

## 2019-02-04 RX ADMIN — CIPROFLOXACIN 500 MG: 500 TABLET, FILM COATED ORAL at 08:22

## 2019-02-04 RX ADMIN — HEPARIN SODIUM 5000 UNITS: 5000 INJECTION INTRAVENOUS; SUBCUTANEOUS at 01:23

## 2019-02-04 RX ADMIN — GABAPENTIN 400 MG: 100 CAPSULE ORAL at 08:22

## 2019-02-04 NOTE — PROGRESS NOTES
INFECTIOUS DISEASE FOLLOW UP NOTE : 
 
 
 
Admit Date: 1/21/2019 Overview: 77year-old AA male w/ h/o polysubstance abuse including cocaine, heroin, T 10-11 destructive changes in November 2018 w/ nl ESR, CRP, negative bone/gallium scans - not felt to be infectious then but had Serratia UTI rx'd Cefepime/Ceftriaxone 5 days. Returned 1/21 with back pain, BSI Serratia 1/21 and CT 1/21 with similar destructive changes T9-10 levels c/w discitis/OM and intraspinal and paraspinal phlegmon s/p needle aspiration 1/23 cx + Serratia. Had recurrent respiratory failure requiring RRT 1/23, 1/24. Has residual bilateral pleural effusions on CXR 2/4 Current abx Prior abx  
ceftaz 1/23-7, cipro 1/23-7 Cefepime/vanco  11/14   2, Ceftriaxone 11/16 - 3; Zosyn 1/21-2  
  
Assessment -> Rec:  
  
BSI 2 of 2 Serratia marcescens 1/21 
- source ~ Discitis Phlegmon seen on CT 
- NEW IR aspiration 1/23 chucky culture grew Serratia  
- denies any urinary complaint but had Serratia in urine in past. Currently UA is negative - Ucx was not sent but CT with normal kidneys and nothing sig on CT and pt asymptomatic except some incontinet 
- repeat Blcx 1/22 1/2 positive  
- blcx 1/28 NG x 2 -> Ertapenem 1 gm q 24 hours 6-8 weeks (for osteo) Destructive T9-10 changes discitis/OM 
- mid back pain x 5 months, fell, incontinent of urine - CTAP 11/2018: severe destructive changes T 10-11, paravertebral sot tissue infiltration, extension to anterior epidural space w/ cord compression  
- h/o IVDU - last use in July 2018 
- H/O uncooperative for MRI given claustrophobia in past 
- Bone/ gallium scans 11/27 neg for discitis/OM  
- refused stabilization procedure per Dr. Shayne Rosario in past 
- was not given long term Abx then 
- CT 1/21 with destructive changes T9-10 levels c/w discitis/OM and intraspinal and paraspinal phlegmon.  Overall appearance is grossly unchanged and pain also not different from last time 
- inflammatory markers: esr 38, crp 7.4 
- s/p aspiration of fluid/phlegmon T10-11 discspace 1/23: (+) Serratia in anaerobic culture - potential for emergent AmpC- beta-lactamase- mediated resistance to third-generation cephalosporins that may not be evident during initial susceptibility testing    -> monitor cultures  
-> Abx as above 
-> will need long term Abx 6-8 weeks 
-> pt is re-considering stabilization surgery offered by Dr. Nichole Srivastava in the past but he had refused.  
-> avoid PICC w/ h/o IVDU Acute hypoxic resp distress- RRT called 1/23 and again 1/24 sec to hypercapnic resp failure - off BIPAP now on NC Bilateral pleural effusions- chronic with  atelectasis - monitor Hepatomegaly, splenic hilar & perisplenic varices- concern for cirrhosis and portal HTN 
- seen on CTAP again    
H/o IVDU 
- HIV ab NR 11/19 ,Hep BsAg neg (last reported ivdu August 2018, has hep C and thinks hep b immune) - denies anything currently -  
   
COPD    
HTN    
DJD    
Allergy Shel[place]    
  
MICROBIOLOGY:  
11/14   urcx >100,000 Serratia marcescens             blcx NG x 2 
11/15   CrAG neg, fungitell 375 (reported 11/17) 
11/29   blcx for fungus - ngtd 
            fungitell 113 
  
1/21     Blcx x2 Serratia marcescens R cefazolin UA neg 
1/22 Blcx 1 of 2 Serratia 1/23 Aspiration cx g/s neg, Cx NG 
 NOMAN rare Serratia R Cefazolin Fungal NOS 
1/28  bctx x 2 ngtd 
  
LINES AND CATHETERS:  
piv 
  
 
MEDICATIONS:  
 
Current Facility-Administered Medications Medication Dose Route Frequency  bumetanide (BUMEX) injection 1 mg  1 mg IntraVENous BID  ciprofloxacin HCl (CIPRO) tablet 500 mg  500 mg Oral Q12H  pantoprazole (PROTONIX) tablet 40 mg  40 mg Oral DAILY  albuterol-ipratropium (DUO-NEB) 2.5 MG-0.5 MG/3 ML  3 mL Nebulization QID RT  
 guaiFENesin (ROBITUSSIN) 100 mg/5 mL oral liquid 400 mg  400 mg Oral Q4H  
  cefTAZidime (FORTAZ) 1 g in 0.9% sodium chloride (MBP/ADV) 50 mL MBP  1 g IntraVENous Q8H  
 lidocaine (PF) (XYLOCAINE) 10 mg/mL (1 %) injection 20 mL  20 mL SubCUTAneous Rad Multiple  amLODIPine (NORVASC) tablet 10 mg  10 mg Oral DAILY  gabapentin (NEURONTIN) capsule 400 mg  400 mg Oral TID  lisinopril (PRINIVIL, ZESTRIL) tablet 20 mg  20 mg Oral DAILY  metoprolol tartrate (LOPRESSOR) tablet 50 mg  50 mg Oral BID  simvastatin (ZOCOR) tablet 10 mg  10 mg Oral QHS  acetaminophen (TYLENOL) tablet 650 mg  650 mg Oral Q6H PRN  
 ondansetron (ZOFRAN) injection 4 mg  4 mg IntraVENous Q6H PRN  
 heparin (porcine) injection 5,000 Units  5,000 Units SubCUTAneous Q8H SUBJECTIVE :  
 
Interval notes reviewed. Back pain persists unchanged. No worse but no better. Was not interested n back surgery offered by Dr. Roldan Carter in the past but will re-consider. OBJECTIVE Visit Vitals /64 Pulse 85 Temp 98.8 °F (37.1 °C) Resp 18 Ht 6' (1.829 m) Wt 104.1 kg (229 lb 8 oz) SpO2 96% BMI 31.13 kg/m² Temp (24hrs), Av.8 °F (37.1 °C), Min:98.6 °F (37 °C), Max:98.9 °F (37.2 °C) Gen:on NC O2, awake and alert HEENT: muddy sclerae, pupils equal and reactive Chest: Symmetric expansion, no crackles or wheezing CVS:S1S2 RR, tachy, No JVD, No edema Abd:Soft, NT, ND, BS+ Skin:No jaundice, cyanosis, clubbing. No decubitus Muscsk: Normal muscle tone/strength CNS: AA and follows command Labs: Results:  
Chemistry Recent Labs 19 
0400 19 
0555 19 
1665 GLU 93 88 87  143 143  
K 3.9 4.4 5.5 CL 90* 94* 101 CO2 >45* >45* 41*  
BUN 24* 19* 13  
CREA 0.79 0.73 0.65 CA 8.7 8.8 8.1* AGAP Cannot be calculated Cannot be calculated 1*  
BUCR 30* 26* 20  
  
CBC w/Diff No results for input(s): WBC, RBC, HGB, HCT, PLT, GRANS, LYMPH, EOS, HGBEXT, HCTEXT, PLTEXT, HGBEXT, HCTEXT, PLTEXT in the last 72 hours. RADIOLOGY :   
 
Imaging Results from Hospital Encounter encounter on 01/21/19 XR CHEST PORT Narrative EXAM: Portable Chest 
 
CLINICAL INDICATION:  f-u pleural effusions 
-Additional:  None COMPARISON:  02/01/19 TECHNIQUE: AP portable view at 0406 
 
______________ FINDINGS: 
 
HEART AND MEDIASTINUM:  The heart size is stable. Aortic atherosclerosis is 
present LUNGS AND AIRWAYS:  Density is again seen at both lung bases. This appears to be 
a combination of pleural fluid and adjacent atelectasis/infiltrate. The lungs 
appear stable PLEURA:  No pneumothorax BONES:  No acute or aggressive osseous lesions. OTHER:  None. 
 
______________ Impression IMPRESSION: 
 
Stable appearing densities at both lung bases compatible with pleural effusions 
with adjacent atelectasis/infiltrate Results from Hospital Encounter encounter on 01/21/19 CT GUIDE CATH FLUID DRAIN SOFT TISSUE Narrative CT GUIDED ASPIRATION OF T10-11 disc space:  
 
Indication: Discitis T10-11. COMPARISON: CTA chest, abdomen and pelvis 1/21/2019. CTA chest 12/23/2018 Technique/findings: After the procedure, as well as its risks, benefits and 
alternatives were explained to the patient and his daughter, and all questions 
answered, written informed consent was obtained from the daughter and witnessed. Procedure was performed using only local anesthesia. The fluid collection in the T10-11 disc space was localized under CT guidance. The skin was marked, prepped and draped in sterile fashion and bicarbonate 
buffered lidocaine was used for local anesthesia. An 19 gauge needle was 
advanced into the collection. A total of 3-4 ml of bloody fluid was aspirated. The fluid was sent for laboratory evaluation. The patient tolerated the 
procedure well and there were no immediate complications. Standard post 
procedure pause. One or more dose reduction techniques were used on this CT: automated exposure control, adjustment of the mAs and/or kVp according to patient size, and 
iterative reconstruction techniques. The specific techniques used on this CT 
exam have been documented in the patient's electronic medical record. Digital 
Imaging and Communications in Medicine (DICOM) format image data are available 
to nonaffiliated external healthcare facilities or entities on a secure, media 
free, reciprocally searchable basis with patient authorization for at least a 
12-month period after this study. GUIDANCE: CT guidance was used to position (and confirm the position of) the 
needle. Image(s) saved in PACS: CT Impression IMPRESSION: 
 
Successful CT guided aspiration of the T10-11 disc space. Ken Stone MD 
February 4, 2019 [facility] [phone]

## 2019-02-04 NOTE — ROUTINE PROCESS
1740 Received report from [facility] Patient alert and oriented. 3954 Critical CO2 result called in by Lab. Paged Dr Apple Standing at [phone] - waiting for call back. 1030 ADL care done. PIV infiltrated and new PIV inserted to Right Arm. 1115 Patient off the floor to Radiology. 1154 Patient back in the room. 1430 Patient resting in the bed. Visitors at bedside. 1915 Bedside and Verbal shift change report [facility] to [facility] (oncoming nurse) by me (offgoing nurse). Report included the following information SBAR, Kardex, Intake/Output, MAR, Recent Results and Cardiac Rhythm NSR.

## 2019-02-04 NOTE — PROGRESS NOTES
Internal Medicine Progress Note Patient's Name: Sammy Rivera Admit Date: 1/21/2019 Length of Stay: 14 
 
 
Assessment/Plan Active Hospital Problems Diagnosis Date Noted  Metabolic encephalopathy 45/05/1335 Associated with sepsis, present on admission.  PNA (pneumonia) 01/26/2019  Bacteremia 01/21/2019  Acute on chronic respiratory failure with hypoxia ([address] 12/23/2018  Hypertension 11/14/2018  Hepatitis C 11/14/2018  Back pain 11/14/2018  Discitis of thoracic region 11/14/2018  Bilateral pleural effusion 11/14/2018  
 
- Cont IVAB w/ ertapenem x 6-8wks - Chest CT ordered by pulm to eval pleural effusion 
- Cont BiPAP qhs 
- Pain control PRN, avoid narcs 
- PT/OT 
- Awaiting long term care, no accepting beds - Cont acceptable home medications for chronic conditions  
- DVT protocol I have personally reviewed all pertinent labs and films that have officially resulted over the last 24 hours. I have personally checked for all pending labs that are awaiting final results. Subjective Pt s/e @ bedside No major events overnight Admits to pain in back Wants CPAP at night Denies CP or SOB Objective Visit Vitals /64 Pulse 85 Temp 98.8 °F (37.1 °C) Resp 18 Ht 6' (1.829 m) Wt 104.1 kg (229 lb 8 oz) SpO2 96% BMI 31.13 kg/m² Physical Exam: 
General Appearance: NAD, conversant Lungs: Decreased BS B/L with normal respiratory effort CV: RRR, no m/r/g Abdomen: soft, non-tender, normal bowel sounds Extremities: no cyanosis, no peripheral edema Neuro: No focal deficits, motor/sensory intact Lab/Data Reviewed: 
BMP:  
Lab Results Component Value Date/Time   02/04/2019 04:00 AM  
 K 3.9 02/04/2019 04:00 AM  
 CL 90 (L) 02/04/2019 04:00 AM  
 CO2 >45 (HH) 02/04/2019 04:00 AM  
 AGAP Cannot be calculated 02/04/2019 04:00 AM  
 GLU 93 02/04/2019 04:00 AM  
 BUN 24 (H) 02/04/2019 04:00 AM  
 CREA 0.79 02/04/2019 04:00 AM  
 GFRAA >60 02/04/2019 04:00 AM  
 GFRNA >60 02/04/2019 04:00 AM  
 
CBC: No results found for: WBC, HGB, HGBEXT, HCT, HCTEXT, PLT, PLTEXT, HGBEXT, HCTEXT, PLTEXT Imaging Reviewed: 
[facility] Result Date: 2/4/2019 EXAM: Portable Chest CLINICAL INDICATION:  f-u pleural effusions -Additional:  None COMPARISON:  02/01/19 TECHNIQUE: AP portable view at 0406 ______________ FINDINGS: HEART AND MEDIASTINUM:  The heart size is stable. Aortic atherosclerosis is present LUNGS AND AIRWAYS:  Density is again seen at both lung bases. This appears to be a combination of pleural fluid and adjacent atelectasis/infiltrate. The lungs appear stable PLEURA:  No pneumothorax BONES:  No acute or aggressive osseous lesions. OTHER:  None. ______________ IMPRESSION: Stable appearing densities at both lung bases compatible with pleural effusions with adjacent atelectasis/infiltrate Medications Reviewed: 
Current Facility-Administered Medications Medication Dose Route Frequency  bumetanide (BUMEX) injection 1 mg  1 mg IntraVENous BID  ciprofloxacin HCl (CIPRO) tablet 500 mg  500 mg Oral Q12H  pantoprazole (PROTONIX) tablet 40 mg  40 mg Oral DAILY  albuterol-ipratropium (DUO-NEB) 2.5 MG-0.5 MG/3 ML  3 mL Nebulization QID RT  
 guaiFENesin (ROBITUSSIN) 100 mg/5 mL oral liquid 400 mg  400 mg Oral Q4H  
 cefTAZidime (FORTAZ) 1 g in 0.9% sodium chloride (MBP/ADV) 50 mL MBP  1 g IntraVENous Q8H  
 lidocaine (PF) (XYLOCAINE) 10 mg/mL (1 %) injection 20 mL  20 mL SubCUTAneous Rad Multiple  amLODIPine (NORVASC) tablet 10 mg  10 mg Oral DAILY  gabapentin (NEURONTIN) capsule 400 mg  400 mg Oral TID  lisinopril (PRINIVIL, ZESTRIL) tablet 20 mg  20 mg Oral DAILY  metoprolol tartrate (LOPRESSOR) tablet 50 mg  50 mg Oral BID  simvastatin (ZOCOR) tablet 10 mg  10 mg Oral QHS  acetaminophen (TYLENOL) tablet 650 mg  650 mg Oral Q6H PRN  
 ondansetron (ZOFRAN) injection 4 mg  4 mg IntraVENous Q6H PRN  
  heparin (porcine) injection 5,000 Units  5,000 Units SubCUTAneous Q8H Antoine Flores, DO Internal Medicine, Hospitalist 
Pager: [phone] [facility]

## 2019-02-04 NOTE — PROGRESS NOTES
Problem: Falls - Risk of 
Goal: *Absence of Falls Document Edgar Maxi Fall Risk and appropriate interventions in the flowsheet. Outcome: Progressing Towards Goal 
Fall Risk Interventions: 
Mobility Interventions: Assess mobility with egress test, Patient to call before getting OOB Mentation Interventions: Bed/chair exit alarm, Familiar objects from home, Room close to nurse's station Medication Interventions: Bed/chair exit alarm, Patient to call before getting OOB Elimination Interventions: Call light in reach, Toileting schedule/hourly rounds, Patient to call for help with toileting needs History of Falls Interventions: Bed/chair exit alarm Problem: Pressure Injury - Risk of 
Goal: *Prevention of pressure injury Document Raul Scale and appropriate interventions in the flowsheet. Outcome: Progressing Towards Goal 
Pressure Injury Interventions: 
Sensory Interventions: Assess changes in LOC, Keep linens dry and wrinkle-free, Pressure redistribution bed/mattress (bed type) Moisture Interventions: Absorbent underpads Activity Interventions: Increase time out of bed, Pressure redistribution bed/mattress(bed type), PT/OT evaluation Mobility Interventions: HOB 30 degrees or less, Pressure redistribution bed/mattress (bed type), PT/OT evaluation Nutrition Interventions: Document food/fluid/supplement intake Friction and Shear Interventions: HOB 30 degrees or less

## 2019-02-04 NOTE — PROGRESS NOTES
Pulmonary progress note--completed  
  
History 57-year-old male admitted to the hospital with a Serratia marcescens bacteremia and discitis.  At baseline he has PaCO2 retention at roughly 79. Harsha Soliz was transferred to the ICU on 1/24/19 for what appears to be a right lower lobe pneumonia.  Over the weekend the patient was transferred back to the ICU for BiPAP. 
  
Subjective Feeling about the same. No chest pains. Exam 
Alert and interactive. Oriented. Blood pressure 107/65, pulse 91, temperature 98.7 °F (37.1 °C), resp. rate 20, height 6' (1.829 m), weight 104.1 kg (229 lb 8 oz), SpO2 94 %. Trachea midline Wheezing bilaterally. Deeper breath than 3 days ago Regular rate Abdomen less, but still, distended. No peritoneal signs No cyanosis or clubbing Facial movements symmetric Labs 2/4/19: HCO3 greater than 45. No albumin or WBC check since 1/28 and 1/29 respectively PA and lateral chest x-ray from today shows apparent bilateral pleural effusions with atelectasis. This x-ray remains unchanged since at least 1/21/19 despite aggressive diuresis Assessment Acute on chronic hypercapnic and hypoxic respiratory failure Bilateral pleural effusions Serratia marcescens bacteremia and T9 - T10 discitis Contraction alkalosis 
  
Plan Chest CT to better assess pleural space and need for thoracentesis. Given apparent size of effusions and atelectasis, unclear utility of decubital films As needed BiPAP 
COPD therapies Increase activity as tolerated Addendum These 2 scans show that the majority of the chest x-ray changes are from a combination of pleural fluid and atelectasis. If either effusion was to be tapped I would recommend that it be the left first.  Increase activity such as being up in chair. Stressed to him the importance of drinking enough to quench his thirst and no more.

## 2019-02-04 NOTE — PROGRESS NOTES
Patient is unable to communicate at this time.  offered prayer and left Spiritual Care brochure. Chaplains will continue to follow and will provide pastoral care on an as needed/requested basis. [address] [facility] Staff  Spiritual Care  
[phone]

## 2019-02-04 NOTE — PROGRESS NOTES
Problem: Falls - Risk of 
Goal: *Absence of Falls Document [place] Norris Fall Risk and appropriate interventions in the flowsheet. Outcome: Progressing Towards Goal 
Fall Risk Interventions: 
Mobility Interventions: Patient to call before getting OOB, PT Consult for mobility concerns, PT Consult for assist device competence, OT consult for ADLs, Strengthening exercises (ROM-active/passive) Mentation Interventions: Door open when patient unattended, Increase mobility, Toileting rounds Medication Interventions: Patient to call before getting OOB, Teach patient to arise slowly Elimination Interventions: Call light in reach, Patient to call for help with toileting needs, Toilet paper/wipes in reach, Urinal in reach History of Falls Interventions: Door open when patient unattended Problem: Pressure Injury - Risk of 
Goal: *Prevention of pressure injury Document Raul Scale and appropriate interventions in the flowsheet. Outcome: Progressing Towards Goal 
Pressure Injury Interventions: 
Sensory Interventions: Assess changes in LOC Moisture Interventions: Maintain skin hydration (lotion/cream) Activity Interventions: Pressure redistribution bed/mattress(bed type), PT/OT evaluation Mobility Interventions: HOB 30 degrees or less, Turn and reposition approx. every two hours(pillow and wedges), PT/OT evaluation Nutrition Interventions: Document food/fluid/supplement intake Friction and Shear Interventions: Sit at 90-degree angle, HOB 30 degrees or less

## 2019-02-05 LAB
ANION GAP SERPL CALC-SCNC: 2 MMOL/L (ref 3–18)
BUN SERPL-MCNC: 20 MG/DL (ref 7–18)
BUN/CREAT SERPL: 25 (ref 12–20)
CA-I SERPL-SCNC: 1.09 MMOL/L (ref 1.12–1.32)
CALCIUM SERPL-MCNC: 8.4 MG/DL (ref 8.5–10.1)
CHLORIDE SERPL-SCNC: 95 MMOL/L (ref 100–108)
CO2 SERPL-SCNC: 44 MMOL/L (ref 21–32)
CREAT SERPL-MCNC: 0.79 MG/DL (ref 0.6–1.3)
GLUCOSE SERPL-MCNC: 86 MG/DL (ref 74–99)
MAGNESIUM SERPL-MCNC: 1.8 MG/DL (ref 1.6–2.6)
PHOSPHATE SERPL-MCNC: 3.2 MG/DL (ref 2.5–4.9)
POTASSIUM SERPL-SCNC: 3.4 MMOL/L (ref 3.5–5.5)
SODIUM SERPL-SCNC: 141 MMOL/L (ref 136–145)

## 2019-02-05 PROCEDURE — 74011000250 HC RX REV CODE- 250: Performed by: INTERNAL MEDICINE

## 2019-02-05 PROCEDURE — 82330 ASSAY OF CALCIUM: CPT

## 2019-02-05 PROCEDURE — 83735 ASSAY OF MAGNESIUM: CPT

## 2019-02-05 PROCEDURE — 77010033678 HC OXYGEN DAILY

## 2019-02-05 PROCEDURE — 74011250637 HC RX REV CODE- 250/637: Performed by: HOSPITALIST

## 2019-02-05 PROCEDURE — 74011250636 HC RX REV CODE- 250/636: Performed by: INTERNAL MEDICINE

## 2019-02-05 PROCEDURE — 94760 N-INVAS EAR/PLS OXIMETRY 1: CPT

## 2019-02-05 PROCEDURE — 36415 COLL VENOUS BLD VENIPUNCTURE: CPT

## 2019-02-05 PROCEDURE — 74011250636 HC RX REV CODE- 250/636: Performed by: HOSPITALIST

## 2019-02-05 PROCEDURE — 84100 ASSAY OF PHOSPHORUS: CPT

## 2019-02-05 PROCEDURE — 74011000258 HC RX REV CODE- 258: Performed by: INTERNAL MEDICINE

## 2019-02-05 PROCEDURE — 80048 BASIC METABOLIC PNL TOTAL CA: CPT

## 2019-02-05 PROCEDURE — 65660000000 HC RM CCU STEPDOWN

## 2019-02-05 PROCEDURE — 94640 AIRWAY INHALATION TREATMENT: CPT

## 2019-02-05 RX ORDER — BUMETANIDE 1 MG/1
1 TABLET ORAL 2 TIMES DAILY
Status: DISCONTINUED | OUTPATIENT
Start: 2019-02-05 | End: 2019-02-13

## 2019-02-05 RX ORDER — POTASSIUM CHLORIDE 20 MEQ/1
40 TABLET, EXTENDED RELEASE ORAL
Status: COMPLETED | OUTPATIENT
Start: 2019-02-05 | End: 2019-02-05

## 2019-02-05 RX ADMIN — ERTAPENEM SODIUM 1 G: 1 INJECTION, POWDER, LYOPHILIZED, FOR SOLUTION INTRAMUSCULAR; INTRAVENOUS at 18:50

## 2019-02-05 RX ADMIN — ACETAMINOPHEN 650 MG: 325 TABLET ORAL at 07:43

## 2019-02-05 RX ADMIN — SIMVASTATIN 10 MG: 10 TABLET, FILM COATED ORAL at 21:51

## 2019-02-05 RX ADMIN — PANTOPRAZOLE SODIUM 40 MG: 40 TABLET, DELAYED RELEASE ORAL at 08:58

## 2019-02-05 RX ADMIN — IPRATROPIUM BROMIDE AND ALBUTEROL SULFATE 3 ML: .5; 3 SOLUTION RESPIRATORY (INHALATION) at 08:50

## 2019-02-05 RX ADMIN — IPRATROPIUM BROMIDE AND ALBUTEROL SULFATE 3 ML: .5; 3 SOLUTION RESPIRATORY (INHALATION) at 13:37

## 2019-02-05 RX ADMIN — CEFTAZIDIME 1 G: 1 INJECTION, POWDER, FOR SOLUTION INTRAMUSCULAR; INTRAVENOUS at 01:59

## 2019-02-05 RX ADMIN — BUMETANIDE 1 MG: 1 TABLET ORAL at 21:51

## 2019-02-05 RX ADMIN — METOPROLOL TARTRATE 50 MG: 50 TABLET ORAL at 17:09

## 2019-02-05 RX ADMIN — GABAPENTIN 400 MG: 100 CAPSULE ORAL at 21:51

## 2019-02-05 RX ADMIN — METOPROLOL TARTRATE 50 MG: 50 TABLET ORAL at 09:02

## 2019-02-05 RX ADMIN — HEPARIN SODIUM 5000 UNITS: 5000 INJECTION INTRAVENOUS; SUBCUTANEOUS at 09:03

## 2019-02-05 RX ADMIN — POTASSIUM CHLORIDE 40 MEQ: 20 TABLET, EXTENDED RELEASE ORAL at 18:50

## 2019-02-05 RX ADMIN — GABAPENTIN 400 MG: 100 CAPSULE ORAL at 08:58

## 2019-02-05 RX ADMIN — GABAPENTIN 400 MG: 100 CAPSULE ORAL at 17:09

## 2019-02-05 RX ADMIN — CEFTAZIDIME 1 G: 1 INJECTION, POWDER, FOR SOLUTION INTRAMUSCULAR; INTRAVENOUS at 09:03

## 2019-02-05 RX ADMIN — BUMETANIDE 1 MG: 0.25 INJECTION INTRAMUSCULAR; INTRAVENOUS at 17:10

## 2019-02-05 RX ADMIN — LISINOPRIL 20 MG: 20 TABLET ORAL at 12:35

## 2019-02-05 RX ADMIN — HEPARIN SODIUM 5000 UNITS: 5000 INJECTION INTRAVENOUS; SUBCUTANEOUS at 17:10

## 2019-02-05 RX ADMIN — HEPARIN SODIUM 5000 UNITS: 5000 INJECTION INTRAVENOUS; SUBCUTANEOUS at 01:57

## 2019-02-05 RX ADMIN — ACETAMINOPHEN 650 MG: 325 TABLET ORAL at 21:51

## 2019-02-05 RX ADMIN — IPRATROPIUM BROMIDE AND ALBUTEROL SULFATE 3 ML: .5; 3 SOLUTION RESPIRATORY (INHALATION) at 20:13

## 2019-02-05 RX ADMIN — CIPROFLOXACIN 500 MG: 500 TABLET, FILM COATED ORAL at 09:01

## 2019-02-05 RX ADMIN — AMLODIPINE BESYLATE 10 MG: 10 TABLET ORAL at 12:34

## 2019-02-05 RX ADMIN — IPRATROPIUM BROMIDE AND ALBUTEROL SULFATE 3 ML: .5; 3 SOLUTION RESPIRATORY (INHALATION) at 16:37

## 2019-02-05 RX ADMIN — BUMETANIDE 1 MG: 0.25 INJECTION INTRAMUSCULAR; INTRAVENOUS at 09:00

## 2019-02-05 NOTE — ROUTINE PROCESS
[facility] Patient alert and oriented. 1030 Patient assisted to the [facility]. Perianal care done. 1300 Patient currently resting in bed, alert and oriented, denies pain or shortness of breath, call bell in reach, 5 Ps and hourly rounding completed, bed locked in low position. 1530 Patient asleep, equal and non-labored breathing. 1915 Bedside and Verbal shift change report given to [facility] [employer] (oncoming nurse) by me (offgoing nurse). Report included the following information SBAR, Kardex, Intake/Output, MAR, Recent Results and Cardiac Rhythm NSR.

## 2019-02-05 NOTE — PROGRESS NOTES
Patient has bilateral pleural effusions left greater than right complicated by  osteomyelitis T3 - 4 and T9 - 10. He has yet to be accepted by any skilled facility in the [facility]. Patient though he claims he has not abused drugs for 6 month. He had a positive drug screen in November 2018. He has personal care aide daily and lives with his elderly mother. Patient unable to discharge with a PICC line for IV antibiotic treatment secondary to his drug abuse history. It is unclear why the patient lacks Medicare as he is 77years old. I have attempted to  contact his Medicaid  to elicit some assistance with placement. I have left a voice message requesting a return call.  Giuseppe Chen RN

## 2019-02-05 NOTE — PROGRESS NOTES
Internal Medicine Progress Note Patient's Name: Ten Colon Admit Date: 1/21/2019 Length of Stay: 15 
 
 
Assessment/Plan Active Hospital Problems Diagnosis Date Noted  Metabolic encephalopathy 52/35/3234 Associated with sepsis, present on admission.  PNA (pneumonia) 01/26/2019  Bacteremia 01/21/2019  Acute on chronic respiratory failure with hypoxia ([facility] [address] 12/23/2018  Hypertension 11/14/2018  Hepatitis C 11/14/2018  Back pain 11/14/2018  Discitis of thoracic region 11/14/2018  Bilateral pleural effusion 11/14/2018  
 
- Cont IVAB w/ ertapenem x 6-8wks - Chest CT w/ B/L pleural effusions and atelectasis, pt without sig breathing issues at this time - If effusions need tapped, pulm rec left first 
- Fluid restriction - Switch to PO bumex 
- HCO3 trending down - Cont BiPAP qhs 
- Pain control PRN, avoid narcs 
- PT/OT 
- Awaiting long term care, no accepting beds - Cont acceptable home medications for chronic conditions  
- DVT protocol I have personally reviewed all pertinent labs and films that have officially resulted over the last 24 hours. I have personally checked for all pending labs that are awaiting final results. Subjective Pt s/e @ bedside No major events overnight Doing ok and denies SOB Denies CP Objective Visit Vitals /70 (BP 1 Location: Left arm) Pulse 71 Temp 97.5 °F (36.4 °C) Resp 20 Ht 6' (1.829 m) Wt 101.9 kg (224 lb 11.2 oz) SpO2 95% BMI 30.47 kg/m² Physical Exam: 
General Appearance: NAD, conversant Lungs: Decreased BS B/L with normal respiratory effort CV: RRR, no m/r/g Abdomen: soft, non-tender, mild distension, normal bowel sounds Extremities: no cyanosis, no peripheral edema Neuro: No focal deficits, motor/sensory intact Lab/Data Reviewed: 
BMP:  
Lab Results Component Value Date/Time   02/05/2019 07:30 AM  
 K 3.4 (L) 02/05/2019 07:30 AM  
 CL 95 (L) 02/05/2019 07:30 AM  
 CO2 44 (HH) 02/05/2019 07:30 AM  
 AGAP 2 (L) 02/05/2019 07:30 AM  
 GLU 86 02/05/2019 07:30 AM  
 BUN 20 (H) 02/05/2019 07:30 AM  
 CREA 0.79 02/05/2019 07:30 AM  
 GFRAA >60 02/05/2019 07:30 AM  
 GFRNA >60 02/05/2019 07:30 AM  
 
CBC: No results found for: WBC, HGB, HGBEXT, HCT, HCTEXT, PLT, PLTEXT, HGBEXT, HCTEXT, PLTEXT Imaging Reviewed: 
No results found. Medications Reviewed: 
Current Facility-Administered Medications Medication Dose Route Frequency  bumetanide (BUMEX) injection 1 mg  1 mg IntraVENous BID  ciprofloxacin HCl (CIPRO) tablet 500 mg  500 mg Oral Q12H  pantoprazole (PROTONIX) tablet 40 mg  40 mg Oral DAILY  albuterol-ipratropium (DUO-NEB) 2.5 MG-0.5 MG/3 ML  3 mL Nebulization QID RT  
 guaiFENesin (ROBITUSSIN) 100 mg/5 mL oral liquid 400 mg  400 mg Oral Q4H  
 cefTAZidime (FORTAZ) 1 g in 0.9% sodium chloride (MBP/ADV) 50 mL MBP  1 g IntraVENous Q8H  
 lidocaine (PF) (XYLOCAINE) 10 mg/mL (1 %) injection 20 mL  20 mL SubCUTAneous Rad Multiple  amLODIPine (NORVASC) tablet 10 mg  10 mg Oral DAILY  gabapentin (NEURONTIN) capsule 400 mg  400 mg Oral TID  lisinopril (PRINIVIL, ZESTRIL) tablet 20 mg  20 mg Oral DAILY  metoprolol tartrate (LOPRESSOR) tablet 50 mg  50 mg Oral BID  simvastatin (ZOCOR) tablet 10 mg  10 mg Oral QHS  acetaminophen (TYLENOL) tablet 650 mg  650 mg Oral Q6H PRN  
 ondansetron (ZOFRAN) injection 4 mg  4 mg IntraVENous Q6H PRN  
 heparin (porcine) injection 5,000 Units  5,000 Units SubCUTAneous Q8H Dimple Kayser, DO Internal Medicine, Hospitalist 
Pager: [phone] [facility]

## 2019-02-05 NOTE — PROGRESS NOTES
INFECTIOUS DISEASE FOLLOW UP NOTE : 
 
 
 
Admit Date: 1/21/2019 Overview: 77year-old AA male w/ h/o polysubstance abuse including cocaine, heroin, T 10-11 destructive changes in November 2018 w/ nl ESR, CRP, negative bone/gallium scans - not felt to be infectious then but had Serratia UTI rx'd Cefepime/Ceftriaxone 5 days. Returned 1/21 with back pain, BSI Serratia 1/21 and CT 1/21 with similar destructive changes T9-10 levels c/w discitis/OM and intraspinal and paraspinal phlegmon s/p needle aspiration 1/23 cx + Serratia. Had recurrent respiratory failure requiring RRT 1/23, 1/24. Has residual bilateral pleural effusions on CXR 2/4 Current abx Prior abx Ertapenem 2/5 - 0   abx 1/23 - 13 Cefepime/vanco  11/14   2, Ceftriaxone 11/16 - 3; Zosyn 1/21-2  ceftaz 1/23-13, cipro 1/23-13  
  
Assessment -> Rec:  
  
Destructive T9-10 changes discitis/OM 
- mid back pain x 5 months, fell, incontinent of urine - h/o IVDU - last use in July 2018 
- CTAP 11/2018: severe destructive changes T 10-11, paravertebral sot tissue infiltration, extension to anterior epidural space w/ cord compression 
- unable to have MRI due to claustrophobia - Bone/ gallium scans 11/27 neg for discitis/OM 
- not given long term IV abx  
- refused stabilization procedure per Dr. Inez Mares in past 
- CT 1/21 with destructive changes T9-10 levels c/w discitis/OM and intraspinal and paraspinal phlegmon. Overall appearance unchanged and pain not different from last time 
- inflammatory markers: esr 38, crp 7.4 
- s/p aspiration of fluid/phlegmon T10-11 discspace 1/23: (+) Serratia in anaerobic culture - potential for emergent AmpC- beta-lactamase- mediated resistance to third-generation cephalosporins that may not be evident during initial susceptibility testing  -> Ertapenem 1 gm q 24 hours 6-8 weeks  
-> he is willing to discuss and reconsider stabilization surgery offered by Dr. Betty Osuna in the past 
-> avoid PICC w/ h/o IVDU  
BSI 2 of 2 Serratia marcescens 1/21 
- source ~ Discitis Phlegmon seen on CT 
- NEW IR aspiration 1/23 chucky culture grew Serratia  
- denies any urinary complaint but had Serratia in urine in past. Currently UA is negative - Ucx was not sent but CT with normal kidneys and nothing sig on CT and pt asymptomatic except some incontinet 
- repeat Blcx 1/22 1/2 positive  
- blcx 1/28 NG x 2 -> BSI adequately treated at this time but will remain on abx for osteomyelitis as above Acute hypoxic resp distress- RRT called 1/23 and again 1/24 sec to hypercapnic resp failure - off BIPAP now on NC Bilateral pleural effusions- chronic with  atelectasis - monitor Hepatomegaly, splenic hilar & perisplenic varices- concern for cirrhosis and portal HTN 
- seen on CTAP again    
H/o IVDU 
- HIV ab NR 11/19 ,Hep BsAg neg (last reported ivdu August 2018, has hep C and thinks hep b immune) - denies anything currently -  
   
COPD    
HTN    
DJD    
Allergy Shelfish    
  
MICROBIOLOGY:  
11/14   urcx >100,000 Serratia marcescens             blcx NG x 2 
11/15   CrAG neg, fungitell 375 (reported 11/17) 
11/29   blcx for fungus - ngtd 
            fungitell 113 
  
1/21     Blcx x2 Serratia marcescens R cefazolin UA neg 
1/22 Blcx 1 of 2 Serratia 1/23 Aspiration cx g/s neg, Cx NG 
 CHUCKY rare Serratia R Cefazolin Fungal NOS 
1/28  bctx x 2 ngtd 
  
LINES AND CATHETERS:  
piv 
  
 
MEDICATIONS:  
 
Current Facility-Administered Medications Medication Dose Route Frequency  bumetanide (BUMEX) injection 1 mg  1 mg IntraVENous BID  ciprofloxacin HCl (CIPRO) tablet 500 mg  500 mg Oral Q12H  pantoprazole (PROTONIX) tablet 40 mg  40 mg Oral DAILY  albuterol-ipratropium (DUO-NEB) 2.5 MG-0.5 MG/3 ML  3 mL Nebulization QID RT  
 guaiFENesin (ROBITUSSIN) 100 mg/5 mL oral liquid 400 mg  400 mg Oral Q4H  
  cefTAZidime (FORTAZ) 1 g in 0.9% sodium chloride (MBP/ADV) 50 mL MBP  1 g IntraVENous Q8H  
 lidocaine (PF) (XYLOCAINE) 10 mg/mL (1 %) injection 20 mL  20 mL SubCUTAneous Rad Multiple  amLODIPine (NORVASC) tablet 10 mg  10 mg Oral DAILY  gabapentin (NEURONTIN) capsule 400 mg  400 mg Oral TID  lisinopril (PRINIVIL, ZESTRIL) tablet 20 mg  20 mg Oral DAILY  metoprolol tartrate (LOPRESSOR) tablet 50 mg  50 mg Oral BID  simvastatin (ZOCOR) tablet 10 mg  10 mg Oral QHS  acetaminophen (TYLENOL) tablet 650 mg  650 mg Oral Q6H PRN  
 ondansetron (ZOFRAN) injection 4 mg  4 mg IntraVENous Q6H PRN  
 heparin (porcine) injection 5,000 Units  5,000 Units SubCUTAneous Q8H SUBJECTIVE :  
 
Interval notes reviewed. Remains afebrile. Says he can get up with help but back pain is really no better. He is willing to talk to neurosurgery to re-consider surgical intervention. OBJECTIVE Visit Vitals /70 (BP 1 Location: Left arm) Pulse 71 Temp 97.5 °F (36.4 °C) Resp 20 Ht 6' (1.829 m) Wt 101.9 kg (224 lb 11.2 oz) SpO2 90% BMI 30.47 kg/m² Temp (24hrs), Av.9 °F (36.6 °C), Min:97.3 °F (36.3 °C), Max:98.4 °F (36.9 °C) Gen:on NC O2, awake and alert HEENT: muddy sclerae, pupils equal and reactive Chest: Symmetric expansion, no crackles or wheezing CVS:S1S2 RR, tachy, No JVD, No edema Abd:Soft, NT, ND, BS+ Skin:No jaundice, cyanosis, clubbing. No decubitus Muscsk: Normal muscle tone/strength CNS: AA and follows command Labs: Results:  
Chemistry Recent Labs 19 
0730 19 
0400 19 
0555 GLU 86 93 88  141 143  
K 3.4* 3.9 4.4 CL 95* 90* 94* CO2 44* >45* >45* BUN 20* 24* 19* CREA 0.79 0.79 0.73 CA 8.4* 8.7 8.8 AGAP 2* Cannot be calculated Cannot be calculated BUCR 25* 30* 26* CBC w/Diff No results for input(s): WBC, RBC, HGB, HCT, PLT, GRANS, LYMPH, EOS, HGBEXT, HCTEXT, PLTEXT, HGBEXT, HCTEXT, PLTEXT in the last 72 hours. RADIOLOGY :   
 
Imaging Results from Hospital Encounter encounter on 01/21/19 XR CHEST PORT Narrative EXAM: Portable Chest 
 
CLINICAL INDICATION:  f-u pleural effusions 
-Additional:  None COMPARISON:  02/01/19 TECHNIQUE: AP portable view at 0406 
 
______________ FINDINGS: 
 
HEART AND MEDIASTINUM:  The heart size is stable. Aortic atherosclerosis is 
present LUNGS AND AIRWAYS:  Density is again seen at both lung bases. This appears to be 
a combination of pleural fluid and adjacent atelectasis/infiltrate. The lungs 
appear stable PLEURA:  No pneumothorax BONES:  No acute or aggressive osseous lesions. OTHER:  None. 
 
______________ Impression IMPRESSION: 
 
Stable appearing densities at both lung bases compatible with pleural effusions 
with adjacent atelectasis/infiltrate Results from Hospital Encounter encounter on 01/21/19 CT CHEST WO CONT Narrative EXAM: CT chest without contrast . INDICATION: Equivocal stress test.  Evaluate pleural effusions. COMPARISON: 1/21/2019 TECHNIQUE: Axial CT imaging from the thoracic inlet through the diaphragm 
without  intravenous contrast. Multiplanar reformats were generated. Dose reduction techniques:  Automated exposure control, mAs and/or kVp 
reductions based on patient size, and iterative reconstruction. The specific 
techniques utilized on this CT exam have been documented in the patient's 
electronic medical record. Digital imaging and communications in medicine (DICOM) format image data are 
available to non-affiliated external healthcare facilities or entities on a 
secure, media free, reciprocally searchable database, maintained by the 
healthcare system, with patient authorization for at least a 12 month period 
after this study. _______________ FINDINGS: 
 
 MEDIASTINUM AND LYMPH NODES: Normal heart size. No pericardial effusion. No 
mediastinal or hilar lymph node enlargement. LUNGS AND AIRWAYS:  
- Essentially complete bilateral lower lobe atelectasis due to bilateral pleural 
effusions. 
- No new focal consolidation. PLEURA: Moderate, stable left greater than right pleural effusions. UPPER ABDOMEN:  
- Hepatosplenomegaly. 
- Moderate burden mesenteric varices. - Several subcentimeter hepatic hypodensities, presumably cysts. - Small hiatal hernia. VASCULATURE:  
- Main pulmonary artery diameter: 3.7 cm.  
- Mild atherosclerotic disease of the aortic arch. - Moderate CAD. - Moderate atherosclerotic aortic valve. OTHER:  
- Mixed, stable mild lytic/ sclerotic destructive change at T3-4 and surrounding 
paraspinal inflammatory change consistent with discitis/osteomyelitis. -Mixed lytic/sclerotic destructive change at T9-10 and paraspinal inflammatory 
change consistent with discitis/osteomyelitis as seen on prior CT. Since prior, 
there is progressive fragmentation and bone loss of T9 vertebral body. 
-No new bone lesions. _______________ Impression IMPRESSION: 
 
1. Moderate bilateral pleural effusions, left > right, amenable to image guided 
thoracentesis if clinically appropriate. 2. Progressive fragmentation and bone loss of T9 from known 
discitis/osteomyelitis at T9-10. 
 
3. Stable discitis/osteomyelitis changes at T3-4. 
 
4. Other chronic findings detailed above and without significant interval 
change. Jolynn Cleveland MD 
February 5, 2019 [facility] [phone]

## 2019-02-06 LAB
ANION GAP SERPL CALC-SCNC: 4 MMOL/L (ref 3–18)
BUN SERPL-MCNC: 21 MG/DL (ref 7–18)
BUN/CREAT SERPL: 27 (ref 12–20)
CA-I SERPL-SCNC: 1.11 MMOL/L (ref 1.12–1.32)
CALCIUM SERPL-MCNC: 8.6 MG/DL (ref 8.5–10.1)
CHLORIDE SERPL-SCNC: 95 MMOL/L (ref 100–108)
CO2 SERPL-SCNC: 42 MMOL/L (ref 21–32)
CREAT SERPL-MCNC: 0.79 MG/DL (ref 0.6–1.3)
GLUCOSE SERPL-MCNC: 102 MG/DL (ref 74–99)
MAGNESIUM SERPL-MCNC: 1.8 MG/DL (ref 1.6–2.6)
PHOSPHATE SERPL-MCNC: 3.4 MG/DL (ref 2.5–4.9)
POTASSIUM SERPL-SCNC: 3.6 MMOL/L (ref 3.5–5.5)
SODIUM SERPL-SCNC: 141 MMOL/L (ref 136–145)

## 2019-02-06 PROCEDURE — 74011250637 HC RX REV CODE- 250/637: Performed by: HOSPITALIST

## 2019-02-06 PROCEDURE — 74011250636 HC RX REV CODE- 250/636: Performed by: INTERNAL MEDICINE

## 2019-02-06 PROCEDURE — 94760 N-INVAS EAR/PLS OXIMETRY 1: CPT

## 2019-02-06 PROCEDURE — 80048 BASIC METABOLIC PNL TOTAL CA: CPT

## 2019-02-06 PROCEDURE — 94640 AIRWAY INHALATION TREATMENT: CPT

## 2019-02-06 PROCEDURE — 74011250636 HC RX REV CODE- 250/636: Performed by: HOSPITALIST

## 2019-02-06 PROCEDURE — 74011000258 HC RX REV CODE- 258: Performed by: INTERNAL MEDICINE

## 2019-02-06 PROCEDURE — 77010033678 HC OXYGEN DAILY

## 2019-02-06 PROCEDURE — 74011000250 HC RX REV CODE- 250: Performed by: INTERNAL MEDICINE

## 2019-02-06 PROCEDURE — 97530 THERAPEUTIC ACTIVITIES: CPT

## 2019-02-06 PROCEDURE — 65660000000 HC RM CCU STEPDOWN

## 2019-02-06 PROCEDURE — 82330 ASSAY OF CALCIUM: CPT

## 2019-02-06 PROCEDURE — 84100 ASSAY OF PHOSPHORUS: CPT

## 2019-02-06 PROCEDURE — 94660 CPAP INITIATION&MGMT: CPT

## 2019-02-06 PROCEDURE — 74011250637 HC RX REV CODE- 250/637: Performed by: INTERNAL MEDICINE

## 2019-02-06 PROCEDURE — 36415 COLL VENOUS BLD VENIPUNCTURE: CPT

## 2019-02-06 PROCEDURE — 97535 SELF CARE MNGMENT TRAINING: CPT

## 2019-02-06 PROCEDURE — 83735 ASSAY OF MAGNESIUM: CPT

## 2019-02-06 RX ORDER — TRAMADOL HYDROCHLORIDE 50 MG/1
50 TABLET ORAL
Status: DISCONTINUED | OUTPATIENT
Start: 2019-02-06 | End: 2019-02-23 | Stop reason: HOSPADM

## 2019-02-06 RX ADMIN — PANTOPRAZOLE SODIUM 40 MG: 40 TABLET, DELAYED RELEASE ORAL at 08:53

## 2019-02-06 RX ADMIN — LISINOPRIL 20 MG: 20 TABLET ORAL at 08:53

## 2019-02-06 RX ADMIN — SIMVASTATIN 10 MG: 10 TABLET, FILM COATED ORAL at 22:12

## 2019-02-06 RX ADMIN — ERTAPENEM SODIUM 1 G: 1 INJECTION, POWDER, LYOPHILIZED, FOR SOLUTION INTRAMUSCULAR; INTRAVENOUS at 20:36

## 2019-02-06 RX ADMIN — ACETAMINOPHEN 650 MG: 325 TABLET ORAL at 15:27

## 2019-02-06 RX ADMIN — METOPROLOL TARTRATE 50 MG: 50 TABLET ORAL at 17:28

## 2019-02-06 RX ADMIN — IPRATROPIUM BROMIDE AND ALBUTEROL SULFATE 3 ML: .5; 3 SOLUTION RESPIRATORY (INHALATION) at 15:29

## 2019-02-06 RX ADMIN — GABAPENTIN 400 MG: 100 CAPSULE ORAL at 15:38

## 2019-02-06 RX ADMIN — GUAIFENESIN 400 MG: 100 SOLUTION ORAL at 08:53

## 2019-02-06 RX ADMIN — GUAIFENESIN 400 MG: 100 SOLUTION ORAL at 12:19

## 2019-02-06 RX ADMIN — TRAMADOL HYDROCHLORIDE 50 MG: 50 TABLET, FILM COATED ORAL at 17:28

## 2019-02-06 RX ADMIN — GABAPENTIN 400 MG: 100 CAPSULE ORAL at 22:12

## 2019-02-06 RX ADMIN — HEPARIN SODIUM 5000 UNITS: 5000 INJECTION INTRAVENOUS; SUBCUTANEOUS at 12:18

## 2019-02-06 RX ADMIN — BUMETANIDE 1 MG: 1 TABLET ORAL at 17:28

## 2019-02-06 RX ADMIN — HEPARIN SODIUM 5000 UNITS: 5000 INJECTION INTRAVENOUS; SUBCUTANEOUS at 17:28

## 2019-02-06 RX ADMIN — HEPARIN SODIUM 5000 UNITS: 5000 INJECTION INTRAVENOUS; SUBCUTANEOUS at 03:42

## 2019-02-06 RX ADMIN — AMLODIPINE BESYLATE 10 MG: 10 TABLET ORAL at 08:53

## 2019-02-06 RX ADMIN — ACETAMINOPHEN 650 MG: 325 TABLET ORAL at 03:40

## 2019-02-06 RX ADMIN — IPRATROPIUM BROMIDE AND ALBUTEROL SULFATE 3 ML: .5; 3 SOLUTION RESPIRATORY (INHALATION) at 21:55

## 2019-02-06 RX ADMIN — BUMETANIDE 1 MG: 1 TABLET ORAL at 08:53

## 2019-02-06 RX ADMIN — GUAIFENESIN 400 MG: 100 SOLUTION ORAL at 15:38

## 2019-02-06 RX ADMIN — GUAIFENESIN 400 MG: 100 SOLUTION ORAL at 20:36

## 2019-02-06 RX ADMIN — METOPROLOL TARTRATE 50 MG: 50 TABLET ORAL at 08:53

## 2019-02-06 RX ADMIN — IPRATROPIUM BROMIDE AND ALBUTEROL SULFATE 3 ML: .5; 3 SOLUTION RESPIRATORY (INHALATION) at 07:52

## 2019-02-06 RX ADMIN — GABAPENTIN 400 MG: 100 CAPSULE ORAL at 08:53

## 2019-02-06 NOTE — ROUTINE PROCESS
0715-report received, patient resting quietly in bed, no distress noted, voiced no complaints. 0845-assessment completed,call bell within reach, no distress noted, am due medications given. 1230-no change in condition, no distress noted. 1530-complained of back pain, tylenol given. No other changes in condition. 1730-pm due medications given. 1920-Bedside and Verbal shift change report given to [address] [place] [address] (oncoming nurse) by Sumit Ortega (offgoing nurse). Report included the following information SBAR, MAR and Recent Results.

## 2019-02-06 NOTE — PROGRESS NOTES
Problem: Falls - Risk of 
Goal: *Absence of Falls Document Lona Fearing Fall Risk and appropriate interventions in the flowsheet. Outcome: Progressing Towards Goal 
Fall Risk Interventions: 
Mobility Interventions: Patient to call before getting OOB, OT consult for ADLs Mentation Interventions: Room close to nurse's station, Door open when patient unattended Medication Interventions: Patient to call before getting OOB Elimination Interventions: Call light in reach, Toileting schedule/hourly rounds, Urinal in reach History of Falls Interventions: Door open when patient unattended Problem: Pressure Injury - Risk of 
Goal: *Prevention of pressure injury Document Raul Scale and appropriate interventions in the flowsheet. Outcome: Progressing Towards Goal 
Pressure Injury Interventions: 
Sensory Interventions: Assess changes in LOC, Maintain/enhance activity level Moisture Interventions: Absorbent underpads Activity Interventions: Increase time out of bed, Pressure redistribution bed/mattress(bed type), PT/OT evaluation Mobility Interventions: HOB 30 degrees or less, Pressure redistribution bed/mattress (bed type), PT/OT evaluation Nutrition Interventions: Document food/fluid/supplement intake Friction and Shear Interventions: HOB 30 degrees or less

## 2019-02-06 NOTE — PROGRESS NOTES
Internal Medicine Progress Note Patient's Name: Neomi Carrel Admit Date: 1/21/2019 Length of Stay: 16 
 
 
Assessment/Plan Active Hospital Problems Diagnosis Date Noted  Metabolic encephalopathy 32/09/3669 Associated with sepsis, present on admission.  PNA (pneumonia) 01/26/2019  Bacteremia 01/21/2019  Acute on chronic respiratory failure with hypoxia ([facility] [address] 12/23/2018  Hypertension 11/14/2018  Hepatitis C 11/14/2018  Back pain 11/14/2018  Discitis of thoracic region 11/14/2018  Bilateral pleural effusion 11/14/2018  
 
- Cont IVAB w/ ertapenem x 6-8wks - Chest CT w/ B/L pleural effusions and atelectasis, pt without sig breathing issues at this time - If effusions need tapped, pulm rec left first 
- Cont fluid restriction 
- Cont PO bumex 
- HCO3 cont to trend down - Cont BiPAP qhs 
- Pain control PRN, avoid narcs 
- PT/OT 
- Awaiting long term care, no accepting beds - Discussed potential surgery w/ patient, he is not interested at this time and not ready to commit if it was even an option. He would like to proceed with PT and see how things go - Cont acceptable home medications for chronic conditions  
- DVT protocol I have personally reviewed all pertinent labs and films that have officially resulted over the last 24 hours. I have personally checked for all pending labs that are awaiting final results. Subjective Pt s/e @ bedside No major events overnight Offers no complaints today other than his back pain Denies CP Objective Visit Vitals BP 96/58 (BP 1 Location: Right arm, BP Patient Position: Supine) Pulse 74 Temp 98.4 °F (36.9 °C) Resp 20 Ht 6' (1.829 m) Wt 99.4 kg (219 lb 3.2 oz) SpO2 95% BMI 29.73 kg/m² Physical Exam: 
General Appearance: NAD, conversant Lungs: Decreased BS B/L with normal respiratory effort CV: RRR, no m/r/g Abdomen: soft, non-tender, mild distension, normal bowel sounds Extremities: no cyanosis, no peripheral edema Neuro: No focal deficits, motor/sensory intact Lab/Data Reviewed: 
BMP:  
Lab Results Component Value Date/Time  02/06/2019 04:50 AM  
 K 3.6 02/06/2019 04:50 AM  
 CL 95 (L) 02/06/2019 04:50 AM  
 CO2 42 (HH) 02/06/2019 04:50 AM  
 AGAP 4 02/06/2019 04:50 AM  
  (H) 02/06/2019 04:50 AM  
 BUN 21 (H) 02/06/2019 04:50 AM  
 CREA 0.79 02/06/2019 04:50 AM  
 GFRAA >60 02/06/2019 04:50 AM  
 GFRNA >60 02/06/2019 04:50 AM  
 
CBC: No results found for: WBC, HGB, HGBEXT, HCT, HCTEXT, PLT, PLTEXT, HGBEXT, HCTEXT, PLTEXT Imaging Reviewed: 
No results found. Medications Reviewed: 
Current Facility-Administered Medications Medication Dose Route Frequency  ertapenem (INVANZ) 1 g in 0.9% sodium chloride (MBP/ADV) 50 mL MBP  1 g IntraVENous Q24H  
 bumetanide (BUMEX) tablet 1 mg  1 mg Oral BID  pantoprazole (PROTONIX) tablet 40 mg  40 mg Oral DAILY  albuterol-ipratropium (DUO-NEB) 2.5 MG-0.5 MG/3 ML  3 mL Nebulization QID RT  
 guaiFENesin (ROBITUSSIN) 100 mg/5 mL oral liquid 400 mg  400 mg Oral Q4H  
 lidocaine (PF) (XYLOCAINE) 10 mg/mL (1 %) injection 20 mL  20 mL SubCUTAneous Rad Multiple  amLODIPine (NORVASC) tablet 10 mg  10 mg Oral DAILY  gabapentin (NEURONTIN) capsule 400 mg  400 mg Oral TID  lisinopril (PRINIVIL, ZESTRIL) tablet 20 mg  20 mg Oral DAILY  metoprolol tartrate (LOPRESSOR) tablet 50 mg  50 mg Oral BID  simvastatin (ZOCOR) tablet 10 mg  10 mg Oral QHS  acetaminophen (TYLENOL) tablet 650 mg  650 mg Oral Q6H PRN  
 ondansetron (ZOFRAN) injection 4 mg  4 mg IntraVENous Q6H PRN  
 heparin (porcine) injection 5,000 Units  5,000 Units SubCUTAneous Q8H Estelle Enamorado DO Internal Medicine, Hospitalist 
Pager: [phone] [facility]

## 2019-02-06 NOTE — PROGRESS NOTES
Problem: Self Care Deficits Care Plan (Adult) Goal: *Acute Goals and Plan of Care (Insert Text) Occupational Therapy Goals Initiated 1/30/2019 within 7 day(s). 1.  Patient will perform grooming tasks while standing with stand-by assistance for balance. 2.  Patient will perform lower body dressing with minimal assistance utilizing adaptive equipment/strategies, prn. 
3.  Patient will perform functional task in standing for 8 minutes with supervision and < 2 rest breaks to increase activity tolerance for functional transfers & ADLs. 4.  Patient will perform toilet transfers with supervision. 5.  Patient will perform all aspects of toileting with supervision. 6.  Patient will participate in upper extremity therapeutic exercise/activities for 8 minutes to increase BUE strength for functional transfers and ADLs. 7.  Patient will utilize energy conservation techniques during functional activities with minimal verbal cues. Outcome: Progressing Towards Goal 
Occupational Therapy TREATMENT Patient: Maday Holman (67 y.o. male) Date: 2/6/2019 Diagnosis: Bacteremia Bacteremia Precautions:   
Chart, occupational therapy assessment, plan of care, and goals were reviewed. PLOF: Assist w/BADLs ASSESSMENT: 
Pt soiled upon entry, requesting to use bathroom. Pt requires increase time w/bed mobility to maneuver to EOB. Pt performs functional transfer to standing from elevated surface w/RW and Min Assist. Pt tolerates standing for bowel hygiene and side stepping to HOB. Pt requires max verbal/tacatile cues for safety w/RW and side stepping. Pt performs simple ADL grooming tasks, polly-care at EOB and UB dressing w/increase time and SBA. Pt c/o LBP 8/10 and  returned to supine in chair position. EDUCATION Pt educated on importance OOB Progression toward goals: 
[]          Improving appropriately and progressing toward goals [x]          Improving slowly and progressing toward goals []          Not making progress toward goals and plan of care will be adjusted PLAN: 
Patient continues to benefit from skilled intervention to address the above impairments. Continue treatment per established plan of care. Discharge Recommendations:  LTC Further Equipment Recommendations for Discharge:  hospital bed and wheelchair SUBJECTIVE:  
Patient stated I feel like I need to go to the bathroom.  OBJECTIVE DATA SUMMARY: 
  
Cognitive/Behavioral Status: 
Neurologic State: Alert Orientation Level: Oriented X4 Cognition: Follows commands Functional Mobility and Transfers for ADLs: 
 Bed Mobility: 
Rolling: Additional time;Stand-by assistance Supine to Sit: Additional time;Stand-by assistance Sit to Supine: Additional time; Moderate assistance(assist w/BLE) Balance: 
Sitting: Intact Standing: Intact; With support ADL Intervention: 
Grooming Washing Face: Stand-by assistance Washing Hands: Stand-by assistance Upper Body Dressing Assistance Hospital Gown: Stand-by assistance Toileting Toileting Assistance: Moderate assistance Bladder Hygiene: Supervision/set-up(seated) Bowel Hygiene: Maximum assistance(standing) Clothing Management: Maximum assistance Pain: 
Pre Treatment: 
Post Treatment: 
Pain Scale 1: Numeric (0 - 10) Pain Intensity 1: 8 Pain Location 1: Back Pain Orientation 1: Posterior; Lower Pain Description 1: throbbing Pain Intervention(s) 1: Declines;Repositioned; Exercise Activity Tolerance:   
Fair Please refer to the flowsheet for vital signs taken during this treatment. After treatment:  
[]  Patient left in no apparent distress sitting up in chair 
[x]  Patient left in no apparent distress in bed 
[x]  Call bell left within reach 
[]  Nursing notified 
[]  Caregiver present 
[]  Bed alarm activated Devendra Shah Time Calculation: 25 mins

## 2019-02-06 NOTE — PROGRESS NOTES
Pulmonary progress note 
  
History 75-year-old male admitted to the hospital on 1/21 with a Serratia marcescens bacteremia and discitis.  At baseline he has PaCO2 retention at roughly 79. Kavin Fontanez was transferred to the ICU on 1/24/19 for what appeared to be a right lower lobe pneumonia.  Chest CT 2/4 showed bilateral pleural effusions and extensive atelectasis. 
  
Subjective Feeling about the same. No chest pains or unusual shortness of breath. I am uncertain if he is actually following any sort of fluid restriction. Exam 
Alert and interactive. Oriented. Blood pressure 110/66, pulse 71, temperature 97.6 °F (36.4 °C), resp. rate 22, height 6' (1.829 m), weight 101.9 kg (224 lb 11.2 oz), SpO2 95 %. No accessory muscle use. Less conversational dyspnea than even yesterday Labs 2/4/19: HCO3 44. Creatinine 0.79 Assessment Acute on chronic hypercapnic and hypoxic respiratory failure Bilateral pleural effusions with extensive atelectasis Serratia marcescens bacteremia and T9 - T10 discitis Contraction alkalosis 
  
Plan Encourage to limit fluid intake to enough to quench his thirst. 
As needed BiPAP 
COPD therapies Increase activity as tolerated. This includes longer time spent in chair to help clear atelectasis

## 2019-02-06 NOTE — PROGRESS NOTES
Nutrition follow up/ 
Plan of care RECOMMENDATIONS:  
 
1. Cardiac diet 2. Monitor weight, labs and PO intake 3. RD to follow GOALS: 
 
1. Met/Ongoing: PO intake meets >75% of protein/calorie needs by 2/13 
2. Ongoing: Weight Maintenance (+/- 1-2 lb by 2/13) ASSESSMENT: 
 
Weight status is classified as overweight per BMI of 29.7. PO intake appears adequate. Labs noted. Patient with hypoalbuminemia and elevated BUN. Nutrition recommendations listed. RD to follow. Nutrition Risk:  [] High  [x] Moderate []  Low SUBJECTIVE/OBJECTIVE:  
  
(1/30): LOS. Transferred from ICU. Admitted with bacteremia. Has history of COPD and HTN. Patient reports having a good appetite and eating all of meals. Observed 100% intake of lunch meal during visit. Has food allergy to shellfish. Reports having limited dentition but declined change in diet texture. Will monitor. 
 
(2/6): Transferred back to [facility] from ICU on 2/3. Patient reports having a good appetite and eating most of meals. Observed 50% intake of lunch meal during visit. % intake of meals per vitals. Stated food preferences. Will monitor. Information Obtained from:  
 [x] Chart Review [x] Patient 
 [] Family/Caregiver 
 [] Nurse/Physician 
 [] Interdisciplinary Meeting/Rounds Diet: Cardiac diet Medications: [x] Reviewed (Norvasc, Bumex) Allergies: [x] Reviewed (Shellfish) Encounter Diagnoses ICD-10-CM ICD-9-CM 1. Bacteremia R78.81 790.7 2. History of heroin abuse Z87.898 305.53 Past Medical History:  
Diagnosis Date  Arthritis  COPD  Hypertension Labs:   
Lab Results Component Value Date/Time  Sodium 141 02/06/2019 04:50 AM  
 Potassium 3.6 02/06/2019 04:50 AM  
 Chloride 95 (L) 02/06/2019 04:50 AM  
 CO2 42 (HH) 02/06/2019 04:50 AM  
 Anion gap 4 02/06/2019 04:50 AM  
 Glucose 102 (H) 02/06/2019 04:50 AM  
 BUN 21 (H) 02/06/2019 04:50 AM  
 Creatinine 0.79 02/06/2019 04:50 AM  
 Calcium 8.6 02/06/2019 04:50 AM  
 Magnesium 1.8 02/06/2019 04:50 AM  
 Phosphorus 3.4 02/06/2019 04:50 AM  
 Albumin 2.1 (L) 01/28/2019 05:08 AM  
 
Anthropometrics: BMI (calculated): 29.7 Last 3 Recorded Weights in this Encounter 02/04/19 0114 02/05/19 0430 02/06/19 9839 Weight: 104.1 kg (229 lb 8 oz) 101.9 kg (224 lb 11.2 oz) 99.4 kg (219 lb 3.2 oz) Ht Readings from Last 1 Encounters:  
01/30/19 6' (1.829 m) Weight Metrics 2/6/2019 1/21/2019 1/21/2019 1/21/2019 12/30/2018 12/23/2018 12/21/2018 Weight 219 lb 3.2 oz - 252 lb - 252 lb 6.8 oz - 246 lb 0.5 oz  
BMI - 29.73 kg/m2 - 34.18 kg/m2 - 34.24 kg/m2 33.37 kg/m2 Patient Vitals for the past 100 hrs: 
 % Diet Eaten 02/05/19 1749 100 % 02/05/19 1238 100 % 02/05/19 0903 100 % 02/04/19 1825 100 % 02/04/19 1300 75 % 02/04/19 0909 100 % Estimated Nutrition Needs: [x] MSJ Calories: 3612-4778 Kcal Based on:   [x] Actual BW   
Protein:    g Based on:   [x] Actual BW Fluid:       2845-1932 ml Based on:   [x] Actual BW  
 
 [x] No Cultural, Orthodoxy or ethnic dietary need identified. [] Cultural, Orthodoxy and ethnic food preferences identified and addressed Wt Status:  [] Normal (18.6 - 24.9) [] Underweight (<18.5) [x] Overweight (25 - 29.9) [] Mild Obesity (30 - 34.9)  [] Moderate Obesity (35 - 39.9) [] Morbid Obesity (40+) Nutrition Problems Identified:  
[] Suboptimal PO intake [x] Food Allergies (Shellfish) [] Difficulty chewing/swallowing/poor dentition 
[] Constipation/Diarrhea  
[] Nausea/Vomiting  
[] None 
[] Other:  
 
Plan:  
[x] Therapeutic Diet [x]  Obtained/adjusted food preferences/tolerances and/or snacks options  
[]  Supplements added  
[] Occupational therapy following for feeding techniques []  HS snack added  
[]  Modify diet texture  
[x]  Modify diet for food allergies []  Assist with menu selection  
[x]  Monitor PO intake on meal rounds [x]  Continue inpatient monitoring and intervention  
[]  Participated in discharge planning/Interdisciplinary rounds/Team meetings  
[]  Other:  
 
Education Needs: 
 [] Not appropriate for teaching at this time due to: 
 [x] Identified and addressed Nutrition Monitoring and Evaluation: 
[x] Continue ongoing monitoring and intervention 
[] Other [address]

## 2019-02-06 NOTE — PROGRESS NOTES
INFECTIOUS DISEASE FOLLOW UP NOTE : 
 
 
 
Admit Date: 1/21/2019 Overview: 77year-old AA male w/ h/o polysubstance abuse including cocaine, heroin, T 10-11 destructive changes in November 2018 w/ nl ESR, CRP, negative bone/gallium scans - not felt to be infectious then but had Serratia UTI rx'd Cefepime/Ceftriaxone 5 days. Returned 1/21 with back pain, BSI Serratia 1/21 and CT 1/21 with similar destructive changes T9-10 levels c/w discitis/OM and intraspinal and paraspinal phlegmon s/p needle aspiration 1/23 cx + Serratia. Had recurrent respiratory failure requiring RRT 1/23, 1/24. Has residual bilateral pleural effusions on CXR 2/4 Current abx Prior abx Ertapenem 2/5 - 0   abx 1/23 - 13 Cefepime/vanco  11/14   2, Ceftriaxone 11/16 - 3; Zosyn 1/21-2  ceftaz 1/23-13, cipro 1/23-13  
  
Assessment -> Rec:  
  
Destructive T9-10 changes discitis/OM 
- mid-back pain x 5 months, fell, incontinent of urine - h/o IVDU - last use in July 2018 
- CTAP 11/2018: severe destructive changes T 10-11, paravertebral sot tissue infiltration, extens to ant epidural space w/ cord compression 
- unable to have MRI due to claustrophobia - Bone/ gallium scans 11/27 neg for discitis/OM, ESR 20, CRP 0.7 
- not given long term IV abx  
- refused stabilization procedure per Dr. Arslan Mane 
- returned 1/21 w/ worsened back pain, blcx + Serratia  
- CT 1/21: destructive changes T9-10 levels c/w discitis/OM and intraspinal and paraspinal phlegmon. Overall appearance unchanged and pain not different from previous - 1/22: esr 38, crp 7.4 
- s/p aspiration of fluid/phlegmon T10-11 discspace 1/23: (+) Serratia in anaerobic culture - potential for emergent AmpC- beta-lactamase- mediated resistance to third-generation cephalosporins that may not be evident during initial susceptibility testing  -> Ertapenem 1 gm q 24 hours 6-8 weeks -> he is willing to discuss and reconsider stabilization surgery previously offered by Dr. Julian Ruvalcaba. D/w Dr. Claudette Pat 
-> avoid PICC w/ h/o IVDU  
BSI 2 of 2 Serratia marcescens 1/21 
- source ~ Discitis Phlegmon seen on CT 
- NEW IR aspiration 1/23 chucky culture grew Serratia  
- denies any urinary complaint but had Serratia in urine in past. Currently UA is negative - Ucx was not sent but CT with normal kidneys and nothing sig on CT and pt asymptomatic except some incontinet 
- repeat Blcx 1/22 1/2 positive  
- blcx 1/28 NG x 2 
- BSI adequately treated at this time  -> Continue Ertapenem for osteomyelitis as above Acute hypoxic resp distress- RRT called 1/23 and again 1/24 sec to hypercapnic resp failure - off BIPAP now on NC Bilateral pleural effusions- chronic with  atelectasis - monitor Hepatomegaly, splenic hilar & perisplenic varices- concern for cirrhosis and portal HTN 
- seen on CTAP again    
H/o IVDU 
- HIV ab NR 11/19 ,Hep BsAg neg (last reported ivdu August 2018, has hep C and thinks hep b immune) 
- denies current use -> avoid PICC 
   
COPD    
HTN    
DJD    
Allergy Shelfish    
  
MICROBIOLOGY:  
11/14   urcx >100,000 Serratia marcescens             blcx NG x 2 
11/15   CrAG neg, fungitell 375 (reported 11/17) 
11/29   blcx for fungus - ngtd 
            fungitell 113 
  
1/21     Blcx x2 Serratia marcescens R cefazolin UA neg 
1/22 Blcx 1 of 2 Serratia 1/23 Aspiration cx g/s neg, Cx NG 
 CHUCKY rare Serratia R Cefazolin Fungal NOS 
1/28  bctx x 2 ngtd 
  
LINES AND CATHETERS:  
piv 
  
 
MEDICATIONS:  
 
Current Facility-Administered Medications Medication Dose Route Frequency  ertapenem (INVANZ) 1 g in 0.9% sodium chloride (MBP/ADV) 50 mL MBP  1 g IntraVENous Q24H  
 bumetanide (BUMEX) tablet 1 mg  1 mg Oral BID  pantoprazole (PROTONIX) tablet 40 mg  40 mg Oral DAILY  albuterol-ipratropium (DUO-NEB) 2.5 MG-0.5 MG/3 ML  3 mL Nebulization QID RT  
  guaiFENesin (ROBITUSSIN) 100 mg/5 mL oral liquid 400 mg  400 mg Oral Q4H  
 lidocaine (PF) (XYLOCAINE) 10 mg/mL (1 %) injection 20 mL  20 mL SubCUTAneous Rad Multiple  amLODIPine (NORVASC) tablet 10 mg  10 mg Oral DAILY  gabapentin (NEURONTIN) capsule 400 mg  400 mg Oral TID  lisinopril (PRINIVIL, ZESTRIL) tablet 20 mg  20 mg Oral DAILY  metoprolol tartrate (LOPRESSOR) tablet 50 mg  50 mg Oral BID  simvastatin (ZOCOR) tablet 10 mg  10 mg Oral QHS  acetaminophen (TYLENOL) tablet 650 mg  650 mg Oral Q6H PRN  
 ondansetron (ZOFRAN) injection 4 mg  4 mg IntraVENous Q6H PRN  
 heparin (porcine) injection 5,000 Units  5,000 Units SubCUTAneous Q8H SUBJECTIVE :  
 
Interval notes reviewed. Says his breathing is better today but otherwise unchanged. Back pain still persists on change in position. Unable to move around much. OBJECTIVE Visit Vitals /65 (BP 1 Location: Left arm, BP Patient Position: At rest) Pulse 90 Temp 98.3 °F (36.8 °C) Resp 20 Ht 6' (1.829 m) Wt 99.4 kg (219 lb 3.2 oz) SpO2 95% BMI 29.73 kg/m² Temp (24hrs), Av.9 °F (36.6 °C), Min:97.4 °F (36.3 °C), Max:98.5 °F (36.9 °C) Gen:on NC O2, awake and alert HEENT: muddy sclerae, pupils equal and reactive Chest: Symmetric expansion, no crackles or wheezing CVS:S1S2 RR, tachy, No JVD, No edema Abd:Soft, NT, ND, BS+ Skin:No jaundice, cyanosis, clubbing. No decubitus Muscsk: Normal muscle tone/strength CNS: AA and follows command Labs: Results:  
Chemistry Recent Labs 19 
0450 19 
0730 19 
0400 * 86 93  141 141  
K 3.6 3.4* 3.9 CL 95* 95* 90* CO2 42* 44* >45* BUN 21* 20* 24* CREA 0.79 0.79 0.79 CA 8.6 8.4* 8.7 AGAP 4 2* Cannot be calculated BUCR 27* 25* 30* CBC w/Diff No results for input(s): WBC, RBC, HGB, HCT, PLT, GRANS, LYMPH, EOS, HGBEXT, HCTEXT, PLTEXT, HGBEXT, HCTEXT, PLTEXT in the last 72 hours.   
 
 
RADIOLOGY :   
 
 Imaging Results from Hospital Encounter encounter on 01/21/19 XR CHEST PORT Narrative EXAM: Portable Chest 
 
CLINICAL INDICATION:  f-u pleural effusions 
-Additional:  None COMPARISON:  02/01/19 TECHNIQUE: AP portable view at 0406 
 
______________ FINDINGS: 
 
HEART AND MEDIASTINUM:  The heart size is stable. Aortic atherosclerosis is 
present LUNGS AND AIRWAYS:  Density is again seen at both lung bases. This appears to be 
a combination of pleural fluid and adjacent atelectasis/infiltrate. The lungs 
appear stable PLEURA:  No pneumothorax BONES:  No acute or aggressive osseous lesions. OTHER:  None. 
 
______________ Impression IMPRESSION: 
 
Stable appearing densities at both lung bases compatible with pleural effusions 
with adjacent atelectasis/infiltrate Results from Hospital Encounter encounter on 01/21/19 CT CHEST WO CONT Narrative EXAM: CT chest without contrast . INDICATION: Equivocal stress test.  Evaluate pleural effusions. COMPARISON: 1/21/2019 TECHNIQUE: Axial CT imaging from the thoracic inlet through the diaphragm 
without  intravenous contrast. Multiplanar reformats were generated. Dose reduction techniques:  Automated exposure control, mAs and/or kVp 
reductions based on patient size, and iterative reconstruction. The specific 
techniques utilized on this CT exam have been documented in the patient's 
electronic medical record. Digital imaging and communications in medicine (DICOM) format image data are 
available to non-affiliated external healthcare facilities or entities on a 
secure, media free, reciprocally searchable database, maintained by the 
healthcare system, with patient authorization for at least a 12 month period 
after this study. _______________ FINDINGS: 
 
MEDIASTINUM AND LYMPH NODES: Normal heart size. No pericardial effusion. No 
mediastinal or hilar lymph node enlargement.  
 
LUNGS AND AIRWAYS:  
 - Essentially complete bilateral lower lobe atelectasis due to bilateral pleural 
effusions. 
- No new focal consolidation. PLEURA: Moderate, stable left greater than right pleural effusions. UPPER ABDOMEN:  
- Hepatosplenomegaly. 
- Moderate burden mesenteric varices. - Several subcentimeter hepatic hypodensities, presumably cysts. - Small hiatal hernia. VASCULATURE:  
- Main pulmonary artery diameter: 3.7 cm.  
- Mild atherosclerotic disease of the aortic arch. - Moderate CAD. - Moderate atherosclerotic aortic valve. OTHER:  
- Mixed, stable mild lytic/ sclerotic destructive change at T3-4 and surrounding 
paraspinal inflammatory change consistent with discitis/osteomyelitis. -Mixed lytic/sclerotic destructive change at T9-10 and paraspinal inflammatory 
change consistent with discitis/osteomyelitis as seen on prior CT. Since prior, 
there is progressive fragmentation and bone loss of T9 vertebral body. 
-No new bone lesions. _______________ Impression IMPRESSION: 
 
1. Moderate bilateral pleural effusions, left > right, amenable to image guided 
thoracentesis if clinically appropriate. 2. Progressive fragmentation and bone loss of T9 from known 
discitis/osteomyelitis at T9-10. 
 
3. Stable discitis/osteomyelitis changes at T3-4. 
 
4. Other chronic findings detailed above and without significant interval 
change. Chad Plummer MD 
February 6, 2019 [facility] [phone]

## 2019-02-07 LAB
ANION GAP SERPL CALC-SCNC: 6 MMOL/L (ref 3–18)
BUN SERPL-MCNC: 25 MG/DL (ref 7–18)
BUN/CREAT SERPL: 25 (ref 12–20)
CA-I SERPL-SCNC: 1.13 MMOL/L (ref 1.12–1.32)
CALCIUM SERPL-MCNC: 8.6 MG/DL (ref 8.5–10.1)
CHLORIDE SERPL-SCNC: 94 MMOL/L (ref 100–108)
CO2 SERPL-SCNC: 39 MMOL/L (ref 21–32)
CREAT SERPL-MCNC: 0.99 MG/DL (ref 0.6–1.3)
GLUCOSE SERPL-MCNC: 95 MG/DL (ref 74–99)
MAGNESIUM SERPL-MCNC: 2 MG/DL (ref 1.6–2.6)
PHOSPHATE SERPL-MCNC: 4.2 MG/DL (ref 2.5–4.9)
POTASSIUM SERPL-SCNC: 4.3 MMOL/L (ref 3.5–5.5)
SODIUM SERPL-SCNC: 139 MMOL/L (ref 136–145)

## 2019-02-07 PROCEDURE — 80048 BASIC METABOLIC PNL TOTAL CA: CPT

## 2019-02-07 PROCEDURE — 74011250637 HC RX REV CODE- 250/637: Performed by: HOSPITALIST

## 2019-02-07 PROCEDURE — 94640 AIRWAY INHALATION TREATMENT: CPT

## 2019-02-07 PROCEDURE — 84100 ASSAY OF PHOSPHORUS: CPT

## 2019-02-07 PROCEDURE — 74011250637 HC RX REV CODE- 250/637: Performed by: INTERNAL MEDICINE

## 2019-02-07 PROCEDURE — 74011250636 HC RX REV CODE- 250/636: Performed by: HOSPITALIST

## 2019-02-07 PROCEDURE — 65660000000 HC RM CCU STEPDOWN

## 2019-02-07 PROCEDURE — 74011000258 HC RX REV CODE- 258: Performed by: INTERNAL MEDICINE

## 2019-02-07 PROCEDURE — 83735 ASSAY OF MAGNESIUM: CPT

## 2019-02-07 PROCEDURE — 74011250636 HC RX REV CODE- 250/636: Performed by: INTERNAL MEDICINE

## 2019-02-07 PROCEDURE — 36415 COLL VENOUS BLD VENIPUNCTURE: CPT

## 2019-02-07 PROCEDURE — 82330 ASSAY OF CALCIUM: CPT

## 2019-02-07 PROCEDURE — 74011000250 HC RX REV CODE- 250: Performed by: HOSPITALIST

## 2019-02-07 PROCEDURE — 77010033678 HC OXYGEN DAILY

## 2019-02-07 PROCEDURE — 97530 THERAPEUTIC ACTIVITIES: CPT

## 2019-02-07 PROCEDURE — 97535 SELF CARE MNGMENT TRAINING: CPT

## 2019-02-07 PROCEDURE — 94660 CPAP INITIATION&MGMT: CPT

## 2019-02-07 RX ORDER — IPRATROPIUM BROMIDE AND ALBUTEROL SULFATE 2.5; .5 MG/3ML; MG/3ML
3 SOLUTION RESPIRATORY (INHALATION)
Status: DISCONTINUED | OUTPATIENT
Start: 2019-02-07 | End: 2019-02-23 | Stop reason: HOSPADM

## 2019-02-07 RX ADMIN — BUMETANIDE 1 MG: 1 TABLET ORAL at 17:38

## 2019-02-07 RX ADMIN — IPRATROPIUM BROMIDE AND ALBUTEROL SULFATE 3 ML: .5; 3 SOLUTION RESPIRATORY (INHALATION) at 15:25

## 2019-02-07 RX ADMIN — HEPARIN SODIUM 5000 UNITS: 5000 INJECTION INTRAVENOUS; SUBCUTANEOUS at 02:56

## 2019-02-07 RX ADMIN — GUAIFENESIN 400 MG: 100 SOLUTION ORAL at 12:39

## 2019-02-07 RX ADMIN — ERTAPENEM SODIUM 1 G: 1 INJECTION, POWDER, LYOPHILIZED, FOR SOLUTION INTRAMUSCULAR; INTRAVENOUS at 20:41

## 2019-02-07 RX ADMIN — TRAMADOL HYDROCHLORIDE 50 MG: 50 TABLET, FILM COATED ORAL at 02:55

## 2019-02-07 RX ADMIN — HEPARIN SODIUM 5000 UNITS: 5000 INJECTION INTRAVENOUS; SUBCUTANEOUS at 17:39

## 2019-02-07 RX ADMIN — GUAIFENESIN 400 MG: 100 SOLUTION ORAL at 20:47

## 2019-02-07 RX ADMIN — GUAIFENESIN 400 MG: 100 SOLUTION ORAL at 09:34

## 2019-02-07 RX ADMIN — METOPROLOL TARTRATE 50 MG: 50 TABLET ORAL at 09:34

## 2019-02-07 RX ADMIN — AMLODIPINE BESYLATE 10 MG: 10 TABLET ORAL at 09:33

## 2019-02-07 RX ADMIN — BUMETANIDE 1 MG: 1 TABLET ORAL at 09:33

## 2019-02-07 RX ADMIN — GUAIFENESIN 400 MG: 100 SOLUTION ORAL at 17:39

## 2019-02-07 RX ADMIN — HEPARIN SODIUM 5000 UNITS: 5000 INJECTION INTRAVENOUS; SUBCUTANEOUS at 09:36

## 2019-02-07 RX ADMIN — TRAMADOL HYDROCHLORIDE 50 MG: 50 TABLET, FILM COATED ORAL at 17:38

## 2019-02-07 RX ADMIN — PANTOPRAZOLE SODIUM 40 MG: 40 TABLET, DELAYED RELEASE ORAL at 09:33

## 2019-02-07 RX ADMIN — LISINOPRIL 20 MG: 20 TABLET ORAL at 09:33

## 2019-02-07 RX ADMIN — GABAPENTIN 400 MG: 100 CAPSULE ORAL at 17:38

## 2019-02-07 RX ADMIN — GUAIFENESIN 400 MG: 100 SOLUTION ORAL at 03:00

## 2019-02-07 RX ADMIN — SIMVASTATIN 10 MG: 10 TABLET, FILM COATED ORAL at 22:14

## 2019-02-07 RX ADMIN — GABAPENTIN 400 MG: 100 CAPSULE ORAL at 22:14

## 2019-02-07 RX ADMIN — GABAPENTIN 400 MG: 100 CAPSULE ORAL at 09:33

## 2019-02-07 NOTE — PROGRESS NOTES
Patient refused 0800 breathing treatment. Patient said he wanted to hold off and he just took Resmed mask off. MD notified.

## 2019-02-07 NOTE — PROGRESS NOTES
Problem: Falls - Risk of 
Goal: *Absence of Falls Document Danielito Shows Fall Risk and appropriate interventions in the flowsheet. Outcome: Progressing Towards Goal 
Fall Risk Interventions: 
Mobility Interventions: Patient to call before getting OOB Mentation Interventions: Adequate sleep, hydration, pain control, Door open when patient unattended, More frequent rounding, Reorient patient, Room close to nurse's station, Update white board Medication Interventions: Evaluate medications/consider consulting pharmacy, Patient to call before getting OOB, Teach patient to arise slowly Elimination Interventions: Call light in reach, Patient to call for help with toileting needs, Urinal in reach History of Falls Interventions: Evaluate medications/consider consulting pharmacy, Investigate reason for fall, Room close to nurse's station Problem: Pressure Injury - Risk of 
Goal: *Prevention of pressure injury Document Raul Scale and appropriate interventions in the flowsheet. Outcome: Progressing Towards Goal 
Pressure Injury Interventions: 
Sensory Interventions: Assess changes in LOC, Keep linens dry and wrinkle-free, Pressure redistribution bed/mattress (bed type) Moisture Interventions: Absorbent underpads, Maintain skin hydration (lotion/cream) Activity Interventions: Pressure redistribution bed/mattress(bed type) Mobility Interventions: HOB 30 degrees or less, Pressure redistribution bed/mattress (bed type) Nutrition Interventions: Document food/fluid/supplement intake Friction and Shear Interventions: HOB 30 degrees or less

## 2019-02-07 NOTE — PROGRESS NOTES
INFECTIOUS DISEASE FOLLOW UP NOTE : 
 
 
 
Admit Date: 1/21/2019 Overview: 77year-old AA male w/ h/o polysubstance abuse including cocaine, heroin, T 10-11 destructive changes in November 2018 w/ nl ESR, CRP, negative bone/gallium scans - not felt to be infectious then but had Serratia UTI rx'd Cefepime/Ceftriaxone 5 days. Returned 1/21 with back pain, BSI Serratia 1/21 and CT 1/21 with similar destructive changes T9-10 levels c/w discitis/OM and intraspinal and paraspinal phlegmon s/p needle aspiration 1/23 cx + Serratia. Had recurrent respiratory failure requiring RRT 1/23, 1/24. Has residual bilateral pleural effusions on CXR 2/4 Current abx Prior abx Ertapenem 2/5-2   abx 1/23 - 15 Cefepime/vanco  11/14   2, Ceftriaxone 11/16 - 3; Zosyn 1/21-2  ceftaz 1/23-13, cipro 1/23-13  
  
Assessment -> Rec:  
  
Destructive T9-10 changes discitis/OM 
- mid-back pain x 5 months, fell, incontinent of urine - h/o IVDU - last use in July 2018 
- CTAP 11/2018: severe destructive changes T 10-11, paravertebral sot tissue infiltration, extens to ant epidural space w/ cord compression 
- unable to have MRI due to claustrophobia - Bone/ gallium scans 11/27 neg for discitis/OM, ESR 20, CRP 0.7 
- not given long term IV abx  
- refused stabilization procedure per Dr. Arslan Mane 
- returned 1/21 w/ worsened back pain, blcx + Serratia  
- CT 1/21: destructive changes T9-10 levels c/w discitis/OM and intraspinal and paraspinal phlegmon. Overall appearance unchanged and pain not different from previous - 1/22: esr 38, crp 7.4->1/31 57/2.1 
- s/p aspiration of fluid/phlegmon T10-11 discspace 1/23: (+) Serratia in anaerobic culture - potential for emergent AmpC- beta-lactamase- mediated resistance to third-generation cephalosporins that may not be evident during initial susceptibility testing  -> Ertapenem 1 gm q 24 hours 6-8 weeks -> he is willing to discuss and reconsider stabilization surgery previously offered by Dr. Leigh Trinidad. D/w Dr. Shameka Holman 
-> avoid PICC w/ h/o IVDU 
->check cbc repeat inflam markers in am   
BSI 2 of 2 Serratia marcescens 1/21 
- source ~ Discitis Phlegmon seen on CT 
- IR aspiration 1/23 chucky culture grew Serratia  
- denies any urinary complaint but had Serratia in urine in past. Currently UA is negative - Ucx was not sent but CT with normal kidneys and nothing sig on CT and pt asymptomatic except some incontinet 
- repeat Blcx 1/22 1/2 positive  
- blcx 1/28 NG x 2 
- BSI adequately treated at this time  -> Continue Ertapenem for osteomyelitis as above Acute hypoxic resp distress- RRT called 1/23 and again 1/24 sec to hypercapnic resp failure - off BIPAP now on NC Bilateral pleural effusions- chronic with  atelectasis - monitor Hepatomegaly, splenic hilar & perisplenic varices- concern for cirrhosis and portal HTN 
- seen on CTAP again    
H/o IVDU 
- HIV ab NR 11/19 ,Hep BsAg neg (last reported ivdu August 2018, has hep C and thinks hep b immune) 
- denies current use -> avoid PICC 
   
COPD    
HTN    
DJD    
Allergy Shelfish    
  
MICROBIOLOGY:  
11/14   urcx >100,000 Serratia marcescens             blcx NG x 2 
11/15   CrAG neg, fungitell 375 (reported 11/17) 
11/29   blcx for fungus - ngtd 
            fungitell 113 
  
1/21     Blcx x2 Serratia marcescens R cefazolin UA neg 
1/22 Blcx 1 of 2 Serratia 1/23 Aspiration cx g/s neg, Cx NG 
 CHUCKY rare Serratia R Cefazolin Fungal NOS 
1/28  bctx x 2 ng 
  
LINES AND CATHETERS:  
piv 
  
 
MEDICATIONS:  
 
Current Facility-Administered Medications Medication Dose Route Frequency  albuterol-ipratropium (DUO-NEB) 2.5 MG-0.5 MG/3 ML  3 mL Nebulization Q4H PRN  
 traMADol (ULTRAM) tablet 50 mg  50 mg Oral Q6H PRN  
 ertapenem (INVANZ) 1 g in 0.9% sodium chloride (MBP/ADV) 50 mL MBP  1 g IntraVENous Q24H  bumetanide (BUMEX) tablet 1 mg  1 mg Oral BID  pantoprazole (PROTONIX) tablet 40 mg  40 mg Oral DAILY  guaiFENesin (ROBITUSSIN) 100 mg/5 mL oral liquid 400 mg  400 mg Oral Q4H  
 lidocaine (PF) (XYLOCAINE) 10 mg/mL (1 %) injection 20 mL  20 mL SubCUTAneous Rad Multiple  amLODIPine (NORVASC) tablet 10 mg  10 mg Oral DAILY  gabapentin (NEURONTIN) capsule 400 mg  400 mg Oral TID  lisinopril (PRINIVIL, ZESTRIL) tablet 20 mg  20 mg Oral DAILY  metoprolol tartrate (LOPRESSOR) tablet 50 mg  50 mg Oral BID  simvastatin (ZOCOR) tablet 10 mg  10 mg Oral QHS  acetaminophen (TYLENOL) tablet 650 mg  650 mg Oral Q6H PRN  
 ondansetron (ZOFRAN) injection 4 mg  4 mg IntraVENous Q6H PRN  
 heparin (porcine) injection 5,000 Units  5,000 Units SubCUTAneous Q8H SUBJECTIVE :  
 
Interval notes reviewed. Pt denied increased sob or chest or back pain. Asking for help gettting oob and for magazine, told not to get up without nursing help, and informed nursing. Denied fever rash or leg weakness. OBJECTIVE Visit Vitals /73 Pulse 79 Temp 97.7 °F (36.5 °C) Resp 20 Ht 6' (1.829 m) Wt 100.7 kg (221 lb 14.4 oz) SpO2 94% BMI 30.10 kg/m² Temp (24hrs), Av.3 °F (36.8 °C), Min:97.7 °F (36.5 °C), Max:98.4 °F (36.9 °C) Gen:on NC O2, awake and alert HEENT: muddy sclerae, pupils equal and reactive Chest: Symmetric expansion, no crackles or wheezing CVS:S1S2 RR, No JVD or edema Abd:Soft, NT, ND, BS+ Skin:No jaundice, cyanosis, clubbing. No decubitus Muscsk: Normal muscle tone/strength CNS: AA and follows command Labs: Results:  
Chemistry Recent Labs 19 
0541 19 
0450 19 
0730 GLU 95 102* 86  141 141  
K 4.3 3.6 3.4*  
CL 94* 95* 95* CO2 39* 42* 44*  
BUN 25* 21* 20* CREA 0.99 0.79 0.79 CA 8.6 8.6 8.4* AGAP 6 4 2*  
BUCR 25* 27* 25* CBC w/Diff No results for input(s): WBC, RBC, HGB, HCT, PLT, GRANS, LYMPH, EOS, HGBEXT, HCTEXT, PLTEXT, HGBEXT, HCTEXT, PLTEXT in the last 72 hours. RADIOLOGY :   
cxr 2/4 IMPRESSION: 
  
Stable appearing densities at both lung bases compatible with pleural effusions 
with adjacent atelectasis/infiltrate 2/4 CT chest IMPRESSION: 
  
1. Moderate bilateral pleural effusions, left > right, amenable to image guided 
thoracentesis if clinically appropriate.   
  
2. Progressive fragmentation and bone loss of T9 from known 
discitis/osteomyelitis at T9-10. 
  
3. Stable discitis/osteomyelitis changes at T3-4. 
  
4. Other chronic findings detailed above and without significant interval 
change. 
  
 
 
KEM Peace MD 
February 7, 2019 [facility] [phone]

## 2019-02-07 NOTE — PROGRESS NOTES
Internal Medicine Progress Note Patient's Name: Chao Reyna Admit Date: 1/21/2019 Length of Stay: 17 
 
 
Assessment/Plan Active Hospital Problems Diagnosis Date Noted  Metabolic encephalopathy 12/83/5784 Associated with sepsis, present on admission.  PNA (pneumonia) 01/26/2019  Bacteremia 01/21/2019  Acute on chronic respiratory failure with hypoxia ([address] 12/23/2018  Hypertension 11/14/2018  Hepatitis C 11/14/2018  Back pain 11/14/2018  Discitis of thoracic region 11/14/2018  Bilateral pleural effusion 11/14/2018  
 
- Cont IVAB w/ ertapenem x 6-8wks - Chest CT w/ B/L pleural effusions and atelectasis, pt without sig breathing issues at this time - If effusions need tapped, pulm rec left first 
- Cont fluid restriction 
- Cont PO bumex 
- HCO3 cont to trend down nicely, now at 39 
- Cont BiPAP qhs 
- Pain control PRN, avoid opiates (tramadol helping though) 
- PT/OT 
- Awaiting long term care, no accepting beds - Discussed potential surgery w/ patient, he is not interested at this time and not ready to commit if it was even an option. He would like to proceed with PT and see how things go - Cont acceptable home medications for chronic conditions  
- DVT protocol I have personally reviewed all pertinent labs and films that have officially resulted over the last 24 hours. I have personally checked for all pending labs that are awaiting final results. Subjective Pt s/e @ bedside No major events overnight Doing well, tramadol helping Denies CP Objective Visit Vitals /77 Pulse 72 Temp 97.8 °F (36.6 °C) Resp 20 Ht 6' (1.829 m) Wt 100.7 kg (221 lb 14.4 oz) SpO2 97% BMI 30.10 kg/m² Physical Exam: 
General Appearance: NAD, conversant Lungs: Decreased BS B/L with normal respiratory effort CV: RRR, no m/r/g Abdomen: soft, non-tender, mild distension, normal bowel sounds Extremities: no cyanosis, no peripheral edema Neuro: No focal deficits, motor/sensory intact Lab/Data Reviewed: 
BMP:  
Lab Results Component Value Date/Time  02/07/2019 05:41 AM  
 K 4.3 02/07/2019 05:41 AM  
 CL 94 (L) 02/07/2019 05:41 AM  
 CO2 39 (H) 02/07/2019 05:41 AM  
 AGAP 6 02/07/2019 05:41 AM  
 GLU 95 02/07/2019 05:41 AM  
 BUN 25 (H) 02/07/2019 05:41 AM  
 CREA 0.99 02/07/2019 05:41 AM  
 GFRAA >60 02/07/2019 05:41 AM  
 GFRNA >60 02/07/2019 05:41 AM  
 
CBC: No results found for: WBC, HGB, HGBEXT, HCT, HCTEXT, PLT, PLTEXT, HGBEXT, HCTEXT, PLTEXT Imaging Reviewed: 
No results found. Medications Reviewed: 
Current Facility-Administered Medications Medication Dose Route Frequency  albuterol-ipratropium (DUO-NEB) 2.5 MG-0.5 MG/3 ML  3 mL Nebulization Q4H PRN  
 traMADol (ULTRAM) tablet 50 mg  50 mg Oral Q6H PRN  
 ertapenem (INVANZ) 1 g in 0.9% sodium chloride (MBP/ADV) 50 mL MBP  1 g IntraVENous Q24H  
 bumetanide (BUMEX) tablet 1 mg  1 mg Oral BID  pantoprazole (PROTONIX) tablet 40 mg  40 mg Oral DAILY  guaiFENesin (ROBITUSSIN) 100 mg/5 mL oral liquid 400 mg  400 mg Oral Q4H  
 lidocaine (PF) (XYLOCAINE) 10 mg/mL (1 %) injection 20 mL  20 mL SubCUTAneous Rad Multiple  amLODIPine (NORVASC) tablet 10 mg  10 mg Oral DAILY  gabapentin (NEURONTIN) capsule 400 mg  400 mg Oral TID  lisinopril (PRINIVIL, ZESTRIL) tablet 20 mg  20 mg Oral DAILY  metoprolol tartrate (LOPRESSOR) tablet 50 mg  50 mg Oral BID  simvastatin (ZOCOR) tablet 10 mg  10 mg Oral QHS  acetaminophen (TYLENOL) tablet 650 mg  650 mg Oral Q6H PRN  
 ondansetron (ZOFRAN) injection 4 mg  4 mg IntraVENous Q6H PRN  
 heparin (porcine) injection 5,000 Units  5,000 Units SubCUTAneous Q8H Devaughn Gina, DO Internal Medicine, Hospitalist 
Pager: [phone] [facility]

## 2019-02-07 NOTE — PROGRESS NOTES
Problem: Self Care Deficits Care Plan (Adult) Goal: *Acute Goals and Plan of Care (Insert Text) Occupational Therapy Goals Initiated 1/30/2019 within 7 day(s). 1.  Patient will perform grooming tasks while standing with stand-by assistance for balance. 2.  Patient will perform lower body dressing with minimal assistance utilizing adaptive equipment/strategies, prn. 
3.  Patient will perform functional task in standing for 8 minutes with supervision and < 2 rest breaks to increase activity tolerance for functional transfers & ADLs. 4.  Patient will perform toilet transfers with supervision. 5.  Patient will perform all aspects of toileting with supervision. 6.  Patient will participate in upper extremity therapeutic exercise/activities for 8 minutes to increase BUE strength for functional transfers and ADLs. 7.  Patient will utilize energy conservation techniques during functional activities with minimal verbal cues. Outcome: Progressing Towards Goal 
[facility] 
OCCUPATIONAL THERAPY: Daily Note INPATIENT: Medicaid: Hospital Day: 25 Patient: Flor Shelton (58 y.o. male)    Date: 2/7/2019 Primary Diagnosis: Bacteremia  
,  ,  
Precautions: Falls PLOF: Pt was independent with most ADLs PTA. WC/RW for functional mobility PTA. ASSESSMENT : Pt supine on arrival, motivated and agreeable to work with therapy. Min A for supine-->sit. Fair-/poor sitting balance due to increased back pain. Functional transfer x3 trials with min/mod A and fair-/poor standing balance due to BLE weakness and 10/10 back pain. Max A for gown management and pericare secondary to impaired balance and activity tolerance. Pt unable to tolerate transfer to chair due to activity tolerance and back pain. Pt left supine in bed with needs within reach. Recommend SNF upon d/c.   
 
PLAN : 
Patient continues to benefit from skilled intervention to address the above impairments. Continue treatment per established plan of care. EDUCATION:  
Education:  Patient was educated on the following topics:  
Progression toward goals: 
[x]          Improving appropriately and progressing toward goals 
[]          Improving slowly and progressing toward goals 
[]          Not making progress toward goals and plan of care will be adjusted Barriers to Learning/Limitations: None Compensate with: visual, verbal, tactile, kinesthetic cues/model Discharge Recommendations: Franki Bains Further Equipment Recommendations for Discharge: bedside commode SUBJECTIVE:  
Patient stated: \"I'm having a hard time straightening my legs. \" OBJECTIVE/TREATMENT:  
 
Past Medical History:  
Diagnosis Date  Arthritis  COPD  Hypertension Past Surgical History:  
Procedure Laterality Date  HX REFRACTIVE SURGERY Functional Status Indep (I) Mod I Stand-by Assist  
Contact Guard Min Assist  
Mod Assist  
Max assist  
Total Assist  
Comments Rolling []  []  []  []    [x]    []    []  [] Supine to sit []  []  []  []  [x]  []  []  []  Additional time Sit to supine []  []  []  []  [x]  []  []  [] Sit to stand []  []  []  []  [x]  []  []  []  Additional time Stand to sit []  []  []  []  [x]  []  []  [] Bed to chair transfers []  []  []  []  [x]  [x]  []  []  x2 trials; unable to transfer to chair Balance Good Omelia Beady Poor Unable Comments Sitting static []  [x]  []  [] Sitting dynamic []  [x]  []  []  Fair-  
Standing static []  [x]  []  []  Fair-  
Standing dynamic []  []  [x]  [] Reaction Time []  []  []  [] Therapeutic Activity:  
Functional transfer x3 trials with min/mod A and fair-/poor standing balance due to BLE weakness and 10/10 back pain. ADL Intervention:  
 Max A for gown management and pericare secondary to impaired balance and activity tolerance. Pain:  
Pre treatment: 8.5/10 Post treatment: 10/10 Scale: numeric Activity tolerance:  
fair- 
 
COMMUNICATION/EDUCATION:  
Education:  Patient was educated on the following topics: role of OT and updated POC Treatment/Session Assessment: · After treatment position/precautions:  
o Supine in bed 
o Call light within reach 
o RN notified Recommendations/Intent for next treatment session: \"Next visit will focus on advancements to more challenging activities\". Thank you for this referral. 
Christin Styles MS OTR/L Time Calculation: 28 mins

## 2019-02-07 NOTE — ROUTINE PROCESS
0730-report received, call bell within reach, no distress noted, voiced no complaints at this time. 0840-Assessment completed, call bell within reach, no distress noted. 0930-am due medications given by Klaus Carl RN. 
1230-no change in condition, no distress noted. 1400-resting quietly in bed, no distress noted. 1630-no change in condition, no distress noted, voiced no complaints at this time. 1730-pm due medications given. 1945-Bedside and Verbal shift change report given to Tony Sylvester (oncoming nurse) by Sumit Ortega (offgoing nurse). Report included the following information SBAR, MAR and Recent Results.

## 2019-02-07 NOTE — PROGRESS NOTES
Problem: Falls - Risk of 
Goal: *Absence of Falls Document Kati Schultz Fall Risk and appropriate interventions in the flowsheet. Outcome: Progressing Towards Goal 
Fall Risk Interventions: 
Mobility Interventions: Patient to call before getting OOB Mentation Interventions: Adequate sleep, hydration, pain control, Door open when patient unattended, More frequent rounding, Reorient patient, Room close to nurse's station, Update white board Medication Interventions: Evaluate medications/consider consulting pharmacy, Patient to call before getting OOB, Teach patient to arise slowly Elimination Interventions: Call light in reach, Patient to call for help with toileting needs, Urinal in reach History of Falls Interventions: Evaluate medications/consider consulting pharmacy, Investigate reason for fall, Room close to nurse's station Problem: Pressure Injury - Risk of 
Goal: *Prevention of pressure injury Document Raul Scale and appropriate interventions in the flowsheet. Outcome: Progressing Towards Goal 
Pressure Injury Interventions: 
Sensory Interventions: Assess changes in LOC, Keep linens dry and wrinkle-free, Pressure redistribution bed/mattress (bed type) Moisture Interventions: Absorbent underpads, Maintain skin hydration (lotion/cream) Activity Interventions: Pressure redistribution bed/mattress(bed type) Mobility Interventions: HOB 30 degrees or less, Pressure redistribution bed/mattress (bed type) Nutrition Interventions: Document food/fluid/supplement intake Friction and Shear Interventions: HOB 30 degrees or less

## 2019-02-07 NOTE — PROGRESS NOTES
1922  Received bedside verbal shift report from [facility]. Pt lying in bed resting comfortably. 2lnc in place. NSrR on telemetry box # 2. Piv # 22 to left a/c intact  Call bell within reach, side rails up x 3, bed low and locked. No complaints offered, pt instructed to call for assistance. 7480  Reassessment completed. 8228  Reassessment completed. 0715  Bedside and Verbal shift change report given to [facility] (oncoming nurse) by Merline Pizza (offgoing nurse). Report included the following information SBAR, Kardex, Intake/Output, MAR, Recent Results and Cardiac Rhythm NSR.

## 2019-02-07 NOTE — PROGRESS NOTES
Patient is unable to communicate at this time. as he is resting peacefully at this time.  offered prayer and left Spiritual Care brochure. Chaplains will continue to follow and will provide pastoral care on an as needed/requested basis. Rupert Simon Board Certified [employer] [facility]  
[phone]

## 2019-02-08 LAB
BASOPHILS # BLD: 0 K/UL (ref 0–0.1)
BASOPHILS NFR BLD: 0 % (ref 0–2)
CA-I SERPL-SCNC: 1.15 MMOL/L (ref 1.12–1.32)
CRP SERPL-MCNC: 1.5 MG/DL (ref 0–0.3)
DIFFERENTIAL METHOD BLD: ABNORMAL
EOSINOPHIL # BLD: 0.1 K/UL (ref 0–0.4)
EOSINOPHIL NFR BLD: 1 % (ref 0–5)
ERYTHROCYTE [DISTWIDTH] IN BLOOD BY AUTOMATED COUNT: 15.4 % (ref 11.6–14.5)
ERYTHROCYTE [SEDIMENTATION RATE] IN BLOOD: 49 MM/HR (ref 0–20)
HCT VFR BLD AUTO: 37.9 % (ref 36–48)
HGB BLD-MCNC: 11.5 G/DL (ref 13–16)
LYMPHOCYTES # BLD: 2.1 K/UL (ref 0.9–3.6)
LYMPHOCYTES NFR BLD: 39 % (ref 21–52)
MAGNESIUM SERPL-MCNC: 2.1 MG/DL (ref 1.6–2.6)
MCH RBC QN AUTO: 28.4 PG (ref 24–34)
MCHC RBC AUTO-ENTMCNC: 30.3 G/DL (ref 31–37)
MCV RBC AUTO: 93.6 FL (ref 74–97)
MONOCYTES # BLD: 0.4 K/UL (ref 0.05–1.2)
MONOCYTES NFR BLD: 7 % (ref 3–10)
NEUTS SEG # BLD: 2.9 K/UL (ref 1.8–8)
NEUTS SEG NFR BLD: 53 % (ref 40–73)
PHOSPHATE SERPL-MCNC: 5.3 MG/DL (ref 2.5–4.9)
PLATELET # BLD AUTO: 138 K/UL (ref 135–420)
PMV BLD AUTO: 11.3 FL (ref 9.2–11.8)
RBC # BLD AUTO: 4.05 M/UL (ref 4.7–5.5)
WBC # BLD AUTO: 5.4 K/UL (ref 4.6–13.2)

## 2019-02-08 PROCEDURE — 97110 THERAPEUTIC EXERCISES: CPT

## 2019-02-08 PROCEDURE — 77010033678 HC OXYGEN DAILY

## 2019-02-08 PROCEDURE — 74011250637 HC RX REV CODE- 250/637: Performed by: HOSPITALIST

## 2019-02-08 PROCEDURE — 74011250637 HC RX REV CODE- 250/637: Performed by: INTERNAL MEDICINE

## 2019-02-08 PROCEDURE — 74011250636 HC RX REV CODE- 250/636: Performed by: INTERNAL MEDICINE

## 2019-02-08 PROCEDURE — 97530 THERAPEUTIC ACTIVITIES: CPT

## 2019-02-08 PROCEDURE — 36415 COLL VENOUS BLD VENIPUNCTURE: CPT

## 2019-02-08 PROCEDURE — 65660000000 HC RM CCU STEPDOWN

## 2019-02-08 PROCEDURE — 97162 PT EVAL MOD COMPLEX 30 MIN: CPT

## 2019-02-08 PROCEDURE — 86140 C-REACTIVE PROTEIN: CPT

## 2019-02-08 PROCEDURE — 83735 ASSAY OF MAGNESIUM: CPT

## 2019-02-08 PROCEDURE — 85025 COMPLETE CBC W/AUTO DIFF WBC: CPT

## 2019-02-08 PROCEDURE — 84100 ASSAY OF PHOSPHORUS: CPT

## 2019-02-08 PROCEDURE — 94660 CPAP INITIATION&MGMT: CPT

## 2019-02-08 PROCEDURE — 74011000258 HC RX REV CODE- 258: Performed by: INTERNAL MEDICINE

## 2019-02-08 PROCEDURE — 82330 ASSAY OF CALCIUM: CPT

## 2019-02-08 PROCEDURE — 85652 RBC SED RATE AUTOMATED: CPT

## 2019-02-08 PROCEDURE — 97535 SELF CARE MNGMENT TRAINING: CPT

## 2019-02-08 PROCEDURE — 74011250636 HC RX REV CODE- 250/636: Performed by: HOSPITALIST

## 2019-02-08 RX ADMIN — LISINOPRIL 20 MG: 20 TABLET ORAL at 09:08

## 2019-02-08 RX ADMIN — BUMETANIDE 1 MG: 1 TABLET ORAL at 18:15

## 2019-02-08 RX ADMIN — METOPROLOL TARTRATE 50 MG: 50 TABLET ORAL at 09:08

## 2019-02-08 RX ADMIN — GUAIFENESIN 400 MG: 100 SOLUTION ORAL at 09:08

## 2019-02-08 RX ADMIN — GUAIFENESIN 400 MG: 100 SOLUTION ORAL at 05:49

## 2019-02-08 RX ADMIN — BUMETANIDE 1 MG: 1 TABLET ORAL at 09:08

## 2019-02-08 RX ADMIN — GUAIFENESIN 400 MG: 100 SOLUTION ORAL at 12:17

## 2019-02-08 RX ADMIN — HEPARIN SODIUM 5000 UNITS: 5000 INJECTION INTRAVENOUS; SUBCUTANEOUS at 18:16

## 2019-02-08 RX ADMIN — GABAPENTIN 400 MG: 100 CAPSULE ORAL at 18:15

## 2019-02-08 RX ADMIN — ERTAPENEM SODIUM 1 G: 1 INJECTION, POWDER, LYOPHILIZED, FOR SOLUTION INTRAMUSCULAR; INTRAVENOUS at 20:41

## 2019-02-08 RX ADMIN — GABAPENTIN 400 MG: 100 CAPSULE ORAL at 22:57

## 2019-02-08 RX ADMIN — AMLODIPINE BESYLATE 10 MG: 10 TABLET ORAL at 09:08

## 2019-02-08 RX ADMIN — TRAMADOL HYDROCHLORIDE 50 MG: 50 TABLET, FILM COATED ORAL at 12:17

## 2019-02-08 RX ADMIN — HEPARIN SODIUM 5000 UNITS: 5000 INJECTION INTRAVENOUS; SUBCUTANEOUS at 09:08

## 2019-02-08 RX ADMIN — SIMVASTATIN 10 MG: 10 TABLET, FILM COATED ORAL at 22:57

## 2019-02-08 RX ADMIN — GUAIFENESIN 400 MG: 100 SOLUTION ORAL at 18:16

## 2019-02-08 RX ADMIN — HEPARIN SODIUM 5000 UNITS: 5000 INJECTION INTRAVENOUS; SUBCUTANEOUS at 02:30

## 2019-02-08 RX ADMIN — GUAIFENESIN 400 MG: 100 SOLUTION ORAL at 20:41

## 2019-02-08 RX ADMIN — GABAPENTIN 400 MG: 100 CAPSULE ORAL at 09:08

## 2019-02-08 RX ADMIN — PANTOPRAZOLE SODIUM 40 MG: 40 TABLET, DELAYED RELEASE ORAL at 09:08

## 2019-02-08 RX ADMIN — GUAIFENESIN 400 MG: 100 SOLUTION ORAL at 00:29

## 2019-02-08 NOTE — PROGRESS NOTES
INFECTIOUS DISEASE FOLLOW UP NOTE : 
 
Will plan to check back on Monday 2/11/2019. I will be available on call for ID this weekend and can be reached through pager [phone] if needed. Admit Date: 1/21/2019 Overview: 77year-old AA male w/ h/o polysubstance abuse including cocaine, heroin, T 10-11 destructive changes in November 2018 w/ nl ESR, CRP, negative bone/gallium scans - not felt to be infectious then but had Serratia UTI rx'd Cefepime/Ceftriaxone 5 days. Returned 1/21 with back pain, BSI Serratia 1/21 and CT 1/21 with similar destructive changes T9-10 levels c/w discitis/OM and intraspinal and paraspinal phlegmon s/p needle aspiration 1/23 cx + Serratia. Had recurrent respiratory failure requiring RRT 1/23, 1/24. Has residual bilateral pleural effusions on CXR 2/4. Refused surgery offered by Dr. Roldan Carter in November - willing to re-consider. Current abx Prior abx Ertapenem 2/5-2   abx 1/23 - 15 Cefepime/vanco  11/14   2, Ceftriaxone 11/16 - 3; Zosyn 1/21-2  ceftaz 1/23-13, cipro 1/23-13  
  
Assessment -> Rec:  
  
Destructive T9-10 changes discitis/OM 
- mid-back pain x 5 months, fell, incontinent of urine - h/o IVDU - last use in July 2018 
- CTAP 11/2018: severe destructive changes T 10-11, paravertebral sot tissue infiltration, extens to ant epidural space w/ cord compression 
- unable to have MRI due to claustrophobia - Bone/ gallium scans 11/27 neg for discitis/OM, ESR 20, CRP 0.7 
- not given long term IV abx  
- refused stabilization procedure per Dr. Roldan Carter 
- returned 1/21 w/ worsened back pain, blcx + Serratia  
- CT 1/21: destructive changes T9-10 levels c/w discitis/OM and intraspinal and paraspinal phlegmon. Overall appearance unchanged and pain not different from previous - 1/22: esr 38, crp 7.4->1/31 57/2.1 
- s/p aspiration of fluid/phlegmon T10-11 discspace 1/23: (+) Serratia in anaerobic culture - potential for emergent AmpC- beta-lactamase- mediated resistance to third-generation cephalosporins not be evident during initial susc  testing so Carbapenem is DOC 
- CRP down to 1.5 today from 2.1 1/31. ESR pending -> Ertapenem 1 gm q 24 hours 6-8 weeks  
-> he is willing to discuss and reconsider stabilization surgery previously offered by Dr. Ileana Lo. Defer to Dr. Lord Hartman 
-> avoid PICC w/ h/o IVDU  
BSI 2 of 2 Serratia marcescens 1/21 
- source ~ Discitis Phlegmon seen on CT 
- IR aspiration 1/23 chucky culture grew Serratia  
- denies any urinary complaint but had Serratia in urine in past. Currently UA is negative - Ucx was not sent but CT with normal kidneys and nothing sig on CT and pt asymptomatic except some incontinet 
- repeat Blcx 1/22 1/2 positive  
- blcx 1/28 NG x 2 
- BSI adequately treated at this time  -> Continue Ertapenem for osteomyelitis as above Acute hypoxic resp distress- RRT called 1/23 and again 1/24 sec to hypercapnic resp failure - off BIPAP now on NC Bilateral pleural effusions- chronic with  atelectasis - monitor Hepatomegaly, splenic hilar & perisplenic varices- concern for cirrhosis and portal HTN 
- seen on CTAP again    
H/o IVDU 
- HIV ab NR 11/19 ,Hep BsAg neg (last reported ivdu August 2018, has hep C and thinks hep b immune) 
- denies current use -> avoid PICC 
   
COPD    
HTN    
DJD    
Allergy Shelfish    
  
MICROBIOLOGY:  
11/14   urcx >100,000 Serratia marcescens             blcx NG x 2 
11/15   CrAG neg, fungitell 375 (reported 11/17) 
11/29   blcx for fungus - ngtd 
            fungitell 113 
  
1/21     Blcx x2 Serratia marcescens R cefazolin UA neg 
1/22 Blcx 1 of 2 Serratia 1/23 Aspiration cx g/s neg, Cx NG 
 CHUCKY rare Serratia R Cefazolin Fungal NOS 
1/28  bctx x 2 ng 
  
LINES AND CATHETERS:  
piv 
  
MEDICATIONS:  
 
Current Facility-Administered Medications Medication Dose Route Frequency  albuterol-ipratropium (DUO-NEB) 2.5 MG-0.5 MG/3 ML  3 mL Nebulization Q4H PRN  
 traMADol (ULTRAM) tablet 50 mg  50 mg Oral Q6H PRN  
 ertapenem (INVANZ) 1 g in 0.9% sodium chloride (MBP/ADV) 50 mL MBP  1 g IntraVENous Q24H  
 bumetanide (BUMEX) tablet 1 mg  1 mg Oral BID  pantoprazole (PROTONIX) tablet 40 mg  40 mg Oral DAILY  guaiFENesin (ROBITUSSIN) 100 mg/5 mL oral liquid 400 mg  400 mg Oral Q4H  
 lidocaine (PF) (XYLOCAINE) 10 mg/mL (1 %) injection 20 mL  20 mL SubCUTAneous Rad Multiple  amLODIPine (NORVASC) tablet 10 mg  10 mg Oral DAILY  gabapentin (NEURONTIN) capsule 400 mg  400 mg Oral TID  lisinopril (PRINIVIL, ZESTRIL) tablet 20 mg  20 mg Oral DAILY  metoprolol tartrate (LOPRESSOR) tablet 50 mg  50 mg Oral BID  simvastatin (ZOCOR) tablet 10 mg  10 mg Oral QHS  acetaminophen (TYLENOL) tablet 650 mg  650 mg Oral Q6H PRN  
 ondansetron (ZOFRAN) injection 4 mg  4 mg IntraVENous Q6H PRN  
 heparin (porcine) injection 5,000 Units  5,000 Units SubCUTAneous Q8H SUBJECTIVE :  
 
Interval notes reviewed. Back pain remains unchanged. Has difficulty getting out of bed with PT/OT assistance. Told me today he is still interested in re-discussing surgical options for the back. OBJECTIVE Visit Vitals /58 Pulse 83 Temp 97.8 °F (36.6 °C) Resp 18 Ht 6' (1.829 m) Wt 100.7 kg (221 lb 14.4 oz) SpO2 96% BMI 30.10 kg/m² Temp (24hrs), Av.8 °F (36.6 °C), Min:96.8 °F (36 °C), Max:98.6 °F (37 °C) Gen:on NC O2, awake and alert HEENT: muddy sclerae, pupils equal and reactive Chest: Symmetric expansion, no crackles or wheezing CVS:S1S2 RR, No JVD or edema Abd:Soft, NT, ND, BS+ Skin:No jaundice, cyanosis, clubbing. No decubitus Muscsk: Normal muscle tone/strength CNS: AA and follows command Labs: Results:  
Chemistry Recent Labs 19 
[id number] 19 
8017 GLU 95 102*  141  
K 4.3 3.6 CL 94* 95* CO2 39* 42*  
BUN 25* 21*  
 CREA 0.99 0.79 CA 8.6 8.6 AGAP 6 4 BUCR 25* 27* CBC w/Diff Recent Labs 02/08/19 
2290 WBC 5.4  
RBC 4.05* HGB 11.5* HCT 37.9  GRANS 53 LYMPH 39 EOS 1  
  
 
RADIOLOGY :   
cxr 2/4 IMPRESSION: 
  
Stable appearing densities at both lung bases compatible with pleural effusions 
with adjacent atelectasis/infiltrate 2/4 CT chest IMPRESSION: 
  
1. Moderate bilateral pleural effusions, left > right, amenable to image guided 
thoracentesis if clinically appropriate.   
  
2. Progressive fragmentation and bone loss of T9 from known 
discitis/osteomyelitis at T9-10. 
  
3. Stable discitis/osteomyelitis changes at T3-4. 
  
4. Other chronic findings detailed above and without significant interval 
change. 
  
 
 
Ankur Rodríguez MD 
February 8, 2019 [facility] [phone]

## 2019-02-08 NOTE — PROGRESS NOTES
Problem: Mobility Impaired (Adult and Pediatric) Goal: *Acute Goals and Plan of Care (Insert Text) Physical Therapy Goals Initiated 2/8/2019 and to be accomplished within 7 day(s) 1. Patient will move from supine to sit and sit to supine in bed with supervision/set-up. 2.  Patient will transfer from bed to chair and chair to bed with supervision/set-up using the least restrictive device. 3.  Patient will perform sit to stand with supervision/set-up. 4.  Patient will ambulate with supervision/set-up for 50 feet with the least restrictive device. Outcome: Progressing Towards Goal 
PHYSICAL THERAPY: Initial Assessment INPATIENT: Medicaid: Hospital Day: 23 Patient: Janice Estes (74 y.o. male)    Date: 2/8/2019 Primary Diagnosis: Bacteremia Precautions: Fall PLOF: Mod I with ww ASSESSMENT : 
Patient requires between moderate assistance  and minimal assistance/contact guard assist for bed mobility, transfers and ambulation. Seated in recliner upon entry. Oriented to person, place, and month. Educated on role of PT and benefits of mobility. Mod A for sit to stand. Cues for set up; BLE foot placement, BUE assistance, and safe use of ww. Poor standing balance with flexed hips and knees; posterior pelvic tilt and exaggerated lumbar lordosis. Returned to seated with standing less than 1 minute. Seated rest 3 minutes. Mod A for sit to stand second trial; improved balance; posture remains unchanged. Now required only min A for balance at ww; 1 minute. Returned to seated in recliner. BLE exercise completed; educated on completing while in chair. All needs in reach at end of session. RN Mary present. Patient presents with deficits in: 
Bed Mobility, Transfers, Gait, Strength and Balance Patient will benefit from skilled intervention to address the above impairments. Patients rehabilitation potential is considered to be Fair Factors which may influence rehabilitation potential include: []         None noted 
[]         Mental ability/status [x]         Medical condition 
[]         Home/family situation and support systems 
[]         Safety awareness [x]         Pain tolerance/management 
[]         Other: PLAN : 
Recommendations and Planned Interventions: 
[x]           Bed Mobility Training             [x]    Neuromuscular Re-Education 
[x]           Transfer Training                   []    Orthotic/Prosthetic Training 
[x]           Gait Training                          []    Modalities [x]           Therapeutic Exercises          []    Edema Management/Control 
[x]           Therapeutic Activities            [x]    Patient and Family Training/Education 
[]           Other (comment): EDUCATION:  
Education:  Patient was educated on the following topics: Bed mobility, transfers, ADLs, balance, amb, safety, exercise, role of PT, plan of care, cognition, skin integrity, vitals Barriers to Learning/Limitations: yes;  cognitive Compensate with: visual, verbal, tactile, kinesthetic cues/model Frequency/Duration: Patient will be followed by physical therapy 3-5 times a week to address goals. Discharge Recommendations: Franki Bains Further Equipment Recommendations for Discharge: rolling walker SUBJECTIVE:  
Patient stated Michael Cartagena say I'm refusing but I'm not.  OBJECTIVE DATA SUMMARY:  
 
Past Medical History:  
Diagnosis Date  Arthritis  COPD  Hypertension Past Surgical History:  
Procedure Laterality Date  HX REFRACTIVE SURGERY Eval Complexity: History: MEDIUM  Complexity : 1-2 comorbidities / personal factors will impact the outcome/ POC Exam:MEDIUM Complexity : 3 Standardized tests and measures addressing body structure, function, activity limitation and / or participation in recreation  Presentation: MEDIUM Complexity : Evolving with changing characteristics  Clinical Decision Making:Medium Complexity clinical judgement; ROM, MMT, functional mobility Overall Complexity:MEDIUM Prior Level of Function/Home Situation:  
Home Situation Home Environment: Private residence One/Two Story Residence: One story Living Alone: No 
Support Systems: Family member(s) Patient Expects to be Discharged to[de-identified] Private residence Current DME Used/Available at Home: NoneCritical Behavior: 
Neurologic State: Alert Orientation Level: Oriented X4 Cognition: Follows commands Psychosocial 
Patient Behaviors: Cooperative Manual Muscle Testing (LE) 
       R     L Hip Flexion:   3+/5  3+/5 Knee EXT:   3+/5  3+/5 Knee FLEX:   3+/5  3+/5 Ankle DF:   3+/5  3+/5 
_________________________________________________ Tone : BLE normalSensation: Intact to light touch BLE Range Of Motion: BLE AROM [facility] Functional Mobility: 
 
 
Functional Status Indep (I) Mod I Super-vision Min A Mod A Max A Total A Assist x2 Verbal cues Additional time Not tested Comments Rolling []  []  [] []    []    []  []  [] [] [] [x] Supine to sit []  []  [] []  []  []  []  [] [] [] [x] Sit to supine []  []  [] []  []  []  []  [] [] [] [x] Sit to stand []  []  [] []  [x]  []  []  [] [] [] []   
Stand to sit []  []  [] []  [x]  []  []  [] [] [] [] Bed to chair transfers []  []  [] []  []  []  []  [] [] [] [x] Balance Good Levell Alvarado Poor Unable Not tested Comments Sitting static [x]  []  []  []  [] Sitting dynamic [x]  []  []  []  []   
Standing static []  []  [x]  []  []   
Standing dynamic []  []  []  []  [x] Therapeutic Exercises:  
Sit to stand x2 BLE AROM; knee extension, hip flexion, ankle pumps Pain: 
Pre treatment pain level: 10 Post treatment pain level: 10 Pain Scale 1: Numeric (0 - 10) Pain Intensity 1: 10 
Pain Location 1: Back Pain Orientation 1: Posterior Pain Description 1: Aching Activity Tolerance:  
Fair Please refer to the flowsheet for vital signs taken during this treatment. After treatment:  
[x]         Patient left in no apparent distress sitting up in chair 
[]         Patient left in no apparent distress in bed 
[x]         Call bell left within reach [x]         Nursing notified 
[]         Caregiver present 
[]         Bed alarm activated COMMUNICATION/EDUCATION:  
[x]         Fall prevention education was provided and the patient/caregiver indicated understanding. [x]         Patient/family have participated as able in goal setting and plan of care. [x]         Patient/family agree to work toward stated goals and plan of care. []         Patient understands intent and goals of therapy, but is neutral about his/her participation. []         Patient is unable to participate in goal setting and plan of care. Thank you for this referral. 
Danitza Torrez, PT Time Calculation: 13 mins

## 2019-02-08 NOTE — PROGRESS NOTES
Received bedside report from Pili,  [facility] to Tita Schofield, [address] [place] [address]. Pt is AxO 4. IV flushed and patent. Pt denies pain at this time. Report included the follow information SBAR, Kardex, Procedure Summary, Intake/Output, MAR, Recent Lab Results, and Cardiac Rhythm @ sinus rhythm, tele box #2. Pt educated on call bell when in need of help/assistance. Pt verbalizes understanding. Will resume care and monitor pt.  
 
2045- due meds administered 2215- due meds admisnitered. 0000- Reassessment complete. No changes noted. 0100- pt sleeping. BiPAP on.  
 
0400- Reassessment complete. no changes noted. 8591- due med given. Pt cleaned up. Linens provided. Will continue to monitor. Bedside shift change report given to Pili RN (oncoming nurse) by Tita Schofield RN (offgoing nurse). Report included the following information SBAR, Kardex, Intake/Output, MAR, Accordion, Recent Results and Cardiac Rhythm sinus rhythm.

## 2019-02-08 NOTE — PROGRESS NOTES
Problem: Falls - Risk of 
Goal: *Absence of Falls Document Carlos Ivy Fall Risk and appropriate interventions in the flowsheet. Outcome: Progressing Towards Goal 
Fall Risk Interventions: 
Mobility Interventions: Bed/chair exit alarm, Communicate number of staff needed for ambulation/transfer Mentation Interventions: Adequate sleep, hydration, pain control, Door open when patient unattended Medication Interventions: Patient to call before getting OOB, Teach patient to arise slowly, Bed/chair exit alarm Elimination Interventions: Bed/chair exit alarm, Call light in reach History of Falls Interventions: Bed/chair exit alarm Problem: Pressure Injury - Risk of 
Goal: *Prevention of pressure injury Document Raul Scale and appropriate interventions in the flowsheet. Outcome: Progressing Towards Goal 
Pressure Injury Interventions: 
Sensory Interventions: Assess changes in LOC, Keep linens dry and wrinkle-free Moisture Interventions: Absorbent underpads Activity Interventions: Increase time out of bed, Pressure redistribution bed/mattress(bed type) Mobility Interventions: HOB 30 degrees or less, Pressure redistribution bed/mattress (bed type) Nutrition Interventions: Document food/fluid/supplement intake, Offer support with meals,snacks and hydration Friction and Shear Interventions: HOB 30 degrees or less

## 2019-02-08 NOTE — ROUTINE PROCESS
0730-report received call bell within reach, no distress noted, voiced no complaints at this time. 0909-am due medications given. 1140-up to chair by PT, resting quietly in bed. 1230-no change in condition, no distress noted, voiced no complaints at this time. 1440-assisted patient back up to chair. Tolerated well. 1630-no change in condition, no distress noted, voiced no complaints at this time. 1815-pm due medications given. 1720-Bedside and Verbal shift change report given to Nael Paez (oncoming nurse) by Tiff Llanos (offgoing nurse). Report included the following information SBAR, MAR and Recent Results.

## 2019-02-08 NOTE — PROGRESS NOTES
Internal Medicine Progress Note Patient's Name: Emily Zambrano Admit Date: 1/21/2019 Length of Stay: 18 
 
 
Assessment/Plan Active Hospital Problems Diagnosis Date Noted  Metabolic encephalopathy 90/22/0706 Associated with sepsis, present on admission.  PNA (pneumonia) 01/26/2019  Bacteremia 01/21/2019  Acute on chronic respiratory failure with hypoxia ([address] 12/23/2018  Hypertension 11/14/2018  Hepatitis C 11/14/2018  Back pain 11/14/2018  Discitis of thoracic region 11/14/2018  Bilateral pleural effusion 11/14/2018  
 
- Cont IVAB w/ ertapenem x 6-8wks - If effusions need tapped, pulm rec left first 
- Cont fluid restriction 
- Cont PO bumex 
- Cont BiPAP qhs 
- Pain control PRN, avoid opiates (tramadol helping though) 
- PT/OT 
- Awaiting long term care, no accepting beds - Discussed potential surgery w/ patient, he is not interested at this time and not ready to commit if it was even an option. He would like to proceed with PT and see how things go - Cont acceptable home medications for chronic conditions  
- DVT protocol I have personally reviewed all pertinent labs and films that have officially resulted over the last 24 hours. I have personally checked for all pending labs that are awaiting final results. Subjective Pt s/e @ bedside No major events overnight No complaints today At baseline Objective Visit Vitals /58 Pulse 83 Temp 97.8 °F (36.6 °C) Resp 18 Ht 6' (1.829 m) Wt 100.7 kg (221 lb 14.4 oz) SpO2 96% BMI 30.10 kg/m² Physical Exam: 
General Appearance: NAD, conversant Lungs: Decreased BS B/L with normal respiratory effort CV: RRR, no m/r/g Abdomen: soft, non-tender, mild distension, normal bowel sounds Lab/Data Reviewed: 
BMP:  
No results found for: NA, K, CL, CO2, AGAP, GLU, BUN, CREA, GFRAA, GFRNA 
CBC:  
Lab Results Component Value Date/Time  WBC 5.4 02/08/2019 06:40 AM  
 HGB 11.5 (L) 02/08/2019 06:40 AM  
 HCT 37.9 02/08/2019 06:40 AM  
  02/08/2019 06:40 AM  
 
 
Imaging Reviewed: 
No results found. Medications Reviewed: 
Current Facility-Administered Medications Medication Dose Route Frequency  albuterol-ipratropium (DUO-NEB) 2.5 MG-0.5 MG/3 ML  3 mL Nebulization Q4H PRN  
 traMADol (ULTRAM) tablet 50 mg  50 mg Oral Q6H PRN  
 ertapenem (INVANZ) 1 g in 0.9% sodium chloride (MBP/ADV) 50 mL MBP  1 g IntraVENous Q24H  
 bumetanide (BUMEX) tablet 1 mg  1 mg Oral BID  pantoprazole (PROTONIX) tablet 40 mg  40 mg Oral DAILY  guaiFENesin (ROBITUSSIN) 100 mg/5 mL oral liquid 400 mg  400 mg Oral Q4H  
 lidocaine (PF) (XYLOCAINE) 10 mg/mL (1 %) injection 20 mL  20 mL SubCUTAneous Rad Multiple  amLODIPine (NORVASC) tablet 10 mg  10 mg Oral DAILY  gabapentin (NEURONTIN) capsule 400 mg  400 mg Oral TID  lisinopril (PRINIVIL, ZESTRIL) tablet 20 mg  20 mg Oral DAILY  metoprolol tartrate (LOPRESSOR) tablet 50 mg  50 mg Oral BID  simvastatin (ZOCOR) tablet 10 mg  10 mg Oral QHS  acetaminophen (TYLENOL) tablet 650 mg  650 mg Oral Q6H PRN  
 ondansetron (ZOFRAN) injection 4 mg  4 mg IntraVENous Q6H PRN  
 heparin (porcine) injection 5,000 Units  5,000 Units SubCUTAneous Q8H Severiano Dragon, DO Internal Medicine, Hospitalist 
Pager: [phone] [facility]

## 2019-02-08 NOTE — PROGRESS NOTES
Problem: Self Care Deficits Care Plan (Adult) Goal: *Acute Goals and Plan of Care (Insert Text) Occupational Therapy Goals Initiated 1/30/2019 within 7 day(s). 1.  Patient will perform grooming tasks while standing with stand-by assistance for balance. 2.  Patient will perform lower body dressing with minimal assistance utilizing adaptive equipment/strategies, prn. 
3.  Patient will perform functional task in standing for 8 minutes with supervision and < 2 rest breaks to increase activity tolerance for functional transfers & ADLs. 4.  Patient will perform toilet transfers with supervision. 5.  Patient will perform all aspects of toileting with supervision. 6.  Patient will participate in upper extremity therapeutic exercise/activities for 8 minutes to increase BUE strength for functional transfers and ADLs. 7.  Patient will utilize energy conservation techniques during functional activities with minimal verbal cues. Outcome: Progressing Towards Goal 
Occupational Therapy TREATMENT Patient: Donna Soliz (67 y.o. male) Date: 2/8/2019 Diagnosis: Bacteremia Bacteremia Precautions: Fall Chart, occupational therapy assessment, plan of care, and goals were reviewed. PLOF: Independent ASSESSMENT: 
Pt motivated for OOB. Pt requires increase time and use of SR w/bed mobility to maneuver to EOB. Pt requires vc's for use of adaptive equipment w/LB dressing tasks, donning socks and vc's for posture, ie BUE shoulder retraction. BLE weakness requires Min Assist w/functional transfer to chair w/RW. Pt performs UB and grooming ADLs at chair level w/increase time and set-up. Pt left inchair and reinforced importance of calling for assistance. EDUCATION Pt educated on importance of hand placement w/functional transfers. Progression toward goals: 
[]          Improving appropriately and progressing toward goals [x]          Improving slowly and progressing toward goals []          Not making progress toward goals and plan of care will be adjusted PLAN: 
Patient continues to benefit from skilled intervention to address the above impairments. Continue treatment per established plan of care. Discharge Recommendations:  Skilled Nursing Facility/LTC Further Equipment Recommendations for Discharge:  N/A, pt has DME SUBJECTIVE:  
Patient stated That feels so much better.  reference OOB and ADL OBJECTIVE DATA SUMMARY: 
  
Cognitive/Behavioral Status: 
Neurologic State: Alert Orientation Level: Oriented X4 Cognition: Follows commands Functional Mobility and Transfers for ADLs: 
 Bed Mobility: 
Supine to Sit: Additional time;Stand-by assistance Transfers: 
Bed to Chair: Minimum assistance(w/RW) Sit to stand:CGA w/RW from elevated surface Balance: 
Sitting: Intact Standing: Impaired; With support Standing - Static: Fair Standing - Dynamic : Fair ADL Intervention: 
Grooming Washing Face: Supervision/set-up Washing Hands: Supervision/set-up Brushing Teeth: Supervision/set-up Upper Body Bathing Bathing Assistance: Stand-by assistance Position Performed: Seated in chair Upper Body Dressing Assistance Hospital Gown: Stand-by assistance Lower Body Dressing Assistance Socks: Stand-by assistance Leg Crossed Method Used: No 
Position Performed: Seated in chair Cues: Erwin Ocampo Adaptive Equipment Used: Sock aid Toileting Toileting Assistance: Supervision/set up(urinal at chair level) Clothing Management: Modified independent Pain: 
Pre Treatment:10 Post Treatment:8 Pain Scale 1: Numeric (0 - 10) Pain Intensity 1: 10 
Pain Location 1: Back Pain Orientation 1: Posterior Pain Description 1: Aching Pain Intervention(s) 1: Medication (see MAR) Activity Tolerance:   
Fair Please refer to the flowsheet for vital signs taken during this treatment. After treatment:  
[x]  Patient left in no apparent distress sitting up in chair []  Patient left in no apparent distress in bed 
[x]  Call bell left within reach [x]  Nursing notified 
[]  Caregiver present 
[]  Bed alarm activated Jannette Negro Time Calculation: 45 mins

## 2019-02-08 NOTE — PROGRESS NOTES
Received email from utilization review asking that I speak with Jakob Suero for this pt. I have placed a call to Ms. Tomasa Hood at this time and left a voice mail with my ext today

## 2019-02-09 LAB
CA-I SERPL-SCNC: 1.13 MMOL/L (ref 1.12–1.32)
MAGNESIUM SERPL-MCNC: 1.9 MG/DL (ref 1.6–2.6)
PHOSPHATE SERPL-MCNC: 3.6 MG/DL (ref 2.5–4.9)

## 2019-02-09 PROCEDURE — 94660 CPAP INITIATION&MGMT: CPT

## 2019-02-09 PROCEDURE — 77010033678 HC OXYGEN DAILY

## 2019-02-09 PROCEDURE — 97535 SELF CARE MNGMENT TRAINING: CPT

## 2019-02-09 PROCEDURE — 74011250637 HC RX REV CODE- 250/637: Performed by: HOSPITALIST

## 2019-02-09 PROCEDURE — 74011250636 HC RX REV CODE- 250/636: Performed by: INTERNAL MEDICINE

## 2019-02-09 PROCEDURE — 94760 N-INVAS EAR/PLS OXIMETRY 1: CPT

## 2019-02-09 PROCEDURE — 84100 ASSAY OF PHOSPHORUS: CPT

## 2019-02-09 PROCEDURE — 74011000258 HC RX REV CODE- 258: Performed by: INTERNAL MEDICINE

## 2019-02-09 PROCEDURE — 74011250637 HC RX REV CODE- 250/637: Performed by: INTERNAL MEDICINE

## 2019-02-09 PROCEDURE — 74011250636 HC RX REV CODE- 250/636: Performed by: HOSPITALIST

## 2019-02-09 PROCEDURE — 82330 ASSAY OF CALCIUM: CPT

## 2019-02-09 PROCEDURE — 97530 THERAPEUTIC ACTIVITIES: CPT

## 2019-02-09 PROCEDURE — 36415 COLL VENOUS BLD VENIPUNCTURE: CPT

## 2019-02-09 PROCEDURE — 65660000000 HC RM CCU STEPDOWN

## 2019-02-09 PROCEDURE — 83735 ASSAY OF MAGNESIUM: CPT

## 2019-02-09 RX ORDER — LISINOPRIL 10 MG/1
10 TABLET ORAL DAILY
Status: DISCONTINUED | OUTPATIENT
Start: 2019-02-10 | End: 2019-02-13

## 2019-02-09 RX ORDER — METOPROLOL TARTRATE 25 MG/1
25 TABLET, FILM COATED ORAL 2 TIMES DAILY
Status: DISCONTINUED | OUTPATIENT
Start: 2019-02-09 | End: 2019-02-10

## 2019-02-09 RX ADMIN — GABAPENTIN 400 MG: 100 CAPSULE ORAL at 09:35

## 2019-02-09 RX ADMIN — BUMETANIDE 1 MG: 1 TABLET ORAL at 18:22

## 2019-02-09 RX ADMIN — ERTAPENEM SODIUM 1 G: 1 INJECTION, POWDER, LYOPHILIZED, FOR SOLUTION INTRAMUSCULAR; INTRAVENOUS at 18:23

## 2019-02-09 RX ADMIN — HEPARIN SODIUM 5000 UNITS: 5000 INJECTION INTRAVENOUS; SUBCUTANEOUS at 18:21

## 2019-02-09 RX ADMIN — GABAPENTIN 400 MG: 100 CAPSULE ORAL at 22:01

## 2019-02-09 RX ADMIN — SIMVASTATIN 10 MG: 10 TABLET, FILM COATED ORAL at 22:01

## 2019-02-09 RX ADMIN — HEPARIN SODIUM 5000 UNITS: 5000 INJECTION INTRAVENOUS; SUBCUTANEOUS at 09:37

## 2019-02-09 RX ADMIN — GUAIFENESIN 400 MG: 100 SOLUTION ORAL at 13:05

## 2019-02-09 RX ADMIN — GUAIFENESIN 400 MG: 100 SOLUTION ORAL at 18:21

## 2019-02-09 RX ADMIN — BUMETANIDE 1 MG: 1 TABLET ORAL at 09:35

## 2019-02-09 RX ADMIN — GUAIFENESIN 400 MG: 100 SOLUTION ORAL at 09:35

## 2019-02-09 RX ADMIN — GABAPENTIN 400 MG: 100 CAPSULE ORAL at 18:21

## 2019-02-09 RX ADMIN — GUAIFENESIN 400 MG: 100 SOLUTION ORAL at 20:05

## 2019-02-09 RX ADMIN — PANTOPRAZOLE SODIUM 40 MG: 40 TABLET, DELAYED RELEASE ORAL at 09:36

## 2019-02-09 NOTE — PROGRESS NOTES
02/08/19 0830 CPAP/BIPAP  
CPAP/BIPAP Start/Stop On  
   has removed his BIPAP mask several times overnight. Nasal cannula was left in placed under BIPAP mask for this reason. RN is aware. 0455:  found with BIPAP mask off and circuit pulled off machine with pieces in the bed with him. He was placed back on the BIPAP at this time.

## 2019-02-09 NOTE — PROGRESS NOTES
Problem: Self Care Deficits Care Plan (Adult) Goal: *Acute Goals and Plan of Care (Insert Text) Occupational Therapy Goals Initiated 1/30/2019 within 7 day(s). 1.  Patient will perform grooming tasks while standing with stand-by assistance for balance. 2.  Patient will perform lower body dressing with minimal assistance utilizing adaptive equipment/strategies, prn. 
3.  Patient will perform functional task in standing for 8 minutes with supervision and < 2 rest breaks to increase activity tolerance for functional transfers & ADLs. 4.  Patient will perform toilet transfers with supervision. 5.  Patient will perform all aspects of toileting with supervision. 6.  Patient will participate in upper extremity therapeutic exercise/activities for 8 minutes to increase BUE strength for functional transfers and ADLs. 7.  Patient will utilize energy conservation techniques during functional activities with minimal verbal cues. Outcome: Progressing Towards Goal 
Occupational Therapy TREATMENT Patient: Masood Contreras (67 y.o. male) Date: 2/9/2019 Diagnosis: Bacteremia Bacteremia Precautions: Fall Chart, occupational therapy assessment, plan of care, and goals were reviewed. PLOF: Independent ASSESSMENT: 
Pt requesting urinal upon entry. Pt and pads soiled. Pt tolerates standing for polly-care, however poor dynamic standing balance requires assist w/clothing mgt and polly-care s/p toileting w/urinal at bed level. Pt requires increase time w/functional transfer to chair and vc's for hand placement. Pt performs UE TherEx at chair level w/rest breaks between sets. Pt w/good participation and carryover today. EDUCATION Pt educated on importance of UE Therex and encouraged to perform throughout the day Progression toward goals: 
[]          Improving appropriately and progressing toward goals [x]          Improving slowly and progressing toward goals []          Not making progress toward goals and plan of care will be adjusted PLAN: 
Patient continues to benefit from skilled intervention to address the above impairments. Continue treatment per established plan of care. Discharge Recommendations:  Skilled Nursing Facility/LTC Further Equipment Recommendations for Discharge:  N/A, pt has DME SUBJECTIVE:  
Patient stated . OBJECTIVE DATA SUMMARY: 
  
Cognitive/Behavioral Status: 
Neurologic State: Alert Orientation Level: Oriented X4 Cognition: Appropriate for age attention/concentration Functional Mobility and Transfers for ADLs: 
 Bed Mobility: 
Supine to Sit: Additional time;Stand-by assistance(w/HOB raised and SRs) Transfers: 
Sit to Stand: Contact guard assistance(w/RW) Bed to Chair: Contact guard assistance(w/RW) Balance: 
Sitting: Intact Standing: Impaired; With support Standing - Static: Fair Standing - Dynamic : Fair ADL Intervention: 
Grooming Washing Face: Supervision/set-up Washing Hands: Supervision/set-up Upper Body Dressing Assistance Hospital Gown: Supervision/ set-up Toileting Toileting Assistance: Maximum assistance Bowel Hygiene: Maximum assistance Clothing Management: Maximum assistance Therapeutic Exercises: AROM BUE shoulder shrugs and rolls forward/backward for improved posture Pain: 
Pre Treatment:0 Post Treatment:0 Pain Scale 1: Numeric (0 - 10) Pain Intensity 1: 0 Activity Tolerance:   
Fair Please refer to the flowsheet for vital signs taken during this treatment. After treatment:  
[x]  Patient left in no apparent distress sitting up in chair 
[]  Patient left in no apparent distress in bed 
[x]  Call bell left within reach [x]  Nursing notified 
[]  Caregiver present 
[]  Bed alarm activated Ambrocio Santana Time Calculation: 23 mins

## 2019-02-09 NOTE — PROGRESS NOTES
Internal Medicine Progress Note Patient's Name: Melford Kocher Admit Date: 1/21/2019 Length of Stay: 23 Assessment/Plan Active Hospital Problems Diagnosis Date Noted  Metabolic encephalopathy 96/57/9734 Associated with sepsis, present on admission.  PNA (pneumonia) 01/26/2019  Bacteremia 01/21/2019  Acute on chronic respiratory failure with hypoxia ([address] 12/23/2018  Hypertension 11/14/2018  Hepatitis C 11/14/2018  Back pain 11/14/2018  Discitis of thoracic region 11/14/2018  Bilateral pleural effusion 11/14/2018  
 
- Cont IVAB w/ ertapenem x 6-8wks - If effusions need tapped, pulm rec left first 
- Cont fluid restriction 
- Cont PO bumex - BP borderline last 24 hours, d/c norvasc, decrease lopressor and lisinopril - Wasn't wearing BiPAP overnight well and more lethargic this AM, BiPAP placed by resp 
- Pain control PRN, avoid opiates (tramadol helping though) 
- PT/OT 
- Awaiting long term care, no accepting beds - Discussed potential surgery w/ patient, he is not interested at this time and not ready to commit if it was even an option. He would like to proceed with PT and see how things go - Cont acceptable home medications for chronic conditions  
- DVT protocol I have personally reviewed all pertinent labs and films that have officially resulted over the last 24 hours. I have personally checked for all pending labs that are awaiting final results. Subjective Pt s/e @ bedside No major events overnight Apparently lethargic this AM and confused some Seemed better during my convo but still had some odd comments Objective Visit Vitals BP 96/55 (BP 1 Location: Left arm) Pulse 88 Temp 97.5 °F (36.4 °C) Resp 20 Ht 6' (1.829 m) Wt 116.4 kg (256 lb 11.2 oz) SpO2 96% BMI 34.81 kg/m² Physical Exam: 
General Appearance: NAD, conversant HEENT: AT/NC, moist mucosa Lungs: Decreased BS B/L with normal respiratory effort CV: RRR, no m/r/g Abdomen: soft, non-tender, mild distension, normal bowel sounds Ext: No edema, no cyanosis Lab/Data Reviewed: 
BMP:  
No results found for: NA, K, CL, CO2, AGAP, GLU, BUN, CREA, GFRAA, GFRNA 
CBC:  
No results found for: WBC, HGB, HGBEXT, HCT, HCTEXT, PLT, PLTEXT, HGBEXT, HCTEXT, PLTEXT Imaging Reviewed: 
No results found. Medications Reviewed: 
Current Facility-Administered Medications Medication Dose Route Frequency  albuterol-ipratropium (DUO-NEB) 2.5 MG-0.5 MG/3 ML  3 mL Nebulization Q4H PRN  
 traMADol (ULTRAM) tablet 50 mg  50 mg Oral Q6H PRN  
 ertapenem (INVANZ) 1 g in 0.9% sodium chloride (MBP/ADV) 50 mL MBP  1 g IntraVENous Q24H  
 bumetanide (BUMEX) tablet 1 mg  1 mg Oral BID  pantoprazole (PROTONIX) tablet 40 mg  40 mg Oral DAILY  guaiFENesin (ROBITUSSIN) 100 mg/5 mL oral liquid 400 mg  400 mg Oral Q4H  
 lidocaine (PF) (XYLOCAINE) 10 mg/mL (1 %) injection 20 mL  20 mL SubCUTAneous Rad Multiple  amLODIPine (NORVASC) tablet 10 mg  10 mg Oral DAILY  gabapentin (NEURONTIN) capsule 400 mg  400 mg Oral TID  lisinopril (PRINIVIL, ZESTRIL) tablet 20 mg  20 mg Oral DAILY  metoprolol tartrate (LOPRESSOR) tablet 50 mg  50 mg Oral BID  simvastatin (ZOCOR) tablet 10 mg  10 mg Oral QHS  acetaminophen (TYLENOL) tablet 650 mg  650 mg Oral Q6H PRN  
 ondansetron (ZOFRAN) injection 4 mg  4 mg IntraVENous Q6H PRN  
 heparin (porcine) injection 5,000 Units  5,000 Units SubCUTAneous Q8H Sandhya Bailey DO Internal Medicine, Hospitalist 
Pager: [phone] [facility]

## 2019-02-09 NOTE — PROGRESS NOTES
1900  -- Bedside, Verbal and Written shift change report given to 2309 Sin Clark (oncoming nurse) by [address] [place] [address]. Report included the following information SBAR, Kardex, Intake/Output, MAR and Recent Results.  
   
 2042 -- PM medications administered, pt tolerated with ease, will continue to monitor. 
  
 0000 -- Shift reassessment, pt condition unchanged, will continue to monitor. 
   
0400 --  Shift reassessment, pt condition unchanged, will continue to monitor.   
   
 0700 -- Bedside, Verbal and Written shift change report given to 1316 Sharmila Wares nurse) by EDMOND (offgoing nurse). Report included the following information SBAR, Kardex, Intake/Output, MAR and Recent Results. Skin assessment completed.

## 2019-02-09 NOTE — PROGRESS NOTES
Problem: Falls - Risk of 
Goal: *Absence of Falls Document [place] Fall Risk and appropriate interventions in the flowsheet. Outcome: Progressing Towards Goal 
Fall Risk Interventions: 
Mobility Interventions: Patient to call before getting OOB Mentation Interventions: Adequate sleep, hydration, pain control Medication Interventions: Patient to call before getting OOB Elimination Interventions: Patient to call for help with toileting needs History of Falls Interventions: Evaluate medications/consider consulting pharmacy, Door open when patient unattended Problem: Pressure Injury - Risk of 
Goal: *Prevention of pressure injury Document Raul Scale and appropriate interventions in the flowsheet. Outcome: Progressing Towards Goal 
Pressure Injury Interventions: 
Sensory Interventions: Assess changes in LOC Moisture Interventions: Absorbent underpads Activity Interventions: Pressure redistribution bed/mattress(bed type) Mobility Interventions: Pressure redistribution bed/mattress (bed type) Nutrition Interventions: Document food/fluid/supplement intake Friction and Shear Interventions: HOB 30 degrees or less

## 2019-02-09 NOTE — ROUTINE PROCESS
0720-report received,call bell within reach, no distress noted. Voiced no complaints at this time. 0941-am due medications given, assessment completed, call bell within reach, no distress noted. 1130-up to chair by OT. 1230-no change in condition, no distress noted, voiced no complaints. 1400-resting quietly on recliner. 1500-back to bed by CNA. 1600-no change in condition, no distress noted. 1815-pm due medication given. 1915-Bedside and Verbal shift change report given to Opal Obrien (oncoming nurse) by Sumit Ortega (offgoing nurse). Report included the following information SBAR, MAR and Recent Results.

## 2019-02-10 LAB
CA-I SERPL-SCNC: 1 MMOL/L (ref 1.12–1.32)
MAGNESIUM SERPL-MCNC: 2.1 MG/DL (ref 1.6–2.6)
PHOSPHATE SERPL-MCNC: 3.2 MG/DL (ref 2.5–4.9)

## 2019-02-10 PROCEDURE — 97530 THERAPEUTIC ACTIVITIES: CPT

## 2019-02-10 PROCEDURE — 74011250637 HC RX REV CODE- 250/637: Performed by: INTERNAL MEDICINE

## 2019-02-10 PROCEDURE — 77010033678 HC OXYGEN DAILY

## 2019-02-10 PROCEDURE — 74011250637 HC RX REV CODE- 250/637: Performed by: HOSPITALIST

## 2019-02-10 PROCEDURE — 36415 COLL VENOUS BLD VENIPUNCTURE: CPT

## 2019-02-10 PROCEDURE — 65660000000 HC RM CCU STEPDOWN

## 2019-02-10 PROCEDURE — 74011250636 HC RX REV CODE- 250/636: Performed by: HOSPITALIST

## 2019-02-10 PROCEDURE — 82330 ASSAY OF CALCIUM: CPT

## 2019-02-10 PROCEDURE — 74011000258 HC RX REV CODE- 258: Performed by: INTERNAL MEDICINE

## 2019-02-10 PROCEDURE — 84100 ASSAY OF PHOSPHORUS: CPT

## 2019-02-10 PROCEDURE — 74011250636 HC RX REV CODE- 250/636: Performed by: INTERNAL MEDICINE

## 2019-02-10 PROCEDURE — 97110 THERAPEUTIC EXERCISES: CPT

## 2019-02-10 PROCEDURE — 83735 ASSAY OF MAGNESIUM: CPT

## 2019-02-10 RX ADMIN — BUMETANIDE 1 MG: 1 TABLET ORAL at 17:01

## 2019-02-10 RX ADMIN — GABAPENTIN 400 MG: 100 CAPSULE ORAL at 17:01

## 2019-02-10 RX ADMIN — GABAPENTIN 400 MG: 100 CAPSULE ORAL at 21:36

## 2019-02-10 RX ADMIN — GUAIFENESIN 400 MG: 100 SOLUTION ORAL at 09:25

## 2019-02-10 RX ADMIN — GUAIFENESIN 400 MG: 100 SOLUTION ORAL at 20:16

## 2019-02-10 RX ADMIN — GUAIFENESIN 400 MG: 100 SOLUTION ORAL at 11:46

## 2019-02-10 RX ADMIN — METOPROLOL TARTRATE 25 MG: 25 TABLET ORAL at 09:25

## 2019-02-10 RX ADMIN — GABAPENTIN 400 MG: 100 CAPSULE ORAL at 09:25

## 2019-02-10 RX ADMIN — SIMVASTATIN 10 MG: 10 TABLET, FILM COATED ORAL at 21:36

## 2019-02-10 RX ADMIN — ACETAMINOPHEN 650 MG: 325 TABLET ORAL at 09:25

## 2019-02-10 RX ADMIN — BUMETANIDE 1 MG: 1 TABLET ORAL at 09:25

## 2019-02-10 RX ADMIN — HEPARIN SODIUM 5000 UNITS: 5000 INJECTION INTRAVENOUS; SUBCUTANEOUS at 01:07

## 2019-02-10 RX ADMIN — HEPARIN SODIUM 5000 UNITS: 5000 INJECTION INTRAVENOUS; SUBCUTANEOUS at 09:25

## 2019-02-10 RX ADMIN — HEPARIN SODIUM 5000 UNITS: 5000 INJECTION INTRAVENOUS; SUBCUTANEOUS at 17:01

## 2019-02-10 RX ADMIN — LISINOPRIL 10 MG: 10 TABLET ORAL at 09:25

## 2019-02-10 RX ADMIN — GUAIFENESIN 400 MG: 100 SOLUTION ORAL at 03:13

## 2019-02-10 RX ADMIN — GUAIFENESIN 400 MG: 100 SOLUTION ORAL at 17:01

## 2019-02-10 RX ADMIN — GUAIFENESIN 400 MG: 100 SOLUTION ORAL at 23:35

## 2019-02-10 RX ADMIN — ACETAMINOPHEN 650 MG: 325 TABLET ORAL at 17:01

## 2019-02-10 RX ADMIN — PANTOPRAZOLE SODIUM 40 MG: 40 TABLET, DELAYED RELEASE ORAL at 09:25

## 2019-02-10 RX ADMIN — ERTAPENEM SODIUM 1 G: 1 INJECTION, POWDER, LYOPHILIZED, FOR SOLUTION INTRAMUSCULAR; INTRAVENOUS at 18:51

## 2019-02-10 NOTE — PROGRESS NOTES
Internal Medicine Progress Note Patient's Name: Barbie Hale Admit Date: 1/21/2019 Length of Stay: 20 
 
 
Assessment/Plan Active Hospital Problems Diagnosis Date Noted  Metabolic encephalopathy 75/29/8731 Associated with sepsis, present on admission.  PNA (pneumonia) 01/26/2019  Bacteremia 01/21/2019  Acute on chronic respiratory failure with hypoxia ([facility] [address] 12/23/2018  Hypertension 11/14/2018  Hepatitis C 11/14/2018  Back pain 11/14/2018  Discitis of thoracic region 11/14/2018  Bilateral pleural effusion 11/14/2018  
 
- Cont IVAB w/ ertapenem x 6-8wks - If effusions need tapped, pulm rec left first 
- Cont fluid restriction 
- Cont PO bumex - BP still quite low even with changes yesterday, will d/c lopressor 
- Cont BiPAP qhs 
- Pain control PRN, avoid opiates (tramadol helping though) 
- PT/OT 
- Awaiting long term care, no accepting beds - Discussed potential surgery w/ patient, he is not interested at this time and not ready to commit if it was even an option. He would like to proceed with PT and see how things go - Cont acceptable home medications for chronic conditions  
- DVT protocol I have personally reviewed all pertinent labs and films that have officially resulted over the last 24 hours. I have personally checked for all pending labs that are awaiting final results. Subjective Pt s/e @ bedside No major events overnight Doing OK this AM other than some back pain overnight Breathing OK Objective Visit Vitals /67 Pulse 95 Temp 98.1 °F (36.7 °C) Resp 20 Ht 6' (1.829 m) Wt 116.4 kg (256 lb 11.2 oz) SpO2 94% BMI 34.81 kg/m² Physical Exam: 
General Appearance: NAD, conversant HEENT: AT/NC, moist mucosa Lungs: Decreased BS B/L with normal respiratory effort CV: RRR, no m/r/g Lab/Data Reviewed: 
BMP:  
No results found for: NA, K, CL, CO2, AGAP, GLU, BUN, CREA, GFRAA, GFRNA 
CBC:  
 No results found for: WBC, HGB, HGBEXT, HCT, HCTEXT, PLT, PLTEXT, HGBEXT, HCTEXT, PLTEXT Imaging Reviewed: 
No results found. Medications Reviewed: 
Current Facility-Administered Medications Medication Dose Route Frequency  metoprolol tartrate (LOPRESSOR) tablet 25 mg  25 mg Oral BID  lisinopril (PRINIVIL, ZESTRIL) tablet 10 mg  10 mg Oral DAILY  albuterol-ipratropium (DUO-NEB) 2.5 MG-0.5 MG/3 ML  3 mL Nebulization Q4H PRN  
 traMADol (ULTRAM) tablet 50 mg  50 mg Oral Q6H PRN  
 ertapenem (INVANZ) 1 g in 0.9% sodium chloride (MBP/ADV) 50 mL MBP  1 g IntraVENous Q24H  
 bumetanide (BUMEX) tablet 1 mg  1 mg Oral BID  pantoprazole (PROTONIX) tablet 40 mg  40 mg Oral DAILY  guaiFENesin (ROBITUSSIN) 100 mg/5 mL oral liquid 400 mg  400 mg Oral Q4H  
 lidocaine (PF) (XYLOCAINE) 10 mg/mL (1 %) injection 20 mL  20 mL SubCUTAneous Rad Multiple  gabapentin (NEURONTIN) capsule 400 mg  400 mg Oral TID  simvastatin (ZOCOR) tablet 10 mg  10 mg Oral QHS  acetaminophen (TYLENOL) tablet 650 mg  650 mg Oral Q6H PRN  
 ondansetron (ZOFRAN) injection 4 mg  4 mg IntraVENous Q6H PRN  
 heparin (porcine) injection 5,000 Units  5,000 Units SubCUTAneous Q8H Radha Thomson DO Internal Medicine, Hospitalist 
Pager: [phone] [facility]

## 2019-02-10 NOTE — ROUTINE PROCESS
1915 Bedside and Verbal shift change  Received from Kimberley Kwan RN (outgoing nurse), to SUREKHA De La Cruz (oncoming)  Pt. Is AOX 4. IV SL, Pt. denies  pain at this time. Report included the following information SBAR, Kardex, Procedure Summary, Intake/Output, MAR, Recent Lab Results, and  Cardiac Rhythm @ NSR. Will resume care and monitor Pt. Condition. Pt. Educated on call bell when in need of help and assistance. Pt. verbalized understanding. Bed alarm on. 
 
2005 Pt. Head to toe Assessment Done and documented. Pt. Resting comfortably in bed. 2130  Pt. Resting in bed comfortably, no sign of distress. 2245  No SOB noted. 2350  BP @89/49, paged Dr Rafaela Newton. 3973  Dr. Rafaela Newton notified of BP MD made no order. 0200  Pt. Made no complaints. 0400  Pt. Able to rest well throughout the shift. 0530  Pt. Given complete care, bath, back care, pad changed, gown and incontinent care. 0630  Pt. Resting comfortably in bed. Verbal and bedside Shift changed report given to Colette Chung RN (oncoming RN) on Pt. Condition. Report consisted of patients Situation, History, Activities, intake/output,  Background, Assessment and Recommendations(SBAR). Information from the following report(s) Kardex, order Summary, Lab results and MAR was reviewed with the receiving nurse. Opportunity for questions and clarification was provided.

## 2019-02-10 NOTE — PROGRESS NOTES
Problem: Falls - Risk of 
Goal: *Absence of Falls Document Danielito Shows Fall Risk and appropriate interventions in the flowsheet. Outcome: Progressing Towards Goal 
Fall Risk Interventions: 
Mobility Interventions: Communicate number of staff needed for ambulation/transfer Mentation Interventions: Adequate sleep, hydration, pain control Medication Interventions: Evaluate medications/consider consulting pharmacy, Patient to call before getting OOB Elimination Interventions: Call light in reach, Patient to call for help with toileting needs History of Falls Interventions: Evaluate medications/consider consulting pharmacy Problem: Pressure Injury - Risk of 
Goal: *Prevention of pressure injury Document Raul Scale and appropriate interventions in the flowsheet. Outcome: Progressing Towards Goal 
Pressure Injury Interventions: 
Sensory Interventions: Assess changes in LOC, Pressure redistribution bed/mattress (bed type) Moisture Interventions: Absorbent underpads Activity Interventions: Pressure redistribution bed/mattress(bed type), Increase time out of bed Mobility Interventions: Pressure redistribution bed/mattress (bed type) Nutrition Interventions: Document food/fluid/supplement intake Friction and Shear Interventions: HOB 30 degrees or less

## 2019-02-10 NOTE — ROUTINE PROCESS
0700: Bedside shift change report given to Naty Chen RN (oncoming nurse) by Renee Warner RN (offgoing nurse). Report included the following information SBAR, Kardex, ED Summary, Procedure Summary, Intake/Output, MAR, Accordion, Recent Results and Med Rec Status. 1400: Pt found sitting EOB, bed alarm sounding, naked. Stated he was trying to get up to bathroom. Pt assisted to use bedpan, teaching provided to stay in bed and call for help, call bell in reach, bed alar re-activated. 1900: Bedside shift change report given to CAMERON Alva (oncoming nurse) by Naty Chen RN (offgoing nurse). Report included the following information SBAR, Kardex, ED Summary, Procedure Summary, Intake/Output, MAR, Accordion, Recent Results and Med Rec Status.

## 2019-02-10 NOTE — PROGRESS NOTES
Problem: Mobility Impaired (Adult and Pediatric) Goal: *Acute Goals and Plan of Care (Insert Text) Physical Therapy Goals Initiated 2/8/2019 and to be accomplished within 7 day(s) 1. Patient will move from supine to sit and sit to supine in bed with supervision/set-up. 2.  Patient will transfer from bed to chair and chair to bed with supervision/set-up using the least restrictive device. 3.  Patient will perform sit to stand with supervision/set-up. 4.  Patient will ambulate with supervision/set-up for 50 feet with the least restrictive device. Outcome: Progressing Towards Goal 
PHYSICAL THERAPY: Daily TREATMENT Note INPATIENT: Medicaid: Hospital Day: 21 Patient: Klaudia Villaseñor (53 y.o. male)    Date: 2/10/2019 Primary Diagnosis: Bacteremia  
,  ,  
Precautions: Fall FWB Chart, physical therapy assessment, plan of care and goals were reviewed. PLOF:mod I with [employer] 
 
ASSESSMENT: 
Pt with c/o 8/10 T/s p!. CGA for rolling and mod A for sup<>sit. Pt able to sit on EOB to perform TE, note demos poor core and scap strength. demos poor posture; improved with VCs, unable to keep upright. Min A for sit<>std. CGA for 4 side steps to [facility] with RW. Pt able to demo improved upright posture in standind and with amb. CGA for stand to sit. Pt with c/o increase t/s pain and requesting to get back to bed. Pt declined chair due to previous difficulty getting out of chair the time before. Min >mod A for sit>supine. Pt able to use LE to push to scoot to [facility], max a x 2 Pt with all needs in reach and nurse informed of pain. Progression toward goals: 
      Improving appropriately and progressing toward goals(x) Improving slowly and progressing toward goals Not making progress toward goals and plan of care will be adjusted PLAN: 
Patient continues to benefit from skilled intervention to address the above impairments. Continue treatment per established plan of care.  
 
EDUCATION:  
 Education:  Patient was educated on the following topics:  Pt ed on importance + benefits to perform bed mobility and exercises every 1-2 hours to increase/maintain LE/core strength. Pt ed on importance and benefits of proper posture, and positioning every hr to prevent break down of skin and bed sores; all ed to promote functional mobility; pt verbalized understanding Discharge Recommendations:  Franki Bains Further Equipment Recommendations for Discharge:  TBD Factors which may impact discharge planning: none SUBJECTIVE:  
Patient stated I am hurting write where here. I try to move, maybe I am moving too much.  OBJECTIVE DATA SUMMARY:  
Critical Behavior: 
Neurologic State: Alert Orientation Level: Oriented X4 Cognition: Appropriate decision making [address] Standing Balance Scale 
0: Pt performs 25% or less of standing activity (Max assist) CN, 100% impaired. 1: Pt supports self with upper extremities but requires therapist assistance. Pt performs 25-50% of effort (Mod assist) CM, 80% to <100% impaired. 1+: Pt supports self with upper extremities but requires therapist assistance. Pt performs >50% effort. (Min assist). CL, 60% to <80% impaired. 2: Pt supports self independently with both upper extremities (walker, crutches, parallel bars). CL, 60% to <80% impaired. 2+: Pt support self independently with 1 upper extremity (cane, crutch, 1 parallel bar). CK, 40% to <60% impaired. 3: Pt stands without upper extremity support for up to 30 seconds. CK, 40% to <60% impaired. 3+: Pt stands without upper extremity support for 30 seconds or greater. CJ, 20% to <40% impaired. 4: Pt independently moves and returns center of gravity 1-2 inches in one plane. CJ, 20% to <40% impaired. 4+: Pt independently moves and returns center of gravity 1-2 inches in multiple planes. CI, 1% to <20% impaired.  
5: Pt independently moves and returns center of gravity in all planes greater than 2 inches. CH, 0% impaired. Functional Mobility: 
 
 
Functional Status Indep (I) Mod I Super-vision Min A Mod A Max A Total A Assist x2 Verbal cues Additional time Not tested Comments Rolling []  []  [] [x]    []    []  []  [] [] [] [] Supine to sit []  []  [] []  [x]  []  []  [] [] [] [] Sit to supine []  []  [] []  [x]  []  []  [] [] [] [] Sit to stand []  []  [] [x]  []  []  []  [] [] [] []   
Stand to sit []  []  [] [x]  []  []  []  [] [] [] [] Bed to chair transfers []  []  [] []  []  []  []  [] [] [] [] Balance Good Rosendo Arik Poor Unable Not tested Comments Sitting static [x]  [x]  []  []  [] Sitting dynamic []  [x]  []  []  []   
Standing static []  [x]  []  []  []   
Standing dynamic []  []  []  []  [] Mobility/Gait:  
Level of Assistance: Contact guard assistance Assistive Device: rolling walker Distance Ambulated: 4 right side steps Base of Support: center of gravity altered and widened Speed/Shannan: pace decreased (<100 feet/min) Step Length: left shortened and right shortened Swing Pattern: left asymmetrical and right asymmetrical 
Gait Abnormalities: decreased step clearance Therapeutic Exercises:  
 
 
 
EXERCISE Sets Reps Active Active Assist  
Passive Self ROM Comments Ankle Pumps 1 20  [x] [] [] [] Quad Sets/Glut Sets 1 10 [x] [] [] [] Hamstring Sets   [] [] [] [] Short Arc Quads   [] [] [] [] Heel Slides 1 10 [x] [] [] [] Straight Leg Raises 1 5 [x] [] [] [] pain Hip Abd/Add   [] [] [] [] Long Arc Quads 1 10 [x] [] [] [] Seated Marching   [] [] [] []   
Standing Marching   [] [] [] []   
scap squeezes 1 10 [x] [] [] []   
 
 
Vital Signs Temp: 98.6 °F (37 °C) Pulse (Heart Rate): 98    
BP: 92/54 Resp Rate: 18    
O2 Sat (%): 92 %Pain:8/10 Pre treatment pain level:8/10 Post treatment pain level:8/10Pain Scale 1: Numeric (0 - 10) Pain Intensity 1: 8 Pain Location 1: Head 
  
  
Pain Intervention(s) 1: Medication (see MAR) Activity Tolerance:  
poor due to pain After treatment:  
Patient left in no apparent distress sitting up in chair Patient left in no apparent distress in bed(X) Call bell left within reach(X) Nursing notified(X) Caregiver present Bed alarm activated*(X) [facility], [facility] Time Calculation: 46 mins

## 2019-02-11 LAB
CA-I SERPL-SCNC: 1.09 MMOL/L (ref 1.12–1.32)
MAGNESIUM SERPL-MCNC: 1.9 MG/DL (ref 1.6–2.6)
PHOSPHATE SERPL-MCNC: 3.1 MG/DL (ref 2.5–4.9)

## 2019-02-11 PROCEDURE — 82330 ASSAY OF CALCIUM: CPT

## 2019-02-11 PROCEDURE — 74011250637 HC RX REV CODE- 250/637: Performed by: INTERNAL MEDICINE

## 2019-02-11 PROCEDURE — 74011250636 HC RX REV CODE- 250/636: Performed by: HOSPITALIST

## 2019-02-11 PROCEDURE — 74011250636 HC RX REV CODE- 250/636: Performed by: INTERNAL MEDICINE

## 2019-02-11 PROCEDURE — 65660000000 HC RM CCU STEPDOWN

## 2019-02-11 PROCEDURE — 74011250637 HC RX REV CODE- 250/637: Performed by: HOSPITALIST

## 2019-02-11 PROCEDURE — 74011000258 HC RX REV CODE- 258: Performed by: INTERNAL MEDICINE

## 2019-02-11 PROCEDURE — 84100 ASSAY OF PHOSPHORUS: CPT

## 2019-02-11 PROCEDURE — 36415 COLL VENOUS BLD VENIPUNCTURE: CPT

## 2019-02-11 PROCEDURE — 77010033678 HC OXYGEN DAILY

## 2019-02-11 PROCEDURE — 97110 THERAPEUTIC EXERCISES: CPT

## 2019-02-11 PROCEDURE — 97112 NEUROMUSCULAR REEDUCATION: CPT

## 2019-02-11 PROCEDURE — 83735 ASSAY OF MAGNESIUM: CPT

## 2019-02-11 PROCEDURE — 97535 SELF CARE MNGMENT TRAINING: CPT

## 2019-02-11 RX ADMIN — PANTOPRAZOLE SODIUM 40 MG: 40 TABLET, DELAYED RELEASE ORAL at 10:06

## 2019-02-11 RX ADMIN — TRAMADOL HYDROCHLORIDE 50 MG: 50 TABLET, FILM COATED ORAL at 14:30

## 2019-02-11 RX ADMIN — HEPARIN SODIUM 5000 UNITS: 5000 INJECTION INTRAVENOUS; SUBCUTANEOUS at 03:14

## 2019-02-11 RX ADMIN — ERTAPENEM SODIUM 1 G: 1 INJECTION, POWDER, LYOPHILIZED, FOR SOLUTION INTRAMUSCULAR; INTRAVENOUS at 20:30

## 2019-02-11 RX ADMIN — BUMETANIDE 1 MG: 1 TABLET ORAL at 10:05

## 2019-02-11 RX ADMIN — GUAIFENESIN 400 MG: 100 SOLUTION ORAL at 16:58

## 2019-02-11 RX ADMIN — BUMETANIDE 1 MG: 1 TABLET ORAL at 17:01

## 2019-02-11 RX ADMIN — GABAPENTIN 400 MG: 100 CAPSULE ORAL at 16:58

## 2019-02-11 RX ADMIN — SIMVASTATIN 10 MG: 10 TABLET, FILM COATED ORAL at 22:13

## 2019-02-11 RX ADMIN — GABAPENTIN 400 MG: 100 CAPSULE ORAL at 10:06

## 2019-02-11 RX ADMIN — LISINOPRIL 10 MG: 10 TABLET ORAL at 10:06

## 2019-02-11 RX ADMIN — ACETAMINOPHEN 650 MG: 325 TABLET ORAL at 11:40

## 2019-02-11 RX ADMIN — ONDANSETRON 4 MG: 2 INJECTION INTRAMUSCULAR; INTRAVENOUS at 14:30

## 2019-02-11 RX ADMIN — GUAIFENESIN 400 MG: 100 SOLUTION ORAL at 10:05

## 2019-02-11 RX ADMIN — GABAPENTIN 400 MG: 100 CAPSULE ORAL at 22:13

## 2019-02-11 RX ADMIN — GUAIFENESIN 400 MG: 100 SOLUTION ORAL at 20:30

## 2019-02-11 RX ADMIN — GUAIFENESIN 400 MG: 100 SOLUTION ORAL at 03:14

## 2019-02-11 RX ADMIN — GUAIFENESIN 400 MG: 100 SOLUTION ORAL at 14:30

## 2019-02-11 RX ADMIN — ACETAMINOPHEN 650 MG: 325 TABLET ORAL at 03:14

## 2019-02-11 RX ADMIN — HEPARIN SODIUM 5000 UNITS: 5000 INJECTION INTRAVENOUS; SUBCUTANEOUS at 10:06

## 2019-02-11 NOTE — PROGRESS NOTES
Problem: Mobility Impaired (Adult and Pediatric) Goal: *Acute Goals and Plan of Care (Insert Text) Physical Therapy Goals Initiated 2/8/2019 and to be accomplished within 7 day(s) 1. Patient will move from supine to sit and sit to supine in bed with supervision/set-up. 2.  Patient will transfer from bed to chair and chair to bed with supervision/set-up using the least restrictive device. 3.  Patient will perform sit to stand with supervision/set-up. 4.  Patient will ambulate with supervision/set-up for 50 feet with the least restrictive device. Outcome: Progressing Towards Goal 
 
PHYSICAL THERAPY: Daily TREATMENT Note INPATIENT: Medicaid: Hospital Day: 22 Patient: Herson Obrien (89 y.o. male)    Date: 2/11/2019 Primary Diagnosis: Bacteremia Precautions: Fall Chart, physical therapy assessment, plan of care and goals were reviewed. PLOF: Mod I with ww ASSESSMENT: 
Agreeable to PT. Mod A for supine to sit. Supervision for sitting balance. Poor posture; forward head with cervical flexion, rounded shoulders, decreased lumbar lordosis. Educated on posture to modify lumbar pain and improve respiratory compliance. Able to correct with verbal cues; maintains less than 30 seconds. Completed seated exercise as noted below. Min A for sit to stand from elevated bed height. Maintained standing at ww for 1 minute with min A. Improved spine alignment via posture in standing. Returned to seated on bed with good control on descent. Min A for sit to stand and bed to chair transfer with ww. Amb 3ft with ww and min A to chair. Seated in chair with BLE elevated; call barber in reach. CAMERON Medrano notified. Reinforced with patient need for OOB mobility; verbalized understanding. Progression toward goals: 
      Improving appropriately and progressing toward goals PLAN: 
Patient continues to benefit from skilled intervention to address the above impairments. Continue treatment per established plan of care. EDUCATION:  
Education:  Patient was educated on the following topics: Bed mobility, transfers, ADLs, balance, amb, safety, exercise, role of PT, plan of care, cognition, skin integrity, vitals Barriers to Learning/Limitations: None Compensate with: visual, verbal, tactile, kinesthetic cues/model Discharge Recommendations:  Franki Bains Further Equipment Recommendations for Discharge:  rolling walker SUBJECTIVE:  
Patient stated Merline Chars you coming back?  OBJECTIVE DATA SUMMARY:  
Critical Behavior: 
Neurologic State: Alert, Appropriate for age, Eyes open spontaneously Orientation Level: Oriented X4 Cognition: Follows commands Functional Mobility: 
 
 
Functional Status Indep (I) Mod I Super-vision Min A Mod A Max A Total A Assist x2 Verbal cues Additional time Not tested Comments Rolling []  []  [] []    []    []  []  [] [] [] [x] Supine to sit []  []  [] []  [x]  []  []  [] [] [] [] Sit to supine []  []  [] []  []  []  []  [] [] [] [x] Sit to stand []  []  [] [x]  []  []  []  [] [] [] []   
Stand to sit []  []  [] [x]  []  []  []  [] [] [] [] Bed to chair transfers []  []  [] [x]  []  []  []  [] [] [] [] Balance Good Beckie Tucker Poor Unable Not tested Comments Sitting static [x]  []  []  []  [] Sitting dynamic [x]  []  []  []  []   
Standing static []  [x]  []  []  []   
Standing dynamic []  [x]  []  []  [] Mobility/Gait:  
Level of Assistance: Minimum assistance Assistive Device: rolling walker Distance Ambulated: 3 feet Base of Support: widened Speed/Shannan: pace decreased (<100 feet/min) Neuro Re-Education: 
Seated EOB 10 minutesTherapeutic Exercises:  
Sit to stand x2 BLE knee extension x10 BUE rows x10 Pain:Pre treatment pain level: 9 Post treatment pain level: 9Pain Scale 1: Numeric (0 - 10) Activity Tolerance:  
Fair After treatment:  
Patient left in no apparent distress sitting up in chair Call bell left within reach Nursing notified Andrew Adamson, PT Time Calculation: 23 mins

## 2019-02-11 NOTE — PROGRESS NOTES
Bedside shift report received from  [facility]: AOX4, on 3l 02 via NC, resting in bed, in no apparent distress; denies any pain; urinal at bedside;  Generally weak; urinal, call bell at bedside 2136: due meds given as scheduled 0030: sleeping; Bipap on; urinal at bedside 
 
0200: sleeping; Bipap on 
 
0314: c/o back pain; Tylenol 650 mg po given; assisted with further needs 0400: sleeping; no visual indicators for pain noted 0600: resting in bed; on 3l 02 via NC; pad changed 0720: Bedside and Verbal shift change report given to [address] [facility] (oncoming nurse) by Luisito Lion RN (offgoing nurse). Report included the following information SBAR, Kardex, Intake/Output, Recent Results and Cardiac Rhythm NSR.

## 2019-02-11 NOTE — PROGRESS NOTES
Internal Medicine Progress Note Patient's Name: Klaudia Villaseñor Admit Date: 1/21/2019 Length of Stay: 21 
 
 
Assessment/Plan Active Hospital Problems Diagnosis Date Noted  Metabolic encephalopathy 57/62/5464 Associated with sepsis, present on admission.  PNA (pneumonia) 01/26/2019  Bacteremia 01/21/2019  Acute on chronic respiratory failure with hypoxia ([address] 12/23/2018  Hypertension 11/14/2018  Hepatitis C 11/14/2018  Back pain 11/14/2018  Discitis of thoracic region 11/14/2018  Bilateral pleural effusion 11/14/2018  
 
- Cont IVAB w/ ertapenem x 6-8wks - If effusions need tapped, pulm rec left first 
- Cont fluid restriction 
- Cont PO bumex - BP still quite low even with changes yesterday, will d/c lopressor 
- Cont BiPAP qhs 
- Pain control PRN, avoid opiates (tramadol helping though) 
- PT/OT 
- Awaiting long term care, no accepting beds - Discussed potential surgery w/ patient, he is not interested at this time and not ready to commit if it was even an option. He would like to proceed with PT and see how things go - Cont acceptable home medications for chronic conditions  
- DVT protocol I have personally reviewed all pertinent labs and films that have officially resulted over the last 24 hours. I have personally checked for all pending labs that are awaiting final results. Subjective Pt s/e @ bedside No major events overnight Denies complaints Breathing OK Objective Visit Vitals /62 (BP 1 Location: Left arm, BP Patient Position: Head of bed elevated (Comment degrees)) Pulse (!) 105 Temp 98.2 °F (36.8 °C) Resp 18 Ht 6' (1.829 m) Wt 99.4 kg (219 lb 3.2 oz) SpO2 92% BMI 29.73 kg/m² Physical Exam: 
General Appearance: NAD, conversant HEENT: AT/NC, moist mucosa Lungs: Decreased BS B/L with normal respiratory effort CV: RRR, no m/r/g Lab/Data Reviewed: 
BMP:  
 No results found for: NA, K, CL, CO2, AGAP, GLU, BUN, CREA, GFRAA, GFRNA 
CBC:  
No results found for: WBC, HGB, HGBEXT, HCT, HCTEXT, PLT, PLTEXT, HGBEXT, HCTEXT, PLTEXT Imaging Reviewed: 
No results found. Medications Reviewed: 
Current Facility-Administered Medications Medication Dose Route Frequency  lisinopril (PRINIVIL, ZESTRIL) tablet 10 mg  10 mg Oral DAILY  albuterol-ipratropium (DUO-NEB) 2.5 MG-0.5 MG/3 ML  3 mL Nebulization Q4H PRN  
 traMADol (ULTRAM) tablet 50 mg  50 mg Oral Q6H PRN  
 ertapenem (INVANZ) 1 g in 0.9% sodium chloride (MBP/ADV) 50 mL MBP  1 g IntraVENous Q24H  
 bumetanide (BUMEX) tablet 1 mg  1 mg Oral BID  pantoprazole (PROTONIX) tablet 40 mg  40 mg Oral DAILY  guaiFENesin (ROBITUSSIN) 100 mg/5 mL oral liquid 400 mg  400 mg Oral Q4H  
 lidocaine (PF) (XYLOCAINE) 10 mg/mL (1 %) injection 20 mL  20 mL SubCUTAneous Rad Multiple  gabapentin (NEURONTIN) capsule 400 mg  400 mg Oral TID  simvastatin (ZOCOR) tablet 10 mg  10 mg Oral QHS  acetaminophen (TYLENOL) tablet 650 mg  650 mg Oral Q6H PRN  
 ondansetron (ZOFRAN) injection 4 mg  4 mg IntraVENous Q6H PRN  
 heparin (porcine) injection 5,000 Units  5,000 Units SubCUTAneous Q8H

## 2019-02-11 NOTE — PROGRESS NOTES
Received bedside report from Lynn [address]. Pt Aox4, Tele box #2 SR/ST, NC-3 liters, pt resting in bed/bed in low position, wheels locked, call bell within reach. 1005-Pt provided scheduled meds. Denies pain or discomfort at this time. Pt resting in bed/bed in low position, wheels locked, call bell within reach. 1140-Noted pt complained of back pain, Esperanza BARNES provided pt Tylenol. Will continue to assess pain. 1220-Pt eating lunch at this time, denies pian or discomfort. Will, continue to assess patient. 1430-Noted pt complained of back pain, provided Ultram PRN and Zofran to prevent nausea. 1500-Bedside and Verbal shift change report given to [facility] (oncoming nurse) by Saint Martin RN (offgoing nurse). Report included the following information SBAR, Kardex, MAR and Cardiac Rhythm SR/ST.

## 2019-02-11 NOTE — PROGRESS NOTES
Problem: Falls - Risk of 
Goal: *Absence of Falls Document [place] Officer Fall Risk and appropriate interventions in the flowsheet. Outcome: Progressing Towards Goal 
Fall Risk Interventions: 
Mobility Interventions: Patient to call before getting OOB, Strengthening exercises (ROM-active/passive), Bed/chair exit alarm Mentation Interventions: Bed/chair exit alarm, Evaluate medications/consider consulting pharmacy, Toileting rounds, Update white board, More frequent rounding Medication Interventions: Patient to call before getting OOB, Evaluate medications/consider consulting pharmacy Elimination Interventions: Toileting schedule/hourly rounds, Urinal in reach, Call light in reach, Bed/chair exit alarm History of Falls Interventions: Evaluate medications/consider consulting pharmacy, Bed/chair exit alarm Problem: Pressure Injury - Risk of 
Goal: *Prevention of pressure injury Document Raul Scale and appropriate interventions in the flowsheet. Outcome: Progressing Towards Goal 
Pressure Injury Interventions: 
Sensory Interventions: Assess changes in LOC, Check visual cues for pain, Minimize linen layers, Pressure redistribution bed/mattress (bed type) Moisture Interventions: Absorbent underpads Activity Interventions: Pressure redistribution bed/mattress(bed type) Mobility Interventions: Pressure redistribution bed/mattress (bed type) Nutrition Interventions: Document food/fluid/supplement intake Friction and Shear Interventions: HOB 30 degrees or less

## 2019-02-11 NOTE — PROGRESS NOTES
Nutrition follow up/ 
Plan of care RECOMMENDATIONS:  
 
1. Cardiac diet 2. Monitor weight, labs and PO intake 3. RD to follow GOALS: 
 
1. Met/Ongoing: PO intake meets >75% of protein/calorie needs by 2/18 
2. Ongoing: Weight Maintenance (+/- 1-2 lb by 2/18) ASSESSMENT: 
 
Weight status is classified as overweight per BMI of 29.7. PO intake is adequate. Labs noted. Patient with hypoalbuminemia and elevated BUN. Nutrition recommendations listed. RD to follow. Nutrition Risk:  [] High  [] Moderate [x]  Low SUBJECTIVE/OBJECTIVE: 
  
(1/30): LOS. Transferred from ICU. Admitted with bacteremia. Has history of COPD and HTN. Patient reports having a good appetite and eating all of meals. Observed 100% intake of lunch meal during visit. Has food allergy to shellfish. Reports having limited dentition but declined change in diet texture. Will monitor. 
 
(2/6): Transferred back to [facility] from ICU on 2/3. Patient reports having a good appetite and eating most of meals. Observed 50% intake of lunch meal during visit. % intake of meals per vitals. Stated food preferences. Will monitor. 
 
(2/11): Patient with a good appetite and eating all of meals. Observed 100% intake of lunch meal during visit and 100% intake of majority of meals per vitals. No complaints. Awaiting placement. Will monitor. Information Obtained from:  
 [x] Chart Review [x] Patient 
 [] Family/Caregiver 
 [] Nurse/Physician 
 [] Interdisciplinary Meeting/Rounds Diet: Cardiac diet Medications: [x] Reviewed (Bumex) Allergies: [x] Reviewed (Shellfish) Encounter Diagnoses ICD-10-CM ICD-9-CM 1. Bacteremia R78.81 790.7 2. History of heroin abuse Z87.898 305.53 Past Medical History:  
Diagnosis Date  Arthritis  COPD  Hypertension Labs:   
Lab Results Component Value Date/Time  Sodium 139 02/07/2019 05:41 AM  
 Potassium 4.3 02/07/2019 05:41 AM  
 Chloride 94 (L) 02/07/2019 05:41 AM  
 CO2 39 (H) 02/07/2019 05:41 AM  
 Anion gap 6 02/07/2019 05:41 AM  
 Glucose 95 02/07/2019 05:41 AM  
 BUN 25 (H) 02/07/2019 05:41 AM  
 Creatinine 0.99 02/07/2019 05:41 AM  
 Calcium 8.6 02/07/2019 05:41 AM  
 Magnesium 1.9 02/11/2019 04:35 AM  
 Phosphorus 3.1 02/11/2019 04:35 AM  
 Albumin 2.1 (L) 01/28/2019 05:08 AM  
 
Anthropometrics: BMI (calculated): 29.7 Last 3 Recorded Weights in this Encounter 02/09/19 4920 02/10/19 2015 02/11/19 0430 Weight: 116.4 kg (256 lb 11.2 oz) 99.8 kg (220 lb) 99.4 kg (219 lb 3.2 oz) Ht Readings from Last 1 Encounters:  
01/30/19 6' (1.829 m) Weight Metrics 2/11/2019 1/21/2019 1/21/2019 1/21/2019 12/30/2018 12/23/2018 12/21/2018 Weight 219 lb 3.2 oz - 252 lb - 252 lb 6.8 oz - 246 lb 0.5 oz  
BMI - 29.73 kg/m2 - 34.18 kg/m2 - 34.24 kg/m2 33.37 kg/m2 Patient Vitals for the past 100 hrs: 
 % Diet Eaten 02/11/19 1253 100 % 02/11/19 0954 100 % 02/09/19 1823 100 % 02/09/19 1320 50 % 02/09/19 0943 100 % 02/08/19 1856 100 % 02/08/19 1310 100 % 02/08/19 0930 100 % 02/07/19 1739 100 % 02/07/19 1117 240 % 02/07/19 0930 100 % Estimated Nutrition Needs: [x] MSJ Calories: 0052-5686 Kcal Based on:   [x] Actual BW   
Protein:    g Based on:   [x] Actual BW Fluid:       9338-7214 ml Based on:   [x] Actual BW  
 
 [x] No Cultural, Mandaen or ethnic dietary need identified. [] Cultural, Mandaen and ethnic food preferences identified and addressed Wt Status:  [] Normal (18.6 - 24.9) [] Underweight (<18.5) [x] Overweight (25 - 29.9) [] Mild Obesity (30 - 34.9)  [] Moderate Obesity (35 - 39.9) [] Morbid Obesity (40+) Nutrition Problems Identified:  
[] Suboptimal PO intake [x] Food Allergies (Shellfish) [] Difficulty chewing/swallowing/poor dentition 
[] Constipation/Diarrhea  
[] Nausea/Vomiting  
[] None 
[] Other:  
 
Plan:  
[x] Therapeutic Diet [x]  Obtained/adjusted food preferences/tolerances and/or snacks options []  Supplements added  
[] Occupational therapy following for feeding techniques []  HS snack added  
[]  Modify diet texture  
[x]  Modify diet for food allergies []  Assist with menu selection  
[x]  Monitor PO intake on meal rounds  
[x]  Continue inpatient monitoring and intervention  
[]  Participated in discharge planning/Interdisciplinary rounds/Team meetings  
[]  Other:  
 
Education Needs: 
 [] Not appropriate for teaching at this time due to: 
 [x] Identified and addressed Nutrition Monitoring and Evaluation: 
[x] Continue ongoing monitoring and intervention 
[] Other [address]

## 2019-02-11 NOTE — PROGRESS NOTES
attempted to  Complete a follow up visit with patient in room 3002 and a Spiritual assessment of patient, but found patient resting peacefully at this time. No family seen at this visit. ,. Chaplains will continue to follow and will provide pastoral care on an as needed/requested basis Rupert [address] Board Certified [employer] Spiritual Care  
[phone]

## 2019-02-11 NOTE — PROGRESS NOTES
Problem: Self Care Deficits Care Plan (Adult) Goal: *Acute Goals and Plan of Care (Insert Text) Occupational Therapy Goals Initiated 1/30/2019 within 7 day(s). 1.  Patient will perform grooming tasks while standing with stand-by assistance for balance. 2.  Patient will perform lower body dressing with minimal assistance utilizing adaptive equipment/strategies, prn. 
3.  Patient will perform functional task in standing for 8 minutes with supervision and < 2 rest breaks to increase activity tolerance for functional transfers & ADLs. 4.  Patient will perform toilet transfers with supervision. 5.  Patient will perform all aspects of toileting with supervision. 6.  Patient will participate in upper extremity therapeutic exercise/activities for 8 minutes to increase BUE strength for functional transfers and ADLs. 7.  Patient will utilize energy conservation techniques during functional activities with minimal verbal cues. Outcome: Progressing Towards Goal 
Occupational Therapy TREATMENT Patient: Ashely Benson (67 y.o. male) Date: 2/11/2019 Diagnosis: Bacteremia Bacteremia Precautions: Fall Chart, occupational therapy assessment, plan of care, and goals were reviewed. PLOF: Independent ASSESSMENT: 
Pt requiring increase assist w/ADLs and bed mobility 2/2 c/o chronic back pain 9/10. Pt tolerates ~ 15 minutes sitting EOB performing UB and grooming ADLs. Pt fatigues easily, request return to supine. Pt requires increase time and assist w/BLE return to supine. Alerted Marcela LÓPEZ, pt's c/o pain. EDUCATION Pt educated on importance EOB/OOB activity to increase activity tolerance w/ADLs Progression toward goals: 
[]          Improving appropriately and progressing toward goals [x]          Improving slowly and progressing toward goals 
[]          Not making progress toward goals and plan of care will be adjusted PLAN: 
Patient continues to benefit from skilled intervention to address the above impairments. Continue treatment per established plan of care. Discharge Recommendations:  Skilled Nursing Facility/LTC Further Equipment Recommendations for Discharge:  rolling walker and wheelchair SUBJECTIVE:  
Patient stated I can't do anymore.  OBJECTIVE DATA SUMMARY: 
  
Cognitive/Behavioral Status: 
Neurologic State: Alert Orientation Level: Oriented X4 Cognition: Follows commands Functional Mobility and Transfers for ADLs: 
 Bed Mobility: 
Rolling: Moderate assistance; Additional time Supine to Sit: Additional time; Moderate assistance(w/HOB raised adn SRs) Sit to Supine: Additional time; Moderate assistance(assist w/BLE) Balance: 
Sitting: Intact ADL Intervention: 
Grooming Washing Face: Supervision/set-up Washing Hands: Supervision/set-up Brushing Teeth: Supervision/set-up Upper Body Bathing Bathing Assistance: Minimum assistance Position Performed: Seated edge of bed Upper Body Dressing Assistance Hospital Gown: Minimum  assistance Pain: 
Pre Treatment:9 Post Treatment:10 Pain Scale 1: Numeric (0 - 10) Pain Intensity 1: 6 Pain Location 1: Back Pain Orientation 1: Lower Pain Description 1: Aching Pain Intervention(s) 1: Medication (see MAR) Activity Tolerance:   
Poor 2/2 c/o pain Please refer to the flowsheet for vital signs taken during this treatment. After treatment:  
[]  Patient left in no apparent distress sitting up in chair 
[x]  Patient left in no apparent distress in bed 
[x]  Call bell left within reach [x]  Nursing notified 
[]  Caregiver present 
[]  Bed alarm activated Ramonita Mckinnon Time Calculation: 23 mins

## 2019-02-12 ENCOUNTER — APPOINTMENT (OUTPATIENT)
Dept: GENERAL RADIOLOGY | Age: 69
DRG: 049 | End: 2019-02-12
Attending: HOSPITALIST
Payer: MEDICAID

## 2019-02-12 LAB
ARTERIAL PATENCY WRIST A: YES
ARTERIAL PATENCY WRIST A: YES
BASE EXCESS BLD CALC-SCNC: 20 MMOL/L
BDY SITE: ABNORMAL
BDY SITE: ABNORMAL
BODY TEMPERATURE: 98.4
BODY TEMPERATURE: 98.7
CA-I SERPL-SCNC: 1.14 MMOL/L (ref 1.12–1.32)
GAS FLOW.O2 O2 DELIVERY SYS: ABNORMAL L/MIN
GAS FLOW.O2 O2 DELIVERY SYS: ABNORMAL L/MIN
GAS FLOW.O2 SETTING OXYMISER: 3 L/M
GLUCOSE BLD STRIP.AUTO-MCNC: 92 MG/DL (ref 70–110)
HCO3 BLD-SCNC: 46.5 MMOL/L (ref 22–26)
MAGNESIUM SERPL-MCNC: 2.1 MG/DL (ref 1.6–2.6)
O2/TOTAL GAS SETTING VFR VENT: 0.32 %
O2/TOTAL GAS SETTING VFR VENT: 50 %
PCO2 BLD: 96.7 MMHG (ref 35–45)
PCO2 BLD: >130 MMHG (ref 35–45)
PEEP RESPIRATORY: 8 CMH2O
PH BLD: 7.17 [PH] (ref 7.35–7.45)
PH BLD: 7.29 [PH] (ref 7.35–7.45)
PHOSPHATE SERPL-MCNC: 4.8 MG/DL (ref 2.5–4.9)
PIP ISTAT,IPIP: 20
PO2 BLD: 82 MMHG (ref 80–100)
PO2 BLD: 86 MMHG (ref 80–100)
SAO2 % BLD: 94 % (ref 92–97)
SERVICE CMNT-IMP: ABNORMAL
SERVICE CMNT-IMP: ABNORMAL
SPECIMEN TYPE: ABNORMAL
SPECIMEN TYPE: ABNORMAL
TOTAL RESP. RATE, ITRR: 16
TOTAL RESP. RATE, ITRR: 24

## 2019-02-12 PROCEDURE — 74011250637 HC RX REV CODE- 250/637: Performed by: HOSPITALIST

## 2019-02-12 PROCEDURE — 82330 ASSAY OF CALCIUM: CPT

## 2019-02-12 PROCEDURE — 36600 WITHDRAWAL OF ARTERIAL BLOOD: CPT

## 2019-02-12 PROCEDURE — 82803 BLOOD GASES ANY COMBINATION: CPT

## 2019-02-12 PROCEDURE — 83735 ASSAY OF MAGNESIUM: CPT

## 2019-02-12 PROCEDURE — 74011250636 HC RX REV CODE- 250/636: Performed by: HOSPITALIST

## 2019-02-12 PROCEDURE — 77010033678 HC OXYGEN DAILY

## 2019-02-12 PROCEDURE — 74011250636 HC RX REV CODE- 250/636: Performed by: FAMILY MEDICINE

## 2019-02-12 PROCEDURE — 82962 GLUCOSE BLOOD TEST: CPT

## 2019-02-12 PROCEDURE — 74011000258 HC RX REV CODE- 258: Performed by: INTERNAL MEDICINE

## 2019-02-12 PROCEDURE — 84100 ASSAY OF PHOSPHORUS: CPT

## 2019-02-12 PROCEDURE — 94660 CPAP INITIATION&MGMT: CPT

## 2019-02-12 PROCEDURE — 77030035694 HC MSK BIPAP FLL FAC PERFMAX PHIL -B

## 2019-02-12 PROCEDURE — 74011250636 HC RX REV CODE- 250/636: Performed by: INTERNAL MEDICINE

## 2019-02-12 PROCEDURE — 71045 X-RAY EXAM CHEST 1 VIEW: CPT

## 2019-02-12 PROCEDURE — 65660000001 HC RM ICU INTERMED STEPDOWN

## 2019-02-12 PROCEDURE — 74011250637 HC RX REV CODE- 250/637: Performed by: INTERNAL MEDICINE

## 2019-02-12 PROCEDURE — 36415 COLL VENOUS BLD VENIPUNCTURE: CPT

## 2019-02-12 RX ORDER — SODIUM CHLORIDE 9 MG/ML
500 INJECTION, SOLUTION INTRAVENOUS ONCE
Status: COMPLETED | OUTPATIENT
Start: 2019-02-12 | End: 2019-02-12

## 2019-02-12 RX ORDER — SODIUM CHLORIDE 9 MG/ML
1000 INJECTION, SOLUTION INTRAVENOUS ONCE
Status: COMPLETED | OUTPATIENT
Start: 2019-02-12 | End: 2019-02-12

## 2019-02-12 RX ADMIN — GUAIFENESIN 400 MG: 100 SOLUTION ORAL at 20:46

## 2019-02-12 RX ADMIN — GUAIFENESIN 400 MG: 100 SOLUTION ORAL at 08:12

## 2019-02-12 RX ADMIN — SODIUM CHLORIDE 500 ML: 900 INJECTION, SOLUTION INTRAVENOUS at 20:20

## 2019-02-12 RX ADMIN — SODIUM CHLORIDE 1000 ML: 900 INJECTION, SOLUTION INTRAVENOUS at 15:14

## 2019-02-12 RX ADMIN — TRAMADOL HYDROCHLORIDE 50 MG: 50 TABLET, FILM COATED ORAL at 08:13

## 2019-02-12 RX ADMIN — GUAIFENESIN 400 MG: 100 SOLUTION ORAL at 03:18

## 2019-02-12 RX ADMIN — HEPARIN SODIUM 5000 UNITS: 5000 INJECTION INTRAVENOUS; SUBCUTANEOUS at 17:54

## 2019-02-12 RX ADMIN — SIMVASTATIN 10 MG: 10 TABLET, FILM COATED ORAL at 21:13

## 2019-02-12 RX ADMIN — ERTAPENEM SODIUM 1 G: 1 INJECTION, POWDER, LYOPHILIZED, FOR SOLUTION INTRAMUSCULAR; INTRAVENOUS at 20:45

## 2019-02-12 RX ADMIN — GABAPENTIN 400 MG: 100 CAPSULE ORAL at 21:13

## 2019-02-12 RX ADMIN — GABAPENTIN 400 MG: 100 CAPSULE ORAL at 08:13

## 2019-02-12 RX ADMIN — TRAMADOL HYDROCHLORIDE 50 MG: 50 TABLET, FILM COATED ORAL at 03:18

## 2019-02-12 RX ADMIN — BUMETANIDE 1 MG: 1 TABLET ORAL at 08:12

## 2019-02-12 RX ADMIN — HEPARIN SODIUM 5000 UNITS: 5000 INJECTION INTRAVENOUS; SUBCUTANEOUS at 03:19

## 2019-02-12 RX ADMIN — PANTOPRAZOLE SODIUM 40 MG: 40 TABLET, DELAYED RELEASE ORAL at 08:13

## 2019-02-12 RX ADMIN — HEPARIN SODIUM 5000 UNITS: 5000 INJECTION INTRAVENOUS; SUBCUTANEOUS at 10:20

## 2019-02-12 RX ADMIN — LISINOPRIL 10 MG: 10 TABLET ORAL at 08:13

## 2019-02-12 RX ADMIN — ACETAMINOPHEN 650 MG: 325 TABLET ORAL at 20:50

## 2019-02-12 NOTE — PROGRESS NOTES
Called to room for ABG. Pt on 3lpm NC ABG done pH 7.175, PCO2 130, PO 82. Pt transferred to ICU for BIPAP.

## 2019-02-12 NOTE — PROGRESS NOTES
1435 Patient brought to ICU with RN and RT. Bipap placed on patient immediately. STAT CXR ordered. Patient has eyes closed but assisted with turning by holding onto side rail and gave a \"thumbs up\" after being on bipap for a few minutes. Rocco Collins to report  MAP's ranging in the 40's-50's. Order for 1 L NS bolus and repeat ABG. [phone] Order changed to 500 ml bolus by Dr. Jenny Collins when he came to bedside. ABG results given to MD by RT at this time. 1825 Patient remains on bipap continuing to have RR that matches settings but will follow simple commands when woken up. Condom cath placed for urinary incontinence. Bedside and Verbal shift change report given to Zuleyka [facility] (oncoming nurse) by Vanesa Simeon RN 
 (offgoing nurse).  Report included the following information SBAR, Kardex, Intake/Output, MAR and Cardiac Rhythm SR.

## 2019-02-12 NOTE — PROGRESS NOTES
Called by nurse bon Mooney that patient seemed different and fatigued. Had incontinence x1 incident. Went in to assess patient. Patient able to state name but unable to state where he is, time, or president. Very slow to respond/non responsive with decreased attention. Notified Dr Clara Dalton who ordered stat ABGs. 1405- ABGs showed low PH and PCO2 >125. Dr Clara Dalton ordered transfer to ICU for progression of care 1415- transferred patient to ICU with RT. Patient placed on BiPap upon arrival. Bedside shift report given to nurse Jeyson Galarza RN. SBAR, meds, and assessment discussed and provided opportunity for questions.

## 2019-02-12 NOTE — PROGRESS NOTES
Patient resting after eating lunch. Offered cough syrup for 1200 dose and patient stated he did not want/need it at this time. Offered toileting and asked if patient in pain. Denied pain and ate about 30% of lunch

## 2019-02-12 NOTE — PROGRESS NOTES
Problem: Falls - Risk of 
Goal: *Absence of Falls Document Cy Ariton Fall Risk and appropriate interventions in the flowsheet. Outcome: Progressing Towards Goal 
Fall Risk Interventions: 
Mobility Interventions: Bed/chair exit alarm, Communicate number of staff needed for ambulation/transfer Mentation Interventions: Adequate sleep, hydration, pain control, Bed/chair exit alarm, Door open when patient unattended, Familiar objects from home, Eyeglasses and hearing aids Medication Interventions: Bed/chair exit alarm, Evaluate medications/consider consulting pharmacy Elimination Interventions: Bed/chair exit alarm, Call light in reach History of Falls Interventions: Bed/chair exit alarm, Door open when patient unattended Problem: Pressure Injury - Risk of 
Goal: *Prevention of pressure injury Document Raul Scale and appropriate interventions in the flowsheet. Outcome: Progressing Towards Goal 
Pressure Injury Interventions: 
Sensory Interventions: Assess changes in LOC, Keep linens dry and wrinkle-free Moisture Interventions: Absorbent underpads Activity Interventions: Increase time out of bed, Pressure redistribution bed/mattress(bed type) Mobility Interventions: HOB 30 degrees or less, Pressure redistribution bed/mattress (bed type) Nutrition Interventions: Document food/fluid/supplement intake, Offer support with meals,snacks and hydration Friction and Shear Interventions: HOB 30 degrees or less

## 2019-02-12 NOTE — PROGRESS NOTES
INFECTIOUS DISEASE FOLLOW UP NOTE : 
 
Admit Date: 1/21/2019 Overview: 77year-old AA male w/ h/o polysubstance abuse including cocaine, heroin, T 10-11 destructive changes in November 2018 w/ nl ESR, CRP, negative bone/gallium scans - not felt to be infectious then but had Serratia UTI rx'd Cefepime/Ceftriaxone 5 days. Returned 1/21 with back pain, BSI Serratia 1/21 and CT 1/21 with similar destructive changes T9-10 levels c/w discitis/OM and intraspinal and paraspinal phlegmon s/p needle aspiration 1/23 cx + Serratia. Had recurrent respiratory failure requiring RRT 1/23, 1/24. Has residual bilateral pleural effusions on CXR 2/4. Refused surgery offered by Dr. Arslan Mane in November - willing to re-consider. Current abx Prior abx Ertapenem 2/5 - 7   abx 1/23 - 20 of 42 vs 56 Cefepime/vanco  11/14   2, Ceftriaxone 11/16 - 3; Zosyn 1/21-2  ceftaz 1/23-13, cipro 1/23-13  
  
Assessment -> Rec:  
  
Destructive T9-10 changes discitis/OM 
- mid-back pain x 5 months, fell, incontinent of urine - h/o IVDU - last use in July 2018 
- CTAP 11/2018: severe destructive changes T 10-11, paravertebral sot tissue infiltration, extens to ant epidural space w/ cord compression 
- unable to have MRI due to claustrophobia - Bone/ gallium scans 11/27 neg for discitis/OM, ESR 20, CRP 0.7 
- not given long term IV abx  
- refused stabilization procedure per Dr. Arslan Mane 
- returned 1/21 w/ worsened back pain, blcx + Serratia  
- CT 1/21: destructive changes T9-10 levels c/w discitis/OM and intraspinal and paraspinal phlegmon. Overall appearance unchanged and pain not different from previous - 1/22: esr 38, crp 7.4->1/31 57/2.1 
- s/p aspiration of fluid/phlegmon T10-11 discspace 1/23: (+) Serratia in anaerobic culture - potential for emergent AmpC- beta-lactamase- mediated resistance to third-generation cephalosporins not be evident during initial susc testing so Carbapenem is DOC 
- CRP down to 1.5 from 2.1 1/31. ESR 49 (from 57) -> continue Ertapenem 1 gm q 24 hours 6-8 weeks  
-> no accepting SNF or LTAC. May need to do OP infusion if back pain improves enough to permit this. BSI 2 of 2 Serratia marcescens 1/21 
- source ~ Discitis Phlegmon seen on CT 
- IR aspiration 1/23 chucky culture grew Serratia  
- denies any urinary complaint but had Serratia in urine in past. Currently UA is negative - Ucx was not sent but CT with normal kidneys and nothing sig on CT and pt asymptomatic except some incontinet 
- repeat Blcx 1/22 1/2 positive  
- blcx 1/28 NG x 2 
- BSI adequately treated at this time  -> remains on Ertapenem for osteomyelitis as above Acute hypoxic resp distress- RRT called 1/23 and again 1/24 sec to hypercapnic resp failure Bilateral pleural effusions- chronic with  atelectasis - monitor Hepatomegaly, splenic hilar & perisplenic varices- concern for cirrhosis and portal HTN 
- seen on CTAP again    
H/o IVDU 
- HIV ab NR 11/19 ,Hep BsAg neg (last reported ivdu August 2018, has hep C and thinks hep b immune) 
- denies current use -> avoid PICC 
   
COPD    
HTN    
DJD    
Allergy [place]    
  
MICROBIOLOGY:  
11/14   urcx >100,000 Serratia marcescens             blcx NG x 2 
11/15   CrAG neg, fungitell 375 (reported 11/17) 
11/29   blcx for fungus - ngtd 
            fungitell 113 
  
1/21     Blcx x2 Serratia marcescens R cefazolin UA neg 
1/22 Blcx 1 of 2 Serratia 1/23 Aspiration cx g/s neg, Cx NG 
 CHUCKY rare Serratia R Cefazolin Fungal NOS 
1/28  bctx x 2 ng 
  
LINES AND CATHETERS:  
piv 
  
MEDICATIONS:  
 
Current Facility-Administered Medications Medication Dose Route Frequency  lisinopril (PRINIVIL, ZESTRIL) tablet 10 mg  10 mg Oral DAILY  albuterol-ipratropium (DUO-NEB) 2.5 MG-0.5 MG/3 ML  3 mL Nebulization Q4H PRN  
 traMADol (ULTRAM) tablet 50 mg  50 mg Oral Q6H PRN  
  ertapenem (INVANZ) 1 g in 0.9% sodium chloride (MBP/ADV) 50 mL MBP  1 g IntraVENous Q24H  
 bumetanide (BUMEX) tablet 1 mg  1 mg Oral BID  pantoprazole (PROTONIX) tablet 40 mg  40 mg Oral DAILY  guaiFENesin (ROBITUSSIN) 100 mg/5 mL oral liquid 400 mg  400 mg Oral Q4H  
 lidocaine (PF) (XYLOCAINE) 10 mg/mL (1 %) injection 20 mL  20 mL SubCUTAneous Rad Multiple  gabapentin (NEURONTIN) capsule 400 mg  400 mg Oral TID  simvastatin (ZOCOR) tablet 10 mg  10 mg Oral QHS  acetaminophen (TYLENOL) tablet 650 mg  650 mg Oral Q6H PRN  
 ondansetron (ZOFRAN) injection 4 mg  4 mg IntraVENous Q6H PRN  
 heparin (porcine) injection 5,000 Units  5,000 Units SubCUTAneous Q8H SUBJECTIVE :  
 
Interval notes reviewed. Sleeping. Easily awakened. No fever or chills. Has not gotten up out of bed so far today. OBJECTIVE Visit Vitals /48 Pulse 98 Temp 98 °F (36.7 °C) Resp 20 Ht 6' (1.829 m) Wt 99.8 kg (220 lb 1.6 oz) SpO2 92% BMI 29.85 kg/m² Temp (24hrs), Av °F (36.7 °C), Min:97.4 °F (36.3 °C), Max:98.4 °F (36.9 °C) Gen:on NC O2, awake and alert HEENT: muddy sclerae, pupils equal and reactive Chest: Symmetric expansion, no crackles or wheezing CVS:S1S2 RR, No JVD or edema Abd:Soft, NT, ND, BS+ Skin:No jaundice, cyanosis, clubbing. No decubitus Muscsk: Normal muscle tone/strength CNS: AA and follows command Labs: Results:  
Chemistry No results for input(s): GLU, NA, K, CL, CO2, BUN, CREA, CA, AGAP, BUCR, TBIL, GPT, AP, TP, ALB, GLOB, AGRAT in the last 72 hours. CBC w/Diff No results for input(s): WBC, RBC, HGB, HCT, PLT, GRANS, LYMPH, EOS, HGBEXT, HCTEXT, PLTEXT, HGBEXT, HCTEXT, PLTEXT in the last 72 hours. RADIOLOGY :   
cxr  IMPRESSION: 
  
Stable appearing densities at both lung bases compatible with pleural effusions 
with adjacent atelectasis/infiltrate  CT chest IMPRESSION: 
  
 1.  Moderate bilateral pleural effusions, left > right, amenable to image guided 
thoracentesis if clinically appropriate.   
  
2. Progressive fragmentation and bone loss of T9 from known 
discitis/osteomyelitis at T9-10. 
  
3. Stable discitis/osteomyelitis changes at T3-4. 
  
4. Other chronic findings detailed above and without significant interval 
change. 
  
 
 
Arlet Whyte MD 
February 12, 2019 [facility] [phone]

## 2019-02-12 NOTE — PROGRESS NOTES
-1415-Pt. Transferred to ICU secondary to hypercarbia s/p ABG. Pt. Placed on Bipap 20/8, FIO2-50%. O2sats- 95% HR-103, RR-20. -[address]

## 2019-02-12 NOTE — PROGRESS NOTES
Internal Medicine Progress Note Patient's Name: Xiomara Estrada Admit Date: 1/21/2019 Length of Stay: 22 
 
 
Assessment/Plan Active Hospital Problems Diagnosis Date Noted  Metabolic encephalopathy 05/97/4183 Associated with sepsis, present on admission.  PNA (pneumonia) 01/26/2019  Bacteremia 01/21/2019  Acute on chronic respiratory failure with hypoxia ([address] 12/23/2018  Hypertension 11/14/2018  Hepatitis C 11/14/2018  Back pain 11/14/2018  Discitis of thoracic region 11/14/2018  Bilateral pleural effusion 11/14/2018 - Pt less responsive this AM, ABG revealed pH 7.17, PCO2 >125; transfer to Step Down for BIPAP 
- Cont IVAB w/ ertapenem x 6-8wks - If effusions need tapped, pulm rec left first 
- Cont fluid restriction 
- Cont PO bumex - BP still quite low even with changes yesterday, will d/c lopressor 
- Cont BiPAP qhs 
- Pain control PRN, avoid opiates (tramadol helping though) 
- PT/OT 
- Awaiting long term care, no accepting beds - Discussed potential surgery w/ patient, he is not interested at this time and not ready to commit if it was even an option. He would like to proceed with PT and see how things go - Cont acceptable home medications for chronic conditions  
- DVT protocol I have personally reviewed all pertinent labs and films that have officially resulted over the last 24 hours. I have personally checked for all pending labs that are awaiting final results. Subjective Pt s/e @ bedside Pt refused BIPAP overnight. This afternoon, was less responsive. ABG reveals hypercarbia (PCO2 > 125) and acidosis (pH 7.17). Will transfer to [facility] for BIPAP. Objective Visit Vitals /48 Pulse 98 Temp 98 °F (36.7 °C) Resp 20 Ht 6' (1.829 m) Wt 99.8 kg (220 lb 1.6 oz) SpO2 92% BMI 29.85 kg/m² Physical Exam: 
General Appearance: NAD, lethargic HEENT: AT/NC, moist mucosa Lungs: Decreased BS B/L with normal respiratory effort CV: RRR, no m/r/g Lab/Data Reviewed: 
BMP:  
No results found for: NA, K, CL, CO2, AGAP, GLU, BUN, CREA, GFRAA, GFRNA 
CBC:  
No results found for: WBC, HGB, HGBEXT, HCT, HCTEXT, PLT, PLTEXT, HGBEXT, HCTEXT, PLTEXT Imaging Reviewed: 
No results found. Medications Reviewed: 
Current Facility-Administered Medications Medication Dose Route Frequency  lisinopril (PRINIVIL, ZESTRIL) tablet 10 mg  10 mg Oral DAILY  albuterol-ipratropium (DUO-NEB) 2.5 MG-0.5 MG/3 ML  3 mL Nebulization Q4H PRN  
 traMADol (ULTRAM) tablet 50 mg  50 mg Oral Q6H PRN  
 ertapenem (INVANZ) 1 g in 0.9% sodium chloride (MBP/ADV) 50 mL MBP  1 g IntraVENous Q24H  
 bumetanide (BUMEX) tablet 1 mg  1 mg Oral BID  pantoprazole (PROTONIX) tablet 40 mg  40 mg Oral DAILY  guaiFENesin (ROBITUSSIN) 100 mg/5 mL oral liquid 400 mg  400 mg Oral Q4H  
 lidocaine (PF) (XYLOCAINE) 10 mg/mL (1 %) injection 20 mL  20 mL SubCUTAneous Rad Multiple  gabapentin (NEURONTIN) capsule 400 mg  400 mg Oral TID  simvastatin (ZOCOR) tablet 10 mg  10 mg Oral QHS  acetaminophen (TYLENOL) tablet 650 mg  650 mg Oral Q6H PRN  
 ondansetron (ZOFRAN) injection 4 mg  4 mg IntraVENous Q6H PRN  
 heparin (porcine) injection 5,000 Units  5,000 Units SubCUTAneous Q8H

## 2019-02-12 NOTE — PROGRESS NOTES
[facility] bedside [facility] Pt is AxO 4. IV flushed and patent. Pt denies pain at this time. Report included the follow information SBAR, Kardex, Procedure Summary, Intake/Output, MAR, Recent Lab Results, and Cardiac Rhythm @ sinus rhythm. Pt educated on call bell when in need of help/assistance. Pt verbalizes understanding. Will resume care and monitor pt. 2030- due meds administered/abx hung 2213- due meds given. Pt stating he is hungry. Box lunch given. 2350- pt refusing his robitussin at this time. Reassessment complete. No changes noted. 4046- due meds given. Pt c/o pain in back. Prn pain med given. 0345- Reassessment complete. No changes noted. Pt vocalizes pain has decreased. 0430- pt sleeping soundly. No behavioral indication of pain. Will continue to monitor. Bedside shift change report given to Doni Stringer RN (oncoming nurse) by CAMERON Atkinson (offgoing nurse). Report included the following information SBAR, Kardex, Intake/Output, MAR, Accordion, Recent Results and Cardiac Rhythm sinus rhythm/sinus tach.

## 2019-02-13 ENCOUNTER — APPOINTMENT (OUTPATIENT)
Dept: GENERAL RADIOLOGY | Age: 69
DRG: 049 | End: 2019-02-13
Attending: HOSPITALIST
Payer: MEDICAID

## 2019-02-13 LAB
ANION GAP SERPL CALC-SCNC: 3 MMOL/L (ref 3–18)
BASOPHILS # BLD: 0 K/UL (ref 0–0.1)
BASOPHILS NFR BLD: 1 % (ref 0–2)
BNP SERPL-MCNC: 16 PG/ML (ref 0–900)
BUN SERPL-MCNC: 31 MG/DL (ref 7–18)
BUN/CREAT SERPL: 29 (ref 12–20)
CA-I SERPL-SCNC: 1.1 MMOL/L (ref 1.12–1.32)
CALCIUM SERPL-MCNC: 8.5 MG/DL (ref 8.5–10.1)
CHLORIDE SERPL-SCNC: 95 MMOL/L (ref 100–108)
CO2 SERPL-SCNC: 43 MMOL/L (ref 21–32)
CREAT SERPL-MCNC: 1.07 MG/DL (ref 0.6–1.3)
DIFFERENTIAL METHOD BLD: ABNORMAL
EOSINOPHIL # BLD: 0 K/UL (ref 0–0.4)
EOSINOPHIL NFR BLD: 0 % (ref 0–5)
ERYTHROCYTE [DISTWIDTH] IN BLOOD BY AUTOMATED COUNT: 15 % (ref 11.6–14.5)
GLUCOSE SERPL-MCNC: 89 MG/DL (ref 74–99)
HCT VFR BLD AUTO: 36.8 % (ref 36–48)
HGB BLD-MCNC: 11 G/DL (ref 13–16)
LYMPHOCYTES # BLD: 1.9 K/UL (ref 0.9–3.6)
LYMPHOCYTES NFR BLD: 41 % (ref 21–52)
MAGNESIUM SERPL-MCNC: 2.1 MG/DL (ref 1.6–2.6)
MCH RBC QN AUTO: 28.1 PG (ref 24–34)
MCHC RBC AUTO-ENTMCNC: 29.9 G/DL (ref 31–37)
MCV RBC AUTO: 94.1 FL (ref 74–97)
MONOCYTES # BLD: 0.7 K/UL (ref 0.05–1.2)
MONOCYTES NFR BLD: 14 % (ref 3–10)
NEUTS SEG # BLD: 2 K/UL (ref 1.8–8)
NEUTS SEG NFR BLD: 44 % (ref 40–73)
PHOSPHATE SERPL-MCNC: 4.7 MG/DL (ref 2.5–4.9)
PLATELET # BLD AUTO: 130 K/UL (ref 135–420)
PMV BLD AUTO: 11.1 FL (ref 9.2–11.8)
POTASSIUM SERPL-SCNC: 4.8 MMOL/L (ref 3.5–5.5)
RBC # BLD AUTO: 3.91 M/UL (ref 4.7–5.5)
SODIUM SERPL-SCNC: 141 MMOL/L (ref 136–145)
WBC # BLD AUTO: 4.6 K/UL (ref 4.6–13.2)

## 2019-02-13 PROCEDURE — 94660 CPAP INITIATION&MGMT: CPT

## 2019-02-13 PROCEDURE — 74011250636 HC RX REV CODE- 250/636: Performed by: HOSPITALIST

## 2019-02-13 PROCEDURE — 97530 THERAPEUTIC ACTIVITIES: CPT

## 2019-02-13 PROCEDURE — 84100 ASSAY OF PHOSPHORUS: CPT

## 2019-02-13 PROCEDURE — 83735 ASSAY OF MAGNESIUM: CPT

## 2019-02-13 PROCEDURE — 77030037878 HC DRSG MEPILEX >48IN BORD MOLN -B

## 2019-02-13 PROCEDURE — 74011000258 HC RX REV CODE- 258: Performed by: INTERNAL MEDICINE

## 2019-02-13 PROCEDURE — 65660000000 HC RM CCU STEPDOWN

## 2019-02-13 PROCEDURE — 74011250637 HC RX REV CODE- 250/637: Performed by: INTERNAL MEDICINE

## 2019-02-13 PROCEDURE — 74011250637 HC RX REV CODE- 250/637: Performed by: HOSPITALIST

## 2019-02-13 PROCEDURE — 82330 ASSAY OF CALCIUM: CPT

## 2019-02-13 PROCEDURE — 85025 COMPLETE CBC W/AUTO DIFF WBC: CPT

## 2019-02-13 PROCEDURE — 36415 COLL VENOUS BLD VENIPUNCTURE: CPT

## 2019-02-13 PROCEDURE — 80048 BASIC METABOLIC PNL TOTAL CA: CPT

## 2019-02-13 PROCEDURE — 74011250636 HC RX REV CODE- 250/636: Performed by: INTERNAL MEDICINE

## 2019-02-13 PROCEDURE — 77010033678 HC OXYGEN DAILY

## 2019-02-13 PROCEDURE — 71045 X-RAY EXAM CHEST 1 VIEW: CPT

## 2019-02-13 PROCEDURE — 74011000250 HC RX REV CODE- 250: Performed by: HOSPITALIST

## 2019-02-13 PROCEDURE — 83880 ASSAY OF NATRIURETIC PEPTIDE: CPT

## 2019-02-13 PROCEDURE — 97535 SELF CARE MNGMENT TRAINING: CPT

## 2019-02-13 RX ORDER — BUMETANIDE 0.25 MG/ML
1 INJECTION INTRAMUSCULAR; INTRAVENOUS 2 TIMES DAILY
Status: DISCONTINUED | OUTPATIENT
Start: 2019-02-13 | End: 2019-02-15

## 2019-02-13 RX ADMIN — SIMVASTATIN 10 MG: 10 TABLET, FILM COATED ORAL at 21:25

## 2019-02-13 RX ADMIN — GUAIFENESIN 400 MG: 100 SOLUTION ORAL at 12:28

## 2019-02-13 RX ADMIN — PANTOPRAZOLE SODIUM 40 MG: 40 TABLET, DELAYED RELEASE ORAL at 09:22

## 2019-02-13 RX ADMIN — GUAIFENESIN 400 MG: 100 SOLUTION ORAL at 09:22

## 2019-02-13 RX ADMIN — HEPARIN SODIUM 5000 UNITS: 5000 INJECTION INTRAVENOUS; SUBCUTANEOUS at 09:24

## 2019-02-13 RX ADMIN — ERTAPENEM SODIUM 1 G: 1 INJECTION, POWDER, LYOPHILIZED, FOR SOLUTION INTRAMUSCULAR; INTRAVENOUS at 19:20

## 2019-02-13 RX ADMIN — GABAPENTIN 400 MG: 100 CAPSULE ORAL at 09:22

## 2019-02-13 RX ADMIN — GUAIFENESIN 400 MG: 100 SOLUTION ORAL at 21:24

## 2019-02-13 RX ADMIN — TRAMADOL HYDROCHLORIDE 50 MG: 50 TABLET, FILM COATED ORAL at 14:21

## 2019-02-13 RX ADMIN — GUAIFENESIN 400 MG: 100 SOLUTION ORAL at 17:22

## 2019-02-13 RX ADMIN — GUAIFENESIN 400 MG: 100 SOLUTION ORAL at 06:27

## 2019-02-13 RX ADMIN — HEPARIN SODIUM 5000 UNITS: 5000 INJECTION INTRAVENOUS; SUBCUTANEOUS at 17:23

## 2019-02-13 RX ADMIN — GABAPENTIN 400 MG: 100 CAPSULE ORAL at 17:22

## 2019-02-13 RX ADMIN — BUMETANIDE 1 MG: 0.25 INJECTION INTRAMUSCULAR; INTRAVENOUS at 17:25

## 2019-02-13 RX ADMIN — GUAIFENESIN 400 MG: 100 SOLUTION ORAL at 02:28

## 2019-02-13 RX ADMIN — ACETAMINOPHEN 650 MG: 325 TABLET ORAL at 06:27

## 2019-02-13 RX ADMIN — HEPARIN SODIUM 5000 UNITS: 5000 INJECTION INTRAVENOUS; SUBCUTANEOUS at 02:28

## 2019-02-13 RX ADMIN — BUMETANIDE 1 MG: 1 TABLET ORAL at 09:22

## 2019-02-13 RX ADMIN — GABAPENTIN 400 MG: 100 CAPSULE ORAL at 21:25

## 2019-02-13 NOTE — PROGRESS NOTES
1915 
Bedside SBAR report taken from [facility]. Patient is more alert now than earlier per RN. He is on Bipap machine. BP with MAP at 70 
 
2010 Spoke with dr reddy to let him know that this patient's blood pressure is low 49/33, bolus of 500mls ordered. Went into room to check on patient and he would not arouse to painful stimuli, had to pinch nipples in order to get him to arouse. BS obtained = 92. Bolus started. Patient is agitated and irritated at this time and will not listen to the nurse explaining why he needs the bipap 2030 
500ml NS bolus is complete. Patient will not keep bipap on. Is getting aggressive, pushing my hand away and telling me not to touch him. Has pulled off his pulse ox probe. Have talked him into letting me place nasal cannula on him. Provided him with some water and night time medications. BP a little better. Lungs sound clear. Has a very congested cough. [address],25 A Patient is calm at the moment. He tried to get out of bed, he took his oxygen off and pulse oximeter probe off and would let me put them back on . When I and another staff member tried to help him back in the bed, he pinched me and swatted telling me to leave him alone. Was able to get him back in the bed by coaxing him with pudding. He took all of his medications. 0500 Patient tried to get out of bed. Took off his gown and oxygen. Talked him into getting back into the bed, situated all pads underneath him, changed his gown and got him comfortable. Turned on TV for distraction and so far calmer 
 
0700 Patient oriented to Name, hospital, but no the name of it, and month, can't always get the date right. FOllows commands. [address] [facility] Bedside SBAR report given to incoming RN Al Sandoval. Patient on 1 liter of oxygen via NC. Had given him tylenol already for pain in his back. Good urine output

## 2019-02-13 NOTE — PROGRESS NOTES
Problem: Falls - Risk of 
Goal: *Absence of Falls Document Lore Fermin Fall Risk and appropriate interventions in the flowsheet. Outcome: Progressing Towards Goal 
Fall Risk Interventions: 
Mobility Interventions: Assess mobility with egress test, Bed/chair exit alarm, Patient to call before getting OOB, PT Consult for mobility concerns, OT consult for ADLs, Strengthening exercises (ROM-active/passive) Mentation Interventions: Adequate sleep, hydration, pain control, Bed/chair exit alarm, Door open when patient unattended, Increase mobility, More frequent rounding, Reorient patient, Room close to nurse's station Medication Interventions: Patient to call before getting OOB Elimination Interventions: Bed/chair exit alarm, Call light in reach, Patient to call for help with toileting needs, Toileting schedule/hourly rounds History of Falls Interventions: Bed/chair exit alarm, Door open when patient unattended Problem: Pressure Injury - Risk of 
Goal: *Prevention of pressure injury Document Raul Scale and appropriate interventions in the flowsheet. Outcome: Progressing Towards Goal 
Pressure Injury Interventions: 
Sensory Interventions: Assess changes in LOC, Discuss PT/OT consult with provider, Keep linens dry and wrinkle-free Moisture Interventions: Absorbent underpads Activity Interventions: Pressure redistribution bed/mattress(bed type), PT/OT evaluation Mobility Interventions: Pressure redistribution bed/mattress (bed type), HOB 30 degrees or less, PT/OT evaluation Nutrition Interventions: Document food/fluid/supplement intake, Offer support with meals,snacks and hydration Friction and Shear Interventions: Apply protective barrier, creams and emollients, HOB 30 degrees or less, Foam dressings/transparent film/skin sealants, Lift sheet

## 2019-02-13 NOTE — PROGRESS NOTES
Problem: Mobility Impaired (Adult and Pediatric) Goal: *Acute Goals and Plan of Care (Insert Text) Physical Therapy Goals Reviewed and remain appropriate 2/13/2019 Initiated 2/8/2019 and to be accomplished within 7 day(s) 1. Patient will move from supine to sit and sit to supine in bed with supervision/set-up. 2.  Patient will transfer from bed to chair and chair to bed with supervision/set-up using the least restrictive device. 3.  Patient will perform sit to stand with supervision/set-up. 4.  Patient will ambulate with supervision/set-up for 50 feet with the least restrictive device. Outcome: Progressing Towards Goal 
 
PHYSICAL THERAPY: Daily TREATMENT Note INPATIENT: Medicaid: Hospital Day: 24 Patient: Emily Zambrano (29 y.o. male)    Date: 2/13/2019 Primary Diagnosis: Bacteremia Precautions: Fall Chart, physical therapy assessment, plan of care and goals were reviewed. PLOF: Mod I with ww ASSESSMENT: 
Re-assessed s/p transfer to ICU for bipap. Now on 1L O2 via nasal cannula. Mod A for supine to sit. Min A for sit to stand from elevated bed height. Remained standing at bedside with ww min A for 3 minutes. Completed side steps x3ft with ww with mod A. Physical assistance required for manipulation of ww. Returned to seated at EOB. Mod A for sit to supine. Seated in bed with HOB elevated and all needs in reach. RN Jonnie Sinclair aware. BP pre mobility 118/67 BP post mobility 107/59 Progression toward goals: 
          Improving slowly and progressing toward goals PLAN: 
Patient continues to benefit from skilled intervention to address the above impairments. Continue treatment per established plan of care. EDUCATION:  
Education:  Patient was educated on the following topics: Bed mobility, transfers, ADLs, balance, amb, safety, exercise, role of PT, plan of care, cognition, skin integrity, vitals Barriers to Learning/Limitations: yes;  cognitive Compensate with: visual, verbal, tactile, kinesthetic cues/model Discharge Recommendations:  Franki Bains Further Equipment Recommendations for Discharge:  rolling walker SUBJECTIVE:  
Patient stated I'm tired.  OBJECTIVE DATA SUMMARY:  
Critical Behavior: 
Neurologic State: Alert Orientation Level: Oriented to person, Oriented to place Cognition: Follows commands Functional Mobility: 
 
 
Functional Status Indep (I) Mod I Super-vision Min A Mod A Max A Total A Assist x2 Verbal cues Additional time Not tested Comments Rolling []  []  [] []    []    []  []  [] [] [] [x] Supine to sit []  []  [] []  [x]  []  []  [] [] [] [] Sit to supine []  []  [] []  [x]  []  []  [] [] [] [] Sit to stand []  []  [] [x]  []  []  []  [] [] [] []   
Stand to sit []  []  [] [x]  []  []  []  [] [] [] [] Bed to chair transfers []  []  [] []  []  []  []  [] [] [] [x] Balance Good Anastasia Panda Poor Unable Not tested Comments Sitting static []  [x]  []  []  [] Sitting dynamic []  [x]  []  []  []   
Standing static []  [x]  []  []  []   
Standing dynamic []  []  [x]  []  [] Mobility/Gait:  
Level of Assistance: Moderate Assistance Assistive Device: rolling walker Distance Ambulated: 3 feet Neuro Re-Education: 
Seated EOB 5 minutes Standing 3 minutes Pain:Pre treatment pain level: 4 Post treatment pain level: 4Activity Tolerance:  
Fair After treatment:  
Patient left in no apparent distress in bed Call bell left within reach Nursing notified Anika White, PT Time Calculation: 16 mins

## 2019-02-13 NOTE — PROGRESS NOTES
Problem: Self Care Deficits Care Plan (Adult) Goal: *Acute Goals and Plan of Care (Insert Text) Occupational Therapy Goals Initiated 1/30/2019 within 7 day(s). 1.  Patient will perform grooming tasks while standing with stand-by assistance for balance. 2.  Patient will perform lower body dressing with minimal assistance utilizing adaptive equipment/strategies, prn. 
3.  Patient will perform functional task in standing for 8 minutes with supervision and < 2 rest breaks to increase activity tolerance for functional transfers & ADLs. 4.  Patient will perform toilet transfers with supervision. 5.  Patient will perform all aspects of toileting with supervision. 6.  Patient will participate in upper extremity therapeutic exercise/activities for 8 minutes to increase BUE strength for functional transfers and ADLs. 7.  Patient will utilize energy conservation techniques during functional activities with minimal verbal cues. Outcome: Progressing Towards Goal 
Occupational Therapy TREATMENT Patient: Xiomara Estrada (67 y.o. male) Date: 2/13/2019 Diagnosis: Bacteremia Bacteremia Precautions: Fall Chart, occupational therapy assessment, plan of care, and goals were reviewed. PLOF: Independent ASSESSMENT: 
Pt c/o soiled w/urine request assist w/hygiene. Pt requires increase time w/bed mobility to maneuver to EOB and vc's for cervical extension/BUE shoulder retraction. Pt performs anterior polly-care seated EOB w/set-up and increase time. Pt requires CGA standing w/RW for posterior polly-care and Min Assist for thoroughness. Pt requires assist w/BLE, returning to supine 2/2 BLE weakness. Pt left w/all needs within reach. EDUCATION Continued education importance posture EOB/standing to minimize c/o back pain. Progression toward goals: 
[]          Improving appropriately and progressing toward goals [x]          Improving slowly and progressing toward goals []          Not making progress toward goals and plan of care will be adjusted PLAN: 
Patient continues to benefit from skilled intervention to address the above impairments. Continue treatment per established plan of care. Discharge Recommendations:  Franki Bains Further Equipment Recommendations for Discharge:  wheelchair SUBJECTIVE:  
Patient stated Traci Belle give me so many water pills and I have to go.  OBJECTIVE DATA SUMMARY: 
  
Cognitive/Behavioral Status: 
Neurologic State: Alert Orientation Level: Oriented to time Cognition: Follows commands Functional Mobility and Transfers for ADLs: 
 Bed Mobility: 
Supine to Sit: Additional time;Contact guard assistance Sit to Supine: Moderate assistance(assist w/BLE) Transfers: 
Sit to Stand: Contact guard assistance(w/RW) Balance: 
Sitting: Intact Standing: Impaired; With support Standing - Static: Fair Standing - Dynamic : None ADL Intervention: 
Grooming Washing Face: Supervision/set-up Washing Hands: Supervision/set-up Lower Body Bathing Perineal  : Minimum assistance(assist for thoroughness) Position Performed: Standing Pain: 
Pre Treatment:3 Post Treatment:3 Pain Scale 1: Numeric (0 - 10) Pain Intensity 1: 3 Pain Location 1: Back Pain Orientation 1: Lower; Left Pain Description 1: Aching Pain Intervention(s) 1: Medication (see MAR) Activity Tolerance:   
Fair, pt fatigues easily Please refer to the flowsheet for vital signs taken during this treatment. After treatment:  
[]  Patient left in no apparent distress sitting up in chair 
[x]  Patient left in no apparent distress in bed in chair position 
[x]  Call bell left within reach 
[]  Nursing notified 
[]  Caregiver present 
[]  Bed alarm activated Pranay Landers Time Calculation: 18 mins

## 2019-02-13 NOTE — ROUTINE PROCESS
TRANSFER - OUT REPORT: 
 
Verbal report given to Shawn Ott RN(name) on Barbie Hale  being transferred to [facility](unit) for routine progression of care Report consisted of patients Situation, Background, Assessment and  
Recommendations(SBAR). Information from the following report(s) SBAR, Kardex, Intake/Output, MAR and Cardiac Rhythm SR was reviewed with the receiving nurse. Lines:  
Peripheral IV 73/47/97 Right Basilic (Active) Site Assessment Clean, dry, & intact 2/13/2019  4:00 AM  
Phlebitis Assessment 0 2/13/2019  4:00 AM  
Infiltration Assessment 0 2/13/2019  4:00 AM  
Dressing Status Clean, dry, & intact 2/13/2019  4:00 AM  
Dressing Type Transparent;Tape 2/13/2019  4:00 AM  
Hub Color/Line Status Blue;Capped; Patent 2/13/2019  4:00 AM  
Action Taken Open ports on tubing capped 2/13/2019  4:00 AM  
Alcohol Cap Used Yes 2/13/2019  4:00 AM  
  
 
Opportunity for questions and clarification was provided. Patient transported with: 
 Monitor O2 @ 1 liters Registered Nurse

## 2019-02-13 NOTE — DIABETES MGMT
NUTRITIONAL RE -ASSESSMENT AND GLYCEMIC CONTROL PLAN OF CARE Sae Millan           77 y.o.           1/21/2019 1. Bacteremia 2. History of heroin abuse HTN, hx COPD, Hep C. 
 []  No Cultural, Congregation or ethnic dietary need identified. [x]  Cultural, Congregation and ethnic food preferences identified and addressed [x]  Participated in discharge planning/Interdisciplinary rounds Food allergies: []  No        [x]  Adrián Mendez ASSESSMENT:  
Pt seen on meal rounds. Intake is >75% of his calorie and protein requirements. Based on current wt he is 124% ideal wt with BMI=30kg/m2. Pt is well nourished. No dietary problems at this time. INTERVENTIONS/PLAN:  
Continue present cardiac diet. SUBJECTIVE/OBJECTIVE: Information obtained from: Pt/ICU rounds Diet:Cardiac -shellfish allergy Patient Vitals for the past 100 hrs: 
 % Diet Eaten 02/13/19 1000 100 % 02/12/19 0823 75 % 02/11/19 2308 100 % 02/11/19 1600 100 % 02/11/19 1253 100 % 02/11/19 0954 100 % 02/09/19 1823 100 % 02/09/19 1320 50 % Medications: [x]                Reviewed Most Recent POC Glucose:  
Recent Labs  
  02/13/19 
0400 GLU 89 Labs:  
Lab Results Component Value Date/Time Hemoglobin A1c 5.8 12/06/2010 04:45 AM  
Meets criteria for prediabetes in 2010, current BG is in the target range Lab Results Component Value Date/Time Sodium 141 02/13/2019 04:00 AM  
 Potassium 4.8 02/13/2019 04:00 AM  
 Chloride 95 (L) 02/13/2019 04:00 AM  
 CO2 43 (HH) 02/13/2019 04:00 AM  
 Anion gap 3 02/13/2019 04:00 AM  
 Glucose 89 02/13/2019 04:00 AM  
 BUN 31 (H) 02/13/2019 04:00 AM  
 Creatinine 1.07 02/13/2019 04:00 AM  
 Calcium 8.5 02/13/2019 04:00 AM  
 Magnesium 2.1 02/13/2019 04:00 AM  
 Phosphorus 4.7 02/13/2019 04:00 AM  
 Albumin 2.1 (L) 01/28/2019 05:08 AM  
 
 
Anthropometrics: 124% Ideal Wt   ,  , BMI (calculated): 30 kg/m2 Wt Readings from Last 1 Encounters: 02/13/19 100.5 kg (221 lb 9.6 oz) Ht Readings from Last 1 Encounters:  
01/30/19 6' (1.829 m) Estimated Nutrition Needs:  ,      2200 calories, 84 g protein      fluid reqrts 2.2 liters/d Based on:   [x]          Actual BW    []          IBW   []            Adjusted BW   
Nutrition Diagnoses: as stated above Nutrition Interventions: continue current cardiac diet Goal:  
   Intake will meet >75% of energy and protein requirements by2/18-met . Glucose will be within target range of 70-180mg/dl by 2/16-met . Gradual weight loss post discharge 1 lb per week or weight maintenance acceptable due to dx.by 2/23/19. Nutrition Monitoring and Evaluation []     Monitor po intake on meal rounds 
[x]     Continue inpatient monitoring and intervention 
[]     Other: 
 
 
Nutrition Risk:  []   High     []  Moderate    [x]  Minimal/Uncompromised Gaby Whitt RD 
pgr [phone]

## 2019-02-13 NOTE — PROGRESS NOTES
Assumed care of patient at change of shift. Patient alert and oriented to self, place, and situation. Education completed by multiple staff about patient needing to wear cpap at night. Patient verbalized understanding. PT worked with patient and patient was able to sit on side of bed as well as stand at side of bed.

## 2019-02-13 NOTE — PROGRESS NOTES
Internal Medicine Progress Note Patient's Name: Kavon Conrad Admit Date: 1/21/2019 Length of Stay: 23 
 
 
Assessment/Plan Active Hospital Problems Diagnosis Date Noted  Metabolic encephalopathy 24/56/0021 Associated with sepsis, present on admission.  PNA (pneumonia) 01/26/2019  Bacteremia 01/21/2019  Acute on chronic respiratory failure with hypoxia ([facility] [address] 12/23/2018  Hypertension 11/14/2018  Hepatitis C 11/14/2018  Back pain 11/14/2018  Discitis of thoracic region 11/14/2018  Bilateral pleural effusion 11/14/2018  
 
- Cont IVAB w/ ertapenem x 6-8wks - If effusions need tapped, pulm rec left first 
- Cont fluid restriction - switch from PO to IV bumex d/t worsening pulm edema on CXR 
- Intermittent hypotension; will d/c lisinopril 
- Cont BiPAP qhs 
- Pain control PRN, avoid opiates (tramadol helping though) 
- PT/OT 
- Awaiting long term care, no accepting beds - Discussed potential surgery w/ patient, he is not interested at this time and not ready to commit if it was even an option. He would like to proceed with PT and see how things go - Cont acceptable home medications for chronic conditions  
- DVT protocol I have personally reviewed all pertinent labs and films that have officially resulted over the last 24 hours. I have personally checked for all pending labs that are awaiting final results. Subjective Pt s/e @ bedside Used BIPAP yesterday in Step Down. Pt alert and oriented this AM. Will transfer to floor. Objective Visit Vitals /59 Pulse (!) 109 Temp 98.6 °F (37 °C) Resp 22 Ht 6' (1.829 m) Wt 100.5 kg (221 lb 9.6 oz) SpO2 97% BMI 30.05 kg/m² Physical Exam: 
General Appearance: NAD, alert, oriented HEENT: AT/NC, moist mucosa Lungs: Decreased BS B/L with normal respiratory effort CV: RRR, no m/r/g Lab/Data Reviewed: 
BMP:  
Lab Results Component Value Date/Time   02/13/2019 04:00 AM  
 K 4.8 02/13/2019 04:00 AM  
 CL 95 (L) 02/13/2019 04:00 AM  
 CO2 43 (HH) 02/13/2019 04:00 AM  
 AGAP 3 02/13/2019 04:00 AM  
 GLU 89 02/13/2019 04:00 AM  
 BUN 31 (H) 02/13/2019 04:00 AM  
 CREA 1.07 02/13/2019 04:00 AM  
 GFRAA >60 02/13/2019 04:00 AM  
 GFRNA >60 02/13/2019 04:00 AM  
 
CBC:  
Lab Results Component Value Date/Time WBC 4.6 02/13/2019 04:00 AM  
 HGB 11.0 (L) 02/13/2019 04:00 AM  
 HCT 36.8 02/13/2019 04:00 AM  
  (L) 02/13/2019 04:00 AM  
 
 
Imaging Reviewed: 
Zoraida Truong Chest [facility] Result Date: 2/13/2019 EXAM:  PORTABLE CHEST INDICATION:  Coarse crackles on auscultation. TECHNIQUE:  Portable, erect AP view. COMPARISON:  02/12/2019 ____________________ FINDINGS:  SUPPORT DEVICES: None. HEART AND MEDIASTINUM: Cardiac silhouette is not well evaluated due to adjacent bilateral opacities. LUNGS AND PLEURAL SPACES: Progression of pulmonary edema with progression of bibasilar opacities. No pneumothorax. BONY THORAX AND SOFT TISSUES: No acute osseous abnormality. Degenerative changes around the visualized shoulders. ____________________ IMPRESSION:  Findings consistent with progression of pulmonary edema with progression of bibasilar opacities, atelectasis, pneumonia, sequela of edema, and/or pleural effusions. Xr Chest [facility] Result Date: 2/12/2019 HISTORY: -From Provider: respiratory distress -Additional: None Technique : AP PORTABLE CHEST Time of study: 14:58 Comparison : 02/04/19. Findings:Improved aeration in the upper lung zones, with persistent bilateral pleural effusions and subjacent atelectasis and/or consolidation silhouetting the diaphragms and costophrenic angles. Aortic vascular calcification. No change in bony findings. Impression: Improved aeration with residual bilateral pleural effusions and subjacent atelectasis and/or consolidation. Medications Reviewed: 
Current Facility-Administered Medications Medication Dose Route Frequency  bumetanide (BUMEX) injection 1 mg  1 mg IntraVENous BID  lisinopril (PRINIVIL, ZESTRIL) tablet 10 mg  10 mg Oral DAILY  albuterol-ipratropium (DUO-NEB) 2.5 MG-0.5 MG/3 ML  3 mL Nebulization Q4H PRN  
 traMADol (ULTRAM) tablet 50 mg  50 mg Oral Q6H PRN  
 ertapenem (INVANZ) 1 g in 0.9% sodium chloride (MBP/ADV) 50 mL MBP  1 g IntraVENous Q24H  pantoprazole (PROTONIX) tablet 40 mg  40 mg Oral DAILY  guaiFENesin (ROBITUSSIN) 100 mg/5 mL oral liquid 400 mg  400 mg Oral Q4H  
 lidocaine (PF) (XYLOCAINE) 10 mg/mL (1 %) injection 20 mL  20 mL SubCUTAneous Rad Multiple  gabapentin (NEURONTIN) capsule 400 mg  400 mg Oral TID  simvastatin (ZOCOR) tablet 10 mg  10 mg Oral QHS  acetaminophen (TYLENOL) tablet 650 mg  650 mg Oral Q6H PRN  
 ondansetron (ZOFRAN) injection 4 mg  4 mg IntraVENous Q6H PRN  
 heparin (porcine) injection 5,000 Units  5,000 Units SubCUTAneous Q8H

## 2019-02-13 NOTE — PROGRESS NOTES
Problem: Falls - Risk of 
Goal: *Absence of Falls Document Christiano Quick Fall Risk and appropriate interventions in the flowsheet. Outcome: Progressing Towards Goal 
Fall Risk Interventions: 
Mobility Interventions: OT consult for ADLs, Patient to call before getting OOB, PT Consult for assist device competence, PT Consult for mobility concerns Mentation Interventions: Door open when patient unattended, More frequent rounding Medication Interventions: Patient to call before getting OOB, Teach patient to arise slowly Elimination Interventions: Call light in reach, Patient to call for help with toileting needs, Urinal in reach History of Falls Interventions: Room close to nurse's station, Door open when patient unattended Problem: Pressure Injury - Risk of 
Goal: *Prevention of pressure injury Document Ralu Scale and appropriate interventions in the flowsheet. Outcome: Progressing Towards Goal 
Pressure Injury Interventions: 
Sensory Interventions: Assess changes in LOC, Discuss PT/OT consult with provider, Keep linens dry and wrinkle-free, Maintain/enhance activity level, Monitor skin under medical devices, Pressure redistribution bed/mattress (bed type), Sit a 90-degree angle/use footstool if needed, Turn and reposition approx. every two hours (pillows and wedges if needed) Moisture Interventions: Absorbent underpads, Maintain skin hydration (lotion/cream) Activity Interventions: Pressure redistribution bed/mattress(bed type), PT/OT evaluation Mobility Interventions: HOB 30 degrees or less, Turn and reposition approx. every two hours(pillow and wedges), PT/OT evaluation Nutrition Interventions: Document food/fluid/supplement intake Friction and Shear Interventions: Foam dressings/transparent film/skin sealants, HOB 30 degrees or less

## 2019-02-13 NOTE — PROGRESS NOTES
INFECTIOUS DISEASE FOLLOW UP NOTE : 
 
Admit Date: 1/21/2019 Overview: 77year-old AA male w/ h/o polysubstance abuse including cocaine, heroin, T 10-11 destructive changes in November 2018 w/ nl ESR, CRP, negative bone/gallium scans - not felt to be infectious then but had Serratia UTI rx'd Cefepime/Ceftriaxone 5 days. Returned 1/21 with back pain, BSI Serratia 1/21 and CT 1/21 with similar destructive changes T9-10 levels c/w discitis/OM and intraspinal and paraspinal phlegmon s/p needle aspiration 1/23 cx + Serratia. Had recurrent respiratory failure requiring RRT 1/23, 1/24. Has residual bilateral pleural effusions on CXR 2/4. Refused surgery offered by Dr. James Smith in November - willing to re-consider. Hypercarbia 2/12 PCO2 125 - trans ICU Current abx Prior abx Ertapenem 2/5 - 7   abx 1/23 - 20 of 42 vs 56 Cefepime/vanco  11/14   2, Ceftriaxone 11/16 - 3; Zosyn 1/21-2  ceftaz 1/23-13, cipro 1/23-13  
  
Assessment -> Rec:  
  
COPD / Acute Hypercarbia 
- dec LOC 2/12, PCO2 >130 
- reportedly refused BIPAP earlier 
- transferred to ICU but now improved -> per [facility] Destructive T9-10 changes discitis/OM 
- mid-back pain x 5 months, fell, incontinent of urine - h/o IVDU - last use in July 2018 
- CTAP 11/2018: severe destructive changes T 10-11, paravertebral sot tissue infiltration, extens to ant epidural space w/ cord compression 
- unable to have MRI due to claustrophobia - Bone/ gallium scans 11/27 neg for discitis/OM, ESR 20, CRP 0.7 
- not given long term IV abx  
- refused stabilization procedure per Dr. James Smith 
- returned 1/21 w/ worsened back pain, blcx + Serratia  
- CT 1/21: destructive changes T9-10 levels c/w discitis/OM and intraspinal and paraspinal phlegmon. Overall appearance unchanged and pain not different from previous - 1/22: esr 38, crp 7.4->1/31 57/2.1 - s/p aspiration of fluid/phlegmon T10-11 discspace 1/23: (+) Serratia in anaerobic culture - potential for emergent AmpC- beta-lactamase- mediated resistance to third-generation cephalosporins not be evident during initial susc  testing so Carbapenem is DOC 
- CRP down to 1.5 from 2.1 1/31. ESR 49 (from 57) -> continue Ertapenem 1 gm q 24 hours 6-8 weeks  
-> no accepting SNF or LTAC. May need to do OP infusion if back pain improves enough to permit this. BSI 2 of 2 Serratia marcescens 1/21 
- source ~ Discitis Phlegmon seen on CT 
- IR aspiration 1/23 chucky culture grew Serratia  
- denies any urinary complaint but had Serratia in urine in past. Currently UA is negative - Ucx was not sent but CT with normal kidneys and nothing sig on CT and pt asymptomatic except some incontinet 
- repeat Blcx 1/22 1/2 positive  
- blcx 1/28 NG x 2 
- BSI adequately treated at this time  -> remains on Ertapenem for osteomyelitis as above Acute hypoxic resp distress- RRT called 1/23 and again 1/24 sec to hypercapnic resp failure Bilateral pleural effusions- chronic with  atelectasis - monitor Hepatomegaly, splenic hilar & perisplenic varices- concern for cirrhosis and portal HTN 
- seen on CTAP again    
H/o IVDU 
- HIV ab NR 11/19 ,Hep BsAg neg (last reported ivdu August 2018, has hep C and thinks hep b immune) 
- denies current use -> avoid PICC 
   
HTN    
DJD    
[facility]    
  
MICROBIOLOGY:  
11/14   urcx >100,000 Serratia marcescens             blcx NG x 2 
11/15   CrAG neg, fungitell 375 (reported 11/17) 
11/29   blcx for fungus - ngtd 
            fungitell 113 
  
1/21     Blcx x2 Serratia marcescens R cefazolin UA neg 
1/22 Blcx 1 of 2 Serratia 1/23 Aspiration cx g/s neg, Cx NG 
 CHUCKY rare Serratia R Cefazolin Fungal NOS 
1/28  bctx x 2 ng 
  
LINES AND CATHETERS:  
piv 
  
MEDICATIONS:  
 
Current Facility-Administered Medications Medication Dose Route Frequency  lisinopril (PRINIVIL, ZESTRIL) tablet 10 mg  10 mg Oral DAILY  albuterol-ipratropium (DUO-NEB) 2.5 MG-0.5 MG/3 ML  3 mL Nebulization Q4H PRN  
 traMADol (ULTRAM) tablet 50 mg  50 mg Oral Q6H PRN  
 ertapenem (INVANZ) 1 g in 0.9% sodium chloride (MBP/ADV) 50 mL MBP  1 g IntraVENous Q24H  
 bumetanide (BUMEX) tablet 1 mg  1 mg Oral BID  pantoprazole (PROTONIX) tablet 40 mg  40 mg Oral DAILY  guaiFENesin (ROBITUSSIN) 100 mg/5 mL oral liquid 400 mg  400 mg Oral Q4H  
 lidocaine (PF) (XYLOCAINE) 10 mg/mL (1 %) injection 20 mL  20 mL SubCUTAneous Rad Multiple  gabapentin (NEURONTIN) capsule 400 mg  400 mg Oral TID  simvastatin (ZOCOR) tablet 10 mg  10 mg Oral QHS  acetaminophen (TYLENOL) tablet 650 mg  650 mg Oral Q6H PRN  
 ondansetron (ZOFRAN) injection 4 mg  4 mg IntraVENous Q6H PRN  
 heparin (porcine) injection 5,000 Units  5,000 Units SubCUTAneous Q8H SUBJECTIVE :  
 
Interval notes reviewed. Sleeping. Easily awakened. No fever or chills. Has not gotten up out of bed so far today. OBJECTIVE Visit Vitals /64 Pulse 83 Temp 98.6 °F (37 °C) Resp (!) 34 Ht 6' (1.829 m) Wt 100.5 kg (221 lb 9.6 oz) SpO2 100% BMI 30.05 kg/m² Temp (24hrs), Av.3 °F (36.8 °C), Min:97.7 °F (36.5 °C), Max:98.8 °F (37.1 °C) Gen: WD, 76 y/o AA M awake, oriented, not in distress on NC O2 
HEENT: muddy sclerae, pupils equal and reactive Chest: Symmetric expansion, no crackles or wheezing CVS:S 1S2 RR, No JVD or edema Abd:Soft, NT, ND, BS+ Skin:No jaundice, cyanosis, clubbing. No decubitus Muscsk: Normal muscle tone/strength CNS: AA and follows command Labs: Results:  
Chemistry Recent Labs  
  19 
0400 GLU 89   
K 4.8  
CL 95* CO2 43* BUN 31* CREA 1.07  
CA 8.5 AGAP 3  
BUCR 29* CBC w/Diff Recent Labs  
  19 
0400 WBC 4.6  
RBC 3.91* HGB 11.0*  
HCT 36.8 * GRANS 44 LYMPH 41 EOS 0  
  
 
RADIOLOGY :   
 cxr 2/4 IMPRESSION: 
  
Stable appearing densities at both lung bases compatible with pleural effusions 
with adjacent atelectasis/infiltrate 2/4 CT chest IMPRESSION: 
  
1. Moderate bilateral pleural effusions, left > right, amenable to image guided 
thoracentesis if clinically appropriate.   
  
2. Progressive fragmentation and bone loss of T9 from known 
discitis/osteomyelitis at T9-10. 
  
3. Stable discitis/osteomyelitis changes at T3-4. 
  
4. Other chronic findings detailed above and without significant interval 
change. 
  
CXR 2/12: Improved aeration with residual bilateral pleural effusions and subjacent 
atelectasis and/or consolidation. Kassi Small MD 
February 13, 2019 [facility] [phone]

## 2019-02-14 LAB
ANION GAP SERPL CALC-SCNC: 9 MMOL/L (ref 3–18)
BUN SERPL-MCNC: 26 MG/DL (ref 7–18)
BUN/CREAT SERPL: 23 (ref 12–20)
CA-I SERPL-SCNC: 1.12 MMOL/L (ref 1.12–1.32)
CALCIUM SERPL-MCNC: 9.2 MG/DL (ref 8.5–10.1)
CHLORIDE SERPL-SCNC: 90 MMOL/L (ref 100–108)
CO2 SERPL-SCNC: 40 MMOL/L (ref 21–32)
CREAT SERPL-MCNC: 1.12 MG/DL (ref 0.6–1.3)
GLUCOSE SERPL-MCNC: 73 MG/DL (ref 74–99)
MAGNESIUM SERPL-MCNC: 2.1 MG/DL (ref 1.6–2.6)
PHOSPHATE SERPL-MCNC: 3.3 MG/DL (ref 2.5–4.9)
POTASSIUM SERPL-SCNC: 4.5 MMOL/L (ref 3.5–5.5)
SODIUM SERPL-SCNC: 139 MMOL/L (ref 136–145)

## 2019-02-14 PROCEDURE — 74011250636 HC RX REV CODE- 250/636: Performed by: HOSPITALIST

## 2019-02-14 PROCEDURE — 84100 ASSAY OF PHOSPHORUS: CPT

## 2019-02-14 PROCEDURE — 74011000250 HC RX REV CODE- 250: Performed by: HOSPITALIST

## 2019-02-14 PROCEDURE — 74011250636 HC RX REV CODE- 250/636: Performed by: INTERNAL MEDICINE

## 2019-02-14 PROCEDURE — 80048 BASIC METABOLIC PNL TOTAL CA: CPT

## 2019-02-14 PROCEDURE — 65660000000 HC RM CCU STEPDOWN

## 2019-02-14 PROCEDURE — 82330 ASSAY OF CALCIUM: CPT

## 2019-02-14 PROCEDURE — 94660 CPAP INITIATION&MGMT: CPT

## 2019-02-14 PROCEDURE — 74011250637 HC RX REV CODE- 250/637: Performed by: HOSPITALIST

## 2019-02-14 PROCEDURE — 97530 THERAPEUTIC ACTIVITIES: CPT

## 2019-02-14 PROCEDURE — 94760 N-INVAS EAR/PLS OXIMETRY 1: CPT

## 2019-02-14 PROCEDURE — 83735 ASSAY OF MAGNESIUM: CPT

## 2019-02-14 PROCEDURE — 77010033678 HC OXYGEN DAILY

## 2019-02-14 PROCEDURE — 97110 THERAPEUTIC EXERCISES: CPT

## 2019-02-14 PROCEDURE — 36415 COLL VENOUS BLD VENIPUNCTURE: CPT

## 2019-02-14 PROCEDURE — 74011250637 HC RX REV CODE- 250/637: Performed by: INTERNAL MEDICINE

## 2019-02-14 PROCEDURE — 74011000258 HC RX REV CODE- 258: Performed by: INTERNAL MEDICINE

## 2019-02-14 RX ADMIN — BUMETANIDE 1 MG: 0.25 INJECTION INTRAMUSCULAR; INTRAVENOUS at 08:41

## 2019-02-14 RX ADMIN — GUAIFENESIN 400 MG: 100 SOLUTION ORAL at 17:10

## 2019-02-14 RX ADMIN — PANTOPRAZOLE SODIUM 40 MG: 40 TABLET, DELAYED RELEASE ORAL at 08:41

## 2019-02-14 RX ADMIN — GABAPENTIN 400 MG: 100 CAPSULE ORAL at 17:10

## 2019-02-14 RX ADMIN — BUMETANIDE 1 MG: 0.25 INJECTION INTRAMUSCULAR; INTRAVENOUS at 17:10

## 2019-02-14 RX ADMIN — SIMVASTATIN 10 MG: 10 TABLET, FILM COATED ORAL at 21:44

## 2019-02-14 RX ADMIN — TRAMADOL HYDROCHLORIDE 50 MG: 50 TABLET, FILM COATED ORAL at 17:13

## 2019-02-14 RX ADMIN — HEPARIN SODIUM 5000 UNITS: 5000 INJECTION INTRAVENOUS; SUBCUTANEOUS at 17:10

## 2019-02-14 RX ADMIN — GABAPENTIN 400 MG: 100 CAPSULE ORAL at 21:44

## 2019-02-14 RX ADMIN — GUAIFENESIN 400 MG: 100 SOLUTION ORAL at 13:00

## 2019-02-14 RX ADMIN — GUAIFENESIN 400 MG: 100 SOLUTION ORAL at 01:10

## 2019-02-14 RX ADMIN — GUAIFENESIN 400 MG: 100 SOLUTION ORAL at 21:44

## 2019-02-14 RX ADMIN — HEPARIN SODIUM 5000 UNITS: 5000 INJECTION INTRAVENOUS; SUBCUTANEOUS at 09:16

## 2019-02-14 RX ADMIN — ACETAMINOPHEN 650 MG: 325 TABLET ORAL at 08:50

## 2019-02-14 RX ADMIN — ERTAPENEM SODIUM 1 G: 1 INJECTION, POWDER, LYOPHILIZED, FOR SOLUTION INTRAMUSCULAR; INTRAVENOUS at 21:44

## 2019-02-14 RX ADMIN — GABAPENTIN 400 MG: 100 CAPSULE ORAL at 08:41

## 2019-02-14 RX ADMIN — TRAMADOL HYDROCHLORIDE 50 MG: 50 TABLET, FILM COATED ORAL at 04:19

## 2019-02-14 RX ADMIN — GUAIFENESIN 400 MG: 100 SOLUTION ORAL at 08:41

## 2019-02-14 RX ADMIN — HEPARIN SODIUM 5000 UNITS: 5000 INJECTION INTRAVENOUS; SUBCUTANEOUS at 03:20

## 2019-02-14 RX ADMIN — GUAIFENESIN 400 MG: 100 SOLUTION ORAL at 04:20

## 2019-02-14 NOTE — PROGRESS NOTES
Internal Medicine Progress Note Patient's Name: Sammy Rivera Admit Date: 1/21/2019 Length of Stay: 24 
 
 
Assessment/Plan Active Hospital Problems Diagnosis Date Noted  Metabolic encephalopathy 28/90/2016 Associated with sepsis, present on admission.  PNA (pneumonia) 01/26/2019  Bacteremia 01/21/2019  Acute on chronic respiratory failure with hypoxia ([facility] [address] 12/23/2018  Hypertension 11/14/2018  Hepatitis C 11/14/2018  Back pain 11/14/2018  Discitis of thoracic region 11/14/2018  Bilateral pleural effusion 11/14/2018  
 
- Cont IVAB w/ ertapenem x 6-8wks - If effusions need tapped, pulm rec left first 
- Cont fluid restriction - switch from PO to IV bumex d/t worsening pulm edema on CXR 
- Intermittent hypotension; will d/c lisinopril 
- Cont BiPAP qhs 
- Pain control PRN, avoid opiates (tramadol helping though) 
- PT/OT 
- Awaiting long term care, no accepting beds - Discussed potential surgery w/ patient, he is not interested at this time and not ready to commit if it was even an option. He would like to proceed with PT and see how things go - Cont acceptable home medications for chronic conditions  
- DVT protocol I have personally reviewed all pertinent labs and films that have officially resulted over the last 24 hours. I have personally checked for all pending labs that are awaiting final results. Subjective Pt s/e @ bedside Used BIPAP yesterday in Step Down. Pt alert and oriented this AM. Will transfer to floor. Objective Visit Vitals /61 Pulse 85 Temp 97.6 °F (36.4 °C) Resp 20 Ht 6' (1.829 m) Wt 95.3 kg (210 lb 3.2 oz) SpO2 96% BMI 28.51 kg/m² Physical Exam: 
General Appearance: NAD, alert, oriented HEENT: AT/NC, moist mucosa Lungs: Decreased BS B/L with normal respiratory effort CV: RRR, no m/r/g Lab/Data Reviewed: 
BMP:  
No results found for: NA, K, CL, CO2, AGAP, GLU, BUN, CREA, GFRAA, GFRNA 
CBC:  
 No results found for: WBC, HGB, HGBEXT, HCT, HCTEXT, PLT, PLTEXT, HGBEXT, HCTEXT, PLTEXT Imaging Reviewed: 
No results found. Medications Reviewed: 
Current Facility-Administered Medications Medication Dose Route Frequency  bumetanide (BUMEX) injection 1 mg  1 mg IntraVENous BID  albuterol-ipratropium (DUO-NEB) 2.5 MG-0.5 MG/3 ML  3 mL Nebulization Q4H PRN  
 traMADol (ULTRAM) tablet 50 mg  50 mg Oral Q6H PRN  
 ertapenem (INVANZ) 1 g in 0.9% sodium chloride (MBP/ADV) 50 mL MBP  1 g IntraVENous Q24H  pantoprazole (PROTONIX) tablet 40 mg  40 mg Oral DAILY  guaiFENesin (ROBITUSSIN) 100 mg/5 mL oral liquid 400 mg  400 mg Oral Q4H  
 lidocaine (PF) (XYLOCAINE) 10 mg/mL (1 %) injection 20 mL  20 mL SubCUTAneous Rad Multiple  gabapentin (NEURONTIN) capsule 400 mg  400 mg Oral TID  simvastatin (ZOCOR) tablet 10 mg  10 mg Oral QHS  acetaminophen (TYLENOL) tablet 650 mg  650 mg Oral Q6H PRN  
 ondansetron (ZOFRAN) injection 4 mg  4 mg IntraVENous Q6H PRN  
 heparin (porcine) injection 5,000 Units  5,000 Units SubCUTAneous Q8H

## 2019-02-14 NOTE — ROUTINE PROCESS
26 - Assumed care of patient - 
 
0830 -up to chair for breakfast - AM meds given. Tylenol for aching to back 0930 - back to bed -  
 
1015 - PT at bedside 1230 - sitting up for lunch - no complaints 1700 - medicated for pain and OOB to chair for dinner.

## 2019-02-14 NOTE — PROGRESS NOTES
Problem: Falls - Risk of 
Goal: *Absence of Falls Document [facility] Fall Risk and appropriate interventions in the flowsheet. Outcome: Progressing Towards Goal 
Fall Risk Interventions: 
Mobility Interventions: Patient to call before getting OOB, PT Consult for mobility concerns Mentation Interventions: Door open when patient unattended Medication Interventions: Teach patient to arise slowly, Patient to call before getting OOB Elimination Interventions: Call light in reach, Patient to call for help with toileting needs History of Falls Interventions: Door open when patient unattended

## 2019-02-14 NOTE — PROGRESS NOTES
INFECTIOUS DISEASE FOLLOW UP NOTE : 
 
Admit Date: 1/21/2019 Overview: 77year-old AA male w/ h/o polysubstance abuse including cocaine, heroin, T 10-11 destructive changes in November 2018 w/ nl ESR, CRP, negative bone/gallium scans - not felt to be infectious then but had Serratia UTI rx'd Cefepime/Ceftriaxone 5 days. Returned 1/21 with back pain, BSI Serratia 1/21 and CT 1/21 with similar destructive changes T9-10 levels c/w discitis/OM and intraspinal and paraspinal phlegmon s/p needle aspiration 1/23 cx + Serratia. Had recurrent respiratory failure requiring RRT 1/23, 1/24. Has residual bilateral pleural effusions on CXR 2/4. Refused surgery offered by Dr. Marylen Grad in November - willing to re-consider. Hypercarbia 2/12 PCO2 125 - trans ICU Current abx Prior abx Ertapenem 2/5 - 8   abx 1/23 - 21 of 42 vs 56 Cefepime/vanco  11/14   2, Ceftriaxone 11/16 - 3; Zosyn 1/21-2  ceftaz 1/23-13, cipro 1/23-13  
  
Assessment -> Rec:  
  
COPD / Acute Hypercarbia 
- dec LOC 2/12, PCO2 >130 
- reportedly refused BIPAP earlier 
- transferred to ICU but now improved -> per [facility] Destructive T9-10 changes discitis/OM 
- mid-back pain x 5 months, fell, incontinent of urine - h/o IVDU - last use in July 2018 
- CTAP 11/2018: severe destructive changes T 10-11, paravertebral sot tissue infiltration, extens to ant epidural space w/ cord compression 
- unable to have MRI due to claustrophobia - Bone/ gallium scans 11/27 neg for discitis/OM, ESR 20, CRP 0.7 
- not given long term IV abx  
- refused stabilization procedure per Dr. Marylen Grad 
- returned 1/21 w/ worsened back pain, blcx + Serratia  
- CT 1/21: destructive changes T9-10 levels c/w discitis/OM and intraspinal and paraspinal phlegmon. Overall appearance unchanged and pain not different from previous - 1/22: esr 38, crp 7.4->1/31 57/2.1 - s/p aspiration of fluid/phlegmon T10-11 discspace 1/23: (+) Serratia in anaerobic culture - potential for emergent AmpC- beta-lactamase- mediated resistance to third-generation cephalosporins not be evident during initial susc  testing so Carbapenem is DOC 
- CRP down to 1.5 from 2.1 1/31. ESR 49 (from 57) -> continue Ertapenem 1 gm q 24 hours 6-8 weeks  
-> no accepting SNF or LTAC. May need to do OP infusion if back pain improves enough to permit this 
-> Monitor CBC as dropping WBC and plt ?sec to Ertapenem 
-> repeat CBC and inflammatory markers in am 
->? Repeat imaging at some point BSI 2 of 2 Serratia marcescens 1/21 
- source ~ Discitis Phlegmon seen on CT 
- IR aspiration 1/23 chucky culture grew Serratia  
- denies any urinary complaint but had Serratia in urine in past. Currently UA is negative - Ucx was not sent but CT with normal kidneys and nothing sig on CT and pt asymptomatic except some incontinet 
- repeat Blcx 1/22 1/2 positive  
- blcx 1/28 NG x 2 
- BSI adequately treated at this time  -> remains on Ertapenem for osteomyelitis as above Acute hypoxic resp distress- RRT called 1/23 and again 1/24 sec to hypercapnic resp failure Bilateral pleural effusions- chronic with  atelectasis - monitor Hepatomegaly, splenic hilar & perisplenic varices- concern for cirrhosis and portal HTN 
- seen on CTAP again    
H/o IVDU 
- HIV ab NR 11/19 ,Hep BsAg neg (last reported ivdu August 2018, has hep C and thinks hep b immune) 
- denies current use -> avoid PICC 
   
HTN    
DJD    
Allergy [facility]    
  
MICROBIOLOGY:  
11/14   urcx >100,000 Serratia marcescens             blcx NG x 2 
11/15   CrAG neg, fungitell 375 (reported 11/17) 
11/29   blcx for fungus - ngtd 
            fungitell 113 
  
1/21     Blcx x2 Serratia marcescens R cefazolin UA neg 
1/22 Blcx 1 of 2 Serratia 1/23 Aspiration cx g/s neg, Cx NG 
 CHUCKY rare Serratia R Cefazolin  Fungal NOS 
1/28  bctx x 2 ng 
  
 LINES AND CATHETERS:  
piv 
  
MEDICATIONS:  
 
Current Facility-Administered Medications Medication Dose Route Frequency  bumetanide (BUMEX) injection 1 mg  1 mg IntraVENous BID  albuterol-ipratropium (DUO-NEB) 2.5 MG-0.5 MG/3 ML  3 mL Nebulization Q4H PRN  
 traMADol (ULTRAM) tablet 50 mg  50 mg Oral Q6H PRN  
 ertapenem (INVANZ) 1 g in 0.9% sodium chloride (MBP/ADV) 50 mL MBP  1 g IntraVENous Q24H  pantoprazole (PROTONIX) tablet 40 mg  40 mg Oral DAILY  guaiFENesin (ROBITUSSIN) 100 mg/5 mL oral liquid 400 mg  400 mg Oral Q4H  
 lidocaine (PF) (XYLOCAINE) 10 mg/mL (1 %) injection 20 mL  20 mL SubCUTAneous Rad Multiple  gabapentin (NEURONTIN) capsule 400 mg  400 mg Oral TID  simvastatin (ZOCOR) tablet 10 mg  10 mg Oral QHS  acetaminophen (TYLENOL) tablet 650 mg  650 mg Oral Q6H PRN  
 ondansetron (ZOFRAN) injection 4 mg  4 mg IntraVENous Q6H PRN  
 heparin (porcine) injection 5,000 Units  5,000 Units SubCUTAneous Q8H SUBJECTIVE :  
 
Interval notes reviewed. Afebrile. C/o back pain- says different from usual pain. Eleanor Abx. Denies any nausea or vomiting. Says working with PT. OBJECTIVE Visit Vitals /77 (BP 1 Location: Left arm, BP Patient Position: Head of bed elevated (Comment degrees)) Pulse 84 Temp 98.9 °F (37.2 °C) Resp 20 Ht 6' (1.829 m) Wt 100.5 kg (221 lb 9.6 oz) SpO2 91% BMI 30.05 kg/m² Temp (24hrs), Av.4 °F (36.9 °C), Min:97.8 °F (36.6 °C), Max:98.9 °F (37.2 °C) Gen: WD, 78 y/o AA M awake, oriented, not in distress on NC O2 
HEENT: muddy sclerae, pupils equal and reactive Chest: Symmetric expansion, no crackles or wheezing CVS:S 1S2 RR, No JVD or edema Abd:Soft, NT, ND, BS+ Skin:No jaundice, cyanosis, clubbing. No decubitus Muscsk: Normal muscle tone/strength CNS: AA and follows command Labs: Results:  
Chemistry Recent Labs  
  19 
0400 GLU 89   
K 4.8  
CL 95* CO2 43* BUN 31* CREA 1.07  
CA 8.5 AGAP 3  
BUCR 29* CBC w/Diff Recent Labs  
  02/13/19 
0400 WBC 4.6  
RBC 3.91* HGB 11.0*  
HCT 36.8 * GRANS 44 LYMPH 41 EOS 0  
  
 
RADIOLOGY :   
cxr 2/4 IMPRESSION: 
  
Stable appearing densities at both lung bases compatible with pleural effusions 
with adjacent atelectasis/infiltrate 2/4 CT chest IMPRESSION: 
  
1. Moderate bilateral pleural effusions, left > right, amenable to image guided 
thoracentesis if clinically appropriate.   
  
2. Progressive fragmentation and bone loss of T9 from known 
discitis/osteomyelitis at T9-10. 
  
3. Stable discitis/osteomyelitis changes at T3-4. 
  
4. Other chronic findings detailed above and without significant interval 
change. 
  
CXR 2/12: Improved aeration with residual bilateral pleural effusions and subjacent 
atelectasis and/or consolidation. James Hedrick MD 
February 14, 2019 [facility] [phone]

## 2019-02-14 NOTE — ROUTINE PROCESS
1915 Bedside and Verbal shift change report given to [facility] RN (oncoming nurse) by me (offgoing nurse). Report included the following information SBAR, Kardex, Intake/Output, MAR, Recent Results and Cardiac Rhythm NSR.

## 2019-02-14 NOTE — PROGRESS NOTES
Problem: Falls - Risk of 
Goal: *Absence of Falls Document Kati Schultz Fall Risk and appropriate interventions in the flowsheet. Outcome: Progressing Towards Goal 
Fall Risk Interventions: 
Mobility Interventions: Patient to call before getting OOB, Strengthening exercises (ROM-active/passive) Mentation Interventions: Bed/chair exit alarm, Evaluate medications/consider consulting pharmacy, More frequent rounding, Toileting rounds, Update white board Medication Interventions: Evaluate medications/consider consulting pharmacy, Bed/chair exit alarm, Patient to call before getting OOB Elimination Interventions: Patient to call for help with toileting needs, Call light in reach, Bed/chair exit alarm, Toileting schedule/hourly rounds, Urinal in reach, Toilet paper/wipes in reach History of Falls Interventions: Room close to nurse's station, Door open when patient unattended Problem: Pressure Injury - Risk of 
Goal: *Prevention of pressure injury Document Raul Scale and appropriate interventions in the flowsheet. Outcome: Progressing Towards Goal 
Pressure Injury Interventions: 
Sensory Interventions: Assess changes in LOC, Check visual cues for pain Moisture Interventions: Absorbent underpads Activity Interventions: Pressure redistribution bed/mattress(bed type) Mobility Interventions: HOB 30 degrees or less Nutrition Interventions: Document food/fluid/supplement intake Friction and Shear Interventions: HOB 30 degrees or less, Foam dressings/transparent film/skin sealants

## 2019-02-14 NOTE — PROGRESS NOTES
Problem: Self Care Deficits Care Plan (Adult) Goal: *Acute Goals and Plan of Care (Insert Text) Occupational Therapy Goals Initiated 1/30/2019 within 7 day(s). Pt has met all goals at this time; he has reach his maximum potential in the acute care setting. Will discharge from OT caseload at this time. 1.  Patient will perform grooming tasks while standing with stand-by assistance for balance. 2.  Patient will perform lower body dressing with minimal assistance utilizing adaptive equipment/strategies, prn. 
3.  Patient will perform functional task in standing for 8 minutes with supervision and < 2 rest breaks to increase activity tolerance for functional transfers & ADLs. 4.  Patient will perform toilet transfers with supervision. 5.  Patient will perform all aspects of toileting with supervision. 6.  Patient will participate in upper extremity therapeutic exercise/activities for 8 minutes to increase BUE strength for functional transfers and ADLs. 7.  Patient will utilize energy conservation techniques during functional activities with minimal verbal cues. Outcome: Resolved/Met Date Met: 02/14/19 [facility] 
OCCUPATIONAL THERAPY: Daily Note/DISCharge INPATIENT: Medicaid: Hospital Day: 25 Patient: Bob Hoover (56 y.o. male)    Date: 2/14/2019 Primary Diagnosis: Bacteremia Precautions: Falls PLOF: Pt was independent with most ADLs PTA. RW/WC for functional mobility PTA. ASSESSMENT : Pt supine on arrival, c/o 7/10 back pain, agreeable to therapy. Min A for supine-->sit; improved sitting balance at EOB. Pt instructed on BUE TherEx to maximize BUE strength for functional transfers and ADLs. Pt performed shoulder flex, scapular retract. And bicep curls x10 with rest breaks between each rep. CGA to stand and maneuver to [facility]. Min A for sit-->supine. Pt c/o 2/10 pain at end of session.  Pt has made good progress towards functional goals, however, at this time pt has reached his maximum potential in the acute care setting. Will defer to PT for further functional mobility training. Pt verbalized understanding. Pt left supine with HOB elevated and needs within reach. PLAN : 
Patient will be discharged from occupational therapy at this time. Rationale for discharge: Pt has achieved all goals and has reached his maximum potential in the acute care setting  
[x] Goals Achieved 
[] [address] 
[] Patient not participating in therapy 
[] Other: 
 
Discharge Recommendations:  Franki Herson Further Equipment Recommendations for Discharge:  TBD at next level of care EDUCATION: Pt educated on importance of mobility; he verbalized understanding. Barriers to Learning/Limitations: None Compensate with: visual, verbal, tactile, kinesthetic cues/model SUBJECTIVE:  
Patient stated: \"That bothered my back. \" OBJECTIVE/TREATMENT:  
 
Past Medical History:  
Diagnosis Date  Arthritis  COPD  Hypertension Past Surgical History:  
Procedure Laterality Date  HX REFRACTIVE SURGERY Functional Status Indep (I) Mod I Stand-by Assist  
Contact Guard Min Assist  
Mod Assist  
Max assist  
Total Assist  
Comments Rolling []  []  [x]  []    []    []    []  [] Supine to sit []  []  [x]  []  []  []  []  [] Sit to supine []  []  []  []  [x]  []  []  []  BLE management Sit to stand []  []  []  [x]  []  []  []  []    
Stand to sit []  []  []  [x]  []  []  []  [] Bed to chair transfers []  []  []  []  []  []  []  []  n/t Balance Good [address] Poor Unable Comments Sitting static [x]  []  []  [] Sitting dynamic []  [x]  []  []    
Standing static []  [x]  []  []    
Standing dynamic []  [x]  []  [] Reaction Time []  []  []  [] Therapeutic Exercise:  
AROM, SHLD flexion, Elbow flexion and elbow extension, scapular retraction x10 reps each with increased time and rest breaks between each rep Pain:  
Pre treatment:  7/10 Post treatment:  2/10 Scale: numeric Activity tolerance:  
fair COMMUNICATION/EDUCATION:   
Education:  Patient was educated on the following topics: role of OT and updated POC Treatment/Session Assessment: · After treatment position/precautions:  
o Supine in bed 
o Call light within reach 
o RN notified Thank you for this referral. 
Santy Wayne MS OTR/L Time Calculation: 16 mins

## 2019-02-14 NOTE — PROGRESS NOTES
Bedside shift report received from [address] [place] [address]: AOX3, on 1l 02 via NC, resting in bed, back from bedside commode use; with shortness of breath noted on exertion; shift assessment done; denies any pain or other discomforts; NSR on tele 2125: due meds given as scheduled; pad changed- wet from urine 2300: resting in bed; no complaints voiced 3992: sleeping with Bipap on; call bell at bedside 0250: awake; bipap mask adjusted; reminded on the importance of keeping bipap on 
 
0419: c/o back pain; Ultram 1 tab given po- see flowsheet 0510: sleeping; no visual indicators for pain noted 0600: polly care done; back care done; gown and pad changed 0720: Bedside and Verbal shift change report given to Erasmo Almodovar RN (oncoming nurse) by Ayo Maldonado RN (offgoing nurse). Report included the following information SBAR, Kardex, Intake/Output, Recent Results and Cardiac Rhythm NSR.

## 2019-02-14 NOTE — PROGRESS NOTES
Problem: Mobility Impaired (Adult and Pediatric) Goal: *Acute Goals and Plan of Care (Insert Text) Physical Therapy Goals Reviewed and remain appropriate 2/13/2019 Initiated 2/8/2019 and to be accomplished within 7 day(s) 1. Patient will move from supine to sit and sit to supine in bed with supervision/set-up. 2.  Patient will transfer from bed to chair and chair to bed with supervision/set-up using the least restrictive device. 3.  Patient will perform sit to stand with supervision/set-up. 4.  Patient will ambulate with supervision/set-up for 50 feet with the least restrictive device. Outcome: Progressing Towards Goal 
 
PHYSICAL THERAPY: Daily TREATMENT Note INPATIENT: Medicaid: Hospital Day: 25 Patient: Donna Soliz (32 y.o. male)    Date: 2/14/2019 Primary Diagnosis: Bacteremia  
,  ,  
Precautions: Fall Chart, physical therapy assessment, plan of care and goals were reviewed. PLOF:Independent ASSESSMENT: 
Pt instructed in log rolling for bed mobility to reduce back strain, CGA for rolling and sup>sit min a sit>stand. In standing pt with significant quad weakness and poor knee extension in stance, max verbal and tactile cues for quad recruitment to reduce buckling during stance phase of gait. Pt amb 5 ft X2 trials with seated rest break. Instructed pt in seated LE exercises for strengthening to improve functional mobility. Progression toward goals: 
 
      Improving slowly and progressing toward goals PLAN: 
Patient continues to benefit from skilled intervention to address the above impairments. Continue treatment per established plan of care. EDUCATION:  
Education:  Patient was educated on the following topics: exercises Barriers to Learning/Limitations: None Compensate with: visual, verbal, tactile, kinesthetic cues/model Discharge Recommendations:  long term care Further Equipment Recommendations for Discharge:  wheelchair if pt goes home as he lacks the strength and activity tolerance to amb safely for household and community distances Factors which may impact discharge planning: none SUBJECTIVE:  
Patient stated My legs feel weak.  OBJECTIVE DATA SUMMARY:  
Critical Behavior: 
Neurologic State: Alert Orientation Level: Oriented X4 Cognition: Appropriate decision making, Appropriate safety awareness, Follows commands Functional Mobility: 
 
 
Functional Status Indep (I) Mod I Super-vision Min A Mod A Max A Total A Assist x2 Verbal cues Additional time Not tested Comments Rolling []  []  [x] []    []    []  []  [] [x] [x] [] Supine to sit []  []  [x] []  []  []  []  [] [x] [x] [] Sit to supine []  []  [] []  []  []  []  [] [] [] [x] Sit to stand []  []  [] [x]  []  []  []  [] [x] [x] [] Stand to sit []  []  [x] []  []  []  []  [] [x] [x] [] Bed to chair transfers []  []  [] [x]  []  []  []  [] [x] [x] [] Balance Good Holley Console Poor Unable Not tested Comments Sitting static [x]  []  []  []  [] Sitting dynamic []  [x]  []  []  []   
Standing static []  [x]  []  []  []   
Standing dynamic []  [x] (-) []  []  [] Mobility/Gait:  
Level of Assistance: Minimum assistance Assistive Device: rolling walker Distance Ambulated: 5 feet    2 trials Base of Support: center of gravity altered Speed/Shannan: slow Step Length: left shortened and right shortened Stance: time Gait Abnormalities: decreased step clearance and knee flexion with buckling Stairs:  
Level of Assistance: Unable at this time Therapeutic Exercises:  
 
 
 
EXERCISE Sets Reps Active Active Assist  
Passive Self ROM Comments Ankle Pumps 1 10  [x] [] [] [] Quad Sets/Glut Sets 1 10 [x] [] [] [] Hamstring Sets   [] [] [] [] Short Arc Quads   [] [] [] [] Heel Slides   [] [] [] [] Straight Leg Raises   [] [] [] [] Hip Abd/Add   [] [] [] [] Long Arc Quads 1 10 [x] [] [] [] Seated Marching   [] [] [] []   
Standing Marching   [] [] [] []   
   [] [] [] []   
 
 
Vital Signs Temp: 98.3 °F (36.8 °C) Pulse (Heart Rate): 81    
BP: 138/72 Resp Rate: 20    
O2 Sat (%): 100 %Pain:Pre treatment pain level:5 Post treatment pain level:5Pain Scale 1: Numeric (0 - 10) Pain Intensity 1: 5 Pain Location 1: Back Pain Orientation 1: Left;Right Pain Description 1: Aching Pain Intervention(s) 1: Medication (see MAR) Activity Tolerance:  
Poor After treatment:  
Patient left in no apparent distress sitting up in chair Call bell left within reach Andrés Baker PTA Time Calculation: 25 mins

## 2019-02-15 LAB
ALBUMIN SERPL-MCNC: 2.5 G/DL (ref 3.4–5)
ALBUMIN/GLOB SERPL: 0.5 {RATIO} (ref 0.8–1.7)
ALP SERPL-CCNC: 164 U/L (ref 45–117)
ALT SERPL-CCNC: 30 U/L (ref 16–61)
ANION GAP SERPL CALC-SCNC: 6 MMOL/L (ref 3–18)
AST SERPL-CCNC: 33 U/L (ref 15–37)
BASOPHILS # BLD: 0 K/UL (ref 0–0.1)
BASOPHILS NFR BLD: 0 % (ref 0–2)
BILIRUB SERPL-MCNC: 0.3 MG/DL (ref 0.2–1)
BUN SERPL-MCNC: 22 MG/DL (ref 7–18)
BUN/CREAT SERPL: 26 (ref 12–20)
CA-I SERPL-SCNC: 1.06 MMOL/L (ref 1.12–1.32)
CALCIUM SERPL-MCNC: 8.4 MG/DL (ref 8.5–10.1)
CHLORIDE SERPL-SCNC: 87 MMOL/L (ref 100–108)
CO2 SERPL-SCNC: 43 MMOL/L (ref 21–32)
CREAT SERPL-MCNC: 0.85 MG/DL (ref 0.6–1.3)
CRP SERPL-MCNC: 1.8 MG/DL (ref 0–0.3)
DIFFERENTIAL METHOD BLD: ABNORMAL
EOSINOPHIL # BLD: 0.1 K/UL (ref 0–0.4)
EOSINOPHIL NFR BLD: 2 % (ref 0–5)
ERYTHROCYTE [DISTWIDTH] IN BLOOD BY AUTOMATED COUNT: 14.6 % (ref 11.6–14.5)
ERYTHROCYTE [SEDIMENTATION RATE] IN BLOOD: 92 MM/HR (ref 0–20)
GLOBULIN SER CALC-MCNC: 5.4 G/DL (ref 2–4)
GLUCOSE SERPL-MCNC: 85 MG/DL (ref 74–99)
HCT VFR BLD AUTO: 36 % (ref 36–48)
HGB BLD-MCNC: 11.1 G/DL (ref 13–16)
LYMPHOCYTES # BLD: 2.2 K/UL (ref 0.9–3.6)
LYMPHOCYTES NFR BLD: 44 % (ref 21–52)
MAGNESIUM SERPL-MCNC: 1.9 MG/DL (ref 1.6–2.6)
MCH RBC QN AUTO: 28 PG (ref 24–34)
MCHC RBC AUTO-ENTMCNC: 30.8 G/DL (ref 31–37)
MCV RBC AUTO: 90.7 FL (ref 74–97)
MONOCYTES # BLD: 0.5 K/UL (ref 0.05–1.2)
MONOCYTES NFR BLD: 9 % (ref 3–10)
NEUTS SEG # BLD: 2.2 K/UL (ref 1.8–8)
NEUTS SEG NFR BLD: 45 % (ref 40–73)
PHOSPHATE SERPL-MCNC: 3.3 MG/DL (ref 2.5–4.9)
PLATELET # BLD AUTO: 148 K/UL (ref 135–420)
PMV BLD AUTO: 11.1 FL (ref 9.2–11.8)
POTASSIUM SERPL-SCNC: 4.1 MMOL/L (ref 3.5–5.5)
PROT SERPL-MCNC: 7.9 G/DL (ref 6.4–8.2)
RBC # BLD AUTO: 3.97 M/UL (ref 4.7–5.5)
SODIUM SERPL-SCNC: 136 MMOL/L (ref 136–145)
WBC # BLD AUTO: 5 K/UL (ref 4.6–13.2)

## 2019-02-15 PROCEDURE — 74011250637 HC RX REV CODE- 250/637: Performed by: HOSPITALIST

## 2019-02-15 PROCEDURE — 84100 ASSAY OF PHOSPHORUS: CPT

## 2019-02-15 PROCEDURE — 85025 COMPLETE CBC W/AUTO DIFF WBC: CPT

## 2019-02-15 PROCEDURE — 36415 COLL VENOUS BLD VENIPUNCTURE: CPT

## 2019-02-15 PROCEDURE — 80053 COMPREHEN METABOLIC PANEL: CPT

## 2019-02-15 PROCEDURE — 74011250637 HC RX REV CODE- 250/637: Performed by: INTERNAL MEDICINE

## 2019-02-15 PROCEDURE — 74011250636 HC RX REV CODE- 250/636: Performed by: INTERNAL MEDICINE

## 2019-02-15 PROCEDURE — 74011250636 HC RX REV CODE- 250/636: Performed by: HOSPITALIST

## 2019-02-15 PROCEDURE — 74011000250 HC RX REV CODE- 250: Performed by: HOSPITALIST

## 2019-02-15 PROCEDURE — 86140 C-REACTIVE PROTEIN: CPT

## 2019-02-15 PROCEDURE — 85652 RBC SED RATE AUTOMATED: CPT

## 2019-02-15 PROCEDURE — 82330 ASSAY OF CALCIUM: CPT

## 2019-02-15 PROCEDURE — 97530 THERAPEUTIC ACTIVITIES: CPT

## 2019-02-15 PROCEDURE — 74011000258 HC RX REV CODE- 258: Performed by: INTERNAL MEDICINE

## 2019-02-15 PROCEDURE — 83735 ASSAY OF MAGNESIUM: CPT

## 2019-02-15 PROCEDURE — 77010033678 HC OXYGEN DAILY

## 2019-02-15 PROCEDURE — 65660000000 HC RM CCU STEPDOWN

## 2019-02-15 PROCEDURE — 94660 CPAP INITIATION&MGMT: CPT

## 2019-02-15 RX ORDER — BUMETANIDE 0.25 MG/ML
1 INJECTION INTRAMUSCULAR; INTRAVENOUS 2 TIMES DAILY
Status: COMPLETED | OUTPATIENT
Start: 2019-02-15 | End: 2019-02-15

## 2019-02-15 RX ORDER — BUMETANIDE 1 MG/1
1 TABLET ORAL 2 TIMES DAILY
Status: DISCONTINUED | OUTPATIENT
Start: 2019-02-16 | End: 2019-02-23 | Stop reason: HOSPADM

## 2019-02-15 RX ADMIN — TRAMADOL HYDROCHLORIDE 50 MG: 50 TABLET, FILM COATED ORAL at 03:41

## 2019-02-15 RX ADMIN — SIMVASTATIN 10 MG: 10 TABLET, FILM COATED ORAL at 21:17

## 2019-02-15 RX ADMIN — TRAMADOL HYDROCHLORIDE 50 MG: 50 TABLET, FILM COATED ORAL at 15:09

## 2019-02-15 RX ADMIN — GUAIFENESIN 400 MG: 100 SOLUTION ORAL at 08:14

## 2019-02-15 RX ADMIN — GUAIFENESIN 400 MG: 100 SOLUTION ORAL at 19:50

## 2019-02-15 RX ADMIN — BUMETANIDE 1 MG: 0.25 INJECTION INTRAMUSCULAR; INTRAVENOUS at 17:39

## 2019-02-15 RX ADMIN — TRAMADOL HYDROCHLORIDE 50 MG: 50 TABLET, FILM COATED ORAL at 21:17

## 2019-02-15 RX ADMIN — PANTOPRAZOLE SODIUM 40 MG: 40 TABLET, DELAYED RELEASE ORAL at 08:15

## 2019-02-15 RX ADMIN — HEPARIN SODIUM 5000 UNITS: 5000 INJECTION INTRAVENOUS; SUBCUTANEOUS at 11:25

## 2019-02-15 RX ADMIN — HEPARIN SODIUM 5000 UNITS: 5000 INJECTION INTRAVENOUS; SUBCUTANEOUS at 17:39

## 2019-02-15 RX ADMIN — GUAIFENESIN 400 MG: 100 SOLUTION ORAL at 15:09

## 2019-02-15 RX ADMIN — ACETAMINOPHEN 650 MG: 325 TABLET ORAL at 19:50

## 2019-02-15 RX ADMIN — TRAMADOL HYDROCHLORIDE 50 MG: 50 TABLET, FILM COATED ORAL at 08:15

## 2019-02-15 RX ADMIN — HEPARIN SODIUM 5000 UNITS: 5000 INJECTION INTRAVENOUS; SUBCUTANEOUS at 03:41

## 2019-02-15 RX ADMIN — GUAIFENESIN 400 MG: 100 SOLUTION ORAL at 03:42

## 2019-02-15 RX ADMIN — ERTAPENEM SODIUM 1 G: 1 INJECTION, POWDER, LYOPHILIZED, FOR SOLUTION INTRAMUSCULAR; INTRAVENOUS at 19:50

## 2019-02-15 RX ADMIN — BUMETANIDE 1 MG: 0.25 INJECTION INTRAMUSCULAR; INTRAVENOUS at 08:15

## 2019-02-15 RX ADMIN — GUAIFENESIN 400 MG: 100 SOLUTION ORAL at 23:09

## 2019-02-15 RX ADMIN — GABAPENTIN 400 MG: 100 CAPSULE ORAL at 21:17

## 2019-02-15 RX ADMIN — GABAPENTIN 400 MG: 100 CAPSULE ORAL at 08:15

## 2019-02-15 RX ADMIN — GABAPENTIN 400 MG: 100 CAPSULE ORAL at 15:09

## 2019-02-15 NOTE — ROUTINE PROCESS
Bedside and Verbal shift change report given to Matt Mata RN (oncoming nurse) by Olena Mercaod RN (offgoing nurse). Report included the following information SBAR, Kardex and MAR.

## 2019-02-15 NOTE — ROUTINE PROCESS
1955: Assumed care. HOB elevated. On oxygen at 2 LPM. No discomfort noted at this time. Calllight within reach. 2144: Due meds given. 2339: No change from previous assessment. 0100: RT placed patient on BIPAP. Due Guaifenesin dose missed. 0200: Sleeping comfortably on BIPAP. 0341: No change from previous assessment. Medicated for pain per patient request. Patient off BIPAP. Placed on NC 
 
0630: Slept good thru night. Needs attended. 1516: Dr. Mele Common called back. Informed of the patient CO2 of 43 this morning. No order given. 5916: Bedside and Verbal shift change report given to [facility] RN (oncoming nurse) by me (offgoing nurse). Report included the following information SBAR, Kardex, Intake/Output, MAR and Recent Results.

## 2019-02-15 NOTE — PROGRESS NOTES
conducted a Follow up consultation and Spiritual Assessment for Xiomara Estrada, who is a 77 y.o.,male. The  provided the following Interventions: 
Continued the relationship of care and support. Listened empathically. Offered prayer and assurance of continued prayer on patients behalf. Chart reviewed. The following outcomes were achieved: 
Patient expressed gratitude for pastoral care visit. Assessment: 
There are no further spiritual or Tenriism issues which require Spiritual Care Services interventions at this time. Plan: 
Chaplains will continue to follow and will provide pastoral care on an as needed/requested basis.  recommends bedside caregivers page  on duty if patient shows signs of acute spiritual or emotional distress. [address] [facility] Staff  Spiritual Care  
[phone]

## 2019-02-15 NOTE — PROGRESS NOTES
Spoke with pt this afternoon regarding his discharge plan. He states previous , Prakash Byrd, RN made him aware of possible plan to go home with home health and to go to outpatient infusion center for IV antibiotics. This CM questioned pt about CPAP and he states that he does have one at home and he uses it every night. Also reports that he has a walker and wheelchair at home. Pt states he does not know how he would get to infusion center, as he does not have a 's license, or anyone to drive him. He states his mother is not always able to drive. He does mention that he thought about going to [facility] to see if he can get a restricted license that will allow him to drive only to Dr yang, etc during daytime hours. Questioned him about the possibility of other family members or friends that could arrange to take him to infusion center in the meantime, but he states he has no one who could provide reliable transportation. Pt continues to work with PT, walked 2 ft in room with walker. Spoke with Dr. Marck Montana regarding patient's discharge plan. Informed him of current plan to try to send pt home with home health and outpatient infusion center. He did express concern that pt has not been doing much with PT and he has increased pain when he works with PT. However, he will complete form in case ambulation, transportation issues can be managed. Informed Dr. Marck Montana that I will place form on pt paper chart

## 2019-02-15 NOTE — PROGRESS NOTES
Problem: Mobility Impaired (Adult and Pediatric) Goal: *Acute Goals and Plan of Care (Insert Text) Physical Therapy Goals Reviewed and remain appropriate 2/13/2019 Initiated 2/8/2019 and to be accomplished within 7 day(s) 1. Patient will move from supine to sit and sit to supine in bed with supervision/set-up. 2.  Patient will transfer from bed to chair and chair to bed with supervision/set-up using the least restrictive device. 3.  Patient will perform sit to stand with supervision/set-up. 4.  Patient will ambulate with supervision/set-up for 50 feet with the least restrictive device. Outcome: Progressing Towards Goal 
 
PHYSICAL THERAPY: Daily TREATMENT Note INPATIENT: Medicaid: Hospital Day: 32 Patient: Klaudia Villaseñor (83 y.o. male)    Date: 2/15/2019 Primary Diagnosis: Bacteremia  
,  ,  
Precautions: Fall Chart, physical therapy assessment, plan of care and goals were reviewed. PLOF:mod I 
 
ASSESSMENT: 
Pt instructed in LE exercises for strengthening to improve functional mobility. Supervision for bed mobility, sit<>stand transfers and transfer to chair (approx. 2 ft) with RW, pt with decreased LE buckling today for short distance. Educated on need for compliance with exercise program to maximize functional benefit. Progression toward goals: 
      Improving appropriately and progressing toward goals PLAN: 
Patient continues to benefit from skilled intervention to address the above impairments. Continue treatment per established plan of care. EDUCATION:  
Education:  Patient was educated on the following topics: exercises Barriers to Learning/Limitations: None Compensate with: visual, verbal, tactile, kinesthetic cues/model Discharge Recommendations:  Long term care Further Equipment Recommendations for Discharge:  wheelchair if discharged to home, pt has not demonstrated activity tolerance at household distances Factors which may impact discharge planning: medical issues SUBJECTIVE:  
Patient stated I did good in the chair yesterday.  OBJECTIVE DATA SUMMARY:  
Critical Behavior: 
Neurologic State: Alert Orientation Level: Oriented X4 Cognition: Appropriate decision making, Appropriate for age attention/concentration, Appropriate safety awareness, Follows commands Functional Mobility: 
 
 
Functional Status Indep (I) Mod I Super-vision Min A Mod A Max A Total A Assist x2 Verbal cues Additional time Not tested Comments Rolling []  []  [x] []    []    []  []  [] [x] [x] [] Supine to sit []  []  [x] []  []  []  []  [] [x] [x] [] Sit to supine []  []  [] []  []  []  []  [] [] [] [x] Sit to stand []  []  [x] []  []  []  []  [] [x] [x] [] Stand to sit []  []  [x] []  []  []  []  [] [x] [x] [] Bed to chair transfers []  []  [x] []  []  []  []  [] [x] [x] [] Balance Good [address] Poor Unable Not tested Comments Sitting static [x]  []  []  []  [] Sitting dynamic [x]  []  []  []  []   
Standing static [x]  []  []  []  []   
Standing dynamic [x]  []  []  []  [] With support Therapeutic Exercises:  
 
 
 
EXERCISE Sets Reps Active Active Assist  
Passive Self ROM Comments Ankle Pumps 1 10  [x] [] [] [] Quad Sets/Glut Sets 1 10 [x] [] [] [] Hamstring Sets   [] [] [] [] Short Arc Quads   [] [] [] [] Heel Slides 1 10 [x] [] [] [] Straight Leg Raises 1 10 [x] [] [] [] Hip Abd/Add 1 10 [x] [] [] [] Long Arc Quads   [] [] [] [] Seated Marching   [] [] [] []   
Standing Marching   [] [] [] []   
   [] [] [] []   
 
 
Vital Signs Temp: 98.4 °F (36.9 °C) Pulse (Heart Rate): 83    
BP: 120/67 Resp Rate: 18    
O2 Sat (%): 95 %Pain:Pre treatment pain level:8 Post treatment pain level:8Pain Scale 1: Numeric (0 - 10) Pain Intensity 1: 6 Pain Location 1: Back Pain Orientation 1: Left;Right Pain Description 1: Aching Pain Intervention(s) 1: Medication (see MAR) Activity Tolerance:  
good After treatment:  
Patient left in no apparent distress sitting up in chair Call bell left within reach Nursing present to provide meds Yasmin Trinidad PTA Time Calculation: 17 mins

## 2019-02-15 NOTE — PROGRESS NOTES
Internal Medicine Progress Note Patient's Name: Flor Shelton Admit Date: 1/21/2019 Length of Stay: 25 
 
 
Assessment/Plan Active Hospital Problems Diagnosis Date Noted  Metabolic encephalopathy 29/46/1239 Associated with sepsis, present on admission.  PNA (pneumonia) 01/26/2019  Bacteremia 01/21/2019  Acute on chronic respiratory failure with hypoxia ([address] 12/23/2018  Hypertension 11/14/2018  Hepatitis C 11/14/2018  Back pain 11/14/2018  Discitis of thoracic region 11/14/2018  Bilateral pleural effusion 11/14/2018  
 
- Cont IVAB w/ ertapenem x 6-8wks - If effusions need tapped, pulm rec left first 
- Cont fluid restriction - bumex PO 
- Intermittent hypotension; will d/c lisinopril 
- Cont BiPAP qhs 
- Pain control PRN, avoid opiates (tramadol helping though) 
- PT/OT 
- Awaiting long term care, no accepting beds - Discussed potential surgery w/ patient, he is not interested at this time and not ready to commit if it was even an option. He would like to proceed with PT and see how things go - Cont acceptable home medications for chronic conditions  
- DVT protocol I have personally reviewed all pertinent labs and films that have officially resulted over the last 24 hours. I have personally checked for all pending labs that are awaiting final results. Subjective Pt s/e @ bedside Pt is alert and oriented. Worked with PT. 
 
Objective Visit Vitals /75 Pulse 93 Temp 97.6 °F (36.4 °C) Resp 18 Ht 6' (1.829 m) Wt 99.4 kg (219 lb 2.2 oz) SpO2 94% BMI 29.72 kg/m² Physical Exam: 
General Appearance: NAD, alert, oriented HEENT: AT/NC, moist mucosa Lungs: Decreased BS B/L with normal respiratory effort CV: RRR, no m/r/g Lab/Data Reviewed: 
BMP:  
Lab Results Component Value Date/Time   02/15/2019 05:00 AM  
 K 4.1 02/15/2019 05:00 AM  
 CL 87 (L) 02/15/2019 05:00 AM  
 CO2 43 (HH) 02/15/2019 05:00 AM  
 AGAP 6 02/15/2019 05:00 AM  
 GLU 85 02/15/2019 05:00 AM  
 BUN 22 (H) 02/15/2019 05:00 AM  
 CREA 0.85 02/15/2019 05:00 AM  
 GFRAA >60 02/15/2019 05:00 AM  
 GFRNA >60 02/15/2019 05:00 AM  
 
CBC:  
Lab Results Component Value Date/Time WBC 5.0 02/15/2019 05:00 AM  
 HGB 11.1 (L) 02/15/2019 05:00 AM  
 HCT 36.0 02/15/2019 05:00 AM  
  02/15/2019 05:00 AM  
 
 
Imaging Reviewed: 
No results found. Medications Reviewed: 
Current Facility-Administered Medications Medication Dose Route Frequency  bumetanide (BUMEX) injection 1 mg  1 mg IntraVENous BID  albuterol-ipratropium (DUO-NEB) 2.5 MG-0.5 MG/3 ML  3 mL Nebulization Q4H PRN  
 traMADol (ULTRAM) tablet 50 mg  50 mg Oral Q6H PRN  
 ertapenem (INVANZ) 1 g in 0.9% sodium chloride (MBP/ADV) 50 mL MBP  1 g IntraVENous Q24H  pantoprazole (PROTONIX) tablet 40 mg  40 mg Oral DAILY  guaiFENesin (ROBITUSSIN) 100 mg/5 mL oral liquid 400 mg  400 mg Oral Q4H  
 lidocaine (PF) (XYLOCAINE) 10 mg/mL (1 %) injection 20 mL  20 mL SubCUTAneous Rad Multiple  gabapentin (NEURONTIN) capsule 400 mg  400 mg Oral TID  simvastatin (ZOCOR) tablet 10 mg  10 mg Oral QHS  acetaminophen (TYLENOL) tablet 650 mg  650 mg Oral Q6H PRN  
 ondansetron (ZOFRAN) injection 4 mg  4 mg IntraVENous Q6H PRN  
 heparin (porcine) injection 5,000 Units  5,000 Units SubCUTAneous Q8H

## 2019-02-15 NOTE — PROGRESS NOTES
INFECTIOUS DISEASE FOLLOW UP NOTE : 
 
Admit Date: 1/21/2019 Overview: 77year-old AA male w/ h/o polysubstance abuse including cocaine, heroin, T 10-11 destructive changes in November 2018 w/ nl ESR, CRP, negative bone/gallium scans - not felt to be infectious then but had Serratia UTI rx'd Cefepime/Ceftriaxone 5 days. Returned 1/21 with back pain, BSI Serratia 1/21 and CT 1/21 with similar destructive changes T9-10 levels c/w discitis/OM and intraspinal and paraspinal phlegmon s/p needle aspiration 1/23 cx + Serratia. Had recurrent respiratory failure requiring RRT 1/23, 1/24. Has residual bilateral pleural effusions on CXR 2/4. Refused surgery offered by Dr. Vivek Velasco in November. Hypercarbia 2/12 PCO2 125 - trans ICU Current abx Prior abx Ertapenem 2/5 - 10   abx 1/23 - 23 of 42 vs 56 Cefepime/vanco  11/14   2, Ceftriaxone 11/16 - 3; Zosyn 1/21-2  ceftaz 1/23-13, cipro 1/23-13  
  
Assessment -> Rec:  
  
COPD / Acute Hypercarbia 
- dec LOC 2/12, PCO2 >130 
- reportedly refused BIPAP earlier 
- transferred to ICU but now improved -> per [facility] Osteomyelitis, discitis, T9-10, Serratia 
- suspect seeded from Serratia UTI 11/2018 (no inflammation in spine lesions then) 
- returned 1/21 w/ worsened back pain, blcx + Serratia  
- CT 1/21/2019: destructive changes T9-10 levels c/w discitis/OM and intraspinal and paraspinal phlegmon. Overall appearance unchanged and pain not different from previous - s/p aspiration of fluid/phlegmon T10-11 discspace 1/23: (+) Serratia in anaerobic culture - potential for emergent AmpC- beta-lactamase- mediated resistance to third-generation cephalosporins not be evident during initial susc 
 testing so Carbapenem is DOC 
- CT Chest 2/4: progressive fragmentation, bone loss of T9  
- 1/22: esr 38, crp 7.4->1/31 57/2. 1- CRP 2.1 -> 1.5 -> 1.8. ESR 57-> 49 -> now 92 -> continue Ertapenem 1 gm q 24 hours 6-8 weeks -> no accepting SNF or LTAC. May need to do OP infusion if back pain improves enough to permit this Destructive T9-10 changes discitis/OM 
- mid-back pain x 5 months, fell, incontinent of urine - h/o IVDU - last use in July 2018 
- CTAP 11/2018: severe destructive changes T 10-11, paravertebral sot tissue infiltration, extens to ant epidural space w/ cord compression 
- unable to have MRI due to claustrophobia - Bone/ gallium scans 11/27 neg for discitis/OM, ESR 20, CRP 0.7 
- Not felt to be infected then. Not given long term IV abx  
- refused stabilization procedure per Dr. Wilson Oh BSI 2 of 2 Serratia marcescens 1/21 
- source ~ Discitis Phlegmon seen on CT 
- IR aspiration 1/23 chucky culture grew Serratia  
- denies any urinary complaint but had Serratia in urine in past. Currently UA is negative - Ucx was not sent but CT with normal kidneys and nothing sig on CT and pt asymptomatic except some incontinet 
- repeat Blcx 1/22 1/2 positive  
- blcx 1/28 NG x 2 
- BSI adequately treated at this time  -> remains on Ertapenem for osteomyelitis as above Acute hypoxic resp distress- RRT called 1/23 and again 1/24 sec to hypercapnic resp failure Bilateral pleural effusions- chronic with  atelectasis - monitor Hepatomegaly, splenic hilar & perisplenic varices- concern for cirrhosis and portal HTN 
- seen on CTAP again    
H/o IVDU 
- HIV ab NR 11/19 ,Hep BsAg neg (last reported ivdu August 2018, has hep C and thinks hep b immune) 
- denies current use -> avoid PICC 
   
HTN    
DJD    
[facility]    
  
MICROBIOLOGY:  
11/14   urcx >100,000 Serratia marcescens             blcx NG x 2 
11/15   CrAG neg, fungitell 375 (reported 11/17) 
11/29   blcx for fungus - ngtd 
            fungitell 113 
  
1/21     Blcx x2 Serratia marcescens R cefazolin UA neg 
1/22 Blcx 1 of 2 Serratia 1/23 Aspiration cx g/s neg, Cx NG 
 CHUCKY rare Serratia R Cefazolin  Fungal NOS 
1/28  bctx x 2 ng 
  
 LINES AND CATHETERS:  
piv 
  
MEDICATIONS:  
 
Current Facility-Administered Medications Medication Dose Route Frequency  [START ON 2019] bumetanide (BUMEX) tablet 1 mg  1 mg Oral BID  bumetanide (BUMEX) injection 1 mg  1 mg IntraVENous BID  albuterol-ipratropium (DUO-NEB) 2.5 MG-0.5 MG/3 ML  3 mL Nebulization Q4H PRN  
 traMADol (ULTRAM) tablet 50 mg  50 mg Oral Q6H PRN  
 ertapenem (INVANZ) 1 g in 0.9% sodium chloride (MBP/ADV) 50 mL MBP  1 g IntraVENous Q24H  pantoprazole (PROTONIX) tablet 40 mg  40 mg Oral DAILY  guaiFENesin (ROBITUSSIN) 100 mg/5 mL oral liquid 400 mg  400 mg Oral Q4H  
 lidocaine (PF) (XYLOCAINE) 10 mg/mL (1 %) injection 20 mL  20 mL SubCUTAneous Rad Multiple  gabapentin (NEURONTIN) capsule 400 mg  400 mg Oral TID  simvastatin (ZOCOR) tablet 10 mg  10 mg Oral QHS  acetaminophen (TYLENOL) tablet 650 mg  650 mg Oral Q6H PRN  
 ondansetron (ZOFRAN) injection 4 mg  4 mg IntraVENous Q6H PRN  
 heparin (porcine) injection 5,000 Units  5,000 Units SubCUTAneous Q8H SUBJECTIVE :  
 
Interval notes reviewed. Eating lunch in bed. Tells me he had no pain for a while when he got up out of bed and into chair with PT today but it came back after a while. Overall he thinks the back pain is better than 2-3 weeks ago. OBJECTIVE Visit Vitals /75 Pulse 93 Temp 97.6 °F (36.4 °C) Resp 18 Ht 6' (1.829 m) Wt 99.4 kg (219 lb 2.2 oz) SpO2 94% BMI 29.72 kg/m² Temp (24hrs), Av.8 °F (36.6 °C), Min:97.3 °F (36.3 °C), Max:98.4 °F (36.9 °C) Gen: WD, 78 y/o AA M awake, oriented, not in distress on NC O2 
HEENT: muddy sclerae, pupils equal and reactive Chest: Symmetric expansion, no crackles or wheezing CVS:S 1S2 RR, No JVD or edema Abd:Soft, NT, ND, BS+ Skin:No jaundice, cyanosis, clubbing. No decubitus Muscsk: Normal muscle tone/strength CNS: AA, oriented, conversing appropriately Labs: Results:  
Chemistry Recent Labs 02/15/19 
0500 02/14/19 
0400 02/13/19 
0400 GLU 85 73* 89  139 141  
K 4.1 4.5 4.8  
CL 87* 90* 95* CO2 43* 40* 43*  
BUN 22* 26* 31* CREA 0.85 1.12 1.07  
CA 8.4* 9.2 8.5 AGAP 6 9 3 BUCR 26* 23* 29* *  --   --   
TP 7.9  --   --   
ALB 2.5*  --   --   
GLOB 5.4*  --   --   
AGRAT 0.5*  --   --   
  
CBC w/Diff Recent Labs  
  02/15/19 
0500 02/13/19 
0400 WBC 5.0 4.6  
RBC 3.97* 3.91* HGB 11.1* 11.0*  
HCT 36.0 36.8  130* GRANS 45 44 LYMPH 44 41 EOS 2 0  
  
 
RADIOLOGY :   
cxr 2/4 IMPRESSION: 
  
Stable appearing densities at both lung bases compatible with pleural effusions 
with adjacent atelectasis/infiltrate 2/4 CT chest IMPRESSION: 
  
1. Moderate bilateral pleural effusions, left > right, amenable to image guided 
thoracentesis if clinically appropriate.   
  
2. Progressive fragmentation and bone loss of T9 from known 
discitis/osteomyelitis at T9-10. 
  
3. Stable discitis/osteomyelitis changes at T3-4. 
  
4. Other chronic findings detailed above and without significant interval 
change. 
  
CXR 2/12: Improved aeration with residual bilateral pleural effusions and subjacent 
atelectasis and/or consolidation. Jolynn Cleveland MD 
February 15, 2019 [facility] [phone]

## 2019-02-16 LAB
CA-I SERPL-SCNC: 1.13 MMOL/L (ref 1.12–1.32)
MAGNESIUM SERPL-MCNC: 1.8 MG/DL (ref 1.6–2.6)
PHOSPHATE SERPL-MCNC: 3.7 MG/DL (ref 2.5–4.9)

## 2019-02-16 PROCEDURE — 74011250636 HC RX REV CODE- 250/636: Performed by: HOSPITALIST

## 2019-02-16 PROCEDURE — 74011250637 HC RX REV CODE- 250/637: Performed by: INTERNAL MEDICINE

## 2019-02-16 PROCEDURE — 77010033678 HC OXYGEN DAILY

## 2019-02-16 PROCEDURE — 65660000000 HC RM CCU STEPDOWN

## 2019-02-16 PROCEDURE — 74011250637 HC RX REV CODE- 250/637: Performed by: HOSPITALIST

## 2019-02-16 PROCEDURE — 74011000258 HC RX REV CODE- 258: Performed by: INTERNAL MEDICINE

## 2019-02-16 PROCEDURE — 83735 ASSAY OF MAGNESIUM: CPT

## 2019-02-16 PROCEDURE — 74011250636 HC RX REV CODE- 250/636: Performed by: INTERNAL MEDICINE

## 2019-02-16 PROCEDURE — 84100 ASSAY OF PHOSPHORUS: CPT

## 2019-02-16 PROCEDURE — 36415 COLL VENOUS BLD VENIPUNCTURE: CPT

## 2019-02-16 PROCEDURE — 97530 THERAPEUTIC ACTIVITIES: CPT

## 2019-02-16 PROCEDURE — 82330 ASSAY OF CALCIUM: CPT

## 2019-02-16 RX ADMIN — GABAPENTIN 400 MG: 100 CAPSULE ORAL at 21:38

## 2019-02-16 RX ADMIN — HEPARIN SODIUM 5000 UNITS: 5000 INJECTION INTRAVENOUS; SUBCUTANEOUS at 17:17

## 2019-02-16 RX ADMIN — GUAIFENESIN 400 MG: 100 SOLUTION ORAL at 20:03

## 2019-02-16 RX ADMIN — GABAPENTIN 400 MG: 100 CAPSULE ORAL at 08:55

## 2019-02-16 RX ADMIN — PANTOPRAZOLE SODIUM 40 MG: 40 TABLET, DELAYED RELEASE ORAL at 09:00

## 2019-02-16 RX ADMIN — BUMETANIDE 1 MG: 1 TABLET ORAL at 08:55

## 2019-02-16 RX ADMIN — BUMETANIDE 1 MG: 1 TABLET ORAL at 17:17

## 2019-02-16 RX ADMIN — ERTAPENEM SODIUM 1 G: 1 INJECTION, POWDER, LYOPHILIZED, FOR SOLUTION INTRAMUSCULAR; INTRAVENOUS at 20:03

## 2019-02-16 RX ADMIN — GUAIFENESIN 400 MG: 100 SOLUTION ORAL at 16:44

## 2019-02-16 RX ADMIN — TRAMADOL HYDROCHLORIDE 50 MG: 50 TABLET, FILM COATED ORAL at 03:02

## 2019-02-16 RX ADMIN — TRAMADOL HYDROCHLORIDE 50 MG: 50 TABLET, FILM COATED ORAL at 23:15

## 2019-02-16 RX ADMIN — GUAIFENESIN 400 MG: 100 SOLUTION ORAL at 23:14

## 2019-02-16 RX ADMIN — HEPARIN SODIUM 5000 UNITS: 5000 INJECTION INTRAVENOUS; SUBCUTANEOUS at 01:06

## 2019-02-16 RX ADMIN — SIMVASTATIN 10 MG: 10 TABLET, FILM COATED ORAL at 21:38

## 2019-02-16 RX ADMIN — TRAMADOL HYDROCHLORIDE 50 MG: 50 TABLET, FILM COATED ORAL at 17:10

## 2019-02-16 RX ADMIN — GUAIFENESIN 400 MG: 100 SOLUTION ORAL at 03:02

## 2019-02-16 RX ADMIN — HEPARIN SODIUM 5000 UNITS: 5000 INJECTION INTRAVENOUS; SUBCUTANEOUS at 08:55

## 2019-02-16 RX ADMIN — GABAPENTIN 400 MG: 100 CAPSULE ORAL at 16:44

## 2019-02-16 RX ADMIN — GUAIFENESIN 400 MG: 100 SOLUTION ORAL at 08:54

## 2019-02-16 NOTE — ROUTINE PROCESS
8499 Bedside, Verbal and Written shift change report given to Mary Giron (oncoming nurse) by Brie Cordero RN (offgoing nurse). Report included the following information SBAR, Kardex and Cardiac Rhythm sinus rhythm. Pt resting In bed, awake and alert, no acute distress, call bell in reach

## 2019-02-16 NOTE — PROGRESS NOTES
Problem: Falls - Risk of 
Goal: *Absence of Falls Document Christiano Quick Fall Risk and appropriate interventions in the flowsheet. Outcome: Progressing Towards Goal 
Fall Risk Interventions: 
Mobility Interventions: Patient to call before getting OOB Mentation Interventions: Adequate sleep, hydration, pain control Medication Interventions: Teach patient to arise slowly Elimination Interventions: Elevated toilet seat History of Falls Interventions: Room close to nurse's station Problem: Pressure Injury - Risk of 
Goal: *Prevention of pressure injury Document Raul Scale and appropriate interventions in the flowsheet. Outcome: Progressing Towards Goal 
Pressure Injury Interventions: 
Sensory Interventions: Minimize linen layers, Monitor skin under medical devices Moisture Interventions: Offer toileting Q_hr Activity Interventions: PT/OT evaluation Mobility Interventions: Pressure redistribution bed/mattress (bed type) Nutrition Interventions: Offer support with meals,snacks and hydration Friction and Shear Interventions: Transferring/repositioning devices

## 2019-02-16 NOTE — PROGRESS NOTES
Problem: Mobility Impaired (Adult and Pediatric) Goal: *Acute Goals and Plan of Care (Insert Text) Physical Therapy Goals Reviewed and remain appropriate 2/13/2019 Initiated 2/8/2019 and to be accomplished within 7 day(s) 1. Patient will move from supine to sit and sit to supine in bed with supervision/set-up. 2.  Patient will transfer from bed to chair and chair to bed with supervision/set-up using the least restrictive device. 3.  Patient will perform sit to stand with supervision/set-up. 4.  Patient will ambulate with supervision/set-up for 50 feet with the least restrictive device. Outcome: Progressing Towards Goal 
physical Therapy TREATMENT Patient: Emily Zambrano (67 y.o. male) Date: 2/16/2019 Diagnosis: Bacteremia Bacteremia Precautions: Fall Chart, physical therapy assessment, plan of care and goals were reviewed. PLOF:ambulatory with a cane ASSESSMENT: 
Pt c/o back pain, agreeable to participate in PT. Rin to sit up EOB. Elevated bed to assist with sit to stand. Knees buckling upon initiating side steps, once warmed up buckling stopped. Performed lateral and forward/backward steps close to bed, Rin with RW mgmt, no LOB. Performed seated and supine strengthening exercises, c/o increased back pain. Education: RW mgmt and safety Progression toward goals: 
[]      Improving appropriately and progressing toward goals [x]      Improving slowly and progressing toward goals 
[]      Not making progress toward goals and plan of care will be adjusted PLAN: 
Patient continues to benefit from skilled intervention to address the above impairments. Continue treatment per established plan of care. Discharge Recommendations:  Franki Bains Further Equipment Recommendations for Discharge:  rolling walker SUBJECTIVE:  
Patient stated My back hurts.  OBJECTIVE DATA SUMMARY:  
Critical Behavior: 
Neurologic State: Alert Orientation Level: Oriented X4 
 Cognition: Follows commands Functional Mobility Training: 
Bed Mobility: 
Rolling: Minimum assistance Supine to Sit: Minimum assistance Sit to Supine: Moderate assistance Transfers: 
Sit to Stand: Minimum assistance Stand to Sit: Contact guard assistance Balance: 
Sitting: Intact Standing: Impaired; With support Standing - Static: Fair Standing - Dynamic : Fair(-)Ambulation/Gait Training: 
Distance (ft): 3 Feet (ft) Assistive Device: Gait belt;Walker, rolling Ambulation - Level of Assistance: Minimal assistance Gait Abnormalities: Decreased step clearance Therapeutic Exercises: LAQ, QS, GS 5x each (B) LEs Pain: 
Pre:5 Post:5 Pain Scale 1: Numeric (0 - 10) Pain Location 1: Back Pain Orientation 1: Lower;Right Pain Description 1: Constant Activity Tolerance:  
Fair(-) due to back pain Please refer to the flowsheet for vital signs taken during this treatment. After treatment:  
[] Patient left in no apparent distress sitting up in chair 
[x] Patient left in no apparent distress in bed 
[x] Call bell left within reach 
[] Nursing notified 
[] Caregiver present 
[] Bed alarm activated Trevor Hansen PTA Time Calculation: 19 mins

## 2019-02-16 NOTE — PROGRESS NOTES
Problem: Falls - Risk of 
Goal: *Absence of Falls Document Rosalee Gimenez Fall Risk and appropriate interventions in the flowsheet. Outcome: Progressing Towards Goal 
Fall Risk Interventions: 
Mobility Interventions: Patient to call before getting OOB, Utilize walker, cane, or other assistive device, PT Consult for mobility concerns, Communicate number of staff needed for ambulation/transfer Mentation Interventions: Toileting rounds, Room close to nurse's station, More frequent rounding, Adequate sleep, hydration, pain control, Door open when patient unattended Medication Interventions: Teach patient to arise slowly, Evaluate medications/consider consulting pharmacy, Patient to call before getting OOB Elimination Interventions: Call light in reach, Toileting schedule/hourly rounds, Patient to call for help with toileting needs History of Falls Interventions: Room close to nurse's station, Door open when patient unattended, Evaluate medications/consider consulting pharmacy Problem: Pressure Injury - Risk of 
Goal: *Prevention of pressure injury Document Raul Scale and appropriate interventions in the flowsheet. Outcome: Progressing Towards Goal 
Pressure Injury Interventions: 
Sensory Interventions: Minimize linen layers Moisture Interventions: Offer toileting Q_hr, Absorbent underpads, Check for incontinence Q2 hours and as needed, Internal/External urinary devices, Maintain skin hydration (lotion/cream) Activity Interventions: PT/OT evaluation, Increase time out of bed Mobility Interventions: Pressure redistribution bed/mattress (bed type), PT/OT evaluation, HOB 30 degrees or less Nutrition Interventions: Offer support with meals,snacks and hydration Friction and Shear Interventions: Transferring/repositioning devices, Minimize layers, Foam dressings/transparent film/skin sealants, HOB 30 degrees or less

## 2019-02-16 NOTE — PROGRESS NOTES
Internal Medicine Progress Note Patient's Name: Bob Hoover Admit Date: 1/21/2019 Length of Stay: 32 Assessment/Plan Active Hospital Problems Diagnosis Date Noted  Metabolic encephalopathy 26/18/6901 Associated with sepsis, present on admission.  PNA (pneumonia) 01/26/2019  Bacteremia 01/21/2019  Acute on chronic respiratory failure with hypoxia ([address] 12/23/2018  Hypertension 11/14/2018  Hepatitis C 11/14/2018  Back pain 11/14/2018  Discitis of thoracic region 11/14/2018  Bilateral pleural effusion 11/14/2018  
 
- Cont IVAB w/ ertapenem x 6-8wks 
- no accepting facilities. per ID, may need to do OP infusion if back pain improves enough to permit this. Encouraged pt to work with PT   
- If effusions need tapped, pulm rec left first 
- Cont fluid restriction - bumex PO 
- Cont BiPAP qhs 
- Pain control PRN, avoid opiates (tramadol helping though) 
- PT/OT 
- Awaiting long term care, no accepting beds - Discussed potential surgery w/ patient, he is not interested at this time and not ready to commit if it was even an option. He would like to proceed with PT and see how things go - Cont acceptable home medications for chronic conditions  
- DVT protocol I have personally reviewed all pertinent labs and films that have officially resulted over the last 24 hours. I have personally checked for all pending labs that are awaiting final results. Subjective Pt s/e @ bedside Pt is alert and oriented. Encouraged to work with PT. 
 
Objective Visit Vitals /75 (BP 1 Location: Right arm, BP Patient Position: At rest) Pulse 95 Temp 97.5 °F (36.4 °C) Resp 18 Ht 6' (1.829 m) Wt 100.3 kg (221 lb 1.6 oz) SpO2 96% BMI 29.99 kg/m² Physical Exam: 
General Appearance: NAD, alert, oriented HEENT: AT/NC, moist mucosa Lungs: Decreased BS B/L with normal respiratory effort CV: RRR, no m/r/g Lab/Data Reviewed: 
BMP:  
 No results found for: NA, K, CL, CO2, AGAP, GLU, BUN, CREA, GFRAA, GFRNA 
CBC:  
No results found for: WBC, HGB, HGBEXT, HCT, HCTEXT, PLT, PLTEXT, HGBEXT, HCTEXT, PLTEXT Imaging Reviewed: 
No results found. Medications Reviewed: 
Current Facility-Administered Medications Medication Dose Route Frequency  bumetanide (BUMEX) tablet 1 mg  1 mg Oral BID  albuterol-ipratropium (DUO-NEB) 2.5 MG-0.5 MG/3 ML  3 mL Nebulization Q4H PRN  
 traMADol (ULTRAM) tablet 50 mg  50 mg Oral Q6H PRN  
 ertapenem (INVANZ) 1 g in 0.9% sodium chloride (MBP/ADV) 50 mL MBP  1 g IntraVENous Q24H  pantoprazole (PROTONIX) tablet 40 mg  40 mg Oral DAILY  guaiFENesin (ROBITUSSIN) 100 mg/5 mL oral liquid 400 mg  400 mg Oral Q4H  
 lidocaine (PF) (XYLOCAINE) 10 mg/mL (1 %) injection 20 mL  20 mL SubCUTAneous Rad Multiple  gabapentin (NEURONTIN) capsule 400 mg  400 mg Oral TID  simvastatin (ZOCOR) tablet 10 mg  10 mg Oral QHS  acetaminophen (TYLENOL) tablet 650 mg  650 mg Oral Q6H PRN  
 ondansetron (ZOFRAN) injection 4 mg  4 mg IntraVENous Q6H PRN  
 heparin (porcine) injection 5,000 Units  5,000 Units SubCUTAneous Q8H

## 2019-02-16 NOTE — PROGRESS NOTES
INFECTIOUS DISEASE FOLLOW UP NOTE Admit Date: 1/21/2019 Overview: 77year-old AA male w/ h/o polysubstance abuse including cocaine, heroin, T 10-11 destructive changes in November 2018 w/ nl ESR, CRP, negative bone/gallium scans - not felt to be infectious then but had Serratia UTI rx'd Cefepime/Ceftriaxone 5 days. Returned 1/21 with back pain, BSI Serratia 1/21 and CT 1/21 with similar destructive changes T9-10 levels c/w discitis/OM and intraspinal and paraspinal phlegmon s/p needle aspiration 1/23 cx + Serratia. Had recurrent respiratory failure requiring RRT 1/23, 1/24. Has residual bilateral pleural effusions on CXR 2/4. Refused surgery offered by Dr. Katya Alvarez in November. Hypercarbia 2/12 PCO2 125 - trans ICU, now back out Current abx Prior abx Ertapenem 2/5 - 16   abx 1/23 - 24 of 42 vs 56 Cefepime/vanco  11/14   2, Ceftriaxone 11/16 - 3; Zosyn 1/21-2  ceftaz 1/23-13, cipro 1/23-13  
  
Assessment -> Rec:  
  
COPD / Acute Hypercarbia 
- dec LOC 2/12, PCO2 >130 
- reportedly refused BIPAP earlier 
- transferred to ICU but now improved -> per [facility] Osteomyelitis, discitis, T9-10, Serratia 
- suspect seeded from Serratia UTI 11/2018 (no inflammation in spine lesions then) 
- returned 1/21 w/ worsened back pain, blcx + Serratia  
- CT 1/21/2019: destructive changes T9-10 levels c/w discitis/OM and intraspinal and paraspinal phlegmon. Overall appearance unchanged and pain not different from previous - s/p aspiration of fluid/phlegmon T10-11 discspace 1/23: (+) Serratia in anaerobic culture - potential for emergent AmpC- beta-lactamase- mediated resistance to third-generation cephalosporins not be evident during initial susc 
 testing so Carbapenem is DOC 
- CT Chest 2/4: progressive fragmentation, bone loss of T9  
- 1/22: esr 38, crp 7.4->1/31 57/2. 1- CRP 2.1 -> 1.5 -> 1.8.  ESR 57-> 49 -> now 92 
- slowly improving back pain and increasing mobility -> continue Ertapenem 1 gm q 24 hours 6-8 weeks  
-> no accepting SNF or LTAC. May need to do OP infusion if back pain improves enough to permit this Destructive T9-10 changes discitis/OM 
- mid-back pain x 5 months, fell, incontinent of urine - h/o IVDU - last use in July 2018 
- CTAP 11/2018: severe destructive changes T 10-11, paravertebral sot tissue infiltration, extens to ant epidural space w/ cord compression 
- unable to have MRI due to claustrophobia - Bone/ gallium scans 11/27 neg for discitis/OM, ESR 20, CRP 0.7 
- Not felt to be infected then. Not given long term IV abx  
- refused stabilization procedure per Dr. Umesh Garcia BSI 2 of 2 Serratia marcescens 1/21 
- source ~ Discitis Phlegmon seen on CT 
- IR aspiration 1/23 chucky culture grew Serratia  
- denies any urinary complaint but had Serratia in urine in past. Currently UA is negative - Ucx was not sent but CT with normal kidneys and nothing sig on CT and pt asymptomatic except some incontinet 
- repeat Blcx 1/22 1/2 positive  
- blcx 1/28 NG x 2 
- BSI adequately treated at this time  -> remains on Ertapenem for osteomyelitis as above Acute hypoxic resp distress- RRT called 1/23 and again 1/24 sec to hypercapnic resp failure Bilateral pleural effusions- chronic with  atelectasis - monitor Hepatomegaly, splenic hilar & perisplenic varices- concern for cirrhosis and portal HTN 
- seen on CTAP again    
H/o IVDU 
- HIV ab NR 11/19 ,Hep BsAg neg (last reported ivdu August 2018, has hep C and thinks hep b immune) 
- denies current use -> avoid PICC 
   
HTN    
DJD    
Allergy [facility]    
  
MICROBIOLOGY:  
11/14   urcx >100,000 Serratia marcescens             blcx NG x 2 
11/15   CrAG neg, fungitell 375 (reported 11/17) 
11/29   blcx for fungus - ngtd 
            fungitell 113 
  
1/21     Blcx x2 Serratia marcescens R cefazolin UA neg 
1/22 Blcx 1 of 2 Serratia 1/23 Aspiration cx g/s neg, Cx NG 
 CHUCKY rare Serratia R Cefazolin  Fungal NOS 
   bctx x 2 ng 
  
LINES AND CATHETERS:  
piv 
  
MEDICATIONS:  
 
Current Facility-Administered Medications Medication Dose Route Frequency  bumetanide (BUMEX) tablet 1 mg  1 mg Oral BID  albuterol-ipratropium (DUO-NEB) 2.5 MG-0.5 MG/3 ML  3 mL Nebulization Q4H PRN  
 traMADol (ULTRAM) tablet 50 mg  50 mg Oral Q6H PRN  
 ertapenem (INVANZ) 1 g in 0.9% sodium chloride (MBP/ADV) 50 mL MBP  1 g IntraVENous Q24H  pantoprazole (PROTONIX) tablet 40 mg  40 mg Oral DAILY  guaiFENesin (ROBITUSSIN) 100 mg/5 mL oral liquid 400 mg  400 mg Oral Q4H  
 lidocaine (PF) (XYLOCAINE) 10 mg/mL (1 %) injection 20 mL  20 mL SubCUTAneous Rad Multiple  gabapentin (NEURONTIN) capsule 400 mg  400 mg Oral TID  simvastatin (ZOCOR) tablet 10 mg  10 mg Oral QHS  acetaminophen (TYLENOL) tablet 650 mg  650 mg Oral Q6H PRN  
 ondansetron (ZOFRAN) injection 4 mg  4 mg IntraVENous Q6H PRN  
 heparin (porcine) injection 5,000 Units  5,000 Units SubCUTAneous Q8H SUBJECTIVE :  
 
Interval notes reviewed. Lying in bed as usual.  Watching TV. Worked with PT this am. Says back pain is like a drill in his back. PT notes indicate \"improving slowly and progressing toward goals\". OBJECTIVE Visit Vitals /75 (BP 1 Location: Right arm, BP Patient Position: At rest) Pulse 95 Temp 97.5 °F (36.4 °C) Resp 18 Ht 6' (1.829 m) Wt 100.3 kg (221 lb 1.6 oz) SpO2 96% BMI 29.99 kg/m² Temp (24hrs), Av.8 °F (36.6 °C), Min:97.5 °F (36.4 °C), Max:98.5 °F (36.9 °C) Gen: WD, 76 y/o AA M awake, oriented, not in distress on NC O2 
HEENT: muddy sclerae, pupils equal and reactive Chest: Symmetric expansion, no crackles or wheezing CVS:S 1S2 RR, No JVD or edema Abd:Soft, NT, ND, BS+ Skin:No jaundice, cyanosis, clubbing. No decubitus Muscsk: Normal muscle tone/strength CNS: AA, oriented, conversing appropriately Labs: Results:  
Chemistry Recent Labs  
  02/15/19 
0500 19 
0400 GLU 85 73*  139  
K 4.1 4.5  
CL 87* 90* CO2 43* 40* BUN 22* 26* CREA 0.85 1.12  
CA 8.4* 9.2 AGAP 6 9 BUCR 26* 23* *  --   
TP 7.9  --   
ALB 2.5*  --   
GLOB 5.4*  --   
AGRAT 0.5*  --   
  
CBC w/Diff Recent Labs  
  02/15/19 
0500 WBC 5.0  
RBC 3.97* HGB 11.1*  
HCT 36.0  GRANS 45 LYMPH 44 EOS 2  
  
 
RADIOLOGY :   
cxr 2/4 IMPRESSION: 
  
Stable appearing densities at both lung bases compatible with pleural effusions 
with adjacent atelectasis/infiltrate 2/4 CT chest IMPRESSION: 
  
1. Moderate bilateral pleural effusions, left > right, amenable to image guided 
thoracentesis if clinically appropriate.   
  
2. Progressive fragmentation and bone loss of T9 from known 
discitis/osteomyelitis at T9-10. 
  
3. Stable discitis/osteomyelitis changes at T3-4. 
  
4. Other chronic findings detailed above and without significant interval 
change. 
  
CXR 2/12: Improved aeration with residual bilateral pleural effusions and subjacent 
atelectasis and/or consolidation. Sarthak Stevenson MD 
February 16, 2019 [facility] [phone]

## 2019-02-16 NOTE — PROGRESS NOTES
2330,Pt placed on resmed Bipap 20/8 O2sats 96%RN notified. 0400,pt want to take BIPAP off removed by RN,pt in no distress at this time.

## 2019-02-16 NOTE — PROGRESS NOTES
Assumed care; Pt resting in bed; no distress noted; Pt complains of back pain, see mar, Repositioned; bed in low position; Side rails up x 3; Call light and personal belonging within reach. Will continue to monitor. 1953: Complex. [address] changed. [address]22 PIV to Right Arm.  
2220: Pt resting. Per pt, I would like to go on Bipap at midnight. RT to be notified. 0030: Rounding. Noted pt constantly readjusting BiPAP mask. Air leak noted. Pt mask readjusted. Pt wishes to be off mask. Pt states he had been on mask for 4 hours. Pt re-orientated to time. Pt has been on mask for 1 hour. 0115: Rounding. Pt hand noted on BiPAP. Machine noted turned off. Resumed. Pt states the machine turned off on its own, and he was attempting to adjust it, hence was discovered by me. Pt educated to call staff if issue arises again. Pt verbalizes understanding. 0148: Noted mask removed and OFF bipap machine. Per pt, I took it off because it was not working. Pt admits to turning off machine(Presumed to be turning on biPAP by patient). BiPAP removed from arms reach. Pt educated to call for assistance. Pt verbalizes understanding. Shruthi Siddiqui RN verbalizes intent to round more often to address issues with BiPAP with MD ANTON. 
0300:Pt takes off BiPAP. Per pt, I have stayed on it for 4hours. Pt refused to resume BiPAP, converted to nasal canula and drinks liquids.  Pt complains of pain, See Mar.

## 2019-02-17 LAB
BASOPHILS # BLD: 0 K/UL (ref 0–0.1)
BASOPHILS NFR BLD: 0 % (ref 0–2)
CA-I SERPL-SCNC: 0.97 MMOL/L (ref 1.12–1.32)
DIFFERENTIAL METHOD BLD: ABNORMAL
EOSINOPHIL # BLD: 0.1 K/UL (ref 0–0.4)
EOSINOPHIL NFR BLD: 2 % (ref 0–5)
ERYTHROCYTE [DISTWIDTH] IN BLOOD BY AUTOMATED COUNT: 14.5 % (ref 11.6–14.5)
HCT VFR BLD AUTO: 35.7 % (ref 36–48)
HGB BLD-MCNC: 10.8 G/DL (ref 13–16)
LYMPHOCYTES # BLD: 2.1 K/UL (ref 0.9–3.6)
LYMPHOCYTES NFR BLD: 42 % (ref 21–52)
MAGNESIUM SERPL-MCNC: 1.8 MG/DL (ref 1.6–2.6)
MCH RBC QN AUTO: 28 PG (ref 24–34)
MCHC RBC AUTO-ENTMCNC: 30.3 G/DL (ref 31–37)
MCV RBC AUTO: 92.5 FL (ref 74–97)
MONOCYTES # BLD: 0.5 K/UL (ref 0.05–1.2)
MONOCYTES NFR BLD: 9 % (ref 3–10)
NEUTS SEG # BLD: 2.4 K/UL (ref 1.8–8)
NEUTS SEG NFR BLD: 47 % (ref 40–73)
PHOSPHATE SERPL-MCNC: 3.1 MG/DL (ref 2.5–4.9)
PLATELET # BLD AUTO: 137 K/UL (ref 135–420)
PMV BLD AUTO: 10.6 FL (ref 9.2–11.8)
RBC # BLD AUTO: 3.86 M/UL (ref 4.7–5.5)
WBC # BLD AUTO: 5.1 K/UL (ref 4.6–13.2)

## 2019-02-17 PROCEDURE — 84100 ASSAY OF PHOSPHORUS: CPT

## 2019-02-17 PROCEDURE — 94660 CPAP INITIATION&MGMT: CPT

## 2019-02-17 PROCEDURE — 74011250637 HC RX REV CODE- 250/637: Performed by: HOSPITALIST

## 2019-02-17 PROCEDURE — 65660000000 HC RM CCU STEPDOWN

## 2019-02-17 PROCEDURE — 74011250636 HC RX REV CODE- 250/636: Performed by: INTERNAL MEDICINE

## 2019-02-17 PROCEDURE — 85025 COMPLETE CBC W/AUTO DIFF WBC: CPT

## 2019-02-17 PROCEDURE — 83735 ASSAY OF MAGNESIUM: CPT

## 2019-02-17 PROCEDURE — 77010033678 HC OXYGEN DAILY

## 2019-02-17 PROCEDURE — 82330 ASSAY OF CALCIUM: CPT

## 2019-02-17 PROCEDURE — 74011250636 HC RX REV CODE- 250/636: Performed by: HOSPITALIST

## 2019-02-17 PROCEDURE — 77030035694 HC MSK BIPAP FLL FAC PERFMAX PHIL -B

## 2019-02-17 PROCEDURE — 74011250637 HC RX REV CODE- 250/637: Performed by: INTERNAL MEDICINE

## 2019-02-17 PROCEDURE — 36415 COLL VENOUS BLD VENIPUNCTURE: CPT

## 2019-02-17 PROCEDURE — 74011000258 HC RX REV CODE- 258: Performed by: INTERNAL MEDICINE

## 2019-02-17 RX ADMIN — GABAPENTIN 400 MG: 100 CAPSULE ORAL at 21:35

## 2019-02-17 RX ADMIN — GABAPENTIN 400 MG: 100 CAPSULE ORAL at 07:49

## 2019-02-17 RX ADMIN — HEPARIN SODIUM 5000 UNITS: 5000 INJECTION INTRAVENOUS; SUBCUTANEOUS at 09:39

## 2019-02-17 RX ADMIN — GUAIFENESIN 400 MG: 100 SOLUTION ORAL at 05:35

## 2019-02-17 RX ADMIN — SIMVASTATIN 10 MG: 10 TABLET, FILM COATED ORAL at 21:36

## 2019-02-17 RX ADMIN — TRAMADOL HYDROCHLORIDE 50 MG: 50 TABLET, FILM COATED ORAL at 05:35

## 2019-02-17 RX ADMIN — HEPARIN SODIUM 5000 UNITS: 5000 INJECTION INTRAVENOUS; SUBCUTANEOUS at 16:35

## 2019-02-17 RX ADMIN — GUAIFENESIN 400 MG: 100 SOLUTION ORAL at 13:03

## 2019-02-17 RX ADMIN — GUAIFENESIN 400 MG: 100 SOLUTION ORAL at 07:48

## 2019-02-17 RX ADMIN — TRAMADOL HYDROCHLORIDE 50 MG: 50 TABLET, FILM COATED ORAL at 13:03

## 2019-02-17 RX ADMIN — GUAIFENESIN 400 MG: 100 SOLUTION ORAL at 21:35

## 2019-02-17 RX ADMIN — GUAIFENESIN 400 MG: 100 SOLUTION ORAL at 16:35

## 2019-02-17 RX ADMIN — ERTAPENEM SODIUM 1 G: 1 INJECTION, POWDER, LYOPHILIZED, FOR SOLUTION INTRAMUSCULAR; INTRAVENOUS at 21:35

## 2019-02-17 RX ADMIN — BUMETANIDE 1 MG: 1 TABLET ORAL at 16:35

## 2019-02-17 RX ADMIN — HEPARIN SODIUM 5000 UNITS: 5000 INJECTION INTRAVENOUS; SUBCUTANEOUS at 01:13

## 2019-02-17 RX ADMIN — PANTOPRAZOLE SODIUM 40 MG: 40 TABLET, DELAYED RELEASE ORAL at 07:48

## 2019-02-17 RX ADMIN — GABAPENTIN 400 MG: 100 CAPSULE ORAL at 16:35

## 2019-02-17 RX ADMIN — BUMETANIDE 1 MG: 1 TABLET ORAL at 07:48

## 2019-02-17 NOTE — PROGRESS NOTES
Internal Medicine Progress Note Patient's Name: Neomi Carrel Admit Date: 1/21/2019 Length of Stay: 27 
 
 
Assessment/Plan Active Hospital Problems Diagnosis Date Noted  Metabolic encephalopathy 37/22/7914 Associated with sepsis, present on admission.  PNA (pneumonia) 01/26/2019  Bacteremia 01/21/2019  Acute on chronic respiratory failure with hypoxia ([facility] [address] 12/23/2018  Hypertension 11/14/2018  Hepatitis C 11/14/2018  Back pain 11/14/2018  Discitis of thoracic region 11/14/2018  Bilateral pleural effusion 11/14/2018  
 
- Cont IVAB w/ ertapenem x 6-8wks - Awaiting long term care, no accepting beds. Per ID, may need to do OP infusion if back pain improves enough to permit this. Encouraged pt to work with PT   
- If effusions need tapped, pulm rec left first 
- Cont fluid restriction - bumex PO 
- Cont BiPAP qhs 
- Pain control PRN, avoid opiates (tramadol helping though) 
- PT/OT 
- Discussed potential surgery w/ patient, he is not interested at this time and not ready to commit if it was even an option. He would like to proceed with PT and see how things go - Cont acceptable home medications for chronic conditions  
- DVT protocol I have personally reviewed all pertinent labs and films that have officially resulted over the last 24 hours. I have personally checked for all pending labs that are awaiting final results. Subjective Pt s/e @ bedside Pt is alert and oriented. Encouraged to work with PT. 
 
Objective Visit Vitals /79 (BP 1 Location: Left arm, BP Patient Position: At rest) Pulse 83 Temp 97.8 °F (36.6 °C) Resp 18 Ht 6' (1.829 m) Wt 99 kg (218 lb 4.8 oz) SpO2 99% BMI 29.61 kg/m² Physical Exam: 
General Appearance: NAD, alert, oriented HEENT: AT/NC, moist mucosa Lungs: Decreased BS B/L with normal respiratory effort CV: RRR, no m/r/g Lab/Data Reviewed: 
BMP:  
 No results found for: NA, K, CL, CO2, AGAP, GLU, BUN, CREA, GFRAA, GFRNA 
CBC:  
Lab Results Component Value Date/Time WBC 5.1 02/17/2019 08:25 AM  
 HGB 10.8 (L) 02/17/2019 08:25 AM  
 HCT 35.7 (L) 02/17/2019 08:25 AM  
  02/17/2019 08:25 AM  
 
 
Imaging Reviewed: 
No results found. Medications Reviewed: 
Current Facility-Administered Medications Medication Dose Route Frequency  bumetanide (BUMEX) tablet 1 mg  1 mg Oral BID  albuterol-ipratropium (DUO-NEB) 2.5 MG-0.5 MG/3 ML  3 mL Nebulization Q4H PRN  
 traMADol (ULTRAM) tablet 50 mg  50 mg Oral Q6H PRN  
 ertapenem (INVANZ) 1 g in 0.9% sodium chloride (MBP/ADV) 50 mL MBP  1 g IntraVENous Q24H  pantoprazole (PROTONIX) tablet 40 mg  40 mg Oral DAILY  guaiFENesin (ROBITUSSIN) 100 mg/5 mL oral liquid 400 mg  400 mg Oral Q4H  
 lidocaine (PF) (XYLOCAINE) 10 mg/mL (1 %) injection 20 mL  20 mL SubCUTAneous Rad Multiple  gabapentin (NEURONTIN) capsule 400 mg  400 mg Oral TID  simvastatin (ZOCOR) tablet 10 mg  10 mg Oral QHS  acetaminophen (TYLENOL) tablet 650 mg  650 mg Oral Q6H PRN  
 ondansetron (ZOFRAN) injection 4 mg  4 mg IntraVENous Q6H PRN  
 heparin (porcine) injection 5,000 Units  5,000 Units SubCUTAneous Q8H

## 2019-02-17 NOTE — ROUTINE PROCESS
0710 Bedside, Verbal and Written shift change report given to [address] [facility] (oncoming nurse) by Julee Peabody RN (offgoing nurse). Report included the following information SBAR and Kardex. Patient currently resting in bed, alert and oriented to all spheres, denies pain or shortness of breath, call bell in reach, 5 Ps and hourly rounding completed, bed locked in low position

## 2019-02-17 NOTE — PROGRESS NOTES
Problem: Falls - Risk of 
Goal: *Absence of Falls Document Reino Horn Fall Risk and appropriate interventions in the flowsheet. Outcome: Progressing Towards Goal 
Fall Risk Interventions: 
Mobility Interventions: PT Consult for assist device competence, Patient to call before getting OOB, Communicate number of staff needed for ambulation/transfer Mentation Interventions: Adequate sleep, hydration, pain control, Toileting rounds, More frequent rounding Medication Interventions: Patient to call before getting OOB, Teach patient to arise slowly, Evaluate medications/consider consulting pharmacy Elimination Interventions: Call light in reach, Toileting schedule/hourly rounds, Patient to call for help with toileting needs History of Falls Interventions: Room close to nurse's station, Evaluate medications/consider consulting pharmacy, Door open when patient unattended Problem: Pressure Injury - Risk of 
Goal: *Prevention of pressure injury Document Raul Scale and appropriate interventions in the flowsheet. Outcome: Progressing Towards Goal 
Pressure Injury Interventions: 
Sensory Interventions: Minimize linen layers, Monitor skin under medical devices Moisture Interventions: Offer toileting Q_hr, Absorbent underpads, Apply protective barrier, creams and emollients Activity Interventions: Increase time out of bed, Pressure redistribution bed/mattress(bed type) Mobility Interventions: Pressure redistribution bed/mattress (bed type), HOB 30 degrees or less Nutrition Interventions: Offer support with meals,snacks and hydration Friction and Shear Interventions: Minimize layers, HOB 30 degrees or less, Apply protective barrier, creams and emollients

## 2019-02-17 NOTE — PROGRESS NOTES
Patient placed on BiPAP for QHS - NC left in nostrils due to patient's habit of removing BiPAp mask during the night 
 
0220 - patient remains on BiPAP 
 
0430 - patient remains on BiPAP - currently sleeping

## 2019-02-17 NOTE — PROGRESS NOTES
INFECTIOUS DISEASE FOLLOW UP NOTE Admit Date: 1/21/2019 Overview:  
 
Summary:  
 
77year-old AA male w/ h/o polysubstance abuse including cocaine, heroin, T 10-11 destructive changes in November 2018 w/ nl ESR, CRP, negative bone/gallium scans - not felt to be infectious then but had Serratia UTI rx'd Cefepime/Ceftriaxone 5 days. Returned 1/21 with back pain, BSI Serratia 1/21 and CT 1/21 with similar destructive changes T9-10 levels c/w discitis/OM and intraspinal and paraspinal phlegmon s/p needle aspiration 1/23 cx + Serratia. Had recurrent respiratory failure requiring RRT 1/23, 1/24. Has residual bilateral pleural effusions on CXR 2/4. Refused surgery offered by Dr. Vivek Velasco in November. Hypercarbia 2/12 PCO2 125 - trans ICU, now back out Current abx Prior abx Ertapenem 2/5 - 12   abx 1/23 - 25 of 42 vs 56 Cefepime/vanco  11/14   2, Ceftriaxone 11/16 - 3; Zosyn 1/21-2  ceftaz 1/23-13, cipro 1/23-13 Interval History:  
 
Interval notes reviewed. Says he thinks his back pain is getting better based on how he feels when working with PT. Otherwise about the same Assessment / Plan:  
  
COPD / Acute Hypercarbia 
- dec LOC 2/12, PCO2 >130 
- reportedly refused BIPAP earlier 
- transferred to ICU but now improved -> per [facility] Osteomyelitis, discitis, T9-10, Serratia 
- suspect seeded from Serratia UTI 11/2018 (no inflammation in spine lesions then) 
- returned 1/21 w/ worsened back pain, blcx + Serratia  
- CT 1/21/2019: destructive changes T9-10 levels c/w discitis/OM and intraspinal and paraspinal phlegmon. Overall appearance unchanged and pain not different from previous - s/p aspiration of fluid/phlegmon T10-11 discspace 1/23: (+) Serratia in anaerobic culture - potential for emergent AmpC- beta-lactamase- mediated resistance to third-generation cephalosporins not be evident during initial susc 
 testing so Carbapenem is SERA NIELSEN [facility] - CT Chest 2/4: progressive fragmentation, bone loss of T9  
- 1/22: esr 38, crp 7.4->1/31 57/2. 1- CRP 2.1 -> 1.5 -> 1.8. ESR 57-> 49 -> now 92 
- slowly improving back pain and increasing mobility -> continue Ertapenem 1 gm q 24 hours 6-8 weeks  
-> no accepting SNF or LTAC. May need to do OP infusion if back pain improves enough to permit this Destructive T9-10 changes discitis/OM 
- mid-back pain x 5 months, fell, incontinent of urine - h/o IVDU - last use in July 2018 
- CTAP 11/2018: severe destructive changes T 10-11, paravertebral sot tissue infiltration, extens to ant epidural space w/ cord compression 
- unable to have MRI due to claustrophobia - Bone/ gallium scans 11/27 neg for discitis/OM, ESR 20, CRP 0.7 
- Not felt to be infected then. Not given long term IV abx  
- refused stabilization procedure per Dr. Kiarra Smith BSI 2 of 2 Serratia marcescens 1/21 
- source ~ Discitis Phlegmon seen on CT 
- IR aspiration 1/23 chucky culture grew Serratia  
- denies any urinary complaint but had Serratia in urine in past. Currently UA is negative - Ucx was not sent but CT with normal kidneys and nothing sig on CT and pt asymptomatic except some incontinet 
- repeat Blcx 1/22 1/2 positive  
- blcx 1/28 NG x 2 
- BSI adequately treated at this time  -> remains on Ertapenem for osteomyelitis as above Acute hypoxic resp distress- RRT called 1/23 and again 1/24 sec to hypercapnic resp failure Bilateral pleural effusions- chronic with  atelectasis - monitor Hepatomegaly, splenic hilar & perisplenic varices- concern for cirrhosis and portal HTN 
- seen on CTAP again    
H/o IVDU 
- HIV ab NR 11/19 ,Hep BsAg neg (last reported ivdu August 2018, has hep C and thinks hep b immune) 
- denies current use -> avoid PICC 
   
HTN    
DJD    
Allergy Shel[place]    
  
Microbiology:  
 
11/14   urcx >100,000 Serratia marcescens             blcx NG x 2 
11/15   CrAG neg, fungitell 375 (reported 11/17)    blcx for fungus - ngtd 
            fungitell 113 
  
     Blcx x2 Serratia marcescens R cefazolin UA neg 
 Blcx 1 of 2 Serratia  Aspiration cx g/s neg, Cx NG 
 NOMAN rare Serratia R Cefazolin Fungal NOS 
  bctx x 2 ng Lines / Catheters:  
 
piv 
  
 
Current Facility-Administered Medications Medication Dose Route Frequency  bumetanide (BUMEX) tablet 1 mg  1 mg Oral BID  albuterol-ipratropium (DUO-NEB) 2.5 MG-0.5 MG/3 ML  3 mL Nebulization Q4H PRN  
 traMADol (ULTRAM) tablet 50 mg  50 mg Oral Q6H PRN  
 ertapenem (INVANZ) 1 g in 0.9% sodium chloride (MBP/ADV) 50 mL MBP  1 g IntraVENous Q24H  pantoprazole (PROTONIX) tablet 40 mg  40 mg Oral DAILY  guaiFENesin (ROBITUSSIN) 100 mg/5 mL oral liquid 400 mg  400 mg Oral Q4H  
 lidocaine (PF) (XYLOCAINE) 10 mg/mL (1 %) injection 20 mL  20 mL SubCUTAneous Rad Multiple  gabapentin (NEURONTIN) capsule 400 mg  400 mg Oral TID  simvastatin (ZOCOR) tablet 10 mg  10 mg Oral QHS  acetaminophen (TYLENOL) tablet 650 mg  650 mg Oral Q6H PRN  
 ondansetron (ZOFRAN) injection 4 mg  4 mg IntraVENous Q6H PRN  
 heparin (porcine) injection 5,000 Units  5,000 Units SubCUTAneous Q8H Objective:  
 
Visit Vitals /79 (BP 1 Location: Left arm, BP Patient Position: At rest) Pulse 83 Temp 97.8 °F (36.6 °C) Resp 18 Ht 6' (1.829 m) Wt 99 kg (218 lb 4.8 oz) SpO2 99% BMI 29.61 kg/m² Temp (24hrs), Av.1 °F (36.7 °C), Min:97.4 °F (36.3 °C), Max:98.9 °F (37.2 °C) Gen: WD, 76 y/o AA M awake, oriented, not in distress on NC O2 
HEENT: muddy sclerae, pupils equal and reactive Chest: Symmetric expansion, no crackles or wheezing CVS:S 1S2 RR, No JVD or edema Abd:Soft, NT, ND, BS+ Skin:No jaundice, cyanosis, clubbing. No decubitus Muscsk: Normal muscle tone/strength CNS: AA, oriented, conversing appropriately Labs: Results:  
Chemistry Recent Labs  
  02/15/19 
0500 GLU 85   
K 4.1 CL 87* CO2 43*  
BUN 22* CREA 0.85 CA 8.4* AGAP 6  
BUCR 26* * TP 7.9 ALB 2.5*  
GLOB 5.4* AGRAT 0.5* CBC w/Diff Recent Labs  
  02/17/19 
0825 02/15/19 
0500 WBC 5.1 5.0  
RBC 3.86* 3.97* HGB 10.8* 11.1*  
HCT 35.7* 36.0  148 GRANS 47 45 LYMPH 42 44 EOS 2 2 RADIOLOGY :   
cxr 2/4 IMPRESSION: 
  
Stable appearing densities at both lung bases compatible with pleural effusions 
with adjacent atelectasis/infiltrate 2/4 CT chest IMPRESSION: 
  
1. Moderate bilateral pleural effusions, left > right, amenable to image guided 
thoracentesis if clinically appropriate.   
  
2. Progressive fragmentation and bone loss of T9 from known 
discitis/osteomyelitis at T9-10. 
  
3. Stable discitis/osteomyelitis changes at T3-4. 
  
4. Other chronic findings detailed above and without significant interval 
change. 
  
CXR 2/12: Improved aeration with residual bilateral pleural effusions and subjacent 
atelectasis and/or consolidation. Maximilian Voss MD 
February 17, 2019 [facility] [phone]

## 2019-02-17 NOTE — PROGRESS NOTES
Problem: Falls - Risk of 
Goal: *Absence of Falls Document Edilia Pickett Fall Risk and appropriate interventions in the flowsheet. Outcome: Progressing Towards Goal 
Fall Risk Interventions: 
Mobility Interventions: Patient to call before getting OOB Mentation Interventions: Adequate sleep, hydration, pain control Medication Interventions: Teach patient to arise slowly Elimination Interventions: Call light in reach History of Falls Interventions: Room close to nurse's station Problem: Pressure Injury - Risk of 
Goal: *Prevention of pressure injury Document Raul Scale and appropriate interventions in the flowsheet. Outcome: Progressing Towards Goal 
Pressure Injury Interventions: 
Sensory Interventions: Minimize linen layers Moisture Interventions: Offer toileting Q_hr Activity Interventions: Increase time out of bed Mobility Interventions: Pressure redistribution bed/mattress (bed type) Nutrition Interventions: Offer support with meals,snacks and hydration Friction and Shear Interventions: Minimize layers

## 2019-02-17 NOTE — PROGRESS NOTES
Assumed care; Pt resting in bed; no distress noted; Pt denies pain; bed in low position; Side rails up x 3; Call light and personal belonging within reach. Will continue to monitor. 1928: Patient Bathed today Per Yuliya. 2314: Pain Management. Will continue to monitor. 0008: Pt resting, no distress noted. BiPAP on.

## 2019-02-18 LAB
CA-I SERPL-SCNC: 1.09 MMOL/L (ref 1.12–1.32)
MAGNESIUM SERPL-MCNC: 1.9 MG/DL (ref 1.6–2.6)
PHOSPHATE SERPL-MCNC: 3.2 MG/DL (ref 2.5–4.9)

## 2019-02-18 PROCEDURE — 74011000258 HC RX REV CODE- 258: Performed by: INTERNAL MEDICINE

## 2019-02-18 PROCEDURE — 74011250637 HC RX REV CODE- 250/637: Performed by: HOSPITALIST

## 2019-02-18 PROCEDURE — 36415 COLL VENOUS BLD VENIPUNCTURE: CPT

## 2019-02-18 PROCEDURE — 97530 THERAPEUTIC ACTIVITIES: CPT

## 2019-02-18 PROCEDURE — 82330 ASSAY OF CALCIUM: CPT

## 2019-02-18 PROCEDURE — 77010033678 HC OXYGEN DAILY

## 2019-02-18 PROCEDURE — 74011250636 HC RX REV CODE- 250/636: Performed by: HOSPITALIST

## 2019-02-18 PROCEDURE — 74011250637 HC RX REV CODE- 250/637: Performed by: INTERNAL MEDICINE

## 2019-02-18 PROCEDURE — 65660000000 HC RM CCU STEPDOWN

## 2019-02-18 PROCEDURE — 74011250636 HC RX REV CODE- 250/636: Performed by: INTERNAL MEDICINE

## 2019-02-18 PROCEDURE — 84100 ASSAY OF PHOSPHORUS: CPT

## 2019-02-18 PROCEDURE — 97110 THERAPEUTIC EXERCISES: CPT

## 2019-02-18 PROCEDURE — 83735 ASSAY OF MAGNESIUM: CPT

## 2019-02-18 RX ADMIN — GUAIFENESIN 400 MG: 100 SOLUTION ORAL at 19:40

## 2019-02-18 RX ADMIN — GUAIFENESIN 400 MG: 100 SOLUTION ORAL at 04:33

## 2019-02-18 RX ADMIN — HEPARIN SODIUM 5000 UNITS: 5000 INJECTION INTRAVENOUS; SUBCUTANEOUS at 17:43

## 2019-02-18 RX ADMIN — HEPARIN SODIUM 5000 UNITS: 5000 INJECTION INTRAVENOUS; SUBCUTANEOUS at 02:06

## 2019-02-18 RX ADMIN — GUAIFENESIN 400 MG: 100 SOLUTION ORAL at 08:20

## 2019-02-18 RX ADMIN — GABAPENTIN 400 MG: 100 CAPSULE ORAL at 22:55

## 2019-02-18 RX ADMIN — GABAPENTIN 400 MG: 100 CAPSULE ORAL at 17:46

## 2019-02-18 RX ADMIN — HEPARIN SODIUM 5000 UNITS: 5000 INJECTION INTRAVENOUS; SUBCUTANEOUS at 10:08

## 2019-02-18 RX ADMIN — GABAPENTIN 400 MG: 100 CAPSULE ORAL at 08:20

## 2019-02-18 RX ADMIN — GUAIFENESIN 400 MG: 100 SOLUTION ORAL at 17:43

## 2019-02-18 RX ADMIN — TRAMADOL HYDROCHLORIDE 50 MG: 50 TABLET, FILM COATED ORAL at 17:46

## 2019-02-18 RX ADMIN — SIMVASTATIN 10 MG: 10 TABLET, FILM COATED ORAL at 22:55

## 2019-02-18 RX ADMIN — ERTAPENEM SODIUM 1 G: 1 INJECTION, POWDER, LYOPHILIZED, FOR SOLUTION INTRAMUSCULAR; INTRAVENOUS at 19:41

## 2019-02-18 RX ADMIN — TRAMADOL HYDROCHLORIDE 50 MG: 50 TABLET, FILM COATED ORAL at 10:15

## 2019-02-18 RX ADMIN — BUMETANIDE 1 MG: 1 TABLET ORAL at 18:05

## 2019-02-18 RX ADMIN — BUMETANIDE 1 MG: 1 TABLET ORAL at 08:20

## 2019-02-18 RX ADMIN — PANTOPRAZOLE SODIUM 40 MG: 40 TABLET, DELAYED RELEASE ORAL at 08:20

## 2019-02-18 NOTE — PROGRESS NOTES
Problem: Mobility Impaired (Adult and Pediatric) Goal: *Acute Goals and Plan of Care (Insert Text) Physical Therapy Goals Reviewed and remain appropriate 2/13/2019 Initiated 2/8/2019 and to be accomplished within 7 day(s) 1. Patient will move from supine to sit and sit to supine in bed with supervision/set-up. 2.  Patient will transfer from bed to chair and chair to bed with supervision/set-up using the least restrictive device. 3.  Patient will perform sit to stand with supervision/set-up. 4.  Patient will ambulate with supervision/set-up for 50 feet with the least restrictive device. Outcome: Progressing Towards Goal 
 
PHYSICAL THERAPY: Daily TREATMENT Note INPATIENT: Medicaid: Hospital Day: 34 Patient: Ten Colon (85 y.o. male)    Date: 2/18/2019 Primary Diagnosis: Bacteremia  
,  ,  
Precautions: Fall Chart, physical therapy assessment, plan of care and goals were reviewed. PLOF:with walker ASSESSMENT: 
Pt instructed on exercises in seated and sup, educated on importance of performing exercises throughout the day. Pt supervision for bed mobility and transfer to seated to EOB, CGA for sit<>stand. In standing pt has posterior lean with compensatory knee flexion to maintain balance, pt instructed with verbal and tactile cues for posture correction. Pt with several near LOB while performing standing marches when cued to lift knees high enough to clear foot from floor, R LE weaker in stance than L. Progression toward goals: 
 
      Improving slowly and progressing toward goals PLAN: 
Patient continues to benefit from skilled intervention to address the above impairments. Continue treatment per established plan of care. EDUCATION:  
Education:  Patient was educated on the following topics: exercises Barriers to Learning/Limitations: None Compensate with: visual, verbal, tactile, kinesthetic cues/model Discharge Recommendations:  [address] [facility] Further Equipment Recommendations for Discharge:  Wheelchair if home Factors which may impact discharge planning: none SUBJECTIVE:  
Patient stated I just feel weak.  OBJECTIVE DATA SUMMARY:  
Critical Behavior: 
Neurologic State: Alert Orientation Level: Oriented to place, Oriented to person, Oriented to situation Cognition: Follows commands Functional Mobility: 
 
 
Functional Status Indep (I) Mod I Super-vision Min A Mod A Max A Total A Assist x2 Verbal cues Additional time Not tested Comments Rolling []  []  [x] []    []    []  []  [] [] [x] [] Supine to sit []  []  [x] []  []  []  []  [] [] [x] [] Sit to supine []  []  [x] []  []  []  []  [] [] [x] [] Sit to stand []  []  [x]CGA []  []  []  []  [] [x] [x] [] Stand to sit []  []  [x]CGA []  []  []  []  [] [x] [x] [] Bed to chair transfers []  []  [] []  []  []  []  [] [] [] [] Balance Good Judythe Lars Poor Unable Not tested Comments Sitting static [x]  []  []  []  [] Sitting dynamic [x]  []  []  []  []   
Standing static []  [x]  []  []  []   
Standing dynamic []  [x]  []  []  [] Therapeutic Exercises:  
 
 
 
EXERCISE Sets Reps Active Active Assist  
Passive Self ROM Comments Ankle Pumps 1 20  [x] [] [] [] Quad Sets/Glut Sets 1 10 [x] [] [] [] Hamstring Sets   [] [] [] [] Short Arc Quads   [] [] [] [] Heel Slides 1 10 [x] [] [] [] Straight Leg Raises   [] [] [] [] Hip Abd/Add 1 10 [x] [] [] [] Long Arc Quads   [] [] [] [] Seated Marching   [] [] [] []   
Standing Marching 1 10 [x] [] [] []   
TA draw, scap squeeze 1 10 [x] [] [] []   
 
 
Vital Signs Temp: 97.9 °F (36.6 °C) Pulse (Heart Rate): 91    
BP: 96/57 Resp Rate: 18    
O2 Sat (%): 97 %Pain:Pre treatment pain level:0 Post treatment pain level:0Pain Scale 1: Visual 
Pain Intensity 1: 0 Pain Intervention(s) 1: Medication (see MAR) Activity Tolerance:  
Fair After treatment:  
 
Patient left in no apparent distress in bed Call bell left within reach Srinivasan Just, PTA Time Calculation: 33 mins

## 2019-02-18 NOTE — PROGRESS NOTES
130 pm- Discussed at case management meeting. Suggestion for a psychiatric evaluation for capacity. FYI left for physician. Also suggestion to request [employer] to have the patient Medicaid changed from Community based to long term care. I have called and left a message for Ms Emely Blankenship. Macario Sue RN 
 
 
9:53 am:  Spoke to Jin Cookimer from [address] [place] [address], and enquired what was at issue with the patients non acceptance to a LTC facility. She stated the patient has the drug abuse history but also his Community Medicaid  will not cover his IV antibiotic until his Medicaid can be switched to Long Term Medicaid. Will discuss at LOS meeting with director if contract to cover the cost of the patients IV antibiotic could facilitate transition. I have called Member service with, Keagan, [phone] and left a message for the patient , Dodie Conrad . She returned my call and said she will review and speak to her supervisor for suggestions and call me back with a replay. I have also obtained the phone number for [employer] [phone]. His insurance may need to be changed from Community Based to Long term care in order to be accepted to a LTC facility. Awaiting a response from Dodie Conrad  from [facility]. Macario Sue RN Patient has been matched and declined through out the entire state for SNF and LTC. This is likely due to his history of IV drug use. He lives with his elderly mother who cannot act as his primary caregiver., He has a CPAP machine and uses a wheelchair for mobility. He was active with personal care prior to admission. Because of his drug abuse history he is not a good candidate for home infusion and a PICC line. Patient has Medicaid which will provided for his transportation to and from an infusion center, however, he will need IV access stated each day secondary to his abuse history.   Also given his lack of mobility due to pain with mobility he will likely not comply with a treatment which requires daily transport. I have requested a long term care bed again throughout the state. I have called all local partners for [facility] and requested a long term care bed from their admission departments.   Brayden Montana RN

## 2019-02-18 NOTE — PROGRESS NOTES
Problem: Falls - Risk of 
Goal: *Absence of Falls Document Christiano Quick Fall Risk and appropriate interventions in the flowsheet. Outcome: Progressing Towards Goal 
Fall Risk Interventions: 
Mobility Interventions: Patient to call before getting OOB, Communicate number of staff needed for ambulation/transfer, Bed/chair exit alarm Mentation Interventions: Adequate sleep, hydration, pain control, Bed/chair exit alarm, Door open when patient unattended, More frequent rounding, Update white board Medication Interventions: Teach patient to arise slowly, Patient to call before getting OOB, Bed/chair exit alarm Elimination Interventions: Call light in reach, Patient to call for help with toileting needs, Toileting schedule/hourly rounds, Urinal in reach, Bed/chair exit alarm History of Falls Interventions: Bed/chair exit alarm, Door open when patient unattended, Room close to nurse's station Problem: Pressure Injury - Risk of 
Goal: *Prevention of pressure injury Document Raul Scale and appropriate interventions in the flowsheet. Outcome: Progressing Towards Goal 
Pressure Injury Interventions: 
Sensory Interventions: Keep linens dry and wrinkle-free, Minimize linen layers, Maintain/enhance activity level, Pressure redistribution bed/mattress (bed type), Pad between skin to skin Moisture Interventions: Offer toileting Q_hr, Minimize layers, Maintain skin hydration (lotion/cream) Activity Interventions: Increase time out of bed, Pressure redistribution bed/mattress(bed type) Mobility Interventions: HOB 30 degrees or less, Pressure redistribution bed/mattress (bed type), Turn and reposition approx. every two hours(pillow and wedges), Float heels Nutrition Interventions: Offer support with meals,snacks and hydration Friction and Shear Interventions: Minimize layers, Lift sheet, HOB 30 degrees or less, Foam dressings/transparent film/skin sealants

## 2019-02-18 NOTE — PROGRESS NOTES
Spoke with Carlos Jernigan from [employer] she is aware patient needs to be straight Medicaid. She will  in AM.

## 2019-02-18 NOTE — PROGRESS NOTES
Patient placed on Bipap. VS stable and no issues to report. Masked replaced as old one at bedside was broken and missing pieces.

## 2019-02-18 NOTE — ADT AUTH CERT NOTES
Systemic or Infectious Condition GRG - Care Day 26 (2/15/2019) by Yara Bains Review Status Review Entered Completed 2/15/2019 14:32 Criteria Review Care Day: 26 Care Date: 2/15/2019 Level of Care: Telemetry Guideline Day 3 Level Of Care  
(X) * Activity level acceptable 2/15/2019 14:32:34 EST by Scotty Hewitt Ther   RECOMM LTCare upon DC Clinical Status  
(X) * Hemodynamic stability 2/15/2019 14:32:34 EST by Charly Ernandez  
  97.8   97   18   BP 98/59, SAT 97 NC ( liters last noted were 2 liters NC) wt is 219 lb. (X) * Respiratory status acceptable  
(X) * Neurologic status acceptable 2/15/2019 14:32:34 EST by Paula Nichols alert, oriented  
  
(X) * Temperature status acceptable  
(X) * No infection, or status acceptable  
(X) * No neutropenia, or status acceptable ( ) * Special infection or injury situations absent  
(X) * Electrolyte status acceptable ( ) * General Discharge Criteria met 2/15/2019 14:32:34 EST by Paula Nichols await accepting long term NF bed Interventions  
(X) * Intake acceptable ( ) * No inpatient interventions needed 2/15/2019 14:32:34 EST by Charly Ernandez Subject: Additional Clinical Information  
  
labs;wbc 5, hgb 11.1, sed rate 92.     
  
meds; Invanz IV Q 24H 1 G, Heparin sc 5000 units q 8h, ultram po x 2 50 mg . Bumex 1 mg iv bid, and other regular po meds. ORDERS; Card diet, PT, I&0, BIPAP at night. 2/15/2019 14:32:34 EST by Charly Ernandez Subject: Additional Clinical Information PER PT; Supervision for bed mobility, sit to stand transfers and transfer to chair (approx. 2 ft) with RW, pt with decreased LE buckling today for short distance. Recommend LTC upon DC * Milestone Additional Notes Feb 15 review;  
-------PER ID  
COPD / Acute Hypercarbia  
- ** dec LOC **2/12, PCO2 >130  
- reportedly refused BIPAP earlier - **transferred to ICU but now improved -> per [facility] Osteomyelitis, discitis, T9-10, Serratia  
- suspect seeded from Serratia UTI 11/2018 (no inflammation in spine lesions then)  
- returned 1/21 w/ worsened back pain, blcx + Serratia   
- CT 1/21/2019: destructive changes T9-10 levels c/w discitis/OM and intraspinal and paraspinal phlegmon. Overall appearance unchanged and pain not different from previous - s/p aspiration of fluid/phlegmon T10-11 discspace 1/23: (+) Serratia in anaerobic culture - potential for emergent AmpC- beta-lactamase- mediated resistance to third-generation cephalosporins not be evident during initial susc  
 testing so Carbapenem is DOC  
- CT Chest 2/4: progressive fragmentation, bone loss of T9   
- 1/22: esr 38, crp 7.4->1/31 57/2. 1- CRP 2.1 -> 1.5 -> 1.8. ESR 57-> 49 -> now 92 -> continue Ertapenem 1 gm q 24 hours 6-8 weeks   
-> no accepting SNF or LTAC.  May need to do OP infusion if back pain improves enough to permit this  
   
-----------MEDICINE Cont IVAB w/ ertapenem x 6-8wks - If effusions need tapped, pulm rec left first  
- Cont fluid restriction - bumex PO  
- Intermittent hypotension; will d/c lisinopril  
- Cont BiPAP qhs  
- Pain control PRN, avoid opiates (tramadol helping though)  
- PT/OT  
- Awaiting long term care, no accepting beds - Discussed potential surgery w/ patient, he is not interested at this time and not ready to commit if it was even an option. He would like to proceed with PT and see how things go - Cont acceptable home medications for chronic conditions   
- DVT protocol  
  
------------DC plan ;   
RAIMUNDO spoke w pt about possible OP infusion, and CPAP- he states that he does have CPAP at home and he uses it every night. Also reports that he has a walker and wheelchair at home. Pt states he does not know how he would get to infusion center, as he does not have a 's license, or anyone to drive him. (Walked 2 feet in room w walker). ---[ALSO Note clinical change on Feb 12; Pt was transferred to ICU/Stepdown . Feb 12. PER RESP therapist; Called to room for ABG. Pt on 3lpm NC ABG done pH 7.175, PCO2 130, PO 82. Pt transferred to ICU for BIPAP. PER RN ; 2010    Spoke with dr reddy to let him know that this patient's blood pressure is low 49/33, bolus of 500mls ordered.    
   
Went into room to check on patient and he would not arouse to painful stimuli,  BS obtained = 92.    
Bolus started. Patient is agitated and irritated at this time and will not listen to the nurse explaining why he needs the bipap]

## 2019-02-18 NOTE — PROGRESS NOTES
Internal Medicine Progress Note Patient's Name: Cecilio Schroeder Admit Date: 1/21/2019 Length of Stay: 28 
 
 
Assessment/Plan Active Hospital Problems Diagnosis Date Noted  Metabolic encephalopathy 21/19/3751 Associated with sepsis, present on admission.  PNA (pneumonia) 01/26/2019  Bacteremia 01/21/2019  Acute on chronic respiratory failure with hypoxia ([address] 12/23/2018  Hypertension 11/14/2018  Hepatitis C 11/14/2018  Back pain 11/14/2018  Discitis of thoracic region 11/14/2018  Bilateral pleural effusion 11/14/2018  
 
- Cont IVAB w/ ertapenem x 6-8wks - If effusions need tapped, pulm rec left first 
- Cont fluid restriction 
- Cont PO bumex - BP stable on regimen 
- Cont BiPAP qhs 
- Pain control PRN, avoid opiates (tramadol helping though) 
- PT/OT 
- Awaiting long term care, no accepting beds - Discussed potential surgery w/ patient, he is not interested at this time and not ready to commit if it was even an option. He would like to proceed with PT and see how things go - Cont acceptable home medications for chronic conditions  
- DVT protocol I have personally reviewed all pertinent labs and films that have officially resulted over the last 24 hours. I have personally checked for all pending labs that are awaiting final results. Subjective Pt s/e @ bedside No major events overnight No complaints today Objective Visit Vitals /79 (BP 1 Location: Left arm, BP Patient Position: At rest) Pulse 76 Temp 98.6 °F (37 °C) Resp 18 Ht 6' (1.829 m) Wt 99.1 kg (218 lb 7.6 oz) SpO2 99% BMI 29.63 kg/m² Physical Exam: 
General Appearance: NAD, conversant HEENT: AT/NC, moist mucosa Lungs: Decreased BS B/L with normal respiratory effort CV: RRR, no m/r/g Lab/Data Reviewed: 
BMP:  
No results found for: NA, K, CL, CO2, AGAP, GLU, BUN, CREA, GFRAA, GFRNA 
CBC:  
 No results found for: WBC, HGB, HGBEXT, HCT, HCTEXT, PLT, PLTEXT, HGBEXT, HCTEXT, PLTEXT Imaging Reviewed: 
No results found. Medications Reviewed: 
Current Facility-Administered Medications Medication Dose Route Frequency  bumetanide (BUMEX) tablet 1 mg  1 mg Oral BID  albuterol-ipratropium (DUO-NEB) 2.5 MG-0.5 MG/3 ML  3 mL Nebulization Q4H PRN  
 traMADol (ULTRAM) tablet 50 mg  50 mg Oral Q6H PRN  
 ertapenem (INVANZ) 1 g in 0.9% sodium chloride (MBP/ADV) 50 mL MBP  1 g IntraVENous Q24H  pantoprazole (PROTONIX) tablet 40 mg  40 mg Oral DAILY  guaiFENesin (ROBITUSSIN) 100 mg/5 mL oral liquid 400 mg  400 mg Oral Q4H  
 lidocaine (PF) (XYLOCAINE) 10 mg/mL (1 %) injection 20 mL  20 mL SubCUTAneous Rad Multiple  gabapentin (NEURONTIN) capsule 400 mg  400 mg Oral TID  simvastatin (ZOCOR) tablet 10 mg  10 mg Oral QHS  acetaminophen (TYLENOL) tablet 650 mg  650 mg Oral Q6H PRN  
 ondansetron (ZOFRAN) injection 4 mg  4 mg IntraVENous Q6H PRN  
 heparin (porcine) injection 5,000 Units  5,000 Units SubCUTAneous Q8H Gerardo Carlin DO Internal Medicine, Hospitalist 
Pager: [phone] [facility]

## 2019-02-18 NOTE — PROGRESS NOTES
TRANSFER - OUT REPORT: 
 
Verbal report given to Rock Lawson RN on Malloy Band  being transferred to [facility] for routine progression of care Report consisted of patients Situation, Background, Assessment and  
Recommendations(SBAR). Information from the following report(s) SBAR and Procedure Summary was reviewed with the receiving nurse. Lines:  
Peripheral IV 01/21/19 Left Other(comment) (Active) Site Assessment Clean, dry, & intact 1/23/2019  8:00 AM  
Phlebitis Assessment 0 1/23/2019  8:00 AM  
Infiltration Assessment 0 1/23/2019  8:00 AM  
Dressing Status Clean, dry, & intact 1/23/2019  8:00 AM  
Dressing Type Tape;Transparent 1/23/2019  6:00 AM  
Hub Color/Line Status Green; Infusing 1/23/2019  6:00 AM  
Action Taken Open ports on tubing capped 1/23/2019  6:00 AM  
Alcohol Cap Used Yes 1/23/2019  6:00 AM  
   
Peripheral IV 01/23/19 Right Antecubital (Active) Opportunity for questions and clarification was provided. Patient transported with: 
 O2 @ 6 liters with transport staff. SOB

## 2019-02-18 NOTE — PROGRESS NOTES
INFECTIOUS DISEASE FOLLOW UP NOTE Admit Date: 1/21/2019 Summary:  
 
77year-old AA male w/ h/o COPD, polysubstance abuse including cocaine, heroin, T 10-11 destructive changes in November 2018 w/ nl ESR, CRP, negative bone/gallium scans - not felt to be infectious then but had Serratia UTI rx'd Cefepime/Ceftriaxone 5 days. Returned 1/21 with back pain, BSI Serratia 1/21 and CT 1/21 with similar destructive changes T9-10 levels c/w discitis/OM and intraspinal and paraspinal phlegmon s/p needle aspiration 1/23 cx + Serratia. Had recurrent respiratory failure requiring RRT 1/23, 1/24. Has residual bilateral pleural effusions on CXR 2/4. Refused surgery offered by Dr. Silver Prajapati in November. Hypercarbia 2/12 PCO2 125 - trans ICU briefly. Back pain may be improving w/ PT Current abx Prior abx Ertapenem 2/5 - 12   abx 1/23 - 25 of 42 vs 56 Cefepime/vanco  11/14   2, Ceftriaxone 11/16 - 3; Zosyn 1/21-2  ceftaz 1/23-13, cipro 1/23-13 Interval History:  
 
Interval notes reviewed. Says he thinks his back pain is getting better based on how he feels when working with PT. Otherwise about the same Assessment / Plan:  
  
Osteomyelitis, discitis, T9-10, Serratia 
- suspect seeded from Serratia UTI 11/2018 (no inflammation in spine lesions then) 
- returned 1/21 w/ worsened back pain, blcx + Serratia  
- CT 1/21/2019: destructive changes T9-10 levels c/w discitis/OM and intraspinal and paraspinal phlegmon. Overall appearance unchanged and pain not different from previous - s/p aspiration of fluid/phlegmon T10-11 discspace 1/23: (+) Serratia in anaerobic culture - potential for emergent AmpC- beta-lactamase- mediated resistance to third-generation cephalosporins not be evident during initial susc 
 testing so Carbapenem is DOC 
- CT Chest 2/4: progressive fragmentation, bone loss of T9  
- 1/22: esr 38, crp 7.4->1/31 57/2. 1- CRP 2.1 -> 1.5 -> 1.8. ESR 57-> 49 -> now 92 - slowly improving back pain and increasing mobility -> continue Ertapenem 1 gm q 24 hours 6-8 weeks  
-> no accepting SNF or LTAC. May need to do OP infusion if back pain improves enough to permit this COPD / Acute Hypercarbia 
- dec LOC 2/12, PCO2 >130 
- reportedly refused BIPAP earlier 
- transferred to ICU but now improved -> per [facility] Destructive T9-10 changes discitis/OM 
- mid-back pain x 5 months, fell, incontinent of urine - h/o IVDU - last use in July 2018 
- CTAP 11/2018: severe destructive changes T 10-11, paravertebral sot tissue infiltration, extens to ant epidural space w/ cord compression 
- unable to have MRI due to claustrophobia - Bone/ gallium scans 11/27 neg for discitis/OM, ESR 20, CRP 0.7 
- Not felt to be infected then. Not given long term IV abx  
- refused stabilization procedure per Dr. Yane Branham BSI 2 of 2 Serratia marcescens 1/21 
- source ~ Discitis Phlegmon seen on CT 
- IR aspiration 1/23 chucky culture grew Serratia  
- denies any urinary complaint but had Serratia in urine in past. Currently UA is negative - Ucx was not sent but CT with normal kidneys and nothing sig on CT and pt asymptomatic except some incontinet 
- repeat Blcx 1/22 1/2 positive  
- blcx 1/28 NG x 2 
- BSI adequately treated at this time  -> remains on Ertapenem for osteomyelitis as above Acute hypoxic resp distress- RRT called 1/23 and again 1/24 sec to hypercapnic resp failure Bilateral pleural effusions- chronic with  atelectasis - monitor Hepatomegaly, splenic hilar & perisplenic varices- concern for cirrhosis and portal HTN 
- seen on CTAP again    
H/o IVDU 
- HIV ab NR 11/19 ,Hep BsAg neg (last reported ivdu August 2018, has hep C and thinks hep b immune) 
- denies current use -> avoid PICC 
   
HTN    
DJD    
Allergy Shelfish    
  
Microbiology:  
 
11/14   urcx >100,000 Serratia marcescens             blcx NG x 2 
11/15   CrAG neg, fungitell 375 (reported 11/17)    blcx for fungus - ngtd 
            fungitell 113 
  
     Blcx x2 Serratia marcescens R cefazolin UA neg 
 Blcx 1 of 2 Serratia  Aspiration cx g/s neg, Cx NG 
 NOMAN rare Serratia R Cefazolin Fungal NOS 
  bctx x 2 ng Lines / Catheters:  
 
piv 
  
Current Facility-Administered Medications Medication Dose Route Frequency  bumetanide (BUMEX) tablet 1 mg  1 mg Oral BID  albuterol-ipratropium (DUO-NEB) 2.5 MG-0.5 MG/3 ML  3 mL Nebulization Q4H PRN  
 traMADol (ULTRAM) tablet 50 mg  50 mg Oral Q6H PRN  
 ertapenem (INVANZ) 1 g in 0.9% sodium chloride (MBP/ADV) 50 mL MBP  1 g IntraVENous Q24H  pantoprazole (PROTONIX) tablet 40 mg  40 mg Oral DAILY  guaiFENesin (ROBITUSSIN) 100 mg/5 mL oral liquid 400 mg  400 mg Oral Q4H  
 lidocaine (PF) (XYLOCAINE) 10 mg/mL (1 %) injection 20 mL  20 mL SubCUTAneous Rad Multiple  gabapentin (NEURONTIN) capsule 400 mg  400 mg Oral TID  simvastatin (ZOCOR) tablet 10 mg  10 mg Oral QHS  acetaminophen (TYLENOL) tablet 650 mg  650 mg Oral Q6H PRN  
 ondansetron (ZOFRAN) injection 4 mg  4 mg IntraVENous Q6H PRN  
 heparin (porcine) injection 5,000 Units  5,000 Units SubCUTAneous Q8H Objective:  
 
Visit Vitals BP 96/57 (BP 1 Location: Left arm, BP Patient Position: At rest) Pulse 91 Temp 97.9 °F (36.6 °C) Resp 18 Ht 6' (1.829 m) Wt 99.1 kg (218 lb 7.6 oz) SpO2 97% BMI 29.63 kg/m² Temp (24hrs), Av.1 °F (36.7 °C), Min:97.3 °F (36.3 °C), Max:99.1 °F (37.3 °C) Gen: WD, 76 y/o AA M awake, oriented, not in distress on NC O2 
HEENT: muddy sclerae, pupils equal and reactive Chest: Symmetric expansion, no crackles or wheezing CVS:S 1S2 RR, No JVD or edema Abd:Soft, NT, ND, BS+ Skin:No jaundice, cyanosis, clubbing. No decubitus Muscsk: Normal muscle tone/strength CNS: AA, oriented, conversing appropriately Labs: Results:  
Chemistry No results for input(s): GLU, NA, K, CL, CO2, BUN, CREA, CA, AGAP, BUCR, TBIL, GPT, AP, TP, ALB, GLOB, AGRAT in the last 72 hours. CBC w/Diff Recent Labs  
  02/17/19 
0825 WBC 5.1  
RBC 3.86* HGB 10.8* HCT 35.7*  
 GRANS 47 LYMPH 42 EOS 2  
  
 
RADIOLOGY :   
cxr 2/4 IMPRESSION: 
  
Stable appearing densities at both lung bases compatible with pleural effusions 
with adjacent atelectasis/infiltrate 2/4 CT chest IMPRESSION: 
  
1. Moderate bilateral pleural effusions, left > right, amenable to image guided 
thoracentesis if clinically appropriate.   
  
2. Progressive fragmentation and bone loss of T9 from known 
discitis/osteomyelitis at T9-10. 
  
3. Stable discitis/osteomyelitis changes at T3-4. 
  
4. Other chronic findings detailed above and without significant interval 
change. 
  
CXR 2/12: Improved aeration with residual bilateral pleural effusions and subjacent 
atelectasis and/or consolidation. Sarthak Stevenson MD 
February 18, 2019 [facility] [phone]

## 2019-02-19 LAB
CA-I SERPL-SCNC: 1.13 MMOL/L (ref 1.12–1.32)
MAGNESIUM SERPL-MCNC: 1.9 MG/DL (ref 1.6–2.6)
PHOSPHATE SERPL-MCNC: 3.8 MG/DL (ref 2.5–4.9)

## 2019-02-19 PROCEDURE — 74011250637 HC RX REV CODE- 250/637: Performed by: HOSPITALIST

## 2019-02-19 PROCEDURE — 77010033678 HC OXYGEN DAILY

## 2019-02-19 PROCEDURE — 74011000258 HC RX REV CODE- 258: Performed by: INTERNAL MEDICINE

## 2019-02-19 PROCEDURE — 83735 ASSAY OF MAGNESIUM: CPT

## 2019-02-19 PROCEDURE — 74011250636 HC RX REV CODE- 250/636: Performed by: INTERNAL MEDICINE

## 2019-02-19 PROCEDURE — 74011250637 HC RX REV CODE- 250/637: Performed by: INTERNAL MEDICINE

## 2019-02-19 PROCEDURE — 65660000000 HC RM CCU STEPDOWN

## 2019-02-19 PROCEDURE — 82330 ASSAY OF CALCIUM: CPT

## 2019-02-19 PROCEDURE — 94660 CPAP INITIATION&MGMT: CPT

## 2019-02-19 PROCEDURE — 74011250636 HC RX REV CODE- 250/636: Performed by: HOSPITALIST

## 2019-02-19 PROCEDURE — 36415 COLL VENOUS BLD VENIPUNCTURE: CPT

## 2019-02-19 PROCEDURE — 84100 ASSAY OF PHOSPHORUS: CPT

## 2019-02-19 RX ADMIN — GUAIFENESIN 400 MG: 100 SOLUTION ORAL at 23:38

## 2019-02-19 RX ADMIN — SIMVASTATIN 10 MG: 10 TABLET, FILM COATED ORAL at 23:38

## 2019-02-19 RX ADMIN — BUMETANIDE 1 MG: 1 TABLET ORAL at 17:24

## 2019-02-19 RX ADMIN — GABAPENTIN 400 MG: 100 CAPSULE ORAL at 23:38

## 2019-02-19 RX ADMIN — GUAIFENESIN 400 MG: 100 SOLUTION ORAL at 20:20

## 2019-02-19 RX ADMIN — GABAPENTIN 400 MG: 100 CAPSULE ORAL at 08:27

## 2019-02-19 RX ADMIN — GUAIFENESIN 400 MG: 100 SOLUTION ORAL at 01:00

## 2019-02-19 RX ADMIN — TRAMADOL HYDROCHLORIDE 50 MG: 50 TABLET, FILM COATED ORAL at 08:28

## 2019-02-19 RX ADMIN — HEPARIN SODIUM 5000 UNITS: 5000 INJECTION INTRAVENOUS; SUBCUTANEOUS at 10:16

## 2019-02-19 RX ADMIN — BUMETANIDE 1 MG: 1 TABLET ORAL at 08:27

## 2019-02-19 RX ADMIN — GUAIFENESIN 400 MG: 100 SOLUTION ORAL at 08:28

## 2019-02-19 RX ADMIN — PANTOPRAZOLE SODIUM 40 MG: 40 TABLET, DELAYED RELEASE ORAL at 08:28

## 2019-02-19 RX ADMIN — GABAPENTIN 400 MG: 100 CAPSULE ORAL at 16:34

## 2019-02-19 RX ADMIN — GUAIFENESIN 400 MG: 100 SOLUTION ORAL at 05:40

## 2019-02-19 RX ADMIN — TRAMADOL HYDROCHLORIDE 50 MG: 50 TABLET, FILM COATED ORAL at 23:43

## 2019-02-19 RX ADMIN — HEPARIN SODIUM 5000 UNITS: 5000 INJECTION INTRAVENOUS; SUBCUTANEOUS at 17:24

## 2019-02-19 RX ADMIN — HEPARIN SODIUM 5000 UNITS: 5000 INJECTION INTRAVENOUS; SUBCUTANEOUS at 00:55

## 2019-02-19 RX ADMIN — GUAIFENESIN 400 MG: 100 SOLUTION ORAL at 16:35

## 2019-02-19 RX ADMIN — ERTAPENEM SODIUM 1 G: 1 INJECTION, POWDER, LYOPHILIZED, FOR SOLUTION INTRAMUSCULAR; INTRAVENOUS at 20:19

## 2019-02-19 NOTE — PROGRESS NOTES
I have spoken to Aleksey Leonard in [place], [place]. I explained to Ms Shama Krueger that I have spoken to the patient and he has agreed to the co pays required by the facility. I have explained that he lacks a primary caregiver who can assist this patient with home infusions. I explained a UAI has been completed and successfully processed and have entered a copy of this into [facility]. I explained the patient will need 6 to 8 more weeks of IV antibiotics and if he comes to their facility he will be able to have a PICC line for his infusion. She questioned why local facilities had not accepted him. I explained that he has a IV drug abuse history which could put off the local facilities. She stated she could possibly take this patient. She stated she will bring this case to her  and call me back with a determination.  Quita Siddiqui RN

## 2019-02-19 NOTE — PROGRESS NOTES
1514: PT treatment session attempted, patient sleeping soundly. End of day attempt, will f/u with patient. Gregory Lawrence PT, DPT Office extension: [phone] Pager #: [phone]

## 2019-02-19 NOTE — PROGRESS NOTES
Internal Medicine Progress Note Patient's Name: Barbie Hale Admit Date: 1/21/2019 Length of Stay: 29 
 
 
Assessment/Plan Active Hospital Problems Diagnosis Date Noted  Metabolic encephalopathy 10/57/4613 Associated with sepsis, present on admission.  PNA (pneumonia) 01/26/2019  Bacteremia 01/21/2019  Acute on chronic respiratory failure with hypoxia ([facility] [address] 12/23/2018  Hypertension 11/14/2018  Hepatitis C 11/14/2018  Back pain 11/14/2018  Discitis of thoracic region 11/14/2018  Bilateral pleural effusion 11/14/2018  
 
- Cont IVAB w/ ertapenem x 6-8wks - If effusions need tapped, pulm rec left first 
- Cont fluid restriction 
- Cont PO bumex - BP stable on regimen 
- Cont BiPAP qhs 
- Pain control PRN, avoid opiates (tramadol helping though) 
- PT/OT 
- Awaiting long term care, no accepting beds - Psych consulted for capacity - Discussed potential surgery w/ patient, he is not interested at this time and not ready to commit if it was even an option. He would like to proceed with PT and see how things go - Cont acceptable home medications for chronic conditions  
- DVT protocol I have personally reviewed all pertinent labs and films that have officially resulted over the last 24 hours. I have personally checked for all pending labs that are awaiting final results. Subjective Pt s/e @ bedside No major events overnight No complaints today Objective Visit Vitals /63 Pulse 84 Temp 98.3 °F (36.8 °C) Resp 18 Ht 6' (1.829 m) Wt 101 kg (222 lb 10.6 oz) SpO2 98% BMI 30.20 kg/m² Physical Exam: 
General Appearance: NAD, conversant HEENT: AT/NC, moist mucosa Lungs: Decreased BS B/L with normal respiratory effort CV: RRR, no m/r/g Lab/Data Reviewed: 
BMP:  
No results found for: NA, K, CL, CO2, AGAP, GLU, BUN, CREA, GFRAA, GFRNA 
CBC:  
No results found for: WBC, HGB, HGBEXT, HCT, HCTEXT, PLT, PLTEXT, HGBEXT, HCTEXT, PLTEXT 
 Imaging Reviewed: 
No results found. Medications Reviewed: 
Current Facility-Administered Medications Medication Dose Route Frequency  bumetanide (BUMEX) tablet 1 mg  1 mg Oral BID  albuterol-ipratropium (DUO-NEB) 2.5 MG-0.5 MG/3 ML  3 mL Nebulization Q4H PRN  
 traMADol (ULTRAM) tablet 50 mg  50 mg Oral Q6H PRN  
 ertapenem (INVANZ) 1 g in 0.9% sodium chloride (MBP/ADV) 50 mL MBP  1 g IntraVENous Q24H  pantoprazole (PROTONIX) tablet 40 mg  40 mg Oral DAILY  guaiFENesin (ROBITUSSIN) 100 mg/5 mL oral liquid 400 mg  400 mg Oral Q4H  
 lidocaine (PF) (XYLOCAINE) 10 mg/mL (1 %) injection 20 mL  20 mL SubCUTAneous Rad Multiple  gabapentin (NEURONTIN) capsule 400 mg  400 mg Oral TID  simvastatin (ZOCOR) tablet 10 mg  10 mg Oral QHS  acetaminophen (TYLENOL) tablet 650 mg  650 mg Oral Q6H PRN  
 ondansetron (ZOFRAN) injection 4 mg  4 mg IntraVENous Q6H PRN  
 heparin (porcine) injection 5,000 Units  5,000 Units SubCUTAneous Q8H Phillip Aviles DO Internal Medicine, Hospitalist 
Pager: [phone] [facility]

## 2019-02-19 NOTE — PROGRESS NOTES
I was not able to assess the patient at this time and will perform a follow up visit in a few days. Deondre Ruvalcaba M.Div. , [address] [facility]: [phone]/[facility]: [phone]

## 2019-02-19 NOTE — PROGRESS NOTES
INFECTIOUS DISEASE FOLLOW UP NOTE Admit Date: 1/21/2019 Summary:  
 
77year-old AA male w/ h/o COPD, polysubstance abuse including cocaine, heroin, T 10-11 destructive changes in November 2018 w/ nl ESR, CRP, negative bone/gallium scans - not felt to be infectious then but had Serratia UTI rx'd Cefepime/Ceftriaxone 5 days. Returned 1/21 with back pain, BSI Serratia 1/21 and CT 1/21 with similar destructive changes T9-10 levels c/w discitis/OM and intraspinal and paraspinal phlegmon s/p needle aspiration 1/23 cx + Serratia. Had recurrent respiratory failure requiring RRT 1/23, 1/24. Has residual bilateral pleural effusions on CXR 2/4. Refused surgery offered by Dr. Pao Sánchez in November. Hypercarbia 2/12 PCO2 125 - trans ICU briefly. Back pain may be improving w/ PT Current abx Prior abx Ertapenem 2/5 - 14   abx 1/23 - 27 of 42 vs 56 Cefepime/vanco  11/14   2, Ceftriaxone 11/16 - 3; Zosyn 1/21-2  ceftaz 1/23-13, cipro 1/23-13 Interval History:  
 
Interval notes reviewed. Says he thinks his back pain is getting better based on how he feels when working with PT. Otherwise about the same Assessment / Plan:  
  
Osteomyelitis, discitis, T9-10, Serratia 
- suspect seeded from Serratia UTI 11/2018 (no inflammation in spine lesions then) 
- returned 1/21 w/ worsened back pain, blcx + Serratia  
- CT 1/21/2019: destructive changes T9-10 levels c/w discitis/OM and intraspinal and paraspinal phlegmon. Overall appearance unchanged and pain not different from previous - s/p aspiration of fluid/phlegmon T10-11 discspace 1/23: (+) Serratia in anaerobic culture - potential for emergent AmpC- beta-lactamase- mediated resistance to third-generation cephalosporins not be evident during initial susc 
 testing so Carbapenem is DOC 
- CT Chest 2/4: progressive fragmentation, bone loss of T9  
- 1/22: esr 38, crp 7.4->1/31 57/2. 1- CRP 2.1 -> 1.5 -> 1.8. ESR 57-> 49 -> now 92 - back pain improving but no PT today -> continue Ertapenem 1 gm q 24 hours 6-8 weeks  
-> no accepting SNF or LTAC. -> if back pain improves enough may be able to continue IV abx at OP infusion COPD / Acute Hypercarbia 
- dec LOC 2/12, PCO2 >130 
- reportedly refused BIPAP earlier 
- transferred to ICU but now improved -> per [facility] Destructive T9-10 changes discitis/OM 
- mid-back pain x 5 months, fell, incontinent of urine - h/o IVDU - last use in July 2018 
- CTAP 11/2018: severe destructive changes T 10-11, paravertebral sot tissue infiltration, extens to ant epidural space w/ cord compression 
- unable to have MRI due to claustrophobia - Bone/ gallium scans 11/27 neg for discitis/OM, ESR 20, CRP 0.7 
- Not felt to be infected then. Not given long term IV abx  
- refused stabilization procedure per Dr. Betty Osuna BSI 2 of 2 Serratia marcescens 1/21 
- source ~ Discitis Phlegmon seen on CT 
- IR aspiration 1/23 chucky culture grew Serratia  
- denies any urinary complaint but had Serratia in urine in past. Currently UA is negative - Ucx was not sent but CT with normal kidneys and nothing sig on CT and pt asymptomatic except some incontinet 
- repeat Blcx 1/22 1/2 positive  
- blcx 1/28 NG x 2 
- BSI adequately treated at this time  -> remains on Ertapenem for osteomyelitis as above Acute hypoxic resp distress- RRT called 1/23 and again 1/24 sec to hypercapnic resp failure Bilateral pleural effusions- chronic with  atelectasis - monitor Hepatomegaly, splenic hilar & perisplenic varices- concern for cirrhosis and portal HTN 
- seen on CTAP again    
H/o IVDU 
- HIV ab NR 11/19 ,Hep BsAg neg (last reported ivdu August 2018, has hep C and thinks hep b immune) 
- denies current use -> avoid PICC 
   
HTN    
DJD    
Allergy Shelfish    
  
Microbiology:  
 
11/14   urcx >100,000 Serratia marcescens             blcx NG x 2 
11/15   CrAG neg, fungitell 375 (reported 11/17)    blcx for fungus - ngtd 
            fungitell 113 
  
     Blcx x2 Serratia marcescens R cefazolin UA neg 
 Blcx 1 of 2 Serratia  Aspiration cx g/s neg, Cx NG 
 NOMAN rare Serratia R Cefazolin Fungal NOS 
  bctx x 2 ng Lines / Catheters:  
 
piv 
  
Current Facility-Administered Medications Medication Dose Route Frequency  bumetanide (BUMEX) tablet 1 mg  1 mg Oral BID  albuterol-ipratropium (DUO-NEB) 2.5 MG-0.5 MG/3 ML  3 mL Nebulization Q4H PRN  
 traMADol (ULTRAM) tablet 50 mg  50 mg Oral Q6H PRN  
 ertapenem (INVANZ) 1 g in 0.9% sodium chloride (MBP/ADV) 50 mL MBP  1 g IntraVENous Q24H  pantoprazole (PROTONIX) tablet 40 mg  40 mg Oral DAILY  guaiFENesin (ROBITUSSIN) 100 mg/5 mL oral liquid 400 mg  400 mg Oral Q4H  
 lidocaine (PF) (XYLOCAINE) 10 mg/mL (1 %) injection 20 mL  20 mL SubCUTAneous Rad Multiple  gabapentin (NEURONTIN) capsule 400 mg  400 mg Oral TID  simvastatin (ZOCOR) tablet 10 mg  10 mg Oral QHS  acetaminophen (TYLENOL) tablet 650 mg  650 mg Oral Q6H PRN  
 ondansetron (ZOFRAN) injection 4 mg  4 mg IntraVENous Q6H PRN  
 heparin (porcine) injection 5,000 Units  5,000 Units SubCUTAneous Q8H Objective:  
 
Visit Vitals /69 (BP 1 Location: Left arm, BP Patient Position: At rest;Sitting) Pulse 79 Temp 99.3 °F (37.4 °C) Resp 24 Ht 6' (1.829 m) Wt 101 kg (222 lb 10.6 oz) SpO2 100% Comment: 2L NC  
BMI 30.20 kg/m² Temp (24hrs), Av.6 °F (37 °C), Min:98.3 °F (36.8 °C), Max:99.3 °F (37.4 °C) Gen: WD, 78 y/o AA M awake, oriented, not in distress on NC O2 
HEENT: muddy sclerae, pupils equal and reactive Chest: Symmetric expansion, no crackles or wheezing CVS:S 1S2 RR, No JVD or edema Abd:Soft, NT, ND, BS+ Skin:No jaundice, cyanosis, clubbing. No decubitus Muscsk: Normal muscle tone/strength CNS: AA, oriented, conversing appropriately Labs: Results: Chemistry No results for input(s): GLU, NA, K, CL, CO2, BUN, CREA, CA, AGAP, BUCR, TBIL, GPT, AP, TP, ALB, GLOB, AGRAT in the last 72 hours. CBC w/Diff Recent Labs  
  02/17/19 
0825 WBC 5.1  
RBC 3.86* HGB 10.8* HCT 35.7*  
 GRANS 47 LYMPH 42 EOS 2  
  
 
RADIOLOGY :   
cxr 2/4 IMPRESSION: 
  
Stable appearing densities at both lung bases compatible with pleural effusions 
with adjacent atelectasis/infiltrate 2/4 CT chest IMPRESSION: 
  
1. Moderate bilateral pleural effusions, left > right, amenable to image guided 
thoracentesis if clinically appropriate.   
  
2. Progressive fragmentation and bone loss of T9 from known 
discitis/osteomyelitis at T9-10. 
  
3. Stable discitis/osteomyelitis changes at T3-4. 
  
4. Other chronic findings detailed above and without significant interval 
change. 
  
CXR 2/12: Improved aeration with residual bilateral pleural effusions and subjacent 
atelectasis and/or consolidation. Sal Goel MD 
February 19, 2019 [facility] [phone]

## 2019-02-19 NOTE — PROGRESS NOTES
Bedside shift report received from Vineet 13: AOX3, on 3L 02 via NC, resting in bed; no complaints voiced; urinal at  Bedside; still generally weak; kept on fall precautions 2255: due meds given; urinals at bedside; no complaints voiced 0130: bipap on; sleeping comfortably 0300: sleeping with bipap on 
 
0315: on 02; said he is used to taking of bipap at night; 02 sat 97% 0500: awake, urinal emptied; no complaints voiced 4944: resting in bed using urinal; no major events noted throughout the night 0715: Bedside shift change report given to Edilberto Tolbert RN (oncoming nurse) by Gary Dalton RN (offgoing nurse). Report included the following information SBAR, Kardex, Intake/Output, Recent Results and Cardiac Rhythm NSR.

## 2019-02-19 NOTE — PROGRESS NOTES
conducted a Follow up consultation and Spiritual Assessment for Angel Blount, who is a 77 y.o.,male. The  provided the following Interventions: 
Continued the relationship of care and support. Listened empathically. Offered prayer and assurance of continued prayer on patients behalf. Chart reviewed. The following outcomes were achieved: 
Patient expressed gratitude for 's visit. Assessment: 
There are no spiritual or Sikh issues which require intervention at this time. Plan: 
Chaplains will continue to follow and will provide pastoral care on an as needed/requested basis.  recommends bedside caregivers page  on duty if patient shows signs of acute spiritual or emotional distress. Leticia Diane M.Div. , [address] [address] [facility]: [phone]/[facility]: [phone]

## 2019-02-19 NOTE — PROGRESS NOTES
0000,Pt placed on BIPAP resting watching TV no acute distress at this time,RN notified. 0400,Pt removed BIPAP mask states I take it off every morning at 0315,no distress noted at this time.

## 2019-02-20 LAB
ANION GAP SERPL CALC-SCNC: 5 MMOL/L (ref 3–18)
BUN SERPL-MCNC: 20 MG/DL (ref 7–18)
BUN/CREAT SERPL: 25 (ref 12–20)
CA-I SERPL-SCNC: 1.12 MMOL/L (ref 1.12–1.32)
CALCIUM SERPL-MCNC: 8.9 MG/DL (ref 8.5–10.1)
CHLORIDE SERPL-SCNC: 90 MMOL/L (ref 100–108)
CO2 SERPL-SCNC: 42 MMOL/L (ref 21–32)
CREAT SERPL-MCNC: 0.8 MG/DL (ref 0.6–1.3)
GLUCOSE SERPL-MCNC: 71 MG/DL (ref 74–99)
MAGNESIUM SERPL-MCNC: 2 MG/DL (ref 1.6–2.6)
PHOSPHATE SERPL-MCNC: 3.8 MG/DL (ref 2.5–4.9)
POTASSIUM SERPL-SCNC: 4.4 MMOL/L (ref 3.5–5.5)
SODIUM SERPL-SCNC: 137 MMOL/L (ref 136–145)

## 2019-02-20 PROCEDURE — 77010033678 HC OXYGEN DAILY

## 2019-02-20 PROCEDURE — 74011250637 HC RX REV CODE- 250/637: Performed by: HOSPITALIST

## 2019-02-20 PROCEDURE — 80048 BASIC METABOLIC PNL TOTAL CA: CPT

## 2019-02-20 PROCEDURE — 74011250636 HC RX REV CODE- 250/636: Performed by: INTERNAL MEDICINE

## 2019-02-20 PROCEDURE — 84100 ASSAY OF PHOSPHORUS: CPT

## 2019-02-20 PROCEDURE — 74011250637 HC RX REV CODE- 250/637: Performed by: INTERNAL MEDICINE

## 2019-02-20 PROCEDURE — 94660 CPAP INITIATION&MGMT: CPT

## 2019-02-20 PROCEDURE — 74011250636 HC RX REV CODE- 250/636: Performed by: HOSPITALIST

## 2019-02-20 PROCEDURE — 97164 PT RE-EVAL EST PLAN CARE: CPT

## 2019-02-20 PROCEDURE — 74011000258 HC RX REV CODE- 258: Performed by: INTERNAL MEDICINE

## 2019-02-20 PROCEDURE — 36415 COLL VENOUS BLD VENIPUNCTURE: CPT

## 2019-02-20 PROCEDURE — 65660000000 HC RM CCU STEPDOWN

## 2019-02-20 PROCEDURE — 97530 THERAPEUTIC ACTIVITIES: CPT

## 2019-02-20 PROCEDURE — 82330 ASSAY OF CALCIUM: CPT

## 2019-02-20 PROCEDURE — 83735 ASSAY OF MAGNESIUM: CPT

## 2019-02-20 RX ORDER — DOCUSATE SODIUM 100 MG/1
100 CAPSULE, LIQUID FILLED ORAL
Status: DISCONTINUED | OUTPATIENT
Start: 2019-02-21 | End: 2019-02-23 | Stop reason: HOSPADM

## 2019-02-20 RX ORDER — BISACODYL 5 MG
10 TABLET, DELAYED RELEASE (ENTERIC COATED) ORAL
Status: COMPLETED | OUTPATIENT
Start: 2019-02-20 | End: 2019-02-20

## 2019-02-20 RX ADMIN — HEPARIN SODIUM 5000 UNITS: 5000 INJECTION INTRAVENOUS; SUBCUTANEOUS at 17:32

## 2019-02-20 RX ADMIN — PANTOPRAZOLE SODIUM 40 MG: 40 TABLET, DELAYED RELEASE ORAL at 08:57

## 2019-02-20 RX ADMIN — GUAIFENESIN 400 MG: 100 SOLUTION ORAL at 05:33

## 2019-02-20 RX ADMIN — TRAMADOL HYDROCHLORIDE 50 MG: 50 TABLET, FILM COATED ORAL at 05:39

## 2019-02-20 RX ADMIN — GABAPENTIN 400 MG: 100 CAPSULE ORAL at 16:25

## 2019-02-20 RX ADMIN — GUAIFENESIN 400 MG: 100 SOLUTION ORAL at 20:52

## 2019-02-20 RX ADMIN — HEPARIN SODIUM 5000 UNITS: 5000 INJECTION INTRAVENOUS; SUBCUTANEOUS at 09:00

## 2019-02-20 RX ADMIN — GUAIFENESIN 400 MG: 100 SOLUTION ORAL at 08:57

## 2019-02-20 RX ADMIN — ERTAPENEM SODIUM 1 G: 1 INJECTION, POWDER, LYOPHILIZED, FOR SOLUTION INTRAMUSCULAR; INTRAVENOUS at 22:30

## 2019-02-20 RX ADMIN — BUMETANIDE 1 MG: 1 TABLET ORAL at 17:32

## 2019-02-20 RX ADMIN — SIMVASTATIN 10 MG: 10 TABLET, FILM COATED ORAL at 21:05

## 2019-02-20 RX ADMIN — HEPARIN SODIUM 5000 UNITS: 5000 INJECTION INTRAVENOUS; SUBCUTANEOUS at 02:32

## 2019-02-20 RX ADMIN — GUAIFENESIN 400 MG: 100 SOLUTION ORAL at 16:26

## 2019-02-20 RX ADMIN — BISACODYL 10 MG: 5 TABLET, COATED ORAL at 16:25

## 2019-02-20 RX ADMIN — GABAPENTIN 400 MG: 100 CAPSULE ORAL at 21:05

## 2019-02-20 RX ADMIN — GABAPENTIN 400 MG: 100 CAPSULE ORAL at 08:57

## 2019-02-20 RX ADMIN — ACETAMINOPHEN 650 MG: 325 TABLET ORAL at 10:45

## 2019-02-20 RX ADMIN — TRAMADOL HYDROCHLORIDE 50 MG: 50 TABLET, FILM COATED ORAL at 21:05

## 2019-02-20 RX ADMIN — BUMETANIDE 1 MG: 1 TABLET ORAL at 08:57

## 2019-02-20 NOTE — PROGRESS NOTES
Internal Medicine Progress Note Patient's Name: Herson Obrien Admit Date: 1/21/2019 Length of Stay: 30 
 
 
Assessment/Plan Active Hospital Problems Diagnosis Date Noted  Bacteremia 01/21/2019 Priority: 1 - One  Discitis of thoracic region 11/14/2018 Priority: 2 - Two  Metabolic encephalopathy 09/08/7131 Priority: 3 - Three Associated with sepsis, present on admission.  PNA (pneumonia) 01/26/2019 Priority: 4 - Four  Acute on chronic respiratory failure with hypoxia ([address] 12/23/2018  Hypertension 11/14/2018  Hepatitis C 11/14/2018  Back pain 11/14/2018  Bilateral pleural effusion 11/14/2018  
 
- Cont IVAB w/ ertapenem x 6-8wks - If effusions need tapped, pulm rec left first 
- Cont fluid restriction 
- Cont PO bumex - Trend BMP weekly given elevated bicarbonate - t/c acetazolamide - BP stable on regimen 
- Cont BiPAP qhs 
- Pain control PRN, avoid opiates (tramadol helping though) 
- PT/OT 
- Awaiting long term care, no accepting beds - Psych consult pending - Discussed potential surgery w/ patient, he is not interested at this time and not ready to commit if it was even an option. He would like to proceed with PT and see how things go - Cont acceptable home medications for chronic conditions  
- DVT protocol I have personally reviewed all pertinent labs and films that have officially resulted over the last 24 hours. I have personally checked for all pending labs that are awaiting final results. Subjective Pt s/e @ bedside No major events overnight No complaints today Doing well Objective Visit Vitals /70 Pulse 97 Temp 97.4 °F (36.3 °C) Resp 16 Ht 6' (1.829 m) Wt 100.2 kg (221 lb) SpO2 98% BMI 29.97 kg/m² Physical Exam: 
General Appearance: NAD, conversant HEENT: AT/NC, moist mucosa Lungs: CTA B/L with normal respiratory effort CV: RRR, no m/r/g Lab/Data Reviewed: 
BMP:  
Lab Results Component Value Date/Time  02/20/2019 04:10 AM  
 K 4.4 02/20/2019 04:10 AM  
 CL 90 (L) 02/20/2019 04:10 AM  
 CO2 42 (HH) 02/20/2019 04:10 AM  
 AGAP 5 02/20/2019 04:10 AM  
 GLU 71 (L) 02/20/2019 04:10 AM  
 BUN 20 (H) 02/20/2019 04:10 AM  
 CREA 0.80 02/20/2019 04:10 AM  
 GFRAA >60 02/20/2019 04:10 AM  
 GFRNA >60 02/20/2019 04:10 AM  
 
CBC:  
No results found for: WBC, HGB, HGBEXT, HCT, HCTEXT, PLT, PLTEXT, HGBEXT, HCTEXT, PLTEXT Imaging Reviewed: 
No results found. Medications Reviewed: 
Current Facility-Administered Medications Medication Dose Route Frequency  bumetanide (BUMEX) tablet 1 mg  1 mg Oral BID  albuterol-ipratropium (DUO-NEB) 2.5 MG-0.5 MG/3 ML  3 mL Nebulization Q4H PRN  
 traMADol (ULTRAM) tablet 50 mg  50 mg Oral Q6H PRN  
 ertapenem (INVANZ) 1 g in 0.9% sodium chloride (MBP/ADV) 50 mL MBP  1 g IntraVENous Q24H  pantoprazole (PROTONIX) tablet 40 mg  40 mg Oral DAILY  guaiFENesin (ROBITUSSIN) 100 mg/5 mL oral liquid 400 mg  400 mg Oral Q4H  
 lidocaine (PF) (XYLOCAINE) 10 mg/mL (1 %) injection 20 mL  20 mL SubCUTAneous Rad Multiple  gabapentin (NEURONTIN) capsule 400 mg  400 mg Oral TID  simvastatin (ZOCOR) tablet 10 mg  10 mg Oral QHS  acetaminophen (TYLENOL) tablet 650 mg  650 mg Oral Q6H PRN  
 ondansetron (ZOFRAN) injection 4 mg  4 mg IntraVENous Q6H PRN  
 heparin (porcine) injection 5,000 Units  5,000 Units SubCUTAneous Q8H Maynor Guy DO Internal Medicine, Hospitalist 
Pager: [phone] [facility]

## 2019-02-20 NOTE — PROGRESS NOTES
Artem Schreiber  Clayborne Boast from [facility] had indicated she will review this patient for  admission. I spoke with Ms Clayborne Boast today, she stated she has presented the patient to her director of nursing. She stated she will need to investigate his medication cost before she could render a decision. This is the only possible accepting facility in the [facility] at present. Request for psychiatric was denied as doctor felt the patient retains capacity.    Latisha Isabel RN

## 2019-02-20 NOTE — PROGRESS NOTES
1949  Received bedside verbal shift report from [facility]. Pt lying in bed resting comfortably. 2lnc in place. NSR on telemetry box # 6. Piv # 22  to right wrist intact. Call bell within reach, side rails up x 3, bed low and locked. No complaints offered, pt instructed to call for assistance. 0725 Bedside and Verbal shift change report given to [facility] RN (oncoming nurse) by [address] [place] [address] (offgoing nurse). Report included the following information SBAR, Kardex, Intake/Output, MAR, Recent Results and Cardiac Rhythm NSR.

## 2019-02-20 NOTE — PROGRESS NOTES
Problem: Mobility Impaired (Adult and Pediatric) Goal: *Acute Goals and Plan of Care (Insert Text) Physical Therapy Goals Goals reviewed 2/27/19; Will continue x 7 days Initiated 2/8/2019 and to be accomplished within 7 day(s) 1. Patient will move from supine to sit and sit to supine in bed with supervision/set-up. 2.  Patient will transfer from bed to chair and chair to bed with supervision/set-up using the least restrictive device. 3.  Patient will perform sit to stand with supervision/set-up. 4.  Patient will ambulate with supervision/set-up for 50 feet with the least restrictive device. Outcome: Progressing Towards Goal 
PHYSICAL THERAPY: Re-evaluationINPATIENT: Medicaid: Hospital Day: 31 Patient: Bryanna Lopez (22 y.o. male)    Date: 2/20/2019 Primary Diagnosis: Bacteremia  
,  ,  
Precautions: Fall PLOF: Ambulation with RW 
 
ASSESSMENT: 
Patient supine in bed, agreeable to participation with PT. MIN A x 1 for supine > sit. MIN A x 1 for sit > stand x 5 trials; patient demo's fair standing balance. Bilateral knee buckling in stance. Patient participated in standing marches with CGA. Ambulates 4 feet x 3 with RW for support and CGA; requires cuing for upright posture and knee extension in stance. Patient fatigued with activity; returned to bed with MIN A x1. Left supine in bed with all needs within reach. Condom cath displaced with activity, CNA Radha aware. Patient SPO2 with activity 95%. EDUCATION:  
Education:  Patient was educated on the following topics: bed mobility, transfers, standing therex. Verbalizes understanding. Barriers to Learning/Limitations: None Compensate with: visual, verbal, tactile, kinesthetic cues/model Patient's progression toward goals since last assessment: Patient has made slow progress with therapy; continues to demo functional weakness and decreased activity tolerance.  Will continue to benefit from skilled PT to progress to reaching all goals. PLAN: 
Goals have been updated based on progression since last assessment. Patient continues to benefit from skilled intervention to address the above impairments. Continue to follow the patient 3 - 5 times a week to address goals. Planned Interventions: 
[x]     Bed Mobility Training          [x]     Neuromuscular Re-Education 
[x]     Transfer Training                []    Orthotic/Prosthetic Training 
[x]     Gait Training                       []     Modalities [x]     Therapeutic Exercises       []     Edema Management/Control 
[x]     Therapeutic Activities         [x]     Patient and Family Training/Education 
[]     Other (comment): 
Discharge Recommendations: Franki Bains Further Equipment Recommendations for Discharge: rolling walker SUBJECTIVE:  
Patient stated I'm just weak.  OBJECTIVE DATA SUMMARY:  
 
Critical Behavior: 
Neurologic State: Alert Orientation Level: Oriented X4 Cognition: Follows commands Tone : normalSensation: NT 
Range Of Motion: B LE SGB [facility] Functional Mobility: 
 
 
Functional Status Indep (I) Mod I Super-vision Min A Mod A Max A Total A Assist x2 Verbal cues Additional time Not tested Comments Rolling []  []  [] []    []    []  []  [] [] [] [x] Supine to sit []  []  [] [x]  []  []  []  [] [] [] [] Sit to supine []  []  [] [x]  []  []  []  [] [] [] [] Sit to stand []  []  [] [x]  []  []  []  [] [] [] []   
Stand to sit []  []  [x] CGA []  []  []  []  [] [] [] [] Bed to chair transfers []  []  [] []  []  []  []  [] [] [] [x] Balance Good Delford Askew Poor Unable Not tested Comments Sitting static [x]  []  []  []  [] Sitting dynamic []  [x]  []  []  []   
Standing static []  [x]  []  []  [] With RW  
Standing dynamic []  [x]  []  []  [] With RW  
 
Mobility/Gait:  
Level of Assistance: Contact guard assistance Assistive Device: rolling walker Distance Ambulated: 4 feet x 4 Left Lower Extremity: FWB Right Lower Extremity: FWB Base of Support: center of gravity altered Speed/Shannan: slow Step Length: left shortened and right shortened Swing Pattern: [facility] Stance: [facility] Gait Abnormalities: altered arm swing and decreased step clearance, increased knee flexion throughout gait Therapeutic Exercises:  
Standing with RW 
 
 
EXERCISE Sets Reps Active Active Assist  
Passive Self ROM Comments Ankle Pumps    [] [] [] [] Quad Sets/Glut Sets   [] [] [] [] Hamstring Sets   [] [] [] [] Short Arc Quads   [] [] [] [] Heel Slides   [] [] [] [] Straight Leg Raises   [] [] [] [] Hip Abd/Add   [] [] [] [] Long Arc Quads   [] [] [] [] Seated Marching   [] [] [] []   
Standing Marching 1 10 [x] [] [] []   
   [] [] [] []   
 
Pain: 
None Vital Signs Temp: 97.8 °F (36.6 °C) Pulse (Heart Rate): 83    
BP: 106/64 Resp Rate: 16    
O2 Sat (%): 99 % Activity Tolerance:  
Fair Please refer to the flowsheet for vital signs taken during this treatment. After treatment:  
[]  Patient left in no apparent distress sitting up in chair 
[x]  Patient left in no apparent distress in bed 
[x]  Call bell left within reach 
[]  Nursing notified 
[x]  Caregiver present 
[]  Bed alarm activated Nuris Bonilla Time Calculation: 28 mins

## 2019-02-20 NOTE — PROGRESS NOTES
INFECTIOUS DISEASE FOLLOW UP NOTE Admit Date: 1/21/2019 Summary:  
 
77year-old AA male w/ h/o COPD, polysubstance abuse including cocaine, heroin, T 10-11 destructive changes in November 2018 w/ nl ESR, CRP, negative bone/gallium scans - not felt to be infectious then but had Serratia UTI rx'd Cefepime/Ceftriaxone 5 days. Returned 1/21 with back pain, BSI Serratia 1/21 and CT 1/21 with similar destructive changes T9-10 levels c/w discitis/OM and intraspinal and paraspinal phlegmon s/p needle aspiration 1/23 cx + Serratia. Had recurrent respiratory failure requiring RRT 1/23, 1/24. Has residual bilateral pleural effusions on CXR 2/4. Refused surgery offered by Dr. Phill Fitzpatrick in November. Hypercarbia 2/12 PCO2 125 - trans ICU briefly. Back pain may be improving w/ PT Current abx Prior abx Ertapenem 2/5 - 16   abx 1/23 - 28 of 42 vs 56 Cefepime/vanco  11/14   2, Ceftriaxone 11/16 - 3; Zosyn 1/21-2  ceftaz 1/23-13, cipro 1/23-13 Interval History:  
 
Interval notes reviewed. Says he thinks his back pain is getting better based on how he feels when working with PT. Otherwise about the same Assessment / Plan:  
  
Osteomyelitis, discitis, T9-10, Serratia 
- suspect seeded from Serratia UTI 11/2018 (no inflammation in spine lesions then) 
- returned 1/21 w/ worsened back pain, blcx + Serratia  
- CT 1/21/2019: destructive changes T9-10 levels c/w discitis/OM and intraspinal and paraspinal phlegmon. Overall appearance unchanged and pain not different from previous - s/p aspiration of fluid/phlegmon T10-11 discspace 1/23: (+) Serratia in anaerobic culture - potential for emergent AmpC- beta-lactamase- mediated resistance to third-generation cephalosporins not be evident during initial susc 
 testing so Carbapenem is DOC 
- CT Chest 2/4: progressive fragmentation, bone loss of T9  
- 1/22: esr 38, crp 7.4->1/31 57/2. 1- CRP 2.1 -> 1.5 -> 1.8. ESR 57-> 49 -> now 92 - no PT since 2/18 -> continue Ertapenem 1 gm q 24 hours 6-8 weeks  
-> no accepting SNF or LTAC. -> if back pain improves enough may be able to continue IV abx at OP infusion 
-> continue PT 
  
COPD / Acute Hypercarbia 
- dec LOC 2/12, PCO2 >130 
- reportedly refused BIPAP earlier 
- transferred to ICU but now improved -> per [facility] Destructive T9-10 changes discitis/OM 
- mid-back pain x 5 months, fell, incontinent of urine - h/o IVDU - last use in July 2018 
- CTAP 11/2018: severe destructive changes T 10-11, paravertebral sot tissue infiltration, extens to ant epidural space w/ cord compression 
- unable to have MRI due to claustrophobia - Bone/ gallium scans 11/27 neg for discitis/OM, ESR 20, CRP 0.7 
- Not felt to be infected then. Not given long term IV abx  
- refused stabilization procedure per Dr. Miguel Price BSI 2 of 2 Serratia marcescens 1/21 
- source ~ Discitis Phlegmon seen on CT 
- IR aspiration 1/23 chucky culture grew Serratia  
- denies any urinary complaint but had Serratia in urine in past. Currently UA is negative - Ucx was not sent but CT with normal kidneys and nothing sig on CT and pt asymptomatic except some incontinet 
- repeat Blcx 1/22 1/2 positive  
- blcx 1/28 NG x 2 
- BSI adequately treated at this time  -> remains on Ertapenem for osteomyelitis as above Acute hypoxic resp distress- RRT called 1/23 and again 1/24 sec to hypercapnic resp failure Bilateral pleural effusions- chronic with  atelectasis - monitor Hepatomegaly, splenic hilar & perisplenic varices- concern for cirrhosis and portal HTN 
- seen on CTAP again    
H/o IVDU 
- HIV ab NR 11/19 ,Hep BsAg neg (last reported ivdu August 2018, has hep C and thinks hep b immune) 
- denies current use -> avoid PICC 
   
HTN    
DJD    
Allergy Shelfish    
  
Microbiology:  
 
11/14   urcx >100,000 Serratia marcescens             blcx NG x 2 
11/15   CrAG neg, fungitell 375 (reported 11/17)    blcx for fungus - ngtd 
            fungitell 113 
  
     Blcx x2 Serratia marcescens R cefazolin UA neg 
 Blcx 1 of 2 Serratia  Aspiration cx g/s neg, Cx NG 
 NOMAN rare Serratia R Cefazolin Fungal NOS 
  bctx x 2 ng Lines / Catheters:  
 
piv 
  
Current Facility-Administered Medications Medication Dose Route Frequency  bumetanide (BUMEX) tablet 1 mg  1 mg Oral BID  albuterol-ipratropium (DUO-NEB) 2.5 MG-0.5 MG/3 ML  3 mL Nebulization Q4H PRN  
 traMADol (ULTRAM) tablet 50 mg  50 mg Oral Q6H PRN  
 ertapenem (INVANZ) 1 g in 0.9% sodium chloride (MBP/ADV) 50 mL MBP  1 g IntraVENous Q24H  pantoprazole (PROTONIX) tablet 40 mg  40 mg Oral DAILY  guaiFENesin (ROBITUSSIN) 100 mg/5 mL oral liquid 400 mg  400 mg Oral Q4H  
 lidocaine (PF) (XYLOCAINE) 10 mg/mL (1 %) injection 20 mL  20 mL SubCUTAneous Rad Multiple  gabapentin (NEURONTIN) capsule 400 mg  400 mg Oral TID  simvastatin (ZOCOR) tablet 10 mg  10 mg Oral QHS  acetaminophen (TYLENOL) tablet 650 mg  650 mg Oral Q6H PRN  
 ondansetron (ZOFRAN) injection 4 mg  4 mg IntraVENous Q6H PRN  
 heparin (porcine) injection 5,000 Units  5,000 Units SubCUTAneous Q8H Objective:  
 
Visit Vitals /70 Pulse 97 Temp 97.4 °F (36.3 °C) Resp 16 Ht 6' (1.829 m) Wt 100.2 kg (221 lb) SpO2 98% BMI 29.97 kg/m² Temp (24hrs), Av.8 °F (36.6 °C), Min:97.4 °F (36.3 °C), Max:98.1 °F (36.7 °C) Gen: WD, 78 y/o AA M awake, oriented, not in distress on NC O2 
HEENT: muddy sclerae, pupils equal and reactive Chest: Symmetric expansion, no crackles or wheezing CVS:S 1S2 RR, No JVD or edema Abd:Soft, NT, ND, BS+ Skin:No jaundice, cyanosis, clubbing. No decubitus Muscsk: Normal muscle tone/strength CNS: AA, oriented, conversing appropriately Labs: Results:  
Chemistry Recent Labs  
  19 
0410 GLU 71*   
K 4.4 CL 90* CO2 42*  
BUN 20* CREA 0.80 CA 8.9 AGAP 5  
BUCR 25* CBC w/Diff No results for input(s): WBC, RBC, HGB, HCT, PLT, GRANS, LYMPH, EOS, HGBEXT, HCTEXT, PLTEXT, HGBEXT, HCTEXT, PLTEXT in the last 72 hours. RADIOLOGY :   
cxr 2/4 IMPRESSION: 
  
Stable appearing densities at both lung bases compatible with pleural effusions 
with adjacent atelectasis/infiltrate 2/4 CT chest IMPRESSION: 
  
1. Moderate bilateral pleural effusions, left > right, amenable to image guided 
thoracentesis if clinically appropriate.   
  
2. Progressive fragmentation and bone loss of T9 from known 
discitis/osteomyelitis at T9-10. 
  
3. Stable discitis/osteomyelitis changes at T3-4. 
  
4. Other chronic findings detailed above and without significant interval 
change. 
  
CXR 2/12: Improved aeration with residual bilateral pleural effusions and subjacent 
atelectasis and/or consolidation. Chad Plummer MD 
February 20, 2019 [facility] [phone]

## 2019-02-20 NOTE — PROGRESS NOTES
Nutrition follow up/ 
Plan of care RECOMMENDATIONS:  
 
1. Cardiac diet 2. Monitor weight, labs and PO intake 3. RD to follow GOALS: 
 
1. Met/Ongoing: PO intake meets >75% of protein/calorie needs by 2/27 
2. Ongoing: Weight Maintenance (+/- 1-2 lb by 2/27) ASSESSMENT: 
 
Weight status is classified as obese per BMI of 30. PO intake is adequate. Labs noted. Patient with hypoalbuminemia and elevated BUN. Nutrition recommendations listed. RD to follow. Nutrition Risk:  [] High  [] Moderate [x]  Low SUBJECTIVE/OBJECTIVE: 
  
(1/30): LOS. Transferred from ICU. Admitted with bacteremia. Has history of COPD and HTN. Patient reports having a good appetite and eating all of meals. Observed 100% intake of lunch meal during visit. Has food allergy to shellfish. Reports having limited dentition but declined change in diet texture. Will monitor. 
 
(2/6): Transferred back to [facility] from ICU on 2/3. Patient reports having a good appetite and eating most of meals. Observed 50% intake of lunch meal during visit. % intake of meals per vitals. Stated food preferences. Will monitor. 
 
(2/11): Patient with a good appetite and eating all of meals. Observed 100% intake of lunch meal during visit and 100% intake of majority of meals per vitals. No complaints. Awaiting placement. Will monitor. 
 
(2/20): Transferred back from ICU. Patient with a good appetite and eating all of meals. Observed 100% intake of lunch meal during visit. % intake of majority of meals per vitals. No complaints. Will monitor. Information Obtained from:  
 [x] Chart Review [x] Patient [x] Family/Caregiver 
 [] Nurse/Physician 
 [] Interdisciplinary Meeting/Rounds Diet: Cardiac diet Medications: [x] Reviewed (Bumex) Allergies: [x] Reviewed (Shellfish) Encounter Diagnoses ICD-10-CM ICD-9-CM 1. Bacteremia R78.81 790.7 2. History of heroin abuse Z87.898 305.53 Past Medical History:  
Diagnosis Date  Arthritis  COPD  Hypertension Labs:   
Lab Results Component Value Date/Time Sodium 137 02/20/2019 04:10 AM  
 Potassium 4.4 02/20/2019 04:10 AM  
 Chloride 90 (L) 02/20/2019 04:10 AM  
 CO2 42 (HH) 02/20/2019 04:10 AM  
 Anion gap 5 02/20/2019 04:10 AM  
 Glucose 71 (L) 02/20/2019 04:10 AM  
 BUN 20 (H) 02/20/2019 04:10 AM  
 Creatinine 0.80 02/20/2019 04:10 AM  
 Calcium 8.9 02/20/2019 04:10 AM  
 Magnesium 2.0 02/20/2019 04:10 AM  
 Phosphorus 3.8 02/20/2019 04:10 AM  
 Albumin 2.5 (L) 02/15/2019 05:00 AM  
 
Anthropometrics: BMI (calculated): 30 Last 3 Recorded Weights in this Encounter 02/18/19 1163 02/19/19 0400 02/20/19 0554 Weight: 99.1 kg (218 lb 7.6 oz) 101 kg (222 lb 10.6 oz) 100.2 kg (221 lb) Ht Readings from Last 1 Encounters:  
02/17/19 6' (1.829 m) Weight Metrics 2/20/2019 1/21/2019 1/21/2019 1/21/2019 12/30/2018 12/23/2018 12/21/2018 Weight 221 lb - 252 lb - 252 lb 6.8 oz - 246 lb 0.5 oz  
BMI - 29.97 kg/m2 - 34.18 kg/m2 - 34.24 kg/m2 33.37 kg/m2 Patient Vitals for the past 100 hrs: 
 % Diet Eaten 02/20/19 0827 100 % 02/19/19 1220 100 % 02/19/19 0815 75 % 02/18/19 2349 100 % 02/18/19 1318 50 % 02/18/19 0854 100 % 02/16/19 0840 100 % Estimated Nutrition Needs: [x] MSJ Calories: 7854-7468 Kcal Based on:   [x] Actual BW   
Protein:    g Based on:   [x] Actual BW Fluid:       8594-4964 ml Based on:   [x] Actual BW  
 
 [x] No Cultural, Worship or ethnic dietary need identified. [] Cultural, Worship and ethnic food preferences identified and addressed Wt Status:  [] Normal (18.6 - 24.9) [] Underweight (<18.5) [x] Overweight (25 - 29.9) [] Mild Obesity (30 - 34.9)  [] Moderate Obesity (35 - 39.9) [] Morbid Obesity (40+) Nutrition Problems Identified:  
[] Suboptimal PO intake [x] Food Allergies (Shellfish) [] Difficulty chewing/swallowing/poor dentition 
[] Constipation/Diarrhea  
[] Nausea/Vomiting  
[] None [] Other:  
 
Plan:  
[x] Therapeutic Diet [x]  Obtained/adjusted food preferences/tolerances and/or snacks options  
[]  Supplements added  
[] Occupational therapy following for feeding techniques []  HS snack added  
[]  Modify diet texture  
[x]  Modify diet for food allergies []  Assist with menu selection  
[x]  Monitor PO intake on meal rounds  
[x]  Continue inpatient monitoring and intervention  
[]  Participated in discharge planning/Interdisciplinary rounds/Team meetings  
[]  Other:  
 
Education Needs: 
 [] Not appropriate for teaching at this time due to: 
 [x] Identified and addressed Nutrition Monitoring and Evaluation: 
[x] Continue ongoing monitoring and intervention 
[] Laura Lloyd 27

## 2019-02-20 NOTE — PROGRESS NOTES
Problem: Falls - Risk of 
Goal: *Absence of Falls Document Kati Schultz Fall Risk and appropriate interventions in the flowsheet. Outcome: Progressing Towards Goal 
Fall Risk Interventions: 
Mobility Interventions: Communicate number of staff needed for ambulation/transfer Mentation Interventions: Adequate sleep, hydration, pain control, Door open when patient unattended, More frequent rounding, Room close to nurse's station, Update white board Medication Interventions: Evaluate medications/consider consulting pharmacy Elimination Interventions: Call light in reach, Urinal in reach History of Falls Interventions: Evaluate medications/consider consulting pharmacy Problem: Pressure Injury - Risk of 
Goal: *Prevention of pressure injury Document Raul Scale and appropriate interventions in the flowsheet. Outcome: Progressing Towards Goal 
Pressure Injury Interventions: 
Sensory Interventions: Assess changes in LOC, Check visual cues for pain, Keep linens dry and wrinkle-free, Monitor skin under medical devices, Pressure redistribution bed/mattress (bed type) Moisture Interventions: Absorbent underpads, Maintain skin hydration (lotion/cream) Activity Interventions: Pressure redistribution bed/mattress(bed type) Mobility Interventions: Pressure redistribution bed/mattress (bed type) Nutrition Interventions: Document food/fluid/supplement intake Friction and Shear Interventions: HOB 30 degrees or less

## 2019-02-20 NOTE — PROGRESS NOTES
Call placed to dr Lord Hartman concerning critical CO2 of 42. Returned call at 0830. No new orders at this time

## 2019-02-21 LAB
CA-I SERPL-SCNC: 1.14 MMOL/L (ref 1.12–1.32)
MAGNESIUM SERPL-MCNC: 2 MG/DL (ref 1.6–2.6)
PHOSPHATE SERPL-MCNC: 3.4 MG/DL (ref 2.5–4.9)

## 2019-02-21 PROCEDURE — 74011250636 HC RX REV CODE- 250/636: Performed by: HOSPITALIST

## 2019-02-21 PROCEDURE — 74011250637 HC RX REV CODE- 250/637: Performed by: HOSPITALIST

## 2019-02-21 PROCEDURE — 84100 ASSAY OF PHOSPHORUS: CPT

## 2019-02-21 PROCEDURE — 36415 COLL VENOUS BLD VENIPUNCTURE: CPT

## 2019-02-21 PROCEDURE — 82330 ASSAY OF CALCIUM: CPT

## 2019-02-21 PROCEDURE — 74011000258 HC RX REV CODE- 258: Performed by: INTERNAL MEDICINE

## 2019-02-21 PROCEDURE — 65660000000 HC RM CCU STEPDOWN

## 2019-02-21 PROCEDURE — 74011250637 HC RX REV CODE- 250/637: Performed by: INTERNAL MEDICINE

## 2019-02-21 PROCEDURE — 97530 THERAPEUTIC ACTIVITIES: CPT

## 2019-02-21 PROCEDURE — 83735 ASSAY OF MAGNESIUM: CPT

## 2019-02-21 PROCEDURE — 94660 CPAP INITIATION&MGMT: CPT

## 2019-02-21 PROCEDURE — 94760 N-INVAS EAR/PLS OXIMETRY 1: CPT

## 2019-02-21 PROCEDURE — 74011250636 HC RX REV CODE- 250/636: Performed by: INTERNAL MEDICINE

## 2019-02-21 PROCEDURE — 77010033678 HC OXYGEN DAILY

## 2019-02-21 RX ADMIN — PANTOPRAZOLE SODIUM 40 MG: 40 TABLET, DELAYED RELEASE ORAL at 09:34

## 2019-02-21 RX ADMIN — DOCUSATE SODIUM 100 MG: 100 CAPSULE ORAL at 20:26

## 2019-02-21 RX ADMIN — SIMVASTATIN 10 MG: 10 TABLET, FILM COATED ORAL at 22:26

## 2019-02-21 RX ADMIN — BUMETANIDE 1 MG: 1 TABLET ORAL at 09:34

## 2019-02-21 RX ADMIN — GUAIFENESIN 400 MG: 100 SOLUTION ORAL at 16:31

## 2019-02-21 RX ADMIN — GUAIFENESIN 400 MG: 100 SOLUTION ORAL at 23:47

## 2019-02-21 RX ADMIN — GUAIFENESIN 400 MG: 100 SOLUTION ORAL at 13:49

## 2019-02-21 RX ADMIN — HEPARIN SODIUM 5000 UNITS: 5000 INJECTION INTRAVENOUS; SUBCUTANEOUS at 09:38

## 2019-02-21 RX ADMIN — GUAIFENESIN 400 MG: 100 SOLUTION ORAL at 09:34

## 2019-02-21 RX ADMIN — TRAMADOL HYDROCHLORIDE 50 MG: 50 TABLET, FILM COATED ORAL at 20:39

## 2019-02-21 RX ADMIN — GABAPENTIN 400 MG: 100 CAPSULE ORAL at 16:31

## 2019-02-21 RX ADMIN — GUAIFENESIN 400 MG: 100 SOLUTION ORAL at 00:08

## 2019-02-21 RX ADMIN — GABAPENTIN 400 MG: 100 CAPSULE ORAL at 22:26

## 2019-02-21 RX ADMIN — HEPARIN SODIUM 5000 UNITS: 5000 INJECTION INTRAVENOUS; SUBCUTANEOUS at 17:09

## 2019-02-21 RX ADMIN — ERTAPENEM SODIUM 1 G: 1 INJECTION, POWDER, LYOPHILIZED, FOR SOLUTION INTRAMUSCULAR; INTRAVENOUS at 22:25

## 2019-02-21 RX ADMIN — GABAPENTIN 400 MG: 100 CAPSULE ORAL at 09:34

## 2019-02-21 RX ADMIN — BUMETANIDE 1 MG: 1 TABLET ORAL at 17:08

## 2019-02-21 RX ADMIN — HEPARIN SODIUM 5000 UNITS: 5000 INJECTION INTRAVENOUS; SUBCUTANEOUS at 01:41

## 2019-02-21 NOTE — PROGRESS NOTES
Problem: Mobility Impaired (Adult and Pediatric) Goal: *Acute Goals and Plan of Care (Insert Text) Physical Therapy Goals Goals reviewed 2/27/19; Will continue x 7 days Initiated 2/8/2019 and to be accomplished within 7 day(s) 1. Patient will move from supine to sit and sit to supine in bed with supervision/set-up. 2.  Patient will transfer from bed to chair and chair to bed with supervision/set-up using the least restrictive device. 3.  Patient will perform sit to stand with supervision/set-up. 4.  Patient will ambulate with supervision/set-up for 50 feet with the least restrictive device. 5.  Patient will complete wheelchair mobility for 250 feet with modified independence. Outcome: Progressing Towards Goal 
 
PHYSICAL THERAPY: Daily TREATMENT Note INPATIENT: Medicaid: Hospital Day: 28 Patient: Melford Kocher (37 y.o. male)    Date: 2/21/2019 Primary Diagnosis: Bacteremia  
,  ,  
Precautions: Fall Chart, physical therapy assessment, plan of care and goals were reviewed. PLOF:with RW at [facility] ASSESSMENT: 
Pt required min a X2 for sit<>stand, continues to demonstrate poor standing balance with posterior lean and knee flexion in stance; unable to sustain standing balance fully upright with knee extension for any length of time even with verbal and tactile cues. Attempted gait training, pt unsteady with knee buckling; mod a X2 for transfer to [facility]. Pt required min a to propel WC X100 ft. Pt educated on benefit of WC to increase time OOB and for safety to permit mobility at home and within the community. Progression toward goals: 
    []  Improving appropriately and progressing toward goals [x]  Improving slowly and progressing toward goals 
    [] Not making progress toward goals and plan of care will be adjusted PLAN: 
Patient continues to benefit from skilled intervention to address the above impairments. Continue treatment per established plan of care. EDUCATION:  
Education:  Patient was educated on the following topics: Jose Lipscomb Barriers to Learning/Limitations: None Compensate with: visual, verbal, tactile, kinesthetic cues/model Discharge Recommendations:  LTC Further Equipment Recommendations for Discharge:  N/A- pt has WC Factors which may impact discharge planning: antibiotics SUBJECTIVE:  
Patient stated I can sit up for a while.  OBJECTIVE DATA SUMMARY:  
Critical Behavior: 
Neurologic State: Alert Orientation Level: Appropriate for age Cognition: Appropriate decision making Functional Mobility: 
 
 
Functional Status Indep (I) Mod I Super-vision Min A Mod A Max A Total A Assist x2 Verbal cues Additional time Not tested Comments Rolling []  []  [] [x]    []    []  []  [] [] [] [] Supine to sit []  []  [] [x]  []  []  []  [] [] [] [] Sit to supine []  []  [] []  []  []  []  [] [] [] [x] Sit to stand []  []  [] []  [x]  []  []  [x] [] [] []   
Stand to sit []  []  [] []  [x]  []  []  [x] [] [] [] Bed to chair transfers []  []  [] []  [x]  []  []  [x] [] [] [] Balance Good Bill Broach Poor Unable Not tested Comments Sitting static [x]  []  []  []  [] Sitting dynamic []  [x]  []  []  []   
Standing static []  []  [x]  []  []   
Standing dynamic []  []  [x]  []  [] Therapeutic Exercises:  
 
 
 
EXERCISE Sets Reps Active Active Assist  
Passive Self ROM Comments Ankle Pumps    [] [] [] [] Quad Sets/Glut Sets   [] [] [] [] Hamstring Sets   [] [] [] [] Short Arc Quads   [] [] [] [] Heel Slides   [] [] [] [] Straight Leg Raises   [] [] [] [] Hip Abd/Add   [] [] [] [] Long Arc Quads 1 10 [x] [] [] [] Seated Marching   [] [] [] []   
Standing Marching   [] [] [] []   
   [] [] [] []   
 
 
Vital Signs Temp: 98.6 °F (37 °C) Pulse (Heart Rate): 86    
BP: (!) 149/94 Resp Rate: 14    
O2 Sat (%): 97 %Pain:Pre treatment pain level:0 
 Post treatment pain level:0Pain Scale 1: Numeric (0 - 10) Pain Intensity 1: 0 Pain Location 1: Back Pain Orientation 1: Lower Pain Description 1: Aching Pain Intervention(s) 1: Repositioned; Rest 
Activity Tolerance:  
Fair After treatment:  
[x]  Patient left in no apparent distress sitting up in chair 
[]  Patient left in no apparent distress in bed 
[x]  Call bell left within reach [x]  Nursing notified 
[]  Caregiver present 
[]  Bed alarm activated Rose Marie Luque PTA Time Calculation: 39 mins

## 2019-02-21 NOTE — PROGRESS NOTES
Internal Medicine Progress Note Patient's Name: Bryanna Lopez Admit Date: 1/21/2019 Length of Stay: 31 
 
 
Assessment/Plan Active Hospital Problems Diagnosis Date Noted  Bacteremia 01/21/2019 Priority: 1 - One  Discitis of thoracic region 11/14/2018 Priority: 2 - Two  Metabolic encephalopathy 02/67/3109 Priority: 3 - Three Associated with sepsis, present on admission.  PNA (pneumonia) 01/26/2019 Priority: 4 - Four  Acute on chronic respiratory failure with hypoxia ([address] 12/23/2018  Hypertension 11/14/2018  Hepatitis C 11/14/2018  Back pain 11/14/2018  Bilateral pleural effusion 11/14/2018  
 
- Cont IVAB w/ ertapenem x 6-8wks - If effusions need tapped, pulm rec left first 
- Cont PO bumex - Trend BMP weekly given elevated bicarbonate - t/c acetazolamide - BP stable on regimen 
- Cont BiPAP qhs 
- Pain control PRN, avoid opiates 
- PT/OT 
- Awaiting long term care, no accepting beds - Discussed potential surgery w/ patient, he is not interested at this time and not ready to commit if it was even an option. He would like to proceed with PT and see how things go - Cont acceptable home medications for chronic conditions  
- DVT protocol I have personally reviewed all pertinent labs and films that have officially resulted over the last 24 hours. I have personally checked for all pending labs that are awaiting final results. Subjective Pt s/e @ bedside No major events overnight Feeling OK Back better Objective Visit Vitals BP (!) 149/94 Pulse 86 Temp 98.6 °F (37 °C) Resp 14 Ht 6' (1.829 m) Wt 99.8 kg (220 lb) SpO2 97% BMI 29.84 kg/m² Physical Exam: 
General Appearance: NAD, conversant HEENT: AT/NC, moist mucosa Lungs: CTA B/L with normal respiratory effort CV: RRR, no m/r/g Lab/Data Reviewed: 
BMP:  
No results found for: NA, K, CL, CO2, AGAP, GLU, BUN, CREA, GFRAA, GFRNA 
CBC:  
 No results found for: WBC, HGB, HGBEXT, HCT, HCTEXT, PLT, PLTEXT, HGBEXT, HCTEXT, PLTEXT Imaging Reviewed: 
No results found. Medications Reviewed: 
Current Facility-Administered Medications Medication Dose Route Frequency  docusate sodium (COLACE) capsule 100 mg  100 mg Oral QHS  bumetanide (BUMEX) tablet 1 mg  1 mg Oral BID  albuterol-ipratropium (DUO-NEB) 2.5 MG-0.5 MG/3 ML  3 mL Nebulization Q4H PRN  
 traMADol (ULTRAM) tablet 50 mg  50 mg Oral Q6H PRN  
 ertapenem (INVANZ) 1 g in 0.9% sodium chloride (MBP/ADV) 50 mL MBP  1 g IntraVENous Q24H  pantoprazole (PROTONIX) tablet 40 mg  40 mg Oral DAILY  guaiFENesin (ROBITUSSIN) 100 mg/5 mL oral liquid 400 mg  400 mg Oral Q4H  
 lidocaine (PF) (XYLOCAINE) 10 mg/mL (1 %) injection 20 mL  20 mL SubCUTAneous Rad Multiple  gabapentin (NEURONTIN) capsule 400 mg  400 mg Oral TID  simvastatin (ZOCOR) tablet 10 mg  10 mg Oral QHS  acetaminophen (TYLENOL) tablet 650 mg  650 mg Oral Q6H PRN  
 ondansetron (ZOFRAN) injection 4 mg  4 mg IntraVENous Q6H PRN  
 heparin (porcine) injection 5,000 Units  5,000 Units SubCUTAneous Q8H Phillip Aviles DO Internal Medicine, Hospitalist 
Pager: [phone] [facility]

## 2019-02-21 NOTE — PROGRESS NOTES
Physical Exam  
2315- assumed care of patient. Patient awake and oriented x4. No distress noted. See doc flow sheets and MAR for progression of care throughout shift. 0340-patient refused 0400 med stating \"I don't want none\". Guaifenesin held at this time per patient request. 
0700- Bedside shift change report given to  (oncoming nurse) by Tej Kaur RN 
 (offgoing nurse). Report included the following information SBAR.

## 2019-02-21 NOTE — PROGRESS NOTES
Patient is unable to communicate at this time as he is resting very peacefully at this time  offered prayer and left Spiritual Care brochure. Chaplains will continue to follow and will provide pastoral care on an as needed/requested basis. Rupert Simon Board Certified [employer] [facility]  
[phone]

## 2019-02-21 NOTE — PROGRESS NOTES
INFECTIOUS DISEASE FOLLOW UP NOTE Admit Date: 1/21/2019 Summary:  
 
77year-old AA male w/ h/o COPD, polysubstance abuse including cocaine, heroin, T 10-11 destructive changes in November 2018 w/ nl ESR, CRP, negative bone/gallium scans - not felt to be infectious then but had Serratia UTI rx'd Cefepime/Ceftriaxone 5 days. Returned 1/21 with back pain, BSI Serratia 1/21 and CT 1/21 with similar destructive changes T9-10 levels c/w discitis/OM and intraspinal and paraspinal phlegmon s/p needle aspiration 1/23 cx + Serratia. Had recurrent respiratory failure requiring RRT 1/23, 1/24. Has residual bilateral pleural effusions on CXR 2/4. Refused surgery offered by Dr. Kaya Betancourt in November. Hypercarbia 2/12 PCO2 125 - trans ICU briefly. Back pain and mobility slowly improving w/ PT. Current abx Prior abx Ertapenem 2/5 - 16   abx 1/23 - 28 of 42 vs 56 Cefepime/vanco  11/14   2, Ceftriaxone 11/16 - 3; Zosyn 1/21-2  ceftaz 1/23-13, cipro 1/23-13 Interval History:  
 
Interval notes reviewed. PT describes he is unable to sustain standing and could not proceed with gait training due to knee buckling. Patient says it is due to weakness and not back pain. Got on wheelchair today. Assessment / Plan:  
  
Osteomyelitis, discitis, T9-10, Serratia 
- suspect seeded from Serratia UTI 11/2018 (no inflammation in spine lesions then) 
- returned 1/21 w/ worsened back pain, blcx + Serratia  
- CT 1/21/2019: destructive changes T9-10 levels c/w discitis/OM and intraspinal and paraspinal phlegmon. Overall appearance unchanged and pain not different from previous - s/p aspiration of fluid/phlegmon T10-11 discspace 1/23: (+) Serratia in anaerobic culture - potential for emergent AmpC- beta-lactamase- mediated resistance to third-generation cephalosporins not be evident during initial susc 
 testing so Carbapenem is DOC 
- CT Chest 2/4: progressive fragmentation, bone loss of T9  
 - 1/22: esr 38, crp 7.4->1/31 57/2. 1- CRP 2.1 -> 1.5 -> 1.8. ESR 57-> 49 -> now 92 
- no PT since 2/18 -> continue Ertapenem 1 gm q 24 hours 6-8 weeks  
-> no accepting SNF or LTAC. -> if further improved may be able to continue IV abx at OP infusion 
-> continue PT 
  
COPD / Acute Hypercarbia 
- dec LOC 2/12, PCO2 >130 
- reportedly refused BIPAP earlier 
- transferred to ICU but now improved -> per [facility] Destructive T9-10 changes discitis/OM 
- mid-back pain x 5 months, fell, incontinent of urine - h/o IVDU - last use in July 2018 
- CTAP 11/2018: severe destructive changes T 10-11, paravertebral sot tissue infiltration, extens to ant epidural space w/ cord compression 
- unable to have MRI due to claustrophobia - Bone/ gallium scans 11/27 neg for discitis/OM, ESR 20, CRP 0.7 
- Not felt to be infected then. Not given long term IV abx  
- refused stabilization procedure per Dr. Julian Ruvalcaba BSI 2 of 2 Serratia marcescens 1/21 
- source ~ Discitis Phlegmon seen on CT 
- IR aspiration 1/23 chucky culture grew Serratia  
- denies any urinary complaint but had Serratia in urine in past. Currently UA is negative - Ucx was not sent but CT with normal kidneys and nothing sig on CT and pt asymptomatic except some incontinet 
- repeat Blcx 1/22 1/2 positive  
- blcx 1/28 NG x 2 
- BSI adequately treated at this time  -> remains on Ertapenem for osteomyelitis as above Acute hypoxic resp distress- RRT called 1/23 and again 1/24 sec to hypercapnic resp failure Bilateral pleural effusions- chronic with  atelectasis - monitor Hepatomegaly, splenic hilar & perisplenic varices- concern for cirrhosis and portal HTN 
- seen on CTAP again    
H/o IVDU 
- HIV ab NR 11/19 ,Hep BsAg neg (last reported ivdu August 2018, has hep C and thinks hep b immune) 
- denies current use -> avoid PICC 
   
HTN    
DJD    
Allergy Shelfish    
  
Microbiology:  
 
11/14   urcx >100,000 Serratia marcescens             blcx NG x 2 
 11/15   CrAG neg, fungitell 375 (reported ) 
   blcx for fungus - ngtd 
            fungitell 113 
  
     Blcx x2 Serratia marcescens R cefazolin UA neg 
 Blcx 1 of 2 Serratia  Aspiration cx g/s neg, Cx NG 
 NOMAN rare Serratia R Cefazolin Fungal NOS 
  bctx x 2 ng Lines / Catheters:  
 
piv 
  
Current Facility-Administered Medications Medication Dose Route Frequency  docusate sodium (COLACE) capsule 100 mg  100 mg Oral QHS  bumetanide (BUMEX) tablet 1 mg  1 mg Oral BID  albuterol-ipratropium (DUO-NEB) 2.5 MG-0.5 MG/3 ML  3 mL Nebulization Q4H PRN  
 traMADol (ULTRAM) tablet 50 mg  50 mg Oral Q6H PRN  
 ertapenem (INVANZ) 1 g in 0.9% sodium chloride (MBP/ADV) 50 mL MBP  1 g IntraVENous Q24H  pantoprazole (PROTONIX) tablet 40 mg  40 mg Oral DAILY  guaiFENesin (ROBITUSSIN) 100 mg/5 mL oral liquid 400 mg  400 mg Oral Q4H  
 lidocaine (PF) (XYLOCAINE) 10 mg/mL (1 %) injection 20 mL  20 mL SubCUTAneous Rad Multiple  gabapentin (NEURONTIN) capsule 400 mg  400 mg Oral TID  simvastatin (ZOCOR) tablet 10 mg  10 mg Oral QHS  acetaminophen (TYLENOL) tablet 650 mg  650 mg Oral Q6H PRN  
 ondansetron (ZOFRAN) injection 4 mg  4 mg IntraVENous Q6H PRN  
 heparin (porcine) injection 5,000 Units  5,000 Units SubCUTAneous Q8H Objective:  
 
Visit Vitals BP 94/61 Pulse (!) 103 Temp 98.1 °F (36.7 °C) Resp 16 Ht 6' (1.829 m) Wt 99.8 kg (220 lb) SpO2 97% BMI 29.84 kg/m² Temp (24hrs), Av °F (36.7 °C), Min:97.6 °F (36.4 °C), Max:98.6 °F (37 °C) Gen: WD, 78 y/o AA M awake, oriented, not in distress on NC O2 
HEENT: muddy sclerae, pupils equal and reactive Chest: Symmetric expansion, no crackles or wheezing CVS:S 1S2 RR, No JVD or edema Abd:Soft, NT, ND, BS+ Skin:No jaundice, cyanosis, clubbing. No decubitus Muscsk: Normal muscle tone/strength CNS: AA, oriented, conversing appropriately Labs: Results:  
Chemistry Recent Labs 02/20/19 
0410 GLU 71*   
K 4.4 CL 90* CO2 42*  
BUN 20* CREA 0.80 CA 8.9 AGAP 5  
BUCR 25* CBC w/Diff No results for input(s): WBC, RBC, HGB, HCT, PLT, GRANS, LYMPH, EOS, HGBEXT, HCTEXT, PLTEXT, HGBEXT, HCTEXT, PLTEXT in the last 72 hours. RADIOLOGY :   
cxr 2/4 IMPRESSION: 
  
Stable appearing densities at both lung bases compatible with pleural effusions 
with adjacent atelectasis/infiltrate 2/4 CT chest IMPRESSION: 
  
1. Moderate bilateral pleural effusions, left > right, amenable to image guided 
thoracentesis if clinically appropriate.   
  
2. Progressive fragmentation and bone loss of T9 from known 
discitis/osteomyelitis at T9-10. 
  
3. Stable discitis/osteomyelitis changes at T3-4. 
  
4. Other chronic findings detailed above and without significant interval 
change. 
  
CXR 2/12: Improved aeration with residual bilateral pleural effusions and subjacent 
atelectasis and/or consolidation. Navdeep Cárdenas MD 
February 21, 2019 [facility] [phone]

## 2019-02-21 NOTE — PROGRESS NOTES
1921  Received bedside verbal shift report from [facility]. Pt lying in bed resting comfortably. 2lnc in place. NSR on telemetry box # 6. Piv # 22  to right wrist intact. Call bell within reach, side rails up x 3, bed low and locked. No complaints offered, pt instructed to call for assistance.  
  
2329 Bedside and Verbal shift change report given to Honorio (oncoming nurse) by Rin Lion (offgoing nurse). Report included the following information SBAR, Kardex, Intake/Output, MAR, Recent Results and Cardiac Rhythm NSR.

## 2019-02-21 NOTE — PROGRESS NOTES
Problem: Falls - Risk of 
Goal: *Absence of Falls Document Lore Fermin Fall Risk and appropriate interventions in the flowsheet. Outcome: Progressing Towards Goal 
Fall Risk Interventions: 
Mobility Interventions: Strengthening exercises (ROM-active/passive) Mentation Interventions: Evaluate medications/consider consulting pharmacy, More frequent rounding Medication Interventions: Evaluate medications/consider consulting pharmacy Elimination Interventions: Patient to call for help with toileting needs, Toileting schedule/hourly rounds, Call light in reach History of Falls Interventions: Evaluate medications/consider consulting pharmacy Problem: Pressure Injury - Risk of 
Goal: *Prevention of pressure injury Document Raul Scale and appropriate interventions in the flowsheet. Outcome: Progressing Towards Goal 
Pressure Injury Interventions: 
Sensory Interventions: Keep linens dry and wrinkle-free, Pressure redistribution bed/mattress (bed type), Turn and reposition approx. every two hours (pillows and wedges if needed) Moisture Interventions: Apply protective barrier, creams and emollients, Assess need for specialty bed, Maintain skin hydration (lotion/cream), Internal/External urinary devices Activity Interventions: Assess need for specialty bed, Pressure redistribution bed/mattress(bed type) Mobility Interventions: Pressure redistribution bed/mattress (bed type), Turn and reposition approx. every two hours(pillow and wedges) Nutrition Interventions: Document food/fluid/supplement intake Friction and Shear Interventions: HOB 30 degrees or less, Lift team/patient mobility team, Minimize layers, Apply protective barrier, creams and emollients

## 2019-02-22 LAB
CA-I SERPL-SCNC: 1.02 MMOL/L (ref 1.12–1.32)
INR PPP: 1.1 (ref 0.8–1.2)
MAGNESIUM SERPL-MCNC: 2 MG/DL (ref 1.6–2.6)
PHOSPHATE SERPL-MCNC: 3.1 MG/DL (ref 2.5–4.9)
PROTHROMBIN TIME: 14.4 SEC (ref 11.5–15.2)

## 2019-02-22 PROCEDURE — 94660 CPAP INITIATION&MGMT: CPT

## 2019-02-22 PROCEDURE — 74011250637 HC RX REV CODE- 250/637: Performed by: INTERNAL MEDICINE

## 2019-02-22 PROCEDURE — 74011250637 HC RX REV CODE- 250/637: Performed by: HOSPITALIST

## 2019-02-22 PROCEDURE — 36569 INSJ PICC 5 YR+ W/O IMAGING: CPT | Performed by: HOSPITALIST

## 2019-02-22 PROCEDURE — 85610 PROTHROMBIN TIME: CPT

## 2019-02-22 PROCEDURE — 74011250636 HC RX REV CODE- 250/636: Performed by: HOSPITALIST

## 2019-02-22 PROCEDURE — 83735 ASSAY OF MAGNESIUM: CPT

## 2019-02-22 PROCEDURE — 77030018786 HC NDL GD F/USND BARD -B

## 2019-02-22 PROCEDURE — 36415 COLL VENOUS BLD VENIPUNCTURE: CPT

## 2019-02-22 PROCEDURE — 65660000000 HC RM CCU STEPDOWN

## 2019-02-22 PROCEDURE — 74011250636 HC RX REV CODE- 250/636: Performed by: INTERNAL MEDICINE

## 2019-02-22 PROCEDURE — 84100 ASSAY OF PHOSPHORUS: CPT

## 2019-02-22 PROCEDURE — 82330 ASSAY OF CALCIUM: CPT

## 2019-02-22 PROCEDURE — 76937 US GUIDE VASCULAR ACCESS: CPT | Performed by: HOSPITALIST

## 2019-02-22 PROCEDURE — C1751 CATH, INF, PER/CENT/MIDLINE: HCPCS

## 2019-02-22 PROCEDURE — 74011000258 HC RX REV CODE- 258: Performed by: INTERNAL MEDICINE

## 2019-02-22 RX ORDER — PANTOPRAZOLE SODIUM 40 MG/1
40 TABLET, DELAYED RELEASE ORAL DAILY
Qty: 30 TAB | Refills: 0 | Status: SHIPPED
Start: 2019-02-23

## 2019-02-22 RX ORDER — SODIUM CHLORIDE 0.9 % (FLUSH) 0.9 %
10-30 SYRINGE (ML) INJECTION AS NEEDED
Status: DISCONTINUED | OUTPATIENT
Start: 2019-02-22 | End: 2019-02-23 | Stop reason: HOSPADM

## 2019-02-22 RX ORDER — SODIUM CHLORIDE 0.9 % (FLUSH) 0.9 %
20 SYRINGE (ML) INJECTION EVERY 24 HOURS
Status: DISCONTINUED | OUTPATIENT
Start: 2019-02-22 | End: 2019-02-23 | Stop reason: HOSPADM

## 2019-02-22 RX ORDER — SODIUM CHLORIDE 0.9 % (FLUSH) 0.9 %
10-40 SYRINGE (ML) INJECTION EVERY 8 HOURS
Status: DISCONTINUED | OUTPATIENT
Start: 2019-02-22 | End: 2019-02-23 | Stop reason: HOSPADM

## 2019-02-22 RX ORDER — SODIUM CHLORIDE 0.9 % (FLUSH) 0.9 %
10 SYRINGE (ML) INJECTION EVERY 24 HOURS
Status: DISCONTINUED | OUTPATIENT
Start: 2019-02-22 | End: 2019-02-23 | Stop reason: HOSPADM

## 2019-02-22 RX ORDER — BACITRACIN 500 UNIT/G
1 PACKET (EA) TOPICAL AS NEEDED
Status: DISCONTINUED | OUTPATIENT
Start: 2019-02-22 | End: 2019-02-23 | Stop reason: HOSPADM

## 2019-02-22 RX ORDER — TRAMADOL HYDROCHLORIDE 50 MG/1
50 TABLET ORAL
Qty: 18 TAB | Refills: 0 | Status: SHIPPED | OUTPATIENT
Start: 2019-02-22

## 2019-02-22 RX ORDER — BUMETANIDE 1 MG/1
1 TABLET ORAL 2 TIMES DAILY
Qty: 60 TAB | Refills: 0 | Status: SHIPPED
Start: 2019-02-22

## 2019-02-22 RX ORDER — SODIUM CHLORIDE 0.9 % (FLUSH) 0.9 %
10 SYRINGE (ML) INJECTION AS NEEDED
Status: DISCONTINUED | OUTPATIENT
Start: 2019-02-22 | End: 2019-02-23 | Stop reason: HOSPADM

## 2019-02-22 RX ORDER — DOCUSATE SODIUM 100 MG/1
100 CAPSULE, LIQUID FILLED ORAL
Qty: 30 CAP | Refills: 0 | Status: SHIPPED
Start: 2019-02-22 | End: 2019-03-24

## 2019-02-22 RX ADMIN — GABAPENTIN 400 MG: 100 CAPSULE ORAL at 09:16

## 2019-02-22 RX ADMIN — GABAPENTIN 400 MG: 100 CAPSULE ORAL at 21:21

## 2019-02-22 RX ADMIN — ERTAPENEM SODIUM 1 G: 1 INJECTION, POWDER, LYOPHILIZED, FOR SOLUTION INTRAMUSCULAR; INTRAVENOUS at 21:21

## 2019-02-22 RX ADMIN — GUAIFENESIN 400 MG: 100 SOLUTION ORAL at 20:42

## 2019-02-22 RX ADMIN — BUMETANIDE 1 MG: 1 TABLET ORAL at 17:27

## 2019-02-22 RX ADMIN — GUAIFENESIN 400 MG: 100 SOLUTION ORAL at 09:17

## 2019-02-22 RX ADMIN — Medication 10 ML: at 21:22

## 2019-02-22 RX ADMIN — GUAIFENESIN 400 MG: 100 SOLUTION ORAL at 12:48

## 2019-02-22 RX ADMIN — GABAPENTIN 400 MG: 100 CAPSULE ORAL at 15:50

## 2019-02-22 RX ADMIN — HEPARIN SODIUM 5000 UNITS: 5000 INJECTION INTRAVENOUS; SUBCUTANEOUS at 03:37

## 2019-02-22 RX ADMIN — BUMETANIDE 1 MG: 1 TABLET ORAL at 09:16

## 2019-02-22 RX ADMIN — HEPARIN SODIUM 5000 UNITS: 5000 INJECTION INTRAVENOUS; SUBCUTANEOUS at 09:19

## 2019-02-22 RX ADMIN — DOCUSATE SODIUM 100 MG: 100 CAPSULE ORAL at 20:42

## 2019-02-22 RX ADMIN — SIMVASTATIN 10 MG: 10 TABLET, FILM COATED ORAL at 21:21

## 2019-02-22 RX ADMIN — HEPARIN SODIUM 5000 UNITS: 5000 INJECTION INTRAVENOUS; SUBCUTANEOUS at 17:29

## 2019-02-22 RX ADMIN — PANTOPRAZOLE SODIUM 40 MG: 40 TABLET, DELAYED RELEASE ORAL at 09:16

## 2019-02-22 RX ADMIN — GUAIFENESIN 400 MG: 100 SOLUTION ORAL at 15:50

## 2019-02-22 NOTE — PROGRESS NOTES
1244-Assumed care for discharge and communicated to [facility]. Will be going thru [facility] transport to [place] [facility] with oxygen. Documents and instruction given verbally and written on Bacteremia, metabolic encephalopathy, pneumonia CHF, Altered mental status, Obesity and Substance abuse. As all pts on d/c  given verbal and written s/s Heart Attack and Stroke. Belongings bagged. No valuables. No scripts. Esigned. Needs picc line and she is coming to place one now. To be taken by [facility] via [facility].

## 2019-02-22 NOTE — DISCHARGE INSTRUCTIONS
DISCHARGE SUMMARY from Nurse    PATIENT INSTRUCTIONS:    After general anesthesia or intravenous sedation, for 24 hours or while taking prescription Narcotics:  · Limit your activities  · Do not drive and operate hazardous machinery  · Do not make important personal or business decisions  · Do  not drink alcoholic beverages  · If you have not urinated within 8 hours after discharge, please contact your surgeon on call. Report the following to your surgeon:  · Excessive pain, swelling, redness or odor of or around the surgical area  · Temperature over 100.5  · Nausea and vomiting lasting longer than 4 hours or if unable to take medications  · Any signs of decreased circulation or nerve impairment to extremity: change in color, persistent  numbness, tingling, coldness or increase pain  · Any questions    What to do at Home:  Recommended activity: Activity as tolerated,     If you experience any of the following symptoms shortness of breath or chest pain longer than 5 minutes severe, please follow up with 911. *  Please give a list of your current medications to your Primary Care Provider. *  Please update this list whenever your medications are discontinued, doses are      changed, or new medications (including over-the-counter products) are added. *  Please carry medication information at all times in case of emergency situations. These are general instructions for a healthy lifestyle:    No smoking/ No tobacco products/ Avoid exposure to second hand smoke  Surgeon General's Warning:  Quitting smoking now greatly reduces serious risk to your health.     Obesity, smoking, and sedentary lifestyle greatly increases your risk for illness    A healthy diet, regular physical exercise & weight monitoring are important for maintaining a healthy lifestyle    You may be retaining fluid if you have a history of heart failure or if you experience any of the following symptoms:  Weight gain of 3 pounds or more overnight or 5 pounds in a week, increased swelling in our hands or feet or shortness of breath while lying flat in bed. Please call your doctor as soon as you notice any of these symptoms; do not wait until your next office visit. Recognize signs and symptoms of STROKE:    F-face looks uneven    A-arms unable to move or move unevenly    S-speech slurred or non-existent    T-time-call 911 as soon as signs and symptoms begin-DO NOT go       Back to bed or wait to see if you get better-TIME IS BRAIN. Warning Signs of HEART ATTACK     Call 911 if you have these symptoms:   Chest discomfort. Most heart attacks involve discomfort in the center of the chest that lasts more than a few minutes, or that goes away and comes back. It can feel like uncomfortable pressure, squeezing, fullness, or pain.  Discomfort in other areas of the upper body. Symptoms can include pain or discomfort in one or both arms, the back, neck, jaw, or stomach.  Shortness of breath with or without chest discomfort.  Other signs may include breaking out in a cold sweat, nausea, or lightheadedness. Don't wait more than five minutes to call 911 - MINUTES MATTER! Fast action can save your life. Calling 911 is almost always the fastest way to get lifesaving treatment. Emergency Medical Services staff can begin treatment when they arrive -- up to an hour sooner than if someone gets to the hospital by car. The discharge information has been reviewed with the patient. The patient verbalized understanding. Discharge medications reviewed with the patient and appropriate educational materials and side effects teaching were provided.   Id shredded  ___________________________________________________________________________________________________________________________________

## 2019-02-22 NOTE — PROGRESS NOTES
Problem: Falls - Risk of 
Goal: *Absence of Falls Document Ramírez Norris Fall Risk and appropriate interventions in the flowsheet. Outcome: Progressing Towards Goal 
Fall Risk Interventions: 
Mobility Interventions: Patient to call before getting OOB Mentation Interventions: Adequate sleep, hydration, pain control, Door open when patient unattended, More frequent rounding, Reorient patient, Room close to nurse's station, Update white board Medication Interventions: Evaluate medications/consider consulting pharmacy, Patient to call before getting OOB, Teach patient to arise slowly Elimination Interventions: Call light in reach, Patient to call for help with toileting needs History of Falls Interventions: Door open when patient unattended, Evaluate medications/consider consulting pharmacy, Room close to nurse's station

## 2019-02-22 NOTE — ROUTINE PROCESS
Right  4 FR single lumen Power PICC inserted utilizing 3CG for SVC tip verification. Released for use, CAMERON Schafer notified. 2 ml 1% lidocaine injected SC at insertion site for local anesthetic.

## 2019-02-22 NOTE — PROGRESS NOTES
Problem: Falls - Risk of 
Goal: *Absence of Falls Document Lona Fearing Fall Risk and appropriate interventions in the flowsheet. Outcome: Progressing Towards Goal 
Fall Risk Interventions: 
Mobility Interventions: Patient to call before getting OOB Mentation Interventions: Adequate sleep, hydration, pain control, Door open when patient unattended, More frequent rounding, Reorient patient, Room close to nurse's station, Update white board Medication Interventions: Evaluate medications/consider consulting pharmacy, Patient to call before getting OOB, Teach patient to arise slowly Elimination Interventions: Call light in reach, Patient to call for help with toileting needs History of Falls Interventions: Door open when patient unattended, Evaluate medications/consider consulting pharmacy, Room close to nurse's station Problem: Pressure Injury - Risk of 
Goal: *Prevention of pressure injury Document Raul Scale and appropriate interventions in the flowsheet. Outcome: Progressing Towards Goal 
Pressure Injury Interventions: 
Sensory Interventions: Assess changes in LOC, Keep linens dry and wrinkle-free, Pressure redistribution bed/mattress (bed type) Moisture Interventions: Absorbent underpads, Internal/External urinary devices, Maintain skin hydration (lotion/cream) Activity Interventions: Pressure redistribution bed/mattress(bed type) Mobility Interventions: HOB 30 degrees or less, Pressure redistribution bed/mattress (bed type) Nutrition Interventions: Document food/fluid/supplement intake Friction and Shear Interventions: HOB 30 degrees or less

## 2019-02-22 NOTE — PROGRESS NOTES
Transportation at Discharge: 2/23/19 Transport Company/Representative: [place] / [employer] Phone number: [phone] Method of Transport: Tino Bell / Bee Montalvo Estimated pick-up time: 11:00a Destination: [facility] Insurance Info: Jakob GILLIS [facility] Authorization:  [id number] Per Ismael Sunshine with Rossi Kimball Requesting Outcomes Manager: Rafia Woodruff, Care- ext [phone]

## 2019-02-22 NOTE — DISCHARGE SUMMARY
Internal Medicine Discharge Summary        Patient: Laura Antunez    YOB: 1950    Age:  77 y.o. Admit Date: 1/21/2019    Discharge Date: 2/22/2019    LOS:  LOS: 32 days     Discharge To: SNF    Consults: Infectious Disease    Admission Diagnoses: Bacteremia    Discharge Diagnoses:    Problem List as of 2/22/2019 Date Reviewed: 11/14/2018          Codes Class Noted - Resolved    * (Principal) Bacteremia ICD-10-CM: R78.81  ICD-9-CM: 790.7  1/21/2019 - Present        Discitis of thoracic region ICD-10-CM: M46.44  ICD-9-CM: 722.92  11/14/2018 - Present        Metabolic encephalopathy [facility]68-HH: G93.41  ICD-9-CM: 348.31  1/29/2019 - Present    Overview Signed 1/29/2019 10:18 AM by Pacheco Wilkinson MD     Associated with sepsis, present on admission.              PNA (pneumonia) ICD-10-CM: J18.9  ICD-9-CM: 986  1/26/2019 - Present        Acute exacerbation of chronic obstructive pulmonary disease (COPD) ([facility] ICD-10-CM: J44.1  ICD-9-CM: 491.21  12/25/2018 - Present        Diastolic CHF, acute on chronic [facility]) ICD-10-CM: I50.33  ICD-9-CM: 428.33, 428.0  12/24/2018 - Present        Acute on chronic respiratory failure with hypoxia (HCC) ICD-10-CM: J96.21  ICD-9-CM: 518.84, 799.02  12/23/2018 - Present        Sinusitis ICD-10-CM: J32.9  ICD-9-CM: 473.9  12/17/2018 - Present        Altered mental status ICD-10-CM: R41.82  ICD-9-CM: 780.97  12/16/2018 - Present        Acute respiratory failure with hypoxia (HCC) ICD-10-CM: J96.01  ICD-9-CM: 518.81  12/16/2018 - Present        UTI (urinary tract infection) ICD-10-CM: N39.0  ICD-9-CM: 599.0  11/14/2018 - Present        Bilateral pleural effusion ICD-10-CM: J90  ICD-9-CM: 511.9  11/14/2018 - Present        Lumbar stenosis ICD-10-CM: M48.061  ICD-9-CM: 724.02  11/14/2018 - Present        Adenoma of left adrenal gland ICD-10-CM: D35.02  ICD-9-CM: 227.0  11/14/2018 - Present        Hypertension ICD-10-CM: I10  ICD-9-CM: 401.9  11/14/2018 - Present        Cirrhosis of liver ([facility] ICD-10-CM: K74.60  ICD-9-CM: 571.5  11/14/2018 - Present        Hepatitis C ICD-10-CM: B19.20  ICD-9-CM: 070.70  11/14/2018 - Present        Obesity ICD-10-CM: E66.9  ICD-9-CM: 278.00  11/14/2018 - Present        Constipation ICD-10-CM: K59.00  ICD-9-CM: 564.00  11/14/2018 - Present        Back pain ICD-10-CM: M54.9  ICD-9-CM: 724.5  11/14/2018 - Present              Discharge Condition:  Improved    Procedures: IR aspiration         HPI: Maday Holman is a 77 y.o. 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 He was evaluated by infectious disease and they initially recommended continued use of IV vancomycin and zosyn. This was later changed to zosyn and cipro and then ceftaz and cipro based off blood cultures of serratia marcescens. He has aspiration of fluid/phlegmon in the back and this also grew serratia. This led to final recommendation from ID for 6-8 weeks of IV antibiotics with ertapenem. He is currently on day 28 of 42 vs 56. The patient was not interested in neurosurgery and wanted to attempt physical therapy for his back pain issues. Over his admission, his symptoms improved. He was treated with peripheral IV due to history of IV drug abuse. The accepting facility/physician understood his history of drug abuse and requested a PICC line prior to discharge for acceptance.    The patient had a rapid response on 01/24 due to acute on chronic hypercapnic respiratory failure and required BiPAP and transition to the unit for a few days. He slowly improved and was back on nasal cannula and comfortable on the floor. There was concern his hypercapnea was induced by narcotics and thus his percocet was stopped and transitioned to tramadol, which was effective for him. He had CT of chest that showed bilateral pleural effusions. They did not need tapped, but if needed down road pulm recommends the left side first. The rest of his admissions was extended due to awaiting long term care approval as he was stable for discharge for quite some time. He requires CPAP at night for ELSA      Visit Vitals  BP 94/55 (BP 1 Location: Left arm, BP Patient Position: Supine)   Pulse 100   Temp 98.5 °F (36.9 °C)   Resp 18   Ht 6' (1.829 m)   Wt 104.8 kg (231 lb 1.6 oz)   SpO2 95%   BMI 31.34 kg/m²         Labs Prior to Discharge:  Labs: Results:       Chemistry Recent Labs     02/20/19  0410   GLU 71*      K 4.4   CL 90*   CO2 42*   BUN 20*   CREA 0.80   CA 8.9   AGAP 5   BUCR 25*      CBC w/Diff No results for input(s): WBC, RBC, HGB, HCT, PLT, GRANS, LYMPH, EOS, HGBEXT, HCTEXT, PLTEXT in the last 72 hours. Cardiac Enzymes No results for input(s): CPK, CKND1, KARYNA in the last 72 hours. No lab exists for component: CKRMB, TROIP   Coagulation No results for input(s): PTP, INR, APTT in the last 72 hours. No lab exists for component: INREXT    Lipid Panel Lab Results   Component Value Date/Time    Cholesterol, total 186 01/22/2019 04:55 AM    HDL Cholesterol 39 (L) 01/22/2019 04:55 AM    LDL, calculated 125.4 (H) 01/22/2019 04:55 AM    VLDL, calculated 21.6 01/22/2019 04:55 AM    Triglyceride 108 01/22/2019 04:55 AM    CHOL/HDL Ratio 4.8 01/22/2019 04:55 AM      BNP No results for input(s): BNPP in the last 72 hours. Liver Enzymes No results for input(s): TP, ALB, TBIL, AP, SGOT, GPT in the last 72 hours.     No lab exists for component: DBIL   Thyroid Studies Lab Results   Component Value Date/Time    TSH 0.30 (L) 11/19/2009 03:26 PM            Significant Imaging:  Xr Chest Pa Lat    Result Date: 2/1/2019  EXAM: XR CHEST PA LAT INDICATION: Hypoxia COMPARISON: Several prior exams, most recently 1/26/2019. FINDINGS: PA and lateral radiographs of the chest demonstrate low lung volumes with central vascular congestion and mild bilateral interstitial opacities. Posteriorly layering pleural effusions are present bilaterally, right slightly larger than left. No pneumothorax. Cardiac size and mediastinal contours appear stable, predominately obscured. No acute osseous abnormality. Destructive changes at the T10-T11 disc space level are not well demonstrated on the lateral projection secondary overlying soft tissues. Impression: 1. Posteriorly layering pleural effusions, central vascular congestion, and interstitial edema similar to immediate comparison exam. 2. Unchanged bibasilar atelectasis or infiltrate. Ct Chest Wo Cont    Result Date: 2/4/2019  EXAM: CT chest without contrast . INDICATION: Equivocal stress test.  Evaluate pleural effusions. COMPARISON: 1/21/2019 TECHNIQUE: Axial CT imaging from the thoracic inlet through the diaphragm without  intravenous contrast. Multiplanar reformats were generated. Dose reduction techniques:  Automated exposure control, mAs and/or kVp reductions based on patient size, and iterative reconstruction. The specific techniques utilized on this CT exam have been documented in the patient's electronic medical record. Digital imaging and communications in medicine (DICOM) format image data are available to non-affiliated external healthcare facilities or entities on a secure, media free, reciprocally searchable database, maintained by the healthcare system, with patient authorization for at least a 12 month period after this study.  _______________ FINDINGS: MEDIASTINUM AND LYMPH NODES: Normal heart size. No pericardial effusion. No mediastinal or hilar lymph node enlargement. LUNGS AND AIRWAYS: - Essentially complete bilateral lower lobe atelectasis due to bilateral pleural effusions. - No new focal consolidation. PLEURA: Moderate, stable left greater than right pleural effusions. UPPER ABDOMEN: - Hepatosplenomegaly. - Moderate burden mesenteric varices. - Several subcentimeter hepatic hypodensities, presumably cysts. - Small hiatal hernia. VASCULATURE: - Main pulmonary artery diameter: 3.7 cm. - Mild atherosclerotic disease of the aortic arch. - Moderate CAD. - Moderate atherosclerotic aortic valve. OTHER: - Mixed, stable mild lytic/ sclerotic destructive change at T3-4 and surrounding paraspinal inflammatory change consistent with discitis/osteomyelitis. -Mixed lytic/sclerotic destructive change at T9-10 and paraspinal inflammatory change consistent with discitis/osteomyelitis as seen on prior CT. Since prior, there is progressive fragmentation and bone loss of T9 vertebral body. -No new bone lesions. _______________     IMPRESSION: 1. Moderate bilateral pleural effusions, left > right, amenable to image guided thoracentesis if clinically appropriate. 2. Progressive fragmentation and bone loss of T9 from known discitis/osteomyelitis at T9-10. 3. Stable discitis/osteomyelitis changes at T3-4. 4. Other chronic findings detailed above and without significant interval change. Xr Chest Port    Result Date: 2/13/2019  EXAM:  PORTABLE CHEST INDICATION:  Coarse crackles on auscultation. TECHNIQUE:  Portable, erect AP view. COMPARISON:  02/12/2019 ____________________ FINDINGS:  SUPPORT DEVICES: None. HEART AND MEDIASTINUM: Cardiac silhouette is not well evaluated due to adjacent bilateral opacities. LUNGS AND PLEURAL SPACES: Progression of pulmonary edema with progression of bibasilar opacities. No pneumothorax. BONY THORAX AND SOFT TISSUES: No acute osseous abnormality.   Degenerative changes around the visualized shoulders. ____________________     IMPRESSION:  Findings consistent with progression of pulmonary edema with progression of bibasilar opacities, atelectasis, pneumonia, sequela of edema, and/or pleural effusions. Xr Chest Port    Result Date: 2/12/2019  HISTORY: -From Provider: respiratory distress -Additional: None Technique : AP PORTABLE CHEST Time of study: 14:58 Comparison : 02/04/19. Findings:Improved aeration in the upper lung zones, with persistent bilateral pleural effusions and subjacent atelectasis and/or consolidation silhouetting the diaphragms and costophrenic angles. Aortic vascular calcification. No change in bony findings. Impression: Improved aeration with residual bilateral pleural effusions and subjacent atelectasis and/or consolidation. Xr Chest Port    Result Date: 2/4/2019  EXAM: Portable Chest CLINICAL INDICATION:  f-u pleural effusions -Additional:  None COMPARISON:  02/01/19 TECHNIQUE: AP portable view at 0406 ______________ FINDINGS: HEART AND MEDIASTINUM:  The heart size is stable. Aortic atherosclerosis is present LUNGS AND AIRWAYS:  Density is again seen at both lung bases. This appears to be a combination of pleural fluid and adjacent atelectasis/infiltrate. The lungs appear stable PLEURA:  No pneumothorax BONES:  No acute or aggressive osseous lesions. OTHER:  None. ______________     IMPRESSION: Stable appearing densities at both lung bases compatible with pleural effusions with adjacent atelectasis/infiltrate    Xr Chest Port    Result Date: 1/26/2019  EXAM: CHEST RADIOGRAPH, SINGLE VIEW CLINICAL INDICATION/HISTORY: Tube Placement COMPARISON: 1/24/2019 TECHNIQUE: Portable frontal view of the chest was obtained. _______________ FINDINGS: SUPPORT DEVICES: Cardiac leads and wires overlie the anterior chest. There is no visualized tube given indication of tube placement. HEART AND MEDIASTINUM: Cardiomediastinal silhouette appears unchanged.   Tortuous and atherosclerotic thoracic aorta. Indistinct central pulmonary vasculature, unchanged. LUNGS AND PLEURAL SPACES: Low lung volumes with layering right greater than left pleural effusions, each with adjacent basilar patchy opacity. Diffusely increased interstitial markings, especially throughout perihilar and basilar distributions appear unchanged. No pneumothorax. BONY THORAX AND SOFT TISSUES: No acute osseous abnormality. _______________     IMPRESSION: 1. No visualized tube given provided history of tube placement. 2. Unchanged cardiomegaly with right greater than left pleural effusions, adjacent basilar atelectasis/infiltrate and mild pulmonary interstitial edema. Xr Chest Port    Addendum Date: 1/24/2019    Addendum: Initial preliminary report was provided to the ED by the on-call radiology resident. Result Date: 1/24/2019  EXAM: XR CHEST PORT CLINICAL INDICATION/HISTORY: sob  >Additional: None COMPARISON: January 22, 2018  >Reference exam: None. TECHNIQUE: Portable chest. _______________ FINDINGS: SUPPORT LINES AND TUBES: Overlying monitor leads. HEART AND MEDIASTINUM: The cardiac mediastinal silhouette is obscured by bilateral airspace opacities, right greater than left. Pulmonary vascular congestion with hazy bilateral perihilar interstitial opacities. LUNGS AND PLEURAL SPACES: The lungs are underexpanded. Large right-sided pleural effusion with associated atelectasis seen is right heart border and right hemidiaphragm with a small amount of fluid tracking along the right thoracic wall. Moderate size left pleural effusion is noted. BONY THORAX AND SOFT TISSUES: The bones and soft tissues are within normal limits. _______________     IMPRESSION: Large right and moderate left pleural effusions with evidence of pulmonary vascular congestion and interstitial edema suggesting CHF. Possible superimposed pneumonia.     Ct Guide Cath Fluid Drain Soft Tissue    Result Date: 1/23/2019  CT GUIDED ASPIRATION OF T10-11 disc space: Indication: Discitis T10-11. COMPARISON: CTA chest, abdomen and pelvis 1/21/2019. CTA chest 12/23/2018 Technique/findings: After the procedure, as well as its risks, benefits and alternatives were explained to the patient and his daughter, and all questions answered, written informed consent was obtained from the daughter and witnessed. Procedure was performed using only local anesthesia. The fluid collection in the T10-11 disc space was localized under CT guidance. The skin was marked, prepped and draped in sterile fashion and bicarbonate buffered lidocaine was used for local anesthesia. An 19 gauge needle was advanced into the collection. A total of 3-4 ml of bloody fluid was aspirated. The fluid was sent for laboratory evaluation. The patient tolerated the procedure well and there were no immediate complications. Standard post procedure pause. One or more dose reduction techniques were used on this CT: automated exposure control, adjustment of the mAs and/or kVp according to patient size, and iterative reconstruction techniques. The specific techniques used on this CT exam have been documented in the patient's electronic medical record. Digital Imaging and Communications in Medicine (DICOM) format image data are available to nonaffiliated external healthcare facilities or entities on a secure, media free, reciprocally searchable basis with patient authorization for at least a 12-month period after this study. GUIDANCE: CT guidance was used to position (and confirm the position of) the needle. Image(s) saved in PACS: CT     IMPRESSION: Successful CT guided aspiration of the T10-11 disc space. Discharge Medications:     Current Discharge Medication List      START taking these medications    Details   bumetanide (BUMEX) 1 mg tablet Take 1 Tab by mouth two (2) times a day.   Qty: 60 Tab, Refills: 0      docusate sodium (COLACE) 100 mg capsule Take 1 Cap by mouth nightly for 30 days.  Qty: 30 Cap, Refills: 0      ertapenem 1 gram 1 g IVPB 1 g by IntraVENous route every twenty-four (24) hours. Qty: 1 Dose, Refills: 0      pantoprazole (PROTONIX) 40 mg tablet Take 1 Tab by mouth daily. Qty: 30 Tab, Refills: 0      traMADol (ULTRAM) 50 mg tablet Take 1 Tab by mouth every six (6) hours as needed. Max Daily Amount: 200 mg. Qty: 18 Tab, Refills: 0    Associated Diagnoses: Discitis of thoracic region         CONTINUE these medications which have NOT CHANGED    Details   gabapentin (NEURONTIN) 400 mg capsule Take 400 mg by mouth three (3) times daily. simvastatin (ZOCOR) 10 mg tablet Take  by mouth nightly. acetaminophen (TYLENOL) 325 mg tablet Take 2 Tabs by mouth every four (4) hours as needed for Pain. Qty: 120 Tab, Refills: 0      ipratropium-albuterol (COMBIVENT RESPIMAT)  mcg/actuation inhaler Take 1 Puff by inhalation every twelve (12) hours.  Indications: Chronic Obstructive Pulmonary Disease with Bronchospasms         STOP taking these medications       metoprolol tartrate (LOPRESSOR) 50 mg tablet Comments:   Reason for Stopping:         furosemide (LASIX) 40 mg tablet Comments:   Reason for Stopping:         amLODIPine (NORVASC) 10 mg tablet Comments:   Reason for Stopping:         loratadine (CLARITIN) 10 mg tablet Comments:   Reason for Stopping:         lisinopril (PRINIVIL, ZESTRIL) 20 mg tablet Comments:   Reason for Stopping:         oxyCODONE IR (ROXICODONE) 5 mg immediate release tablet Comments:   Reason for Stopping:         naloxone (NARCAN) 4 mg/actuation nasal spray Comments:   Reason for Stopping:         ibuprofen (MOTRIN) 400 mg tablet Comments:   Reason for Stopping:         potassium chloride (K-DUR, KLOR-CON) 20 mEq tablet Comments:   Reason for Stopping:         predniSONE (DELTASONE) 10 mg tablet Comments:   Reason for Stopping:         oxyCODONE-acetaminophen (PERCOCET) 5-325 mg per tablet Comments:   Reason for Stopping:               Activity: PT/OT Eval and Treat    Diet: Cardiac Diet    Wound Care: None needed    Follow-up:   Please follow up with your PCP within 7 days to discuss your recent hospitalization. Patient to arrange.   Follow up with infectious disease in 2-3 weeks  would repeat CT thoracic spine in 2 weeks (to compare with 2/4 and 1/21)         Total time spent including time spent on final examination and discharge discussion, discharge documentation and records reviewed and medication reconciliation: > 30 minutes    Ashlie Mcfarland DO  Internal Medicine, Hospitalist  Pager: [phone]

## 2019-02-22 NOTE — PROGRESS NOTES
Bedside report received from [address] 
 
5982: pt in bed resting,watching tv,alert and oriented X4,on 2L O2 nc,no c/o ofpain or sob,shift assessment completed,call bell within reach, bed in low position and lock 
 
0916: due meds given, tolerated well 1245: pt in bed resting, denies any pain or sob,continue to monitor 1550: due meds given, continue to monitor 1818: pt sleeping,call bell within reach 1930:Bedside shift change report given to Lynn Meyers (oncoming nurse) by Saud García RN (offgoing nurse). Report included the following information SBAR, Kardex, Intake/Output, MAR, Recent Results and Cardiac Rhythm NSR on tele #16.

## 2019-02-22 NOTE — PROGRESS NOTES
1922  Received bedside verbal shift report from Marian BARNES.   Pt lying in bed resting comfortably.  2lnc in place.  NSR on telemetry box # 6.  Piv # 22  to right wrist intact.  Call bell within reach, side rails up x 3, bed low and locked.  No complaints offered, pt instructed to call for assistance. 2220 assisted pt to [facility] pt with large soft bm noted. 5847  Reassessment completed, pt resting comfortably bipap in place. Call bell within reach, bed low and locked. Bedside and Verbal shift change report given to Armando (oncoming nurse) by Nadeem Matthew (offgoing nurse). Report included the following information SBAR, Kardex, Intake/Output, MAR, Recent Results and Cardiac Rhythm NSR\.

## 2019-02-22 NOTE — PROGRESS NOTES
INFECTIOUS DISEASE FOLLOW UP NOTE Admit Date: 1/21/2019 Summary:  
 
77year-old AA male w/ h/o COPD, polysubstance abuse including cocaine, heroin, T 10-11 destructive changes in November 2018 w/ nl ESR, CRP, negative bone/gallium scans - not felt to be infectious then but had Serratia UTI rx'd Cefepime/Ceftriaxone 5 days. Returned 1/21 with back pain, BSI Serratia 1/21 and CT 1/21 with similar destructive changes T9-10 levels c/w discitis/OM and intraspinal and paraspinal phlegmon s/p needle aspiration 1/23 cx + Serratia. Had recurrent respiratory failure requiring RRT 1/23, 1/24. Has residual bilateral pleural effusions on CXR 2/4. Refused surgery offered by Dr. Ileana Lo in November. Hypercarbia 2/12 PCO2 125 - trans ICU briefly. Back pain and mobility slowly improving w/ PT. Current abx Prior abx Ertapenem 2/5 - 16   abx 1/23 - 28 of 42 vs 56 Cefepime/vanco  11/14   2, Ceftriaxone 11/16 - 3; Zosyn 1/21-2  ceftaz 1/23-13, cipro 1/23-13 Interval History:  
 
Interval notes reviewed. Medicaid approved and accepted now at a LTc facility in [address] [place] [address]. Per Dr. Lord Hartman, accepting physician is aware of h/o IVDU and requested PICC. Assessment / Plan:  
  
Osteomyelitis, discitis, T9-10, Serratia 
- suspect seeded from Serratia UTI 11/2018 (no inflammation in spine lesions then) 
- returned 1/21 w/ worsened back pain, blcx + Serratia  
- CT 1/21/2019: destructive changes T9-10 levels c/w discitis/OM and intraspinal and paraspinal phlegmon. Overall appearance unchanged and pain not different from previous - s/p aspiration of fluid/phlegmon T10-11 discspace 1/23: (+) Serratia in anaerobic culture - potential for emergent AmpC- beta-lactamase- mediated resistance to third-generation cephalosporins not be evident during initial susc 
 testing so Carbapenem is DOC 
- CT Chest 2/4: progressive fragmentation, bone loss of T9  
 - 1/22: esr 38, crp 7.4->1/31 57/2. 1- CRP 2.1 -> 1.5 -> 1.8. ESR 57-> 49 -> now 92 
- no PT since 2/18 
- accepted at LTC facility in [facility] -> continue Ertapenem 1 gm q 24 hours 8 weeks from 1/23 (target end date: 4/20/2019) 
-> CBC, BMP, ESR, CRP q Monday 
-> would repeat CT thoracic spine in 2 weeks (to compare with 2/4 and 1/21) 
-> for any ID questions I can be reached at [phone] or pager [phone] COPD / Acute Hypercarbia 
- dec LOC 2/12, PCO2 >130 
- reportedly refused BIPAP earlier 
- transferred to ICU but now improved -> per [facility] Destructive T9-10 changes discitis/OM 
- mid-back pain x 5 months, fell, incontinent of urine - h/o IVDU - last use in July 2018 
- CTAP 11/2018: severe destructive changes T 10-11, paravertebral soft tissue infiltration,extens to ant epidural space w/ cord compression 
- unable to have MRI due to claustrophobia - Bone/ gallium scans 11/27 neg for discitis/OM, ESR 20, CRP 0.7 
- Not felt to be infected then. Not given long term IV abx  
- refused stabilization procedure per Dr. Asher Congress Dr. Shameka Holman tells me accepting physician in [facility] is aware of h/o IVDU and requests that PICC be placed. BSI 2 of 2 Serratia marcescens 1/21 
- source ~ Discitis Phlegmon seen on CT 
- IR aspiration 1/23 chucky culture grew Serratia  
- denies any urinary complaint but had Serratia in urine in past. Currently UA is negative - Ucx was not sent but CT with normal kidneys and nothing sig on CT and pt asymptomatic except some incontinet 
- repeat Blcx 1/22 1/2 positive  
- blcx 1/28 NG x 2 
- BSI adequately treated at this time  -> remains on Ertapenem for osteomyelitis as above Acute hypoxic resp distress- RRT called 1/23 and again 1/24 sec to hypercapnic resp failure Bilateral pleural effusions- chronic with  atelectasis - monitor Hepatomegaly, splenic hilar & perisplenic varices- concern for cirrhosis and portal HTN 
- seen on CTAP again    
H/o IVDU - HIV ab NR  ,Hep BsAg neg (last reported ivdu 2018, has hep C and thinks hep b immune) 
- denies current use -> avoid PICC 
   
HTN    
DJD    
Allergy [place]    
  
Microbiology:  
 
   urcx >100,000 Serratia marcescens             blcx NG x 2 
11/15   CrAG neg, fungitell 375 (reported ) 
   blcx for fungus - ngtd 
            fungitell 113 
  
     Blcx x2 Serratia marcescens R cefazolin UA neg 
 Blcx 1 of 2 Serratia  Aspiration cx g/s neg, Cx NG 
 NOMAN rare Serratia R Cefazolin Fungal NOS 
  bctx x 2 ng Lines / Catheters:  
 
piv 
  
Current Facility-Administered Medications Medication Dose Route Frequency  docusate sodium (COLACE) capsule 100 mg  100 mg Oral QHS  bumetanide (BUMEX) tablet 1 mg  1 mg Oral BID  albuterol-ipratropium (DUO-NEB) 2.5 MG-0.5 MG/3 ML  3 mL Nebulization Q4H PRN  
 traMADol (ULTRAM) tablet 50 mg  50 mg Oral Q6H PRN  
 ertapenem (INVANZ) 1 g in 0.9% sodium chloride (MBP/ADV) 50 mL MBP  1 g IntraVENous Q24H  pantoprazole (PROTONIX) tablet 40 mg  40 mg Oral DAILY  guaiFENesin (ROBITUSSIN) 100 mg/5 mL oral liquid 400 mg  400 mg Oral Q4H  
 lidocaine (PF) (XYLOCAINE) 10 mg/mL (1 %) injection 20 mL  20 mL SubCUTAneous Rad Multiple  gabapentin (NEURONTIN) capsule 400 mg  400 mg Oral TID  simvastatin (ZOCOR) tablet 10 mg  10 mg Oral QHS  acetaminophen (TYLENOL) tablet 650 mg  650 mg Oral Q6H PRN  
 ondansetron (ZOFRAN) injection 4 mg  4 mg IntraVENous Q6H PRN  
 heparin (porcine) injection 5,000 Units  5,000 Units SubCUTAneous Q8H Objective:  
 
Visit Vitals /67 (BP 1 Location: Left arm, BP Patient Position: Supine) Pulse 100 Temp 99.3 °F (37.4 °C) Resp 18 Ht 6' (1.829 m) Wt 104.8 kg (231 lb 1.6 oz) SpO2 96% BMI 31.34 kg/m² Temp (24hrs), Av.3 °F (36.8 °C), Min:96.8 °F (36 °C), Max:99.3 °F (37.4 °C) Gen: WD, 76 y/o AA M awake, oriented, not in distress on NC O2 
 HEENT: muddy sclerae, pupils equal and reactive Chest: Symmetric expansion, no crackles or wheezing CVS:S 1S2 RR, No JVD or edema Abd:Soft, NT, ND, BS+ Skin:No jaundice, cyanosis, clubbing. No decubitus Muscsk: Normal muscle tone/strength CNS: AA, oriented, conversing appropriately Labs: Results:  
Chemistry Recent Labs  
  02/20/19 
0410 GLU 71*   
K 4.4 CL 90* CO2 42*  
BUN 20* CREA 0.80 CA 8.9 AGAP 5  
BUCR 25* CBC w/Diff No results for input(s): WBC, RBC, HGB, HCT, PLT, GRANS, LYMPH, EOS, HGBEXT, HCTEXT, PLTEXT, HGBEXT, HCTEXT, PLTEXT in the last 72 hours. RADIOLOGY :   
cxr 2/4 IMPRESSION: 
  
Stable appearing densities at both lung bases compatible with pleural effusions 
with adjacent atelectasis/infiltrate 2/4 CT chest IMPRESSION: 
  
1. Moderate bilateral pleural effusions, left > right, amenable to image guided 
thoracentesis if clinically appropriate.   
  
2. Progressive fragmentation and bone loss of T9 from known 
discitis/osteomyelitis at T9-10. 
  
3. Stable discitis/osteomyelitis changes at T3-4. 
  
4. Other chronic findings detailed above and without significant interval 
change. 
  
CXR 2/12: Improved aeration with residual bilateral pleural effusions and subjacent 
atelectasis and/or consolidation. Jonnathan Hassan MD 
February 22, 2019 [facility] [phone]

## 2019-02-22 NOTE — PROGRESS NOTES
Remigio Luque) admission coordinator for [facility] has enquired when the patient will be ready for discharge. I have explained he is ready today if she can accept today. Awaiting a response.  Kallie Kiser RN

## 2019-02-22 NOTE — PROGRESS NOTES
Patient does not have a PICC line, Dr Bandar Ferrara has ordered a line to be placed STAT, PICC line nurse to be in route. I was told the patient will need an INR prior to the PICC insertion. I called and spoke to Douglas gomez at the facility, she stated the facility could not accept the patient after 500 pm. Given the fact that the INR would need to be sent put to [facility] because I was told out INR machine in the lab was down. I expalined this to the transport team they have stated they will not be able to accommodate a late arrival.  I have called the facility, they can accept the patient tomorrow. I have arranged for [facility] to transport the patient at 1100 am tomorrow. I have spoken to Dr Bandar Ferrara and he agrees with this arrangement. I have informed the patirnt that he will be discharge tomorrow to [place] as opposed to tonight. Eloy Mishra RN Patient has been accepted to [place], [place]. I have spoken to Dr Bandar Ferrara and requested DC summary. I have spoken to the patient and he agrees with transition plan. I have called and spoken to the patients mother, Christopher Cortez . She agreed to transition plan, she would have like to have the patient at a local facility but accepts the fact that [facility] is the only accepting facility in the [facility]. I have provided the patient mother with the name , address and phone number for the facility in [place]. UAI completed and entered into [employer] I will also include a copy of the UAI with his discharge summary and SNF short form. PCS form to nursing unit. Eloy Mishra RN Care Management Interventions PCP Verified by CM: Yes Last Visit to PCP: 08/22/18 Palliative Care Criteria Met (RRAT>21 & CHF Dx)?: No 
Mode of Transport at Discharge: [facility]) Transition of Care Consult (CM Consult): [facility] Signup: No 
Discharge Durable Medical Equipment: No 
Health Maintenance Reviewed:  Yes 
 Physical Therapy Consult: Yes Occupational Therapy Consult: Yes Speech Therapy Consult: Yes Current Support Network: Relative's Home(live with Cate [phone]) Confirm Follow Up Transport: Other (see comment)(BLS) Plan discussed with Pt/Family/Caregiver: Yes [employer] Information Provided?: No 
Discharge Location Discharge Placement: Skilled nursing facility

## 2019-02-23 VITALS
HEART RATE: 85 BPM | TEMPERATURE: 97.4 F | HEIGHT: 72 IN | BODY MASS INDEX: 30.25 KG/M2 | DIASTOLIC BLOOD PRESSURE: 59 MMHG | WEIGHT: 223.33 LBS | SYSTOLIC BLOOD PRESSURE: 112 MMHG | RESPIRATION RATE: 16 BRPM | OXYGEN SATURATION: 100 %

## 2019-02-23 LAB
CA-I SERPL-SCNC: 1.14 MMOL/L (ref 1.12–1.32)
MAGNESIUM SERPL-MCNC: 2 MG/DL (ref 1.6–2.6)
PHOSPHATE SERPL-MCNC: 4 MG/DL (ref 2.5–4.9)

## 2019-02-23 PROCEDURE — 36415 COLL VENOUS BLD VENIPUNCTURE: CPT

## 2019-02-23 PROCEDURE — 84100 ASSAY OF PHOSPHORUS: CPT

## 2019-02-23 PROCEDURE — 74011250637 HC RX REV CODE- 250/637: Performed by: HOSPITALIST

## 2019-02-23 PROCEDURE — 74011250636 HC RX REV CODE- 250/636: Performed by: HOSPITALIST

## 2019-02-23 PROCEDURE — 74011250637 HC RX REV CODE- 250/637: Performed by: INTERNAL MEDICINE

## 2019-02-23 PROCEDURE — 82330 ASSAY OF CALCIUM: CPT

## 2019-02-23 PROCEDURE — 77030010545

## 2019-02-23 PROCEDURE — 83735 ASSAY OF MAGNESIUM: CPT

## 2019-02-23 PROCEDURE — 94660 CPAP INITIATION&MGMT: CPT

## 2019-02-23 RX ADMIN — GABAPENTIN 400 MG: 100 CAPSULE ORAL at 08:45

## 2019-02-23 RX ADMIN — BUMETANIDE 1 MG: 1 TABLET ORAL at 08:45

## 2019-02-23 RX ADMIN — PANTOPRAZOLE SODIUM 40 MG: 40 TABLET, DELAYED RELEASE ORAL at 08:46

## 2019-02-23 RX ADMIN — GUAIFENESIN 400 MG: 100 SOLUTION ORAL at 00:22

## 2019-02-23 RX ADMIN — Medication 10 ML: at 06:06

## 2019-02-23 RX ADMIN — GUAIFENESIN 400 MG: 100 SOLUTION ORAL at 03:16

## 2019-02-23 RX ADMIN — GUAIFENESIN 400 MG: 100 SOLUTION ORAL at 08:46

## 2019-02-23 RX ADMIN — HEPARIN SODIUM 5000 UNITS: 5000 INJECTION INTRAVENOUS; SUBCUTANEOUS at 03:15

## 2019-02-23 NOTE — DISCHARGE SUMMARY
Internal Medicine Discharge Summary        Patient: Xiomara Estrada    YOB: 1950    Age:  77 y.o. Admit Date: 1/21/2019    Discharge Date: 2/23/2019    LOS:  LOS: 33 days     Discharge To: SNF    Consults: Infectious Disease    Admission Diagnoses: Bacteremia    Discharge Diagnoses:    Problem List as of 2/23/2019 Date Reviewed: 11/14/2018          Codes Class Noted - Resolved    * (Principal) Bacteremia ICD-10-CM: R78.81  ICD-9-CM: 790.7  1/21/2019 - Present        Discitis of thoracic region ICD-10-CM: M46.44  ICD-9-CM: 722.92  11/14/2018 - Present        Metabolic encephalopathy [id number]: G93.41  ICD-9-CM: 348.31  1/29/2019 - Present    Overview Signed 1/29/2019 10:18 AM by Isabel Graf MD     Associated with sepsis, present on admission.              PNA (pneumonia) ICD-10-CM: J18.9  ICD-9-CM: 615  1/26/2019 - Present        Acute exacerbation of chronic obstructive pulmonary disease (COPD) ([facility] ICD-10-CM: J44.1  ICD-9-CM: 491.21  12/25/2018 - Present        Diastolic CHF, acute on chronic [facility]) ICD-10-CM: I50.33  ICD-9-CM: 428.33, 428.0  12/24/2018 - Present        Acute on chronic respiratory failure with hypoxia (HCC) ICD-10-CM: J96.21  ICD-9-CM: 518.84, 799.02  12/23/2018 - Present        Sinusitis ICD-10-CM: J32.9  ICD-9-CM: 473.9  12/17/2018 - Present        Altered mental status ICD-10-CM: R41.82  ICD-9-CM: 780.97  12/16/2018 - Present        Acute respiratory failure with hypoxia (HCC) ICD-10-CM: J96.01  ICD-9-CM: 518.81  12/16/2018 - Present        UTI (urinary tract infection) ICD-10-CM: N39.0  ICD-9-CM: 599.0  11/14/2018 - Present        Bilateral pleural effusion ICD-10-CM: J90  ICD-9-CM: 511.9  11/14/2018 - Present        Lumbar stenosis ICD-10-CM: M48.061  ICD-9-CM: 724.02  11/14/2018 - Present        Adenoma of left adrenal gland ICD-10-CM: D35.02  ICD-9-CM: 227.0  11/14/2018 - Present        Hypertension ICD-10-CM: I10  ICD-9-CM: 401.9  11/14/2018 - Present        Cirrhosis of liver ([facility] ICD-10-CM: K74.60  ICD-9-CM: 571.5  11/14/2018 - Present        Hepatitis C ICD-10-CM: B19.20  ICD-9-CM: 070.70  11/14/2018 - Present        Obesity ICD-10-CM: E66.9  ICD-9-CM: 278.00  11/14/2018 - Present        Constipation ICD-10-CM: K59.00  ICD-9-CM: 564.00  11/14/2018 - Present        Back pain ICD-10-CM: M54.9  ICD-9-CM: 724.5  11/14/2018 - Present              Discharge Condition:  Improved    Procedures: IR aspiration         HPI: Evelyn Moulton is a 77 y.o. 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 He was evaluated by infectious disease and they initially recommended continued use of IV vancomycin and zosyn. This was later changed to zosyn and cipro and then ceftaz and cipro based off blood cultures of serratia marcescens. He has aspiration of fluid/phlegmon in the back and this also grew serratia. This led to final recommendation from ID for 6-8 weeks of IV antibiotics with ertapenem. He is currently on day 28 of 42 vs 56. The patient was not interested in neurosurgery and wanted to attempt physical therapy for his back pain issues. Over his admission, his symptoms improved. He was treated with peripheral IV due to history of IV drug abuse. The accepting facility/physician understood his history of drug abuse and requested a PICC line prior to discharge for acceptance.    The patient had a rapid response on 01/24 due to acute on chronic hypercapnic respiratory failure and required BiPAP and transition to the unit for a few days. He slowly improved and was back on nasal cannula and comfortable on the floor. There was concern his hypercapnea was induced by narcotics and thus his percocet was stopped and transitioned to tramadol, which was effective for him. He had CT of chest that showed bilateral pleural effusions. They did not need tapped, but if needed down road pulm recommends the left side first. The rest of his admissions was extended due to awaiting long term care approval as he was stable for discharge for quite some time. He requires CPAP at night for ELSA      Visit Vitals  /59   Pulse 85   Temp 97.4 °F (36.3 °C)   Resp 16   Ht 6' (1.829 m)   Wt 101.3 kg (223 lb 5.2 oz)   SpO2 100%   BMI 30.29 kg/m²         Labs Prior to Discharge:  Labs: Results:       Chemistry No results for input(s): GLU, NA, K, CL, CO2, BUN, CREA, CA, AGAP, BUCR, TBIL, GPT, AP, TP, ALB, GLOB, AGRAT in the last 72 hours. CBC w/Diff No results for input(s): WBC, RBC, HGB, HCT, PLT, GRANS, LYMPH, EOS, HGBEXT, HCTEXT, PLTEXT, HGBEXT, HCTEXT, PLTEXT in the last 72 hours. Cardiac Enzymes No results for input(s): CPK, CKND1, KARYNA in the last 72 hours. No lab exists for component: CKRMB, TROIP   Coagulation Recent Labs     02/22/19  1348   PTP 14.4   INR 1.1       Lipid Panel Lab Results   Component Value Date/Time    Cholesterol, total 186 01/22/2019 04:55 AM    HDL Cholesterol 39 (L) 01/22/2019 04:55 AM    LDL, calculated 125.4 (H) 01/22/2019 04:55 AM    VLDL, calculated 21.6 01/22/2019 04:55 AM    Triglyceride 108 01/22/2019 04:55 AM    CHOL/HDL Ratio 4.8 01/22/2019 04:55 AM      BNP No results for input(s): BNPP in the last 72 hours. Liver Enzymes No results for input(s): TP, ALB, TBIL, AP, SGOT, GPT in the last 72 hours.     No lab exists for component: DBIL   Thyroid Studies Lab Results   Component Value Date/Time TSH 0.30 (L) 11/19/2009 03:26 PM            Significant Imaging:  Xr Chest Pa Lat    Result Date: 2/1/2019  EXAM: XR CHEST PA LAT INDICATION: Hypoxia COMPARISON: Several prior exams, most recently 1/26/2019. FINDINGS: PA and lateral radiographs of the chest demonstrate low lung volumes with central vascular congestion and mild bilateral interstitial opacities. Posteriorly layering pleural effusions are present bilaterally, right slightly larger than left. No pneumothorax. Cardiac size and mediastinal contours appear stable, predominately obscured. No acute osseous abnormality. Destructive changes at the T10-T11 disc space level are not well demonstrated on the lateral projection secondary overlying soft tissues. Impression: 1. Posteriorly layering pleural effusions, central vascular congestion, and interstitial edema similar to immediate comparison exam. 2. Unchanged bibasilar atelectasis or infiltrate. Ct Chest Wo Cont    Result Date: 2/4/2019  EXAM: CT chest without contrast . INDICATION: Equivocal stress test.  Evaluate pleural effusions. COMPARISON: 1/21/2019 TECHNIQUE: Axial CT imaging from the thoracic inlet through the diaphragm without  intravenous contrast. Multiplanar reformats were generated. Dose reduction techniques:  Automated exposure control, mAs and/or kVp reductions based on patient size, and iterative reconstruction. The specific techniques utilized on this CT exam have been documented in the patient's electronic medical record. Digital imaging and communications in medicine (DICOM) format image data are available to non-affiliated external healthcare facilities or entities on a secure, media free, reciprocally searchable database, maintained by the healthcare system, with patient authorization for at least a 12 month period after this study. _______________ FINDINGS: MEDIASTINUM AND LYMPH NODES: Normal heart size. No pericardial effusion.  No mediastinal or hilar lymph node enlargement. LUNGS AND AIRWAYS: - Essentially complete bilateral lower lobe atelectasis due to bilateral pleural effusions. - No new focal consolidation. PLEURA: Moderate, stable left greater than right pleural effusions. UPPER ABDOMEN: - Hepatosplenomegaly. - Moderate burden mesenteric varices. - Several subcentimeter hepatic hypodensities, presumably cysts. - Small hiatal hernia. VASCULATURE: - Main pulmonary artery diameter: 3.7 cm. - Mild atherosclerotic disease of the aortic arch. - Moderate CAD. - Moderate atherosclerotic aortic valve. OTHER: - Mixed, stable mild lytic/ sclerotic destructive change at T3-4 and surrounding paraspinal inflammatory change consistent with discitis/osteomyelitis. -Mixed lytic/sclerotic destructive change at T9-10 and paraspinal inflammatory change consistent with discitis/osteomyelitis as seen on prior CT. Since prior, there is progressive fragmentation and bone loss of T9 vertebral body. -No new bone lesions. _______________     IMPRESSION: 1. Moderate bilateral pleural effusions, left > right, amenable to image guided thoracentesis if clinically appropriate. 2. Progressive fragmentation and bone loss of T9 from known discitis/osteomyelitis at T9-10. 3. Stable discitis/osteomyelitis changes at T3-4. 4. Other chronic findings detailed above and without significant interval change. Xr Chest Port    Result Date: 2/13/2019  EXAM:  PORTABLE CHEST INDICATION:  Coarse crackles on auscultation. TECHNIQUE:  Portable, erect AP view. COMPARISON:  02/12/2019 ____________________ FINDINGS:  SUPPORT DEVICES: None. HEART AND MEDIASTINUM: Cardiac silhouette is not well evaluated due to adjacent bilateral opacities. LUNGS AND PLEURAL SPACES: Progression of pulmonary edema with progression of bibasilar opacities. No pneumothorax. BONY THORAX AND SOFT TISSUES: No acute osseous abnormality. Degenerative changes around the visualized shoulders.  ____________________     IMPRESSION:  Findings consistent with progression of pulmonary edema with progression of bibasilar opacities, atelectasis, pneumonia, sequela of edema, and/or pleural effusions. Xr Chest Port    Result Date: 2/12/2019  HISTORY: -From Provider: respiratory distress -Additional: None Technique : AP PORTABLE CHEST Time of study: 14:58 Comparison : 02/04/19. Findings:Improved aeration in the upper lung zones, with persistent bilateral pleural effusions and subjacent atelectasis and/or consolidation silhouetting the diaphragms and costophrenic angles. Aortic vascular calcification. No change in bony findings. Impression: Improved aeration with residual bilateral pleural effusions and subjacent atelectasis and/or consolidation. Xr Chest Port    Result Date: 2/4/2019  EXAM: Portable Chest CLINICAL INDICATION:  f-u pleural effusions -Additional:  None COMPARISON:  02/01/19 TECHNIQUE: AP portable view at 0406 ______________ FINDINGS: HEART AND MEDIASTINUM:  The heart size is stable. Aortic atherosclerosis is present LUNGS AND AIRWAYS:  Density is again seen at both lung bases. This appears to be a combination of pleural fluid and adjacent atelectasis/infiltrate. The lungs appear stable PLEURA:  No pneumothorax BONES:  No acute or aggressive osseous lesions. OTHER:  None. ______________     IMPRESSION: Stable appearing densities at both lung bases compatible with pleural effusions with adjacent atelectasis/infiltrate    Xr Chest Port    Result Date: 1/26/2019  EXAM: CHEST RADIOGRAPH, SINGLE VIEW CLINICAL INDICATION/HISTORY: Tube Placement COMPARISON: 1/24/2019 TECHNIQUE: Portable frontal view of the chest was obtained. _______________ FINDINGS: SUPPORT DEVICES: Cardiac leads and wires overlie the anterior chest. There is no visualized tube given indication of tube placement. HEART AND MEDIASTINUM: Cardiomediastinal silhouette appears unchanged. Tortuous and atherosclerotic thoracic aorta.   Indistinct central pulmonary vasculature, unchanged. LUNGS AND PLEURAL SPACES: Low lung volumes with layering right greater than left pleural effusions, each with adjacent basilar patchy opacity. Diffusely increased interstitial markings, especially throughout perihilar and basilar distributions appear unchanged. No pneumothorax. BONY THORAX AND SOFT TISSUES: No acute osseous abnormality. _______________     IMPRESSION: 1. No visualized tube given provided history of tube placement. 2. Unchanged cardiomegaly with right greater than left pleural effusions, adjacent basilar atelectasis/infiltrate and mild pulmonary interstitial edema. Xr Chest Port    Addendum Date: 1/24/2019    Addendum: Initial preliminary report was provided to the ED by the on-call radiology resident. Result Date: 1/24/2019  EXAM: XR CHEST PORT CLINICAL INDICATION/HISTORY: sob  >Additional: None COMPARISON: January 22, 2018  >Reference exam: None. TECHNIQUE: Portable chest. _______________ FINDINGS: SUPPORT LINES AND TUBES: Overlying monitor leads. HEART AND MEDIASTINUM: The cardiac mediastinal silhouette is obscured by bilateral airspace opacities, right greater than left. Pulmonary vascular congestion with hazy bilateral perihilar interstitial opacities. LUNGS AND PLEURAL SPACES: The lungs are underexpanded. Large right-sided pleural effusion with associated atelectasis seen is right heart border and right hemidiaphragm with a small amount of fluid tracking along the right thoracic wall. Moderate size left pleural effusion is noted. BONY THORAX AND SOFT TISSUES: The bones and soft tissues are within normal limits. _______________     IMPRESSION: Large right and moderate left pleural effusions with evidence of pulmonary vascular congestion and interstitial edema suggesting CHF. Possible superimposed pneumonia. Ct Guide Cath Fluid Drain Soft Tissue    Result Date: 1/23/2019  CT GUIDED ASPIRATION OF T10-11 disc space: Indication: Discitis T10-11.  COMPARISON: CTA chest, abdomen and pelvis 1/21/2019. CTA chest 12/23/2018 Technique/findings: After the procedure, as well as its risks, benefits and alternatives were explained to the patient and his daughter, and all questions answered, written informed consent was obtained from the daughter and witnessed. Procedure was performed using only local anesthesia. The fluid collection in the T10-11 disc space was localized under CT guidance. The skin was marked, prepped and draped in sterile fashion and bicarbonate buffered lidocaine was used for local anesthesia. An 19 gauge needle was advanced into the collection. A total of 3-4 ml of bloody fluid was aspirated. The fluid was sent for laboratory evaluation. The patient tolerated the procedure well and there were no immediate complications. Standard post procedure pause. One or more dose reduction techniques were used on this CT: automated exposure control, adjustment of the mAs and/or kVp according to patient size, and iterative reconstruction techniques. The specific techniques used on this CT exam have been documented in the patient's electronic medical record. Digital Imaging and Communications in Medicine (DICOM) format image data are available to nonaffiliated external healthcare facilities or entities on a secure, media free, reciprocally searchable basis with patient authorization for at least a 12-month period after this study. GUIDANCE: CT guidance was used to position (and confirm the position of) the needle. Image(s) saved in PACS: CT     IMPRESSION: Successful CT guided aspiration of the T10-11 disc space. Discharge Medications:     Current Discharge Medication List      START taking these medications    Details   bumetanide (BUMEX) 1 mg tablet Take 1 Tab by mouth two (2) times a day. Qty: 60 Tab, Refills: 0      docusate sodium (COLACE) 100 mg capsule Take 1 Cap by mouth nightly for 30 days.   Qty: 30 Cap, Refills: 0      ertapenem 1 gram 1 g IVPB 1 g by IntraVENous route every twenty-four (24) hours. Qty: 1 Dose, Refills: 0      pantoprazole (PROTONIX) 40 mg tablet Take 1 Tab by mouth daily. Qty: 30 Tab, Refills: 0      traMADol (ULTRAM) 50 mg tablet Take 1 Tab by mouth every six (6) hours as needed. Max Daily Amount: 200 mg. Qty: 18 Tab, Refills: 0    Associated Diagnoses: Discitis of thoracic region         CONTINUE these medications which have NOT CHANGED    Details   gabapentin (NEURONTIN) 400 mg capsule Take 400 mg by mouth three (3) times daily. simvastatin (ZOCOR) 10 mg tablet Take  by mouth nightly. acetaminophen (TYLENOL) 325 mg tablet Take 2 Tabs by mouth every four (4) hours as needed for Pain. Qty: 120 Tab, Refills: 0      ipratropium-albuterol (COMBIVENT RESPIMAT)  mcg/actuation inhaler Take 1 Puff by inhalation every twelve (12) hours.  Indications: Chronic Obstructive Pulmonary Disease with Bronchospasms         STOP taking these medications       metoprolol tartrate (LOPRESSOR) 50 mg tablet Comments:   Reason for Stopping:         furosemide (LASIX) 40 mg tablet Comments:   Reason for Stopping:         amLODIPine (NORVASC) 10 mg tablet Comments:   Reason for Stopping:         loratadine (CLARITIN) 10 mg tablet Comments:   Reason for Stopping:         lisinopril (PRINIVIL, ZESTRIL) 20 mg tablet Comments:   Reason for Stopping:         oxyCODONE IR (ROXICODONE) 5 mg immediate release tablet Comments:   Reason for Stopping:         naloxone (NARCAN) 4 mg/actuation nasal spray Comments:   Reason for Stopping:         ibuprofen (MOTRIN) 400 mg tablet Comments:   Reason for Stopping:         potassium chloride (K-DUR, KLOR-CON) 20 mEq tablet Comments:   Reason for Stopping:         predniSONE (DELTASONE) 10 mg tablet Comments:   Reason for Stopping:         oxyCODONE-acetaminophen (PERCOCET) 5-325 mg per tablet Comments:   Reason for Stopping:               Activity: PT/OT Eval and Treat    Diet: Cardiac Diet    Wound Care: None needed    Follow-up:   Please follow up with your PCP within 7 days to discuss your recent hospitalization. Patient to arrange.   Follow up with infectious disease in 2-3 weeks  would repeat CT thoracic spine in 2 weeks (to compare with 2/4 and 1/21)         Total time spent including time spent on final examination and discharge discussion, discharge documentation and records reviewed and medication reconciliation: > 30 minutes    Ramon Cope DO  Internal Medicine, Hospitalist  Pager: [phone]

## 2019-02-23 NOTE — PROGRESS NOTES
Bedside shift report received from [address]: AOX3, on 2L 02 via NC, resting in bed, in no apparent distress; denies any pain or other discomforts at this time; shift assessment completed 2121: due meds given as scheduled; no distress noted 2300: resting in bed, requesting for potato chips; educated on low salt diet 
 
0100: on bipap; constantly monitored pt for adequate oxygenation 0300: took bipap off; refuses to put it back; \"I always take it off at 3 am\" per pt; educated pt but still refuses to wear it back; 02 via NC resumed 0530: awake; bath given; skin care done; gown and linens changed 
 
0700: resting comfortably; urinals at bedside 0740: Bedside and Verbal shift change report given to [facility] [place], operated by [facility] RN and Juventino Soler RN (oncoming nurse) by Marge Babcock RN (offgoing nurse). Report included the following information SBAR, Kardex, Intake/Output, Recent Results and Cardiac Rhythm NSR.

## 2019-02-23 NOTE — PROGRESS NOTES
Problem: Pressure Injury - Risk of 
Goal: *Prevention of pressure injury Document Raul Scale and appropriate interventions in the flowsheet. Outcome: Progressing Towards Goal 
Pressure Injury Interventions: 
Sensory Interventions: Assess changes in LOC, Check visual cues for pain, Keep linens dry and wrinkle-free Moisture Interventions: Absorbent underpads Activity Interventions: Pressure redistribution bed/mattress(bed type), PT/OT evaluation Mobility Interventions: HOB 30 degrees or less, PT/OT evaluation Nutrition Interventions: Document food/fluid/supplement intake Friction and Shear Interventions: HOB 30 degrees or less

## 2019-02-23 NOTE — PROGRESS NOTES
Bedside report received from [facility] 
 
0800: pt in red resting comfortably, alert and oriented X3, on 2L O2 n.c,denies any pain or discomforts at this time, shift assessment completed, call bell within reach 0845: due meds given, tolerated well 1027: pt for discharged, report called  to [facility] from [address] [place] [address] rehab facility,armed band removed personal belongings with the pt

## 2019-02-25 LAB
BACTERIA SPEC CULT: NORMAL
FUNGUS SMEAR,FNGSMR: NORMAL
SERVICE CMNT-IMP: NORMAL

## 2019-03-15 ENCOUNTER — PATIENT OUTREACH (OUTPATIENT)
Dept: FAMILY MEDICINE CLINIC | Age: 69
End: 2019-03-15

## 2019-03-15 NOTE — PROGRESS NOTES
Patient was transferred to [facility] on 2/23/18. Called SNF, spoke to Caleb Hollis , nurse confirmed patient is advancing. He has just finished antibiotic Ertapenum, med rec done, allother medications remain the same. The place is to discharge home, in house MD is Dr. Anya Segura. There is no d/c date as of yet. Will continue to follow.

## 2019-03-28 ENCOUNTER — PATIENT OUTREACH (OUTPATIENT)
Dept: FAMILY MEDICINE CLINIC | Age: 69
End: 2019-03-28

## 2019-03-28 NOTE — PROGRESS NOTES
Spoke with mother of patient. She is aware patient is to be discharged to her home. Patient had a paid caregiver through Medicaid that cares for him. Explained the roll of the NN, she verbalized understanding and agrees to future calls.

## 2019-03-28 NOTE — PROGRESS NOTES
Called [facility] for update on patient. Spoke to Caleb Miller who stated there has been no change in care or status since our converstation 3/15/19. Then spoke to [address] [place] [address] who stated patient wants to return home to the care of his family. This will happen once patient has completed all therapies. There is no known date as of yet.

## 2019-04-22 ENCOUNTER — PATIENT OUTREACH (OUTPATIENT)
Dept: FAMILY MEDICINE CLINIC | Age: 69
End: 2019-04-22

## 2019-04-25 NOTE — PROGRESS NOTES
Follow up call to patient, ID verified. Patient stated his PCP is now Dr. Anthony Hsu. Explained Dr. Lulu Boswell was not one of our providers, patient verbalized understanding.

## 2019-05-01 ENCOUNTER — HOSPITAL ENCOUNTER (INPATIENT)
Age: 69
LOS: 3 days | Discharge: HOME HEALTH | DRG: 720 | End: 2019-05-04
Attending: EMERGENCY MEDICINE | Admitting: HOSPITALIST
Payer: MEDICAID

## 2019-05-01 ENCOUNTER — APPOINTMENT (OUTPATIENT)
Dept: CT IMAGING | Age: 69
DRG: 720 | End: 2019-05-01
Attending: EMERGENCY MEDICINE
Payer: MEDICAID

## 2019-05-01 ENCOUNTER — APPOINTMENT (OUTPATIENT)
Dept: GENERAL RADIOLOGY | Age: 69
DRG: 720 | End: 2019-05-01
Attending: INTERNAL MEDICINE
Payer: MEDICAID

## 2019-05-01 ENCOUNTER — APPOINTMENT (OUTPATIENT)
Dept: GENERAL RADIOLOGY | Age: 69
DRG: 720 | End: 2019-05-01
Attending: EMERGENCY MEDICINE
Payer: MEDICAID

## 2019-05-01 DIAGNOSIS — J96.00 ACUTE RESPIRATORY FAILURE, UNSPECIFIED WHETHER WITH HYPOXIA OR HYPERCAPNIA (HCC): Primary | ICD-10-CM

## 2019-05-01 DIAGNOSIS — N17.9 ACUTE KIDNEY INJURY (NONTRAUMATIC) (HCC): ICD-10-CM

## 2019-05-01 DIAGNOSIS — E87.5 ACUTE HYPERKALEMIA: ICD-10-CM

## 2019-05-01 DIAGNOSIS — A41.9 SEPTIC SHOCK (HCC): ICD-10-CM

## 2019-05-01 DIAGNOSIS — J18.9 PNEUMONIA OF BOTH LUNGS DUE TO INFECTIOUS ORGANISM, UNSPECIFIED PART OF LUNG: ICD-10-CM

## 2019-05-01 DIAGNOSIS — R65.21 SEPTIC SHOCK (HCC): ICD-10-CM

## 2019-05-01 LAB
ALBUMIN SERPL-MCNC: 3.1 G/DL (ref 3.4–5)
ALBUMIN/GLOB SERPL: 0.6 {RATIO} (ref 0.8–1.7)
ALP SERPL-CCNC: 106 U/L (ref 45–117)
ALT SERPL-CCNC: 19 U/L (ref 16–61)
AMMONIA PLAS-SCNC: 72 UMOL/L (ref 11–32)
AMPHET UR QL SCN: NEGATIVE
ANION GAP BLD CALC-SCNC: 14 MMOL/L (ref 10–20)
ANION GAP BLD CALC-SCNC: 18 MMOL/L (ref 10–20)
ANION GAP SERPL CALC-SCNC: 6 MMOL/L (ref 3–18)
ANION GAP SERPL CALC-SCNC: 9 MMOL/L (ref 3–18)
APPEARANCE UR: ABNORMAL
ARTERIAL PATENCY WRIST A: ABNORMAL
ARTERIAL PATENCY WRIST A: ABNORMAL
AST SERPL-CCNC: 24 U/L (ref 15–37)
ATRIAL RATE: 86 BPM
BARBITURATES UR QL SCN: NEGATIVE
BASE EXCESS BLD CALC-SCNC: 2 MMOL/L
BASE EXCESS BLD CALC-SCNC: 3 MMOL/L
BASOPHILS # BLD: 0 K/UL (ref 0–0.1)
BASOPHILS NFR BLD: 0 % (ref 0–2)
BDY SITE: ABNORMAL
BDY SITE: ABNORMAL
BENZODIAZ UR QL: NEGATIVE
BILIRUB SERPL-MCNC: 1 MG/DL (ref 0.2–1)
BILIRUB UR QL: NEGATIVE
BNP SERPL-MCNC: ABNORMAL PG/ML (ref 0–900)
BODY TEMPERATURE: 97
BUN BLD-MCNC: 32 MG/DL (ref 7–18)
BUN BLD-MCNC: 36 MG/DL (ref 7–18)
BUN SERPL-MCNC: 31 MG/DL (ref 7–18)
BUN SERPL-MCNC: 32 MG/DL (ref 7–18)
BUN/CREAT SERPL: 11 (ref 12–20)
BUN/CREAT SERPL: 9 (ref 12–20)
CA-I BLD-MCNC: 1.04 MMOL/L (ref 1.12–1.32)
CA-I BLD-MCNC: 1.09 MMOL/L (ref 1.12–1.32)
CA-I SERPL-SCNC: 1.07 MMOL/L (ref 1.12–1.32)
CALCIUM SERPL-MCNC: 8.1 MG/DL (ref 8.5–10.1)
CALCIUM SERPL-MCNC: 8.2 MG/DL (ref 8.5–10.1)
CALCULATED P AXIS, ECG09: 50 DEGREES
CALCULATED R AXIS, ECG10: -26 DEGREES
CALCULATED T AXIS, ECG11: -31 DEGREES
CANNABINOIDS UR QL SCN: NEGATIVE
CHLORIDE BLD-SCNC: 101 MMOL/L (ref 100–108)
CHLORIDE BLD-SCNC: 104 MMOL/L (ref 100–108)
CHLORIDE SERPL-SCNC: 101 MMOL/L (ref 100–108)
CHLORIDE SERPL-SCNC: 106 MMOL/L (ref 100–108)
CK MB CFR SERPL CALC: 1.6 % (ref 0–4)
CK MB SERPL-MCNC: 4.8 NG/ML (ref 5–25)
CK SERPL-CCNC: 303 U/L (ref 39–308)
CO2 BLD-SCNC: 28 MMOL/L (ref 19–24)
CO2 BLD-SCNC: 32 MMOL/L (ref 19–24)
CO2 SERPL-SCNC: 26 MMOL/L (ref 21–32)
CO2 SERPL-SCNC: 31 MMOL/L (ref 21–32)
COCAINE UR QL SCN: POSITIVE
COLOR UR: ABNORMAL
CREAT SERPL-MCNC: 2.9 MG/DL (ref 0.6–1.3)
CREAT SERPL-MCNC: 3.69 MG/DL (ref 0.6–1.3)
CREAT UR-MCNC: 3.3 MG/DL (ref 0.6–1.3)
CREAT UR-MCNC: 3.7 MG/DL (ref 0.6–1.3)
DIAGNOSIS, 93000: NORMAL
DIFFERENTIAL METHOD BLD: ABNORMAL
EOSINOPHIL # BLD: 0 K/UL (ref 0–0.4)
EOSINOPHIL NFR BLD: 0 % (ref 0–5)
ERYTHROCYTE [DISTWIDTH] IN BLOOD BY AUTOMATED COUNT: 13.7 % (ref 11.6–14.5)
ETHANOL SERPL-MCNC: <3 MG/DL (ref 0–3)
GAS FLOW.O2 O2 DELIVERY SYS: ABNORMAL L/MIN
GAS FLOW.O2 O2 DELIVERY SYS: ABNORMAL L/MIN
GAS FLOW.O2 SETTING OXYMISER: 16 BPM
GAS FLOW.O2 SETTING OXYMISER: 76 BPM
GLOBULIN SER CALC-MCNC: 4.9 G/DL (ref 2–4)
GLUCOSE BLD STRIP.AUTO-MCNC: 105 MG/DL (ref 74–106)
GLUCOSE BLD STRIP.AUTO-MCNC: 106 MG/DL (ref 70–110)
GLUCOSE BLD STRIP.AUTO-MCNC: 106 MG/DL (ref 74–106)
GLUCOSE BLD STRIP.AUTO-MCNC: 120 MG/DL (ref 70–110)
GLUCOSE BLD STRIP.AUTO-MCNC: 126 MG/DL (ref 70–110)
GLUCOSE SERPL-MCNC: 100 MG/DL (ref 74–99)
GLUCOSE SERPL-MCNC: 146 MG/DL (ref 74–99)
GLUCOSE UR STRIP.AUTO-MCNC: NEGATIVE MG/DL
HCO3 BLD-SCNC: 25.9 MMOL/L (ref 22–26)
HCO3 BLD-SCNC: 29.4 MMOL/L (ref 22–26)
HCT VFR BLD AUTO: 45.6 % (ref 36–48)
HCT VFR BLD CALC: 44 % (ref 36–49)
HCT VFR BLD CALC: 45 % (ref 36–49)
HDSCOM,HDSCOM: ABNORMAL
HGB BLD-MCNC: 13.7 G/DL (ref 13–16)
HGB BLD-MCNC: 15 G/DL (ref 12–16)
HGB BLD-MCNC: 15.3 G/DL (ref 12–16)
HGB UR QL STRIP: NEGATIVE
INR PPP: 1.1 (ref 0.8–1.2)
INSPIRATION.DURATION SETTING TIME VENT: 0.75 SEC
KETONES UR QL STRIP.AUTO: NEGATIVE MG/DL
L PNEUMO AG UR QL IA: NEGATIVE
LACTATE BLD-SCNC: 3.34 MMOL/L (ref 0.4–2)
LACTATE BLD-SCNC: 3.92 MMOL/L (ref 0.4–2)
LACTATE BLD-SCNC: 4.76 MMOL/L (ref 0.4–2)
LACTATE SERPL-SCNC: 3 MMOL/L (ref 0.4–2)
LEUKOCYTE ESTERASE UR QL STRIP.AUTO: NEGATIVE
LYMPHOCYTES # BLD: 0.9 K/UL (ref 0.9–3.6)
LYMPHOCYTES NFR BLD: 7 % (ref 21–52)
MCH RBC QN AUTO: 29 PG (ref 24–34)
MCHC RBC AUTO-ENTMCNC: 30 G/DL (ref 31–37)
MCV RBC AUTO: 96.6 FL (ref 74–97)
METHADONE UR QL: NEGATIVE
MONOCYTES # BLD: 1 K/UL (ref 0.05–1.2)
MONOCYTES NFR BLD: 8 % (ref 3–10)
NEUTS SEG # BLD: 10.5 K/UL (ref 1.8–8)
NEUTS SEG NFR BLD: 85 % (ref 40–73)
NITRITE UR QL STRIP.AUTO: NEGATIVE
O2/TOTAL GAS SETTING VFR VENT: 0.5 %
O2/TOTAL GAS SETTING VFR VENT: 100 %
OPIATES UR QL: NEGATIVE
P-R INTERVAL, ECG05: 128 MS
PCO2 BLD: 36.3 MMHG (ref 35–45)
PCO2 BLD: 54.2 MMHG (ref 35–45)
PCP UR QL: NEGATIVE
PEEP RESPIRATORY: 16 CMH2O
PEEP RESPIRATORY: 5 CMH2O
PH BLD: 7.34 [PH] (ref 7.35–7.45)
PH BLD: 7.46 [PH] (ref 7.35–7.45)
PH UR STRIP: 5 [PH] (ref 5–8)
PIP ISTAT,IPIP: 30
PLATELET # BLD AUTO: 141 K/UL (ref 135–420)
PMV BLD AUTO: 10.1 FL (ref 9.2–11.8)
PO2 BLD: 289 MMHG (ref 80–100)
PO2 BLD: 69 MMHG (ref 80–100)
POTASSIUM BLD-SCNC: 5.6 MMOL/L (ref 3.5–5.5)
POTASSIUM BLD-SCNC: 7.3 MMOL/L (ref 3.5–5.5)
POTASSIUM SERPL-SCNC: 4.8 MMOL/L (ref 3.5–5.5)
POTASSIUM SERPL-SCNC: 7.1 MMOL/L (ref 3.5–5.5)
PROT SERPL-MCNC: 8 G/DL (ref 6.4–8.2)
PROT UR STRIP-MCNC: ABNORMAL MG/DL
PROTHROMBIN TIME: 14.1 SEC (ref 11.5–15.2)
Q-T INTERVAL, ECG07: 442 MS
QRS DURATION, ECG06: 76 MS
QTC CALCULATION (BEZET), ECG08: 528 MS
RBC # BLD AUTO: 4.72 M/UL (ref 4.7–5.5)
S PNEUM AG UR QL: NEGATIVE
SAO2 % BLD: 100 % (ref 92–97)
SAO2 % BLD: 95 % (ref 92–97)
SERVICE CMNT-IMP: ABNORMAL
SERVICE CMNT-IMP: ABNORMAL
SODIUM BLD-SCNC: 139 MMOL/L (ref 136–145)
SODIUM BLD-SCNC: 143 MMOL/L (ref 136–145)
SODIUM SERPL-SCNC: 138 MMOL/L (ref 136–145)
SODIUM SERPL-SCNC: 141 MMOL/L (ref 136–145)
SP GR UR REFRACTOMETRY: 1.02 (ref 1–1.03)
SPECIMEN TYPE: ABNORMAL
SPECIMEN TYPE: ABNORMAL
TOTAL RESP. RATE, ITRR: 16
TOTAL RESP. RATE, ITRR: 5
TROPONIN I BLD-MCNC: <0.04 NG/ML (ref 0–0.08)
TROPONIN I SERPL-MCNC: 0.05 NG/ML (ref 0–0.04)
TROPONIN I SERPL-MCNC: 0.07 NG/ML (ref 0–0.04)
TROPONIN I SERPL-MCNC: <0.02 NG/ML (ref 0–0.04)
TSH SERPL DL<=0.05 MIU/L-ACNC: 0.09 UIU/ML (ref 0.36–3.74)
UROBILINOGEN UR QL STRIP.AUTO: 1 EU/DL (ref 0.2–1)
VENTILATION MODE VENT: ABNORMAL
VENTILATION MODE VENT: ABNORMAL
VENTRICULAR RATE, ECG03: 86 BPM
VOLUME CONTROL PLUS IVLCP: YES
VT SETTING VENT: 550 ML
VT SETTING VENT: 550 ML
WBC # BLD AUTO: 12.4 K/UL (ref 4.6–13.2)

## 2019-05-01 PROCEDURE — 87450 LEGIONELLA PNEUMOPHILA AG, URINE: CPT

## 2019-05-01 PROCEDURE — 5A1945Z RESPIRATORY VENTILATION, 24-96 CONSECUTIVE HOURS: ICD-10-PCS | Performed by: EMERGENCY MEDICINE

## 2019-05-01 PROCEDURE — 75810000455 HC PLCMT CENT VENOUS CATH LVL 2 5182

## 2019-05-01 PROCEDURE — 36600 WITHDRAWAL OF ARTERIAL BLOOD: CPT

## 2019-05-01 PROCEDURE — 74011250636 HC RX REV CODE- 250/636: Performed by: EMERGENCY MEDICINE

## 2019-05-01 PROCEDURE — 82803 BLOOD GASES ANY COMBINATION: CPT

## 2019-05-01 PROCEDURE — 77010033678 HC OXYGEN DAILY

## 2019-05-01 PROCEDURE — 74011250636 HC RX REV CODE- 250/636: Performed by: HOSPITALIST

## 2019-05-01 PROCEDURE — 84443 ASSAY THYROID STIM HORMONE: CPT

## 2019-05-01 PROCEDURE — 82550 ASSAY OF CK (CPK): CPT

## 2019-05-01 PROCEDURE — 84484 ASSAY OF TROPONIN QUANT: CPT

## 2019-05-01 PROCEDURE — 82962 GLUCOSE BLOOD TEST: CPT

## 2019-05-01 PROCEDURE — 99285 EMERGENCY DEPT VISIT HI MDM: CPT

## 2019-05-01 PROCEDURE — 80307 DRUG TEST PRSMV CHEM ANLYZR: CPT

## 2019-05-01 PROCEDURE — 87449 NOS EACH ORGANISM AG IA: CPT

## 2019-05-01 PROCEDURE — 74011250636 HC RX REV CODE- 250/636

## 2019-05-01 PROCEDURE — 87040 BLOOD CULTURE FOR BACTERIA: CPT

## 2019-05-01 PROCEDURE — 77030032490 HC SLV COMPR SCD KNE COVD -B

## 2019-05-01 PROCEDURE — 85025 COMPLETE CBC W/AUTO DIFF WBC: CPT

## 2019-05-01 PROCEDURE — 87086 URINE CULTURE/COLONY COUNT: CPT

## 2019-05-01 PROCEDURE — 85610 PROTHROMBIN TIME: CPT

## 2019-05-01 PROCEDURE — 74011636637 HC RX REV CODE- 636/637: Performed by: EMERGENCY MEDICINE

## 2019-05-01 PROCEDURE — 87077 CULTURE AEROBIC IDENTIFY: CPT

## 2019-05-01 PROCEDURE — 96374 THER/PROPH/DIAG INJ IV PUSH: CPT

## 2019-05-01 PROCEDURE — 71250 CT THORAX DX C-: CPT

## 2019-05-01 PROCEDURE — 81001 URINALYSIS AUTO W/SCOPE: CPT

## 2019-05-01 PROCEDURE — 96365 THER/PROPH/DIAG IV INF INIT: CPT

## 2019-05-01 PROCEDURE — 94640 AIRWAY INHALATION TREATMENT: CPT

## 2019-05-01 PROCEDURE — 71045 X-RAY EXAM CHEST 1 VIEW: CPT

## 2019-05-01 PROCEDURE — 51702 INSERT TEMP BLADDER CATH: CPT

## 2019-05-01 PROCEDURE — 96367 TX/PROPH/DG ADDL SEQ IV INF: CPT

## 2019-05-01 PROCEDURE — 74011250636 HC RX REV CODE- 250/636: Performed by: NURSE PRACTITIONER

## 2019-05-01 PROCEDURE — 94002 VENT MGMT INPAT INIT DAY: CPT

## 2019-05-01 PROCEDURE — 74011000250 HC RX REV CODE- 250: Performed by: HOSPITALIST

## 2019-05-01 PROCEDURE — 83605 ASSAY OF LACTIC ACID: CPT

## 2019-05-01 PROCEDURE — 77030005514 HC CATH URETH FOL14 BARD -A

## 2019-05-01 PROCEDURE — 93005 ELECTROCARDIOGRAM TRACING: CPT

## 2019-05-01 PROCEDURE — 82140 ASSAY OF AMMONIA: CPT

## 2019-05-01 PROCEDURE — 74018 RADEX ABDOMEN 1 VIEW: CPT

## 2019-05-01 PROCEDURE — 74011000258 HC RX REV CODE- 258: Performed by: HOSPITALIST

## 2019-05-01 PROCEDURE — 74011250636 HC RX REV CODE- 250/636: Performed by: INTERNAL MEDICINE

## 2019-05-01 PROCEDURE — 77030037877 HC DRSG MEPILEX >48IN BORD MOLN -A

## 2019-05-01 PROCEDURE — 80053 COMPREHEN METABOLIC PANEL: CPT

## 2019-05-01 PROCEDURE — 74011000258 HC RX REV CODE- 258: Performed by: EMERGENCY MEDICINE

## 2019-05-01 PROCEDURE — 74011000250 HC RX REV CODE- 250: Performed by: EMERGENCY MEDICINE

## 2019-05-01 PROCEDURE — 87186 SC STD MICRODIL/AGAR DIL: CPT

## 2019-05-01 PROCEDURE — 36592 COLLECT BLOOD FROM PICC: CPT

## 2019-05-01 PROCEDURE — C1751 CATH, INF, PER/CENT/MIDLINE: HCPCS

## 2019-05-01 PROCEDURE — 70450 CT HEAD/BRAIN W/O DYE: CPT

## 2019-05-01 PROCEDURE — 96375 TX/PRO/DX INJ NEW DRUG ADDON: CPT

## 2019-05-01 PROCEDURE — 77030008771 HC TU NG SALEM SUMP -A

## 2019-05-01 PROCEDURE — 82330 ASSAY OF CALCIUM: CPT

## 2019-05-01 PROCEDURE — C9113 INJ PANTOPRAZOLE SODIUM, VIA: HCPCS | Performed by: HOSPITALIST

## 2019-05-01 PROCEDURE — 02HV33Z INSERTION OF INFUSION DEVICE INTO SUPERIOR VENA CAVA, PERCUTANEOUS APPROACH: ICD-10-PCS | Performed by: EMERGENCY MEDICINE

## 2019-05-01 PROCEDURE — 74011000250 HC RX REV CODE- 250: Performed by: NURSE PRACTITIONER

## 2019-05-01 PROCEDURE — C9113 INJ PANTOPRAZOLE SODIUM, VIA: HCPCS | Performed by: NURSE PRACTITIONER

## 2019-05-01 PROCEDURE — 83880 ASSAY OF NATRIURETIC PEPTIDE: CPT

## 2019-05-01 PROCEDURE — 80047 BASIC METABLC PNL IONIZED CA: CPT

## 2019-05-01 PROCEDURE — 65610000006 HC RM INTENSIVE CARE

## 2019-05-01 RX ORDER — ARFORMOTEROL TARTRATE 15 UG/2ML
15 SOLUTION RESPIRATORY (INHALATION)
Status: DISCONTINUED | OUTPATIENT
Start: 2019-05-01 | End: 2019-05-04 | Stop reason: HOSPADM

## 2019-05-01 RX ORDER — SODIUM BICARBONATE 84 MG/ML
50 INJECTION, SOLUTION INTRAVENOUS
Status: DISCONTINUED | OUTPATIENT
Start: 2019-05-01 | End: 2019-05-01

## 2019-05-01 RX ORDER — MAGNESIUM SULFATE 100 %
4 CRYSTALS MISCELLANEOUS AS NEEDED
Status: DISCONTINUED | OUTPATIENT
Start: 2019-05-01 | End: 2019-05-04 | Stop reason: HOSPADM

## 2019-05-01 RX ORDER — EPINEPHRINE 0.1 MG/ML
0.05 INJECTION INTRACARDIAC; INTRAVENOUS
Status: DISCONTINUED | OUTPATIENT
Start: 2019-05-01 | End: 2019-05-01

## 2019-05-01 RX ORDER — DEXTROSE MONOHYDRATE 25 G/50ML
25-50 INJECTION, SOLUTION INTRAVENOUS AS NEEDED
Status: DISCONTINUED | OUTPATIENT
Start: 2019-05-01 | End: 2019-05-04 | Stop reason: HOSPADM

## 2019-05-01 RX ORDER — ALBUTEROL SULFATE 0.83 MG/ML
5 SOLUTION RESPIRATORY (INHALATION)
Status: COMPLETED | OUTPATIENT
Start: 2019-05-01 | End: 2019-05-01

## 2019-05-01 RX ORDER — LEVOFLOXACIN 5 MG/ML
750 INJECTION, SOLUTION INTRAVENOUS
Status: DISCONTINUED | OUTPATIENT
Start: 2019-05-03 | End: 2019-05-03

## 2019-05-01 RX ORDER — PROPOFOL 10 MG/ML
0-50 VIAL (ML) INTRAVENOUS
Status: DISCONTINUED | OUTPATIENT
Start: 2019-05-01 | End: 2019-05-02

## 2019-05-01 RX ORDER — SODIUM CHLORIDE 0.9 % (FLUSH) 0.9 %
5-10 SYRINGE (ML) INJECTION AS NEEDED
Status: DISCONTINUED | OUTPATIENT
Start: 2019-05-01 | End: 2019-05-04 | Stop reason: HOSPADM

## 2019-05-01 RX ORDER — KETAMINE HYDROCHLORIDE 50 MG/ML
200 INJECTION, SOLUTION INTRAMUSCULAR; INTRAVENOUS ONCE
Status: COMPLETED | OUTPATIENT
Start: 2019-05-01 | End: 2019-05-01

## 2019-05-01 RX ORDER — DEXTROSE MONOHYDRATE 25 G/50ML
25 INJECTION, SOLUTION INTRAVENOUS
Status: COMPLETED | OUTPATIENT
Start: 2019-05-01 | End: 2019-05-01

## 2019-05-01 RX ORDER — IPRATROPIUM BROMIDE 0.5 MG/2.5ML
0.5 SOLUTION RESPIRATORY (INHALATION)
Status: COMPLETED | OUTPATIENT
Start: 2019-05-01 | End: 2019-05-01

## 2019-05-01 RX ORDER — HEPARIN SODIUM 5000 [USP'U]/ML
5000 INJECTION, SOLUTION INTRAVENOUS; SUBCUTANEOUS EVERY 8 HOURS
Status: DISCONTINUED | OUTPATIENT
Start: 2019-05-01 | End: 2019-05-04 | Stop reason: HOSPADM

## 2019-05-01 RX ORDER — INSULIN LISPRO 100 [IU]/ML
INJECTION, SOLUTION INTRAVENOUS; SUBCUTANEOUS EVERY 6 HOURS
Status: DISCONTINUED | OUTPATIENT
Start: 2019-05-01 | End: 2019-05-04 | Stop reason: HOSPADM

## 2019-05-01 RX ORDER — MAGNESIUM SULFATE HEPTAHYDRATE 40 MG/ML
2 INJECTION, SOLUTION INTRAVENOUS ONCE
Status: COMPLETED | OUTPATIENT
Start: 2019-05-01 | End: 2019-05-01

## 2019-05-01 RX ORDER — CALCIUM GLUCONATE 94 MG/ML
INJECTION, SOLUTION INTRAVENOUS
Status: COMPLETED
Start: 2019-05-01 | End: 2019-05-01

## 2019-05-01 RX ORDER — BUDESONIDE 0.5 MG/2ML
500 INHALANT ORAL
Status: DISCONTINUED | OUTPATIENT
Start: 2019-05-01 | End: 2019-05-04 | Stop reason: HOSPADM

## 2019-05-01 RX ORDER — FENTANYL CITRATE 50 UG/ML
100 INJECTION, SOLUTION INTRAMUSCULAR; INTRAVENOUS
Status: COMPLETED | OUTPATIENT
Start: 2019-05-01 | End: 2019-05-01

## 2019-05-01 RX ORDER — NOREPINEPHRINE BIT/0.9 % NACL 8 MG/250ML
2-16 INFUSION BOTTLE (ML) INTRAVENOUS
Status: DISCONTINUED | OUTPATIENT
Start: 2019-05-01 | End: 2019-05-03

## 2019-05-01 RX ORDER — SODIUM CHLORIDE, SODIUM LACTATE, POTASSIUM CHLORIDE, CALCIUM CHLORIDE 600; 310; 30; 20 MG/100ML; MG/100ML; MG/100ML; MG/100ML
125 INJECTION, SOLUTION INTRAVENOUS CONTINUOUS
Status: DISCONTINUED | OUTPATIENT
Start: 2019-05-01 | End: 2019-05-01

## 2019-05-01 RX ORDER — ACETAMINOPHEN 325 MG/1
650 TABLET ORAL
Status: DISCONTINUED | OUTPATIENT
Start: 2019-05-01 | End: 2019-05-04 | Stop reason: HOSPADM

## 2019-05-01 RX ORDER — FENTANYL CITRATE 50 UG/ML
25-50 INJECTION, SOLUTION INTRAMUSCULAR; INTRAVENOUS
Status: DISCONTINUED | OUTPATIENT
Start: 2019-05-01 | End: 2019-05-03

## 2019-05-01 RX ORDER — SODIUM CHLORIDE 9 MG/ML
75 INJECTION, SOLUTION INTRAVENOUS CONTINUOUS
Status: DISCONTINUED | OUTPATIENT
Start: 2019-05-01 | End: 2019-05-03

## 2019-05-01 RX ORDER — LEVOFLOXACIN 5 MG/ML
750 INJECTION, SOLUTION INTRAVENOUS EVERY 24 HOURS
Status: DISCONTINUED | OUTPATIENT
Start: 2019-05-01 | End: 2019-05-01

## 2019-05-01 RX ORDER — ALBUTEROL SULFATE 0.83 MG/ML
5 SOLUTION RESPIRATORY (INHALATION)
Status: DISCONTINUED | OUTPATIENT
Start: 2019-05-01 | End: 2019-05-01

## 2019-05-01 RX ORDER — ONDANSETRON 2 MG/ML
4 INJECTION INTRAMUSCULAR; INTRAVENOUS
Status: DISCONTINUED | OUTPATIENT
Start: 2019-05-01 | End: 2019-05-04 | Stop reason: HOSPADM

## 2019-05-01 RX ORDER — IPRATROPIUM BROMIDE AND ALBUTEROL SULFATE 2.5; .5 MG/3ML; MG/3ML
3 SOLUTION RESPIRATORY (INHALATION)
Status: DISCONTINUED | OUTPATIENT
Start: 2019-05-01 | End: 2019-05-04 | Stop reason: HOSPADM

## 2019-05-01 RX ORDER — VANCOMYCIN 2 GRAM/500 ML IN 0.9 % SODIUM CHLORIDE INTRAVENOUS
2000 ONCE
Status: COMPLETED | OUTPATIENT
Start: 2019-05-01 | End: 2019-05-01

## 2019-05-01 RX ADMIN — NOREPINEPHRINE BITARTRATE 5 MCG/MIN: 1 INJECTION INTRAVENOUS at 05:00

## 2019-05-01 RX ADMIN — SODIUM CHLORIDE 75 ML/HR: 900 INJECTION, SOLUTION INTRAVENOUS at 13:15

## 2019-05-01 RX ADMIN — NOREPINEPHRINE BITARTRATE 2 MCG/MIN: 1 INJECTION INTRAVENOUS at 22:43

## 2019-05-01 RX ADMIN — KETAMINE HYDROCHLORIDE 200 MG: 50 INJECTION, SOLUTION INTRAMUSCULAR; INTRAVENOUS at 04:40

## 2019-05-01 RX ADMIN — FENTANYL CITRATE 50 MCG: 50 INJECTION, SOLUTION INTRAMUSCULAR; INTRAVENOUS at 21:25

## 2019-05-01 RX ADMIN — IPRATROPIUM BROMIDE 0.5 MG: 0.5 SOLUTION RESPIRATORY (INHALATION) at 04:35

## 2019-05-01 RX ADMIN — METHYLPREDNISOLONE SODIUM SUCCINATE 60 MG: 40 INJECTION, POWDER, FOR SOLUTION INTRAMUSCULAR; INTRAVENOUS at 23:58

## 2019-05-01 RX ADMIN — SODIUM CHLORIDE 1000 ML: 900 INJECTION, SOLUTION INTRAVENOUS at 05:20

## 2019-05-01 RX ADMIN — PIPERACILLIN SODIUM,TAZOBACTAM SODIUM 3.38 G: 3; .375 INJECTION, POWDER, FOR SOLUTION INTRAVENOUS at 17:43

## 2019-05-01 RX ADMIN — SODIUM CHLORIDE 40 MG: 9 INJECTION, SOLUTION INTRAMUSCULAR; INTRAVENOUS; SUBCUTANEOUS at 21:25

## 2019-05-01 RX ADMIN — ARFORMOTEROL TARTRATE 15 MCG: 15 SOLUTION RESPIRATORY (INHALATION) at 19:54

## 2019-05-01 RX ADMIN — PIPERACILLIN SODIUM,TAZOBACTAM SODIUM 4.5 G: 4; .5 INJECTION, POWDER, FOR SOLUTION INTRAVENOUS at 06:39

## 2019-05-01 RX ADMIN — FENTANYL CITRATE 100 MCG: 50 INJECTION, SOLUTION INTRAMUSCULAR; INTRAVENOUS at 05:47

## 2019-05-01 RX ADMIN — LEVOFLOXACIN 750 MG: 5 INJECTION, SOLUTION INTRAVENOUS at 07:14

## 2019-05-01 RX ADMIN — EPINEPHRINE 0.05 MG: 0.1 INJECTION, SOLUTION ENDOTRACHEAL; INTRACARDIAC; INTRAVENOUS at 04:53

## 2019-05-01 RX ADMIN — SODIUM CHLORIDE 75 ML/HR: 900 INJECTION, SOLUTION INTRAVENOUS at 21:15

## 2019-05-01 RX ADMIN — CALCIUM GLUCONATE 1000 MG: 94 INJECTION, SOLUTION INTRAVENOUS at 05:18

## 2019-05-01 RX ADMIN — BUDESONIDE 500 MCG: 0.5 INHALANT RESPIRATORY (INHALATION) at 19:54

## 2019-05-01 RX ADMIN — VANCOMYCIN HYDROCHLORIDE 2000 MG: 10 INJECTION, POWDER, LYOPHILIZED, FOR SOLUTION INTRAVENOUS at 07:44

## 2019-05-01 RX ADMIN — SODIUM CHLORIDE 40 MG: 9 INJECTION, SOLUTION INTRAMUSCULAR; INTRAVENOUS; SUBCUTANEOUS at 09:28

## 2019-05-01 RX ADMIN — METHYLPREDNISOLONE SODIUM SUCCINATE 60 MG: 40 INJECTION, POWDER, FOR SOLUTION INTRAMUSCULAR; INTRAVENOUS at 17:43

## 2019-05-01 RX ADMIN — EPINEPHRINE 0.05 MG: 0.1 INJECTION, SOLUTION ENDOTRACHEAL; INTRACARDIAC; INTRAVENOUS at 04:40

## 2019-05-01 RX ADMIN — EPINEPHRINE 0.05 MG: 0.1 INJECTION, SOLUTION ENDOTRACHEAL; INTRACARDIAC; INTRAVENOUS at 05:04

## 2019-05-01 RX ADMIN — METHYLPREDNISOLONE SODIUM SUCCINATE 60 MG: 40 INJECTION, POWDER, FOR SOLUTION INTRAMUSCULAR; INTRAVENOUS at 12:45

## 2019-05-01 RX ADMIN — PIPERACILLIN SODIUM,TAZOBACTAM SODIUM 3.38 G: 3; .375 INJECTION, POWDER, FOR SOLUTION INTRAVENOUS at 12:41

## 2019-05-01 RX ADMIN — SODIUM CHLORIDE, SODIUM LACTATE, POTASSIUM CHLORIDE, AND CALCIUM CHLORIDE 125 ML/HR: 600; 310; 30; 20 INJECTION, SOLUTION INTRAVENOUS at 07:01

## 2019-05-01 RX ADMIN — IPRATROPIUM BROMIDE AND ALBUTEROL SULFATE 3 ML: .5; 3 SOLUTION RESPIRATORY (INHALATION) at 09:01

## 2019-05-01 RX ADMIN — IPRATROPIUM BROMIDE AND ALBUTEROL SULFATE 3 ML: .5; 3 SOLUTION RESPIRATORY (INHALATION) at 20:12

## 2019-05-01 RX ADMIN — ALBUTEROL SULFATE 5 MG: 2.5 SOLUTION RESPIRATORY (INHALATION) at 06:28

## 2019-05-01 RX ADMIN — DEXTROSE MONOHYDRATE 25 G: 25 INJECTION, SOLUTION INTRAVENOUS at 06:31

## 2019-05-01 RX ADMIN — PROPOFOL 20 MCG/KG/MIN: 10 INJECTION, EMULSION INTRAVENOUS at 12:34

## 2019-05-01 RX ADMIN — CALCIUM GLUCONATE 1 G: 98 INJECTION, SOLUTION INTRAVENOUS at 05:00

## 2019-05-01 RX ADMIN — METHYLPREDNISOLONE SODIUM SUCCINATE 125 MG: 125 INJECTION, POWDER, FOR SOLUTION INTRAMUSCULAR; INTRAVENOUS at 04:35

## 2019-05-01 RX ADMIN — HUMAN INSULIN 5 UNITS: 100 INJECTION, SOLUTION SUBCUTANEOUS at 06:34

## 2019-05-01 RX ADMIN — ARFORMOTEROL TARTRATE 15 MCG: 15 SOLUTION RESPIRATORY (INHALATION) at 09:02

## 2019-05-01 RX ADMIN — SODIUM BICARBONATE 50 MEQ: 84 INJECTION, SOLUTION INTRAVENOUS at 05:02

## 2019-05-01 RX ADMIN — HEPARIN SODIUM 5000 UNITS: 5000 INJECTION INTRAVENOUS; SUBCUTANEOUS at 21:15

## 2019-05-01 RX ADMIN — PROPOFOL 5 MCG/KG/MIN: 10 INJECTION, EMULSION INTRAVENOUS at 06:15

## 2019-05-01 RX ADMIN — BUDESONIDE 500 MCG: 0.5 INHALANT RESPIRATORY (INHALATION) at 09:02

## 2019-05-01 RX ADMIN — PROPOFOL 20 MCG/KG/MIN: 10 INJECTION, EMULSION INTRAVENOUS at 22:43

## 2019-05-01 RX ADMIN — IPRATROPIUM BROMIDE AND ALBUTEROL SULFATE 3 ML: .5; 3 SOLUTION RESPIRATORY (INHALATION) at 16:07

## 2019-05-01 RX ADMIN — SODIUM CHLORIDE 1000 ML: 900 INJECTION, SOLUTION INTRAVENOUS at 05:18

## 2019-05-01 RX ADMIN — MAGNESIUM SULFATE HEPTAHYDRATE 2 G: 40 INJECTION, SOLUTION INTRAVENOUS at 06:38

## 2019-05-01 RX ADMIN — PROPOFOL 30 MCG/KG/MIN: 10 INJECTION, EMULSION INTRAVENOUS at 17:49

## 2019-05-01 RX ADMIN — ALBUTEROL SULFATE 5 MG: 2.5 SOLUTION RESPIRATORY (INHALATION) at 04:35

## 2019-05-01 NOTE — CONSULTS
Consult Note    Assessment:   · PRESLEY. Etio- bl pneumonia/septic shock/volume depletion. Doubt progression to ischemic atn given improving renal function. · Hyperkalemia in context of presley. Improving. · Bl pneumonia/septic shock. On abx. · COPD. On breathing treatments/steroids. · Resp failure. · UDS pos for cocaine. Recommendations:   · Continue ivf, change to NS (LR does contain small amount of K). · Continue pressors to keep map 65-70. · Obtain urine Na/creat. · Avoid NSAID's, IV dye. · Avoid Gadolinium due to its association with nephrogenic systemic fibrosis in a patients with severe ARF and ESRD. · Avoid fleets enemas due to concern for acute phosphate nephropathy. · Please dose all medications for approximate creatinine clearance   30-15. Thank you. Consult requested by: Eric Larry MD    ADMIT DATE: 5/1/2019  CONSULT DATE: May 1, 2019           Late entry, patient was seen at 9:00 am.       Admission diagnosis: Acute respiratory failure [facility])   Reason for Nephrology Consultation: PRESLEY, hyperkalemia. HPI: Vitor Rene is a 77 y.o. male [address] 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 H/o is based on medical records. EMS was called due to patient's mother being unable to contact him. Upon there arrival patient was in resp distress, he was emergently intubates. On arrival to ED patient was hypotensive, was started on ivf, pressors, abx (with bl pulm infiltrates being the potential source). No prior h/o of kidney disease. On arrival scr was up to 3.69, later today it is better at 2.9. K was 7.1 on admission, with insulin/D50/albuterol/Nabicarb/Ca gluconate K is down to 4.8.        Past Medical History:   Diagnosis Date    Arthritis     COPD     Hypertension       Past Surgical History:   Procedure Laterality Date    HX REFRACTIVE SURGERY         Social History     Socioeconomic History    Marital status: SINGLE     Spouse name: Not on file    Number of children: Not on file    Years of education: Not on file    Highest education level: Not on file   Occupational History    Not on file   Social Needs    Financial resource strain: Not on file    Food insecurity:     Worry: Not on file     Inability: Not on file    Transportation needs:     Medical: Not on file     Non-medical: Not on file   Tobacco Use    Smoking status: Former Smoker     Last attempt to quit: 1984     Years since quittin.9    Smokeless tobacco: Former User   Substance and Sexual Activity    Alcohol use: Yes     Comment: sometimes    Drug use: Yes     Types: Heroin, Marijuana, Opiates     Comment: Uses opiates for pain    Sexual activity: Not on file   Lifestyle    Physical activity:     Days per week: Not on file     Minutes per session: Not on file    Stress: Not on file   Relationships    Social connections:     Talks on phone: Not on file     Gets together: Not on file     Attends Alevism service: Not on file     Active member of club or organization: Not on file     Attends meetings of clubs or organizations: Not on file     Relationship status: Not on file    Intimate partner violence:     Fear of current or ex partner: Not on file     Emotionally abused: Not on file     Physically abused: Not on file     Forced sexual activity: Not on file   Other Topics Concern    Not on file   Social History Narrative    Not on file       No family history on file. Allergies   Allergen Reactions    Shellfish Derived Hives        Home Medications:     Medications Prior to Admission   Medication Sig    bumetanide (BUMEX) 1 mg tablet Take 1 Tab by mouth two (2) times a day.  pantoprazole (PROTONIX) 40 mg tablet Take 1 Tab by mouth daily.  traMADol (ULTRAM) 50 mg tablet Take 1 Tab by mouth every six (6) hours as needed. Max Daily Amount: 200 mg.     acetaminophen (TYLENOL) 325 mg tablet Take 2 Tabs by mouth every four (4) hours as needed for Pain.  gabapentin (NEURONTIN) 400 mg capsule Take 400 mg by mouth three (3) times daily.  ipratropium-albuterol (COMBIVENT RESPIMAT)  mcg/actuation inhaler Take 1 Puff by inhalation every twelve (12) hours. Indications: Chronic Obstructive Pulmonary Disease with Bronchospasms    simvastatin (ZOCOR) 10 mg tablet Take  by mouth nightly.        Current Inpatient Medications:     Current Facility-Administered Medications   Medication Dose Route Frequency    sodium chloride (NS) flush 5-10 mL  5-10 mL IntraVENous PRN    propofol (DIPRIVAN) infusion  0-50 mcg/kg/min IntraVENous TITRATE    VANCOMYCIN INFORMATION NOTE   Other Rx Dosing/Monitoring    NOREPINephrine (LEVOPHED) 8 mg in 0.9% NS 250ml infusion  2-16 mcg/min IntraVENous TITRATE    lactated Ringers infusion  125 mL/hr IntraVENous CONTINUOUS    methylPREDNISolone (PF) (SOLU-MEDROL) injection 60 mg  60 mg IntraVENous Q6H    albuterol-ipratropium (DUO-NEB) 2.5 MG-0.5 MG/3 ML  3 mL Nebulization Q4H RT    budesonide (PULMICORT) 500 mcg/2 ml nebulizer suspension  500 mcg Nebulization BID RT    arformoterol (BROVANA) neb solution 15 mcg  15 mcg Nebulization BID RT    insulin lispro (HUMALOG) injection   SubCUTAneous Q6H    glucose chewable tablet 16 g  4 Tab Oral PRN    glucagon (GLUCAGEN) injection 1 mg  1 mg IntraMUSCular PRN    dextrose (D50) infusion 12.5-25 g  25-50 mL IntraVENous PRN    pantoprazole (PROTONIX) 40 mg in sodium chloride 0.9% 10 mL injection  40 mg IntraVENous Q12H    fentaNYL citrate (PF) injection 25-50 mcg  25-50 mcg IntraVENous Q2H PRN    [START ON 5/2/2019] VANCOMYCIN INFORMATION NOTE   Other ONCE    [START ON 5/3/2019] levoFLOXacin (LEVAQUIN) 750 mg in D5W IVPB  750 mg IntraVENous Q48H    piperacillin-tazobactam (ZOSYN) 3.375 g in 0.9% sodium chloride (MBP/ADV) 100 mL MBP  3.375 g IntraVENous ONCE    Followed by    piperacillin-tazobactam (ZOSYN) 3.375 g in 0.9% sodium chloride (MBP/ADV) 100 mL MBP  #EXTENDED 4-HOUR INFUSION##  3.375 g IntraVENous Q8H       Review of Systems:   Unobtainable. Physical Assessment:     Vitals:    05/01/19 1100 05/01/19 1102 05/01/19 1110 05/01/19 1213   BP: 109/58 110/68 112/56    Pulse: 78 78 79 81   Resp:  18  16   Temp: 99.3 °F (37.4 °C) 99.1 °F (37.3 °C) 99.4 °F (37.4 °C)    SpO2: 95% 96% 95% 96%   Weight:         Last 3 Recorded Weights in this Encounter    05/01/19 0413   Weight: 114.7 kg (252 lb 14.4 oz)     Admission weight: Weight: 114.7 kg (252 lb 14.4 oz) (05/01/19 0413)      Intake/Output Summary (Last 24 hours) at 5/1/2019 1249  Last data filed at 5/1/2019 0705  Gross per 24 hour   Intake 100 ml   Output    Net 100 ml       Intubated, sedated. HEENT: Head is normocephalic and atraumatic. Pupils are round, equal, reactive to light. Sclerae are anicteric. ET tube in place. Neck: no cervical lymphadenopathy or thyromegaly. Lungs: diffusely diminished air entry, bl exp rhonchi. Trachea at the midline. Cardiovascular system: S1, S2, regular rate and rhythm. No murmurs, gallops or rubs. No jvd. Carotid upstroke 2 + bilaterally. Abdomen: soft, non tender, non distended. Positive bowel sounds. No hepatosplenomegaly. No abdominal bruits. Extremities: moderate finger  clubbing, no cyanosis or edema. 1+ dorsalis pedis pulses. Delayed capillary refill on the toes bilaterally. Integumentary: skin is grossly intact. Neurologic: intubated, sedated, moves all 4 extremities spontaneously.      Data Review:    Labs: Results:       Chemistry Recent Labs     05/01/19  1020 05/01/19  0440   * 100*    138   K 4.8 7.1*    101   CO2 26 31   BUN 31* 32*   CREA 2.90* 3.69*   CA 8.1* 8.2*   AGAP 9 6   BUCR 11* 9*   AP  --  106   TP  --  8.0   ALB  --  3.1*   GLOB  --  4.9*   AGRAT  --  0.6*         CBC w/Diff Recent Labs     05/01/19  0440   WBC 12.4   RBC 4.72   HGB 13.7   HCT 45.6      GRANS 85*   LYMPH 7*   EOS 0         Iron/Ferritin No results for input(s): IRON in the last 72 hours. No lab exists for component: TIBCCALC   PTH/VIT D No results for input(s): PTH in the last 72 hours.     No lab exists for component: VITD           Efren Peters M.D  [facility]  Office [phone]  Pager [phone]    May 1, 2019

## 2019-05-01 NOTE — PROGRESS NOTES
Called to the bedside to place patient on the ventilator. [id number] Transported patient to and from CT. Transport went well.

## 2019-05-01 NOTE — PROGRESS NOTES
Physical Exam  
Skin:  
 
  
  
 
Primary Nurse Mickey West RN and Kimberly Roland RN performed a dual skin assessment on this patient impairment noted Raul score is 10

## 2019-05-01 NOTE — ED NOTES
TRANSFER - ED to INPATIENT REPORT: 
 
SBAR report made available to receiving floor on this patient being transferred to  [phone]  for routine progression of care Admitting diagnosis Sepsis ([facility] [address] [A41.9] Acute respiratory failure ([facility] [address] [J96.00] Information from the following report(s) SBAR was made available to receiving floor. Lines:  
Triple Lumen 05/01/19 Right Femoral (Active) Peripheral IV 05/01/19 Left External jugular (Active) Peripheral IV 05/01/19 Right External jugular (Active) Medication list unable to confirm Opportunity for questions and clarification was provided. Patient is disoriented Sepsis summary   * Patient is  incontinent and non-ambulatory Valuables transported with patient Patient transported with: 
 Monitor O2 @ vent liters Registered Nurse MAP (Monitor): 70 =Monitored (most recent) Vitals w/ MEWS Score (last day) Date/Time MEWS Score Pulse Resp Temp BP Level of Consciousness SpO2  
 05/01/19 1050    78    99.2 °F (37.3 °C)  115/57    95 % 05/01/19 1040    78    99.1 °F (37.3 °C)  113/58    96 % 05/01/19 1030    77    99 °F (37.2 °C)  115/58    95 % 05/01/19 1020    77    98.9 °F (37.2 °C)  118/58    96 % 05/01/19 1010    77    98.8 °F (37.1 °C)  114/61    96 % 05/01/19 1000    76    98.8 °F (37.1 °C)  120/63    95 % 05/01/19 0950    77    98.7 °F (37.1 °C)  115/63    96 % 05/01/19 0940    77    98.7 °F (37.1 °C)  116/60    97 % 05/01/19 0930    78    98.7 °F (37.1 °C)  112/61    97 % 05/01/19 0920    79    98.7 °F (37.1 °C)  109/58    97 % 05/01/19 0910    79    98.7 °F (37.1 °C)  101/56    96 % 05/01/19 0900    80    98.6 °F (37 °C)  100/52    96 % 05/01/19 0854    77  16        98 % 05/01/19 0800    83    98.2 °F (36.8 °C)  114/61    94 % 05/01/19 0750    82    98.1 °F (36.7 °C)  123/66    96 % 05/01/19 0741    82    98 °F (36.7 °C)  116/64    95 % 05/01/19 0735    85    97.9 °F (36.6 °C)  91/71    95 % 05/01/19 0730    85    97.8 °F (36.6 °C)  115/66    94 % 05/01/19 0720    86    97.7 °F (36.5 °C)  121/58    95 % 05/01/19 0715    84    97.6 °F (36.4 °C)  131/107  (Abnormal)     96 % 05/01/19 0710    85    97.6 °F (36.4 °C)  123/85    93 % 05/01/19 0705    85    97.5 °F (36.4 °C)  101/66    93 % 05/01/19 0700    86    97.5 °F (36.4 °C)  121/72    93 % 05/01/19 0655    85    97.5 °F (36.4 °C)  127/44    94 % 05/01/19 0650    86    97.5 °F (36.4 °C)  125/61    92 % 05/01/19 0645    84    97.5 °F (36.4 °C)  129/65    96 % 05/01/19 0640    86    97.5 °F (36.4 °C)  133/81      
 05/01/19 0635    88    97.6 °F (36.4 °C)        
 05/01/19 0630    88    97.5 °F (36.4 °C)  128/52      
 05/01/19 0625    87    97.5 °F (36.4 °C)  124/83      
 05/01/19 0620    91    97.5 °F (36.4 °C)  118/78      
 05/01/19 0615    90    97.4 °F (36.3 °C)  129/68      
 05/01/19 0610    87    97.3 °F (36.3 °C)  153/60      
 05/01/19 0535    84    97.6 °F (36.4 °C)  141/77      
 05/01/19 0530    83    97.8 °F (36.6 °C)  125/68      
 05/01/19 0525    83    98.1 °F (36.7 °C)  120/67      
 05/01/19 0520    83    98.3 °F (36.8 °C)  92/54      
 05/01/19 0515    86    98.4 °F (36.9 °C)  71/41  (Abnormal)       
 05/01/19 0510    88    98.5 °F (36.9 °C)  69/38  (Abnormal)       
 05/01/19 0505    97    98.7 °F (37.1 °C)  143/67      
 05/01/19 0504      16          
 05/01/19 0500    85    98.7 °F (37.1 °C)  50/31  (Abnormal)     100 % 05/01/19 0455    98    98.7 °F (37.1 °C)  66/36  (Abnormal)       
 05/01/19 0450    90    98.6 °F (37 °C)  60/40  (Abnormal)       
 05/01/19 0445    100    36.9 °F (2.7 °C)  (Abnormal)       100 % 05/01/19 0440    115  (Abnormal)     36.8 °F (2.7 °C)  (Abnormal)   152/71      
 05/01/19 0435    102  (Abnormal)     36.5 °F (2.5 °C)  (Abnormal)   48/34  (Abnormal)       
 05/01/19 0430    114  (Abnormal)       84/57  (Abnormal)       
 05/01/19 0425    125  (Abnormal)             
 05/01/19 0420    129  (Abnormal)   12    93/74      
 05/01/19 0415    128  (Abnormal)            Septic Patients:  
 
Lactic Acid Lab Results Component Value Date LACPOC 4.76 ([facility]) 05/01/2019 LACPOC 3.92 ([facility]) 05/01/2019  
 (Most recent on top) Repeat drawn: YES Time: now ALL LACTIC ACIDS GREATER THAN 2 MUST BE REPEATED POC WITHIN 4 HOURS OR PRIOR TO ADMISSION Total Fluid Bolus initiated and documented on MAR: YES All ordered antibiotics initiated within first 3 hours of TIME ZERO?   YES

## 2019-05-01 NOTE — PROGRESS NOTES
Pharmacy Dosing Services: Vancomycin Indication: Sepsis Day of therapy: 1 Other Antimicrobials (Include dose, start day & day of therapy): Levofloxacin 750 mg every 24 hours Zosyn 4.5 gm every 6 hours Loading dose (date given): 2000 mg  
Current Maintenance dose: new start Goal Vancomycin Level: 15-20 
(Trough 15-20 for most infections, 20 for meningitis/osteomyelitis, pre-HD level ~25) Vancomycin Level (if drawn): new start Significant Cultures: pending Renal function stable? (unstable defined as SCr increase of 0.5 mg/dL or > 50% increase from baseline, whichever is greater) (Y/N): N  
 
CAPD, Hemodialysis or Renal Replacement Therapy (Y/N): N Recent Labs 19 
0440 CREA 3.69* BUN 32* WBC 12.4 Temp (24hrs), Av.3 °F (33.5 °C), Min:36.5 °F (2.5 °C), Max:98.7 °F (37.1 °C) Creatinine Clearance (Creatinine Clearance (ml/min)): 30 mL/min Regimen assessment: - PRESLEY, vancomycin 2 gm loading once, then dosing per level  
- will change levofloxacin to every 48 hours Maintenance dose: dosing per level Next scheduled level: random  at 0400 Pharmacy will follow daily and adjust medications as appropriate for renal function and/or serum levels. Thank you, MART Griffiht

## 2019-05-01 NOTE — H&P
[address] [place], [place] [address] History & Physical 
PatientJenna Pierce MRN: [id number]  CSN: [id number] YOB: 1950  Age: 77 y.o. Sex: male DOA: 2019 LOS:  LOS: 0 days DOA: 2019 Assessment/Plan Active Problems: 
  Acute respiratory failure ([facility] [address] (2019) Sepsis ([facility] [address] (2019) Plan: 1. Acute Hypoxic Resp failure - Intubated - on MV ,PCCM consulted 2. Sepsis - ? Related to ground glass opacities - PNA - Broad spectrum IV Abx 3. Acute Renal failure  - Nephrology consulted - IVF - monitor I&O's  
4. Acute Metab encephalopathy - unclear etiology 5. Hyperkalemia - Rx in the ER - will repeat BMP now 6. UDS + for cocaine DVT Px - Heparin FC  
 
 
 
 
 
 
 
HPI:  
 
Debby Corral is a 77 y.o. male who is currently being admitted to the ICU - pt was found with AMS barely breathing by EMS - intubated in the field - brought to the ER Per ER chart review - EMS called by pt's mother since she hadnt heard from him in a few hrs Pt is unable to give any info ER eval - Sepsis with Acute Hypoxic Resp failure , Metab Encephalopathy, ARF , Hyperkalemia Pt is currently being admitted to the ICU Past Medical History:  
Diagnosis Date  Arthritis  COPD  Hypertension Past Surgical History:  
Procedure Laterality Date  HX REFRACTIVE SURGERY No family history on file. Social History Socioeconomic History  Marital status: SINGLE Spouse name: Not on file  Number of children: Not on file  Years of education: Not on file  Highest education level: Not on file Tobacco Use  Smoking status: Former Smoker Last attempt to quit: 1984 Years since quittin.9  Smokeless tobacco: Former User Substance and Sexual Activity  Alcohol use: Yes Comment: sometimes  Drug use: Yes Types: Heroin, Marijuana, Opiates Comment: Uses opiates for pain Prior to Admission medications Medication Sig Start Date End Date Taking? Authorizing Provider  
bumetanide (BUMEX) 1 mg tablet Take 1 Tab by mouth two (2) times a day. 2/22/19   Virgie SOLORZANO DO  
pantoprazole (PROTONIX) 40 mg tablet Take 1 Tab by mouth daily. 2/23/19   Steve Celeste DO  
traMADol Alver Galas) 50 mg tablet Take 1 Tab by mouth every six (6) hours as needed. Max Daily Amount: 200 mg. 2/22/19   Steve Celeste DO  
acetaminophen (TYLENOL) 325 mg tablet Take 2 Tabs by mouth every four (4) hours as needed for Pain. 1/21/19   Rahul Shetty MD  
gabapentin (NEURONTIN) 400 mg capsule Take 400 mg by mouth three (3) times daily. Kim Sanchez MD  
ipratropium-albuterol (COMBIVENT RESPIMAT)  mcg/actuation inhaler Take 1 Puff by inhalation every twelve (12) hours. Indications: Chronic Obstructive Pulmonary Disease with Bronchospasms    Kim Sanchez MD  
simvastatin (ZOCOR) 10 mg tablet Take  by mouth nightly. Kim Sanchez MD  
 
 
Allergies Allergen Reactions  Shellfish Derived Hives Review of Systems Review of systems not obtained due to patient factors. Physical Exam:  
  
Visit Vitals BP (!) 69/38 Pulse 88 Temp 98.5 °F (36.9 °C) Resp 16 Wt 114.7 kg (252 lb 14.4 oz) BMI 34.30 kg/m² Physical Exam: 
 
Gen: In general, this is a well nourished male in no acute distress HEENT: Sclerae nonicteric. Oral mucous membranes dry. Dentition poor Neck: Supple with midline trachea. CV: RRR without murmur or rub appreciated. Resp:Respirations are unlabored without use of accessory muscles. Lung fields B/L with decreased BS in bases Abd: Soft, nontender, nondistended. Extrem: Extremities are warm, without cyanosis or clubbing. No pitting pretibial edema Skin: Warm, no visible rashes. Neuro: Patient is sedated Labs Reviewed: 
 
Recent Results (from the past 24 hour(s)) AMMONIA  Collection Time: 05/01/19  4:36 AM  
 Result Value Ref Range Ammonia 72 (H) 11 - 32 UMOL/L  
METABOLIC PANEL, COMPREHENSIVE Collection Time: 05/01/19  4:40 AM  
Result Value Ref Range Sodium 138 136 - 145 mmol/L Potassium 7.1 (HH) 3.5 - 5.5 mmol/L Chloride 101 100 - 108 mmol/L  
 CO2 31 21 - 32 mmol/L Anion gap 6 3.0 - 18 mmol/L Glucose 100 (H) 74 - 99 mg/dL BUN 32 (H) 7.0 - 18 MG/DL Creatinine 3.69 (H) 0.6 - 1.3 MG/DL  
 BUN/Creatinine ratio 9 (L) 12 - 20 GFR est AA 20 (L) >60 ml/min/1.73m2 GFR est non-AA 16 (L) >60 ml/min/1.73m2 Calcium 8.2 (L) 8.5 - 10.1 MG/DL Bilirubin, total 1.0 0.2 - 1.0 MG/DL  
 ALT (SGPT) 19 16 - 61 U/L  
 AST (SGOT) 24 15 - 37 U/L Alk. phosphatase 106 45 - 117 U/L Protein, total 8.0 6.4 - 8.2 g/dL Albumin 3.1 (L) 3.4 - 5.0 g/dL Globulin 4.9 (H) 2.0 - 4.0 g/dL A-G Ratio 0.6 (L) 0.8 - 1.7    
CBC WITH AUTOMATED DIFF Collection Time: 05/01/19  4:40 AM  
Result Value Ref Range WBC 12.4 4.6 - 13.2 K/uL  
 RBC 4.72 4.70 - 5.50 M/uL  
 HGB 13.7 13.0 - 16.0 g/dL HCT 45.6 36.0 - 48.0 % MCV 96.6 74.0 - 97.0 FL  
 MCH 29.0 24.0 - 34.0 PG  
 MCHC 30.0 (L) 31.0 - 37.0 g/dL  
 RDW 13.7 11.6 - 14.5 % PLATELET 699 066 - 354 K/uL MPV 10.1 9.2 - 11.8 FL  
 NEUTROPHILS 85 (H) 40 - 73 % LYMPHOCYTES 7 (L) 21 - 52 % MONOCYTES 8 3 - 10 % EOSINOPHILS 0 0 - 5 % BASOPHILS 0 0 - 2 %  
 ABS. NEUTROPHILS 10.5 (H) 1.8 - 8.0 K/UL  
 ABS. LYMPHOCYTES 0.9 0.9 - 3.6 K/UL  
 ABS. MONOCYTES 1.0 0.05 - 1.2 K/UL  
 ABS. EOSINOPHILS 0.0 0.0 - 0.4 K/UL  
 ABS. BASOPHILS 0.0 0.0 - 0.1 K/UL  
 DF AUTOMATED    
ETHYL ALCOHOL Collection Time: 05/01/19  4:40 AM  
Result Value Ref Range ALCOHOL(ETHYL),SERUM <3 0 - 3 MG/DL  
NT-PRO BNP Collection Time: 05/01/19  4:40 AM  
Result Value Ref Range NT pro-BNP 46,748 (H) 0 - 900 PG/ML  
PROTHROMBIN TIME + INR Collection Time: 05/01/19  4:40 AM  
Result Value Ref Range Prothrombin time 14.1 11.5 - 15.2 sec INR 1.1 0.8 - 1.2 CARDIAC PANEL,(CK, CKMB & TROPONIN) Collection Time: 05/01/19  4:40 AM  
Result Value Ref Range  39 - 308 U/L  
 CK - MB 4.8 (H) <3.6 ng/ml CK-MB Index 1.6 0.0 - 4.0 % Troponin-I, QT 0.07 (H) 0.0 - 0.045 NG/ML  
POC CHEM8 Collection Time: 05/01/19  4:52 AM  
Result Value Ref Range CO2, POC 32 (H) 19 - 24 MMOL/L Glucose,  74 - 106 MG/DL  
 BUN, POC 36 (H) 7 - 18 MG/DL Creatinine, POC 3.7 (H) 0.6 - 1.3 MG/DL  
 GFRAA, POC 20 (L) >60 ml/min/1.73m2 GFRNA, POC 16 (L) >60 ml/min/1.73m2 Sodium,  136 - 145 MMOL/L Potassium, POC 7.3 (HH) 3.5 - 5.5 MMOL/L Calcium, ionized (POC) 1.04 (L) 1.12 - 1.32 mmol/L Chloride,  100 - 108 MMOL/L Anion gap, POC 14 10 - 20 Hematocrit, POC 45 36 - 49 % Hemoglobin, POC 15.3 12 - 16 G/DL POC LACTIC ACID Collection Time: 05/01/19  4:53 AM  
Result Value Ref Range Lactic Acid (POC) 3.92 (HH) 0.40 - 2.00 mmol/L  
POC TROPONIN-I Collection Time: 05/01/19  5:09 AM  
Result Value Ref Range Troponin-I (POC) <0.04 0.00 - 0.08 ng/mL URINALYSIS W/ RFLX MICROSCOPIC Collection Time: 05/01/19  5:12 AM  
Result Value Ref Range Color DARK YELLOW Appearance CLOUDY Specific gravity 1.019 1.005 - 1.030    
 pH (UA) 5.0 5.0 - 8.0 Protein TRACE (A) NEG mg/dL Glucose NEGATIVE  NEG mg/dL Ketone NEGATIVE  NEG mg/dL Bilirubin NEGATIVE  NEG Blood NEGATIVE  NEG Urobilinogen 1.0 0.2 - 1.0 EU/dL Nitrites NEGATIVE  NEG Leukocyte Esterase NEGATIVE  NEG    
DRUG SCREEN, URINE Collection Time: 05/01/19  5:12 AM  
Result Value Ref Range BENZODIAZEPINES NEGATIVE  NEG    
 BARBITURATES NEGATIVE  NEG    
 THC (TH-CANNABINOL) NEGATIVE  NEG    
 OPIATES NEGATIVE  NEG    
 PCP(PHENCYCLIDINE) NEGATIVE  NEG    
 COCAINE POSITIVE (A) NEG    
 AMPHETAMINES NEGATIVE  NEG METHADONE NEGATIVE  NEG HDSCOM (NOTE) EKG, 12 LEAD, INITIAL Collection Time: 05/01/19  5:18 AM  
Result Value Ref Range Ventricular Rate 86 BPM  
 Atrial Rate 86 BPM  
 P-R Interval 128 ms QRS Duration 76 ms  
 Q-T Interval 442 ms QTC Calculation (Bezet) 528 ms Calculated P Axis 50 degrees Calculated R Axis -26 degrees Calculated T Axis -31 degrees Diagnosis Normal sinus rhythm Possible Left atrial enlargement T wave abnormality, consider anterior ischemia Prolonged QT Abnormal ECG When compared with ECG of 21-JAN-2019 01:08, 
QRS voltage has decreased T wave inversion now evident in Inferior leads T wave inversion now evident in Anterior leads POC G3 Collection Time: 05/01/19  5:38 AM  
Result Value Ref Range Device: VENT    
 FIO2 (POC) 100 % pH (POC) 7.338 (L) 7.35 - 7.45    
 pCO2 (POC) 54.2 (H) 35.0 - 45.0 MMHG  
 pO2 (POC) 289 (H) 80 - 100 MMHG  
 HCO3 (POC) 29.4 (H) 22 - 26 MMOL/L  
 sO2 (POC) 100 (H) 92 - 97 % Base excess (POC) 3 mmol/L Mode ASSIST CONTROL Tidal volume 550 ml Set Rate 16 bpm  
 PEEP/CPAP (POC) 16 cmH2O Allens test (POC) N/A Total resp. rate 5 Site LEFT RADIAL Patient temp. 97.0 Specimen type (POC) ARTERIAL Performed by Mary Teran Volume control plus YES    
POC CHEM8 Collection Time: 05/01/19  6:18 AM  
Result Value Ref Range CO2, POC 28 (H) 19 - 24 MMOL/L Glucose,  74 - 106 MG/DL  
 BUN, POC 32 (H) 7 - 18 MG/DL Creatinine, POC 3.3 (H) 0.6 - 1.3 MG/DL  
 GFRAA, POC 23 (L) >60 ml/min/1.73m2 GFRNA, POC 19 (L) >60 ml/min/1.73m2 Sodium,  136 - 145 MMOL/L Potassium, POC 5.6 (H) 3.5 - 5.5 MMOL/L Calcium, ionized (POC) 1.09 (L) 1.12 - 1.32 mmol/L Chloride,  100 - 108 MMOL/L Anion gap, POC 18 10 - 20 Hematocrit, POC 44 36 - 49 % Hemoglobin, POC 15.0 12 - 16 G/DL Imaging Reviewed: CTA Chest Abd & Pelvis Reviewed - B/L consolidations Lung bases Reesa Alberta, MD 
 5/1/2019, 7:30 AM

## 2019-05-01 NOTE — ED PROVIDER NOTES
Cheryl Rudd is a 77 y.o. Male with history of COPD who was found unresponsive by EMS after his mother called she has not seen him in several hours. Patient was apparently breathing per EMS and did not give much history patient was emergently intubated with etomidate and rocuronium without difficulty. Patient was also given Versed just prior to arrival as well. Patient is not able to give any history due to his intubated status The history is provided by the EMS personnel and medical records. The history is limited by the condition of the patient. Past Medical History:  
Diagnosis Date  Arthritis  COPD  Hypertension Past Surgical History:  
Procedure Laterality Date  HX REFRACTIVE SURGERY No family history on file. Social History Socioeconomic History  Marital status: SINGLE Spouse name: Not on file  Number of children: Not on file  Years of education: Not on file  Highest education level: Not on file Occupational History  Not on file Social Needs  Financial resource strain: Not on file  Food insecurity:  
  Worry: Not on file Inability: Not on file  Transportation needs:  
  Medical: Not on file Non-medical: Not on file Tobacco Use  Smoking status: Former Smoker Last attempt to quit: 1984 Years since quittin.9  Smokeless tobacco: Former User Substance and Sexual Activity  Alcohol use: Yes Comment: sometimes  Drug use: Yes Types: Heroin, Marijuana, Opiates Comment: Uses opiates for pain  Sexual activity: Not on file Lifestyle  Physical activity:  
  Days per week: Not on file Minutes per session: Not on file  Stress: Not on file Relationships  Social connections:  
  Talks on phone: Not on file Gets together: Not on file Attends Religion service: Not on file Active member of club or organization: Not on file Attends meetings of clubs or organizations: Not on file Relationship status: Not on file  Intimate partner violence:  
  Fear of current or ex partner: Not on file Emotionally abused: Not on file Physically abused: Not on file Forced sexual activity: Not on file Other Topics Concern  Not on file Social History Narrative  Not on file ALLERGIES: Shellfish derived Review of Systems Unable to perform ROS: Intubated Vitals:  
 05/01/19 6389 05/01/19 5750 05/01/19 0505 05/01/19 0510 BP:   143/67 (!) 69/38 Pulse: (!) 123  97 88 Resp: 12 16 Temp:   98.7 °F (37.1 °C) 98.5 °F (36.9 °C) Weight:      
      
 
Physical Exam  
Constitutional: He appears well-developed and well-nourished. Non-toxic appearance. He has a sickly appearance. He appears ill. He appears distressed. He is intubated. HENT:  
Head: Normocephalic and atraumatic. Right Ear: External ear normal.  
Left Ear: External ear normal.  
Nose: Nose normal.  
Mouth/Throat: Oropharynx is clear and moist. No oropharyngeal exudate. Eyes: Conjunctivae are normal.  
Neck: Normal range of motion. Cardiovascular: Normal rate, regular rhythm, normal heart sounds and intact distal pulses. Pulmonary/Chest: He is intubated. He is in respiratory distress. He has decreased breath sounds. He has rhonchi. He has rales. Abdominal: Soft. There is no tenderness. Obese Musculoskeletal: Normal range of motion. He exhibits edema. Neurological: GCS eye subscore is 1. GCS verbal subscore is 1. GCS motor subscore is 1. Skin: Skin is warm and dry. Capillary refill takes less than 2 seconds. He is not diaphoretic. Nursing note and vitals reviewed. [facility] 
Date/Time: 5/1/2019 5:17 AM 
Performed by: Maryann Gonzalez MD 
Authorized by: Maryann Gonzalez MD  
 
Consent:  
  Consent obtained:  Emergent situation Pre-procedure details:  
  Skin preparation:  ChloraPrep Anesthesia (see MAR for exact dosages): Anesthesia method:  Local infiltration Local anesthetic:  Lidocaine 1% w/o epi Procedure details: Location:  R femoral 
  Patient position:  Flat Procedural supplies:  Triple lumen Catheter size:  7 Fr Landmarks identified: yes Ultrasound guidance: yes Sterile ultrasound techniques: Sterile gel and sterile probe covers were used Number of attempts:  1 Successful placement: yes Post-procedure details: Post-procedure:  Dressing applied and line sutured Assessment:  Blood return through all ports, free fluid flow and placement verified by x-ray Patient tolerance of procedure: Tolerated well, no immediate complications Vitals: 
Patient Vitals for the past 12 hrs: 
 Temp Pulse Resp BP  
05/01/19 0510 98.5 °F (36.9 °C) 88  (!) 69/38  
05/01/19 0505 98.7 °F (37.1 °C) 97  143/67  
05/01/19 0504   16   
05/01/19 0420  (!) 123 12  Medications ordered:  
Medications  
sodium chloride (NS) flush 5-10 mL (has no administration in time range)  
albuterol (PROVENTIL VENTOLIN) nebulizer solution 5 mg (has no administration in time range)  
ipratropium (ATROVENT) 0.02 % nebulizer solution 0.5 mg (has no administration in time range)  
magnesium sulfate 2 g/50 ml IVPB (premix or compounded) (has no administration in time range) methylPREDNISolone (PF) (Solu-MEDROL) injection 125 mg (has no administration in time range) ketamine (KETALAR) 50 mg/mL injection 200 mg (has no administration in time range) propofol (DIPRIVAN) infusion (has no administration in time range)  
piperacillin-tazobactam (ZOSYN) 4.5 g in 0.9% sodium chloride (MBP/ADV) 100 mL MBP (has no administration in time range) Followed by  
piperacillin-tazobactam (ZOSYN) 4.5 g in 0.9% sodium chloride (MBP/ADV) 100 mL MBP (has no administration in time range)  
vancomycin (VANCOCIN) 2000 mg in  ml infusion (has no administration in time range) levoFLOXacin (LEVAQUIN) 750 mg in D5W IVPB (has no administration in time range) VANCOMYCIN INFORMATION NOTE (has no administration in time range) NOREPINephrine (LEVOPHED) 8 mg in 0.9% NS 250ml infusion (has no administration in time range)  
sodium chloride 0.9 % bolus infusion 1,000 mL (has no administration in time range)  
sodium chloride 0.9 % bolus infusion 1,000 mL (has no administration in time range)  
lactated Ringers infusion (has no administration in time range)  
sodium bicarbonate (8.4%) injection 50 mEq (has no administration in time range)  
calcium gluconate 1 gram in 0.9% sodium chloride 50 ml infusion (has no administration in time range)  
insulin regular (NOVOLIN R, HUMULIN R) injection 5 Units (has no administration in time range) dextrose (D50) infusion 25 g (has no administration in time range) EPINEPHrine (ADRENALIN) 0.1 mg/mL syringe 0.05 mg (has no administration in time range) EPINEPHrine (ADRENALIN) 0.1 mg/mL syringe 0.05 mg (has no administration in time range) EPINEPHrine (ADRENALIN) 0.1 mg/mL syringe 0.05 mg (has no administration in time range)  
albuterol (PROVENTIL VENTOLIN) nebulizer solution 5 mg (has no administration in time range)  
calcium gluconate 100 mg/mL (10%) injection (1 g  Given 5/1/19 0500) fentaNYL citrate (PF) injection 100 mcg (100 mcg IntraVENous Given 5/1/19 3860) Lab findings: 
Recent Results (from the past 12 hour(s)) AMMONIA Collection Time: 05/01/19  4:36 AM  
Result Value Ref Range Ammonia 72 (H) 11 - 32 UMOL/L  
METABOLIC PANEL, COMPREHENSIVE Collection Time: 05/01/19  4:40 AM  
Result Value Ref Range Sodium 138 136 - 145 mmol/L Potassium 7.1 (HH) 3.5 - 5.5 mmol/L Chloride 101 100 - 108 mmol/L  
 CO2 31 21 - 32 mmol/L Anion gap 6 3.0 - 18 mmol/L Glucose 100 (H) 74 - 99 mg/dL BUN 32 (H) 7.0 - 18 MG/DL  Creatinine 3.69 (H) 0.6 - 1.3 MG/DL  
 BUN/Creatinine ratio 9 (L) 12 - 20    
 GFR est AA 20 (L) >60 ml/min/1.73m2 GFR est non-AA 16 (L) >60 ml/min/1.73m2 Calcium 8.2 (L) 8.5 - 10.1 MG/DL Bilirubin, total 1.0 0.2 - 1.0 MG/DL  
 ALT (SGPT) 19 16 - 61 U/L  
 AST (SGOT) 24 15 - 37 U/L Alk. phosphatase 106 45 - 117 U/L Protein, total 8.0 6.4 - 8.2 g/dL Albumin 3.1 (L) 3.4 - 5.0 g/dL Globulin 4.9 (H) 2.0 - 4.0 g/dL A-G Ratio 0.6 (L) 0.8 - 1.7    
CBC WITH AUTOMATED DIFF Collection Time: 05/01/19  4:40 AM  
Result Value Ref Range WBC 12.4 4.6 - 13.2 K/uL  
 RBC 4.72 4.70 - 5.50 M/uL  
 HGB 13.7 13.0 - 16.0 g/dL HCT 45.6 36.0 - 48.0 % MCV 96.6 74.0 - 97.0 FL  
 MCH 29.0 24.0 - 34.0 PG  
 MCHC 30.0 (L) 31.0 - 37.0 g/dL  
 RDW 13.7 11.6 - 14.5 % PLATELET 795 476 - 811 K/uL MPV 10.1 9.2 - 11.8 FL  
 NEUTROPHILS 85 (H) 40 - 73 % LYMPHOCYTES 7 (L) 21 - 52 % MONOCYTES 8 3 - 10 % EOSINOPHILS 0 0 - 5 % BASOPHILS 0 0 - 2 %  
 ABS. NEUTROPHILS 10.5 (H) 1.8 - 8.0 K/UL  
 ABS. LYMPHOCYTES 0.9 0.9 - 3.6 K/UL  
 ABS. MONOCYTES 1.0 0.05 - 1.2 K/UL  
 ABS. EOSINOPHILS 0.0 0.0 - 0.4 K/UL  
 ABS. BASOPHILS 0.0 0.0 - 0.1 K/UL  
 DF AUTOMATED    
ETHYL ALCOHOL Collection Time: 05/01/19  4:40 AM  
Result Value Ref Range ALCOHOL(ETHYL),SERUM <3 0 - 3 MG/DL  
NT-PRO BNP Collection Time: 05/01/19  4:40 AM  
Result Value Ref Range NT pro-BNP 46,748 (H) 0 - 900 PG/ML  
PROTHROMBIN TIME + INR Collection Time: 05/01/19  4:40 AM  
Result Value Ref Range Prothrombin time 14.1 11.5 - 15.2 sec INR 1.1 0.8 - 1.2 CARDIAC PANEL,(CK, CKMB & TROPONIN) Collection Time: 05/01/19  4:40 AM  
Result Value Ref Range  39 - 308 U/L  
 CK - MB 4.8 (H) <3.6 ng/ml CK-MB Index 1.6 0.0 - 4.0 % Troponin-I, QT 0.07 (H) 0.0 - 0.045 NG/ML  
POC CHEM8 Collection Time: 05/01/19  4:52 AM  
Result Value Ref Range CO2, POC 32 (H) 19 - 24 MMOL/L Glucose,  74 - 106 MG/DL  
 BUN, POC 36 (H) 7 - 18 MG/DL  Creatinine, POC 3.7 (H) 0.6 - 1.3 MG/DL  
 GFRAA, POC 20 (L) >60 ml/min/1.73m2 GFRNA, POC 16 (L) >60 ml/min/1.73m2 Sodium,  136 - 145 MMOL/L Potassium, POC 7.3 (HH) 3.5 - 5.5 MMOL/L Calcium, ionized (POC) 1.04 (L) 1.12 - 1.32 mmol/L Chloride,  100 - 108 MMOL/L Anion gap, POC 14 10 - 20 Hematocrit, POC 45 36 - 49 % Hemoglobin, POC 15.3 12 - 16 G/DL POC LACTIC ACID Collection Time: 05/01/19  4:53 AM  
Result Value Ref Range Lactic Acid (POC) 3.92 (HH) 0.40 - 2.00 mmol/L  
POC TROPONIN-I Collection Time: 05/01/19  5:09 AM  
Result Value Ref Range Troponin-I (POC) <0.04 0.00 - 0.08 ng/mL URINALYSIS W/ RFLX MICROSCOPIC Collection Time: 05/01/19  5:12 AM  
Result Value Ref Range Color DARK YELLOW Appearance CLOUDY Specific gravity 1.019 1.005 - 1.030    
 pH (UA) 5.0 5.0 - 8.0 Protein TRACE (A) NEG mg/dL Glucose NEGATIVE  NEG mg/dL Ketone NEGATIVE  NEG mg/dL Bilirubin NEGATIVE  NEG Blood NEGATIVE  NEG Urobilinogen 1.0 0.2 - 1.0 EU/dL Nitrites NEGATIVE  NEG Leukocyte Esterase NEGATIVE  NEG    
DRUG SCREEN, URINE Collection Time: 05/01/19  5:12 AM  
Result Value Ref Range BENZODIAZEPINES NEGATIVE  NEG    
 BARBITURATES NEGATIVE  NEG    
 THC (TH-CANNABINOL) NEGATIVE  NEG    
 OPIATES NEGATIVE  NEG    
 PCP(PHENCYCLIDINE) NEGATIVE  NEG    
 COCAINE POSITIVE (A) NEG    
 AMPHETAMINES NEGATIVE  NEG METHADONE NEGATIVE  NEG HDSCOM (NOTE) EKG, 12 LEAD, INITIAL Collection Time: 05/01/19  5:18 AM  
Result Value Ref Range Ventricular Rate 86 BPM  
 Atrial Rate 86 BPM  
 P-R Interval 128 ms QRS Duration 76 ms  
 Q-T Interval 442 ms QTC Calculation (Bezet) 528 ms Calculated P Axis 50 degrees Calculated R Axis -26 degrees Calculated T Axis -31 degrees Diagnosis Normal sinus rhythm Possible Left atrial enlargement T wave abnormality, consider anterior ischemia Prolonged QT Abnormal ECG 
 When compared with ECG of 21-JAN-2019 01:08, 
QRS voltage has decreased T wave inversion now evident in Inferior leads T wave inversion now evident in Anterior leads POC G3 Collection Time: 05/01/19  5:38 AM  
Result Value Ref Range Device: VENT    
 FIO2 (POC) 100 % pH (POC) 7.338 (L) 7.35 - 7.45    
 pCO2 (POC) 54.2 (H) 35.0 - 45.0 MMHG  
 pO2 (POC) 289 (H) 80 - 100 MMHG  
 HCO3 (POC) 29.4 (H) 22 - 26 MMOL/L  
 sO2 (POC) 100 (H) 92 - 97 % Base excess (POC) 3 mmol/L Mode ASSIST CONTROL Tidal volume 550 ml Set Rate 16 bpm  
 PEEP/CPAP (POC) 16 cmH2O Allens test (POC) N/A Total resp. rate 5 Site LEFT RADIAL Patient temp. 97.0 Specimen type (POC) ARTERIAL Performed by Selvin Brown Volume control plus YES    
 
 
EKG interpretation by ED Physician: 
Normal sinus rhythm with T wave inversions in anterior lateral leads. There is no acute ST changes. Rate 86 QRS 76  T wave inversions now present. QTC is prolonged. Cardiac monitor: Initially tachycardic within normal rate with regular rhythm no ectopy Pulse ox 100% on 100% FiO2 debated X-Ray, CT or other radiology findings or impressions: 
XR CHEST PORT    (Results Pending) CT HEAD WO CONT    (Results Pending) CT CHEST ABD PELV WO CONT    (Results Pending)  
ett in appropriate position; bilateral consolidation Progress notes, Consult notes or additional Procedure notes:  
Patient became progressively more hypotensive. Small doses of 50 mcg each of epinephrine were given with blood pressure response until Levophed drip could be started. Patient also noted to be acutely hyperkalemic as well with acute renal failure. Calcium, albuterol, sodium bicarb insulin and glucose were also given. Discussed with Dr. Ike Rodríguez on-call for the hospitalist service will admit Discussed with Dr. Ladi Tamez on-call for the intensive care unit who will arrange consult and agrees with current management. Discussed with Dr. Roddy Hand on-call for nephrology will arrange consult and also agrees with current management ED Critical Care Note System at risk for life threatening failure: Neuro, pulmonary, cardiac, renal 
Associated problems: Acute kidney injury, hyperkalemia, respiratory failure, sepsis Critical Care services provided: Bedside management of acute kidney injury, hyperkalemia, sepsis, respiratory failure, hypotension, consultation, documentation, bedside reassessments Excluded procedures (time not included in critical care): EKG interpretation and central line placement Total Critical Care Time (in minutes) 112 Reevaluation of patient:  
stable Disposition: 
Diagnosis: 1. Acute respiratory failure, unspecified whether with hypoxia or hypercapnia ([facility] [address] 2. Acute kidney injury (nontraumatic) (HCC) 3. Acute hyperkalemia 4. Septic shock ([facility] [address] 5. Pneumonia of both lungs due to infectious organism, unspecified part of lung Disposition: admit Follow-up Information None Patient's Medications Start Taking No medications on file Continue Taking ACETAMINOPHEN (TYLENOL) 325 MG TABLET    Take 2 Tabs by mouth every four (4) hours as needed for Pain. BUMETANIDE (BUMEX) 1 MG TABLET    Take 1 Tab by mouth two (2) times a day. GABAPENTIN (NEURONTIN) 400 MG CAPSULE    Take 400 mg by mouth three (3) times daily. IPRATROPIUM-ALBUTEROL (COMBIVENT RESPIMAT)  MCG/ACTUATION INHALER    Take 1 Puff by inhalation every twelve (12) hours. Indications: Chronic Obstructive Pulmonary Disease with Bronchospasms PANTOPRAZOLE (PROTONIX) 40 MG TABLET    Take 1 Tab by mouth daily. SIMVASTATIN (ZOCOR) 10 MG TABLET    Take  by mouth nightly. TRAMADOL (ULTRAM) 50 MG TABLET    Take 1 Tab by mouth every six (6) hours as needed. Max Daily Amount: 200 mg. These Medications have changed No medications on file Stop Taking ERTAPENEM 1 GRAM 1 G IVPB    1 g by IntraVENous route every twenty-four (24) hours.

## 2019-05-01 NOTE — ED NOTES
Received report, assumed care of patient. This patient has been recommended for inpatient treatment and is awaiting placement. The ED provider has reviewed the patients history and medications, diet, and treatments and will order as necessary. The patient will be observed and attended to as their medical needs require and/or policy dictates.

## 2019-05-01 NOTE — ED TRIAGE NOTES
Patient arrived via EMS. EMS found patient lying on bed unresponsive, with \"guppy breathing. Patient intubated in field and given Narcan for possible accidental opiod overdose with no effect. 5mg Versed administered just prior to arrival to ED. Upon arrival patient placed on ventilator, cardiac, bp and pulse ox monitors.

## 2019-05-01 NOTE — PROGRESS NOTES
1200 Patient arrived on unit from ED. Some blood noted on pad underneath patient possibly from Femoral line placement. Bedside report received from [facility]. 1230 Bilateral wrist restraints applied 1440 OG Tube inserted and Port Xray ordered to verify placement  
 
1900 urine specimen collected for legionella, ur creatinine, and urine sodium 1920 Bedside and Verbal shift change report given to Marnie RN (oncoming nurse) by Coral RN (offgoing nurse). Report included the following information SBAR, Kardex, ED Summary, Intake/Output, MAR, Recent Results and Cardiac Rhythm NSR.

## 2019-05-01 NOTE — ED NOTES
While moving patient from ER stretcher to hospital bed, pt opened eyes, attempted to lift hands to ET tube. Pt redirected at this time. Repositioned for comfort, sedation titrated

## 2019-05-01 NOTE — PROGRESS NOTES
-0850-Received patient on ventilator ACVC+ rate-16, VT-550, PEEP-5, FIO2-50%. O2 Sats`-98% HR-81, RR-16. ETT noted to be patent and secure. Respiratory will continue to monitor. -[facility] 
 
-1210-Pt. Transferred to ICU via transport ventilator without complications. Pt. Returned to above settings O2 Sats- 96%HR-85, RR-16. ETT remains patent and secure. -[address]

## 2019-05-01 NOTE — CONSULTS
[facility]  Pulmonary, Critical Care, and Sleep Medicine      Name: You Ruiz MRN: [id number]   : 1950 Hospital: [facility]   Date: 2019          Critical Care Initial Patient Consult    Requesting Harpreet Escobar [phone]                                                  Reason for CC Consult:ICU management    IMPRESSION:   · Acute hypoxic respiratory failure requiring intubation  · Encephalopathy  · Acute kidney injury- improving  · Acute hyperkalemia- improving  · Septic shock- improving  · Possible B/L Pneumonia- Abx  · UDS +Cocaine  · Hyperammonemia  Hx  · COPD  · Arthritis  · Drug abuse  · HTN  · Hep C      RECOMMENDATIONS:   · Resp -  VAP bundle. Titrate Fi02/supp 02 for Sp02 > 90. Daily ABG and CXR. Scheduled nebulizeres, steroids. · ID - Severe sepsis bundle. Afebrile and aleukocytosis. Follow BCx and UCx. Continue ABx. · CVS - HD stable. · Heme/Onc- H/H and platelets stable. Daily CBC. · Metabolic - Trend and replace electrolytes as needed. Daily BMP. · Renal - Cr 2.90, improving. Trend renal indices. Cardoza for strict I/O.  · Endocrine - SSI, Q6 BS. · Neuro/ Pain/ Sedation - Titrate sedation to RASS 0 - (-1). · GI - NPO  · Prophylaxis - DVT, GI- Protonix     Subjective/History: This patient has been seen and evaluated at the request of Dr. Irineo Poon for ICU management. Patient is a 77 y.o. male w/ PMHx of COPD, drug abuse, HTN, Hep C presented to ED via EMS intubated after being found unresponsive after mother had not seen him for a couple of hours. Pt was initially hypotensive, tachycardic, lactic acidosis, CXR showed possible PNA -started on broad spectrum antibiotics, received 2L fluid. Pt seen and evaluated in ICU, intubated, sedated, not following commands. The patient is critically ill and can not provide additional history due to Ventilated.      Past Medical History:   Diagnosis Date    Arthritis     COPD     Hypertension       Past Surgical History:   Procedure Laterality Date    HX REFRACTIVE SURGERY        Prior to Admission medications    Medication Sig Start Date End Date Taking? Authorizing Provider   bumetanide (BUMEX) 1 mg tablet Take 1 Tab by mouth two (2) times a day. 2/22/19   Oly SOLORZANO DO   pantoprazole (PROTONIX) 40 mg tablet Take 1 Tab by mouth daily. 2/23/19   Kulwinder Leger DO   traMADol Myriam Teddy) 50 mg tablet Take 1 Tab by mouth every six (6) hours as needed. Max Daily Amount: 200 mg. 2/22/19   Kulwinder Leger DO   acetaminophen (TYLENOL) 325 mg tablet Take 2 Tabs by mouth every four (4) hours as needed for Pain. 1/21/19   Saturnino Shetty MD   gabapentin (NEURONTIN) 400 mg capsule Take 400 mg by mouth three (3) times daily. Kim Sanchez MD   ipratropium-albuterol (COMBIVENT RESPIMAT)  mcg/actuation inhaler Take 1 Puff by inhalation every twelve (12) hours. Indications: Chronic Obstructive Pulmonary Disease with Bronchospasms    Kim Sanchez MD   simvastatin (ZOCOR) 10 mg tablet Take  by mouth nightly.     iKm Sanchez MD     Current Facility-Administered Medications   Medication Dose Route Frequency    propofol (DIPRIVAN) infusion  0-50 mcg/kg/min IntraVENous TITRATE    VANCOMYCIN INFORMATION NOTE   Other Rx Dosing/Monitoring    NOREPINephrine (LEVOPHED) 8 mg in 0.9% NS 250ml infusion  2-16 mcg/min IntraVENous TITRATE    lactated Ringers infusion  125 mL/hr IntraVENous CONTINUOUS    methylPREDNISolone (PF) (SOLU-MEDROL) injection 60 mg  60 mg IntraVENous Q6H    albuterol-ipratropium (DUO-NEB) 2.5 MG-0.5 MG/3 ML  3 mL Nebulization Q4H RT    budesonide (PULMICORT) 500 mcg/2 ml nebulizer suspension  500 mcg Nebulization BID RT    arformoterol (BROVANA) neb solution 15 mcg  15 mcg Nebulization BID RT    insulin lispro (HUMALOG) injection   SubCUTAneous Q6H    pantoprazole (PROTONIX) 40 mg in sodium chloride 0.9% 10 mL injection  40 mg IntraVENous Q12H    [START ON 5/2/2019] VANCOMYCIN INFORMATION NOTE   Other ONCE    [START ON 5/3/2019] levoFLOXacin (LEVAQUIN) 750 mg in D5W IVPB  750 mg IntraVENous Q48H    piperacillin-tazobactam (ZOSYN) 3.375 g in 0.9% sodium chloride (MBP/ADV) 100 mL MBP  3.375 g IntraVENous ONCE    Followed by    piperacillin-tazobactam (ZOSYN) 3.375 g in 0.9% sodium chloride (MBP/ADV) 100 mL MBP  #EXTENDED 4-HOUR INFUSION##  3.375 g IntraVENous Q8H     Allergies   Allergen Reactions    Shellfish Derived Hives      Social History     Tobacco Use    Smoking status: Former Smoker     Last attempt to quit: 1984     Years since quittin.9    Smokeless tobacco: Former User   Substance Use Topics    Alcohol use: Yes     Comment: sometimes      No family history on file. Review of Systems:  A comprehensive review of systems was negative except for that written in the HPI. Objective:   Vital Signs:    Visit Vitals  /56   Pulse 81   Temp 99.4 °F (37.4 °C)   Resp 16   Wt 114.7 kg (252 lb 14.4 oz)   SpO2 96%   BMI 34.30 kg/m²       O2 Device: Ventilator       Temp (24hrs), Av.4 °F (34.7 °C), Min:36.5 °F (2.5 °C), Max:99.4 °F (37.4 °C)       Intake/Output:   Last shift:       07 -  1900  In: 100 [I.V.:100]  Out: -   Last 3 shifts: No intake/output data recorded. Intake/Output Summary (Last 24 hours) at 2019 1252  Last data filed at 2019 0705  Gross per 24 hour   Intake 100 ml   Output    Net 100 ml       Ventilator Settings:  Mode Rate Tidal Volume Pressure FiO2 PEEP   Assist control, Pressure support, VC+   550 ml    50 % 5 cm H20     Peak airway pressure: 28 cm H2O    Minute ventilation: 9.69 l/min      Physical Exam:    General:  Intubated, sedated   Head:  Normocephalic, without obvious abnormality, atraumatic. Eyes:  Conjunctivae/corneas clear. PERRL, EOMs intact. Nose: Nares normal. Septum midline. Mucosa normal. No drainage or sinus tenderness.    Throat: Lips, mucosa, and tongue normal. Teeth and gums normal.  ETT in place.   Neck: Supple, symmetrical, trachea midline, no adenopathy, thyroid: no enlargment/tenderness/nodules, no carotid bruit and no JVD. Back:   Symmetric, no curvature. ROM normal.   Lungs:   Coarse to auscultation bilaterally, diminished in the bases. Chest wall:  No tenderness or deformity. Right chest abrasion. Heart:  Regular rate and rhythm, S1, S2 normal, no murmur, click, rub or gallop. Abdomen:   Soft, non-tender. Bowel sounds normal. No masses,  No organomegaly. Extremities: Extremities normal.  anasarca. B/L knee abrasions. Pulses: 2+ and symmetric all extremities. Skin: Skin color, texture, turgor normal. No rashes or lesions. Normal capillary refill. Lymph nodes: Cervical, supraclavicular, and axillary nodes normal.   Neurologic: Grossly nonfocal       Data:     Recent Results (from the past 24 hour(s))   AMMONIA    Collection Time: 05/01/19  4:36 AM   Result Value Ref Range    Ammonia 72 (H) 11 - 32 UMOL/L   CULTURE, BLOOD    Collection Time: 05/01/19  4:40 AM   Result Value Ref Range    Special Requests: NO SPECIAL REQUESTS      Culture result: NO GROWTH AFTER 3 HOURS     METABOLIC PANEL, COMPREHENSIVE    Collection Time: 05/01/19  4:40 AM   Result Value Ref Range    Sodium 138 136 - 145 mmol/L    Potassium 7.1 (HH) 3.5 - 5.5 mmol/L    Chloride 101 100 - 108 mmol/L    CO2 31 21 - 32 mmol/L    Anion gap 6 3.0 - 18 mmol/L    Glucose 100 (H) 74 - 99 mg/dL    BUN 32 (H) 7.0 - 18 MG/DL    Creatinine 3.69 (H) 0.6 - 1.3 MG/DL    BUN/Creatinine ratio 9 (L) 12 - 20      GFR est AA 20 (L) >60 ml/min/1.73m2    GFR est non-AA 16 (L) >60 ml/min/1.73m2    Calcium 8.2 (L) 8.5 - 10.1 MG/DL    Bilirubin, total 1.0 0.2 - 1.0 MG/DL    ALT (SGPT) 19 16 - 61 U/L    AST (SGOT) 24 15 - 37 U/L    Alk.  phosphatase 106 45 - 117 U/L    Protein, total 8.0 6.4 - 8.2 g/dL    Albumin 3.1 (L) 3.4 - 5.0 g/dL    Globulin 4.9 (H) 2.0 - 4.0 g/dL    A-G Ratio 0.6 (L) 0.8 - 1.7     CBC WITH AUTOMATED DIFF    Collection Time: 05/01/19  4:40 AM   Result Value Ref Range    WBC 12.4 4.6 - 13.2 K/uL    RBC 4.72 4.70 - 5.50 M/uL    HGB 13.7 13.0 - 16.0 g/dL    HCT 45.6 36.0 - 48.0 %    MCV 96.6 74.0 - 97.0 FL    MCH 29.0 24.0 - 34.0 PG    MCHC 30.0 (L) 31.0 - 37.0 g/dL    RDW 13.7 11.6 - 14.5 %    PLATELET 333 347 - 829 K/uL    MPV 10.1 9.2 - 11.8 FL    NEUTROPHILS 85 (H) 40 - 73 %    LYMPHOCYTES 7 (L) 21 - 52 %    MONOCYTES 8 3 - 10 %    EOSINOPHILS 0 0 - 5 %    BASOPHILS 0 0 - 2 %    ABS. NEUTROPHILS 10.5 (H) 1.8 - 8.0 K/UL    ABS. LYMPHOCYTES 0.9 0.9 - 3.6 K/UL    ABS. MONOCYTES 1.0 0.05 - 1.2 K/UL    ABS. EOSINOPHILS 0.0 0.0 - 0.4 K/UL    ABS.  BASOPHILS 0.0 0.0 - 0.1 K/UL    DF AUTOMATED     ETHYL ALCOHOL    Collection Time: 05/01/19  4:40 AM   Result Value Ref Range    ALCOHOL(ETHYL),SERUM <3 0 - 3 MG/DL   NT-PRO BNP    Collection Time: 05/01/19  4:40 AM   Result Value Ref Range    NT pro-BNP 46,748 (H) 0 - 900 PG/ML   PROTHROMBIN TIME + INR    Collection Time: 05/01/19  4:40 AM   Result Value Ref Range    Prothrombin time 14.1 11.5 - 15.2 sec    INR 1.1 0.8 - 1.2     CARDIAC PANEL,(CK, CKMB & TROPONIN)    Collection Time: 05/01/19  4:40 AM   Result Value Ref Range     39 - 308 U/L    CK - MB 4.8 (H) <3.6 ng/ml    CK-MB Index 1.6 0.0 - 4.0 %    Troponin-I, QT 0.07 (H) 0.0 - 0.045 NG/ML   POC CHEM8    Collection Time: 05/01/19  4:52 AM   Result Value Ref Range    CO2, POC 32 (H) 19 - 24 MMOL/L    Glucose,  74 - 106 MG/DL    BUN, POC 36 (H) 7 - 18 MG/DL    Creatinine, POC 3.7 (H) 0.6 - 1.3 MG/DL    GFRAA, POC 20 (L) >60 ml/min/1.73m2    GFRNA, POC 16 (L) >60 ml/min/1.73m2    Sodium,  136 - 145 MMOL/L    Potassium, POC 7.3 (HH) 3.5 - 5.5 MMOL/L    Calcium, ionized (POC) 1.04 (L) 1.12 - 1.32 mmol/L    Chloride,  100 - 108 MMOL/L    Anion gap, POC 14 10 - 20      Hematocrit, POC 45 36 - 49 %    Hemoglobin, POC 15.3 12 - 16 G/DL   POC LACTIC ACID    Collection Time: 05/01/19  4:53 AM   Result Value Ref Range Lactic Acid (POC) 3.92 (HH) 0.40 - 2.00 mmol/L   POC TROPONIN-I    Collection Time: 05/01/19  5:09 AM   Result Value Ref Range    Troponin-I (POC) <0.04 0.00 - 0.08 ng/mL   URINALYSIS W/ RFLX MICROSCOPIC    Collection Time: 05/01/19  5:12 AM   Result Value Ref Range    Color DARK YELLOW      Appearance CLOUDY      Specific gravity 1.019 1.005 - 1.030      pH (UA) 5.0 5.0 - 8.0      Protein TRACE (A) NEG mg/dL    Glucose NEGATIVE  NEG mg/dL    Ketone NEGATIVE  NEG mg/dL    Bilirubin NEGATIVE  NEG      Blood NEGATIVE  NEG      Urobilinogen 1.0 0.2 - 1.0 EU/dL    Nitrites NEGATIVE  NEG      Leukocyte Esterase NEGATIVE  NEG     DRUG SCREEN, URINE    Collection Time: 05/01/19  5:12 AM   Result Value Ref Range    BENZODIAZEPINES NEGATIVE  NEG      BARBITURATES NEGATIVE  NEG      THC (TH-CANNABINOL) NEGATIVE  NEG      OPIATES NEGATIVE  NEG      PCP(PHENCYCLIDINE) NEGATIVE  NEG      COCAINE POSITIVE (A) NEG      AMPHETAMINES NEGATIVE  NEG      METHADONE NEGATIVE  NEG      HDSCOM (NOTE)    EKG, 12 LEAD, INITIAL    Collection Time: 05/01/19  5:18 AM   Result Value Ref Range    Ventricular Rate 86 BPM    Atrial Rate 86 BPM    P-R Interval 128 ms    QRS Duration 76 ms    Q-T Interval 442 ms    QTC Calculation (Bezet) 528 ms    Calculated P Axis 50 degrees    Calculated R Axis -26 degrees    Calculated T Axis -31 degrees    Diagnosis       Normal sinus rhythm  Possible Left atrial enlargement  T wave abnormality, consider anterior ischemia  Prolonged QT  Abnormal ECG  When compared with ECG of 21-JAN-2019 01:08,  QRS voltage has decreased  T wave inversion now evident in Inferior leads  T wave inversion now evident in Anterior leads  Confirmed by Yves Madison ([id number]) on 5/1/2019 11:06:24 AM     POC G3    Collection Time: 05/01/19  5:38 AM   Result Value Ref Range    Device: VENT      FIO2 (POC) 100 %    pH (POC) 7.338 (L) 7.35 - 7.45      pCO2 (POC) 54.2 (H) 35.0 - 45.0 MMHG    pO2 (POC) 289 (H) 80 - 100 MMHG    HCO3 (POC) 29.4 (H) 22 - 26 MMOL/L    sO2 (POC) 100 (H) 92 - 97 %    Base excess (POC) 3 mmol/L    Mode ASSIST CONTROL      Tidal volume 550 ml    Set Rate 16 bpm    PEEP/CPAP (POC) 16 cmH2O    Allens test (POC) N/A      Total resp. rate 5      Site LEFT RADIAL      Patient temp.  97.0      Specimen type (POC) ARTERIAL      Performed by Ismael Crenshaw     Volume control plus YES     POC CHEM8    Collection Time: 05/01/19  6:18 AM   Result Value Ref Range    CO2, POC 28 (H) 19 - 24 MMOL/L    Glucose,  74 - 106 MG/DL    BUN, POC 32 (H) 7 - 18 MG/DL    Creatinine, POC 3.3 (H) 0.6 - 1.3 MG/DL    GFRAA, POC 23 (L) >60 ml/min/1.73m2    GFRNA, POC 19 (L) >60 ml/min/1.73m2    Sodium,  136 - 145 MMOL/L    Potassium, POC 5.6 (H) 3.5 - 5.5 MMOL/L    Calcium, ionized (POC) 1.09 (L) 1.12 - 1.32 mmol/L    Chloride,  100 - 108 MMOL/L    Anion gap, POC 18 10 - 20      Hematocrit, POC 44 36 - 49 %    Hemoglobin, POC 15.0 12 - 16 G/DL   POC LACTIC ACID    Collection Time: 05/01/19  8:59 AM   Result Value Ref Range    Lactic Acid (POC) 4.76 (HH) 0.40 - 2.00 mmol/L   CALCIUM, IONIZED    Collection Time: 05/01/19 10:20 AM   Result Value Ref Range    Ionized Calcium 1.07 (L) 1.12 - 1.32 MMOL/L   TSH 3RD GENERATION    Collection Time: 05/01/19 10:20 AM   Result Value Ref Range    TSH 0.09 (L) 0.36 - 3.74 uIU/mL   TROPONIN I    Collection Time: 05/01/19 10:20 AM   Result Value Ref Range    Troponin-I, QT 0.05 (H) 0.0 - 6.630 NG/ML   METABOLIC PANEL, BASIC    Collection Time: 05/01/19 10:20 AM   Result Value Ref Range    Sodium 141 136 - 145 mmol/L    Potassium 4.8 3.5 - 5.5 mmol/L    Chloride 106 100 - 108 mmol/L    CO2 26 21 - 32 mmol/L    Anion gap 9 3.0 - 18 mmol/L    Glucose 146 (H) 74 - 99 mg/dL    BUN 31 (H) 7.0 - 18 MG/DL    Creatinine 2.90 (H) 0.6 - 1.3 MG/DL    BUN/Creatinine ratio 11 (L) 12 - 20      GFR est AA 26 (L) >60 ml/min/1.73m2    GFR est non-AA 22 (L) >60 ml/min/1.73m2    Calcium 8.1 (L) 8.5 - 10.1 MG/DL   POC LACTIC ACID    Collection Time: 05/01/19 11:13 AM   Result Value Ref Range    Lactic Acid (POC) 3.34 (HH) 0.40 - 2.00 mmol/L             Telemetry:normal sinus rhythm    Imaging:  CXR Results  (Last 48 hours)               05/01/19 0427  XR CHEST PORT Final result    Impression:  IMPRESSION:           1. Endotracheal tube in position as above. 2. Right perihilar and basilar atelectasis/ airspace disease and small bilateral   pleural effusions. Narrative:  EXAM: XR CHEST PORT       CLINICAL INDICATION/HISTORY: Abscess, respiratory failure, endotracheal tube   placement   -Additional: None       COMPARISON: Several prior exams, most recently 2/13/2019. TECHNIQUE: Frontal view of the chest       _______________       FINDINGS:       SUPPORT DEVICES: There is an endotracheal tube which projects approximately 3.3   cm above the reggie. HEART AND MEDIASTINUM: Cardiac size and mediastinal contours as visualized   appear stable. LUNGS AND PLEURAL SPACES: The lungs are moderately underexpanded, similar to   prior. Patchy right perihilar and bilateral lower lung opacities noted. No   pneumothorax. Small bilateral pleural effusions. BONY THORAX AND SOFT TISSUES: No acute osseous abnormality       _______________                 CT Results  (Last 48 hours)               05/01/19 0615  CT CHEST ABD PELV WO CONT Final result    Impression:  IMPRESSION:       1. Patchy airspace opacities are present within both lungs, concerning for   multifocal pneumonia. Small associated bilateral pleural effusions are present. Edema is less likely diagnostic consideration. 2. Destructive changes at the T3/T4 and T9/T10 level again noted, related to   known discitis osteomyelitis. Preliminary report was provided by the on-call resident. Narrative:  EXAM: CT of the chest, abdomen, and pelvis       INDICATION: Sepsis. Acute renal failure. Intubated.        COMPARISON: CT chest 2/4/2019, 1/21/2019, 12/23/2018       TECHNIQUE: Axial CT imaging of the chest, abdomen, and pelvis was performed   without intravenous contrast. Multiplanar reformats were generated. One or more   dose reduction techniques were used on this CT: automated exposure control,   adjustment of the mAs and/or kVp according to patient size, and iterative   reconstruction techniques. The specific techniques used on this CT exam have   been documented in the patient's electronic medical record. Digital Imaging and   Communications in Medicine (DICOM) format image data are available to   nonaffiliated external healthcare facilities or entities on a secured, media   free, reciprocally searchable basis with patient authorization for at least a   12-month period after this study. _______________       FINDINGS:       CHEST:       BASE OF THE NECK: Normal.       LUNGS: Patchy airspace opacities are present within both lungs, concerning for   multifocal pneumonia. Focal areas of consolidation are present within both lower   lungs. Small bilateral pleural effusions with adjacent atelectasis. AIRWAY: The patient is intubated. Motion artifact obscures consolidation. The   airways are otherwise patent. PLEURA: Small bilateral pleural effusions with adjacent atelectasis. MEDIASTINUM: Normal heart size. No pericardial effusion. Again noted is mild   prominence of the central pulmonary artery, suggestive of potential.       LYMPH NODES: No enlarged lymph nodes. OTHER: None.       ===============       ABDOMEN/PELVIS:       LIVER, BILIARY: The liver has a nodular contour. Liver is otherwise   unremarkable. No biliary dilation. Gallbladder is unremarkable. PANCREAS: The unenhanced pancreas is       SPLEEN: Normal. Splenorenal shunt is present. ADRENALS: Nodularity of the left adrenal gland is unchanged. KIDNEYS, URETERS, BLADDER: Cardoza catheter is present. No hydronephrosis.        LYMPH NODES: No enlarged lymph nodes. GASTROINTESTINAL TRACT: No bowel dilation or wall thickening. PELVIC ORGANS: Unremarkable. VASCULATURE: Right femoral line is present. Cardoza catheter is present. BONES: Erosive changes related to known discitis/osteomyelitis at the T3/T4   level and T9/T10 level, in keeping with known osteomyelitis. OTHER: None.       _______________           05/01/19 0615  CT HEAD WO CONT Final result    Impression:  IMPRESSION:       1. No acute intracranial abnormalities. 2. Diffuse fluid is filling the nasopharynx and nasal passageways, which could   be related to aspiration. Patient does not appear to be intubated at the time of   examination. Small layering fluid levels are present within both sphenoid   sinuses. Preliminary report was provided by the on-call resident. Narrative:  EXAM: CT head       INDICATION: Confusion/delirium, altered mental status. COMPARISON: 12/16/2018       TECHNIQUE: Axial CT imaging of the head was performed without intravenous   contrast. One or more dose reduction techniques were used on this CT: automated   exposure control, adjustment of the mAs and/or kVp according to patient size,   and iterative reconstruction techniques. The specific techniques used on this   CT exam have been documented in the patient's electronic medical record. Digital Imaging and Communications in Medicine (DICOM) format image data are   available to nonaffiliated external healthcare facilities or entities on a   secured, media free, reciprocally searchable basis with patient authorization   for at least a 12-month period after this study. _______________       FINDINGS:       BRAIN AND POSTERIOR FOSSA: There is no intracranial hemorrhage, mass effect, or   midline shift. There are no areas of abnormal parenchymal attenuation. EXTRA-AXIAL SPACES AND MENINGES: There are no abnormal extra-axial fluid   collections.        CALVARIUM: Intact. SINUSES: The nasopharynx is completely filled with low attenuation fluid which   extends into the nasal passages. Small layering air-fluid levels are present   within the sphenoid sinuses. This could be related to aspiration or intubation.        OTHER: None.       _______________                       Total critical care time exclusive of procedures: 50 minutes  Scott Senior, NP

## 2019-05-01 NOTE — PROGRESS NOTES
attempted to complete  the initial Spiritual Assessment of the patient in bed 14 of the emergency room, and offer Pastoral Care support  But found patient currently on life support at this time. No family seen at this time. Patient does not have any Moravian/cultural needs that will affect patients preferences in health care. Chaplains will continue to follow and will provide pastoral care on an as needed/requested basis. Rupert Simon Board Certified [employer] Spiritual Care Department [phone]

## 2019-05-02 ENCOUNTER — APPOINTMENT (OUTPATIENT)
Dept: NON INVASIVE DIAGNOSTICS | Age: 69
DRG: 720 | End: 2019-05-02
Attending: HOSPITALIST
Payer: MEDICAID

## 2019-05-02 ENCOUNTER — APPOINTMENT (OUTPATIENT)
Dept: GENERAL RADIOLOGY | Age: 69
DRG: 720 | End: 2019-05-02
Attending: NURSE PRACTITIONER
Payer: MEDICAID

## 2019-05-02 PROBLEM — R79.89 ELEVATED BRAIN NATRIURETIC PEPTIDE (BNP) LEVEL: Status: ACTIVE | Noted: 2019-05-02

## 2019-05-02 PROBLEM — A41.9 SEPTIC SHOCK (HCC): Status: ACTIVE | Noted: 2019-05-02

## 2019-05-02 PROBLEM — N17.9 ACUTE KIDNEY INJURY SUPERIMPOSED ON CHRONIC KIDNEY DISEASE (HCC): Status: ACTIVE | Noted: 2019-05-02

## 2019-05-02 PROBLEM — N18.9 CKD (CHRONIC KIDNEY DISEASE): Status: ACTIVE | Noted: 2019-05-02

## 2019-05-02 PROBLEM — E87.5 HYPERKALEMIA: Status: ACTIVE | Noted: 2019-05-02

## 2019-05-02 PROBLEM — N18.9 ACUTE KIDNEY INJURY SUPERIMPOSED ON CHRONIC KIDNEY DISEASE (HCC): Status: ACTIVE | Noted: 2019-05-02

## 2019-05-02 PROBLEM — R79.89 ELEVATED LACTIC ACID LEVEL: Status: ACTIVE | Noted: 2019-05-02

## 2019-05-02 PROBLEM — R65.21 SEPTIC SHOCK (HCC): Status: ACTIVE | Noted: 2019-05-02

## 2019-05-02 PROBLEM — F19.90 DRUG USE: Status: ACTIVE | Noted: 2019-05-02

## 2019-05-02 LAB
ALBUMIN SERPL-MCNC: 2.5 G/DL (ref 3.4–5)
ALBUMIN/GLOB SERPL: 0.6 {RATIO} (ref 0.8–1.7)
ALP SERPL-CCNC: 74 U/L (ref 45–117)
ALT SERPL-CCNC: 13 U/L (ref 16–61)
AMMONIA PLAS-SCNC: 38 UMOL/L (ref 11–32)
ANION GAP SERPL CALC-SCNC: 6 MMOL/L (ref 3–18)
APTT PPP: 31.4 SEC (ref 23–36.4)
ARTERIAL PATENCY WRIST A: ABNORMAL
AST SERPL-CCNC: 18 U/L (ref 15–37)
BASE EXCESS BLD CALC-SCNC: 3 MMOL/L
BASOPHILS # BLD: 0 K/UL (ref 0–0.1)
BASOPHILS NFR BLD: 0 % (ref 0–2)
BDY SITE: ABNORMAL
BILIRUB SERPL-MCNC: 0.8 MG/DL (ref 0.2–1)
BNP SERPL-MCNC: 5891 PG/ML (ref 0–900)
BODY TEMPERATURE: 98.2
BUN SERPL-MCNC: 34 MG/DL (ref 7–18)
BUN/CREAT SERPL: 15 (ref 12–20)
CA-I SERPL-SCNC: 1.06 MMOL/L (ref 1.12–1.32)
CA-I SERPL-SCNC: 1.18 MMOL/L (ref 1.12–1.32)
CALCIUM SERPL-MCNC: 8.3 MG/DL (ref 8.5–10.1)
CHLORIDE SERPL-SCNC: 109 MMOL/L (ref 100–108)
CHOLEST SERPL-MCNC: 135 MG/DL
CO2 SERPL-SCNC: 27 MMOL/L (ref 21–32)
CREAT SERPL-MCNC: 2.34 MG/DL (ref 0.6–1.3)
DIFFERENTIAL METHOD BLD: ABNORMAL
EOSINOPHIL # BLD: 0 K/UL (ref 0–0.4)
EOSINOPHIL NFR BLD: 0 % (ref 0–5)
ERYTHROCYTE [DISTWIDTH] IN BLOOD BY AUTOMATED COUNT: 13.9 % (ref 11.6–14.5)
GAS FLOW.O2 O2 DELIVERY SYS: ABNORMAL L/MIN
GAS FLOW.O2 SETTING OXYMISER: 16 BPM
GLOBULIN SER CALC-MCNC: 4.2 G/DL (ref 2–4)
GLUCOSE BLD STRIP.AUTO-MCNC: 107 MG/DL (ref 70–110)
GLUCOSE BLD STRIP.AUTO-MCNC: 113 MG/DL (ref 70–110)
GLUCOSE BLD STRIP.AUTO-MCNC: 116 MG/DL (ref 70–110)
GLUCOSE SERPL-MCNC: 130 MG/DL (ref 74–99)
HCO3 BLD-SCNC: 25.5 MMOL/L (ref 22–26)
HCT VFR BLD AUTO: 39 % (ref 36–48)
HDLC SERPL-MCNC: 36 MG/DL (ref 40–60)
HDLC SERPL: 3.8 {RATIO} (ref 0–5)
HGB BLD-MCNC: 12.6 G/DL (ref 13–16)
INSPIRATION.DURATION SETTING TIME VENT: 0.75 SEC
LACTATE SERPL-SCNC: 1.3 MMOL/L (ref 0.4–2)
LACTATE SERPL-SCNC: 2.3 MMOL/L (ref 0.4–2)
LACTATE SERPL-SCNC: 2.5 MMOL/L (ref 0.4–2)
LACTATE SERPL-SCNC: 2.6 MMOL/L (ref 0.4–2)
LACTATE SERPL-SCNC: 2.7 MMOL/L (ref 0.4–2)
LDLC SERPL CALC-MCNC: 74.8 MG/DL (ref 0–100)
LIPID PROFILE,FLP: ABNORMAL
LYMPHOCYTES # BLD: 0.8 K/UL (ref 0.9–3.6)
LYMPHOCYTES NFR BLD: 11 % (ref 21–52)
MAGNESIUM SERPL-MCNC: 2.5 MG/DL (ref 1.6–2.6)
MCH RBC QN AUTO: 28.8 PG (ref 24–34)
MCHC RBC AUTO-ENTMCNC: 32.3 G/DL (ref 31–37)
MCV RBC AUTO: 89 FL (ref 74–97)
MONOCYTES # BLD: 0.2 K/UL (ref 0.05–1.2)
MONOCYTES NFR BLD: 3 % (ref 3–10)
NEUTS SEG # BLD: 6.3 K/UL (ref 1.8–8)
NEUTS SEG NFR BLD: 86 % (ref 40–73)
O2/TOTAL GAS SETTING VFR VENT: 0.4 %
PCO2 BLD: 31.6 MMHG (ref 35–45)
PEEP RESPIRATORY: 5 CMH2O
PH BLD: 7.51 [PH] (ref 7.35–7.45)
PHOSPHATE SERPL-MCNC: 1.9 MG/DL (ref 2.5–4.9)
PHOSPHATE SERPL-MCNC: 3.4 MG/DL (ref 2.5–4.9)
PIP ISTAT,IPIP: 28
PLATELET # BLD AUTO: 134 K/UL (ref 135–420)
PMV BLD AUTO: 11.1 FL (ref 9.2–11.8)
PO2 BLD: 79 MMHG (ref 80–100)
POTASSIUM SERPL-SCNC: 4.6 MMOL/L (ref 3.5–5.5)
PROT SERPL-MCNC: 6.7 G/DL (ref 6.4–8.2)
RBC # BLD AUTO: 4.38 M/UL (ref 4.7–5.5)
SAO2 % BLD: 97 % (ref 92–97)
SERVICE CMNT-IMP: ABNORMAL
SODIUM SERPL-SCNC: 142 MMOL/L (ref 136–145)
SPECIMEN TYPE: ABNORMAL
TOTAL RESP. RATE, ITRR: 17
TRIGL SERPL-MCNC: 121 MG/DL (ref ?–150)
TROPONIN I SERPL-MCNC: 0.02 NG/ML (ref 0–0.04)
VANCOMYCIN SERPL-MCNC: 11.9 UG/ML (ref 5–40)
VENTILATION MODE VENT: ABNORMAL
VLDLC SERPL CALC-MCNC: 24.2 MG/DL
VOLUME CONTROL PLUS IVLCP: YES
VT SETTING VENT: 550 ML
WBC # BLD AUTO: 7.3 K/UL (ref 4.6–13.2)

## 2019-05-02 PROCEDURE — 84100 ASSAY OF PHOSPHORUS: CPT

## 2019-05-02 PROCEDURE — 80202 ASSAY OF VANCOMYCIN: CPT

## 2019-05-02 PROCEDURE — 83880 ASSAY OF NATRIURETIC PEPTIDE: CPT

## 2019-05-02 PROCEDURE — 36600 WITHDRAWAL OF ARTERIAL BLOOD: CPT

## 2019-05-02 PROCEDURE — 77010033678 HC OXYGEN DAILY

## 2019-05-02 PROCEDURE — 82803 BLOOD GASES ANY COMBINATION: CPT

## 2019-05-02 PROCEDURE — 74011000250 HC RX REV CODE- 250: Performed by: HOSPITALIST

## 2019-05-02 PROCEDURE — 74011250636 HC RX REV CODE- 250/636: Performed by: HOSPITALIST

## 2019-05-02 PROCEDURE — 65610000006 HC RM INTENSIVE CARE

## 2019-05-02 PROCEDURE — 36415 COLL VENOUS BLD VENIPUNCTURE: CPT

## 2019-05-02 PROCEDURE — 83605 ASSAY OF LACTIC ACID: CPT

## 2019-05-02 PROCEDURE — 83735 ASSAY OF MAGNESIUM: CPT

## 2019-05-02 PROCEDURE — 94640 AIRWAY INHALATION TREATMENT: CPT

## 2019-05-02 PROCEDURE — 82140 ASSAY OF AMMONIA: CPT

## 2019-05-02 PROCEDURE — 74011000258 HC RX REV CODE- 258: Performed by: HOSPITALIST

## 2019-05-02 PROCEDURE — 85730 THROMBOPLASTIN TIME PARTIAL: CPT

## 2019-05-02 PROCEDURE — C9113 INJ PANTOPRAZOLE SODIUM, VIA: HCPCS | Performed by: HOSPITALIST

## 2019-05-02 PROCEDURE — 71045 X-RAY EXAM CHEST 1 VIEW: CPT

## 2019-05-02 PROCEDURE — 94003 VENT MGMT INPAT SUBQ DAY: CPT

## 2019-05-02 PROCEDURE — 82330 ASSAY OF CALCIUM: CPT

## 2019-05-02 PROCEDURE — 80053 COMPREHEN METABOLIC PANEL: CPT

## 2019-05-02 PROCEDURE — 85025 COMPLETE CBC W/AUTO DIFF WBC: CPT

## 2019-05-02 PROCEDURE — 82962 GLUCOSE BLOOD TEST: CPT

## 2019-05-02 PROCEDURE — 74011250636 HC RX REV CODE- 250/636: Performed by: INTERNAL MEDICINE

## 2019-05-02 PROCEDURE — 93306 TTE W/DOPPLER COMPLETE: CPT

## 2019-05-02 PROCEDURE — 84484 ASSAY OF TROPONIN QUANT: CPT

## 2019-05-02 PROCEDURE — 94762 N-INVAS EAR/PLS OXIMTRY CONT: CPT

## 2019-05-02 PROCEDURE — 94660 CPAP INITIATION&MGMT: CPT

## 2019-05-02 PROCEDURE — 80061 LIPID PANEL: CPT

## 2019-05-02 RX ORDER — IPRATROPIUM BROMIDE AND ALBUTEROL SULFATE 2.5; .5 MG/3ML; MG/3ML
SOLUTION RESPIRATORY (INHALATION)
Status: DISPENSED
Start: 2019-05-02 | End: 2019-05-02

## 2019-05-02 RX ORDER — VANCOMYCIN/0.9 % SOD CHLORIDE 1.5G/250ML
1500 PLASTIC BAG, INJECTION (ML) INTRAVENOUS EVERY 24 HOURS
Status: DISCONTINUED | OUTPATIENT
Start: 2019-05-02 | End: 2019-05-04 | Stop reason: HOSPADM

## 2019-05-02 RX ADMIN — IPRATROPIUM BROMIDE AND ALBUTEROL SULFATE 3 ML: .5; 3 SOLUTION RESPIRATORY (INHALATION) at 09:06

## 2019-05-02 RX ADMIN — METHYLPREDNISOLONE SODIUM SUCCINATE 60 MG: 40 INJECTION, POWDER, FOR SOLUTION INTRAMUSCULAR; INTRAVENOUS at 05:57

## 2019-05-02 RX ADMIN — IPRATROPIUM BROMIDE AND ALBUTEROL SULFATE 3 ML: .5; 3 SOLUTION RESPIRATORY (INHALATION) at 12:21

## 2019-05-02 RX ADMIN — ARFORMOTEROL TARTRATE 15 MCG: 15 SOLUTION RESPIRATORY (INHALATION) at 20:03

## 2019-05-02 RX ADMIN — BUDESONIDE 500 MCG: 0.5 INHALANT RESPIRATORY (INHALATION) at 20:03

## 2019-05-02 RX ADMIN — PIPERACILLIN SODIUM,TAZOBACTAM SODIUM 3.38 G: 3; .375 INJECTION, POWDER, FOR SOLUTION INTRAVENOUS at 02:18

## 2019-05-02 RX ADMIN — HEPARIN SODIUM 5000 UNITS: 5000 INJECTION INTRAVENOUS; SUBCUTANEOUS at 11:21

## 2019-05-02 RX ADMIN — IPRATROPIUM BROMIDE AND ALBUTEROL SULFATE 3 ML: .5; 3 SOLUTION RESPIRATORY (INHALATION) at 16:16

## 2019-05-02 RX ADMIN — PIPERACILLIN SODIUM,TAZOBACTAM SODIUM 3.38 G: 3; .375 INJECTION, POWDER, FOR SOLUTION INTRAVENOUS at 17:47

## 2019-05-02 RX ADMIN — HEPARIN SODIUM 5000 UNITS: 5000 INJECTION INTRAVENOUS; SUBCUTANEOUS at 20:59

## 2019-05-02 RX ADMIN — METHYLPREDNISOLONE SODIUM SUCCINATE 60 MG: 40 INJECTION, POWDER, FOR SOLUTION INTRAMUSCULAR; INTRAVENOUS at 23:37

## 2019-05-02 RX ADMIN — PIPERACILLIN SODIUM,TAZOBACTAM SODIUM 3.38 G: 3; .375 INJECTION, POWDER, FOR SOLUTION INTRAVENOUS at 09:54

## 2019-05-02 RX ADMIN — ARFORMOTEROL TARTRATE 15 MCG: 15 SOLUTION RESPIRATORY (INHALATION) at 09:06

## 2019-05-02 RX ADMIN — METHYLPREDNISOLONE SODIUM SUCCINATE 60 MG: 40 INJECTION, POWDER, FOR SOLUTION INTRAMUSCULAR; INTRAVENOUS at 17:47

## 2019-05-02 RX ADMIN — SODIUM CHLORIDE 6 MMOL: 900 INJECTION, SOLUTION INTRAVENOUS at 09:53

## 2019-05-02 RX ADMIN — VANCOMYCIN HYDROCHLORIDE 1500 MG: 10 INJECTION, POWDER, LYOPHILIZED, FOR SOLUTION INTRAVENOUS at 09:53

## 2019-05-02 RX ADMIN — IPRATROPIUM BROMIDE AND ALBUTEROL SULFATE 3 ML: .5; 3 SOLUTION RESPIRATORY (INHALATION) at 04:42

## 2019-05-02 RX ADMIN — PROPOFOL 15 MCG/KG/MIN: 10 INJECTION, EMULSION INTRAVENOUS at 05:58

## 2019-05-02 RX ADMIN — METHYLPREDNISOLONE SODIUM SUCCINATE 60 MG: 40 INJECTION, POWDER, FOR SOLUTION INTRAMUSCULAR; INTRAVENOUS at 11:20

## 2019-05-02 RX ADMIN — IPRATROPIUM BROMIDE AND ALBUTEROL SULFATE 3 ML: .5; 3 SOLUTION RESPIRATORY (INHALATION) at 20:21

## 2019-05-02 RX ADMIN — CALCIUM GLUCONATE 2 G: 94 INJECTION, SOLUTION INTRAVENOUS at 06:42

## 2019-05-02 RX ADMIN — IPRATROPIUM BROMIDE AND ALBUTEROL SULFATE 3 ML: .5; 3 SOLUTION RESPIRATORY (INHALATION) at 00:30

## 2019-05-02 RX ADMIN — SODIUM CHLORIDE 40 MG: 9 INJECTION, SOLUTION INTRAMUSCULAR; INTRAVENOUS; SUBCUTANEOUS at 21:00

## 2019-05-02 RX ADMIN — SODIUM CHLORIDE 75 ML/HR: 900 INJECTION, SOLUTION INTRAVENOUS at 17:29

## 2019-05-02 RX ADMIN — SODIUM CHLORIDE 40 MG: 9 INJECTION, SOLUTION INTRAMUSCULAR; INTRAVENOUS; SUBCUTANEOUS at 08:32

## 2019-05-02 RX ADMIN — BUDESONIDE 500 MCG: 0.5 INHALANT RESPIRATORY (INHALATION) at 09:06

## 2019-05-02 RX ADMIN — HEPARIN SODIUM 5000 UNITS: 5000 INJECTION INTRAVENOUS; SUBCUTANEOUS at 04:08

## 2019-05-02 NOTE — PROGRESS NOTES
1213 Patient extubated and placed on BIPAP per Dr. Genevieve Cogan. Started patient on 12/5 FiO2 50%.

## 2019-05-02 NOTE — PROGRESS NOTES
Goal: *Discharge to safe environment Outcome: Progressing Towards Goal 
 
Plan: home with home health VS SNF depending on progress. Reason for Admission:   Acute resp failure / Sepsis RRAT Score:   34 Resources/supports as identified by patient/family:  Family, ins. PCP Top Challenges facing patient (as identified by patient/family and CM): Finances/Medication cost?   [place] [employer] [place] Transportation? [facility] cab Support system or lack thereof?  family Living arrangements? Lives with his elderly mother Self-care/ADLs/Cognition? Unable to assess due to pt on vent & no answer when family called. Current Advanced Directive/Advance Care Plan:  Not on file Plan for utilizing home health: Will need home health, if not nursing home placement Likelihood of readmission: High/red Transition of Care Plan:     Home with his mother with home health VS long term nursing home, depending on progress. Chart reviewed. Attempted to interview pt, on vent. Attempted to call both contacts on file, no answer. Per chart, pt lives with his elderly mother. Will try to speak with family & pt(once off vent). Pt has [employer] ins, has NO SNF benefit. Will be difficult placement if needs long term nursing home, as is drug abuser. Will cont to follow for needs. Francisca Linares RN,ext [phone]. Patient is unable to designate anyone to participate in his/her discharge plan and to receive any needed information, as on vent. NOK listed: mother & daughter Name:  Breanna Galdamez Address: 
Phone number: [phone] / [phone] Care Management Interventions Palliative Care Criteria Met (RRAT>21 & CHF Dx)?: Yes 
Palliative Consult Recommended?: Yes 
 Mode of Transport at Discharge: Other (see comment)(family, [facility] cab) Transition of Care Consult (CM Consult): Discharge Planning Discharge Durable Medical Equipment: No 
Physical Therapy Consult: No 
Occupational Therapy Consult: No 
Speech Therapy Consult: No 
Current Support Network: Relative's Home(lives with elderly mother) Confirm Follow Up Transport: Self([facility] cab)

## 2019-05-02 NOTE — PROGRESS NOTES
[facility] Progress Note Name: Melonie Howe : 1950 MRN: [id number] Date: 2019 11:28 AM 
  
[x]I have reviewed the flowsheet and previous days notes. Events overnight reviewed and discussed with nursing staff. Vital signs and records reviewed. HPI 
78 y/o male w/ PMHx of COPD, HTN, illicit drug use presented to ED via EMS after being found down. Pt was intubated in the field and admitted for acute respiratory distress and septic shock. Subjective: 
19 No new events overnight. Remains intubated Sedation on hold, following commands On SBT- plan for extubation today Off pressor 
 
[x]The patient is unable to give any meaningful history or review of systems because the patient is: 
[x]Intubated []Sedated  
[]Unresponsive [x]The patient is critically ill on     
[x]Mechanical ventilation []Pressors []BiPAP [] ROS:A comprehensive review of systems was negative except for that written in the HPI. Medication Review: · Pressors - Levo · Sedation - Propofol · Antibiotics - Levaquin/Zosyn/Vanc · Pain - PRn tylenol/fentanyl · GI/ DVT - Protonix/SQH 
· Others (other gtts) Safety Bundles: VAP Bundle/ CAUTI/ Severe Sepsis Protocol/ Electrolyte Replacement Protocol Vital Signs:   
Visit Vitals /85 Pulse 99 Temp 98.8 °F (37.1 °C) Resp 16 Ht 6' 0.01\" (1.829 m) Wt 114.7 kg (252 lb 14.4 oz) SpO2 100% BMI 34.29 kg/m² O2 Device: Ventilator Temp (24hrs), Av.6 °F (35.9 °C), Min:37.5 °F (3.1 °C), Max:100 °F (37.8 °C) Intake/Output:  
Last shift:       07 -  1900 In: 168 [I.V.:168] Out: 30 [Urine:30] Last 3 shifts: 1901 -  0700 In: 3102.6 [I.V.:3102.6] Out: 825 [Urine:825] Intake/Output Summary (Last 24 hours) at 2019 1128 Last data filed at 2019 0900 Gross per 24 hour Intake 3170.66 ml Output 855 ml Net 2315.66 ml Ventilator Settings: Ventilator Mode: Assist control, VC+ Respiratory Rate Resp Rate Observed: 25 Back-Up Rate: 16 Insp Time (sec): 0.75 sec I:E Ratio: 1;4 
Ventilator Volumes Vt Set (ml): 550 ml Vt Exhaled (Machine Breath) (ml): 590 ml Ve Observed (l/min): 10.3 l/min Ventilator Pressures PIP Observed (cm H2O): 26 cm H2O Plateau Pressure (cm H2O): 17 cm H2O 
MAP (cm H2O): 9.7 PEEP/VENT (cm H2O): 5 cm H20 Auto PEEP Observed (cm H2O): 0.5 cm H2O Physical Exam: 
 
General: Intubated/sedated; HEENT:  Anicteric sclerae; pink palpebral conjunctivae; mucosa moist 
Resp:  BS=Coarse B/L, Symmetrical chest expansion, no accessory muscle use; good airway entry; no rales/ wheezing/ rhonchi noted CV:  S1, S2 present; regular rate and rhythm GI:  Abdomen soft, non-tender; (+) active bowel sounds Extremities:  +2 pulses on all extremities; no edema/ cyanosis/ clubbing noted; normal capillary refill Skin:  Warm; no rashes/ lesions noted Neurologic:  Following commands, nodding appropriately, moving all extremities Devices: OGT, Central line, ETT, Cardoza DATA:  
 
Current Facility-Administered Medications Medication Dose Route Frequency  sodium phosphate 6 mmol in 0.9% sodium chloride 250 mL infusion  6 mmol IntraVENous ONCE  
 vancomycin (VANCOCIN) 1500 mg in  ml infusion  1,500 mg IntraVENous Q24H  
 [START ON 5/4/2019] VANCOMYCIN INFORMATION NOTE   Other ONCE  
 albuterol-ipratropium (DUO-NEB) 2.5 mg-0.5 mg/3 ml nebulizer solution  sodium chloride (NS) flush 5-10 mL  5-10 mL IntraVENous PRN  propofol (DIPRIVAN) infusion  0-50 mcg/kg/min IntraVENous TITRATE  VANCOMYCIN INFORMATION NOTE   Other Rx Dosing/Monitoring  NOREPINephrine (LEVOPHED) 8 mg in 0.9% NS 250ml infusion  2-16 mcg/min IntraVENous TITRATE  methylPREDNISolone (PF) (SOLU-MEDROL) injection 60 mg  60 mg IntraVENous Q6H  
 albuterol-ipratropium (DUO-NEB) 2.5 MG-0.5 MG/3 ML  3 mL Nebulization Q4H RT  
  budesonide (PULMICORT) 500 mcg/2 ml nebulizer suspension  500 mcg Nebulization BID RT  
 arformoterol (BROVANA) neb solution 15 mcg  15 mcg Nebulization BID RT  
 insulin lispro (HUMALOG) injection   SubCUTAneous Q6H  
 glucose chewable tablet 16 g  4 Tab Oral PRN  
 glucagon (GLUCAGEN) injection 1 mg  1 mg IntraMUSCular PRN  
 dextrose (D50) infusion 12.5-25 g  25-50 mL IntraVENous PRN  pantoprazole (PROTONIX) 40 mg in sodium chloride 0.9% 10 mL injection  40 mg IntraVENous Q12H  
 fentaNYL citrate (PF) injection 25-50 mcg  25-50 mcg IntraVENous Q2H PRN  
 acetaminophen (TYLENOL) tablet 650 mg  650 mg Oral Q6H PRN  
 ondansetron (ZOFRAN) injection 4 mg  4 mg IntraVENous Q6H PRN  
 heparin (porcine) injection 5,000 Units  5,000 Units SubCUTAneous Q8H  
 [START ON 5/3/2019] levoFLOXacin (LEVAQUIN) 750 mg in D5W IVPB  750 mg IntraVENous Q48H  piperacillin-tazobactam (ZOSYN) 3.375 g in 0.9% sodium chloride (MBP/ADV) 100 mL MBP  #EXTENDED 4-HOUR INFUSION##  3.375 g IntraVENous Q8H  
 0.9% sodium chloride infusion  75 mL/hr IntraVENous CONTINUOUS Labs: Results:  
   
Chemistry Recent Labs 05/02/19 
0409 05/01/19 
1020 05/01/19 
0440 * 146* 100*  141 138  
K 4.6 4.8 7.1*  
* 106 101 CO2 27 26 31 BUN 34* 31* 32* CREA 2.34* 2.90* 3.69* CA 8.3* 8.1* 8.2* AGAP 6 9 6 BUCR 15 11* 9* AP 74  --  106  
TP 6.7  --  8.0 ALB 2.5*  --  3.1*  
GLOB 4.2*  --  4.9* AGRAT 0.6*  --  0.6* CBC w/Diff Recent Labs 05/02/19 
0409 05/01/19 
0440 WBC 7.3 12.4 RBC 4.38* 4.72  
HGB 12.6* 13.7 HCT 39.0 45.6 * 141 GRANS 86* 85* LYMPH 11* 7* EOS 0 0 Coagulation Recent Labs 05/02/19 0409 05/01/19 
0440 PTP  --  14.1 INR  --  1.1 APTT 31.4  -- Liver Enzymes Recent Labs 05/02/19 0409 TP 6.7 ALB 2.5* AP 74 SGOT 18 ABG Lab Results Component Value Date/Time  PHI 7.515 (H) 05/02/2019 08:57 AM  
 PCO2I 31.6 (L) 05/02/2019 08:57 AM  
 PO2I 79 (L) 05/02/2019 08:57 AM  
 HCO3I 25.5 05/02/2019 08:57 AM  
 FIO2I 0.40 05/02/2019 08:57 AM  
  
Microbiology Recent Labs 05/01/19 
5751 05/01/19 
0440 CULT NO GROWTH AFTER 19 HOURS NO GROWTH 1 DAY Telemetry: [x]Sinus []A-flutter []Paced []A-fib []Multiple PVCs Imaging: CXR Results  (Last 48 hours) 05/02/19 0415  XR CHEST PORT Final result Impression:  IMPRESSION:  
   
1. Satisfactory position endotracheal tube, interval placement NG tube. 2. Interval increased opacity lower lung zones right greater than left  
suggestive of worsening infiltrate atelectasis or edema, with suggestion of  
small pleural effusion right greater than left. Narrative:  EXAM: Portable frontal view of the chest.  
   
CLINICAL INDICATION/HISTORY: Shortness of breath, follow-up intubation COMPARISON: 5/1/2019  
   
_______________ FINDINGS:   
   
Stable and satisfactory position endotracheal tube, interval placement NG tube  
extending into the stomach with the tip not included on field of view. Persistent hilar vascular prominence with interval increased hazy density right  
lower hemithorax obscuring right hemidiaphragm with blunting costophrenic angle. Interval increased retrocardiac density obscuring left hemidiaphragm with slight  
blunting left lateral costophrenic angle. Heart size upper limits of normal  
given for technique. No pneumothorax. .  
   
_______________  
   
  
 05/01/19 0427  XR CHEST PORT Final result Impression:  IMPRESSION:  
   
   
1. Endotracheal tube in position as above. 2. Right perihilar and basilar atelectasis/ airspace disease and small bilateral  
pleural effusions. Narrative:  EXAM: XR CHEST PORT  
   
CLINICAL INDICATION/HISTORY: Abscess, respiratory failure, endotracheal tube  
placement  
-Additional: None COMPARISON: Several prior exams, most recently 2/13/2019. TECHNIQUE: Frontal view of the chest  
   
_______________ FINDINGS:  
   
SUPPORT DEVICES: There is an endotracheal tube which projects approximately 3.3  
cm above the reggie. HEART AND MEDIASTINUM: Cardiac size and mediastinal contours as visualized  
appear stable. LUNGS AND PLEURAL SPACES: The lungs are moderately underexpanded, similar to  
prior. Patchy right perihilar and bilateral lower lung opacities noted. No  
pneumothorax. Small bilateral pleural effusions. BONY THORAX AND SOFT TISSUES: No acute osseous abnormality  
   
_______________ CT Results  (Last 48 hours) 05/01/19 0615  CT CHEST ABD PELV WO CONT Final result Impression:  IMPRESSION:  
   
1. Patchy airspace opacities are present within both lungs, concerning for  
multifocal pneumonia. Small associated bilateral pleural effusions are present. Edema is less likely diagnostic consideration. 2. Destructive changes at the T3/T4 and T9/T10 level again noted, related to  
known discitis osteomyelitis. Preliminary report was provided by the on-call resident. Narrative:  EXAM: CT of the chest, abdomen, and pelvis INDICATION: Sepsis. Acute renal failure. Intubated. COMPARISON: CT chest 2/4/2019, 1/21/2019, 12/23/2018 TECHNIQUE: Axial CT imaging of the chest, abdomen, and pelvis was performed  
without intravenous contrast. Multiplanar reformats were generated. One or more  
dose reduction techniques were used on this CT: automated exposure control,  
adjustment of the mAs and/or kVp according to patient size, and iterative  
reconstruction techniques. The specific techniques used on this CT exam have  
been documented in the patient's electronic medical record.   Digital Imaging and  
Communications in Medicine (DICOM) format image data are available to  
 nonaffiliated external healthcare facilities or entities on a secured, media  
free, reciprocally searchable basis with patient authorization for at least a  
12-month period after this study. _______________ FINDINGS:  
   
CHEST:  
   
BASE OF THE NECK: Normal.  
   
LUNGS: Patchy airspace opacities are present within both lungs, concerning for  
multifocal pneumonia. Focal areas of consolidation are present within both lower  
lungs. Small bilateral pleural effusions with adjacent atelectasis. AIRWAY: The patient is intubated. Motion artifact obscures consolidation. The  
airways are otherwise patent. PLEURA: Small bilateral pleural effusions with adjacent atelectasis. MEDIASTINUM: Normal heart size. No pericardial effusion. Again noted is mild  
prominence of the central pulmonary artery, suggestive of potential.  
   
LYMPH NODES: No enlarged lymph nodes. OTHER: None.  
   
===============  
   
ABDOMEN/PELVIS:  
   
LIVER, BILIARY: The liver has a nodular contour. Liver is otherwise  
unremarkable. No biliary dilation. Gallbladder is unremarkable. PANCREAS: The unenhanced pancreas is SPLEEN: Normal. Splenorenal shunt is present. ADRENALS: Nodularity of the left adrenal gland is unchanged. KIDNEYS, URETERS, BLADDER: Cardoza catheter is present. No hydronephrosis. LYMPH NODES: No enlarged lymph nodes. GASTROINTESTINAL TRACT: No bowel dilation or wall thickening. PELVIC ORGANS: Unremarkable. VASCULATURE: Right femoral line is present. Cardoza catheter is present. BONES: Erosive changes related to known discitis/osteomyelitis at the T3/T4  
level and T9/T10 level, in keeping with known osteomyelitis. OTHER: None.  
   
_______________  
   
  
 05/01/19 0615  CT HEAD WO CONT Final result Impression:  IMPRESSION:  
   
1. No acute intracranial abnormalities. 2. Diffuse fluid is filling the nasopharynx and nasal passageways, which could  
be related to aspiration. Patient does not appear to be intubated at the time of  
examination. Small layering fluid levels are present within both sphenoid  
sinuses. Preliminary report was provided by the on-call resident. Narrative:  EXAM: CT head INDICATION: Confusion/delirium, altered mental status. COMPARISON: 12/16/2018 TECHNIQUE: Axial CT imaging of the head was performed without intravenous  
contrast. One or more dose reduction techniques were used on this CT: automated  
exposure control, adjustment of the mAs and/or kVp according to patient size,  
and iterative reconstruction techniques. The specific techniques used on this CT exam have been documented in the patient's electronic medical record. Digital Imaging and Communications in Medicine (DICOM) format image data are  
available to nonaffiliated external healthcare facilities or entities on a  
secured, media free, reciprocally searchable basis with patient authorization  
for at least a 12-month period after this study. _______________ FINDINGS:  
   
BRAIN AND POSTERIOR FOSSA: There is no intracranial hemorrhage, mass effect, or  
midline shift. There are no areas of abnormal parenchymal attenuation. EXTRA-AXIAL SPACES AND MENINGES: There are no abnormal extra-axial fluid  
collections. CALVARIUM: Intact. SINUSES: The nasopharynx is completely filled with low attenuation fluid which  
extends into the nasal passages. Small layering air-fluid levels are present  
within the sphenoid sinuses. This could be related to aspiration or intubation. OTHER: None.  
   
_______________ IMPRESSION:  
· Acute on chronic respiratory failure · Shock- off pressor · Possible PNA- Abx · PRESLEY on CKD- Cr trending down · Hyperkalemia- resolved · Elevated BNP- trending down · Illicit drug use- UDS +cocaine · Hyperammonemia- Trending down Hx · COPD · Arthritis · Drug abuse · HTN 
· Hep C PLAN:  
· Resp -  VAP bundle. Titrate Fi02/supp 02 for Sp02 > 90. Daily ABG and CXR. Scheduled nebulizeres, steroids. SBT- plan to extubate to BIPAP today. · ID - Severe sepsis bundle. Afebrile and aleukocytosis. Follow BCx and UCx. Continue ABx for now. Lactic acidosis- Q4 hr until < 2.   
· CVS - HD stable. Echo ordered. · Heme/Onc- H/H and platelets stable. Daily CBC. · Metabolic - Trend and replace electrolytes as needed. Daily BMP. · Renal - Cr continues to improve. Trend renal indices. Cardoza for strict I/O. 
· Endocrine - SSI, Q6 BS. · Neuro/ Pain/ Sedation - Hold sedation for SBT. · GI - NPO 
· Prophylaxis - DVT, GI- Protonix. SQH 
· Discussed in interdisciplinary rounds The patient is: [] acutely ill Risk of deterioration: [x] moderate [x] critically ill  [] high  
 
[x]See my orders for details My assessment/plan was discussed with: 
[x]nursing []PT/OT [x]respiratory therapy [x]Dr. Mauricio Conway  
[]family [] [x]Total critical care time exclusive of procedures 40 minutes Kelli Rick NP

## 2019-05-02 NOTE — PROGRESS NOTES
Internal Medicine Progress Note Patient's Name: Jose Lopez Admit Date: 5/1/2019 Length of Stay: 1 Assessment/Plan Principal Problem: 
  Acute respiratory failure ([facility] [address] (5/1/2019) Active Problems: 
  Acute kidney injury superimposed on chronic kidney disease ([facility] [address] (5/2/2019) Bilateral pleural effusion (11/14/2018) PNA (pneumonia) (1/26/2019) Hyperkalemia (5/2/2019) Elevated brain natriuretic peptide (BNP) level (5/2/2019) Elevated lactic acid level (5/2/2019) Septic shock ([facility] [address] (5/2/2019) Drug use (5/2/2019) Acute resp failure Intubated, sedated, SBT begun Most likely due to septic shock, PNA, effusions Hx of COPD, chronic hypoxemia Steroids, breathing tx, Septic Shock Pressors initially initiated, Off levophed CT chest concerning for multifocal PNA - broad spectrum abx initiated Urine culture NGTD Blood culture NGTD PRESLEY on CKD Nephrology consulted - appreciate services Creatinine improving Most likely due to shock Hyperkalemia Improved Monitor Elevated BNP Hx of HF Does not appear to be volume overload ECHO pending Elevated Lactic Most likely due to sepsis/acute resp failure Improving Drug use Positive cocaine on UDS 
 
- Cont acceptable home medications for chronic conditions  
- DVT protocol I have personally reviewed all pertinent labs and films that have officially resulted over the last 24 hours. I have personally checked for all pending labs that are awaiting final results. Interval History Jose Lopez is a 77 y.o. male who is currently being admitted to the ICU - pt was found with AMS barely breathing by EMS - intubated in the field - brought to the ER. Per ER chart review - EMS called by pt's mother since she hadnt heard from him in a few hrs. Pt is unable to give any info ER eval - Sepsis with Acute Hypoxic Resp failure , Metab Encephalopathy, ARF , Hyperkalemia. Pt is currently being admitted to the ICU. Ct head - no acute intracranial abnorm, Diffuse fluid is filling the nasopharynx and nasal passageways, which could be related to aspiration. Pressors added. Pressors d/c, BP improved. Nephrology consulted - hyperkalemia improved, PRESLEY on CKD improving. SBT started. Subjective Pt s/e @ bedside. Eyes are open, Tolerating vent well, intubated, mildly sedated. NAD Objective Visit Vitals /85 Pulse 99 Temp 98.8 °F (37.1 °C) Resp 16 Ht 6' 0.01\" (1.829 m) Wt 114.7 kg (252 lb 14.4 oz) SpO2 100% BMI 34.29 kg/m² Physical Exam: 
General Appearance: NAD, intubated, HENT: normocephalic/atraumatic, moist mucus membranes Neck: No JVD, supple Lungs: Course breath sounds bilat, no wheeze, or crackles CV: RRR, no m/r/g Abdomen: soft, non-tender, normal bowel sounds Extremities: no cyanosis, no peripheral edema, bandages to bilateral knee wounds, no erythema around sites : Cardoza in place, no blood at meatus Neuro: intubated, eyes are open, follows commands, moving lower extrems, Intake and Output: 
Current Shift:  05/02 0701 - 05/02 1900 In: 168 [I.V.:168] Out: 30 [Urine:30] Last three shifts:  04/30 1901 - 05/02 0700 In: 3102.6 [I.V.:3102.6] Out: 825 [Urine:825] Lab/Data Reviewed: All lab results for the last 24 hours reviewed. Imaging Reviewed: 
[facility] Result Date: 5/2/2019 EXAM: Portable frontal view of the chest. CLINICAL INDICATION/HISTORY: Shortness of breath, follow-up intubation COMPARISON: 5/1/2019 _______________ FINDINGS: Stable and satisfactory position endotracheal tube, interval placement NG tube extending into the stomach with the tip not included on field of view. Persistent hilar vascular prominence with interval increased hazy density right lower hemithorax obscuring right hemidiaphragm with blunting costophrenic angle.  Interval increased retrocardiac density obscuring left hemidiaphragm with slight blunting left lateral costophrenic angle. Heart size upper limits of normal given for technique. No pneumothorax. . _______________ IMPRESSION: 1. Satisfactory position endotracheal tube, interval placement NG tube. 2. Interval increased opacity lower lung zones right greater than left suggestive of worsening infiltrate atelectasis or edema, with suggestion of small pleural effusion right greater than left. Xr Abd Port  1 V Result Date: 5/1/2019 EXAM: XR ABD PORT  1 V CLINICAL INDICATION/HISTORY: OG Tube placement   > Additional: None. COMPARISON: Same-day chest radiograph, prior CT May 1, 2019. TECHNIQUE: AP supine projection of the abdomen on 3 exposures. _______________ FINDINGS: Orogastric tube below the diaphragm with tip projecting over the stomach. Right femoral approach central venous catheter noted. Temperature probe from indwelling Cardoza catheter noted at the lower pelvic midline. Nonobstructive and nonspecific bowel gas pattern. Bilateral pleural effusions and lower lung/retrocardiac opacities are unchanged. _______________ IMPRESSION: 1. Orogastric tube present within the stomach. 2. Small pleural effusions and basilar atelectasis/airspace disease. Medications Reviewed: 
Current Facility-Administered Medications Medication Dose Route Frequency  sodium phosphate 6 mmol in 0.9% sodium chloride 250 mL infusion  6 mmol IntraVENous ONCE  
 vancomycin (VANCOCIN) 1500 mg in  ml infusion  1,500 mg IntraVENous Q24H  
 [START ON 5/4/2019] VANCOMYCIN INFORMATION NOTE   Other ONCE  
 albuterol-ipratropium (DUO-NEB) 2.5 mg-0.5 mg/3 ml nebulizer solution  sodium chloride (NS) flush 5-10 mL  5-10 mL IntraVENous PRN  propofol (DIPRIVAN) infusion  0-50 mcg/kg/min IntraVENous TITRATE  VANCOMYCIN INFORMATION NOTE   Other Rx Dosing/Monitoring  NOREPINephrine (LEVOPHED) 8 mg in 0.9% NS 250ml infusion  2-16 mcg/min IntraVENous TITRATE  methylPREDNISolone (PF) (SOLU-MEDROL) injection 60 mg  60 mg IntraVENous Q6H  
 albuterol-ipratropium (DUO-NEB) 2.5 MG-0.5 MG/3 ML  3 mL Nebulization Q4H RT  
 budesonide (PULMICORT) 500 mcg/2 ml nebulizer suspension  500 mcg Nebulization BID RT  
 arformoterol (BROVANA) neb solution 15 mcg  15 mcg Nebulization BID RT  
 insulin lispro (HUMALOG) injection   SubCUTAneous Q6H  
 glucose chewable tablet 16 g  4 Tab Oral PRN  
 glucagon (GLUCAGEN) injection 1 mg  1 mg IntraMUSCular PRN  
 dextrose (D50) infusion 12.5-25 g  25-50 mL IntraVENous PRN  pantoprazole (PROTONIX) 40 mg in sodium chloride 0.9% 10 mL injection  40 mg IntraVENous Q12H  
 fentaNYL citrate (PF) injection 25-50 mcg  25-50 mcg IntraVENous Q2H PRN  
 acetaminophen (TYLENOL) tablet 650 mg  650 mg Oral Q6H PRN  
 ondansetron (ZOFRAN) injection 4 mg  4 mg IntraVENous Q6H PRN  
 heparin (porcine) injection 5,000 Units  5,000 Units SubCUTAneous Q8H  
 [START ON 5/3/2019] levoFLOXacin (LEVAQUIN) 750 mg in D5W IVPB  750 mg IntraVENous Q48H  piperacillin-tazobactam (ZOSYN) 3.375 g in 0.9% sodium chloride (MBP/ADV) 100 mL MBP  #EXTENDED 4-HOUR INFUSION##  3.375 g IntraVENous Q8H  
 0.9% sodium chloride infusion  75 mL/hr IntraVENous CONTINUOUS Alysha Kwan PA-C [address] [place], [place] [address] Pager: [phone] Office: [phone]

## 2019-05-02 NOTE — PROGRESS NOTES
In Patient Progress note Admit Date: 5/1/2019 Impression: 1. PRESLEY. D/t prerenal azotemia as indicated by improved renal parameters Off pressors , hemodynamically stable , continue gentle hydration Respiratory status improved , noted plans for extubation today 2. Bl pneumonia/septic shock , per PCCM 3. COPD. On breathing treatments/steroids. 4. Resp failure d/t #2,3 
5. UDS pos for cocaine. 6. Hyperkalemia , resolved Please call with questions, 
 
Han Cochran MD FASN Cell [phone] Pager: [phone] Subjective: Intubated and sedated Current Facility-Administered Medications:  
  vancomycin (VANCOCIN) 1500 mg in  ml infusion, 1,500 mg, IntraVENous, Q24H, April Drafts, DO, Last Rate: 250 mL/hr at 05/02/19 0953, 1,500 mg at 05/02/19 8913   [START ON 5/4/2019] VANCOMYCIN INFORMATION NOTE, , Other, ONCE, Bandar Gonzalez H, DO 
  albuterol-ipratropium (DUO-NEB) 2.5 mg-0.5 mg/3 ml nebulizer solution, , , ,  
  sodium chloride (NS) flush 5-10 mL, 5-10 mL, IntraVENous, PRN, Richard Maravilla MD 
  VANCOMYCIN INFORMATION NOTE, , Other, Rx Dosing/Monitoring, Richard Maravilla MD 
  NOREPINephrine (LEVOPHED) 8 mg in 0.9% NS 250ml infusion, 2-16 mcg/min, IntraVENous, TITRATE, Richard Maravilla MD, Stopped at 05/02/19 5404   methylPREDNISolone (PF) (SOLU-MEDROL) injection 60 mg, 60 mg, IntraVENous, Q6H, Richard Maravilla MD, 60 mg at 05/02/19 1120 
  albuterol-ipratropium (DUO-NEB) 2.5 MG-0.5 MG/3 ML, 3 mL, Nebulization, Q4H RT, Richard Maravilla MD, 3 mL at 05/02/19 1221 
  budesonide (PULMICORT) 500 mcg/2 ml nebulizer suspension, 500 mcg, Nebulization, BID RT, Richard Maravilla MD, 500 mcg at 05/02/19 7255   arformoterol (BROVANA) neb solution 15 mcg, 15 mcg, Nebulization, BID RT, Richard Maravilla MD, 15 mcg at 05/02/19 7628   insulin lispro (HUMALOG) injection, , SubCUTAneous, Q6H, Richard Maravilla MD, Stopped at 05/01/19 6963   glucose chewable tablet 16 g, 4 Tab, Oral, PRN, Ivis Salinas MD 
  glucagon [facility]) injection 1 mg, 1 mg, IntraMUSCular, PRN, Ivis Salinas MD 
  dextrose (D50) infusion 12.5-25 g, 25-50 mL, IntraVENous, PRN, Ivis Salinas MD 
  pantoprazole (PROTONIX) 40 mg in sodium chloride 0.9% 10 mL injection, 40 mg, IntraVENous, Q12H, Ivis Salinas MD, 40 mg at 05/02/19 0017   fentaNYL citrate (PF) injection 25-50 mcg, 25-50 mcg, IntraVENous, Q2H PRN, Ivis Salinas MD, 50 mcg at 05/01/19 2125   acetaminophen (TYLENOL) tablet 650 mg, 650 mg, Oral, Q6H PRN, Ivis Salinas MD 
  ondansetron [facility]) injection 4 mg, 4 mg, IntraVENous, Q6H PRN, Ivis Salinas MD 
  heparin (porcine) injection 5,000 Units, 5,000 Units, SubCUTAneous, Q8H, Ivis Salinas MD, 5,000 Units at 05/02/19 1121 
  [START ON 5/3/2019] levoFLOXacin (LEVAQUIN) 750 mg in D5W IVPB, 750 mg, IntraVENous, Q48H, Stan Rubio DO 
  [COMPLETED] piperacillin-tazobactam (ZOSYN) 3.375 g in 0.9% sodium chloride (MBP/ADV) 100 mL MBP, 3.375 g, IntraVENous, ONCE, Stopped at 05/01/19 2117 **FOLLOWED BY** piperacillin-tazobactam (ZOSYN) 3.375 g in 0.9% sodium chloride (MBP/ADV) 100 mL MBP  #EXTENDED 4-HOUR INFUSION##, 3.375 g, IntraVENous, Q8H, Ivis Salinas MD, Last Rate: 25 mL/hr at 05/02/19 0954, 3.375 g at 05/02/19 0954 
  0.9% sodium chloride infusion, 75 mL/hr, IntraVENous, CONTINUOUS, Angelo Smith MD, Last Rate: 75 mL/hr at 05/01/19 2115, 75 mL/hr at 05/01/19 2115 Objective:  
 
Visit Vitals /82 Pulse 81 Temp 97.9 °F (36.6 °C) Resp 20 Ht 6' 0.01\" (1.829 m) Wt 114.7 kg (252 lb 14.4 oz) SpO2 100% BMI 34.29 kg/m² Intake/Output Summary (Last 24 hours) at 5/2/2019 1557 Last data filed at 5/2/2019 1300 Gross per 24 hour Intake 2288.17 ml Output 785 ml Net 1503.17 ml Physical Exam:  
 
gen NAD HENT mmm RS AEBE clear CVS s1 s2 wnl no JVD 
GI soft BS + 
 
 Data Review: 
 
Recent Labs 05/02/19 
0409 WBC 7.3  
RBC 4.38* HCT 39.0 MCV 89.0 MCH 28.8 MCHC 32.3 RDW 13.9 Recent Labs 05/02/19 
0409 05/01/19 
1020 05/01/19 
0440 BUN 34* 31* 32* CREA 2.34* 2.90* 3.69* CA 8.3* 8.1* 8.2* ALB 2.5*  --  3.1*  
K 4.6 4.8 7.1*  141 138 * 106 101 CO2 27 26 31 PHOS 1.9*  --   --   
* 146* 100* Ta Arias MD

## 2019-05-02 NOTE — PROGRESS NOTES
Physical Exam  
Skin: Skin is warm and dry. 2-person skin assessment completed by Dwain Kirkland RN.

## 2019-05-02 NOTE — PROGRESS NOTES
Blood cultures- positive-Aerobic Bottle, Gram +cocci and clusters. Dr. Ada Guerrero is aware & notified. Pt is on Vancomycin and MD is aware and has no orders at this time.

## 2019-05-02 NOTE — PROGRESS NOTES
Received patient on vent support: ACVC+, rate 16, , PEEP 5, FiO2 0.40. Current virtals: HR 90, SpO2 98, RR 19, /76, BS rhonchi (suctioned large amount of thick yellow/tan secretions). Size 7.5mm ETT is patent and secure at 26cm at the lips. Ambu bag/mask at bedside. ABG drawn on current vent settings: ACVC+, rate 16, , PEEP 5, FiO2 0.40. Results: pH 7.515, pCO2 31.6, pO2 79, BE 3, HCO3 25.5, sO2 97. Results given to MD Baltazar. SBT today.

## 2019-05-02 NOTE — PROGRESS NOTES
Received call back from pt's dtr, Vinny Cortez, verified demographics. States does live with his mother. Just came home from rehab in [facility] 1st-2nd week in April. Has walker. States she thinks he's getting home health. States not sure if he has followed up with PCP since he's been home. Plan: @ this time, will be home with home health. Will cont to follow. Francisca Linares RN,ext [phone].

## 2019-05-02 NOTE — PROGRESS NOTES
3265 SBT started: HR 95, SpO2 100, RR 17, Ve 7.8, , RSBI 63. Good efforts, no resp distress noted. Patient has a strong, productive cough.

## 2019-05-02 NOTE — PROGRESS NOTES
0700: Bedside shift change report given to [address] [place] [address] 
 (oncoming nurse) by Liv Sanchez RN 
 (offgoing nurse). Report included the following information SBAR, MAR and Recent Results. 0845: Turned profolol off per Alvena Clink, NP 
 
2103: Pt initiated on a SBT. Follow Respiratory notes. 1230: PT extubated to BiPaP. Tolerating well. 1430: ECHO tech at bedside. 1900: Bedside shift change report given to [address] [place] [address] 
 (oncoming nurse) by Meseret Villar RN (offgoing nurse). Report included the following information SBAR, MAR and Recent Results.

## 2019-05-02 NOTE — ROUTINE PROCESS
Bedside and Verbal shift change report given to Gisella (oncoming nurse) by Marnie (offgoing nurse). Report included the following information SBAR, Kardex, ED Summary, Procedure Summary, Intake/Output, MAR, Accordion, Recent Results, Med Rec Status, Cardiac Rhythm  NSR w/ inverted T-waves, Alarm Parameters  and Quality Measures.

## 2019-05-03 PROBLEM — R79.89 ELEVATED LACTIC ACID LEVEL: Status: RESOLVED | Noted: 2019-05-02 | Resolved: 2019-05-03

## 2019-05-03 PROBLEM — E87.5 HYPERKALEMIA: Status: RESOLVED | Noted: 2019-05-02 | Resolved: 2019-05-03

## 2019-05-03 LAB
ALBUMIN SERPL-MCNC: 2.4 G/DL (ref 3.4–5)
ALBUMIN/GLOB SERPL: 0.6 {RATIO} (ref 0.8–1.7)
ALP SERPL-CCNC: 64 U/L (ref 45–117)
ALT SERPL-CCNC: 14 U/L (ref 16–61)
AMMONIA PLAS-SCNC: 26 UMOL/L (ref 11–32)
ANION GAP SERPL CALC-SCNC: 4 MMOL/L (ref 3–18)
AST SERPL-CCNC: 20 U/L (ref 15–37)
BACTERIA SPEC CULT: NORMAL
BASOPHILS # BLD: 0 K/UL (ref 0–0.1)
BASOPHILS NFR BLD: 0 % (ref 0–2)
BILIRUB SERPL-MCNC: 0.6 MG/DL (ref 0.2–1)
BUN SERPL-MCNC: 37 MG/DL (ref 7–18)
BUN/CREAT SERPL: 21 (ref 12–20)
CALCIUM SERPL-MCNC: 8.4 MG/DL (ref 8.5–10.1)
CHLORIDE SERPL-SCNC: 111 MMOL/L (ref 100–108)
CO2 SERPL-SCNC: 27 MMOL/L (ref 21–32)
CREAT SERPL-MCNC: 1.76 MG/DL (ref 0.6–1.3)
DIFFERENTIAL METHOD BLD: ABNORMAL
ECHO PULMONARY ARTERY SYSTOLIC PRESSURE (PASP): 27 MMHG
EOSINOPHIL # BLD: 0 K/UL (ref 0–0.4)
EOSINOPHIL NFR BLD: 0 % (ref 0–5)
ERYTHROCYTE [DISTWIDTH] IN BLOOD BY AUTOMATED COUNT: 14.2 % (ref 11.6–14.5)
GLOBULIN SER CALC-MCNC: 4.1 G/DL (ref 2–4)
GLUCOSE BLD STRIP.AUTO-MCNC: 104 MG/DL (ref 70–110)
GLUCOSE BLD STRIP.AUTO-MCNC: 115 MG/DL (ref 70–110)
GLUCOSE BLD STRIP.AUTO-MCNC: 139 MG/DL (ref 70–110)
GLUCOSE SERPL-MCNC: 117 MG/DL (ref 74–99)
HCT VFR BLD AUTO: 38.1 % (ref 36–48)
HGB BLD-MCNC: 12.2 G/DL (ref 13–16)
LACTATE SERPL-SCNC: 1.2 MMOL/L (ref 0.4–2)
LYMPHOCYTES # BLD: 0.6 K/UL (ref 0.9–3.6)
LYMPHOCYTES NFR BLD: 11 % (ref 21–52)
MAGNESIUM SERPL-MCNC: 2.4 MG/DL (ref 1.6–2.6)
MCH RBC QN AUTO: 28.4 PG (ref 24–34)
MCHC RBC AUTO-ENTMCNC: 32 G/DL (ref 31–37)
MCV RBC AUTO: 88.6 FL (ref 74–97)
MONOCYTES # BLD: 0.2 K/UL (ref 0.05–1.2)
MONOCYTES NFR BLD: 4 % (ref 3–10)
NEUTS SEG # BLD: 4.3 K/UL (ref 1.8–8)
NEUTS SEG NFR BLD: 85 % (ref 40–73)
PHOSPHATE SERPL-MCNC: 3.1 MG/DL (ref 2.5–4.9)
PLATELET # BLD AUTO: 126 K/UL (ref 135–420)
PMV BLD AUTO: 11.2 FL (ref 9.2–11.8)
POTASSIUM SERPL-SCNC: 4.3 MMOL/L (ref 3.5–5.5)
PROT SERPL-MCNC: 6.5 G/DL (ref 6.4–8.2)
RBC # BLD AUTO: 4.3 M/UL (ref 4.7–5.5)
SERVICE CMNT-IMP: NORMAL
SODIUM SERPL-SCNC: 142 MMOL/L (ref 136–145)
WBC # BLD AUTO: 5.1 K/UL (ref 4.6–13.2)

## 2019-05-03 PROCEDURE — 94664 DEMO&/EVAL PT USE INHALER: CPT

## 2019-05-03 PROCEDURE — 94640 AIRWAY INHALATION TREATMENT: CPT

## 2019-05-03 PROCEDURE — 82140 ASSAY OF AMMONIA: CPT

## 2019-05-03 PROCEDURE — 94660 CPAP INITIATION&MGMT: CPT

## 2019-05-03 PROCEDURE — 84100 ASSAY OF PHOSPHORUS: CPT

## 2019-05-03 PROCEDURE — 83605 ASSAY OF LACTIC ACID: CPT

## 2019-05-03 PROCEDURE — 74011000250 HC RX REV CODE- 250: Performed by: HOSPITALIST

## 2019-05-03 PROCEDURE — 92610 EVALUATE SWALLOWING FUNCTION: CPT

## 2019-05-03 PROCEDURE — 36591 DRAW BLOOD OFF VENOUS DEVICE: CPT

## 2019-05-03 PROCEDURE — 74011250636 HC RX REV CODE- 250/636: Performed by: HOSPITALIST

## 2019-05-03 PROCEDURE — 94761 N-INVAS EAR/PLS OXIMETRY MLT: CPT

## 2019-05-03 PROCEDURE — 77030021352 HC CBL LD SYS DISP COVD -B

## 2019-05-03 PROCEDURE — 65660000000 HC RM CCU STEPDOWN

## 2019-05-03 PROCEDURE — 82962 GLUCOSE BLOOD TEST: CPT

## 2019-05-03 PROCEDURE — 74011250636 HC RX REV CODE- 250/636: Performed by: INTERNAL MEDICINE

## 2019-05-03 PROCEDURE — 74011000258 HC RX REV CODE- 258: Performed by: HOSPITALIST

## 2019-05-03 PROCEDURE — 77010033678 HC OXYGEN DAILY

## 2019-05-03 PROCEDURE — 80053 COMPREHEN METABOLIC PANEL: CPT

## 2019-05-03 PROCEDURE — 83735 ASSAY OF MAGNESIUM: CPT

## 2019-05-03 PROCEDURE — 85025 COMPLETE CBC W/AUTO DIFF WBC: CPT

## 2019-05-03 PROCEDURE — C9113 INJ PANTOPRAZOLE SODIUM, VIA: HCPCS | Performed by: HOSPITALIST

## 2019-05-03 PROCEDURE — 36415 COLL VENOUS BLD VENIPUNCTURE: CPT

## 2019-05-03 PROCEDURE — 94762 N-INVAS EAR/PLS OXIMTRY CONT: CPT

## 2019-05-03 RX ORDER — PANTOPRAZOLE SODIUM 40 MG/10ML
40 INJECTION, POWDER, LYOPHILIZED, FOR SOLUTION INTRAVENOUS DAILY
Status: DISCONTINUED | OUTPATIENT
Start: 2019-05-03 | End: 2019-05-03

## 2019-05-03 RX ORDER — PANTOPRAZOLE SODIUM 40 MG/10ML
40 INJECTION, POWDER, LYOPHILIZED, FOR SOLUTION INTRAVENOUS DAILY
Status: DISCONTINUED | OUTPATIENT
Start: 2019-05-04 | End: 2019-05-04 | Stop reason: HOSPADM

## 2019-05-03 RX ADMIN — IPRATROPIUM BROMIDE AND ALBUTEROL SULFATE 3 ML: .5; 3 SOLUTION RESPIRATORY (INHALATION) at 07:35

## 2019-05-03 RX ADMIN — BUDESONIDE 500 MCG: 0.5 INHALANT RESPIRATORY (INHALATION) at 07:35

## 2019-05-03 RX ADMIN — SODIUM CHLORIDE 75 ML/HR: 900 INJECTION, SOLUTION INTRAVENOUS at 05:46

## 2019-05-03 RX ADMIN — LEVOFLOXACIN 750 MG: 5 INJECTION, SOLUTION INTRAVENOUS at 06:29

## 2019-05-03 RX ADMIN — METHYLPREDNISOLONE SODIUM SUCCINATE 60 MG: 40 INJECTION, POWDER, FOR SOLUTION INTRAMUSCULAR; INTRAVENOUS at 23:06

## 2019-05-03 RX ADMIN — METHYLPREDNISOLONE SODIUM SUCCINATE 60 MG: 40 INJECTION, POWDER, FOR SOLUTION INTRAMUSCULAR; INTRAVENOUS at 12:08

## 2019-05-03 RX ADMIN — IPRATROPIUM BROMIDE AND ALBUTEROL SULFATE 3 ML: .5; 3 SOLUTION RESPIRATORY (INHALATION) at 15:27

## 2019-05-03 RX ADMIN — PIPERACILLIN SODIUM,TAZOBACTAM SODIUM 3.38 G: 3; .375 INJECTION, POWDER, FOR SOLUTION INTRAVENOUS at 09:23

## 2019-05-03 RX ADMIN — IPRATROPIUM BROMIDE AND ALBUTEROL SULFATE 3 ML: .5; 3 SOLUTION RESPIRATORY (INHALATION) at 03:55

## 2019-05-03 RX ADMIN — VANCOMYCIN HYDROCHLORIDE 1500 MG: 10 INJECTION, POWDER, LYOPHILIZED, FOR SOLUTION INTRAVENOUS at 10:26

## 2019-05-03 RX ADMIN — ARFORMOTEROL TARTRATE 15 MCG: 15 SOLUTION RESPIRATORY (INHALATION) at 07:35

## 2019-05-03 RX ADMIN — IPRATROPIUM BROMIDE AND ALBUTEROL SULFATE 3 ML: .5; 3 SOLUTION RESPIRATORY (INHALATION) at 00:10

## 2019-05-03 RX ADMIN — HEPARIN SODIUM 5000 UNITS: 5000 INJECTION INTRAVENOUS; SUBCUTANEOUS at 05:01

## 2019-05-03 RX ADMIN — ARFORMOTEROL TARTRATE 15 MCG: 15 SOLUTION RESPIRATORY (INHALATION) at 20:52

## 2019-05-03 RX ADMIN — PIPERACILLIN SODIUM,TAZOBACTAM SODIUM 3.38 G: 3; .375 INJECTION, POWDER, FOR SOLUTION INTRAVENOUS at 02:38

## 2019-05-03 RX ADMIN — METHYLPREDNISOLONE SODIUM SUCCINATE 60 MG: 40 INJECTION, POWDER, FOR SOLUTION INTRAMUSCULAR; INTRAVENOUS at 06:29

## 2019-05-03 RX ADMIN — IPRATROPIUM BROMIDE AND ALBUTEROL SULFATE 3 ML: .5; 3 SOLUTION RESPIRATORY (INHALATION) at 11:32

## 2019-05-03 RX ADMIN — PANTOPRAZOLE SODIUM 40 MG: 40 INJECTION, POWDER, FOR SOLUTION INTRAVENOUS at 10:57

## 2019-05-03 RX ADMIN — IPRATROPIUM BROMIDE AND ALBUTEROL SULFATE 3 ML: .5; 3 SOLUTION RESPIRATORY (INHALATION) at 20:42

## 2019-05-03 RX ADMIN — HEPARIN SODIUM 5000 UNITS: 5000 INJECTION INTRAVENOUS; SUBCUTANEOUS at 15:36

## 2019-05-03 RX ADMIN — PIPERACILLIN SODIUM,TAZOBACTAM SODIUM 3.38 G: 3; .375 INJECTION, POWDER, FOR SOLUTION INTRAVENOUS at 18:23

## 2019-05-03 RX ADMIN — BUDESONIDE 500 MCG: 0.5 INHALANT RESPIRATORY (INHALATION) at 20:52

## 2019-05-03 NOTE — PROGRESS NOTES
05/03/19 0011 CPAP/BIPAP  
CPAP/BIPAP Start/Stop On Device Mode BIPAP  
$$ Bipap Daily Yes [employer] ID # [id number] Mask Type and Size Full face PIP Observed 13 cm H20 IPAP (cm H2O) 12 cm H2O  
EPAP (cm H2O) 5 cm H2O Inspiratory Time (sec) 0.9 seconds Vt Spont (ml) 460 ml Ve Observed (l/min) 11 l/min Backup Rate 8 Total RR (Spontaneous) 26 breaths per minute Insp Rise Time (sec) 3 Leak (Estimated) 32 L/min  
Pt's Home Machine No  
Biomedical Check Performed Yes Settings Verified Yes

## 2019-05-03 NOTE — PROGRESS NOTES
Problem: Dysphagia (Adult) Goal: *Acute Goals and Plan of Care (Insert Text) Description Recommendations: 
Diet: regular Meds: per pt preference Aspiration Precautions Oral Care TID Goals:  Patient will: 1. Tolerate PO trials with 0 s/s overt distress in 4/5 trials 2. Utilize compensatory swallow strategies/maneuvers (decrease bite/sip, size/rate, alt. liq/sol) with min cues in 4/5 trials Outcome: Progressing Towards Goal 
 
SPEECH LANGUAGE PATHOLOGY BEDSIDE SWALLOW EVALUATION Patient: Sherlyn Condon (67 y.o. male) Date: 5/3/2019 Primary Diagnosis: Sepsis ([facility] [A41.9] Acute respiratory failure ([facility] [J96.00] Precautions: aspiration PLOF: independent ASSESSMENT : 
Based on the objective data described below, the patient presents with mild oral dysphagia. Patient seen with thin liquids, puree, and solid trials with no silent, soft, or overt s/sx of aspiration. Patient with hoarse, wet vocal quality at baseline, continued throughout evaluation with no change despite throat clears. Patient with functional oral stage, timely swallow to palpation, and functional hyolaryngeal elevation. Patient with minimal dentition causing prolonged mastication of solids, otherwise functional. Patient endorses regular diet at home, \"I eat what you eat! Everything! \" At this time, SLP recommending regular diet with thin liquids, meds per pt preference. ST will follow up x1-2 visits to ensure diet tolerance and education. Patient will benefit from skilled intervention to address the above impairments. Patient's rehabilitation potential is considered to be Good Factors which may influence rehabilitation potential include: ? None noted ? Mental ability/status ? Medical condition ? Home/family situation and support systems ? Safety awareness ? Pain tolerance/management ? Other: PLAN : 
 Recommendations and Planned Interventions: 
Regular, thin Frequency/Duration: Patient will be followed by speech-language pathology 1-2 times to address goals. Discharge Recommendations: None SUBJECTIVE:  
Patient stated ? Im feeling sore? . 
 
OBJECTIVE:  
 
Past Medical History:  
Diagnosis Date Arthritis COPD Hypertension Past Surgical History:  
Procedure Laterality Date HX REFRACTIVE SURGERY Home Situation:  
Home Situation Home Environment: Apartment One/Two Story Residence: One story Living Alone: No 
Support Systems: Family member(s) [facility] Current DME Used/Available at Home: CPAP, Walker Diet prior to admission: regular Current Diet:  regular Cognitive and Communication Status: 
Neurologic State: Alert Orientation Level: Oriented X4 Cognition: Appropriate decision making Perception: Appears intact Perseveration: No perseveration noted Safety/Judgement: Awareness of environment Oral Assessment: 
Oral Assessment Labial: No impairment Dentition: Limited Oral Hygiene: good Lingual: No impairment Velum: No impairment Mandible: No impairment P.O. Trials: 
Patient Position: NPH18 Vocal quality prior to P.O.: Hoarse Consistency Presented: Thin liquid;Puree; Solid How Presented: SLP-fed/presented How Much: 12 Bolus Acceptance: No impairment Bolus Formation/Control: Impaired Type of Impairment: Mastication Propulsion: No impairment Oral Residue: None Initiation of Swallow: No impairment Laryngeal Elevation: Functional 
Aspiration Signs/Symptoms: None Pharyngeal Phase Characteristics: No impairment, issues, or problems Effective Modifications: None Cues for Modifications: None Oral Phase Severity: Mild Pharyngeal Phase Severity : No impairment PAIN: 
Pain level pre-treatment: 0/10 Pain level post-treatment: 0/10 Pain Intervention(s): Medication (see MAR); Rest, Ice, Repositioning Response to intervention: Nurse notified, See doc flow After treatment:  
?            Patient left in no apparent distress sitting up in chair ? Patient left in no apparent distress in bed ? Call bell left within reach ? Nursing notified ? Family present ? Caregiver present ? Bed alarm activated COMMUNICATION/EDUCATION:  
?            Aspiration precautions; swallow safety; compensatory techniques. ?            Patient/family have participated as able in goal setting and plan of care. ?            Patient/family agree to work toward stated goals and plan of care. ?            Patient understands intent and goals of therapy; neutral about participation. ? Patient unable to participate in goal setting/plan of care; educ ongoing with interdisciplinary staff ? Posted safety precautions in patient's room. Thank you for this referral, Stephany Mendenhall MS [facility] SLP Time Calculation: 10 mins

## 2019-05-03 NOTE — PROGRESS NOTES
Internal Medicine Progress Note Patient's Name: Margaretta Lennox Admit Date: 5/1/2019 Length of Stay: 2 Assessment/Plan Principal Problem: 
  Acute respiratory failure ([address] (5/1/2019) Active Problems: 
  Bilateral pleural effusion (11/14/2018) PNA (pneumonia) (1/26/2019) Hyperkalemia (5/2/2019) Elevated brain natriuretic peptide (BNP) level (5/2/2019) Acute kidney injury superimposed on chronic kidney disease ([facility] (5/2/2019) Elevated lactic acid level (5/2/2019) Septic shock ([facility] (5/2/2019) Drug use (5/2/2019) Acute resp failure - Extubated on 5/2 
- Likely due to septic shock, PNA 
- Hx of COPD 
- Steroids, breathing tx 
- strep, legionella neg 
  
Septic Shock - Pressors needed initially, Off levophed - CT chest concerning for multifocal PNA - broad spectrum abx initiated - Urine culture NGTD 
- Blood culture NGTD 
  
PRESLEY on CKD 
- Nephrology consulted - appreciate services - Creatinine improving 
  
Elevated BNP 
- 46.7K on admission - Hx of HF 
- Does not appear to be volume overload 
- ECHO done, results below 
- down to 5900 
  
Drug use - Positive cocaine on UDS 
 
- Cont acceptable home medications for chronic conditions  
- DVT protocol I have personally reviewed all pertinent labs and films that have officially resulted over the last 24 hours. I have personally checked for all pending labs that are awaiting final results. Interval History Rommel López is a 77 y. o. male who is currently being admitted to the ICU - pt was found with AMS barely breathing by EMS - intubated in the field - brought to the ER. Per ER chart review - EMS called by pt's mother since she hadnt heard from him in a few hrs. Pt is unable to give any info ER eval - Sepsis with Acute Hypoxic Resp failure , Metab Encephalopathy, ARF , Hyperkalemia. Pt is currently being admitted to the ICU.   Ct head - no acute intracranial abnorm, Diffuse fluid is filling the nasopharynx and nasal passageways, which could be related to aspiration. Pressors added. BP improved, pressors d/c'd. Strep, legionella Ag neg. Nephrology consulted - hyperkalemia resolved, PRESLEY on CKD improving. Extubated on 5/2. Transferred to floor the following day. Subjective Pt s/e @ bedside. No major events overnight. Pt offers no complaints this AM. Denies CP or SOB. Denies abd pain, nvd. Objective Visit Vitals /75 Pulse 74 Temp 97.1 °F (36.2 °C) Resp 29 Ht 6' (1.829 m) Wt 114.3 kg (252 lb) SpO2 95% BMI 34.18 kg/m² Physical Exam: 
General Appearance: NAD, conversant HENT: normocephalic/atraumatic, moist mucus membranes Neck: No JVD, supple Lungs: CTA with normal respiratory effort CV: RRR, no m/r/g Abdomen: soft, non-tender, normal bowel sounds Extremities: no cyanosis, no peripheral edema Neuro: No focal deficits, motor/sensory intact Skin: Normal color, intact Intake and Output: 
Current Shift:  05/03 0701 - 05/03 1900 In: -  
Out: 30 [Urine:30] Last three shifts:  05/01 1901 - 05/03 0700 In: 4682.6 [P.O.:600; I.V.:4082.6] Out: 9056 [TGCSI:8363] Lab/Data Reviewed: 
BMP:  
Lab Results Component Value Date/Time  05/03/2019 03:00 AM  
 K 4.3 05/03/2019 03:00 AM  
  (H) 05/03/2019 03:00 AM  
 CO2 27 05/03/2019 03:00 AM  
 AGAP 4 05/03/2019 03:00 AM  
  (H) 05/03/2019 03:00 AM  
 BUN 37 (H) 05/03/2019 03:00 AM  
 CREA 1.76 (H) 05/03/2019 03:00 AM  
 GFRAA 47 (L) 05/03/2019 03:00 AM  
 GFRNA 38 (L) 05/03/2019 03:00 AM  
 
CBC:  
Lab Results Component Value Date/Time WBC 5.1 05/03/2019 03:00 AM  
 HGB 12.2 (L) 05/03/2019 03:00 AM  
 HCT 38.1 05/03/2019 03:00 AM  
  (L) 05/03/2019 03:00 AM  
 
 
Imaging Reviewed: 
No results found. Echo 5/2/19 Interpretation Summary · Aortic Valve: Mild aortic valve leaflet calcification present. · Tricuspid Valve: Mild tricuspid valve regurgitation is present. · Left Ventricle: Mild concentric hypertrophy. Calculated left ventricular ejection fraction is 65%. Visually measured ejection fraction. No regional wall motion abnormality noted. Echo Findings Left Ventricle Normal cavity size, systolic function (ejection fraction normal) and diastolic function. Mild concentric hypertrophy. The calculated ejection fraction is 65%. Visually measured ejection fraction. No regional wall motion abnormality noted. Left Atrium Normal cavity size. Right Ventricle Normal cavity size and global systolic function. Right Atrium Normal cavity size. Aortic Valve Normal valve structure and no stenosis. There is mild noncoronary, right and left leaflet calcification. Trace aortic valve regurgitation. Mitral Valve Normal valve structure, no stenosis and no regurgitation. Tricuspid Valve Normal valve structure and no stenosis. Mild tricuspid valve regurgitation. Pulmonary arterial systolic pressure is 57.1 mmHg. Pulmonic Valve Pulmonic valve not well visualized. No stenosis. Trace regurgitation. Aorta Normal aortic root, ascending aortic, and aortic arch. Normal aortic root. Pericardium No evidence of pericardial effusion. Medications Reviewed: 
Current Facility-Administered Medications Medication Dose Route Frequency  pantoprazole (PROTONIX) injection 40 mg  40 mg IntraVENous DAILY  vancomycin (VANCOCIN) 1500 mg in  ml infusion  1,500 mg IntraVENous Q24H  
 [START ON 5/4/2019] VANCOMYCIN INFORMATION NOTE   Other ONCE  
 sodium chloride (NS) flush 5-10 mL  5-10 mL IntraVENous PRN  
 VANCOMYCIN INFORMATION NOTE   Other Rx Dosing/Monitoring  methylPREDNISolone (PF) (SOLU-MEDROL) injection 60 mg  60 mg IntraVENous Q6H  
 albuterol-ipratropium (DUO-NEB) 2.5 MG-0.5 MG/3 ML  3 mL Nebulization Q4H RT  
 budesonide (PULMICORT) 500 mcg/2 ml nebulizer suspension  500 mcg Nebulization BID RT  
 arformoterol (BROVANA) neb solution 15 mcg  15 mcg Nebulization BID RT  
 insulin lispro (HUMALOG) injection   SubCUTAneous Q6H  
 glucose chewable tablet 16 g  4 Tab Oral PRN  
 glucagon (GLUCAGEN) injection 1 mg  1 mg IntraMUSCular PRN  
 dextrose (D50) infusion 12.5-25 g  25-50 mL IntraVENous PRN  pantoprazole (PROTONIX) 40 mg in sodium chloride 0.9% 10 mL injection  40 mg IntraVENous Q12H  
 acetaminophen (TYLENOL) tablet 650 mg  650 mg Oral Q6H PRN  
 ondansetron (ZOFRAN) injection 4 mg  4 mg IntraVENous Q6H PRN  
 heparin (porcine) injection 5,000 Units  5,000 Units SubCUTAneous Q8H  
 levoFLOXacin (LEVAQUIN) 750 mg in D5W IVPB  750 mg IntraVENous Q48H  piperacillin-tazobactam (ZOSYN) 3.375 g in 0.9% sodium chloride (MBP/ADV) 100 mL MBP  #EXTENDED 4-HOUR INFUSION##  3.375 g IntraVENous Q8H  
 0.9% sodium chloride infusion  75 mL/hr IntraVENous CONTINUOUS Hannah Fontanez PA-C [address] [place], [place] [address] Hospitalist Division Office:  [phone] Pager: [phone]

## 2019-05-03 NOTE — PROGRESS NOTES
1442- Reviewing chart to assess plan of care. HIPAA maintained. Received telephone report from [facility], RN including MAR, SBAR and Kardex. 1600- Pt arrived in unit, on 2L O2 NC. Dressing to R groin (CVL removed) with 4x4 and tegaderm CDI. Gomez EJ IV. CPAP at bedsite. Lung sounds clear and diminished. Some swelling to both hands and feet. Pt moves all extremities. Mepilex to both knees covering abrasion. When asked about wounds to knees, pt stated \" I dont know how I got them. ..bumping into things. \" Mepilex to sacrum prophylaxis. Oral suction (bekeur) set up in room. Call bell w/i reach. 1850- While moving pt to chair, pt accidentally pulled left EJ out. Pressure and dressing applied to site. ABx administration restarted on right EJ. Educated pt on use of call bell before getting out of chair. Complete linen change done after pt spilling some urine from urinal. Pt tolerated transfer well. Call bell w/i reach. 1925- Bedside and Verbal shift change report given to Shelby Figueroa RN (oncoming nurse) by Ryan Kate (offgoing nurse). Report included the following information SBAR, Kardex and MAR.

## 2019-05-03 NOTE — PROGRESS NOTES
Physical Exam  
Skin:  
 
  
 1930 Primary Nurse iVncent Vail, RN and coco zhang rn, RN performed a dual skin assessment on this patient.

## 2019-05-03 NOTE — PROGRESS NOTES
Problem: Falls - Risk of 
Goal: *Absence of Falls Description Document Angelito Hernandes Fall Risk and appropriate interventions in the flowsheet. Outcome: Progressing Towards Goal 
  
Problem: Patient Education: Go to Patient Education Activity Goal: Patient/Family Education Outcome: Progressing Towards Goal 
  
Problem: Pain Goal: *Control of Pain Outcome: Progressing Towards Goal 
  
Problem: Patient Education: Go to Patient Education Activity Goal: Patient/Family Education Outcome: Progressing Towards Goal 
  
Problem: Gas Exchange - Impaired Goal: *Absence of hypoxia Outcome: Progressing Towards Goal 
  
Problem: Patient Education: Go to Patient Education Activity Goal: Patient/Family Education Outcome: Progressing Towards Goal

## 2019-05-03 NOTE — MANAGEMENT PLAN
Discharge/Transition Planning Goal: *Discharge to safe environment Outcome: Progressing Towards Goal 
 
Updates: Will need to assure PCP appt made for pt when he discharges to assure proper follow up. Pt with cocaine smoking addiction and leads to non-compliance. Well known to  and hospital. Pt had lost or had stollen or sold his CPAP for home and cannot get another one yet. Uses Home oxygen. Will need Home Health and assure things are in place for pt at discharge. High Readmit Risk Plan: Home and [facility] and follow up appointments Sadaf Devi RN BSN Outcomes Manager Pager # [phone]

## 2019-05-03 NOTE — PROGRESS NOTES
[facility] ICU Progress Note Name: Duke Marinelli : 1950 MRN: [id number] Date: 5/3/2019 11:28 AM 
  
[x]I have reviewed the flowsheet and previous days notes. Events overnight reviewed and discussed with nursing staff. Vital signs and records reviewed. HPI 
76 y/o male w/ PMHx of COPD, HTN, illicit drug use presented to ED via EMS after being found down. Pt was intubated in the field and admitted for acute respiratory distress and septic shock. Subjective: 
19 S/p liberated from mechanical ventilator 19 Stable on 2L NC 
BIPAP overnight- states that he will be compliant on the floor if BIPAP times are b/n midnight and 5/6 AM 
HD stable OK to transfer to floor ROS:A comprehensive review of systems was negative except for that written in the HPI. Medication Review: · Pressors - none · Sedation - none · Antibiotics - Levaquin/Zosyn/Vanc · Pain - PRn tylenol · GI/ DVT - Protonix/SQH 
· Others (other gtts) Safety Bundles: CAUTI/ Severe Sepsis Protocol/ Electrolyte Replacement Protocol Vital Signs:   
Visit Vitals /75 Pulse 74 Temp 97.1 °F (36.2 °C) Resp 29 Ht 6' (1.829 m) Wt 114.3 kg (252 lb) SpO2 95% BMI 34.18 kg/m² O2 Device: Nasal cannula O2 Flow Rate (L/min): 2 l/min Temp (24hrs), Av.9 °F (36.6 °C), Min:97.1 °F (36.2 °C), Max:98.8 °F (37.1 °C) Intake/Output:  
Last shift:      701 - 1900 In: -  
Out: 30 [Urine:30] Last 3 shifts: 1901 -  0700 In: 4682.6 [P.O.:600; I.V.:4082.6] Out: 6295 [[id number]] Intake/Output Summary (Last 24 hours) at 5/3/2019 5058 Last data filed at 5/3/2019 0800 Gross per 24 hour Intake 3250.75 ml Output 1244 ml Net 2006.75 ml Physical Exam: 
 
General: AOX3, moving all extremities, NAD HEENT:  Anicteric sclerae; pink palpebral conjunctivae; mucosa moist 
Resp:  BS=Coarse B/L, Symmetrical chest expansion, no accessory muscle use; good airway entry; no rales/ wheezing/ rhonchi noted CV:  S1, S2 present; regular rate and rhythm GI:  Abdomen soft, non-tender; (+) active bowel sounds Extremities:  +2 pulses on all extremities; no edema/ cyanosis/ clubbing noted; normal capillary refill Skin:  Warm; no rashes/ lesions noted Neurologic:  Following commands, nodding appropriately, moving all extremities Devices:  [employer], [employer] DATA:  
 
Current Facility-Administered Medications Medication Dose Route Frequency  vancomycin (VANCOCIN) 1500 mg in  ml infusion  1,500 mg IntraVENous Q24H  
 [START ON 5/4/2019] VANCOMYCIN INFORMATION NOTE   Other ONCE  
 sodium chloride (NS) flush 5-10 mL  5-10 mL IntraVENous PRN  
 VANCOMYCIN INFORMATION NOTE   Other Rx Dosing/Monitoring  methylPREDNISolone (PF) (SOLU-MEDROL) injection 60 mg  60 mg IntraVENous Q6H  
 albuterol-ipratropium (DUO-NEB) 2.5 MG-0.5 MG/3 ML  3 mL Nebulization Q4H RT  
 budesonide (PULMICORT) 500 mcg/2 ml nebulizer suspension  500 mcg Nebulization BID RT  
 arformoterol (BROVANA) neb solution 15 mcg  15 mcg Nebulization BID RT  
 insulin lispro (HUMALOG) injection   SubCUTAneous Q6H  
 glucose chewable tablet 16 g  4 Tab Oral PRN  
 glucagon (GLUCAGEN) injection 1 mg  1 mg IntraMUSCular PRN  
 dextrose (D50) infusion 12.5-25 g  25-50 mL IntraVENous PRN  pantoprazole (PROTONIX) 40 mg in sodium chloride 0.9% 10 mL injection  40 mg IntraVENous Q12H  
 fentaNYL citrate (PF) injection 25-50 mcg  25-50 mcg IntraVENous Q2H PRN  
 acetaminophen (TYLENOL) tablet 650 mg  650 mg Oral Q6H PRN  
 ondansetron (ZOFRAN) injection 4 mg  4 mg IntraVENous Q6H PRN  
 heparin (porcine) injection 5,000 Units  5,000 Units SubCUTAneous Q8H  
 levoFLOXacin (LEVAQUIN) 750 mg in D5W IVPB  750 mg IntraVENous Q48H  piperacillin-tazobactam (ZOSYN) 3.375 g in 0.9% sodium chloride (MBP/ADV) 100 mL MBP  #EXTENDED 4-HOUR INFUSION##  3.375 g IntraVENous Q8H  
  0.9% sodium chloride infusion  75 mL/hr IntraVENous CONTINUOUS Labs: Results:  
   
Chemistry Recent Labs 05/03/19 
0300 05/02/19 
0409 05/01/19 
1020 05/01/19 
0440 * 130* 146* 100*  142 141 138  
K 4.3 4.6 4.8 7.1*  
* 109* 106 101 CO2 27 27 26 31 BUN 37* 34* 31* 32* CREA 1.76* 2.34* 2.90* 3.69* CA 8.4* 8.3* 8.1* 8.2* AGAP 4 6 9 6 BUCR 21* 15 11* 9* AP 64 74  --  106  
TP 6.5 6.7  --  8.0 ALB 2.4* 2.5*  --  3.1*  
GLOB 4.1* 4.2*  --  4.9* AGRAT 0.6* 0.6*  --  0.6* CBC w/Diff Recent Labs 05/03/19 
0300 05/02/19 0409 05/01/19 
0440 WBC 5.1 7.3 12.4 RBC 4.30* 4.38* 4.72  
HGB 12.2* 12.6* 13.7 HCT 38.1 39.0 45.6 * 134* 141 GRANS 85* 86* 85* LYMPH 11* 11* 7* EOS 0 0 0 Coagulation Recent Labs 05/02/19 0409 05/01/19 
0440 PTP  --  14.1 INR  --  1.1 APTT 31.4  -- Liver Enzymes Recent Labs 05/03/19 
0300 TP 6.5 ALB 2.4* AP 64 SGOT 20 ABG No results found for: PH, PHI, PCO2, PCO2I, PO2, PO2I, HCO3, HCO3I, FIO2, FIO2I Microbiology Recent Labs 05/01/19 2043 05/01/19 
0440 CULT NO GROWTH AFTER 19 HOURS AEROBIC BOTTLE STAPHYLOCOCCUS SPECIES, COAGULASE NEGATIVE* Telemetry: [x]Sinus []A-flutter []Paced []A-fib []Multiple PVCs Imaging: CXR Results  (Last 48 hours) 05/02/19 0415  XR CHEST PORT Final result Impression:  IMPRESSION:  
   
1. Satisfactory position endotracheal tube, interval placement NG tube. 2. Interval increased opacity lower lung zones right greater than left  
suggestive of worsening infiltrate atelectasis or edema, with suggestion of  
small pleural effusion right greater than left. Narrative:  EXAM: Portable frontal view of the chest.  
   
CLINICAL INDICATION/HISTORY: Shortness of breath, follow-up intubation COMPARISON: 5/1/2019  
   
_______________ FINDINGS:   
   
 Stable and satisfactory position endotracheal tube, interval placement NG tube  
extending into the stomach with the tip not included on field of view. Persistent hilar vascular prominence with interval increased hazy density right  
lower hemithorax obscuring right hemidiaphragm with blunting costophrenic angle. Interval increased retrocardiac density obscuring left hemidiaphragm with slight  
blunting left lateral costophrenic angle. Heart size upper limits of normal  
given for technique. No pneumothorax. .  
   
_______________ CT Results  (Last 48 hours) None IMPRESSION:  
· Acute on chronic respiratory failure s/p liberated from mechanical ventilator 5/2/19 · Shock- off pressor · Possible PNA- Abx · PRESLEY on CKD- Cr trending down · Hyperkalemia- resolved · Elevated BNP- trending down · Illicit drug use- UDS +cocaine · Hyperammonemia- Trending down Hx · COPD · Arthritis · Drug abuse · HTN 
· Hep C PLAN:  
· Resp -  Titrate Fi02/supp 02 for Sp02 > 90. Scheduled nebulizeres, steroids. BIPAP at night. · ID - Severe sepsis bundle. Afebrile and aleukocytosis. Follow BCx and UCx. Continue ABx for now. Lactic acidosis- resolved. · CVS - HD stable. · Heme/Onc- H/H and platelets stable. Daily CBC. · Metabolic - Trend and replace electrolytes as needed. Daily BMP. · Renal - Cr continues to improve. Trend renal indices. D/C bailon. Monitor UOP. · Endocrine - SSI, Q6 BS. · Neuro/ Pain/ Sedation - Monitor neuro status. · GI - Clear liquid diet. SLP today. · Prophylaxis - DVT, GI- Protonix. SQH 
· Discussed in interdisciplinary rounds- HD stable. OK to transfer to floor. [x]See my orders for details My assessment/plan was discussed with: 
[x]nursing []PT/OT [x]respiratory therapy [x]Dr. Sebastian Fink  
[]family [] [x]Total critical care time exclusive of procedures 30 minutes Savanah Mejia, NP

## 2019-05-03 NOTE — ROUTINE PROCESS
TRANSFER - OUT REPORT: 
 
Verbal report given to CAMERON Colón(name) on Maria Del Rosario Araujo  being transferred to [facility](unit) for routine progression of care Report consisted of patients Situation, Background, Assessment and  
Recommendations(SBAR). Information from the following report(s) SBAR, Kardex, Intake/Output and MAR was reviewed with the receiving nurse. Lines:  
Peripheral IV 05/01/19 Left External jugular (Active) Site Assessment Clean, dry, & intact 5/3/2019  7:00 AM  
Phlebitis Assessment 0 5/3/2019  7:00 AM  
Infiltration Assessment 0 5/3/2019  7:00 AM  
Dressing Status Clean, dry, & intact 5/3/2019  7:00 AM  
Dressing Type Transparent;Tape 5/3/2019  4:00 AM  
Hub Color/Line Status Green;Capped 5/3/2019  4:00 AM  
Action Taken Open ports on tubing capped 5/3/2019  4:00 AM  
Alcohol Cap Used Yes 5/3/2019  4:00 AM  
   
Peripheral IV 05/01/19 Right External jugular (Active) Site Assessment Clean, dry, & intact 5/3/2019  7:00 AM  
Phlebitis Assessment 0 5/3/2019  7:00 AM  
Infiltration Assessment 0 5/3/2019  7:00 AM  
Dressing Status Clean, dry, & intact 5/3/2019  7:00 AM  
Dressing Type Transparent;Tape 5/3/2019  4:00 AM  
Hub Color/Line Status Green; Infusing 5/3/2019  4:00 AM  
Action Taken Open ports on tubing capped 5/3/2019  4:00 AM  
Alcohol Cap Used Yes 5/3/2019  4:00 AM  
  
 
Opportunity for questions and clarification was provided. Patient transported with: 
 Monitor O2 @ 4 liters

## 2019-05-03 NOTE — PROGRESS NOTES
1500 SLP eval done today- diet ordered. Patient bailon and CVL removed for transfer to telemetry unit.

## 2019-05-03 NOTE — PROGRESS NOTES
Problem: Falls - Risk of 
Goal: *Absence of Falls Description Document Angelito Bartlettal Fall Risk and appropriate interventions in the flowsheet. Outcome: Progressing Towards Goal 
  
Problem: Patient Education: Go to Patient Education Activity Goal: Patient/Family Education Outcome: Progressing Towards Goal 
  
Problem: Discharge Planning Goal: *Discharge to safe environment Outcome: Progressing Towards Goal 
  
Problem: Patient Education: Go to Patient Education Activity Goal: Patient/Family Education Outcome: Progressing Towards Goal 
  
Problem: Gas Exchange - Impaired Goal: *Absence of hypoxia Outcome: Progressing Towards Goal

## 2019-05-03 NOTE — PROGRESS NOTES
Problem: Pressure Injury - Risk of 
Goal: *Prevention of pressure injury Description Document Raul Scale and appropriate interventions in the flowsheet. Outcome: Progressing Towards Goal 
  
Problem: Falls - Risk of 
Goal: *Absence of Falls Description Document Jhony Ellison Fall Risk and appropriate interventions in the flowsheet. Outcome: Progressing Towards Goal 
  
Problem: Patient Education: Go to Patient Education Activity Goal: Patient/Family Education Outcome: Progressing Towards Goal 
  
Problem: Pressure Injury - Risk of 
Goal: *Prevention of pressure injury Description Document Raul Scale and appropriate interventions in the flowsheet. Outcome: Progressing Towards Goal 
  
Problem: Patient Education: Go to Patient Education Activity Goal: Patient/Family Education Outcome: Progressing Towards Goal 
  
Problem: Discharge Planning Goal: *Discharge to safe environment Outcome: Progressing Towards Goal 
  
Problem: Pain Goal: *Control of Pain Outcome: Progressing Towards Goal 
  
Problem: Patient Education: Go to Patient Education Activity Goal: Patient/Family Education Outcome: Progressing Towards Goal

## 2019-05-03 NOTE — PROGRESS NOTES
RENAL PROGRESS NOTE Ector Dave Assessment/Plan: · PRESLEY (secondary tot bl pneumonia/septic shock/volume depletion). Improving. Volume resuscitated at this time, will d/c IVF. · Hyperkalemia in context of presley. Resolved. · Bl pneumonia/septic shock. On abx. · COPD. On breathing treatments/steroids. · Resp failure. Stable after extubation. · UDS pos for cocaine. Subjective:Patient complaints off: extubated, alert, appropriate. SOB is at the baseline, no CP/N/V. Good appetite. Patient Active Problem List  
Diagnosis Code  Discitis of thoracic region M46.44  
 UTI (urinary tract infection) N39.0  Bilateral pleural effusion J90  
 Lumbar stenosis M48.061  
 Adenoma of left adrenal gland D35.02  
 Hypertension I10  
 Cirrhosis of liver ([facility] [address] K74.60  Hepatitis C B19.20  Obesity E66.9  
 Constipation K59.00  Back pain M54.9  Altered mental status R41.82  
 Acute respiratory failure with hypoxia (HCC) J96.01  
 Sinusitis J32.9  Acute on chronic respiratory failure with hypoxia (HCC) I42.82  
 Diastolic CHF, acute on chronic (HCC) I50.33  
 Acute exacerbation of chronic obstructive pulmonary disease (COPD) ([facility] [address] J44.1  Bacteremia R78.81  
 PNA (pneumonia) J18.9  Metabolic encephalopathy J94.40  
 Acute respiratory failure (HCC) J96.00  
 Hyperkalemia E87.5  Elevated brain natriuretic peptide (BNP) level R79.89  
 Acute kidney injury superimposed on chronic kidney disease (HCC) N17.9, N18.9  Septic shock ([facility] [address] A41.9, R65.21  
 Drug use F19.90 Current Facility-Administered Medications Medication Dose Route Frequency Provider Last Rate Last Dose  pantoprazole (PROTONIX) injection 40 mg  40 mg IntraVENous DAILY Negin SOLORZANO DO   40 mg at 05/03/19 1088  vancomycin (VANCOCIN) 1500 mg in  ml infusion  1,500 mg IntraVENous Q24H Suzanne Hampshire H,  mL/hr at 05/03/19 1026 1,500 mg at 05/03/19 1026  
 [START ON 5/4/2019] VANCOMYCIN INFORMATION NOTE   Other ONCE Suzanne Hampshire H, DO      
 sodium chloride (NS) flush 5-10 mL  5-10 mL IntraVENous PRN Pato Leggett MD      
 VANCOMYCIN INFORMATION NOTE   Other Rx Dosing/Monitoring Pato Leggett MD      
 methylPREDNISolone (PF) (SOLU-MEDROL) injection 60 mg  60 mg IntraVENous Q6H Pato Leggett MD   60 mg at 05/03/19 1208  albuterol-ipratropium (DUO-NEB) 2.5 MG-0.5 MG/3 ML  3 mL Nebulization Q4H RT Pato Leggett MD   3 mL at 05/03/19 1132  budesonide (PULMICORT) 500 mcg/2 ml nebulizer suspension  500 mcg Nebulization BID RT Pato Leggett MD   500 mcg at 05/03/19 5145  arformoterol (BROVANA) neb solution 15 mcg  15 mcg Nebulization BID RT Pato Leggett MD   15 mcg at 05/03/19 9744  insulin lispro (HUMALOG) injection   SubCUTAneous Q6H Pato Leggett MD   Stopped at 05/01/19 0720  
 glucose chewable tablet 16 g  4 Tab Oral PRN Pato Leggett MD      
 glucagon [facility]) injection 1 mg  1 mg IntraMUSCular PRN Pato Leggett MD      
 dextrose (D50) infusion 12.5-25 g  25-50 mL IntraVENous PRN Pato Leggett MD      
 acetaminophen (TYLENOL) tablet 650 mg  650 mg Oral Q6H PRN Pato Leggett MD      
 ondansetron [facility]) injection 4 mg  4 mg IntraVENous Q6H PRN Pato Leggett MD      
 heparin (porcine) injection 5,000 Units  5,000 Units SubCUTAneous Q8H Pato Leggett MD   5,000 Units at 05/03/19 0501  piperacillin-tazobactam (ZOSYN) 3.375 g in 0.9% sodium chloride (MBP/ADV) 100 mL MBP  #EXTENDED 4-HOUR INFUSION##  3.375 g IntraVENous Q8H Pato Leggett MD 25 mL/hr at 05/03/19 0923 3.375 g at 05/03/19 0923  
 0.9% sodium chloride infusion  75 mL/hr IntraVENous CONTINUOUS Sarah Lyric Baca MD 75 mL/hr at 05/03/19 0546 75 mL/hr at 05/03/19 8828 Objective Vitals:  
 05/03/19 0735 05/03/19 0800 05/03/19 0830 05/03/19 1132 BP:  138/73 158/75 Pulse:  60 74 Resp:  17 29 Temp:  97.2 °F (36.2 °C) 97.1 °F (36.2 °C) SpO2: 100% 97% 95% 96% Weight:      
Height:      
 
 
 
Intake/Output Summary (Last 24 hours) at 5/3/2019 1225 Last data filed at 5/3/2019 1040 Gross per 24 hour Intake 2920 ml Output 1264 ml Net 1656 ml Admission weight: Weight: 114.7 kg (252 lb 14.4 oz) (05/01/19 0413) Last Weight Metrics: 
Weight Loss Metrics 5/2/2019 2/23/2019 1/21/2019 1/21/2019 1/21/2019 12/30/2018 12/23/2018 Today's Wt 252 lb 223 lb 5.2 oz - 252 lb - 252 lb 6.8 oz -  
BMI 34.18 kg/m2 - 30.29 kg/m2 - 34.18 kg/m2 - 34.24 kg/m2 Physical Assessment:  
 
General: NAD, alert and oriented. Neck: No jvd. LUNGS: diffusely diminished air entry, bl exp rhonchi. CVS EXM: S1, S2  RRR. Abdomen: soft, non tender. Lower Extremities:  1+ edema. Lab CBC w/Diff Recent Labs 05/03/19 
0300 05/02/19 
0409 05/01/19 
0440 WBC 5.1 7.3 12.4 RBC 4.30* 4.38* 4.72  
HGB 12.2* 12.6* 13.7 HCT 38.1 39.0 45.6 * 134* 141 GRANS 85* 86* 85* LYMPH 11* 11* 7* EOS 0 0 0 Chemistry Recent Labs 05/03/19 
0300  05/02/19 
0409 05/01/19 
1020 05/01/19 
0440 *  --  130* 146* 100*   --  142 141 138  
K 4.3  --  4.6 4.8 7.1*  
*  --  109* 106 101 CO2 27  --  27 26 31 BUN 37*  --  34* 31* 32* CREA 1.76*  --  2.34* 2.90* 3.69* CA 8.4*  --  8.3* 8.1* 8.2* AGAP 4  --  6 9 6 BUCR 21*  --  15 11* 9* AP 64  --  74  --  106  
TP 6.5  --  6.7  --  8.0 ALB 2.4*  --  2.5*  --  3.1*  
GLOB 4.1*  --  4.2*  --  4.9* AGRAT 0.6*  --  0.6*  --  0.6* PHOS 3.1   < > 1.9*  --   --   
 < > = values in this interval not displayed. No results found for: IRON, FE, TIBC, IBCT, PSAT, FERR Lab Results Component Value Date/Time Calcium 8.4 (L) 05/03/2019 03:00 AM  
 Phosphorus 3.1 05/03/2019 03:00 AM  
  
 
Joselin Bone M.D. [facility] Phone [phone] Pager [phone]

## 2019-05-03 NOTE — ROUTINE PROCESS
1930 Bedside shift change report given to manuel rn (oncoming nurse) by Florina zhang rn (offgoing nurse). Report included the following information SBAR, Kardex and Recent Results. Dual skin check completed. Dual piv check completed. 2000 assessment completed. Oral and bailon care completed. 0000 reassessment completed. oral and bailon care completed. 0400 reassessment completed. Oral and bailon care completed. 0730 Bedside shift change report given to 1440 [facility] rn (oncoming nurse) by manuel rn (offgoing nurse). Report included the following information SBAR, Kardex and Cardiac Rhythm nsr. Dual skin check completed. Side rails up x 3 ,call light within reach, hob elevated 30 degrees.

## 2019-05-04 LAB
ALBUMIN SERPL-MCNC: 2.6 G/DL (ref 3.4–5)
ALBUMIN/GLOB SERPL: 0.6 {RATIO} (ref 0.8–1.7)
ALP SERPL-CCNC: 62 U/L (ref 45–117)
ALT SERPL-CCNC: 17 U/L (ref 16–61)
AMMONIA PLAS-SCNC: 15 UMOL/L (ref 11–32)
ANION GAP SERPL CALC-SCNC: 6 MMOL/L (ref 3–18)
AST SERPL-CCNC: 24 U/L (ref 15–37)
BACTERIA SPEC CULT: ABNORMAL
BASOPHILS # BLD: 0 K/UL (ref 0–0.1)
BASOPHILS NFR BLD: 0 % (ref 0–2)
BILIRUB SERPL-MCNC: 0.5 MG/DL (ref 0.2–1)
BUN SERPL-MCNC: 34 MG/DL (ref 7–18)
BUN/CREAT SERPL: 25 (ref 12–20)
CALCIUM SERPL-MCNC: 8.9 MG/DL (ref 8.5–10.1)
CHLORIDE SERPL-SCNC: 109 MMOL/L (ref 100–108)
CO2 SERPL-SCNC: 25 MMOL/L (ref 21–32)
CREAT SERPL-MCNC: 1.36 MG/DL (ref 0.6–1.3)
DIFFERENTIAL METHOD BLD: ABNORMAL
EOSINOPHIL # BLD: 0 K/UL (ref 0–0.4)
EOSINOPHIL NFR BLD: 0 % (ref 0–5)
ERYTHROCYTE [DISTWIDTH] IN BLOOD BY AUTOMATED COUNT: 13.9 % (ref 11.6–14.5)
GLOBULIN SER CALC-MCNC: 4.4 G/DL (ref 2–4)
GLUCOSE BLD STRIP.AUTO-MCNC: 105 MG/DL (ref 70–110)
GLUCOSE BLD STRIP.AUTO-MCNC: 109 MG/DL (ref 70–110)
GLUCOSE BLD STRIP.AUTO-MCNC: 121 MG/DL (ref 70–110)
GLUCOSE SERPL-MCNC: 100 MG/DL (ref 74–99)
GRAM STN SPEC: ABNORMAL
GRAM STN SPEC: ABNORMAL
HCT VFR BLD AUTO: 42.2 % (ref 36–48)
HGB BLD-MCNC: 13.5 G/DL (ref 13–16)
LYMPHOCYTES # BLD: 0.8 K/UL (ref 0.9–3.6)
LYMPHOCYTES NFR BLD: 18 % (ref 21–52)
MAGNESIUM SERPL-MCNC: 2.4 MG/DL (ref 1.6–2.6)
MCH RBC QN AUTO: 28.4 PG (ref 24–34)
MCHC RBC AUTO-ENTMCNC: 32 G/DL (ref 31–37)
MCV RBC AUTO: 88.8 FL (ref 74–97)
MONOCYTES # BLD: 0.4 K/UL (ref 0.05–1.2)
MONOCYTES NFR BLD: 9 % (ref 3–10)
NEUTS SEG # BLD: 3.1 K/UL (ref 1.8–8)
NEUTS SEG NFR BLD: 73 % (ref 40–73)
PLATELET # BLD AUTO: 101 K/UL (ref 135–420)
PMV BLD AUTO: 11 FL (ref 9.2–11.8)
POTASSIUM SERPL-SCNC: 4.7 MMOL/L (ref 3.5–5.5)
PROT SERPL-MCNC: 7 G/DL (ref 6.4–8.2)
RBC # BLD AUTO: 4.75 M/UL (ref 4.7–5.5)
SERVICE CMNT-IMP: ABNORMAL
SODIUM SERPL-SCNC: 140 MMOL/L (ref 136–145)
VANCOMYCIN TROUGH SERPL-MCNC: 13.9 UG/ML (ref 10–20)
WBC # BLD AUTO: 4.2 K/UL (ref 4.6–13.2)

## 2019-05-04 PROCEDURE — 74011250637 HC RX REV CODE- 250/637: Performed by: HOSPITALIST

## 2019-05-04 PROCEDURE — 82140 ASSAY OF AMMONIA: CPT

## 2019-05-04 PROCEDURE — 74011000250 HC RX REV CODE- 250: Performed by: HOSPITALIST

## 2019-05-04 PROCEDURE — 74011000258 HC RX REV CODE- 258: Performed by: HOSPITALIST

## 2019-05-04 PROCEDURE — 77010033678 HC OXYGEN DAILY

## 2019-05-04 PROCEDURE — 83735 ASSAY OF MAGNESIUM: CPT

## 2019-05-04 PROCEDURE — 74011250636 HC RX REV CODE- 250/636: Performed by: HOSPITALIST

## 2019-05-04 PROCEDURE — 82962 GLUCOSE BLOOD TEST: CPT

## 2019-05-04 PROCEDURE — 74011250636 HC RX REV CODE- 250/636: Performed by: INTERNAL MEDICINE

## 2019-05-04 PROCEDURE — 36415 COLL VENOUS BLD VENIPUNCTURE: CPT

## 2019-05-04 PROCEDURE — C9113 INJ PANTOPRAZOLE SODIUM, VIA: HCPCS | Performed by: HOSPITALIST

## 2019-05-04 PROCEDURE — 80053 COMPREHEN METABOLIC PANEL: CPT

## 2019-05-04 PROCEDURE — 85025 COMPLETE CBC W/AUTO DIFF WBC: CPT

## 2019-05-04 PROCEDURE — 94761 N-INVAS EAR/PLS OXIMETRY MLT: CPT

## 2019-05-04 PROCEDURE — 94660 CPAP INITIATION&MGMT: CPT

## 2019-05-04 PROCEDURE — 80202 ASSAY OF VANCOMYCIN: CPT

## 2019-05-04 PROCEDURE — 94640 AIRWAY INHALATION TREATMENT: CPT

## 2019-05-04 RX ORDER — AZITHROMYCIN 500 MG/1
500 TABLET, FILM COATED ORAL DAILY
Qty: 4 TAB | Refills: 0 | Status: SHIPPED | OUTPATIENT
Start: 2019-05-04 | End: 2019-05-04

## 2019-05-04 RX ORDER — DOXYCYCLINE 100 MG/1
100 TABLET ORAL 2 TIMES DAILY
Qty: 8 TAB | Refills: 0 | Status: SHIPPED | OUTPATIENT
Start: 2019-05-04 | End: 2019-05-08

## 2019-05-04 RX ORDER — PREDNISONE 50 MG/1
50 TABLET ORAL DAILY
Qty: 5 TAB | Refills: 0 | Status: SHIPPED | OUTPATIENT
Start: 2019-05-04

## 2019-05-04 RX ADMIN — METHYLPREDNISOLONE SODIUM SUCCINATE 60 MG: 40 INJECTION, POWDER, FOR SOLUTION INTRAMUSCULAR; INTRAVENOUS at 09:47

## 2019-05-04 RX ADMIN — IPRATROPIUM BROMIDE AND ALBUTEROL SULFATE 3 ML: .5; 3 SOLUTION RESPIRATORY (INHALATION) at 03:47

## 2019-05-04 RX ADMIN — ACETAMINOPHEN 650 MG: 325 TABLET, FILM COATED ORAL at 07:13

## 2019-05-04 RX ADMIN — VANCOMYCIN HYDROCHLORIDE 1500 MG: 10 INJECTION, POWDER, LYOPHILIZED, FOR SOLUTION INTRAVENOUS at 11:05

## 2019-05-04 RX ADMIN — BUDESONIDE 500 MCG: 0.5 INHALANT RESPIRATORY (INHALATION) at 09:04

## 2019-05-04 RX ADMIN — ARFORMOTEROL TARTRATE 15 MCG: 15 SOLUTION RESPIRATORY (INHALATION) at 09:04

## 2019-05-04 RX ADMIN — IPRATROPIUM BROMIDE AND ALBUTEROL SULFATE 3 ML: .5; 3 SOLUTION RESPIRATORY (INHALATION) at 12:48

## 2019-05-04 RX ADMIN — PANTOPRAZOLE SODIUM 40 MG: 40 INJECTION, POWDER, FOR SOLUTION INTRAVENOUS at 09:41

## 2019-05-04 RX ADMIN — IPRATROPIUM BROMIDE AND ALBUTEROL SULFATE 3 ML: .5; 3 SOLUTION RESPIRATORY (INHALATION) at 00:20

## 2019-05-04 RX ADMIN — HEPARIN SODIUM 5000 UNITS: 5000 INJECTION INTRAVENOUS; SUBCUTANEOUS at 12:26

## 2019-05-04 RX ADMIN — PIPERACILLIN SODIUM,TAZOBACTAM SODIUM 3.38 G: 3; .375 INJECTION, POWDER, FOR SOLUTION INTRAVENOUS at 09:34

## 2019-05-04 RX ADMIN — IPRATROPIUM BROMIDE AND ALBUTEROL SULFATE 3 ML: .5; 3 SOLUTION RESPIRATORY (INHALATION) at 09:04

## 2019-05-04 RX ADMIN — PIPERACILLIN SODIUM,TAZOBACTAM SODIUM 3.38 G: 3; .375 INJECTION, POWDER, FOR SOLUTION INTRAVENOUS at 05:35

## 2019-05-04 NOTE — PROGRESS NOTES
Patient placed on BIPAP 12/5, back-up rate of 8, 2LPM oxygen. Tolerating well at this time. 0355:  is resting comfortably on his BIPAP. No complaints. He received his 0000 and 0400 Duoneb treatments inline with BIPAP.

## 2019-05-04 NOTE — PROGRESS NOTES
Patient received in bed awake. Patient alert and oriented X4, patient has a headache and was given pain medication. .  Patient resting quietly. Frequent use items within reach. Bed locked in low position. Call bell within reach and patient verbalized understanding of use for assistance and needs. Dual skin assessment conducted with Joan Boothe. 1610:  Patient discharged from the hospital via wheelchair and transported home via private automobile. Discharge instructions and medications reviewed with the patient. All information understood, no questions asked.

## 2019-05-04 NOTE — DISCHARGE INSTRUCTIONS
Patient Education        Learning About COPD and How to Prevent Lung Infections  How do lung infections affect COPD? Lung infections like pneumonia and acute bronchitis are common causes of COPD flare-ups. And people who have COPD are more likely to get these lung infections, especially if they smoke. When you have COPD, it is important to know the symptoms of pneumonia and acute bronchitis and call your doctor if you have them. Symptoms include:  · A cough that brings up more mucus than usual.  · Fever. · Shortness of breath. What can you do to prevent these infections? Stay healthy  · Get a flu shot every year. · Get a pneumococcal vaccine shot. If you have had one before, ask your doctor whether you need another dose. Two different types of pneumococcal vaccines are recommended for people ages 72 and older. · If you must be around people with colds or the flu, wash your hands often. · Do not smoke. This is the most important step you can take to prevent more damage to your lungs. If you need help quitting, talk to your doctor about stop-smoking programs and medicines. These can increase your chances of quitting for good. · Avoid secondhand smoke, air pollution, and high altitudes. Also avoid cold, dry air and hot, humid air. Stay at home with your windows closed when air pollution is bad. Exercise and eat well  · If your doctor recommends it, get more exercise. Walking is a good choice. Bit by bit, increase the amount you walk every day. Try for at least 30 minutes on most days of the week. · Eat regular, well-balanced meals. Eating right keeps your energy levels up and helps your body fight infection. · Get plenty of rest and sleep. Follow-up care is a key part of your treatment and safety. Be sure to make and go to all appointments, and call your doctor if you are having problems. It's also a good idea to know your test results and keep a list of the medicines you take.   Where can you learn more?  Go to http://vinod-joshua.info/. Enter N126 in the search box to learn more about \"Learning About COPD and How to Prevent Lung Infections. \"  Current as of: September 5, 2018  Content Version: 11.9  © 0463-3407 [employer]. Care instructions adapted under license by [employer] (which disclaims liability or warranty for this information). If you have questions about a medical condition or this instruction, always ask your healthcare professional. [address] any warranty or liability for your use of this information. DISCHARGE SUMMARY from Nurse    PATIENT INSTRUCTIONS:    After general anesthesia or intravenous sedation, for 24 hours or while taking prescription Narcotics:  · Limit your activities  · Do not drive and operate hazardous machinery  · Do not make important personal or business decisions  · Do  not drink alcoholic beverages  · If you have not urinated within 8 hours after discharge, please contact your surgeon on call. Report the following to your surgeon:  · Excessive pain, swelling, redness or odor of or around the surgical area  · Temperature over 100.5  · Nausea and vomiting lasting longer than 4 hours or if unable to take medications  · Any signs of decreased circulation or nerve impairment to extremity: change in color, persistent  numbness, tingling, coldness or increase pain  · Any questions    What to do at Home:  Recommended activity: Activity as tolerated. If you experience any of the following symptoms described in the COPD care instructions under\"When should you call for help? \" section , please follow up with your primary care provider and/or call 911. *  Please give a list of your current medications to your Primary Care Provider. *  Please update this list whenever your medications are discontinued, doses are      changed, or new medications (including over-the-counter products) are added.     *  Please carry medication information at all times in case of emergency situations. These are general instructions for a healthy lifestyle:    No smoking/ No tobacco products/ Avoid exposure to second hand smoke  Surgeon General's Warning:  Quitting smoking now greatly reduces serious risk to your health. Obesity, smoking, and sedentary lifestyle greatly increases your risk for illness    A healthy diet, regular physical exercise & weight monitoring are important for maintaining a healthy lifestyle    You may be retaining fluid if you have a history of heart failure or if you experience any of the following symptoms:  Weight gain of 3 pounds or more overnight or 5 pounds in a week, increased swelling in our hands or feet or shortness of breath while lying flat in bed. Please call your doctor as soon as you notice any of these symptoms; do not wait until your next office visit. Recognize signs and symptoms of STROKE:    F-face looks uneven    A-arms unable to move or move unevenly    S-speech slurred or non-existent    T-time-call 911 as soon as signs and symptoms begin-DO NOT go       Back to bed or wait to see if you get better-TIME IS BRAIN. Warning Signs of HEART ATTACK     Call 911 if you have these symptoms:   Chest discomfort. Most heart attacks involve discomfort in the center of the chest that lasts more than a few minutes, or that goes away and comes back. It can feel like uncomfortable pressure, squeezing, fullness, or pain.  Discomfort in other areas of the upper body. Symptoms can include pain or discomfort in one or both arms, the back, neck, jaw, or stomach.  Shortness of breath with or without chest discomfort.  Other signs may include breaking out in a cold sweat, nausea, or lightheadedness. Don't wait more than five minutes to call 911 - MINUTES MATTER! Fast action can save your life. Calling 911 is almost always the fastest way to get lifesaving treatment.  Emergency Medical Services staff can begin treatment when they arrive -- up to an hour sooner than if someone gets to the hospital by car. The discharge information has been reviewed with the patient. The patient verbalized understanding. Discharge medications reviewed with the patient and appropriate educational materials and side effects teaching were provided.   ___________________________________________________________________________________________________________________________________       Patient armband removed and shredded

## 2019-05-04 NOTE — PROGRESS NOTES
1900  -- Bedside, Verbal and Written shift change report given to 2309 Sin Clark (oncoming nurse) by Kenrick Hoyos nurse). Report included the following information SBAR, Kardex, Intake/Output, MAR and Recent Results. Aspiration sign placed at bedside. Allergy band at wrist. 
   
2307 -- PM medications administered, pt tolerated with ease, will continue to monitor. 
  
0000 -- Shift reassessment, pt condition unchanged, will continue to monitor. 
   
0400 --  Shift reassessment, pt condition unchanged, will continue to monitor.   
   
 3752 -- Bedside, Verbal and Written shift change report given to CARMELA  (oncoming nurse) by EDMOND (offgoing nurse). Report included the following information SBAR, Kardex, Intake/Output, MAR and Recent Results. Skin assessment completed. PRN pain medication administered, pt tolerated with ease, will continue to monitor.

## 2019-05-04 NOTE — DISCHARGE SUMMARY
2 [facility]  Hospitalist Division    Discharge Summary    Patient: Mango Manzano MRN: [id number]  CSN: [id number]    YOB: 1950  Age: 77 y.o. Sex: male    DOA: 5/1/2019 LOS:  LOS: 3 days   Discharge Date:      Admission Diagnoses: Sepsis ([facility] [A41.9]  Acute [facility] failure ([facility] [J96.00]    Discharge Diagnoses:    Problem List as of 5/4/2019 Date Reviewed: 11/14/2018          Codes Class Noted - Resolved    Elevated brain natriuretic peptide (BNP) level ICD-10-CM: R79.89  ICD-9-CM: 790.99  5/2/2019 - Present        Acute kidney injury superimposed on chronic kidney disease ([facility] ICD-10-CM: N17.9, N18.9  ICD-9-CM: 866.00, 585.9  5/2/2019 - Present        Septic shock ([facility] ICD-10-CM: A41.9, R65.21  ICD-9-CM: 038.9, 785.52, 995.92  5/2/2019 - Present        Drug use ICD-10-CM: F19.90  ICD-9-CM: 305.90  5/2/2019 - Present        * (Principal) Acute respiratory failure ([facility] ICD-10-CM: J96.00  ICD-9-CM: 518.81  5/1/2019 - Present        Metabolic encephalopathy UTX-31-BM: G93.41  ICD-9-CM: 348.31  1/29/2019 - Present    Overview Signed 1/29/2019 10:18 AM by Lia Caldwell MD     Associated with sepsis, present on admission.              PNA (pneumonia) ICD-10-CM: J18.9  ICD-9-CM: 486  1/26/2019 - Present        Bacteremia ICD-10-CM: R78.81  ICD-9-CM: 790.7  1/21/2019 - Present        Acute exacerbation of chronic obstructive pulmonary disease (COPD) ([facility] ICD-10-CM: J44.1  ICD-9-CM: 491.21  12/25/2018 - Present        Diastolic CHF, acute on chronic [facility]) ICD-10-CM: I50.33  ICD-9-CM: 428.33, 428.0  12/24/2018 - Present        Acute on chronic respiratory failure with hypoxia (HCC) ICD-10-CM: J96.21  ICD-9-CM: 518.84, 799.02  12/23/2018 - Present        Sinusitis ICD-10-CM: J32.9  ICD-9-CM: 473.9  12/17/2018 - Present        Altered mental status ICD-10-CM: R41.82  ICD-9-CM: 780.97  12/16/2018 - Present        Acute respiratory failure with hypoxia ([facility] ICD-10-CM: J96.01  ICD-9-CM: 518.81  12/16/2018 - Present        Discitis of thoracic region ICD-10-CM: M46.44  ICD-9-CM: 722.92  11/14/2018 - Present        UTI (urinary tract infection) ICD-10-CM: N39.0  ICD-9-CM: 599.0  11/14/2018 - Present        Bilateral pleural effusion ICD-10-CM: J90  ICD-9-CM: 511.9  11/14/2018 - Present        Lumbar stenosis ICD-10-CM: M48.061  ICD-9-CM: 724.02  11/14/2018 - Present        Adenoma of left adrenal gland ICD-10-CM: D35.02  ICD-9-CM: 227.0  11/14/2018 - Present        Hypertension ICD-10-CM: I10  ICD-9-CM: 401.9  11/14/2018 - Present        Cirrhosis of liver ([facility] ICD-10-CM: K74.60  ICD-9-CM: 571.5  11/14/2018 - Present        Hepatitis C ICD-10-CM: B19.20  ICD-9-CM: 070.70  11/14/2018 - Present        Obesity ICD-10-CM: E66.9  ICD-9-CM: 278.00  11/14/2018 - Present        Constipation ICD-10-CM: K59.00  ICD-9-CM: 564.00  11/14/2018 - Present        Back pain ICD-10-CM: M54.9  ICD-9-CM: 724.5  11/14/2018 - Present        RESOLVED: Hyperkalemia ICD-10-CM: E87.5  ICD-9-CM: 276.7  5/2/2019 - 5/3/2019        RESOLVED: Elevated lactic acid level ICD-10-CM: R79.89  ICD-9-CM: 276.2  5/2/2019 - 5/3/2019              Discharge Condition: Stable    Discharge To: Home    Consults: Nephrology and [address] [place] [address] Course: Marcos Jones a 77 y. o. male who is currently being admitted to the ICU - pt was found with AMS barely breathing by EMS - intubated in the field - brought to the ER.  Per ER chart review - EMS called by pt's mother since she hadnt heard from him in a few hrs.  Pt is unable to give any info   ER eval - Sepsis with Acute Hypoxic Resp failure , Metab Encephalopathy, ARF , Hyperkalemia.  Pt is currently being admitted to the ICU.  Ct head - no acute intracranial abnorm, Diffuse fluid is filling the nasopharynx and nasal passageways, which could be related to aspiration.  Pressors added. BP improved, pressors d/c'd. Strep, legionella Ag neg. Nephrology consulted - hyperkalemia resolved, PRESLEY on CKD improving. Extubated on 5/2. Transferred to floor the following day. VSS for discharge. Patient states he has a CPAP machine and home O2. Physical Exam:  General appearance: alert, cooperative, no distress, appears stated age  Head: Normocephalic, without obvious abnormality, atraumatic  Lungs: clear to auscultation bilaterally  Heart: regular rate and rhythm, S1, S2 normal, no murmur, click, rub or gallop  Abdomen: soft, non-tender. Bowel sounds normal. No masses,  no organomegaly  Extremities: no cyanosis or edema  Skin: Skin color, texture, turgor normal. No rashes or lesions  Neurologic: Grossly normal  PSY: Mood and affect normal, appropriately behaved    Significant Diagnostic Studies:     BMP:   Lab Results   Component Value Date/Time     05/04/2019 07:15 AM    K 4.7 05/04/2019 07:15 AM     (H) 05/04/2019 07:15 AM    CO2 25 05/04/2019 07:15 AM    AGAP 6 05/04/2019 07:15 AM     (H) 05/04/2019 07:15 AM    BUN 34 (H) 05/04/2019 07:15 AM    CREA 1.36 (H) 05/04/2019 07:15 AM    GFRAA >60 05/04/2019 07:15 AM    GFRNA 52 (L) 05/04/2019 07:15 AM     CBC:   Lab Results   Component Value Date/Time    WBC 4.2 (L) 05/04/2019 07:15 AM    HGB 13.5 05/04/2019 07:15 AM    HCT 42.2 05/04/2019 07:15 AM     (L) 05/04/2019 07:15 AM       Ct Head Wo Cont    Result Date: 5/1/2019  EXAM: CT head INDICATION: Confusion/delirium, altered mental status. COMPARISON: 12/16/2018 TECHNIQUE: Axial CT imaging of the head was performed without intravenous contrast. One or more dose reduction techniques were used on this CT: automated exposure control, adjustment of the mAs and/or kVp according to patient size, and iterative reconstruction techniques. The specific techniques used on this CT exam have been documented in the patient's electronic medical record.  Digital Imaging and Communications in Medicine (DICOM) format image data are available to nonaffiliated external healthcare facilities or entities on a secured, media free, reciprocally searchable basis with patient authorization for at least a 12-month period after this study. _______________ FINDINGS: BRAIN AND POSTERIOR FOSSA: There is no intracranial hemorrhage, mass effect, or midline shift. There are no areas of abnormal parenchymal attenuation. EXTRA-AXIAL SPACES AND MENINGES: There are no abnormal extra-axial fluid collections. CALVARIUM: Intact. SINUSES: The nasopharynx is completely filled with low attenuation fluid which extends into the nasal passages. Small layering air-fluid levels are present within the sphenoid sinuses. This could be related to aspiration or intubation. OTHER: None. _______________     IMPRESSION: 1. No acute intracranial abnormalities. 2. Diffuse fluid is filling the nasopharynx and nasal passageways, which could be related to aspiration. Patient does not appear to be intubated at the time of examination. Small layering fluid levels are present within both sphenoid sinuses. Preliminary report was provided by the on-call resident. Ct Chest Abd Pelv Wo Cont    Result Date: 5/1/2019  EXAM: CT of the chest, abdomen, and pelvis INDICATION: Sepsis. Acute renal failure. Intubated. COMPARISON: CT chest 2/4/2019, 1/21/2019, 12/23/2018 TECHNIQUE: Axial CT imaging of the chest, abdomen, and pelvis was performed without intravenous contrast. Multiplanar reformats were generated. One or more dose reduction techniques were used on this CT: automated exposure control, adjustment of the mAs and/or kVp according to patient size, and iterative reconstruction techniques. The specific techniques used on this CT exam have been documented in the patient's electronic medical record.   Digital Imaging and Communications in Medicine (DICOM) format image data are available to nonaffiliated external healthcare facilities or entities on a secured, media free, reciprocally searchable basis with patient authorization for at least a 12-month period after this study. _______________ FINDINGS: CHEST: BASE OF THE NECK: Normal. LUNGS: Patchy airspace opacities are present within both lungs, concerning for multifocal pneumonia. Focal areas of consolidation are present within both lower lungs. Small bilateral pleural effusions with adjacent atelectasis. AIRWAY: The patient is intubated. Motion artifact obscures consolidation. The airways are otherwise patent. PLEURA: Small bilateral pleural effusions with adjacent atelectasis. MEDIASTINUM: Normal heart size. No pericardial effusion. Again noted is mild prominence of the central pulmonary artery, suggestive of potential. LYMPH NODES: No enlarged lymph nodes. OTHER: None. =============== ABDOMEN/PELVIS: LIVER, BILIARY: The liver has a nodular contour. Liver is otherwise unremarkable. No biliary dilation. Gallbladder is unremarkable. PANCREAS: The unenhanced pancreas is SPLEEN: Normal. Splenorenal shunt is present. ADRENALS: Nodularity of the left adrenal gland is unchanged. KIDNEYS, URETERS, BLADDER: Cardoaz catheter is present. No hydronephrosis. LYMPH NODES: No enlarged lymph nodes. GASTROINTESTINAL TRACT: No bowel dilation or wall thickening. PELVIC ORGANS: Unremarkable. VASCULATURE: Right femoral line is present. Cardoza catheter is present. BONES: Erosive changes related to known discitis/osteomyelitis at the T3/T4 level and T9/T10 level, in keeping with known osteomyelitis. OTHER: None. _______________     IMPRESSION: 1. Patchy airspace opacities are present within both lungs, concerning for multifocal pneumonia. Small associated bilateral pleural effusions are present. Edema is less likely diagnostic consideration. 2. Destructive changes at the T3/T4 and T9/T10 level again noted, related to known discitis osteomyelitis. Preliminary report was provided by the on-call resident.      Xr Chest Port    Result Date: 5/2/2019  EXAM: Portable frontal view of the chest. CLINICAL INDICATION/HISTORY: Shortness of breath, follow-up intubation COMPARISON: 5/1/2019 _______________ FINDINGS: Stable and satisfactory position endotracheal tube, interval placement NG tube extending into the stomach with the tip not included on field of view. Persistent hilar vascular prominence with interval increased hazy density right lower hemithorax obscuring right hemidiaphragm with blunting costophrenic angle. Interval increased retrocardiac density obscuring left hemidiaphragm with slight blunting left lateral costophrenic angle. Heart size upper limits of normal given for technique. No pneumothorax. . _______________     IMPRESSION: 1. Satisfactory position endotracheal tube, interval placement NG tube. 2. Interval increased opacity lower lung zones right greater than left suggestive of worsening infiltrate atelectasis or edema, with suggestion of small pleural effusion right greater than left. Xr Chest Port    Result Date: 5/1/2019  EXAM: XR CHEST PORT CLINICAL INDICATION/HISTORY: Abscess, respiratory failure, endotracheal tube placement -Additional: None COMPARISON: Several prior exams, most recently 2/13/2019. TECHNIQUE: Frontal view of the chest _______________ FINDINGS: SUPPORT DEVICES: There is an endotracheal tube which projects approximately 3.3 cm above the reggie. HEART AND MEDIASTINUM: Cardiac size and mediastinal contours as visualized appear stable. LUNGS AND PLEURAL SPACES: The lungs are moderately underexpanded, similar to prior. Patchy right perihilar and bilateral lower lung opacities noted. No pneumothorax. Small bilateral pleural effusions. BONY THORAX AND SOFT TISSUES: No acute osseous abnormality _______________     IMPRESSION: 1. Endotracheal tube in position as above. 2. Right perihilar and basilar atelectasis/ airspace disease and small bilateral pleural effusions.      Xr Abd Port  1 V    Result Date: 5/1/2019  EXAM: XR ABD PORT  1 V CLINICAL INDICATION/HISTORY: OG Tube placement   > Additional: None. COMPARISON: Same-day chest radiograph, prior CT May 1, 2019. TECHNIQUE: AP supine projection of the abdomen on 3 exposures. _______________ FINDINGS: Orogastric tube below the diaphragm with tip projecting over the stomach. Right femoral approach central venous catheter noted. Temperature probe from indwelling Cardoza catheter noted at the lower pelvic midline. Nonobstructive and nonspecific bowel gas pattern. Bilateral pleural effusions and lower lung/retrocardiac opacities are unchanged. _______________     IMPRESSION: 1. Orogastric tube present within the stomach. 2. Small pleural effusions and basilar atelectasis/airspace disease. Discharge Medications:     Current Discharge Medication List      START taking these medications    Details   predniSONE (DELTASONE) 50 mg tablet Take 1 Tab by mouth daily. Qty: 5 Tab, Refills: 0      doxycycline (ADOXA) 100 mg tablet Take 1 Tab by mouth two (2) times a day for 4 days. Qty: 8 Tab, Refills: 0         CONTINUE these medications which have NOT CHANGED    Details   bumetanide (BUMEX) 1 mg tablet Take 1 Tab by mouth two (2) times a day. Qty: 60 Tab, Refills: 0      pantoprazole (PROTONIX) 40 mg tablet Take 1 Tab by mouth daily. Qty: 30 Tab, Refills: 0      traMADol (ULTRAM) 50 mg tablet Take 1 Tab by mouth every six (6) hours as needed. Max Daily Amount: 200 mg. Qty: 18 Tab, Refills: 0    Associated Diagnoses: Discitis of thoracic region      acetaminophen (TYLENOL) 325 mg tablet Take 2 Tabs by mouth every four (4) hours as needed for Pain. Qty: 120 Tab, Refills: 0      gabapentin (NEURONTIN) 400 mg capsule Take 400 mg by mouth three (3) times daily. ipratropium-albuterol (COMBIVENT RESPIMAT)  mcg/actuation inhaler Take 1 Puff by inhalation every twelve (12) hours. Indications: Chronic Obstructive Pulmonary Disease with Bronchospasms      simvastatin (ZOCOR) 10 mg tablet Take  by mouth nightly.          STOP taking these medications       ertapenem 1 gram 1 g IVPB Comments:   Reason for Stopping:               Activity: Activity as tolerated    Diet: Cardiac Diet    Wound Care: None needed    Follow-up: 1 week with PCP - pt to arrange    Discharge time: >35 minutes    CHICHI [address]  Office:  [phone]  Pager: [phone]      5/4/2019, 2:03 PM

## 2019-05-04 NOTE — PROGRESS NOTES
Respiratory Therapy Assessment Care Plan Patient: 
Rupert Jensen 77 y.o. male 5/4/2019 9:48 AM 
 
Sepsis ([facility] [A41.9] Acute respiratory failure ([facility] [J96.00] Chest X-RAY:  
Results from Hospital Encounter encounter on 05/01/19 XR ABD PORT  1 V Impression IMPRESSION: 
 
 
1. Orogastric tube present within the stomach. 2. Small pleural effusions and basilar atelectasis/airspace disease. XR CHEST PORT Impression IMPRESSION: 
 
1. Satisfactory position endotracheal tube, interval placement NG tube. 2. Interval increased opacity lower lung zones right greater than left 
suggestive of worsening infiltrate atelectasis or edema, with suggestion of 
small pleural effusion right greater than left. XR CHEST PORT Impression IMPRESSION: 
 
 
1. Endotracheal tube in position as above. 2. Right perihilar and basilar atelectasis/ airspace disease and small bilateral 
pleural effusions. Vital Signs:   Visit Vitals BP (!) 170/99 (BP 1 Location: Right arm, BP Patient Position: At rest) Pulse 71 Temp 97.5 °F (36.4 °C) Resp 17 Ht 6' (1.829 m) Wt 114.3 kg (252 lb) SpO2 97% BMI 34.18 kg/m² Indications for treatment: Acute respiratory failure with hypercapnia Plan of care: O2 therapy, scheduled bronchodilators, BIPAP QHS Goal: Maintain O2 at home regimen 2 lpm, bronchodilators PRN, manage chronic hypercapnia with BIPAP

## 2019-05-04 NOTE — PROGRESS NOTES
Problem: Falls - Risk of 
Goal: *Absence of Falls Description Document Easter Getting Fall Risk and appropriate interventions in the flowsheet. Outcome: Progressing Towards Goal 
  
Problem: Patient Education: Go to Patient Education Activity Goal: Patient/Family Education Outcome: Progressing Towards Goal 
  
Problem: Pressure Injury - Risk of 
Goal: *Prevention of pressure injury Description Document Raul Scale and appropriate interventions in the flowsheet. Outcome: Progressing Towards Goal 
  
Problem: Patient Education: Go to Patient Education Activity Goal: Patient/Family Education Outcome: Progressing Towards Goal 
  
Problem: Discharge Planning Goal: *Discharge to safe environment Outcome: Progressing Towards Goal 
  
Problem: Pain Goal: *Control of Pain Outcome: Progressing Towards Goal 
  
Problem: Patient Education: Go to Patient Education Activity Goal: Patient/Family Education Outcome: Progressing Towards Goal 
  
Problem: Gas Exchange - Impaired Goal: *Absence of hypoxia Outcome: Progressing Towards Goal 
  
Problem: Patient Education: Go to Patient Education Activity Goal: Patient/Family Education Outcome: Progressing Towards Goal

## 2019-05-04 NOTE — PROGRESS NOTES
Pharmacy Dosing Services: Vancomycin Indication: Sepsis Day of therapy: 4 Other Antimicrobials (Include dose, start day & day of therapy): Levofloxacin 750 mg every 48 hours Zosyn 3.375 gm every 8 hours extended infusion Loading dose (date given): 2000 mg  
Current Maintenance dose: 1500 mg IV every 24 hours Goal Vancomycin Level: 15-20 
(Trough 15-20 for most infections, 20 for meningitis/osteomyelitis, pre-HD level ~25) Vancomycin Level (if drawn):  
5/2 - 11.9 (20 hours post-dose) /4 - 13.9 (23 hours post-dose) Significant Cultures:  
 Blood - Staph  Urine - NGTD Renal function stable? (unstable defined as SCr increase of 0.5 mg/dL or > 50% increase from baseline, whichever is greater) (Y/N): N (improving) CAPD, Hemodialysis or Renal Replacement Therapy (Y/N): N Recent Labs 19 
0715 19 
0300 19 
0409 CREA 1.36* 1.76* 2.34* BUN 34* 37* 34* WBC 4.2* 5.1 7.3 Temp (24hrs), Av.8 °F (36.6 °C), Min:97.1 °F (36.2 °C), Max:98.3 °F (36.8 °C) Creatinine Clearance (Creatinine Clearance (ml/min)): 66 mL/min Regimen assessment:  
- extrapolated trough is 16; renal function improving continue the same Maintenance dose: 1500 mg IV every 24 hours Next scheduled level: trough on  at 0930 Pharmacy will follow daily and adjust medications as appropriate for renal function and/or serum levels. Thank you, MART Swanson

## 2019-05-04 NOTE — DIABETES MGMT
NUTRITIONAL ASSESSMENT AND GLYCEMIC CONTROL PLAN OF CARE [place] [employer]           77 y.o. COPD Respiratory Failure Pneumonia PRESLEY Patient Active Problem List  
Diagnosis Code  Bilateral pleural effusion J90  
 Lumbar stenosis M48.061  
 Adenoma of left adrenal gland D35.02  
 Hypertension I10  
 Cirrhosis of liver ([facility] [address] K74.60  Hepatitis C B19.20  Obesity E66.9 []  No Cultural, Taoism or ethnic dietary need identified. [x]  Cultural, Taoism and ethnic food preferences identified and addressed [x]  Participated in discharge planning/Interdisciplinary rounds Food allergies: []  No        [x]  Adrián Mendez ASSESSMENT:  
Pt 's wt has increased 30 lbs since 2/2019 admission. Pt  is 142% ideal wt with BMI=34.2kg/m2, he meets criteria for obesity. Pt is well nourished. No dietary problems at this time. 
  
INTERVENTIONS/PLAN:  
Continue present diet. 
  
SUBJECTIVE/OBJECTIVE: Information obtained from: Pt/ICU rounds 
  
Diet:2g Na Diabetic  -shellfish allergy 
   
  
Medications: [x]                Reviewed  
  
  
Labs:  
     
Lab Results Component Value Date/Time  
  Hemoglobin A1c 5.8 12/06/2010 04:45 AM  
Meets criteria for prediabetes in 2010, current BG is in the target range 
     
  
Lab Results Component Value Date/Time Sodium 140 05/04/2019 07:15 AM  
 Potassium 4.7 05/04/2019 07:15 AM  
 Chloride 109 (H) 05/04/2019 07:15 AM  
 CO2 25 05/04/2019 07:15 AM  
 Anion gap 6 05/04/2019 07:15 AM  
 Glucose 100 (H) 05/04/2019 07:15 AM  
 BUN 34 (H) 05/04/2019 07:15 AM  
 Creatinine 1.36 (H) 05/04/2019 07:15 AM  
 BUN/Creatinine ratio 25 (H) 05/04/2019 07:15 AM  
 GFR est AA >60 05/04/2019 07:15 AM  
 GFR est non-AA 52 (L) 05/04/2019 07:15 AM  
 Calcium 8.9 05/04/2019 07:15 AM  
 
Anthropometrics: 
 
Last 3 Recorded Weights in this Encounter 05/01/19 0413 05/02/19 1610 Weight: 114.7 kg (252 lb 14.4 oz) 114.3 kg (252 lb) 142% Ideal Wt    , BMI (calculated): 34.2 kg/m2 Wt Readings from Last 1 Encounters:  
02/13/19 100.5 kg (221 lb 9.6 oz) 5/4/19 252 lbs Ht Readings from Last 1 Encounters:  
05/3/19 6' (1.829 m)  
  
Estimated Nutrition Needs:  ,      2200 calories, 84 g protein      fluid reqrts 2.2 liters/d Based on:   [x]          Actual BW           []          IBW           []            Adjusted BW   
Nutrition Diagnoses: as stated above Nutrition Interventions: continue current diet Goal:  
   Intake will meet >75% of energy and protein requirements by5 /9met . Glucose will be within target range of 70-180mg/dl by 5/7-met . Gradual weight loss post discharge 1 lb per week or weight maintenance acceptable due to dx.by 5/14/19. 
  
Nutrition Monitoring and Evaluation   
  
[]     Monitor po intake on meal rounds 
[x]     Continue inpatient monitoring and intervention 
[]     Other: 
  
  
Nutrition Risk:  []   High       []  Moderate       [x]  Minimal/Uncompromised 
  
Roldan Desouza RD 
[facility] [phone]

## 2019-05-04 NOTE — PROGRESS NOTES
Problem: Falls - Risk of 
Goal: *Absence of Falls Description Document Kayla Das Fall Risk and appropriate interventions in the flowsheet. Outcome: Progressing Towards Goal 
  
Problem: Pain Goal: *Control of Pain Outcome: Progressing Towards Goal

## 2019-05-05 NOTE — ROUTINE PROCESS
Care Management Interventions Palliative Care Criteria Met (RRAT>21 & CHF Dx)?: Yes 
Palliative Consult Recommended?: Yes Mode of Transport at Discharge: Other (see comment)(family, [facility] cab) Transition of Care Consult (CM Consult): Discharge Planning Discharge Durable Medical Equipment: No 
Physical Therapy Consult: No 
Occupational Therapy Consult: No 
Speech Therapy Consult: No 
Current Support Network: Relative's Home(lives with elderly mother) Confirm Follow Up Transport: Self([facility]) Discharge Location Discharge Placement: Home Pt discharged when Care Manager present. Mother drove home. No Home Health written but pt not home bound currently.  If needs HH can go through PCP

## 2019-05-24 VITALS
BODY MASS INDEX: 34.13 KG/M2 | DIASTOLIC BLOOD PRESSURE: 79 MMHG | TEMPERATURE: 97.4 F | RESPIRATION RATE: 17 BRPM | OXYGEN SATURATION: 93 % | HEART RATE: 77 BPM | SYSTOLIC BLOOD PRESSURE: 128 MMHG | HEIGHT: 72 IN | WEIGHT: 252 LBS

## 2019-09-04 NOTE — ROUTINE PROCESS
1940: Assumed care. Awake. HOB elevated. Dyspneic on exertion. On oxygen at 3 LPM. Denies any pain or discomfort at this time. Call light within reach. 2116: Due meds given. 2245: Incontinence care provided. 2346: No change from previous assessment. 6971: No change from previous assessment. 9029: Slept Good thru night. Needs attended. Incontinence care provided. Medicated for pain per patient request. 
 
1487: Bedside and Verbal shift change report given to Clare RN (oncoming nurse) by me (offgoing nurse). Report included the following information SBAR, Kardex, Intake/Output, MAR and Recent Results. Strong peripheral pulses

## 2019-10-17 ENCOUNTER — HOSPITAL ENCOUNTER (OUTPATIENT)
Dept: LAB | Age: 69
Discharge: HOME | End: 2019-10-17
Payer: MEDICAID

## 2019-10-17 LAB
ALBUMIN SERPL-MCNC: 3.3 G/DL (ref 3.4–5)
ALBUMIN/GLOB SERPL: 0.7 {RATIO} (ref 0.8–1.7)
ALP SERPL-CCNC: 95 U/L (ref 45–117)
ALT SERPL-CCNC: 37 U/L (ref 16–61)
ANION GAP SERPL CALC-SCNC: 3 MMOL/L (ref 3–18)
AST SERPL-CCNC: 34 U/L (ref 10–38)
BASOPHILS # BLD: 0 K/UL (ref 0–0.1)
BASOPHILS NFR BLD: 0 % (ref 0–2)
BILIRUB SERPL-MCNC: 0.7 MG/DL (ref 0.2–1)
BUN SERPL-MCNC: 13 MG/DL (ref 7–18)
BUN/CREAT SERPL: 14 (ref 12–20)
CALCIUM SERPL-MCNC: 8.4 MG/DL (ref 8.5–10.1)
CHLORIDE SERPL-SCNC: 99 MMOL/L (ref 100–111)
CHOLEST SERPL-MCNC: 191 MG/DL
CO2 SERPL-SCNC: 38 MMOL/L (ref 21–32)
CREAT SERPL-MCNC: 0.93 MG/DL (ref 0.6–1.3)
DIFFERENTIAL METHOD BLD: ABNORMAL
EOSINOPHIL # BLD: 0.1 K/UL (ref 0–0.4)
EOSINOPHIL NFR BLD: 1 % (ref 0–5)
ERYTHROCYTE [DISTWIDTH] IN BLOOD BY AUTOMATED COUNT: 13.6 % (ref 11.6–14.5)
GLOBULIN SER CALC-MCNC: 4.9 G/DL (ref 2–4)
GLUCOSE SERPL-MCNC: 86 MG/DL (ref 74–99)
HCT VFR BLD AUTO: 40.9 % (ref 36–48)
HDLC SERPL-MCNC: 56 MG/DL (ref 40–60)
HDLC SERPL: 3.4 {RATIO} (ref 0–5)
HGB BLD-MCNC: 12.5 G/DL (ref 13–16)
LDLC SERPL CALC-MCNC: 115.2 MG/DL (ref 0–100)
LIPID PROFILE,FLP: ABNORMAL
LYMPHOCYTES # BLD: 2.2 K/UL (ref 0.9–3.6)
LYMPHOCYTES NFR BLD: 49 % (ref 21–52)
MCH RBC QN AUTO: 28.3 PG (ref 24–34)
MCHC RBC AUTO-ENTMCNC: 30.6 G/DL (ref 31–37)
MCV RBC AUTO: 92.7 FL (ref 74–97)
MONOCYTES # BLD: 0.2 K/UL (ref 0.05–1.2)
MONOCYTES NFR BLD: 5 % (ref 3–10)
NEUTS SEG # BLD: 2 K/UL (ref 1.8–8)
NEUTS SEG NFR BLD: 45 % (ref 40–73)
PLATELET # BLD AUTO: 126 K/UL (ref 135–420)
PMV BLD AUTO: 10.4 FL (ref 9.2–11.8)
POTASSIUM SERPL-SCNC: 4.1 MMOL/L (ref 3.5–5.5)
PROT SERPL-MCNC: 8.2 G/DL (ref 6.4–8.2)
RBC # BLD AUTO: 4.41 M/UL (ref 4.7–5.5)
SODIUM SERPL-SCNC: 140 MMOL/L (ref 136–145)
TRIGL SERPL-MCNC: 99 MG/DL (ref ?–150)
VLDLC SERPL CALC-MCNC: 19.8 MG/DL
WBC # BLD AUTO: 4.5 K/UL (ref 4.6–13.2)

## 2019-10-17 PROCEDURE — 80061 LIPID PANEL: CPT

## 2019-10-17 PROCEDURE — 36415 COLL VENOUS BLD VENIPUNCTURE: CPT

## 2019-10-17 PROCEDURE — 80053 COMPREHEN METABOLIC PANEL: CPT

## 2019-10-17 PROCEDURE — 85025 COMPLETE CBC W/AUTO DIFF WBC: CPT

## 2019-10-17 PROCEDURE — 80307 DRUG TEST PRSMV CHEM ANLYZR: CPT

## 2019-10-19 LAB
AMPHETAMINES SERPL QL SCN: NEGATIVE NG/ML
BARBITURATES SERPL QL SCN: NEGATIVE UG/ML
CANNABINOIDS SERPL QL SCN: NEGATIVE NG/ML
COCAINE+BZE SERPL QL SCN: NEGATIVE NG/ML
OPIATES SERPL QL SCN: NEGATIVE NG/ML
OXYCODONE, 790407: NEGATIVE NG/ML
PCP SERPL QL SCN: NEGATIVE NG/ML

## 2020-10-22 NOTE — PROGRESS NOTES
Problem: Falls - Risk of  Goal: *Absence of Falls  Document Sumi Fall Risk and appropriate interventions in the flowsheet. Outcome: Progressing Towards Goal  Fall Risk Interventions:  Mobility Interventions: Bed/chair exit alarm, Communicate number of staff needed for ambulation/transfer, PT Consult for assist device competence, PT Consult for mobility concerns, Strengthening exercises (ROM-active/passive)    Mentation Interventions: Adequate sleep, hydration, pain control, Bed/chair exit alarm, Door open when patient unattended, Increase mobility, More frequent rounding, Toileting rounds, Update white board, Room close to nurse's station    Medication Interventions: Bed/chair exit alarm, Patient to call before getting OOB    Elimination Interventions: Bed/chair exit alarm, Call light in reach, Toilet paper/wipes in reach, Toileting schedule/hourly rounds             Problem: Pressure Injury - Risk of  Goal: *Prevention of pressure injury  Document Raul Scale and appropriate interventions in the flowsheet. Outcome: Progressing Towards Goal  Pressure Injury Interventions:  Sensory Interventions: Assess changes in LOC, Avoid rigorous massage over bony prominences, Float heels, Discuss PT/OT consult with provider, Keep linens dry and wrinkle-free, Maintain/enhance activity level, Minimize linen layers, Pad between skin to skin, Pressure redistribution bed/mattress (bed type), Turn and reposition approx. every two hours (pillows and wedges if needed)    Moisture Interventions: Absorbent underpads, Apply protective barrier, creams and emollients, Maintain skin hydration (lotion/cream), Minimize layers, Moisture barrier, Offer toileting Q_hr    Activity Interventions: Increase time out of bed, Pressure redistribution bed/mattress(bed type), PT/OT evaluation    Mobility Interventions: HOB 30 degrees or less, Pressure redistribution bed/mattress (bed type), Turn and reposition approx.  every two hours(pillow and wedges)    Nutrition Interventions: Document food/fluid/supplement intake    Friction and Shear Interventions: Foam dressings/transparent film/skin sealants, Lift sheet, Minimize layers, Transferring/repositioning devices 0

## 2020-10-23 NOTE — PROGRESS NOTES
OT order received, chart reviewed. 2 attempts for OT evaluation: pt refusing on first attempt. Eating lunch on second attempt. Will follow up as schedule permits. Balta Peterson MS OTR/L Office Ext: [phone] Pager: [phone] no

## 2021-07-26 NOTE — PROGRESS NOTES
Problem: Discharge Planning Goal: *Discharge to safe environment Outcome: Progressing Towards Goal 
 
Plan: home with home health VS SNF depending on progress. no concerns

## 2022-03-04 NOTE — PROGRESS NOTES
Problem: Mobility Impaired (Adult and Pediatric) Goal: *Acute Goals and Plan of Care (Insert Text) Physical Therapy Goals Initiated 11/25/2018 and to be accomplished within 7 day(s) 1. Patient will move from supine to sit and sit to supine , scoot up and down and roll side to side in bed with minimal assistance/contact guard assist.    
2.  Patient will transfer from bed to chair and chair to bed with minimal assistance/contact guard assist using the least restrictive device. 3.  Patient will perform sit to stand with supervision/set-up. 4.  Patient will ambulate with minimal assistance/contact guard assist for >/= 100 feet with the least restrictive device. 5.  Patient will ascend/descend 5 stairs with bilateral handrail(s) with minimal assistance/contact guard assist.  
Outcome: Progressing Towards Goal 
 
PHYSICAL THERAPY: Daily TREATMENT Note INPATIENT: Medicaid: Hospital Day: 16 Patient: Donna Murphy (67 y.o. male)    Date: 11/29/2018 Primary Diagnosis: Discitis of thoracic region Procedure(s) (LRB): Anes MRI (N/A), 14 Days Post-Op, Precautions:   
 
 
Chart, physical therapy assessment, plan of care and goals were reviewed. PLOF:mod I 
 
ASSESSMENT: 
Pt co-treated with OT to maximize pt safety and activity tolerance; pt demonstrated improved log rolling technique today for sup<>sit transfers, CGA with verbal cues for sup>sit; min a with LE's for sit>sup. Brace placed on pt with assist X2, pt CGA/min for sit<>stand X 5 trials from bed and BSC with verbal cues for posture and hand placement. Pt also able to take sidesteps X3 ft during 3 trials, first along bedside, then for transfer to and from [facility]. Pt with swayback posture, improved knee extension in stance today, required frequent verbal and tactile cues for posture correction in stance. Instructed pt in TA draw and glute squeeze to improve postural support in standing. Progression toward goals: Improving appropriately and progressing toward goals PLAN: 
Patient continues to benefit from skilled intervention to address the above impairments. Continue treatment per established plan of care. EDUCATION:  
Education:  Patient was educated on the following topics: posture, safety during transfers with RW Barriers to Learning/Limitations: None Compensate with: visual, verbal, tactile, kinesthetic cues/model Discharge Recommendations:  Franki Bains Further Equipment Recommendations for Discharge:  rolling walker Factors which may impact discharge planning: none SUBJECTIVE:  
Patient stated My back hurts, but I'll try.  OBJECTIVE DATA SUMMARY:  
Critical Behavior: 
Neurologic State: Alert Orientation Level: Oriented X4 Cognition: Decreased attention/concentration G CODE:Mobility [id number] Current  CL= 60-79%   Goal  CJ= 20-39%. The severity rating is based on the Other [address] Standing Balance Scale 
0: Pt performs 25% or less of standing activity (Max assist) CN, 100% impaired. 1: Pt supports self with upper extremities but requires therapist assistance. Pt performs 25-50% of effort (Mod assist) CM, 80% to <100% impaired. 1+: Pt supports self with upper extremities but requires therapist assistance. Pt performs >50% effort. (Min assist). CL, 60% to <80% impaired. 2: Pt supports self independently with both upper extremities (walker, crutches, parallel bars). CL, 60% to <80% impaired. 2+: Pt support self independently with 1 upper extremity (cane, crutch, 1 parallel bar). CK, 40% to <60% impaired. 3: Pt stands without upper extremity support for up to 30 seconds. CK, 40% to <60% impaired. 3+: Pt stands without upper extremity support for 30 seconds or greater. CJ, 20% to <40% impaired. 4: Pt independently moves and returns center of gravity 1-2 inches in one plane. CJ, 20% to <40% impaired. 4+: Pt independently moves and returns center of gravity 1-2 inches in multiple planes. CI, 1% to <20% impaired. 5: Pt independently moves and returns center of gravity in all planes greater than 2 inches. CH, 0% impaired. Functional Mobility: 
 
 
Functional Status Indep (I) Mod I Super-vision Min A Mod A Max A Total A Assist x2 Verbal cues Additional time Not tested Comments Rolling []  []  [] [x]    []    []  []  [] [x] [x] [] Supine to sit []  []  [] [x]  []  []  []  [] [x] [x] [] Sit to supine []  []  [] [x]  []  []  []  [] [x] [x] [] Sit to stand []  []  [] [x]  []  []  []  [] [x] [x] [] Stand to sit []  []  [] [x]  []  []  []  [] [x] [x] [] Bed to chair transfers []  []  [] [x]  []  []  []  [] [x] [x] [] Balance Good [address] Poor Unable Not tested Comments Sitting static [x]  []  []  []  [] Sitting dynamic [x]  []  []  []  []   
Standing static [x]  []  []  []  []   
Standing dynamic [x] (-) []  []  []  [] Therapeutic Exercises:  
 
 
 
EXERCISE Sets Reps Active Active Assist  
Passive Self ROM Comments Ankle Pumps    [] [] [] [] Quad Sets/Glut Sets 1 10 [x] [] [] [] Hamstring Sets   [] [] [] [] Short Arc Quads   [] [] [] [] Heel Slides   [] [] [] [] Straight Leg Raises   [] [] [] [] Hip Abd/Add   [] [] [] [] Long Arc Quads   [] [] [] [] Seated Marching   [] [] [] []   
Standing Marching   [] [] [] []   
TA Draw 1 10 [x] [] [] []   
 
 
Vital Signs Temp: 97.6 °F (36.4 °C) Pulse (Heart Rate): 74 BP: 117/70 Resp Rate: 18    
O2 Sat (%): 97 %Pain:Pre treatment pain level:5 Post treatment pain level:5Pain Scale 1: Numeric (0 - 10) Pain Intensity 1: 5 Activity Tolerance:  
Good After treatment:  
 
Patient left in no apparent distress in bed in chair position Call bell left within reach Nursing notified David Crabtree PTA Time Calculation: 40 mins details…

## 2022-06-24 NOTE — ROUTINE PROCESS
REPORT RECEIVED FROM YONNY,RN. PT IN ICU BED. YEE. Doxepin Pregnancy And Lactation Text: This medication is Pregnancy Category C and it isn't known if it is safe during pregnancy. It is also excreted in breast milk and breast feeding isn't recommended.

## 2023-02-15 NOTE — PROGRESS NOTES
INFECTIOUS DISEASE FOLLOW UP NOTE : 
 
Admit Date: 1/21/2019 Overview: 77year-old AA male w/ h/o polysubstance abuse including cocaine, heroin, T 10-11 destructive changes in November 2018 w/ nl ESR, CRP, negative bone/gallium scans - not felt to be infectious then but had Serratia UTI rx'd Cefepime/Ceftriaxone 5 days. Returned 1/21 with back pain, BSI Serratia 1/21 and CT 1/21 with similar destructive changes T9-10 levels c/w discitis/OM and intraspinal and paraspinal phlegmon s/p needle aspiration 1/23 cx + Serratia. Had recurrent respiratory failure requiring RRT 1/23, 1/24. Has residual bilateral pleural effusions on CXR 2/4. Refused surgery offered by Dr. Hima Akhtar in November - willing to re-consider. Current abx Prior abx Ertapenem 2/5 - 6   abx 1/23 - 19 of 42 vs 56 Cefepime/vanco  11/14   2, Ceftriaxone 11/16 - 3; Zosyn 1/21-2  ceftaz 1/23-13, cipro 1/23-13  
  
Assessment -> Rec:  
  
Destructive T9-10 changes discitis/OM 
- mid-back pain x 5 months, fell, incontinent of urine - h/o IVDU - last use in July 2018 
- CTAP 11/2018: severe destructive changes T 10-11, paravertebral sot tissue infiltration, extens to ant epidural space w/ cord compression 
- unable to have MRI due to claustrophobia - Bone/ gallium scans 11/27 neg for discitis/OM, ESR 20, CRP 0.7 
- not given long term IV abx  
- refused stabilization procedure per Dr. Hima Akhtar 
- returned 1/21 w/ worsened back pain, blcx + Serratia  
- CT 1/21: destructive changes T9-10 levels c/w discitis/OM and intraspinal and paraspinal phlegmon. Overall appearance unchanged and pain not different from previous - 1/22: esr 38, crp 7.4->1/31 57/2.1 
- s/p aspiration of fluid/phlegmon T10-11 discspace 1/23: (+) Serratia in anaerobic culture - potential for emergent AmpC- beta-lactamase- mediated resistance to third-generation cephalosporins not be evident during initial susc testing so Carbapenem is DOC 
- CRP down to 1.5 from 2.1 1/31. ESR 49 (from 57) -> Ertapenem 1 gm q 24 hours 6-8 weeks  
-> no accepting SNF or LTAC. May need to do OP infusion if back pain improves enough to permit this. BSI 2 of 2 Serratia marcescens 1/21 
- source ~ Discitis Phlegmon seen on CT 
- IR aspiration 1/23 chucky culture grew Serratia  
- denies any urinary complaint but had Serratia in urine in past. Currently UA is negative - Ucx was not sent but CT with normal kidneys and nothing sig on CT and pt asymptomatic except some incontinet 
- repeat Blcx 1/22 1/2 positive  
- blcx 1/28 NG x 2 
- BSI adequately treated at this time  -> Continue Ertapenem for osteomyelitis as above Acute hypoxic resp distress- RRT called 1/23 and again 1/24 sec to hypercapnic resp failure - off BIPAP now on NC Bilateral pleural effusions- chronic with  atelectasis - monitor Hepatomegaly, splenic hilar & perisplenic varices- concern for cirrhosis and portal HTN 
- seen on CTAP again    
H/o IVDU 
- HIV ab NR 11/19 ,Hep BsAg neg (last reported ivdu August 2018, has hep C and thinks hep b immune) 
- denies current use -> avoid PICC 
   
COPD    
HTN    
DJD    
Allergy [facility]    
  
MICROBIOLOGY:  
11/14   urcx >100,000 Serratia marcescens             blcx NG x 2 
11/15   CrAG neg, fungitell 375 (reported 11/17) 
11/29   blcx for fungus - ngtd 
            fungitell 113 
  
1/21     Blcx x2 Serratia marcescens R cefazolin UA neg 
1/22 Blcx 1 of 2 Serratia 1/23 Aspiration cx g/s neg, Cx NG 
 CHUCKY rare Serratia R Cefazolin Fungal NOS 
1/28  bctx x 2 ng 
  
LINES AND CATHETERS:  
piv 
  
MEDICATIONS:  
 
Current Facility-Administered Medications Medication Dose Route Frequency  lisinopril (PRINIVIL, ZESTRIL) tablet 10 mg  10 mg Oral DAILY  albuterol-ipratropium (DUO-NEB) 2.5 MG-0.5 MG/3 ML  3 mL Nebulization Q4H PRN  
 traMADol (ULTRAM) tablet 50 mg  50 mg Oral Q6H PRN  
  ertapenem (INVANZ) 1 g in 0.9% sodium chloride (MBP/ADV) 50 mL MBP  1 g IntraVENous Q24H  
 bumetanide (BUMEX) tablet 1 mg  1 mg Oral BID  pantoprazole (PROTONIX) tablet 40 mg  40 mg Oral DAILY  guaiFENesin (ROBITUSSIN) 100 mg/5 mL oral liquid 400 mg  400 mg Oral Q4H  
 lidocaine (PF) (XYLOCAINE) 10 mg/mL (1 %) injection 20 mL  20 mL SubCUTAneous Rad Multiple  gabapentin (NEURONTIN) capsule 400 mg  400 mg Oral TID  simvastatin (ZOCOR) tablet 10 mg  10 mg Oral QHS  acetaminophen (TYLENOL) tablet 650 mg  650 mg Oral Q6H PRN  
 ondansetron (ZOFRAN) injection 4 mg  4 mg IntraVENous Q6H PRN  
 heparin (porcine) injection 5,000 Units  5,000 Units SubCUTAneous Q8H SUBJECTIVE :  
 
Interval notes reviewed. Presently lying in bed as usual but says he's getting up more and sat in chair twice today. Back pain persists but he can stand with assistance. OBJECTIVE Visit Vitals /67 (BP 1 Location: Left arm, BP Patient Position: Head of bed elevated (Comment degrees)) Pulse 92 Temp 98.3 °F (36.8 °C) Resp 18 Ht 6' (1.829 m) Wt 99.4 kg (219 lb 3.2 oz) SpO2 96% BMI 29.73 kg/m² Temp (24hrs), Av.1 °F (36.7 °C), Min:97.5 °F (36.4 °C), Max:98.6 °F (37 °C) Gen:on NC O2, awake and alert HEENT: muddy sclerae, pupils equal and reactive Chest: Symmetric expansion, no crackles or wheezing CVS:S1S2 RR, No JVD or edema Abd:Soft, NT, ND, BS+ Skin:No jaundice, cyanosis, clubbing. No decubitus Muscsk: Normal muscle tone/strength CNS: AA and follows command Labs: Results:  
Chemistry No results for input(s): GLU, NA, K, CL, CO2, BUN, CREA, CA, AGAP, BUCR, TBIL, GPT, AP, TP, ALB, GLOB, AGRAT in the last 72 hours. CBC w/Diff No results for input(s): WBC, RBC, HGB, HCT, PLT, GRANS, LYMPH, EOS, HGBEXT, HCTEXT, PLTEXT, HGBEXT, HCTEXT, PLTEXT in the last 72 hours.   
 
RADIOLOGY :   
cxr  IMPRESSION: 
  
 Stable appearing densities at both lung bases compatible with pleural effusions 
with adjacent atelectasis/infiltrate 2/4 CT chest IMPRESSION: 
  
1. Moderate bilateral pleural effusions, left > right, amenable to image guided 
thoracentesis if clinically appropriate.   
  
2. Progressive fragmentation and bone loss of T9 from known 
discitis/osteomyelitis at T9-10. 
  
3. Stable discitis/osteomyelitis changes at T3-4. 
  
4. Other chronic findings detailed above and without significant interval 
change. 
  
 
 
Maximilian Voss MD 
February 11, 2019 [facility] [phone] Deep Sutures: 5-0 Vicryl